# Patient Record
Sex: FEMALE | Race: WHITE | NOT HISPANIC OR LATINO | URBAN - METROPOLITAN AREA
[De-identification: names, ages, dates, MRNs, and addresses within clinical notes are randomized per-mention and may not be internally consistent; named-entity substitution may affect disease eponyms.]

---

## 2017-11-07 ENCOUNTER — EMERGENCY (EMERGENCY)
Facility: HOSPITAL | Age: 67
LOS: 1 days | Discharge: HOME | End: 2017-11-07
Attending: EMERGENCY MEDICINE | Admitting: EMERGENCY MEDICINE
Payer: MEDICARE

## 2017-11-07 VITALS
DIASTOLIC BLOOD PRESSURE: 90 MMHG | RESPIRATION RATE: 27 BRPM | HEART RATE: 93 BPM | OXYGEN SATURATION: 94 % | SYSTOLIC BLOOD PRESSURE: 180 MMHG | TEMPERATURE: 98 F

## 2017-11-07 VITALS
TEMPERATURE: 98 F | HEART RATE: 88 BPM | DIASTOLIC BLOOD PRESSURE: 100 MMHG | SYSTOLIC BLOOD PRESSURE: 150 MMHG | OXYGEN SATURATION: 97 % | RESPIRATION RATE: 18 BRPM

## 2017-11-07 LAB
ALBUMIN SERPL ELPH-MCNC: 4.2 G/DL — SIGNIFICANT CHANGE UP (ref 3.3–5)
ALP SERPL-CCNC: 91 U/L — SIGNIFICANT CHANGE UP (ref 40–120)
ALT FLD-CCNC: 44 U/L RC — SIGNIFICANT CHANGE UP (ref 10–45)
ANION GAP SERPL CALC-SCNC: 16 MMOL/L — SIGNIFICANT CHANGE UP (ref 5–17)
APPEARANCE UR: ABNORMAL
APTT BLD: 27.7 SEC — SIGNIFICANT CHANGE UP (ref 27.5–37.4)
AST SERPL-CCNC: 49 U/L — HIGH (ref 10–40)
BACTERIA # UR AUTO: ABNORMAL /HPF
BASOPHILS # BLD AUTO: 0 K/UL — SIGNIFICANT CHANGE UP (ref 0–0.2)
BASOPHILS NFR BLD AUTO: 0.6 % — SIGNIFICANT CHANGE UP (ref 0–2)
BILIRUB SERPL-MCNC: 0.4 MG/DL — SIGNIFICANT CHANGE UP (ref 0.2–1.2)
BILIRUB UR-MCNC: NEGATIVE — SIGNIFICANT CHANGE UP
BLD GP AB SCN SERPL QL: NEGATIVE — SIGNIFICANT CHANGE UP
BUN SERPL-MCNC: 16 MG/DL — SIGNIFICANT CHANGE UP (ref 7–23)
CALCIUM SERPL-MCNC: 9.1 MG/DL — SIGNIFICANT CHANGE UP (ref 8.4–10.5)
CHLORIDE SERPL-SCNC: 101 MMOL/L — SIGNIFICANT CHANGE UP (ref 96–108)
CK SERPL-CCNC: 90 U/L — SIGNIFICANT CHANGE UP (ref 25–170)
CO2 SERPL-SCNC: 28 MMOL/L — SIGNIFICANT CHANGE UP (ref 22–31)
COLOR SPEC: SIGNIFICANT CHANGE UP
CREAT SERPL-MCNC: 0.92 MG/DL — SIGNIFICANT CHANGE UP (ref 0.5–1.3)
DIFF PNL FLD: ABNORMAL
EOSINOPHIL # BLD AUTO: 0.1 K/UL — SIGNIFICANT CHANGE UP (ref 0–0.5)
EOSINOPHIL NFR BLD AUTO: 1.2 % — SIGNIFICANT CHANGE UP (ref 0–6)
EPI CELLS # UR: SIGNIFICANT CHANGE UP /HPF
ETHANOL SERPL-MCNC: 13 MG/DL — HIGH (ref 0–10)
GLUCOSE SERPL-MCNC: 100 MG/DL — HIGH (ref 70–99)
GLUCOSE UR QL: NEGATIVE — SIGNIFICANT CHANGE UP
HCT VFR BLD CALC: 40.4 % — SIGNIFICANT CHANGE UP (ref 34.5–45)
HGB BLD-MCNC: 13.9 G/DL — SIGNIFICANT CHANGE UP (ref 11.5–15.5)
INR BLD: 1.11 RATIO — SIGNIFICANT CHANGE UP (ref 0.88–1.16)
KETONES UR-MCNC: NEGATIVE — SIGNIFICANT CHANGE UP
LEUKOCYTE ESTERASE UR-ACNC: ABNORMAL
LIDOCAIN IGE QN: 28 U/L — SIGNIFICANT CHANGE UP (ref 7–60)
LYMPHOCYTES # BLD AUTO: 2 K/UL — SIGNIFICANT CHANGE UP (ref 1–3.3)
LYMPHOCYTES # BLD AUTO: 29.5 % — SIGNIFICANT CHANGE UP (ref 13–44)
MCHC RBC-ENTMCNC: 30.9 PG — SIGNIFICANT CHANGE UP (ref 27–34)
MCHC RBC-ENTMCNC: 34.3 GM/DL — SIGNIFICANT CHANGE UP (ref 32–36)
MCV RBC AUTO: 90.2 FL — SIGNIFICANT CHANGE UP (ref 80–100)
MONOCYTES # BLD AUTO: 0.5 K/UL — SIGNIFICANT CHANGE UP (ref 0–0.9)
MONOCYTES NFR BLD AUTO: 6.8 % — SIGNIFICANT CHANGE UP (ref 2–14)
NEUTROPHILS # BLD AUTO: 4.3 K/UL — SIGNIFICANT CHANGE UP (ref 1.8–7.4)
NEUTROPHILS NFR BLD AUTO: 61.9 % — SIGNIFICANT CHANGE UP (ref 43–77)
NITRITE UR-MCNC: POSITIVE
PH UR: 6.5 — SIGNIFICANT CHANGE UP (ref 5–8)
PLATELET # BLD AUTO: 238 K/UL — SIGNIFICANT CHANGE UP (ref 150–400)
POTASSIUM SERPL-MCNC: 3.7 MMOL/L — SIGNIFICANT CHANGE UP (ref 3.5–5.3)
POTASSIUM SERPL-SCNC: 3.7 MMOL/L — SIGNIFICANT CHANGE UP (ref 3.5–5.3)
PROT SERPL-MCNC: 8 G/DL — SIGNIFICANT CHANGE UP (ref 6–8.3)
PROT UR-MCNC: 30 MG/DL
PROTHROM AB SERPL-ACNC: 12 SEC — SIGNIFICANT CHANGE UP (ref 9.8–12.7)
RBC # BLD: 4.48 M/UL — SIGNIFICANT CHANGE UP (ref 3.8–5.2)
RBC # FLD: 13 % — SIGNIFICANT CHANGE UP (ref 10.3–14.5)
RBC CASTS # UR COMP ASSIST: ABNORMAL /HPF (ref 0–2)
RH IG SCN BLD-IMP: POSITIVE — SIGNIFICANT CHANGE UP
SODIUM SERPL-SCNC: 145 MMOL/L — SIGNIFICANT CHANGE UP (ref 135–145)
SP GR SPEC: 1.01 — SIGNIFICANT CHANGE UP (ref 1.01–1.02)
TROPONIN T SERPL-MCNC: <0.01 NG/ML — SIGNIFICANT CHANGE UP (ref 0–0.06)
UROBILINOGEN FLD QL: NEGATIVE — SIGNIFICANT CHANGE UP
WBC # BLD: 7 K/UL — SIGNIFICANT CHANGE UP (ref 3.8–10.5)
WBC # FLD AUTO: 7 K/UL — SIGNIFICANT CHANGE UP (ref 3.8–10.5)
WBC UR QL: ABNORMAL /HPF (ref 0–5)

## 2017-11-07 PROCEDURE — 86900 BLOOD TYPING SEROLOGIC ABO: CPT

## 2017-11-07 PROCEDURE — 99285 EMERGENCY DEPT VISIT HI MDM: CPT | Mod: 25

## 2017-11-07 PROCEDURE — 82435 ASSAY OF BLOOD CHLORIDE: CPT

## 2017-11-07 PROCEDURE — 96374 THER/PROPH/DIAG INJ IV PUSH: CPT | Mod: XU

## 2017-11-07 PROCEDURE — 72125 CT NECK SPINE W/O DYE: CPT

## 2017-11-07 PROCEDURE — 83605 ASSAY OF LACTIC ACID: CPT

## 2017-11-07 PROCEDURE — 85610 PROTHROMBIN TIME: CPT

## 2017-11-07 PROCEDURE — 81001 URINALYSIS AUTO W/SCOPE: CPT

## 2017-11-07 PROCEDURE — 80053 COMPREHEN METABOLIC PANEL: CPT

## 2017-11-07 PROCEDURE — 82803 BLOOD GASES ANY COMBINATION: CPT

## 2017-11-07 PROCEDURE — 85014 HEMATOCRIT: CPT

## 2017-11-07 PROCEDURE — 71010: CPT | Mod: 26

## 2017-11-07 PROCEDURE — 72125 CT NECK SPINE W/O DYE: CPT | Mod: 26

## 2017-11-07 PROCEDURE — 72170 X-RAY EXAM OF PELVIS: CPT | Mod: 26

## 2017-11-07 PROCEDURE — 84295 ASSAY OF SERUM SODIUM: CPT

## 2017-11-07 PROCEDURE — 84484 ASSAY OF TROPONIN QUANT: CPT

## 2017-11-07 PROCEDURE — 70450 CT HEAD/BRAIN W/O DYE: CPT | Mod: 26

## 2017-11-07 PROCEDURE — 70450 CT HEAD/BRAIN W/O DYE: CPT

## 2017-11-07 PROCEDURE — 93010 ELECTROCARDIOGRAM REPORT: CPT | Mod: 59

## 2017-11-07 PROCEDURE — 85027 COMPLETE CBC AUTOMATED: CPT

## 2017-11-07 PROCEDURE — 72170 X-RAY EXAM OF PELVIS: CPT

## 2017-11-07 PROCEDURE — 90471 IMMUNIZATION ADMIN: CPT

## 2017-11-07 PROCEDURE — 84132 ASSAY OF SERUM POTASSIUM: CPT

## 2017-11-07 PROCEDURE — 85730 THROMBOPLASTIN TIME PARTIAL: CPT

## 2017-11-07 PROCEDURE — 82330 ASSAY OF CALCIUM: CPT

## 2017-11-07 PROCEDURE — 86850 RBC ANTIBODY SCREEN: CPT

## 2017-11-07 PROCEDURE — 82550 ASSAY OF CK (CPK): CPT

## 2017-11-07 PROCEDURE — 93005 ELECTROCARDIOGRAM TRACING: CPT

## 2017-11-07 PROCEDURE — 86901 BLOOD TYPING SEROLOGIC RH(D): CPT

## 2017-11-07 PROCEDURE — 82947 ASSAY GLUCOSE BLOOD QUANT: CPT

## 2017-11-07 PROCEDURE — 80307 DRUG TEST PRSMV CHEM ANLYZR: CPT

## 2017-11-07 PROCEDURE — 87186 SC STD MICRODIL/AGAR DIL: CPT

## 2017-11-07 PROCEDURE — 90715 TDAP VACCINE 7 YRS/> IM: CPT

## 2017-11-07 PROCEDURE — 87086 URINE CULTURE/COLONY COUNT: CPT

## 2017-11-07 PROCEDURE — 99284 EMERGENCY DEPT VISIT MOD MDM: CPT | Mod: 25,GC

## 2017-11-07 PROCEDURE — 71045 X-RAY EXAM CHEST 1 VIEW: CPT

## 2017-11-07 PROCEDURE — 82553 CREATINE MB FRACTION: CPT

## 2017-11-07 PROCEDURE — 83690 ASSAY OF LIPASE: CPT

## 2017-11-07 PROCEDURE — 12002 RPR S/N/AX/GEN/TRNK2.6-7.5CM: CPT | Mod: GC

## 2017-11-07 RX ORDER — TETANUS TOXOID, REDUCED DIPHTHERIA TOXOID AND ACELLULAR PERTUSSIS VACCINE, ADSORBED 5; 2.5; 8; 8; 2.5 [IU]/.5ML; [IU]/.5ML; UG/.5ML; UG/.5ML; UG/.5ML
0.5 SUSPENSION INTRAMUSCULAR ONCE
Qty: 0 | Refills: 0 | Status: COMPLETED | OUTPATIENT
Start: 2017-11-07 | End: 2017-11-07

## 2017-11-07 RX ORDER — CEFTRIAXONE 500 MG/1
1 INJECTION, POWDER, FOR SOLUTION INTRAMUSCULAR; INTRAVENOUS ONCE
Qty: 0 | Refills: 0 | Status: COMPLETED | OUTPATIENT
Start: 2017-11-07 | End: 2017-11-07

## 2017-11-07 RX ORDER — CEPHALEXIN 500 MG
1 CAPSULE ORAL
Qty: 20 | Refills: 0 | OUTPATIENT
Start: 2017-11-07 | End: 2017-11-17

## 2017-11-07 RX ADMIN — TETANUS TOXOID, REDUCED DIPHTHERIA TOXOID AND ACELLULAR PERTUSSIS VACCINE, ADSORBED 0.5 MILLILITER(S): 5; 2.5; 8; 8; 2.5 SUSPENSION INTRAMUSCULAR at 22:12

## 2017-11-07 RX ADMIN — CEFTRIAXONE 100 GRAM(S): 500 INJECTION, POWDER, FOR SOLUTION INTRAMUSCULAR; INTRAVENOUS at 22:12

## 2017-11-07 NOTE — ED PROVIDER NOTE - PHYSICAL EXAMINATION
Physical Exam: elderly F who is in mild distress, GCS 15 , PERRL, 3 mm in diameter, EOMI without diplopia or discomfort, C-collar in place, trachea midline, no stridor, CTAB, NRRR. No chest wall tenderness, deformity or crepitus. No abdominal tenderness or guarding. No signs of external trauma to chest and abdomen. No tenderness or deformity to maxillo-facial, cervical/thoracic/lumbar vertebrae, clavicles, hips. Pelvis stable. Extremities neurovascularly intact with soft compartments. No focal sensory or motor deficits. Skin with V-shaped laceration about 3 cm on either side w/ underlying hematoma, and abrasion on the R forearm.

## 2017-11-07 NOTE — ED PROVIDER NOTE - MEDICAL DECISION MAKING DETAILS
Todd Ramos MD (resident): 67 F who was BIBEMS for fall w/ unknown mechanism w/ injury to back of head. Trauma lvl 2. If pt is cleared by trauma, will require admission for possible syncope.

## 2017-11-07 NOTE — ED PROVIDER NOTE - OBJECTIVE STATEMENT
Todd Ramos MD (resident): 67 F who was BIBEMS from home for fall w/ head lac. Pt called EMS after she had a fall while on the stairs. She does not recall how many steps she fell down, she does not recall the reason for the fall, unsure about LOC. She was found ambulatory in the home by EMS, who found a pool of blood at the bottom of the stairs. Unknown last Tdap.

## 2017-11-07 NOTE — ED PROVIDER NOTE - PROGRESS NOTE DETAILS
Todd Ramos MD (resident): pt offered admission for workup of syncope and unknown cause of her fall, but pt declines, and would prefer to see her doctor who she has an appoinatment with tomorrow. Pt is aware of possibility to miss cardiac cause like arrhythmia and cardiac events. Dr. Bagley (Attending Physician)  Pt. refused xrays but was nontender over her right arm.  We recommended admission for work-up of her syncope but patient refused.  Has follow-up appt with pmd tomorrow. She understands the risks of going home including arrythmia as cause of fall. We treated her UTI with ceftriaxone.

## 2017-11-07 NOTE — ED PROVIDER NOTE - NS ED ROS FT
No fever, no chills, no change in vision, no change in hearing, no chest pain, no shortness of breath, no abdominal pain, no vomiting, no dysuria, no muscle pain, no rashes, + head pain, + lac, unsure about loss of consciousness. ~ Todd Ramos MD

## 2017-11-07 NOTE — ED PROVIDER NOTE - ATTENDING CONTRIBUTION TO CARE
Dr. Bagley (Attending Physician)  Pt. with likely fall down stairs but does not recall event possible syncope.  Blood found at floor of steps and in bathroom of apt. no recollection of events.  Pt. denies any chest abd. or extremity pain.  Y shaped laceration to top of head 5 cm.  Abrasion to right forearm. Will CT head, neck, cxr, pelvis xray right arm xray and reassess. Will repair lac.

## 2017-11-07 NOTE — ED PROVIDER NOTE - PLAN OF CARE
1) Please follow-up with your Primary Medical Doctor in 3-5 days. If you need to find a new physician, please call [phone].  2) Return to the Emergency Department if you experiences: chest pain, shortness of breath, fainting, dizziness, or symptoms that are new or recurrent.  3) If you have any questions or concerns, do not hesitate to contact us at [phone].  4) You had your laceration repaired with sutures or staples. Please keep the wound site clean and dry for 24 hours. Afterwards, change the bandaging twice a day and check for any signs of infection. If you notice any redness, swelling, or drainage, return immediately to the Emergency Department. Otherwise, please return to the ED or go to your Primary Care Physician to have the staples removed in 10 days.

## 2017-11-08 NOTE — CONSULT NOTE ADULT - ATTENDING COMMENTS
I have seen and examined this patient and agree with above. 67 year old female fall at home. Primary survey intact. Secondary survey reveals posterior salp laceration. She denies chest pain, dyspnea, nausea/emesis and abdominal pain. CT head and c spine negative for traumatic injury. Patient with positive urinalysis. No acute trauma intervention. Should receive treatment for UTI as this could have attributed to fall.

## 2017-11-10 NOTE — ED POST DISCHARGE NOTE - RESULT SUMMARY
Urine culture grew gram negative rods, patient adequately covered with antibiotics - HIREN Tracy MD 11/10/17

## 2017-11-11 LAB
-  AMIKACIN: SIGNIFICANT CHANGE UP
-  AMIKACIN: SIGNIFICANT CHANGE UP
-  AMPICILLIN/SULBACTAM: SIGNIFICANT CHANGE UP
-  AMPICILLIN/SULBACTAM: SIGNIFICANT CHANGE UP
-  AMPICILLIN: SIGNIFICANT CHANGE UP
-  AMPICILLIN: SIGNIFICANT CHANGE UP
-  AZTREONAM: SIGNIFICANT CHANGE UP
-  AZTREONAM: SIGNIFICANT CHANGE UP
-  CEFAZOLIN: SIGNIFICANT CHANGE UP
-  CEFAZOLIN: SIGNIFICANT CHANGE UP
-  CEFEPIME: SIGNIFICANT CHANGE UP
-  CEFEPIME: SIGNIFICANT CHANGE UP
-  CEFOXITIN: SIGNIFICANT CHANGE UP
-  CEFOXITIN: SIGNIFICANT CHANGE UP
-  CEFTAZIDIME: SIGNIFICANT CHANGE UP
-  CEFTAZIDIME: SIGNIFICANT CHANGE UP
-  CEFTRIAXONE: SIGNIFICANT CHANGE UP
-  CEFTRIAXONE: SIGNIFICANT CHANGE UP
-  CIPROFLOXACIN: SIGNIFICANT CHANGE UP
-  CIPROFLOXACIN: SIGNIFICANT CHANGE UP
-  ERTAPENEM: SIGNIFICANT CHANGE UP
-  ERTAPENEM: SIGNIFICANT CHANGE UP
-  GENTAMICIN: SIGNIFICANT CHANGE UP
-  GENTAMICIN: SIGNIFICANT CHANGE UP
-  IMIPENEM: SIGNIFICANT CHANGE UP
-  IMIPENEM: SIGNIFICANT CHANGE UP
-  LEVOFLOXACIN: SIGNIFICANT CHANGE UP
-  LEVOFLOXACIN: SIGNIFICANT CHANGE UP
-  MEROPENEM: SIGNIFICANT CHANGE UP
-  MEROPENEM: SIGNIFICANT CHANGE UP
-  NITROFURANTOIN: SIGNIFICANT CHANGE UP
-  NITROFURANTOIN: SIGNIFICANT CHANGE UP
-  PIPERACILLIN/TAZOBACTAM: SIGNIFICANT CHANGE UP
-  PIPERACILLIN/TAZOBACTAM: SIGNIFICANT CHANGE UP
-  TOBRAMYCIN: SIGNIFICANT CHANGE UP
-  TOBRAMYCIN: SIGNIFICANT CHANGE UP
-  TRIMETHOPRIM/SULFAMETHOXAZOLE: SIGNIFICANT CHANGE UP
-  TRIMETHOPRIM/SULFAMETHOXAZOLE: SIGNIFICANT CHANGE UP
CULTURE RESULTS: SIGNIFICANT CHANGE UP
METHOD TYPE: SIGNIFICANT CHANGE UP
METHOD TYPE: SIGNIFICANT CHANGE UP
ORGANISM # SPEC MICROSCOPIC CNT: SIGNIFICANT CHANGE UP
SPECIMEN SOURCE: SIGNIFICANT CHANGE UP

## 2017-11-21 ENCOUNTER — EMERGENCY (EMERGENCY)
Facility: HOSPITAL | Age: 67
LOS: 1 days | Discharge: HOME | End: 2017-11-21
Attending: EMERGENCY MEDICINE | Admitting: EMERGENCY MEDICINE
Payer: MEDICARE

## 2017-11-21 VITALS
SYSTOLIC BLOOD PRESSURE: 188 MMHG | DIASTOLIC BLOOD PRESSURE: 105 MMHG | RESPIRATION RATE: 18 BRPM | HEART RATE: 84 BPM | OXYGEN SATURATION: 96 %

## 2017-11-21 VITALS
HEART RATE: 66 BPM | TEMPERATURE: 99 F | DIASTOLIC BLOOD PRESSURE: 100 MMHG | OXYGEN SATURATION: 93 % | SYSTOLIC BLOOD PRESSURE: 189 MMHG | RESPIRATION RATE: 18 BRPM

## 2017-11-21 PROCEDURE — 99284 EMERGENCY DEPT VISIT MOD MDM: CPT | Mod: GC

## 2017-11-21 PROCEDURE — 99282 EMERGENCY DEPT VISIT SF MDM: CPT

## 2017-11-21 NOTE — ED PROVIDER NOTE - ATTENDING CONTRIBUTION TO CARE
Attending MD Gibbs:  I personally have seen and examined this patient.  Resident note reviewed and agree on plan of care and except where noted.  See MDM for details.

## 2017-11-21 NOTE — ED PROVIDER NOTE - MEDICAL DECISION MAKING DETAILS
Attending MD Gibbs: 68 yo female sp fall on 11/7 sustaining laceration to scalp.  Patient here for staple removal.  In triage found to have elevated BP.  Pt on no meds but has been on BP meds in past.  Patient denies chest pain, palpitations, SOB, or diaphoresis.  No HA, dizziness or nausea.  Pt has had a UTI at that time and was started on Keflex.  On exam there is a stapled wound to top of scalp.  Plan: suture removal and contact patient's PMD about her BP.  Dr. Gil Feliciano.

## 2017-11-21 NOTE — ED PROVIDER NOTE - PROGRESS NOTE DETAILS
Vinay SEGOVIA: Spoke with pt's PMD, Dr. Gil Min ([phone]). Pt has hx of HTN but has been off meds for some time. PMD is sending out a prescription for losartan that she will take until follow up in clinic next week. Discussed with patient who agreed.

## 2017-11-21 NOTE — ED PROVIDER NOTE - PLAN OF CARE
Your blood pressure was elevated in the ED today. Your primary care doctor wishes for you to Start Losartan 50mg once a day. He wants the patient to follow up in clinic next week.

## 2017-11-21 NOTE — ED PROVIDER NOTE - CARE PLAN
Principal Discharge DX:	Essential hypertension  Instructions for follow-up, activity and diet:	Your blood pressure was elevated in the ED today. Your primary care doctor wishes for you to Start Losartan 50mg once a day. He wants the patient to follow up in clinic next week.  Secondary Diagnosis:	Scalp laceration, subsequent encounter

## 2017-11-21 NOTE — ED PROVIDER NOTE - OBJECTIVE STATEMENT
67F presents back to ED for suture removal. Pt fell several weeks ago and suffered scalp lac. Pt Feeling well now. Was found to have  in triage today. Pt states she is nervous for suture removal. Was previously on antihypertensives but stopped 2/2 improved BP. Denies HA, chest pain, SOB, abd pain, n/v/d.

## 2017-11-21 NOTE — ED ADULT NURSE NOTE - OBJECTIVE STATEMENT
Pt presents to ED awake, alert and ambulatory, coming to the ER for staple removal. Pt states on 11/7, she fell and had a laceration on her occiput sutured and was told to come back here to have her staples removed. Pt reports no pain or discharge from the sutures. No recent falls since then. Pt reports feeling very anxious and nervous about the pain of getting her staples removed. Triage RN measured multiple high BPs. Pt states she is not being treated for hypertension anymore. Staples are clean dry and intact on top of patient's scalp in her hair. No discharge noted. Pt appears A&Ox4, but anxious. Pt has spinal stenosis and is more comfortable standing than sitting. Dr. Gibbs at bedside states she will set pt up with an appt at her primary tomorrow to discuss her high BP. Pt presents to ED awake, alert and ambulatory, coming to the ER for staple removal. Pt states on 11/7, she fell and had a laceration on her occiput sutured and was told to come back here to have her staples removed. Pt reports no pain or discharge from the sutures. No recent falls since then. Pt reports feeling very anxious and nervous about the pain of getting her staples removed. Triage RN measured multiple high BPs. Pt states she is not being treated for hypertension anymore. Staples are clean dry and intact on top of patient's scalp in her hair. No discharge noted. Pt appears A&Ox4, but anxious. Pt has spinal stenosis and is more comfortable standing than sitting. Dr. Gibbs at bedside states she will set pt up with an appt at her primary tomorrow to discuss her high BP. Pt denies chest pain, SOB or any other symptoms concerning her.

## 2018-01-11 NOTE — ED ADULT NURSE NOTE - CAS EDN DISCHARGE ASSESSMENT
Clinically, heart failure is well compensated without evidence of significant volume overload.  On device interrogation, OptiVol shows possible fluid accumulation 12/18/2017, ongoing.  Continue present management.     Alert and oriented to person, place and time

## 2018-05-09 ENCOUNTER — EMERGENCY (EMERGENCY)
Facility: HOSPITAL | Age: 68
LOS: 1 days | Discharge: HOME | End: 2018-05-09
Attending: EMERGENCY MEDICINE | Admitting: EMERGENCY MEDICINE
Payer: MEDICARE

## 2018-05-09 VITALS
HEART RATE: 92 BPM | RESPIRATION RATE: 20 BRPM | SYSTOLIC BLOOD PRESSURE: 140 MMHG | OXYGEN SATURATION: 98 % | DIASTOLIC BLOOD PRESSURE: 86 MMHG

## 2018-05-09 DIAGNOSIS — I10 ESSENTIAL (PRIMARY) HYPERTENSION: ICD-10-CM

## 2018-05-09 DIAGNOSIS — R63.8 OTHER SYMPTOMS AND SIGNS CONCERNING FOOD AND FLUID INTAKE: ICD-10-CM
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
## 2018-05-09 DIAGNOSIS — Z29.9 ENCOUNTER FOR PROPHYLACTIC MEASURES, UNSPECIFIED: ICD-10-CM

## 2018-05-09 LAB
ALBUMIN SERPL ELPH-MCNC: 4.1 G/DL — SIGNIFICANT CHANGE UP (ref 3.3–5)
ALP SERPL-CCNC: 101 U/L — SIGNIFICANT CHANGE UP (ref 40–120)
ALT FLD-CCNC: 57 U/L — HIGH (ref 10–45)
ANION GAP SERPL CALC-SCNC: 18 MMOL/L — HIGH (ref 5–17)
AST SERPL-CCNC: 74 U/L — HIGH (ref 10–40)
BASOPHILS # BLD AUTO: 0 K/UL — SIGNIFICANT CHANGE UP (ref 0–0.2)
BASOPHILS NFR BLD AUTO: 0.2 % — SIGNIFICANT CHANGE UP (ref 0–2)
BILIRUB SERPL-MCNC: 0.3 MG/DL — SIGNIFICANT CHANGE UP (ref 0.2–1.2)
BUN SERPL-MCNC: 18 MG/DL — SIGNIFICANT CHANGE UP (ref 7–23)
CALCIUM SERPL-MCNC: 9.1 MG/DL — SIGNIFICANT CHANGE UP (ref 8.4–10.5)
CHLORIDE SERPL-SCNC: 102 MMOL/L — SIGNIFICANT CHANGE UP (ref 96–108)
CK MB BLD-MCNC: 4.1 % — HIGH (ref 0–3.5)
CK MB CFR SERPL CALC: 4.6 NG/ML — HIGH (ref 0–3.8)
CK SERPL-CCNC: 113 U/L — SIGNIFICANT CHANGE UP (ref 25–170)
CO2 SERPL-SCNC: 21 MMOL/L — LOW (ref 22–31)
CREAT SERPL-MCNC: 0.88 MG/DL — SIGNIFICANT CHANGE UP (ref 0.5–1.3)
EOSINOPHIL # BLD AUTO: 0.1 K/UL — SIGNIFICANT CHANGE UP (ref 0–0.5)
EOSINOPHIL NFR BLD AUTO: 0.6 % — SIGNIFICANT CHANGE UP (ref 0–6)
GLUCOSE SERPL-MCNC: 126 MG/DL — HIGH (ref 70–99)
HCT VFR BLD CALC: 41.7 % — SIGNIFICANT CHANGE UP (ref 34.5–45)
HGB BLD-MCNC: 13.7 G/DL — SIGNIFICANT CHANGE UP (ref 11.5–15.5)
LYMPHOCYTES # BLD AUTO: 2.2 K/UL — SIGNIFICANT CHANGE UP (ref 1–3.3)
LYMPHOCYTES # BLD AUTO: 22.9 % — SIGNIFICANT CHANGE UP (ref 13–44)
MCHC RBC-ENTMCNC: 29.9 PG — SIGNIFICANT CHANGE UP (ref 27–34)
MCHC RBC-ENTMCNC: 32.8 GM/DL — SIGNIFICANT CHANGE UP (ref 32–36)
MCV RBC AUTO: 91.1 FL — SIGNIFICANT CHANGE UP (ref 80–100)
MONOCYTES # BLD AUTO: 0.5 K/UL — SIGNIFICANT CHANGE UP (ref 0–0.9)
MONOCYTES NFR BLD AUTO: 5 % — SIGNIFICANT CHANGE UP (ref 2–14)
NEUTROPHILS # BLD AUTO: 6.7 K/UL — SIGNIFICANT CHANGE UP (ref 1.8–7.4)
NEUTROPHILS NFR BLD AUTO: 71.2 % — SIGNIFICANT CHANGE UP (ref 43–77)
PLATELET # BLD AUTO: 276 K/UL — SIGNIFICANT CHANGE UP (ref 150–400)
POTASSIUM SERPL-MCNC: 4.1 MMOL/L — SIGNIFICANT CHANGE UP (ref 3.5–5.3)
POTASSIUM SERPL-SCNC: 4.1 MMOL/L — SIGNIFICANT CHANGE UP (ref 3.5–5.3)
PROT SERPL-MCNC: 8.1 G/DL — SIGNIFICANT CHANGE UP (ref 6–8.3)
RBC # BLD: 4.57 M/UL — SIGNIFICANT CHANGE UP (ref 3.8–5.2)
RBC # FLD: 12.3 % — SIGNIFICANT CHANGE UP (ref 10.3–14.5)
SODIUM SERPL-SCNC: 141 MMOL/L — SIGNIFICANT CHANGE UP (ref 135–145)
TROPONIN T SERPL-MCNC: <0.01 NG/ML — SIGNIFICANT CHANGE UP (ref 0–0.06)
WBC # BLD: 9.5 K/UL — SIGNIFICANT CHANGE UP (ref 3.8–10.5)
WBC # FLD AUTO: 9.5 K/UL — SIGNIFICANT CHANGE UP (ref 3.8–10.5)

## 2018-05-09 PROCEDURE — 93010 ELECTROCARDIOGRAM REPORT: CPT

## 2018-05-09 PROCEDURE — 99285 EMERGENCY DEPT VISIT HI MDM: CPT | Mod: 25

## 2018-05-09 PROCEDURE — 73080 X-RAY EXAM OF ELBOW: CPT | Mod: 26,LT

## 2018-05-09 PROCEDURE — 72170 X-RAY EXAM OF PELVIS: CPT | Mod: 26

## 2018-05-09 PROCEDURE — 71045 X-RAY EXAM CHEST 1 VIEW: CPT | Mod: 26

## 2018-05-09 PROCEDURE — 70450 CT HEAD/BRAIN W/O DYE: CPT | Mod: 26

## 2018-05-09 PROCEDURE — 73030 X-RAY EXAM OF SHOULDER: CPT | Mod: 26,LT

## 2018-05-09 RX ORDER — TETANUS TOXOID, REDUCED DIPHTHERIA TOXOID AND ACELLULAR PERTUSSIS VACCINE, ADSORBED 5; 2.5; 8; 8; 2.5 [IU]/.5ML; [IU]/.5ML; UG/.5ML; UG/.5ML; UG/.5ML
0.5 SUSPENSION INTRAMUSCULAR ONCE
Qty: 0 | Refills: 0 | Status: COMPLETED | OUTPATIENT
Start: 2018-05-09 | End: 2018-05-09

## 2018-05-09 RX ORDER — ALPRAZOLAM 0.25 MG
0.25 TABLET ORAL ONCE
Qty: 0 | Refills: 0 | Status: DISCONTINUED | OUTPATIENT
Start: 2018-05-09 | End: 2018-05-10

## 2018-05-09 RX ORDER — SODIUM CHLORIDE 9 MG/ML
500 INJECTION INTRAMUSCULAR; INTRAVENOUS; SUBCUTANEOUS ONCE
Qty: 0 | Refills: 0 | Status: COMPLETED | OUTPATIENT
Start: 2018-05-09 | End: 2018-05-09

## 2018-05-09 RX ORDER — HEPARIN SODIUM 5000 [USP'U]/ML
5000 INJECTION INTRAVENOUS; SUBCUTANEOUS EVERY 12 HOURS
Qty: 0 | Refills: 0 | Status: DISCONTINUED | OUTPATIENT
Start: 2018-05-09 | End: 2018-05-10

## 2018-05-09 RX ORDER — TRAZODONE HCL 50 MG
50 TABLET ORAL AT BEDTIME
Qty: 0 | Refills: 0 | Status: DISCONTINUED | OUTPATIENT
Start: 2018-05-09 | End: 2018-05-10

## 2018-05-09 RX ADMIN — SODIUM CHLORIDE 500 MILLILITER(S): 9 INJECTION INTRAMUSCULAR; INTRAVENOUS; SUBCUTANEOUS at 18:06

## 2018-05-09 RX ADMIN — TETANUS TOXOID, REDUCED DIPHTHERIA TOXOID AND ACELLULAR PERTUSSIS VACCINE, ADSORBED 0.5 MILLILITER(S): 5; 2.5; 8; 8; 2.5 SUSPENSION INTRAMUSCULAR at 18:06

## 2018-05-09 NOTE — H&P ADULT - HISTORY OF PRESENT ILLNESS
This is a 67 year old female with PMH HTN BIBEMS s/p fall down carpeted stairs at home c/o left arm pain and lip pain. Patient reports she was walking down stairs when she tripped  down 3-6 steps, but is unsure of how she fell and admits to poor memory of event. Reports she felt well this AM, went about normal activity, and denies any dizziness/CP/palpitations prior to fall or now. Pt c/o bleeding from mouth and lower lip pain. Per EMS, pt ambulatory at scene. Denies any blood thinner use. Denies any fever/chills, recent illness, vision changes, headache, SOB, abd pain, n/v, dysuria, neck pain, back pain, numbness/tingling. Pt given 5mg morphine IV and 4mg zofran in ambulance. Unknown last tetanus.    In ED, Tmax 98.7, HR 89, /77, satting well on RA.  Labs notable for WBC 9.6, Hgb 13.7, Plt 276, normal Cr, AST 74, ALT 57, troponin negative. Imaging without fracture. Received 500cc NS, TDAP. This is a 67 year old female with PMH HTN BIBEMS after fall.   Patient reports she was walking down stairs when she tripped  down 3-6 steps, but is unsure of how she fell and admits to poor memory of event. Reports she felt well this AM, went about normal activity, and denies any dizziness/CP/palpitations prior to fall or now. Pt c/o bleeding from mouth and lower lip pain. Per EMS, pt ambulatory at scene. Denies any blood thinner use. Denies any fever/chills, recent illness, vision changes, headache, SOB, abd pain, n/v, dysuria, neck pain, back pain, numbness/tingling. Pt given 5mg morphine IV and 4mg zofran in ambulance. Unknown last tetanus.    In ED, Tmax 98.7, HR 89, /77, satting well on RA.  Labs notable for WBC 9.6, Hgb 13.7, Plt 276, normal Cr, AST 74, ALT 57, troponin negative. Imaging without fracture. Received 500cc NS, TDAP.

## 2018-05-09 NOTE — ED PROVIDER NOTE - PROGRESS NOTE DETAILS
Spoke with patient and recommended further observation for serial trop/ekg and telemetry monitoring overnight. Patient not willing to stay and also refusing anything be done for her lower lip laceration.   Patient lives alone without nearby family and reports she only has friends around. Per patient request, also called and spoke with patient's friend/neighbor, Lisbet, who called EMS for patient today.  Per Lisbet, patient is at times "out of it" and "spacey." Today patient called Lisbet after her fall and Lisbet reports patient was acting like baseline self after incident.   The patient has decided to leave against medical advice.  The patient is AAOx3, not intoxicated, and displays normal decision making ability. We discussed all risks of leaving including but not limited to permanent disability, injury, and death.  Patient was instructed that she is welcome to change her mind and return to the emergency department at any time and for any reason in order to allow us to render care. D/w Dr. Baker. - Aubree Kaur PA-C ATTG: : patient refusing lip repair. patient refusing to stay in the hospital. we called her neighbor / friend who is very familiar with patient and came to ER to pick her up. patient does not want to stay in the hospital. Per neighbor / friend patient is a baseline. The patient has decided to leave against medical advice.  The patient is AAOx3, not intoxicated, and displays normal decision making ability. We discussed all risks, benefits, and alternatives to the progression of treatment and the potential dangers of leaving including but not limited to permanent disability, injury, and death.  The patient was instructed that they are welcome to change their decision to leave against medical advice and return to the emergency department at any time and for any reason in order to allow us to render care. We have provided the patient with a copy of all available results. The patient was instructed to bring the results to the pmd and follow up closely. Patient feeling weak and unsteady on her feet, does not feel safe going home with stairs to climb tonight. Reports she is now willing to stay for further syncope workup. Will admit. - Aubree Kaur PA-C

## 2018-05-09 NOTE — ED PROVIDER NOTE - ATTENDING CONTRIBUTION TO CARE
66 y/o f with pmhx HTN presents by [facility] for evaluation after a fall with left shoulder pain and knee pain. She was walking down approx 5 steps (4 feet) and fell. unclear cause of fall and cannot recall what she was doing prior to the fall. not witnessed. unknown if LOC. no chest pain or heart palp.   GCS 15, ABCDE intact  Gen.  no acute respsiratory distress  HEENT:  PERRL EOMI no racoon no duncan sign neck no step off or tenderness.   Lungs:  ctab/l   CVS: S1S2   Abd;  soft non tender  Ext: no edema, abrasion to left elbow, left knee. full range of motion. no deformity  Neuro: aaxo3 no focal deficits  MSK: 5/ 5 x 4 ext

## 2018-05-09 NOTE — H&P ADULT - NSHPPHYSICALEXAM_GEN_ALL_CORE
GENERAL: NAD, well-developed  HEAD:  atraumatic, normocephalic  ENT: EOMI, PERRLA, conjunctiva and sclera clear, neck supple, no JVD, moist mucosa, no LAD, no thyromegaly  CHEST/LUNG: CTAB; no wheezes, rales, rhonchi  BACK: no spinal tenderness  HEART: RRR, no murmurs, rubs, or gallops  ABDOMEN: NABS, soft, nontender, nondistended  EXTREMITIES:  no clubbing, cyanosis, or edema  PSYCH: normal behavior, normal affect, euthymic  NEUROLOGY: AAOx3, non-focal, CN 2-12 intact  SKIN: normal color, no rashes or lesions GENERAL: NAD, well-developed  HEAD:  lower lip with small laceration  ENT: EOMI, PERRLA, conjunctiva and sclera clear, neck supple, no JVD, moist mucosa, no LAD, no thyromegaly  CHEST/LUNG: CTAB; no wheezes, rales, rhonchi  BACK: no spinal tenderness  HEART: RRR, no murmurs, rubs, or gallops  ABDOMEN: NABS, soft, nontender, nondistended  EXTREMITIES:  no clubbing, cyanosis, or edema  PSYCH: normal behavior, normal affect, euthymic  NEUROLOGY: AAOx3, non-focal, CN 2-12 intact  SKIN: normal color, no rashes or lesions

## 2018-05-09 NOTE — ED PROVIDER NOTE - MEDICAL DECISION MAKING DETAILS
ATTG: : fall unclear cause, concern for syncopal event. will check cardiac work up, check labs,. ct head, c spine and xray chest, elbow and knee.

## 2018-05-09 NOTE — ED PROVIDER NOTE - OTHER FINDINGS
How Severe Is Your Skin Lesion?: mild Has Your Skin Lesion Been Treated?: not been treated Is This A New Presentation, Or A Follow-Up?: Skin Lesion pvcs, inverted t lead avl, no st elevations

## 2018-05-09 NOTE — H&P ADULT - PROBLEM SELECTOR PLAN 1
--history suggests mechanical  --telemetry, though doubt cardiogenic  --orthostatic vitals  --B12, folate, TSH  --PT eval

## 2018-05-09 NOTE — H&P ADULT - NSHPLABSRESULTS_GEN_ALL_CORE
Labs personally reviewed.                        13.7   9.5   )-----------( 276      ( 09 May 2018 18:23 )             41.7     05-09    141  |  102  |  18  ----------------------------<  126<H>  4.1   |  21<L>  |  0.88    Ca    9.1      09 May 2018 18:23    TPro  8.1  /  Alb  4.1  /  TBili  0.3  /  DBili  x   /  AST  74<H>  /  ALT  57<H>  /  AlkPhos  101  05-09    CARDIAC MARKERS ( 09 May 2018 18:23 )  x     / <0.01 ng/mL / 113 U/L / x     / 4.6 ng/mL      LIVER FUNCTIONS - ( 09 May 2018 18:23 )  Alb: 4.1 g/dL / Pro: 8.1 g/dL / ALK PHOS: 101 U/L / ALT: 57 U/L / AST: 74 U/L / GGT: x               Imaging personally reviewed.      Tracing personally reviewed.

## 2018-05-09 NOTE — ED ADULT NURSE NOTE - OBJECTIVE STATEMENT
67 y.o. Female presents to the ED via EMS from home for fall. A&Ox3. Hx "hypertension and hypotension." As per pt, she tripped and fell down 3-6 steps. Denies LOC or hitting head. Pt states she hit her lip against the wall next to the stairs. EMS reports giving pt 5mg of morphine and 4mg of Zofran prior to arrival through 20g IV on R lower arm. Patient arrived with sling on L arm. +ROM in all four extremities upon assessment. Abrasion noted on L medial lower arm - approx 3 inches - not actively bleeding. Abrasion noted on b/l knees - not actively bleeding. Lac noted on lower lip - not actively bleeding, dry blood noted around lips. Neuro intact. PERRL. Denies any pain, CP, SOB, N/V/D, urinary/bowel complications, fever/chills. Pt is in no current distress. Comfort and safety provided. Will continue to monitor. Dr. Cohen at bedside for assessment.

## 2018-05-09 NOTE — ED PROVIDER NOTE - EXTREMITY EXAM
full ROM intact upper and lower extremities without any joint ttp; + linear abrasion to left proximal forearm, +small abrasion to left knee, + small abrasion to right shin

## 2018-05-09 NOTE — ED PROVIDER NOTE - CARE PLAN
Principal Discharge DX:	Fall, initial encounter  Secondary Diagnosis:	Lip laceration  Secondary Diagnosis:	Shoulder pain, left Principal Discharge DX:	Fall, initial encounter  Assessment and plan of treatment:	1. Rest, Stay well hydrated.   2. Take Tylenol 650mg every 6-8 hours as needed for pain.  3. Follow up with your PCP within 48-72 hours. Bring copy of printed results with you.   4. Return to ER for any worsening pain, vomiting, dizziness, weakness, changes in vision, numbness/tingling, chest pain, or any other concerning symptoms.  Secondary Diagnosis:	Lip laceration  Secondary Diagnosis:	Shoulder pain, left

## 2018-05-09 NOTE — ED PROVIDER NOTE - PLAN OF CARE
1. Rest, Stay well hydrated.   2. Take Tylenol 650mg every 6-8 hours as needed for pain.  3. Follow up with your PCP within 48-72 hours. Bring copy of printed results with you.   4. Return to ER for any worsening pain, vomiting, dizziness, weakness, changes in vision, numbness/tingling, chest pain, or any other concerning symptoms.

## 2018-05-09 NOTE — ED PROVIDER NOTE - OBJECTIVE STATEMENT
68yo F with PMH HTN BIBEMS s/p fall down carpeted stairs at home c/o left arm pain and lip pain. Patient reports she was walking down stairs when she tripped but is unsure of how she fell and admits to poor memory of event. Reports she felt well this AM, went about normal activity, and denies any dizziness/CP/palpitations prior to fall or now. Pt c/p bleeding from mouth and lower lip pain.  Denies any blood thinner use. Denies any fever/chills, recent illness, vision changes, headache, SOB, abd pain, n/v, dysuria, neck pain, back pain, numbness/tingling. Pt given 5mg morphine IV and 4mg zofran in ambulance. Unknown last tetanus. 66yo F with PMH HTN BIBEMS s/p fall down carpeted stairs at home c/o left arm pain and lip pain. Patient reports she was walking down stairs when she tripped  down 3-6 steps, but is unsure of how she fell and admits to poor memory of event. Reports she felt well this AM, went about normal activity, and denies any dizziness/CP/palpitations prior to fall or now. Pt c/o bleeding from mouth and lower lip pain. Per EMS, pt ambulatory at scene. Denies any blood thinner use. Denies any fever/chills, recent illness, vision changes, headache, SOB, abd pain, n/v, dysuria, neck pain, back pain, numbness/tingling. Pt given 5mg morphine IV and 4mg zofran in ambulance. Unknown last tetanus. 66yo F with PMH HTN BIBEMS s/p fall down carpeted stairs at home c/o left arm pain and lip pain. Patient reports she was walking down stairs when she tripped  down 3-6 steps, but is unsure of how she fell and admits to poor memory of event. Reports she felt well this AM, went about normal activity, and denies any dizziness/CP/palpitations prior to fall or now. Pt c/o bleeding from mouth and lower lip pain. Per EMS, pt ambulatory at scene. Denies any blood thinner use. Denies any fever/chills, recent illness, vision changes, headache, SOB, abd pain, n/v, dysuria, neck pain, back pain, numbness/tingling. Pt given 5mg morphine IV and 4mg zofran in ambulance. Unknown last tetanus.    PCP Gil Martinez

## 2018-05-10 ENCOUNTER — TRANSCRIPTION ENCOUNTER (OUTPATIENT)
Age: 68
End: 2018-05-10

## 2018-05-10 VITALS
SYSTOLIC BLOOD PRESSURE: 112 MMHG | HEART RATE: 82 BPM | RESPIRATION RATE: 19 BRPM | OXYGEN SATURATION: 92 % | TEMPERATURE: 99 F | DIASTOLIC BLOOD PRESSURE: 78 MMHG

## 2018-05-10 LAB
ANION GAP SERPL CALC-SCNC: 14 MMOL/L — SIGNIFICANT CHANGE UP (ref 5–17)
BUN SERPL-MCNC: 20 MG/DL — SIGNIFICANT CHANGE UP (ref 7–23)
CALCIUM SERPL-MCNC: 9 MG/DL — SIGNIFICANT CHANGE UP (ref 8.4–10.5)
CHLORIDE SERPL-SCNC: 100 MMOL/L — SIGNIFICANT CHANGE UP (ref 96–108)
CO2 SERPL-SCNC: 24 MMOL/L — SIGNIFICANT CHANGE UP (ref 22–31)
CREAT SERPL-MCNC: 0.94 MG/DL — SIGNIFICANT CHANGE UP (ref 0.5–1.3)
FOLATE SERPL-MCNC: 5.4 NG/ML — SIGNIFICANT CHANGE UP
GLUCOSE SERPL-MCNC: 121 MG/DL — HIGH (ref 70–99)
HCT VFR BLD CALC: 40.1 % — SIGNIFICANT CHANGE UP (ref 34.5–45)
HGB BLD-MCNC: 12.6 G/DL — SIGNIFICANT CHANGE UP (ref 11.5–15.5)
MCHC RBC-ENTMCNC: 28.9 PG — SIGNIFICANT CHANGE UP (ref 27–34)
MCHC RBC-ENTMCNC: 31.5 GM/DL — LOW (ref 32–36)
MCV RBC AUTO: 91.6 FL — SIGNIFICANT CHANGE UP (ref 80–100)
PLATELET # BLD AUTO: 264 K/UL — SIGNIFICANT CHANGE UP (ref 150–400)
POTASSIUM SERPL-MCNC: 4.6 MMOL/L — SIGNIFICANT CHANGE UP (ref 3.5–5.3)
POTASSIUM SERPL-SCNC: 4.6 MMOL/L — SIGNIFICANT CHANGE UP (ref 3.5–5.3)
RBC # BLD: 4.38 M/UL — SIGNIFICANT CHANGE UP (ref 3.8–5.2)
RBC # FLD: 12.4 % — SIGNIFICANT CHANGE UP (ref 10.3–14.5)
SODIUM SERPL-SCNC: 138 MMOL/L — SIGNIFICANT CHANGE UP (ref 135–145)
TSH SERPL-MCNC: 0.9 UIU/ML — SIGNIFICANT CHANGE UP (ref 0.27–4.2)
VIT B12 SERPL-MCNC: 266 PG/ML — SIGNIFICANT CHANGE UP (ref 232–1245)
WBC # BLD: 7.9 K/UL — SIGNIFICANT CHANGE UP (ref 3.8–10.5)
WBC # FLD AUTO: 7.9 K/UL — SIGNIFICANT CHANGE UP (ref 3.8–10.5)

## 2018-05-10 PROCEDURE — 80048 BASIC METABOLIC PNL TOTAL CA: CPT

## 2018-05-10 PROCEDURE — 82746 ASSAY OF FOLIC ACID SERUM: CPT

## 2018-05-10 PROCEDURE — 73030 X-RAY EXAM OF SHOULDER: CPT

## 2018-05-10 PROCEDURE — 97161 PT EVAL LOW COMPLEX 20 MIN: CPT

## 2018-05-10 PROCEDURE — 84443 ASSAY THYROID STIM HORMONE: CPT

## 2018-05-10 PROCEDURE — G8978: CPT

## 2018-05-10 PROCEDURE — 84484 ASSAY OF TROPONIN QUANT: CPT

## 2018-05-10 PROCEDURE — 72170 X-RAY EXAM OF PELVIS: CPT

## 2018-05-10 PROCEDURE — 90715 TDAP VACCINE 7 YRS/> IM: CPT

## 2018-05-10 PROCEDURE — 85027 COMPLETE CBC AUTOMATED: CPT

## 2018-05-10 PROCEDURE — 93005 ELECTROCARDIOGRAM TRACING: CPT

## 2018-05-10 PROCEDURE — 82550 ASSAY OF CK (CPK): CPT

## 2018-05-10 PROCEDURE — 71045 X-RAY EXAM CHEST 1 VIEW: CPT

## 2018-05-10 PROCEDURE — G8979: CPT

## 2018-05-10 PROCEDURE — 99285 EMERGENCY DEPT VISIT HI MDM: CPT | Mod: 25

## 2018-05-10 PROCEDURE — 73070 X-RAY EXAM OF ELBOW: CPT

## 2018-05-10 PROCEDURE — 73020 X-RAY EXAM OF SHOULDER: CPT

## 2018-05-10 PROCEDURE — 82607 VITAMIN B-12: CPT

## 2018-05-10 PROCEDURE — 70450 CT HEAD/BRAIN W/O DYE: CPT

## 2018-05-10 PROCEDURE — 90471 IMMUNIZATION ADMIN: CPT

## 2018-05-10 PROCEDURE — 82553 CREATINE MB FRACTION: CPT

## 2018-05-10 PROCEDURE — G8980: CPT

## 2018-05-10 PROCEDURE — 80053 COMPREHEN METABOLIC PANEL: CPT

## 2018-05-10 RX ORDER — ACETAMINOPHEN 500 MG
1000 TABLET ORAL ONCE
Qty: 0 | Refills: 0 | Status: COMPLETED | OUTPATIENT
Start: 2018-05-10 | End: 2018-05-10

## 2018-05-10 RX ORDER — PREGABALIN 225 MG/1
1 CAPSULE ORAL
Qty: 0 | Refills: 0 | DISCHARGE
Start: 2018-05-10

## 2018-05-10 RX ORDER — PREGABALIN 225 MG/1
1000 CAPSULE ORAL DAILY
Qty: 0 | Refills: 0 | Status: DISCONTINUED | OUTPATIENT
Start: 2018-05-10 | End: 2018-05-10

## 2018-05-10 RX ADMIN — Medication 1000 MILLIGRAM(S): at 13:06

## 2018-05-10 RX ADMIN — Medication 400 MILLIGRAM(S): at 12:06

## 2018-05-10 RX ADMIN — HEPARIN SODIUM 5000 UNIT(S): 5000 INJECTION INTRAVENOUS; SUBCUTANEOUS at 05:07

## 2018-05-10 RX ADMIN — Medication 0.25 MILLIGRAM(S): at 00:14

## 2018-05-10 NOTE — DISCHARGE NOTE ADULT - MEDICATION SUMMARY - MEDICATIONS TO STOP TAKING
I will STOP taking the medications listed below when I get home from the hospital:    Keflex 500 mg oral capsule  -- 1 cap(s) by mouth 2 times a day (with meals) x 10 days   -- Finish all this medication unless otherwise directed by prescriber.

## 2018-05-10 NOTE — PHYSICAL THERAPY INITIAL EVALUATION ADULT - ADDITIONAL COMMENTS
PTA pt was living in a PH + Stairs and was independent in all functional mobility and ADL's. no AD for gait.

## 2018-05-10 NOTE — CHART NOTE - NSCHARTNOTEFT_GEN_A_CORE
[id number]  DALI RAYGOZA    Notified by Attending Physician to facilitate discharge.     67 year old female with PMH HTN BIBEMS after fall.  Patient reports she was walking down stairs when she tripped  down 3-6 steps, but is unsure of how she fell and admits to poor memory of event. Reports she felt well this AM, went about normal activity, and denies any dizziness/CP/palpitations prior to fall or now. Pt c/o bleeding from mouth and lower lip pain. Per EMS, pt ambulatory at scene. Denies any blood thinner use. Denies any fever/chills, recent illness, vision changes, headache, SOB, abd pain, n/v, dysuria, neck pain, back pain, numbness/tingling. Pt had negative Head CT imaging. XR imaging of Chest, Shoulder, Pelvis, Elbow all with negative findings. ECG revealed NSR with no acute abnormalities. Pt observed on Telemetry with no events noted. Pt was evaluated by Physical Therapy and recommended for no additional skilled Pt needs . Pt observed, clinically improved, and remained stable with no further complaints. Pt seen by medical attending, Dr Lamb, who deemed patient medically cleared and stable for discharge to home. Pt advised to follow up with Dr Feliciano in 1 week for review of hospital course.    Pt seen and examined. A+ O x 3. Has no complaints at this time. Medicine reconciliation reviewed, revised, and Edited with Attending. Case and plan discussed at length.     Vital Signs Last 24 Hrs  T(C): 37.1 (10 May 2018 12:50), Max: 37.1 (09 May 2018 17:25)  T(F): 98.8 (10 May 2018 12:50), Max: 98.8 (10 May 2018 12:50)  HR: 82 (10 May 2018 12:50) (82 - 96)  BP: 112/78 (10 May 2018 12:50) (101/69 - 153/94)  BP(mean): --  RR: 19 (10 May 2018 12:50) (18 - 93)  SpO2: 92% (10 May 2018 12:50) (85% - 98%)    Timmy Barahona PA-C   Dept of Medicine   [id number] [address]

## 2018-05-10 NOTE — DISCHARGE NOTE ADULT - PATIENT PORTAL LINK FT
You can access the [facility] Patient Portal, offered by [facility], by registering with the following website: [email or web address]

## 2018-05-10 NOTE — DISCHARGE NOTE ADULT - HOSPITAL COURSE
67 year old female with PMH HTN BIBEMS after fall.   Patient reports she was walking down stairs when she tripped  down 3-6 steps, but is unsure of how she fell and admits to poor memory of event. Reports she felt well this AM, went about normal activity, and denies any dizziness/CP/palpitations prior to fall or now. Pt c/o bleeding from mouth and lower lip pain. Per EMS, pt ambulatory at scene. Denies any blood thinner use. Denies any fever/chills, recent illness, vision changes, headache, SOB, abd pain, n/v, dysuria, neck pain, back pain, numbness/tingling. Pt had negative Head CT imaging. XR imaging of Chest, Shoulder, Pelvis, Elbow all were negative findinng. ECG revealed NSR with no acute abnormalities. Pt observed on Telemetry with no events noted. Pt was evaluated by Physical Therapy and recommended for no additional skilled Pt needs . Pt observed, clinically improved, and remained stable with no further complaints. Pt seen by medical attending, Dr Lamb, who deemed patient medically cleared and stable for discharge to home. Pt advised to follow up with Dr Feliciano in 1 week for review of hospital course. 67 year old female with PMH HTN BIBEMS after fall.  Patient reports she was walking down stairs when she tripped  down 3-6 steps, but is unsure of how she fell and admits to poor memory of event. Reports she felt well this AM, went about normal activity, and denies any dizziness/CP/palpitations prior to fall or now. Pt c/o bleeding from mouth and lower lip pain. Per EMS, pt ambulatory at scene. Denies any blood thinner use. Denies any fever/chills, recent illness, vision changes, headache, SOB, abd pain, n/v, dysuria, neck pain, back pain, numbness/tingling. Pt had negative Head CT imaging. XR imaging of Chest, Shoulder, Pelvis, Elbow all with negative findings. ECG revealed NSR with no acute abnormalities. Pt observed on Telemetry with no events noted. Pt was evaluated by Physical Therapy and recommended for no additional skilled Pt needs . Pt observed, clinically improved, and remained stable with no further complaints. Pt seen by medical attending, Dr Lamb, who deemed patient medically cleared and stable for discharge to home. Pt advised to follow up with Dr Feliciano in 1 week for review of hospital course.

## 2018-05-10 NOTE — DISCHARGE NOTE ADULT - ADDITIONAL INSTRUCTIONS
-Follow with your Primary care Physician in 1 week to review your hospital course   -Bring all discharge documents and prescriptions with you at the time of your appointments   -You may return to the Emergency department for any worsening or return of your symptoms

## 2018-05-10 NOTE — DISCHARGE NOTE ADULT - CARE PLAN
Principal Discharge DX:	Fall, initial encounter  Secondary Diagnosis:	Lip laceration  Secondary Diagnosis:	Shoulder pain, left Principal Discharge DX:	Fall, initial encounter  Goal:	prevention of future fall  Assessment and plan of treatment:	Likely mechanical fall   -S/p Head CT and XR imaging with no emergent finding noted   -No skilled PT needs were met by physical therapy team  - Follow up with Dr Feliciano within 1 week of discharge for review of your hospital course.  Secondary Diagnosis:	Lip laceration  Assessment and plan of treatment:	-Pt refused suture in the Emergency department   -no additional bleeding was noted   -F/u with primary physician for further management and ensure proper healing  Secondary Diagnosis:	Shoulder pain, left  Assessment and plan of treatment:	Pain control with OTC Tylenol as needed

## 2018-05-10 NOTE — PHYSICAL THERAPY INITIAL EVALUATION ADULT - PERTINENT HX OF CURRENT PROBLEM, REHAB EVAL
67 year old female with PMH HTN BIBEMS after fall.   Patient reports she was walking down stairs when she tripped  down 3-6 steps, but is unsure of how she fell and admits to poor memory of event. Reports she felt well this AM, went about normal activity, and denies any dizziness/CP/palpitations prior to fall or now. DX: fall r/o syncope

## 2018-05-10 NOTE — PROGRESS NOTE ADULT - SUBJECTIVE AND OBJECTIVE BOX
Patient is a 67y old  Female who presents with a chief complaint of fall (09 May 2018 22:21)      SUBJECTIVE / OVERNIGHT EVENTS:  Pt seen and examined at bedside.   No overnight event.   Feeling better.  no cp, no sob, no n/v/d.   Tele with no events.  "I tripped and fall down the stair"  no HA/no dizziness.   Lives alone. able to walk.   "My gf lives next to my place". "My kids are in the area, they are all grown up". " I am "   denied pain.       Vital Signs Last 24 Hrs  T(C): 37.1 (10 May 2018 12:50), Max: 37.1 (09 May 2018 17:25)  T(F): 98.8 (10 May 2018 12:50), Max: 98.8 (10 May 2018 12:50)  HR: 82 (10 May 2018 12:50) (82 - 96)  BP: 112/78 (10 May 2018 12:50) (101/69 - 153/94)  BP(mean): --  RR: 19 (10 May 2018 12:50) (18 - 93)  SpO2: 92% (10 May 2018 12:50) (85% - 98%)  I&O's Summary    09 May 2018 07:01  -  10 May 2018 07:00  --------------------------------------------------------  IN: 240 mL / OUT: 0 mL / NET: 240 mL    10 May 2018 07:01  -  10 May 2018 16:11  --------------------------------------------------------  IN: 360 mL / OUT: 0 mL / NET: 360 mL        PHYSICAL EXAM:  GENERAL: NAD, Comfortable, obese, mildly anxious  HEAD:  Atraumatic, Normocephalic  EYES: EOMI, PERRLA, conjunctiva and sclera clear  Lip: small lip edema, healing already. no sign of cellulitis/pus.  NECK: Supple, No JVD  CHEST/LUNG: Clear to auscultation bilaterally; No wheeze  HEART: Regular rate and rhythm; No murmurs, rubs, or gallops  ABDOMEN: Soft, Nontender, Nondistended; Bowel sounds present  Neuro: AAO x 3, no focal deficit, 5/5 b/l extremities  EXTREMITIES:  2+ Peripheral Pulses, No clubbing, cyanosis, or edema  SKIN: No rashes or lesions    LABS:                        12.6   7.9   )-----------( 264      ( 10 May 2018 07:03 )             40.1     05-10    138  |  100  |  20  ----------------------------<  121<H>  4.6   |  24  |  0.94    Ca    9.0      10 May 2018 07:02    TPro  8.1  /  Alb  4.1  /  TBili  0.3  /  DBili  x   /  AST  74<H>  /  ALT  57<H>  /  AlkPhos  101  05-09      CAPILLARY BLOOD GLUCOSE        CARDIAC MARKERS ( 09 May 2018 18:23 )  x     / <0.01 ng/mL / 113 U/L / x     / 4.6 ng/mL          RADIOLOGY & ADDITIONAL TESTS:    Imaging Personally Reviewed:  [x] YES  [ ] NO    Consultant(s) Notes Reviewed:  [x] YES  [ ] NO      MEDICATIONS  (STANDING):  cyanocobalamin 1000 MICROGram(s) Oral daily  heparin  Injectable 5000 Unit(s) SubCutaneous every 12 hours    MEDICATIONS  (PRN):  traZODone 50 milliGRAM(s) Oral at bedtime PRN sleep      Care Discussed with Consultants/Other Providers [x] YES  [ ] NO    HEALTH ISSUES - PROBLEM Dx:  Nutrition, metabolism, and development symptoms: Nutrition, metabolism, and development symptoms  Prophylactic measure: Prophylactic measure  Essential hypertension: Essential hypertension  Fall, initial encounter: Fall, initial encounter

## 2018-05-10 NOTE — DISCHARGE NOTE ADULT - PLAN OF CARE
prevention of future fall Likely mechanical fall   -S/p Head CT and XR imaging with no emergent finding noted   -No skilled PT needs were met by physical therapy team  - Follow up with Dr Feliciano within 1 week of discharge for review of your hospital course. -Pt refused suture in the Emergency department   -no additional bleeding was noted   -F/u with primary physician for further management and ensure proper healing Pain control with OTC Tylenol as needed

## 2018-05-10 NOTE — PHYSICAL THERAPY INITIAL EVALUATION ADULT - PRECAUTIONS/LIMITATIONS, REHAB EVAL
Pt c/o bleeding from mouth and lower lip pain. Per EMS, pt ambulatory at scene. Denies any blood thinner use. Denies any fever/chills, recent illness, vision changes, headache, SOB, abd pain, n/v, dysuria, neck pain, back pain, numbness/tingling.

## 2018-05-10 NOTE — PROGRESS NOTE ADULT - ATTENDING COMMENTS
d/c planning home.  f/u with Dr. Feliciano (PCP/card)    - Dr. LEANA Lamb ([facility])  - [phone] d/c planning home.  f/u with Dr. Feliciano (PCP/card)  she takes xanax PRN at night for insomnia. caution advised.     - Dr. LEANA Moraleset ([facility])  - [phone]

## 2018-05-10 NOTE — DISCHARGE NOTE ADULT - MEDICATION SUMMARY - MEDICATIONS TO TAKE
I will START or STAY ON the medications listed below when I get home from the hospital:    cyanocobalamin 1000 mcg oral tablet  -- 1 tab(s) by mouth once a day  -- Indication: For b12 deficiency

## 2018-05-10 NOTE — PROGRESS NOTE ADULT - PROBLEM SELECTOR PLAN 1
--history suggests mechanical  --telemetry, though doubt cardiogenic  --orthostatic vitals  --B12 borderline., pending folate, TSH WNL  --PT eval: no need for PT  -- will give vit B12 tablets --history suggests mechanical  --telemetry, though doubt cardiogenic  --orthostatic vitals  --B12 borderline., pending folate, TSH WNL  --PT eval: no need for PT  -- will give vit B12 tablets  -- TTE in January at Dr. Feliciano's office is WNL.

## 2018-05-10 NOTE — PHYSICAL THERAPY INITIAL EVALUATION ADULT - CRITERIA FOR SKILLED THERAPEUTIC INTERVENTIONS
rehab potential/predicted duration of therapy intervention/anticipated equipment needs at discharge/functional limitations in following categories/risk reduction/prevention/therapy frequency/impairments found/anticipated discharge recommendation

## 2018-08-30 ENCOUNTER — APPOINTMENT (OUTPATIENT)
Dept: ORTHOPEDIC SURGERY | Facility: CLINIC | Age: 68
End: 2018-08-30
Payer: MEDICARE

## 2018-08-30 VITALS
BODY MASS INDEX: 31.58 KG/M2 | DIASTOLIC BLOOD PRESSURE: 103 MMHG | HEART RATE: 66 BPM | HEIGHT: 64 IN | SYSTOLIC BLOOD PRESSURE: 159 MMHG | WEIGHT: 185 LBS

## 2018-08-30 DIAGNOSIS — Z86.79 PERSONAL HISTORY OF OTHER DISEASES OF THE CIRCULATORY SYSTEM: ICD-10-CM

## 2018-08-30 PROCEDURE — 99215 OFFICE O/P EST HI 40 MIN: CPT

## 2018-08-31 PROBLEM — Z86.79 HISTORY OF ESSENTIAL HYPERTENSION: Status: RESOLVED | Noted: 2018-08-31 | Resolved: 2018-08-31

## 2018-09-02 ENCOUNTER — EMERGENCY (EMERGENCY)
Facility: HOSPITAL | Age: 68
LOS: 1 days | Discharge: HOME | End: 2018-09-02
Attending: EMERGENCY MEDICINE
Payer: MEDICARE

## 2018-09-02 VITALS
HEART RATE: 73 BPM | SYSTOLIC BLOOD PRESSURE: 174 MMHG | DIASTOLIC BLOOD PRESSURE: 127 MMHG | TEMPERATURE: 98 F | OXYGEN SATURATION: 98 % | RESPIRATION RATE: 20 BRPM

## 2018-09-02 VITALS
TEMPERATURE: 98 F | HEART RATE: 66 BPM | SYSTOLIC BLOOD PRESSURE: 136 MMHG | OXYGEN SATURATION: 92 % | DIASTOLIC BLOOD PRESSURE: 115 MMHG | RESPIRATION RATE: 18 BRPM

## 2018-09-02 PROCEDURE — 23650 CLTX SHO DSLC W/MNPJ WO ANES: CPT | Mod: 54,GC

## 2018-09-02 PROCEDURE — 99284 EMERGENCY DEPT VISIT MOD MDM: CPT | Mod: 57,GC,25

## 2018-09-02 PROCEDURE — 73030 X-RAY EXAM OF SHOULDER: CPT | Mod: 26,LT,76

## 2018-09-02 RX ORDER — OXYCODONE HYDROCHLORIDE 5 MG/1
5 TABLET ORAL ONCE
Qty: 0 | Refills: 0 | Status: DISCONTINUED | OUTPATIENT
Start: 2018-09-02 | End: 2018-09-02

## 2018-09-02 RX ORDER — CEFTRIAXONE 500 MG/1
2 INJECTION, POWDER, FOR SOLUTION INTRAMUSCULAR; INTRAVENOUS EVERY 24 HOURS
Qty: 0 | Refills: 0 | Status: DISCONTINUED | OUTPATIENT
Start: 2018-09-02 | End: 2018-09-02

## 2018-09-02 RX ORDER — KETAMINE HYDROCHLORIDE 100 MG/ML
80 INJECTION INTRAMUSCULAR; INTRAVENOUS ONCE
Qty: 0 | Refills: 0 | Status: DISCONTINUED | OUTPATIENT
Start: 2018-09-02 | End: 2018-09-02

## 2018-09-02 RX ORDER — VANCOMYCIN HCL 1 G
1000 VIAL (EA) INTRAVENOUS ONCE
Qty: 0 | Refills: 0 | Status: DISCONTINUED | OUTPATIENT
Start: 2018-09-02 | End: 2018-09-02

## 2018-09-02 RX ADMIN — KETAMINE HYDROCHLORIDE 80 MILLIGRAM(S): 100 INJECTION INTRAMUSCULAR; INTRAVENOUS at 22:50

## 2018-09-02 RX ADMIN — OXYCODONE HYDROCHLORIDE 5 MILLIGRAM(S): 5 TABLET ORAL at 22:37

## 2018-09-02 RX ADMIN — OXYCODONE HYDROCHLORIDE 5 MILLIGRAM(S): 5 TABLET ORAL at 21:37

## 2018-09-02 NOTE — ED ADULT NURSE REASSESSMENT NOTE - NS ED NURSE REASSESS COMMENT FT1
Conscious sedation performed at 2250 with RN, MD, and ED tech present, pt on cardiac monitor, capnography, crash cart at bedside. Sedation completed at 2257 successfully.

## 2018-09-02 NOTE — ED PROVIDER NOTE - PLAN OF CARE
1) Follow up with your doctor in 1 week. Call the orthopedic number attached for an appointment.   2) Return to the ER immediately for new or worsening symptoms including severe pain or other issues.   3) Wear the sling for the next 1 day. Then you may take it off and resume normal activities, but do not over strain your shoulder.   4) Take Tylenol 650mg every 6 hours as needed if your shoulder is sore.

## 2018-09-02 NOTE — ED PROVIDER NOTE - OBJECTIVE STATEMENT
67yoF hx of hld pw left shoulder pain and concern for dislocation after her arm was pulled across her body while playing with a dog. patient did not fall, did not have any injuries. occurred about 2 hours ago. denies fevers, chills, nausea, vomiting, diarrhea, chest pain, sob, weakness, numbness, tingling or other issues.

## 2018-09-02 NOTE — ED ADULT NURSE NOTE - NSIMPLEMENTINTERV_GEN_ALL_ED
Implemented All Universal Safety Interventions:  [place] to call system. Call bell, personal items and telephone within reach. Instruct patient to call for assistance. Room bathroom lighting operational. Non-slip footwear when patient is off stretcher. Physically safe environment: no spills, clutter or unnecessary equipment. Stretcher in lowest position, wheels locked, appropriate side rails in place.

## 2018-09-02 NOTE — ED PROVIDER NOTE - ATTENDING CONTRIBUTION TO CARE
Patient presenting with L shoulder pain after having arm pulled while playing with dog.  Reporting some tingling in arm but no loss of sensation.  No other injuries.  On exam patient with strong radial pulses, sensation to light touch intact, shoulder contour grossly abnormal.  Plain films obtained with confirmation of dislocation, reduction initially attempted with external rotation method and failed, converted to traction/counter traction with conscious sedation with success, confirmed with plain films, will discharge with ortho follow up.

## 2018-09-02 NOTE — ED PROVIDER NOTE - MEDICAL DECISION MAKING DETAILS
left shoulder dislocated by exam. will obtain xray to rule out fracture. then relocate as appropriate.

## 2018-09-02 NOTE — ED PROVIDER NOTE - CARE PLAN
Principal Discharge DX:	Shoulder dislocation, left, initial encounter Principal Discharge DX:	Shoulder dislocation, left, initial encounter  Assessment and plan of treatment:	1) Follow up with your doctor in 1 week. Call the orthopedic number attached for an appointment.   2) Return to the ER immediately for new or worsening symptoms including severe pain or other issues.   3) Wear the sling for the next 1 day. Then you may take it off and resume normal activities, but do not over strain your shoulder.   4) Take Tylenol 650mg every 6 hours as needed if your shoulder is sore.

## 2018-09-02 NOTE — ED PROVIDER NOTE - PROGRESS NOTE DETAILS
conscious sedation performed for shoulder reduction. patient tolerated well. xray confirmed reduction successful. patient aware and did well. patient tolerating po, ambulating without difficulty. will dc home.

## 2018-09-02 NOTE — ED PROVIDER NOTE - PHYSICAL EXAMINATION
LEFT SHOULDER with severe pain with passive and active movement  left hand fingers with full strength, sensation  pulses in upper extremity strong and equal bilaterally.

## 2018-09-02 NOTE — ED ADULT NURSE NOTE - OBJECTIVE STATEMENT
67 year old female presented to ED from home with c/o of left shoulder pain starting 1 hour before ED arrival. Pt was walking her dog and when he pulled on the leash, pt states 'I felt a pop in my shoulder'. Pt denies CP, SOB, nausea/vomiting, numbness/tingling, fever, cough, chills, dizziness, headache. Pt has limited ROM in left arm, palpable pulses, no deformity or physical injury noted. Pt a&ox3, lung sounds clear, heart rate regular, abdomen soft nontender nondistended to palp. skin intact. Pt currently in x-ray. Will continue to monitor and assess while offering support and reassurance.

## 2018-09-02 NOTE — ED ADULT NURSE NOTE - CHPI ED NUR SYMPTOMS NEG
no chills/no decreased eating/drinking/no weakness/no vomiting/no tingling/no dizziness/no fever/no nausea

## 2018-09-03 PROCEDURE — 73030 X-RAY EXAM OF SHOULDER: CPT

## 2018-09-03 PROCEDURE — 96374 THER/PROPH/DIAG INJ IV PUSH: CPT | Mod: XU

## 2018-09-03 PROCEDURE — 99285 EMERGENCY DEPT VISIT HI MDM: CPT | Mod: 25

## 2018-09-03 PROCEDURE — 73020 X-RAY EXAM OF SHOULDER: CPT

## 2018-09-03 PROCEDURE — 23650 CLTX SHO DSLC W/MNPJ WO ANES: CPT | Mod: LT

## 2018-09-03 RX ORDER — ONDANSETRON 8 MG/1
4 TABLET, FILM COATED ORAL ONCE
Qty: 0 | Refills: 0 | Status: COMPLETED | OUTPATIENT
Start: 2018-09-03 | End: 2018-09-03

## 2018-09-03 RX ADMIN — ONDANSETRON 4 MILLIGRAM(S): 8 TABLET, FILM COATED ORAL at 00:09

## 2018-09-03 NOTE — ED PROCEDURE NOTE - NS_POSTPROCCAREGUIDE_ED_ALL_ED
Patient is now fully awake, with vital signs and temperature stable, hydration is adequate, patients Syeda’s  score is at baseline (or greater than 8), patient and escort has received  discharge education.

## 2018-09-03 NOTE — ED PROCEDURE NOTE - ATTENDING CONTRIBUTION TO CARE
Traction/countertraction L shoulder reduction with procedural sedation with ketamine, successful reduction, no complications.  Jose Miguel Randolph M.D.

## 2018-09-03 NOTE — ED ADULT NURSE REASSESSMENT NOTE - NS ED NURSE REASSESS COMMENT FT1
Pt /111, pt asymptomatic, steady gait, neuro intact, slight numbness in left hand, MD aware and assessed patient. Pt ambulatory a&ox3. no dizziness/headache. Pt currently waiting for cab in triage.

## 2018-09-04 ENCOUNTER — CHART COPY (OUTPATIENT)
Age: 68
End: 2018-09-04

## 2018-09-07 ENCOUNTER — TRANSCRIPTION ENCOUNTER (OUTPATIENT)
Age: 68
End: 2018-09-07

## 2018-09-12 ENCOUNTER — APPOINTMENT (OUTPATIENT)
Dept: ORTHOPEDIC SURGERY | Facility: CLINIC | Age: 68
End: 2018-09-12
Payer: MEDICARE

## 2018-09-12 VITALS
SYSTOLIC BLOOD PRESSURE: 134 MMHG | WEIGHT: 180 LBS | BODY MASS INDEX: 30.73 KG/M2 | DIASTOLIC BLOOD PRESSURE: 96 MMHG | HEART RATE: 69 BPM | HEIGHT: 64 IN

## 2018-09-12 PROCEDURE — 73030 X-RAY EXAM OF SHOULDER: CPT | Mod: LT

## 2018-09-12 PROCEDURE — 99213 OFFICE O/P EST LOW 20 MIN: CPT

## 2018-09-24 ENCOUNTER — APPOINTMENT (OUTPATIENT)
Dept: ORTHOPEDIC SURGERY | Facility: CLINIC | Age: 68
End: 2018-09-24
Payer: MEDICARE

## 2018-09-24 PROCEDURE — 99214 OFFICE O/P EST MOD 30 MIN: CPT

## 2018-10-03 ENCOUNTER — APPOINTMENT (OUTPATIENT)
Dept: ORTHOPEDIC SURGERY | Facility: CLINIC | Age: 68
End: 2018-10-03
Payer: MEDICARE

## 2018-10-03 PROCEDURE — 99213 OFFICE O/P EST LOW 20 MIN: CPT

## 2018-10-22 ENCOUNTER — RX RENEWAL (OUTPATIENT)
Age: 68
End: 2018-10-22

## 2018-10-30 ENCOUNTER — APPOINTMENT (OUTPATIENT)
Dept: ORTHOPEDIC SURGERY | Facility: CLINIC | Age: 68
End: 2018-10-30
Payer: MEDICARE

## 2018-10-30 DIAGNOSIS — G89.29 DORSALGIA, UNSPECIFIED: ICD-10-CM

## 2018-10-30 DIAGNOSIS — M54.9 DORSALGIA, UNSPECIFIED: ICD-10-CM

## 2018-10-30 PROCEDURE — 99214 OFFICE O/P EST MOD 30 MIN: CPT

## 2018-11-14 ENCOUNTER — APPOINTMENT (OUTPATIENT)
Dept: ORTHOPEDIC SURGERY | Facility: CLINIC | Age: 68
End: 2018-11-14
Payer: MEDICARE

## 2018-11-14 VITALS
WEIGHT: 180 LBS | HEART RATE: 77 BPM | HEIGHT: 64 IN | BODY MASS INDEX: 30.73 KG/M2 | DIASTOLIC BLOOD PRESSURE: 84 MMHG | SYSTOLIC BLOOD PRESSURE: 123 MMHG

## 2018-11-14 PROCEDURE — 99213 OFFICE O/P EST LOW 20 MIN: CPT

## 2018-12-04 ENCOUNTER — APPOINTMENT (OUTPATIENT)
Dept: ORTHOPEDIC SURGERY | Facility: CLINIC | Age: 68
End: 2018-12-04
Payer: MEDICARE

## 2018-12-04 PROCEDURE — 99214 OFFICE O/P EST MOD 30 MIN: CPT

## 2019-01-18 ENCOUNTER — APPOINTMENT (OUTPATIENT)
Dept: ORTHOPEDIC SURGERY | Facility: CLINIC | Age: 69
End: 2019-01-18

## 2019-02-07 ENCOUNTER — RX RENEWAL (OUTPATIENT)
Age: 69
End: 2019-02-07

## 2019-02-08 ENCOUNTER — APPOINTMENT (OUTPATIENT)
Dept: ORTHOPEDIC SURGERY | Facility: CLINIC | Age: 69
End: 2019-02-08
Payer: MEDICARE

## 2019-02-08 PROCEDURE — 99214 OFFICE O/P EST MOD 30 MIN: CPT

## 2019-02-08 NOTE — HISTORY OF PRESENT ILLNESS
[de-identified] : She was doing well on the meloxicam 15 mg once a day and after lowering the dose to 7-1/2 mg she had an increase in her lower back pain. She has been supplementing with 2 Aleve twice a day. The symptoms seem to be related to the weather. She is also getting night cramps in her legs.

## 2019-02-08 NOTE — REASON FOR VISIT
[Follow-Up Visit] : a follow-up visit for [Degenerative Joint Disease] : degenerative joint disease [Back Pain] : back pain [Spinal Stenosis] : spinal stenosis [FreeTextEntry2] : Lower back pain

## 2019-02-08 NOTE — DISCUSSION/SUMMARY
[Medication Risks Reviewed] : Medication risks reviewed [de-identified] : On increased meloxicam again to 15 mg once a day and I will see her for followup in 5 weeks.

## 2019-03-01 ENCOUNTER — CHART COPY (OUTPATIENT)
Age: 69
End: 2019-03-01

## 2019-03-25 ENCOUNTER — APPOINTMENT (OUTPATIENT)
Dept: ORTHOPEDIC SURGERY | Facility: CLINIC | Age: 69
End: 2019-03-25

## 2019-08-21 ENCOUNTER — APPOINTMENT (OUTPATIENT)
Dept: ORTHOPEDIC SURGERY | Facility: CLINIC | Age: 69
End: 2019-08-21
Payer: MEDICARE

## 2019-08-21 VITALS
HEIGHT: 64 IN | HEART RATE: 86 BPM | DIASTOLIC BLOOD PRESSURE: 79 MMHG | BODY MASS INDEX: 31.58 KG/M2 | WEIGHT: 185 LBS | SYSTOLIC BLOOD PRESSURE: 135 MMHG

## 2019-08-21 PROCEDURE — 73564 X-RAY EXAM KNEE 4 OR MORE: CPT | Mod: 50

## 2019-08-21 PROCEDURE — 20610 DRAIN/INJ JOINT/BURSA W/O US: CPT | Mod: LT

## 2019-08-21 PROCEDURE — 99213 OFFICE O/P EST LOW 20 MIN: CPT | Mod: 25

## 2019-08-23 NOTE — PHYSICAL EXAM
[de-identified] : Examination of the patient's knees at this time show that her right and her left knees go from 0 to 130 degrees.  Her left knee has patellofemoral crepitus.  She has good medial lateral and anterior posterior stability.  She has pain with compression of her patella.  She does not have a major Baker's cyst but may have a slight effusion.\par \par Her right knee is not bothering her at this time but her motion also goes from 0 to 130 degrees she has good medial lateral and anterior posterior stability she may have some very mild tenderness over the joint line and her patella tracks well she does not have pain with compression of the patella on the right and is not having major crepitus behind the patella. [de-identified] : An AP of both knees that shows her right knee has medial joint space narrowing on the standing view her left knee which is her more symptomatic knee shows medial lateral joint spaces are's open and the cartilage are present.  [place] view on the left knee show severe lateral compartment degenerative arthritis at the lateral facet.  Lateral view of the left knee also does show some effusion in the anterior aspect of the knee in the suprapatellar area.

## 2019-08-23 NOTE — PROCEDURE
[de-identified] : This patient is allergic to a cortisone preparations.  She did very well she said in the past hyaluronic acid.\par \par Procedure Note:\par \par Anatomic Location: Left Knee\par \par Diagnosis:  Arthritis\par \par Procedure:  Injection of Orthovisc\par \par Lot Number:      [id number]                                   Expiration Date:  11/30/20\par \par \par Local Spray: Ethyl Chloride.\par \par Preparation of Skin with Alcohol. \par \par \par Patient has consented for the procedure.\par \par Injection  through a lateral parapatella approach.\par \par Patient tolerated the procedure well.\par \par Patient instructed to call the office if any reaction, fever, chills, increased erythema or swelling.   [phone].

## 2019-08-23 NOTE — DISCUSSION/SUMMARY
[de-identified] : This patient tolerated the injection with Orthovisc well.  We will follow her conservatively at this time return visit in 6 months or sooner if necessary.

## 2019-08-23 NOTE — HISTORY OF PRESENT ILLNESS
[de-identified] : Ms. DALI RAYGOZA is a 68 year female who presents to office complaining of left knee pain beginning 2 years ago with insidious onset.\par PATIENT IS ALLERGIC TO CORTISONE AND NSAIDS. She received a gel injection previously by Dr. Turner at [facility] which helped her for about 1 year.\par Knee pain is an intermittent but sometimes constant pain that is dull/achy in nature and located primarily medially.\par Exacerbating factors include getting up from a seated position and ambulating up and down stairs.\par Denies buckling, locking, numbness, tingling, etc.\par All review of systems not previously stated as positive are negative.

## 2019-09-03 ENCOUNTER — INPATIENT (INPATIENT)
Facility: HOSPITAL | Age: 69
LOS: 2 days | Discharge: HOME | DRG: 562 | End: 2019-09-06
Attending: STUDENT IN AN ORGANIZED HEALTH CARE EDUCATION/TRAINING PROGRAM | Admitting: HOSPITALIST
Payer: MEDICARE

## 2019-09-03 VITALS
HEART RATE: 88 BPM | HEIGHT: 64 IN | DIASTOLIC BLOOD PRESSURE: 97 MMHG | WEIGHT: 199.96 LBS | RESPIRATION RATE: 16 BRPM | SYSTOLIC BLOOD PRESSURE: 122 MMHG | TEMPERATURE: 98 F | OXYGEN SATURATION: 96 %

## 2019-09-03 PROCEDURE — 73030 X-RAY EXAM OF SHOULDER: CPT | Mod: 26,LT

## 2019-09-03 PROCEDURE — 99152 MOD SED SAME PHYS/QHP 5/>YRS: CPT

## 2019-09-03 PROCEDURE — 93010 ELECTROCARDIOGRAM REPORT: CPT | Mod: 59

## 2019-09-03 PROCEDURE — 23650 CLTX SHO DSLC W/MNPJ WO ANES: CPT | Mod: 54,LT

## 2019-09-03 PROCEDURE — 99285 EMERGENCY DEPT VISIT HI MDM: CPT | Mod: 25

## 2019-09-03 RX ORDER — MORPHINE SULFATE 50 MG/1
4 CAPSULE, EXTENDED RELEASE ORAL ONCE
Refills: 0 | Status: DISCONTINUED | OUTPATIENT
Start: 2019-09-03 | End: 2019-09-03

## 2019-09-03 RX ORDER — IBUPROFEN 200 MG
600 TABLET ORAL ONCE
Refills: 0 | Status: COMPLETED | OUTPATIENT
Start: 2019-09-03 | End: 2019-09-03

## 2019-09-03 RX ORDER — PROPOFOL 10 MG/ML
40 INJECTION, EMULSION INTRAVENOUS ONCE
Refills: 0 | Status: DISCONTINUED | OUTPATIENT
Start: 2019-09-03 | End: 2019-09-04

## 2019-09-03 RX ADMIN — Medication 600 MILLIGRAM(S): at 22:36

## 2019-09-03 RX ADMIN — MORPHINE SULFATE 4 MILLIGRAM(S): 50 CAPSULE, EXTENDED RELEASE ORAL at 23:59

## 2019-09-03 NOTE — ED ADULT NURSE NOTE - OBJECTIVE STATEMENT
67 y/o female a+ox3, denies pmhx, coming from home via EMS for injury s/p fall. Pt states she was walking in her home when she "lost her footing" and fell onto her table on the left side; pt denies hitting her head, no LOC. Upon arrival pt c/o pain to left shoulder, sharp, non-radiating. Pt denies chest pain or discomfort, headache, lightheadedness, dizziness, difficulty breathing, abdominal pain, n/v/d, fever or chills. Pt left in position of comfort, will reassess

## 2019-09-04 DIAGNOSIS — J96.01 ACUTE RESPIRATORY FAILURE WITH HYPOXIA: ICD-10-CM

## 2019-09-04 DIAGNOSIS — F41.9 ANXIETY DISORDER, UNSPECIFIED: ICD-10-CM

## 2019-09-04 DIAGNOSIS — R41.0 DISORIENTATION, UNSPECIFIED: ICD-10-CM

## 2019-09-04 DIAGNOSIS — S43.005A UNSPECIFIED DISLOCATION OF LEFT SHOULDER JOINT, INITIAL ENCOUNTER: ICD-10-CM

## 2019-09-04 DIAGNOSIS — Z79.899 OTHER LONG TERM (CURRENT) DRUG THERAPY: ICD-10-CM

## 2019-09-04 DIAGNOSIS — E78.5 HYPERLIPIDEMIA, UNSPECIFIED: ICD-10-CM

## 2019-09-04 DIAGNOSIS — N30.00 ACUTE CYSTITIS WITHOUT HEMATURIA: ICD-10-CM

## 2019-09-04 DIAGNOSIS — Z29.9 ENCOUNTER FOR PROPHYLACTIC MEASURES, UNSPECIFIED: ICD-10-CM

## 2019-09-04 DIAGNOSIS — R09.89 OTHER SPECIFIED SYMPTOMS AND SIGNS INVOLVING THE CIRCULATORY AND RESPIRATORY SYSTEMS: ICD-10-CM

## 2019-09-04 DIAGNOSIS — F05 DELIRIUM DUE TO KNOWN PHYSIOLOGICAL CONDITION: ICD-10-CM

## 2019-09-04 DIAGNOSIS — I95.89 OTHER HYPOTENSION: ICD-10-CM

## 2019-09-04 DIAGNOSIS — I10 ESSENTIAL (PRIMARY) HYPERTENSION: ICD-10-CM

## 2019-09-04 LAB
ALBUMIN SERPL ELPH-MCNC: 4 G/DL — SIGNIFICANT CHANGE UP (ref 3.3–5)
ALP SERPL-CCNC: 74 U/L — SIGNIFICANT CHANGE UP (ref 40–120)
ALT FLD-CCNC: 34 U/L — SIGNIFICANT CHANGE UP (ref 10–45)
ANION GAP SERPL CALC-SCNC: 17 MMOL/L — SIGNIFICANT CHANGE UP (ref 5–17)
APPEARANCE UR: ABNORMAL
AST SERPL-CCNC: 30 U/L — SIGNIFICANT CHANGE UP (ref 10–40)
BACTERIA # UR AUTO: ABNORMAL
BASOPHILS # BLD AUTO: 0 K/UL — SIGNIFICANT CHANGE UP (ref 0–0.2)
BASOPHILS NFR BLD AUTO: 0.5 % — SIGNIFICANT CHANGE UP (ref 0–2)
BILIRUB SERPL-MCNC: 0.2 MG/DL — SIGNIFICANT CHANGE UP (ref 0.2–1.2)
BILIRUB UR-MCNC: NEGATIVE — SIGNIFICANT CHANGE UP
BUN SERPL-MCNC: 15 MG/DL — SIGNIFICANT CHANGE UP (ref 7–23)
CALCIUM SERPL-MCNC: 8.8 MG/DL — SIGNIFICANT CHANGE UP (ref 8.4–10.5)
CHLORIDE SERPL-SCNC: 103 MMOL/L — SIGNIFICANT CHANGE UP (ref 96–108)
CO2 SERPL-SCNC: 22 MMOL/L — SIGNIFICANT CHANGE UP (ref 22–31)
COLOR SPEC: YELLOW — SIGNIFICANT CHANGE UP
CREAT SERPL-MCNC: 0.65 MG/DL — SIGNIFICANT CHANGE UP (ref 0.5–1.3)
DIFF PNL FLD: ABNORMAL
EOSINOPHIL # BLD AUTO: 0.1 K/UL — SIGNIFICANT CHANGE UP (ref 0–0.5)
EOSINOPHIL NFR BLD AUTO: 0.9 % — SIGNIFICANT CHANGE UP (ref 0–6)
EPI CELLS # UR: 7 /HPF — HIGH
GLUCOSE SERPL-MCNC: 132 MG/DL — HIGH (ref 70–99)
GLUCOSE UR QL: NEGATIVE — SIGNIFICANT CHANGE UP
HCT VFR BLD CALC: 41.3 % — SIGNIFICANT CHANGE UP (ref 34.5–45)
HCV AB S/CO SERPL IA: 0.35 S/CO — SIGNIFICANT CHANGE UP (ref 0–0.99)
HCV AB SERPL-IMP: SIGNIFICANT CHANGE UP
HGB BLD-MCNC: 13.2 G/DL — SIGNIFICANT CHANGE UP (ref 11.5–15.5)
HYALINE CASTS # UR AUTO: 4 /LPF — HIGH (ref 0–2)
KETONES UR-MCNC: ABNORMAL
LEUKOCYTE ESTERASE UR-ACNC: ABNORMAL
LYMPHOCYTES # BLD AUTO: 2.1 K/UL — SIGNIFICANT CHANGE UP (ref 1–3.3)
LYMPHOCYTES # BLD AUTO: 26.3 % — SIGNIFICANT CHANGE UP (ref 13–44)
MCHC RBC-ENTMCNC: 29.1 PG — SIGNIFICANT CHANGE UP (ref 27–34)
MCHC RBC-ENTMCNC: 31.9 GM/DL — LOW (ref 32–36)
MCV RBC AUTO: 91.2 FL — SIGNIFICANT CHANGE UP (ref 80–100)
MONOCYTES # BLD AUTO: 0.4 K/UL — SIGNIFICANT CHANGE UP (ref 0–0.9)
MONOCYTES NFR BLD AUTO: 5.2 % — SIGNIFICANT CHANGE UP (ref 2–14)
NEUTROPHILS # BLD AUTO: 5.4 K/UL — SIGNIFICANT CHANGE UP (ref 1.8–7.4)
NEUTROPHILS NFR BLD AUTO: 67.2 % — SIGNIFICANT CHANGE UP (ref 43–77)
NITRITE UR-MCNC: NEGATIVE — SIGNIFICANT CHANGE UP
PH UR: 6 — SIGNIFICANT CHANGE UP (ref 5–8)
PLATELET # BLD AUTO: 215 K/UL — SIGNIFICANT CHANGE UP (ref 150–400)
POTASSIUM SERPL-MCNC: 4.3 MMOL/L — SIGNIFICANT CHANGE UP (ref 3.5–5.3)
POTASSIUM SERPL-SCNC: 4.3 MMOL/L — SIGNIFICANT CHANGE UP (ref 3.5–5.3)
PROT SERPL-MCNC: 7.2 G/DL — SIGNIFICANT CHANGE UP (ref 6–8.3)
PROT UR-MCNC: ABNORMAL
RBC # BLD: 4.53 M/UL — SIGNIFICANT CHANGE UP (ref 3.8–5.2)
RBC # FLD: 12.3 % — SIGNIFICANT CHANGE UP (ref 10.3–14.5)
RBC CASTS # UR COMP ASSIST: 4 /HPF — SIGNIFICANT CHANGE UP (ref 0–4)
SODIUM SERPL-SCNC: 142 MMOL/L — SIGNIFICANT CHANGE UP (ref 135–145)
SP GR SPEC: 1.02 — SIGNIFICANT CHANGE UP (ref 1.01–1.02)
UROBILINOGEN FLD QL: ABNORMAL
WBC # BLD: 8 K/UL — SIGNIFICANT CHANGE UP (ref 3.8–10.5)
WBC # FLD AUTO: 8 K/UL — SIGNIFICANT CHANGE UP (ref 3.8–10.5)
WBC UR QL: 59 /HPF — HIGH (ref 0–5)

## 2019-09-04 PROCEDURE — 99223 1ST HOSP IP/OBS HIGH 75: CPT

## 2019-09-04 PROCEDURE — 71275 CT ANGIOGRAPHY CHEST: CPT | Mod: 26

## 2019-09-04 PROCEDURE — 71045 X-RAY EXAM CHEST 1 VIEW: CPT | Mod: 26

## 2019-09-04 PROCEDURE — 73030 X-RAY EXAM OF SHOULDER: CPT | Mod: 26,LT

## 2019-09-04 RX ORDER — HALOPERIDOL DECANOATE 100 MG/ML
1.5 INJECTION INTRAMUSCULAR ONCE
Refills: 0 | Status: COMPLETED | OUTPATIENT
Start: 2019-09-04 | End: 2019-09-04

## 2019-09-04 RX ORDER — IPRATROPIUM/ALBUTEROL SULFATE 18-103MCG
3 AEROSOL WITH ADAPTER (GRAM) INHALATION EVERY 6 HOURS
Refills: 0 | Status: DISCONTINUED | OUTPATIENT
Start: 2019-09-04 | End: 2019-09-06

## 2019-09-04 RX ORDER — HALOPERIDOL DECANOATE 100 MG/ML
1 INJECTION INTRAMUSCULAR ONCE
Refills: 0 | Status: COMPLETED | OUTPATIENT
Start: 2019-09-04 | End: 2019-09-04

## 2019-09-04 RX ORDER — PROPOFOL 10 MG/ML
70 INJECTION, EMULSION INTRAVENOUS ONCE
Refills: 0 | Status: COMPLETED | OUTPATIENT
Start: 2019-09-04 | End: 2019-09-04

## 2019-09-04 RX ORDER — ENOXAPARIN SODIUM 100 MG/ML
40 INJECTION SUBCUTANEOUS DAILY
Refills: 0 | Status: DISCONTINUED | OUTPATIENT
Start: 2019-09-04 | End: 2019-09-06

## 2019-09-04 RX ORDER — ALPRAZOLAM 0.25 MG
1 TABLET ORAL
Refills: 0 | Status: DISCONTINUED | OUTPATIENT
Start: 2019-09-04 | End: 2019-09-06

## 2019-09-04 RX ORDER — CEFTRIAXONE 500 MG/1
1000 INJECTION, POWDER, FOR SOLUTION INTRAMUSCULAR; INTRAVENOUS EVERY 24 HOURS
Refills: 0 | Status: COMPLETED | OUTPATIENT
Start: 2019-09-04 | End: 2019-09-06

## 2019-09-04 RX ORDER — SODIUM CHLORIDE 9 MG/ML
1000 INJECTION INTRAMUSCULAR; INTRAVENOUS; SUBCUTANEOUS ONCE
Refills: 0 | Status: COMPLETED | OUTPATIENT
Start: 2019-09-04 | End: 2019-09-04

## 2019-09-04 RX ADMIN — PROPOFOL 70 MILLIGRAM(S): 10 INJECTION, EMULSION INTRAVENOUS at 00:10

## 2019-09-04 RX ADMIN — Medication 600 MILLIGRAM(S): at 00:00

## 2019-09-04 RX ADMIN — Medication 3 MILLILITER(S): at 09:27

## 2019-09-04 RX ADMIN — MORPHINE SULFATE 4 MILLIGRAM(S): 50 CAPSULE, EXTENDED RELEASE ORAL at 00:51

## 2019-09-04 RX ADMIN — ENOXAPARIN SODIUM 40 MILLIGRAM(S): 100 INJECTION SUBCUTANEOUS at 11:19

## 2019-09-04 RX ADMIN — HALOPERIDOL DECANOATE 1.5 MILLIGRAM(S): 100 INJECTION INTRAMUSCULAR at 16:24

## 2019-09-04 RX ADMIN — CEFTRIAXONE 100 MILLIGRAM(S): 500 INJECTION, POWDER, FOR SOLUTION INTRAMUSCULAR; INTRAVENOUS at 09:27

## 2019-09-04 RX ADMIN — Medication 1 MILLIGRAM(S): at 23:04

## 2019-09-04 RX ADMIN — Medication 3 MILLILITER(S): at 23:04

## 2019-09-04 RX ADMIN — HALOPERIDOL DECANOATE 1 MILLIGRAM(S): 100 INJECTION INTRAMUSCULAR at 16:41

## 2019-09-04 RX ADMIN — SODIUM CHLORIDE 500 MILLILITER(S): 9 INJECTION INTRAMUSCULAR; INTRAVENOUS; SUBCUTANEOUS at 08:32

## 2019-09-04 NOTE — H&P ADULT - HISTORY OF PRESENT ILLNESS
69 y/o female with HTN p/w ambulance s/p fall at home onto her table with l shoulder pain s/p dislocation and reduction one year ago with no loc or other complaints at this time. 67 y/o female with HTN, HLD, anxiety p/w L shoulder dislocation s/p fall at home on 9/2/19.  It was mechanical tripped & fall in the house ( due to L knee pain s/p injection 8/21), landed on her L shoulder resulting in dislocation and pain.  Hx prior L shoulder dislocation x2, last was 1 year ago.  Patient was seen in Urgent Care earlier, transferred to [facility] ED for further management.  Patient was given Morphine x2, Propofol 70 iv x1 in ED for conscious sedation s/p reduction of L shoulder dislocation in ED, found to have hypoxia 88% RA.  c/o SOB, cough which all started after procedure.  No Hx asthma or COPD, but has + extensive second hand smoking (parents and ), no fever/chill, no chest pain, + urinary hesitency/urgency (new)

## 2019-09-04 NOTE — CHART NOTE - NSCHARTNOTEFT_GEN_A_CORE
LMSW received phone call from patient's  Emi who reported that they were able to gain entry into the patient's home and take care of her dog. HUSSAINSW was also in communication with [employer] who reported that they would be able to arrange a foster for the dog while patient is hospitalized. Patient spoke with [employer] representative and gave consent for them to provide care for her dog while she is admitted. Patient gave [employer] her contact information, as well as, her  Emi's contact information. Patient expressed appreciation. Social Work will continue to remain available and collaborate with interdisciplinary team.

## 2019-09-04 NOTE — BEHAVIORAL HEALTH ASSESSMENT NOTE - SUMMARY
Ms. 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 Psychiatry consulted as patient adamant about going home, agitated.     Patient agitated, likely delirious with several possible causes of delirium. UTI vs hypoxia vs pain medications received in ED all could contribute to patient's current mental status. Will need to touch base with collateral to determine baseline, unable to get in contact with son today Ms. 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 Psychiatry consulted as patient adamant about going home, agitated, capacity to leave AMA     Patient agitated, likely delirious with several possible causes of delirium. UTI vs hypoxia vs pain medications received in ED all could contribute to patient's current mental status. Will need to touch base with collateral to determine baseline, unable to get in contact with son today. pt does not have capacity to leave AMA at this time.

## 2019-09-04 NOTE — ED PROVIDER NOTE - NSFOLLOWUPCLINICS_GEN_ALL_ED_FT
[facility] Orthopedic Surgery  Orthopedic Surgery  [address], 3rd & 4th [place] [address]  Phone: [phone]  Fax:   Follow Up Time:     [facility]  Orthopedic Surgery  [address] [address]  Phone: [phone]  Fax:   Follow Up Time:

## 2019-09-04 NOTE — ED ADULT NURSE REASSESSMENT NOTE - NS ED NURSE REASSESS COMMENT FT1
Patient agreeing to stay in hospital. RN reassured patient that social work will come to see her in the morning to discuss options for taking care of her dog.

## 2019-09-04 NOTE — ED PROVIDER NOTE - PATIENT PORTAL LINK FT
You can access the FollowMyHealth Patient Portal offered by [facility] by registering at the following website: [email or web address]. By joining [employer]’s FollowMyHealth portal, you will also be able to view your health information using other applications (apps) compatible with our system.

## 2019-09-04 NOTE — ED ADULT NURSE REASSESSMENT NOTE - NS ED NURSE REASSESS COMMENT FT1
pt received in [place] admitted s/p conscious sedation for left shoulder dislocation with oxygen sats decreased when removed from supplemental oxygen pt alert oriented left arm in sling pt denies any chest discomfort NSR on moniter oxygen 4liters nasal canula in use pt spoke with social work reguarding patients dog who is home alone sw working on assistance for animal pt with b/p 95/67 with PA notified pt given 1 liter bolus in progress via infusion pump over 2 hours pt endorsed to holding nurse [place] pending tele bed assignment

## 2019-09-04 NOTE — ED ADULT NURSE REASSESSMENT NOTE - NS ED NURSE REASSESS COMMENT FT1
Patient's highest O2sat 93%, will intermittently drop down to 80's. Patient also intermittently hypotensive to 80's systolic. MD Morelos and MD Randolph aware. Patient does not want to stay in hospital, states "I have to get home for my dog, no one is around to take care of my dog."

## 2019-09-04 NOTE — H&P ADULT - PROBLEM SELECTOR PLAN 9
Confirmed with [facility] on [address], [place]  - Losartan 50mg #90 (last filled Oct 2018)  - Lipitor 40 #90 (last filled Feb 2019)  - Xanax 1mg #120 (last filled Aug 2019)  - Gabapentin 600mg QHS #30 (last filled May 2019)

## 2019-09-04 NOTE — CHART NOTE - NSCHARTNOTEFT_GEN_A_CORE
67 y/o female with HTN, HLD, anxiety p/w L shoulder dislocation s/p fall at home on 9/2/19.  It was mechanical tripped & fall in the house ( due to L knee pain s/p injection 8/21), landed on her L shoulder resulting in dislocation and pain.  Hx prior L shoulder dislocation x2, last was 1 year ago.  Patient was seen in Urgent Care earlier, transferred to [facility] ED for further management.  Patient was given Morphine x2, Propofol 70 iv x1 in ED for conscious sedation s/p reduction of L shoulder dislocation in ED, found to have hypoxia 88% RA  IN ER pt required 4 Liter oxygen with NC Bp 105/ 69 y/o female with HTN, HLD, anxiety p/w L shoulder dislocation s/p fall at home on 9/2/19.  It was mechanical tripped & fall in the house ( due to L knee pain s/p injection 8/21), landed on her L shoulder resulting in dislocation and pain.  Hx prior L shoulder dislocation x2, last was 1 year ago.  Patient was seen in Urgent Care earlier, transferred to [facility] ED for further management.  Patient was given Morphine x2, Propofol 70 iv x1 in ED for conscious sedation s/p reduction of L shoulder dislocation in ED, found to have hypoxia 88% RA  currently after 2L IV hydration bp 105/79   currently pt does not required oxygenation  she has been treated for dislocation of shoulder and UTI  she want to sign out AMA despite of hypotension refuse to have IV  plan - Psyc consult called  D/w hospitalist  spoke to her  son and agree that she should stay in hospital  Roxann rowell RPA-C

## 2019-09-04 NOTE — ED PROVIDER NOTE - OBJECTIVE STATEMENT
pt is a 69 y/o female with ambulance with fall at home onto her table with l shoulder pain s/p dislocation and reduction one year ago with no loc or other complaints at this time.

## 2019-09-04 NOTE — BEHAVIORAL HEALTH ASSESSMENT NOTE - RISK ASSESSMENT
Patient at elevated risk for harm to others given agitation, desire to leave AMA but without capacity.

## 2019-09-04 NOTE — H&P ADULT - ASSESSMENT
68 M Hx HTN, HLD, anxiety p/w L shoulder pain and dislocation s/p mechanical fall at home, s/p reduction in ED with conscious sedation, c/b acute hypoxic respiratory failure and hypotension.

## 2019-09-04 NOTE — ED ADULT NURSE REASSESSMENT NOTE - NS ED NURSE REASSESS COMMENT FT1
Pt trialed off oxygen, pulse ox desaturated to 90%. Pt placed on 3 L NC, 96% pulse ox, ECO2 25. MD Randolph made aware and states repeat blood work and EKG to be performed. Blood work sent and EKG completed. Pt NSR on cardiac monitor, HR in 80's. Safety and comfort maintained. Call bell within reach.

## 2019-09-04 NOTE — H&P ADULT - PROBLEM SELECTOR PLAN 5
-checked with [facility] since patient is unable to remember her dose of Losartan.  - Losartan 50 mg was last filled on October 2018 (Rx by Dr. Min)  - will hold all anti HTN meds for now due to hypotension

## 2019-09-04 NOTE — H&P ADULT - ADDITIONAL PE
T(F): 98 (04 Sep 2019 03:21), Max: 98 (04 Sep 2019 03:21)  HR: 77 - 100  BP: 100/60 - 134/94  RR: 14 - 23  SpO2: 88% - 98%
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
## 2019-09-04 NOTE — PROVIDER CONTACT NOTE (MEDICATION) - SITUATION
pt. remains agitated and combative, attempts to leave hospital
Pt. is agitated and confused, attempts to leave the hospital

## 2019-09-04 NOTE — H&P ADULT - PROBLEM SELECTOR PLAN 2
awake and mentating.  Receiving 1L NS  - will monitor vitals closely and clinically   - UA+ with urinary hesitancy - will treat with Abx  - will hold anti HTN meds and f/u UCx

## 2019-09-04 NOTE — H&P ADULT - PROBLEM SELECTOR PLAN 6
- Lipitor 40 was last filled Feb 2019.  - will need to clarify meds with PMD.  Suspect non-compliance

## 2019-09-04 NOTE — BEHAVIORAL HEALTH ASSESSMENT NOTE - NSBHCHARTREVIEWINVESTIGATE_PSY_A_CORE FT
Ventricular Rate 83 BPM    Atrial Rate 83 BPM    P-R Interval 140 ms    QRS Duration 88 ms    Q-T Interval 422 ms    QTC Calculation(Bezet) 495 ms    P Axis 42 degrees    R Axis 15 degrees    T Axis 100 degrees    Diagnosis Line NORMAL SINUS RHYTHM  POSSIBLE INFERIOR INFARCT , AGE UNDETERMINED  ABNORMAL ECG    Confirmed by ATTENDING, ED ([id number]),  BRIDGET PETTY ([id number]) on 9/4/2019 5:19:52 AM

## 2019-09-04 NOTE — CHART NOTE - NSCHARTNOTEFT_GEN_A_CORE
Patient referred to Social Work due to patient’s dog (Ashwin) being left at home alone. As per H&P, “Patient is a 69 y/o female with HTN p/w ambulance s/p fall at home onto her table with l shoulder pain s/p dislocation and reduction one year ago with no loc or other complaints at this time”.  LMSW met with patient at bedside, introduced self and social work role. Patient verbalized understanding and confirmed demographics. Patient reported that she resides alone with her dog in a condo in [place], [place]. Patient explained that since her emergency her Ashwin “Naun” has been alone in his crate.  LMSW explored patient’s support system. Patient reported that her sons reside out of state. Patient reported that her keys on her person. Patient has a neighbor Ankit [phone] that was listed as an additional contact. LMSW contacted Ankit, introduced self and social work role. Patient’s neighbor reported that she is currently not home and able to assist, but provided this writer with their condos management contact [employer] [phone]. LMSW contacted [employer] Emi Radford, introduced self and social work role. Emi verbalized understanding and reported that they do not have access to the condos. As per Emi she will look further into the situation and contact this writer back. LMSW provided contact information. LMSW contacted [employer] located in [place], [place], as well as, [facility] and left non urgent voicemails void of phi with this writer’s contact information. Social Work will continue to remain available and collaborate with interdisciplinary team.

## 2019-09-04 NOTE — ED PROVIDER NOTE - PROGRESS NOTE DETAILS
pt required conscious sedation for ant shoulder reduction, easily reduced in shoulder sling, ortho follow up. Patient signed out pending re-evaluate after sedation.  Room air pulse ox in low 90's, on ambulation dropped down to 88%.  Will obtain CXR to further evaluate.  Jose Miguel Randolph M.D.

## 2019-09-04 NOTE — BEHAVIORAL HEALTH ASSESSMENT NOTE - NSBHCHARTREVIEWLAB_PSY_A_CORE FT
CBC Full  -  ( 04 Sep 2019 03:22 )  WBC Count : 8.0 K/uL  RBC Count : 4.53 M/uL  Hemoglobin : 13.2 g/dL  Hematocrit : 41.3 %  Platelet Count - Automated : 215 K/uL  Mean Cell Volume : 91.2 fl  Mean Cell Hemoglobin : 29.1 pg  Mean Cell Hemoglobin Concentration : 31.9 gm/dL  Auto Neutrophil # : 5.4 K/uL  Auto Lymphocyte # : 2.1 K/uL  Auto Monocyte # : 0.4 K/uL  Auto Eosinophil # : 0.1 K/uL  Auto Basophil # : 0.0 K/uL  Auto Neutrophil % : 67.2 %  Auto Lymphocyte % : 26.3 %  Auto Monocyte % : 5.2 %  Auto Eosinophil % : 0.9 %  Auto Basophil % : 0.5 %        142  |  103  |  15  ----------------------------<  132<H>  4.3   |  22  |  0.65    Ca    8.8      04 Sep 2019 03:22    TPro  7.2  /  Alb  4.0  /  TBili  0.2  /  DBili  x   /  AST  30  /  ALT  34  /  AlkPhos  74  09-04    LIVER FUNCTIONS - ( 04 Sep 2019 03:22 )  Alb: 4.0 g/dL / Pro: 7.2 g/dL / ALK PHOS: 74 U/L / ALT: 34 U/L / AST: 30 U/L / GGT: x           Urinalysis Basic - ( 04 Sep 2019 05:20 )    Color: Yellow / Appearance: Slightly Turbid / S.019 / pH: x  Gluc: x / Ketone: Small  / Bili: Negative / Urobili: 2 mg/dL   Blood: x / Protein: 30 mg/dL / Nitrite: Negative   Leuk Esterase: Large / RBC: 4 /hpf / WBC 59 /HPF   Sq Epi: x / Non Sq Epi: 7 /hpf / Bacteria: Many

## 2019-09-04 NOTE — BEHAVIORAL HEALTH ASSESSMENT NOTE - ORIENTATION OTHER
patient agitated, not engaging in conversation patient agitated, not engaging in conversation, unable to fully assess

## 2019-09-04 NOTE — H&P ADULT - PROBLEM SELECTOR PLAN 7
- On Xanax 1mg QD (Confirmed with [facility] [phone]), last filled Aug 9, 2019, Rx'ed by Dr. Min - On Xanax 1mg QD (Confirmed with [facility] [phone]), last filled Aug 9, 2019, Rx'ed by Dr. Min  - will continue to treat with Xanax 1mg at reduced frequency bid prn

## 2019-09-04 NOTE — H&P ADULT - NSICDXPASTMEDICALHX_GEN_ALL_CORE_FT
PAST MEDICAL HISTORY:  Essential hypertension PAST MEDICAL HISTORY:  Anxiety     Essential hypertension     HLD (hyperlipidemia)

## 2019-09-04 NOTE — CHART NOTE - NSCHARTNOTEFT_GEN_A_CORE
DESIREE was in communication with Luz Brown, a representative from [employer]. As reported, Luz will be fostering patient's dog while she is hospitalized. DESIREE provided patient with Luz Espinoza number [phone]. Social Work will continue to remain available and collaborate with interdisciplinary team.

## 2019-09-04 NOTE — BEHAVIORAL HEALTH ASSESSMENT NOTE - NSBHCHARTREVIEWVS_PSY_A_CORE FT
ICU Vital Signs Last 24 Hrs  T(C): 36.8 (04 Sep 2019 12:53), Max: 36.9 (04 Sep 2019 08:32)  T(F): 98.2 (04 Sep 2019 12:53), Max: 98.4 (04 Sep 2019 08:32)  HR: 66 (04 Sep 2019 14:00) (66 - 100)  BP: 105/79 (04 Sep 2019 14:00) (87/74 - 134/94)  BP(mean): 80 (04 Sep 2019 12:53) (80 - 80)  ABP: --  ABP(mean): --  RR: 19 (04 Sep 2019 12:53) (14 - 23)  SpO2: 95% (04 Sep 2019 12:53) (88% - 100%)

## 2019-09-04 NOTE — BEHAVIORAL HEALTH ASSESSMENT NOTE - NSBHCONSULTMEDS_PSY_A_CORE FT
1. Haldol 2mg qHS    2. Haldol PRN 2-5mg IV/IM q6h for agitation        Patient's QtC 495, manual calculation appears to be closer to 420. Would get repeat EKG tomorrow AM    3. Melatonin 3mg qHS    4. Delirium precautions, attempt to re-orient    5. Continuw with 1:1 CO 1. Haldol PRN 2-5mg IV/IM q6h prn for agitation        Patient's QtC 495, manual calculation appears to be closer to 420. Would get repeat EKG tomorrow AM    2. Melatonin 3mg qHS    3.  Delirium precautions, attempt to re-orient    5. Continuw with 1:1 CO

## 2019-09-04 NOTE — H&P ADULT - PROBLEM SELECTOR PLAN 1
Hypoxic 88% RA, RR 22-28 with audible diffuse wheezing and cough, CTA neg for PE, secretion in trachea with ROBBY and bibasilar atelectasis and cardiomegaly  - possible aspiration pneumonitis or acute bronchitis/COPD exacerb (Hx second hand smoking), or r/o acute CHF   - will treat with Duonebs ATC, O2 supplementation for now.  Passed bedside swallow, asp precaution   - will check RVP, will observe off Abx  - will check TTE to further eval to r/o CHF (long standing HTN - unclear compliance, cardiomegaly on CT chest)

## 2019-09-04 NOTE — PROVIDER CONTACT NOTE (MEDICATION) - BACKGROUND
1.5 mg haldol IM given and not effective after 20 min
pt no capacity to make decisions as per psych evaluation.

## 2019-09-04 NOTE — BEHAVIORAL HEALTH ASSESSMENT NOTE - OTHER
patient agitated, not engaging in conversation Angry, irritated patient agitated, not engaging in conversation, unable to fully assess
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
## 2019-09-04 NOTE — BEHAVIORAL HEALTH ASSESSMENT NOTE - NSBHCHARTREVIEWIMAGING_PSY_A_CORE FT
CTPA  IMPRESSION:     No pulmonary embolus.    Hepatic steatosis.    Xray L shoulder  IMPRESSION:  Reduction of a left anterior shoulder dislocation with anatomic   alignment. Hill-Sachs deformity.

## 2019-09-04 NOTE — H&P ADULT - NSHPSOCIALHISTORY_GEN_ALL_CORE
, lives alone in a second story condo with her dog, has 2 children (1 in [place], 1 in [place]), never smoked tobacco, but extensive second hand smoking from parents and ex-, no alcohol use.  Independent with ADL/IADL

## 2019-09-04 NOTE — BEHAVIORAL HEALTH ASSESSMENT NOTE - NSBHCONSULTOBSREASON_PSY_A_CORE FT
Patient with desire to leave AMA, without capacity to do so. Will require 1:1 observation and reassessment tomorrow

## 2019-09-05 ENCOUNTER — TRANSCRIPTION ENCOUNTER (OUTPATIENT)
Age: 69
End: 2019-09-05

## 2019-09-05 LAB
ANION GAP SERPL CALC-SCNC: 12 MMOL/L — SIGNIFICANT CHANGE UP (ref 5–17)
BASE EXCESS BLDA CALC-SCNC: 1 MMOL/L — SIGNIFICANT CHANGE UP (ref -2–2)
BUN SERPL-MCNC: 14 MG/DL — SIGNIFICANT CHANGE UP (ref 7–23)
CALCIUM SERPL-MCNC: 9.1 MG/DL — SIGNIFICANT CHANGE UP (ref 8.4–10.5)
CHLORIDE SERPL-SCNC: 104 MMOL/L — SIGNIFICANT CHANGE UP (ref 96–108)
CO2 BLDA-SCNC: 28 MMOL/L — SIGNIFICANT CHANGE UP (ref 22–30)
CO2 SERPL-SCNC: 24 MMOL/L — SIGNIFICANT CHANGE UP (ref 22–31)
CREAT SERPL-MCNC: 0.75 MG/DL — SIGNIFICANT CHANGE UP (ref 0.5–1.3)
CULTURE RESULTS: SIGNIFICANT CHANGE UP
GLUCOSE SERPL-MCNC: 119 MG/DL — HIGH (ref 70–99)
HCO3 BLDA-SCNC: 26 MMOL/L — SIGNIFICANT CHANGE UP (ref 21–29)
HCT VFR BLD CALC: 37.3 % — SIGNIFICANT CHANGE UP (ref 34.5–45)
HGB BLD-MCNC: 11.7 G/DL — SIGNIFICANT CHANGE UP (ref 11.5–15.5)
MCHC RBC-ENTMCNC: 29.1 PG — SIGNIFICANT CHANGE UP (ref 27–34)
MCHC RBC-ENTMCNC: 31.4 GM/DL — LOW (ref 32–36)
MCV RBC AUTO: 92.8 FL — SIGNIFICANT CHANGE UP (ref 80–100)
PCO2 BLDA: 46 MMHG — SIGNIFICANT CHANGE UP (ref 32–46)
PH BLDA: 7.37 — SIGNIFICANT CHANGE UP (ref 7.35–7.45)
PLATELET # BLD AUTO: 176 K/UL — SIGNIFICANT CHANGE UP (ref 150–400)
PO2 BLDA: 88 MMHG — SIGNIFICANT CHANGE UP (ref 74–108)
POTASSIUM SERPL-MCNC: 3.7 MMOL/L — SIGNIFICANT CHANGE UP (ref 3.5–5.3)
POTASSIUM SERPL-SCNC: 3.7 MMOL/L — SIGNIFICANT CHANGE UP (ref 3.5–5.3)
RBC # BLD: 4.02 M/UL — SIGNIFICANT CHANGE UP (ref 3.8–5.2)
RBC # FLD: 13.6 % — SIGNIFICANT CHANGE UP (ref 10.3–14.5)
SAO2 % BLDA: 97 % — HIGH (ref 92–96)
SODIUM SERPL-SCNC: 140 MMOL/L — SIGNIFICANT CHANGE UP (ref 135–145)
SPECIMEN SOURCE: SIGNIFICANT CHANGE UP
WBC # BLD: 5.74 K/UL — SIGNIFICANT CHANGE UP (ref 3.8–10.5)
WBC # FLD AUTO: 5.74 K/UL — SIGNIFICANT CHANGE UP (ref 3.8–10.5)

## 2019-09-05 PROCEDURE — 99232 SBSQ HOSP IP/OBS MODERATE 35: CPT | Mod: GC

## 2019-09-05 RX ORDER — SODIUM CHLORIDE 9 MG/ML
500 INJECTION INTRAMUSCULAR; INTRAVENOUS; SUBCUTANEOUS
Refills: 0 | Status: COMPLETED | OUTPATIENT
Start: 2019-09-05 | End: 2019-09-05

## 2019-09-05 RX ORDER — LANOLIN ALCOHOL/MO/W.PET/CERES
3 CREAM (GRAM) TOPICAL ONCE
Refills: 0 | Status: COMPLETED | OUTPATIENT
Start: 2019-09-05 | End: 2019-09-05

## 2019-09-05 RX ADMIN — Medication 1 MILLIGRAM(S): at 05:23

## 2019-09-05 RX ADMIN — Medication 3 MILLILITER(S): at 05:23

## 2019-09-05 RX ADMIN — SODIUM CHLORIDE 500 MILLILITER(S): 9 INJECTION INTRAMUSCULAR; INTRAVENOUS; SUBCUTANEOUS at 05:24

## 2019-09-05 RX ADMIN — Medication 1 MILLIGRAM(S): at 23:17

## 2019-09-05 RX ADMIN — ENOXAPARIN SODIUM 40 MILLIGRAM(S): 100 INJECTION SUBCUTANEOUS at 11:22

## 2019-09-05 RX ADMIN — Medication 3 MILLIGRAM(S): at 00:39

## 2019-09-05 RX ADMIN — SODIUM CHLORIDE 500 MILLILITER(S): 9 INJECTION INTRAMUSCULAR; INTRAVENOUS; SUBCUTANEOUS at 06:50

## 2019-09-05 RX ADMIN — Medication 3 MILLIGRAM(S): at 23:55

## 2019-09-05 RX ADMIN — Medication 3 MILLILITER(S): at 23:17

## 2019-09-05 RX ADMIN — CEFTRIAXONE 100 MILLIGRAM(S): 500 INJECTION, POWDER, FOR SOLUTION INTRAMUSCULAR; INTRAVENOUS at 08:43

## 2019-09-05 RX ADMIN — Medication 3 MILLILITER(S): at 11:22

## 2019-09-05 NOTE — PROGRESS NOTE BEHAVIORAL HEALTH - NSBHCONSULTOBSREASON_PSY_A_CORE FT
Continue 1:1 at this time given yesterday's events and reassess tomorrow. Patient agreeable to stay until the completion of her care tomorrow

## 2019-09-05 NOTE — DISCHARGE NOTE PROVIDER - HOSPITAL COURSE
68 M Hx HTN, HLD, anxiety p/w L shoulder pain and dislocation s/p mechanical fall at home, s/p reduction in ED with conscious sedation, c/b acute hypoxic respiratory failure and hypotension.  hypotension and acute respiratory distress resolved. seen by pulm, no further w/u. For Lt shoulder fracture, sling in place. will f/u outpt ortho.     ****PT      ****OT    + UA and UCx GNR, awaiting sensitivity. on IV ceftriaxone.     pt requested AMA, seen by psych, pt has no capacity. 68 M Hx HTN, HLD, anxiety p/w L shoulder pain and dislocation s/p mechanical fall at home, s/p reduction in ED with conscious sedation, c/b acute hypoxic respiratory failure and hypotension.  hypotension and acute respiratory distress resolved. seen by pulm, no further w/u. For Lt shoulder fracture, sling in place. will f/u outpt ortho.     + UA and UCx GNR, completed ceftriaxone iv x 3 days.     pt requested AMA, seen by psych, pt has no capacity, was on 1:1. now mentation improved, psych cleared pt to d/c. 68 M Hx HTN, HLD, anxiety p/w L shoulder pain and dislocation s/p mechanical fall at home, s/p reduction in ED with conscious sedation, c/b acute hypoxic respiratory failure and hypotension.  hypotension and acute respiratory distress resolved. seen by pulm, no further w/u. For Lt shoulder fracture, sling in place. will f/u outpt ortho. seen by PT, OT.     + UA and UCx GNR, completed ceftriaxone iv x 3 days.     pt requested AMA, seen by psych, pt has no capacity, was on 1:1. now mentation improved, psych cleared pt to d/c.

## 2019-09-05 NOTE — PROGRESS NOTE BEHAVIORAL HEALTH - NSBHFUPINTERVALHXFT_PSY_A_CORE
On interview today patient calm, cooperative and significantly more engaging and pleasant than yesterday. Mental status improving, patient aware of the plan for discharge possibly tomorrow following medical clearance. On interview today patient calm, cooperative and significantly more engaging and pleasant than yesterday. Mental status improving, patient aware of the plan for discharge possibly tomorrow following medical clearance. Patient oriented to person, place, situation, day/month. 1:1 for CO in room. Discusses that she will be working with PT and OT today or tomorrow. No PRNs given since Haldol 1mg x2 in ED yesterday

## 2019-09-05 NOTE — DISCHARGE NOTE PROVIDER - CARE PROVIDER_API CALL
Jose Feliciano PMTOM.  Phone: (   )    -  Fax: (   )    -  Follow Up Time:     Casey Eng)  Orthopaedic Sports Medicine; Orthopaedic Surgery  [address] [address]  Phone: [phone]  Fax: [phone]  Follow Up Time: 1 week

## 2019-09-05 NOTE — DISCHARGE NOTE PROVIDER - NSDCFUSCHEDAPPT_GEN_ALL_CORE_FT
DALI RAYGOZA ; 09/24/2019 ; [facility] [address] [facility] DALI RAYGOZA ; 09/24/2019 ; [facility] [address] [facility] DALI RAYGOZA ; 09/24/2019 ; [facility] [address] [facility] DALI RAYGOZA ; 09/24/2019 ; [facility] [address] [facility] DALI RAYGOZA ; 09/24/2019 ; [facility] [address] [facility] DALI RAYGOZA ; 09/24/2019 ; [facility] [address] [facility]

## 2019-09-05 NOTE — DISCHARGE NOTE PROVIDER - PROVIDER TOKENS
FREE:[LAST:[Jodie],PHONE:[(   )    -],FAX:[(   )    -],ADDRESS:[Your PMD.]],PROVIDER:[[id number]],FOLLOWUP:[1 week]]

## 2019-09-05 NOTE — DISCHARGE NOTE PROVIDER - NSDCCPCAREPLAN_GEN_ALL_CORE_FT
PRINCIPAL DISCHARGE DIAGNOSIS  Diagnosis: Shoulder dislocation, left, initial encounter  Assessment and Plan of Treatment: s/p reduction, on Lt arm sling.   **PT  **OT        SECONDARY DISCHARGE DIAGNOSES  Diagnosis: Medication management  Assessment and Plan of Treatment: Confirmed with [facility] on [address], [place]. pt have not refilled medications.   - Losartan 50mg #90 (last filled Oct 2018)  - Lipitor 40 #90 (last filled Feb 2019)  - Xanax 1mg #120 (last filled Aug 2019)  - Gabapentin 600mg QHS #30 (last filled May 2019).   please follow up with your PMD for medication adjustment and refills.    Diagnosis: Acute respiratory failure with hypoxia  Assessment and Plan of Treatment: Likely secondary to sedation.  Now resolved.   seen by pulmonology, no workups needed at this time.    Diagnosis: Hypotension  Assessment and Plan of Treatment: Likely secondary to sedation.  Now improving. off BP medications.    Diagnosis: Acute cystitis without hematuria  Assessment and Plan of Treatment: positive leukocyte in Urinalysis.   positive gram negative michela in urine culture.   HOME CARE INSTRUCTIONS  f you were prescribed antibiotics, take them exactly as your caregiver instructs you. Finish the medication even if you feel better after you have only taken some of the medication.  Drink enough water and fluids to keep your urine clear or pale yellow.  Avoid caffeine, tea, and carbonated beverages. They tend to irritate your bladder.  Empty your bladder often. Avoid holding urine for long periods of time.  Empty your bladder before and after sexual intercourse.  After a bowel movement, women should cleanse from front to back. Use each tissue only once.  SEEK MEDICAL CARE IF:  You have back pain.  You develop a fever.  Your symptoms do not begin to resolve within 3 days.  SEEK IMMEDIATE MEDICAL CARE IF:  You have severe back pain or lower abdominal pain.  You develop chills.  You have nausea or vomiting.  You have continued burning or discomfort with urination.      Diagnosis: Essential hypertension  Assessment and Plan of Treatment: Hold off antihypertensive medications for now.   f/u with your PMD to resume BP meds.    Diagnosis: Hyperlipidemia  Assessment and Plan of Treatment: Currently pt dosent take lipitor. please follow up with your PMD to manage hyperlipidemia.    Diagnosis: Anxiety  Assessment and Plan of Treatment: continue with xanax. PRINCIPAL DISCHARGE DIAGNOSIS  Diagnosis: Shoulder dislocation, left, initial encounter  Assessment and Plan of Treatment: s/p reduction, on Lt arm sling.   please follow up with your orthopedic in 1 ~ 2 weeks.         SECONDARY DISCHARGE DIAGNOSES  Diagnosis: Medication management  Assessment and Plan of Treatment: Confirmed with [facility] on [address], [place]. pt have not refilled medications.   - Losartan 50mg #90 (last filled Oct 2018)  - Lipitor 40 #90 (last filled Feb 2019)  - Xanax 1mg #120 (last filled Aug 2019)  - Gabapentin 600mg QHS #30 (last filled May 2019).   please follow up with your PMD for medication adjustment and refills.    Diagnosis: Acute respiratory failure with hypoxia  Assessment and Plan of Treatment: Likely secondary to sedation.  Now resolved.   seen by pulmonology, no workups needed at this time.    Diagnosis: Hypotension  Assessment and Plan of Treatment: Likely secondary to sedation.  Now improving. off BP medications.    Diagnosis: Acute cystitis without hematuria  Assessment and Plan of Treatment: positive leukocyte in Urinalysis.   positive gram negative michela in urine culture.   HOME CARE INSTRUCTIONS  f you were prescribed antibiotics, take them exactly as your caregiver instructs you. Finish the medication even if you feel better after you have only taken some of the medication.  Drink enough water and fluids to keep your urine clear or pale yellow.  Avoid caffeine, tea, and carbonated beverages. They tend to irritate your bladder.  Empty your bladder often. Avoid holding urine for long periods of time.  Empty your bladder before and after sexual intercourse.  After a bowel movement, women should cleanse from front to back. Use each tissue only once.  SEEK MEDICAL CARE IF:  You have back pain.  You develop a fever.  Your symptoms do not begin to resolve within 3 days.  SEEK IMMEDIATE MEDICAL CARE IF:  You have severe back pain or lower abdominal pain.  You develop chills.  You have nausea or vomiting.  You have continued burning or discomfort with urination.      Diagnosis: Essential hypertension  Assessment and Plan of Treatment: Hold off antihypertensive medications for now.   f/u with your PMD to resume BP meds.    Diagnosis: Hyperlipidemia  Assessment and Plan of Treatment: Currently pt dosent take lipitor. please follow up with your PMD to manage hyperlipidemia.    Diagnosis: Anxiety  Assessment and Plan of Treatment: continue with xanax.

## 2019-09-05 NOTE — PROVIDER CONTACT NOTE (OTHER) - BACKGROUND
69 y/o female with HTN, HLD, anxiety p/w L shoulder dislocation s/p fall at home on 9/2/19.  It was mechanical tripped & fall in the house

## 2019-09-05 NOTE — PROGRESS NOTE ADULT - PROBLEM SELECTOR PLAN 9
Confirmed with [facility] on [address], [place]  - Losartan 50mg #90 (last filled Oct 2018)  - Lipitor 40 #90 (last filled Feb 2019)  - Xanax 1mg #120 (last filled Aug 2019)  - Gabapentin 600mg QHS #30 (last filled May 2019)

## 2019-09-05 NOTE — CONSULT NOTE ADULT - ASSESSMENT
L shoulder dislocation, s/p reduction with sedation  Tansient post reduction hypoxemia (borderline at 88%) - now normal on RA  ? "allergy" to mold/dust - patient states her coughing was typical of prior episodes,  now resolved  No evidence of pneumonia or PE  Obesity with some atelectasis would account for reduction to low normal O2 sats    REC    Ceftriaxone for UTI per primary team  DC bronchodilators: no evidence of bronchospasm  Monitor O2 sats on RA

## 2019-09-05 NOTE — CONSULT NOTE ADULT - SUBJECTIVE AND OBJECTIVE BOX
PULMONARY CONSULT  David Chaidez MD  [phone]    Initial HPI on admission:  HPI:  67 y/o female with HTN, HLD, anxiety p/w L shoulder dislocation s/p fall at home on 19.  It was mechanical tripped & fall in the house ( due to L knee pain s/p injection ), landed on her L shoulder resulting in dislocation and pain.  Hx prior L shoulder dislocation x2, last was 1 year ago.  Patient was seen in Urgent Care earlier, transferred to [facility] ED for further management.  Patient was given Morphine x2, Propofol 70 iv x1 in ED for conscious sedation s/p reduction of L shoulder dislocation in ED, found to have hypoxia 88% RA.  c/o SOB, cough which all started after procedure.  No Hx asthma or COPD, but has + extensive second hand smoking (parents and ), no fever/chill, no chest pain, + urinary hesitency/urgency (new)     Patient denies history of COPD, asthma, allergy associated wheezing. She states she had been followed in past by allergist and experiences productive cough when exposed to mold/dusts. She currently denies SOB, cough, wheeze.   CTA: negative for PE, pneumonia    PAST MEDICAL & SURGICAL HISTORY:  Anxiety  HLD (hyperlipidemia)  Essential hypertension  No significant past surgical history    Review of Systems:  · Negative General Symptoms	no fever; no chills	  · Negative Skin Symptoms	no rash	  · Respiratory and Thorax Symptoms	wheezing  dyspnea  cough	  · Negative Cardiovascular Symptoms	no chest pain	  · Negative Gastrointestinal Symptoms	no nausea; no vomiting; no abdominal pain	  · Gastrointestinal Symptoms	flatulence	  · Negative General Genitourinary Symptoms	no dysuria	  · General Genitourinary Symptoms	urinary hesitancy	  · Musculoskeletal Symptoms	L shoulder pain	  · Negative Neurological Symptoms	no syncope; no headache; no loss of consciousness	  · Psychiatric Symptoms	anxiety	  · Negative Allergy Types	no reactions to medicines	      Allergies and Intolerances:        Allergies:  	No Known Allergies:     FAMILY HISTORY:  No pertinent family history in first degree relatives    Social history: , exposure to second hand smoke (); non smoker    Medications:  MEDICATIONS  (STANDING):  ALBUTerol/ipratropium for Nebulization 3 milliLiter(s) Nebulizer every 6 hours  cefTRIAXone   IVPB 1000 milliGRAM(s) IV Intermittent every 24 hours  enoxaparin Injectable 40 milliGRAM(s) SubCutaneous daily    MEDICATIONS  (PRN):  ALPRAZolam 1 milliGRAM(s) Oral two times a day PRN anxiety    Vital Signs Last 24 Hrs  T(C): 36.8 (05 Sep 2019 07:43), Max: 36.8 (04 Sep 2019 12:53)  T(F): 98.2 (05 Sep 2019 07:43), Max: 98.2 (04 Sep 2019 12:53)  HR: 70 (05 Sep 2019 07:43) (66 - 87)  BP: 98/73 (05 Sep 2019 07:43) (81/47 - 116/85)  BP(mean): 80 (04 Sep 2019 12:53) (80 - 80)  RR: 18 (05 Sep 2019 07:43) (18 - 19)  SpO2: 94% (05 Sep 2019 07:43) (92% - 96%)    ABG - ( 05 Sep 2019 01:08 )  pH, Arterial: 7.37  pH, Blood: x     /  pCO2: 46    /  pO2: 88    / HCO3: 26    / Base Excess: 1.0   /  SaO2: 97           @ 07:01  -  0905 @ 07:00  --------------------------------------------------------  IN: 100 mL / OUT: 0 mL / NET: 100 mL      LABS:                        11.7   5.74  )-----------( 176      ( 05 Sep 2019 08:39 )             37.3     09-05    140  |  104  |  14  ----------------------------<  119<H>  3.7   |  24  |  0.75    Ca    9.1      05 Sep 2019 06:14    TPro  7.2  /  Alb  4.0  /  TBili  0.2  /  DBili  x   /  AST  30  /  ALT  34  /  AlkPhos  74  09-04        Urinalysis Basic - ( 04 Sep 2019 05:20 )    Color: Yellow / Appearance: Slightly Turbid / S.019 / pH: x  Gluc: x / Ketone: Small  / Bili: Negative / Urobili: 2 mg/dL   Blood: x / Protein: 30 mg/dL / Nitrite: Negative   Leuk Esterase: Large / RBC: 4 /hpf / WBC 59 /HPF   Sq Epi: x / Non Sq Epi: 7 /hpf / Bacteria: Many      CULTURES:  Culture Results:   >100,000 CFU/ml Gram Negative Rods ( @ 10:11)    Physical Examination:    General: Non toxic, No acute distress.  O2 sat 93% on RA sitting OOB    HEENT: Pupils equal, reactive to light.  Symmetric.    PULM: Clear without wheeze or rhonchi    CVS: Regular rate and rhythm, no murmurs, rubs, or gallops    ABD: Soft, nondistended, nontender, normoactive bowel sounds, no masses    EXT: No edema, nontender    SKIN: Warm and well perfused, no rashes noted.    NEURO: Alert, oriented, interactive, nonfocal    RADIOLOGY REVIEWED PERSONALLY  CXR:    CT chest:    PROCEDURE DATE:  2019        INTERPRETATION:  CLINICAL INFORMATION: Hypoxia status post shoulder   reduction. Evaluate for PE.    COMPARISON: None.    PROCEDURE:  CT Angiography of the Chest.  90 ml of Omnipaque 350 was injected intravenously. 10 ml were discarded.  Sagittal and coronal reformats were performed as well as 3D (MIP)   reconstructions.      FINDINGS:    LUNGS AND AIRWAYS: Secretions in the right side of the upper trachea. And   main bronchi are clear. Left upper lobe subsegmental and bibasilar   dependent atelectasis. No suspicious pulmonary mass or nodule.    PLEURA: No pleural effusion or pneumothorax.    MEDIASTINUM AND SHANTELLE: No lymphadenopathy.    VESSELS: No pulmonary embolus. Atherosclerosis.     HEART: Cardiomegaly. No pericardial effusion. Coronary artery   calcifications    CHEST WALL AND LOWER NECK: Within normal limits.    VISUALIZED UPPER ABDOMEN: Hepatic steatosis.    BONES: Degenerative changes.    IMPRESSION:     No pulmonary embolus.    Hepatic steatosis.        TTE:      Assessment:    Plan:

## 2019-09-05 NOTE — CHART NOTE - NSCHARTNOTEFT_GEN_A_CORE
NP note- L shoulder dislocation s/p L shoulder sling.    pt is awaiting PT/OT evaluation. d/w ortho Dr. Eng's office, since pt is admitted in hospital, could not provide any recommendations. spoke to house orthopedic, recommended NWB on L arm. no other recommendations.     NP. Avril Coppola  [phone]

## 2019-09-06 ENCOUNTER — INBOUND DOCUMENT (OUTPATIENT)
Age: 69
End: 2019-09-06

## 2019-09-06 ENCOUNTER — TRANSCRIPTION ENCOUNTER (OUTPATIENT)
Age: 69
End: 2019-09-06

## 2019-09-06 VITALS
OXYGEN SATURATION: 95 % | DIASTOLIC BLOOD PRESSURE: 90 MMHG | SYSTOLIC BLOOD PRESSURE: 135 MMHG | TEMPERATURE: 98 F | HEART RATE: 76 BPM | RESPIRATION RATE: 18 BRPM

## 2019-09-06 LAB
-  AMIKACIN: SIGNIFICANT CHANGE UP
-  AMPICILLIN/SULBACTAM: SIGNIFICANT CHANGE UP
-  AMPICILLIN: SIGNIFICANT CHANGE UP
-  AZTREONAM: SIGNIFICANT CHANGE UP
-  CEFAZOLIN: SIGNIFICANT CHANGE UP
-  CEFEPIME: SIGNIFICANT CHANGE UP
-  CEFOXITIN: SIGNIFICANT CHANGE UP
-  CEFTRIAXONE: SIGNIFICANT CHANGE UP
-  CIPROFLOXACIN: SIGNIFICANT CHANGE UP
-  GENTAMICIN: SIGNIFICANT CHANGE UP
-  IMIPENEM: SIGNIFICANT CHANGE UP
-  LEVOFLOXACIN: SIGNIFICANT CHANGE UP
-  MEROPENEM: SIGNIFICANT CHANGE UP
-  NITROFURANTOIN: SIGNIFICANT CHANGE UP
-  PIPERACILLIN/TAZOBACTAM: SIGNIFICANT CHANGE UP
-  TIGECYCLINE: SIGNIFICANT CHANGE UP
-  TOBRAMYCIN: SIGNIFICANT CHANGE UP
-  TRIMETHOPRIM/SULFAMETHOXAZOLE: SIGNIFICANT CHANGE UP
ANION GAP SERPL CALC-SCNC: 14 MMOL/L — SIGNIFICANT CHANGE UP (ref 5–17)
BUN SERPL-MCNC: 12 MG/DL — SIGNIFICANT CHANGE UP (ref 7–23)
CALCIUM SERPL-MCNC: 9.3 MG/DL — SIGNIFICANT CHANGE UP (ref 8.4–10.5)
CHLORIDE SERPL-SCNC: 103 MMOL/L — SIGNIFICANT CHANGE UP (ref 96–108)
CO2 SERPL-SCNC: 23 MMOL/L — SIGNIFICANT CHANGE UP (ref 22–31)
CREAT SERPL-MCNC: 0.88 MG/DL — SIGNIFICANT CHANGE UP (ref 0.5–1.3)
CULTURE RESULTS: SIGNIFICANT CHANGE UP
GLUCOSE SERPL-MCNC: 119 MG/DL — HIGH (ref 70–99)
HCT VFR BLD CALC: 40.5 % — SIGNIFICANT CHANGE UP (ref 34.5–45)
HGB BLD-MCNC: 12.8 G/DL — SIGNIFICANT CHANGE UP (ref 11.5–15.5)
MCHC RBC-ENTMCNC: 29.2 PG — SIGNIFICANT CHANGE UP (ref 27–34)
MCHC RBC-ENTMCNC: 31.6 GM/DL — LOW (ref 32–36)
MCV RBC AUTO: 92.5 FL — SIGNIFICANT CHANGE UP (ref 80–100)
METHOD TYPE: SIGNIFICANT CHANGE UP
ORGANISM # SPEC MICROSCOPIC CNT: SIGNIFICANT CHANGE UP
ORGANISM # SPEC MICROSCOPIC CNT: SIGNIFICANT CHANGE UP
PLATELET # BLD AUTO: 210 K/UL — SIGNIFICANT CHANGE UP (ref 150–400)
POTASSIUM SERPL-MCNC: 3.8 MMOL/L — SIGNIFICANT CHANGE UP (ref 3.5–5.3)
POTASSIUM SERPL-SCNC: 3.8 MMOL/L — SIGNIFICANT CHANGE UP (ref 3.5–5.3)
RBC # BLD: 4.38 M/UL — SIGNIFICANT CHANGE UP (ref 3.8–5.2)
RBC # FLD: 13.6 % — SIGNIFICANT CHANGE UP (ref 10.3–14.5)
SODIUM SERPL-SCNC: 140 MMOL/L — SIGNIFICANT CHANGE UP (ref 135–145)
SPECIMEN SOURCE: SIGNIFICANT CHANGE UP
WBC # BLD: 6.72 K/UL — SIGNIFICANT CHANGE UP (ref 3.8–10.5)
WBC # FLD AUTO: 6.72 K/UL — SIGNIFICANT CHANGE UP (ref 3.8–10.5)

## 2019-09-06 PROCEDURE — 99238 HOSP IP/OBS DSCHRG MGMT 30/<: CPT

## 2019-09-06 PROCEDURE — 99233 SBSQ HOSP IP/OBS HIGH 50: CPT

## 2019-09-06 RX ORDER — LOSARTAN POTASSIUM 100 MG/1
1 TABLET, FILM COATED ORAL
Qty: 0 | Refills: 0 | DISCHARGE

## 2019-09-06 RX ADMIN — Medication 3 MILLILITER(S): at 05:38

## 2019-09-06 RX ADMIN — ENOXAPARIN SODIUM 40 MILLIGRAM(S): 100 INJECTION SUBCUTANEOUS at 11:32

## 2019-09-06 RX ADMIN — CEFTRIAXONE 100 MILLIGRAM(S): 500 INJECTION, POWDER, FOR SOLUTION INTRAMUSCULAR; INTRAVENOUS at 09:04

## 2019-09-06 NOTE — OCCUPATIONAL THERAPY INITIAL EVALUATION ADULT - PERTINENT HX OF CURRENT PROBLEM, REHAB EVAL
69 y/o female p/w L shoulder dislocation s/p fall at home on 9/2/19.   It was mechanical tripped & fall in the house ( due to L knee pain s/p injection 8/21). In [facility] ED conscious sedation s/p reduction of L shoulder dislocation in ED, found to have hypoxia 88% RA.  c/o SOB, cough which all started after procedure.

## 2019-09-06 NOTE — OCCUPATIONAL THERAPY INITIAL EVALUATION ADULT - LIVES WITH, PROFILE
Pt reports living in a condo with her boyfriend, +8 steps to enter, +first floor set-up, +walk-in shower, +shower chair, +elevated toilet seat.

## 2019-09-06 NOTE — PROGRESS NOTE ADULT - PROBLEM SELECTOR PLAN 7
will continue to treat with Xanax 1mg at reduced frequency bid prn  dc 1:1
was deemed to not have capacity yesterday when thought of leaving AMA  today, patient in good spirits and understands why she has to stay in the hospital  will continue to treat with Xanax 1mg at reduced frequency bid prn

## 2019-09-06 NOTE — PROGRESS NOTE BEHAVIORAL HEALTH - ORIENTATION OTHER
patient agitated, not engaging in conversation, unable to fully assess
patient agitated, not engaging in conversation, unable to fully assess

## 2019-09-06 NOTE — OCCUPATIONAL THERAPY INITIAL EVALUATION ADULT - RANGE OF MOTION EXAMINATION, UPPER EXTREMITY
bilateral UE Active ROM was WFL  (within functional limits)/except L shoulder, not tested per NP Umesh's orders

## 2019-09-06 NOTE — PROGRESS NOTE ADULT - ASSESSMENT
L shoulder dislocatin  Transient post procedure hypoxemia  Obesity/atelectasis  No active pulmonary disease at this time    REC    DC duoneb  Monitor O2 sats
68 M Hx HTN, HLD, anxiety p/w L shoulder pain and dislocation s/p mechanical fall at home, s/p reduction in ED with conscious sedation, c/b acute hypoxic respiratory failure and hypotension.
68 M Hx HTN, HLD, anxiety p/w L shoulder pain and dislocation s/p mechanical fall at home, s/p reduction in ED with conscious sedation, c/b acute hypoxic respiratory failure and hypotension.

## 2019-09-06 NOTE — PROGRESS NOTE BEHAVIORAL HEALTH - SUMMARY
Ms. 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 Psychiatry consulted as patient adamant about going home, agitated, capacity to leave AMA from ED.     Patient's mental status and delirium improving today, more aware of her medical plan going forward. Believes she is going home today. Spoke with primary team, patient pending PT/OT eval and urine sensitivities which have now resulted.
Ms. 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 Psychiatry consulted as patient adamant about going home, agitated, capacity to leave AMA from ED.     Patient's mental status improved today, more aware of her medical plan going forward.

## 2019-09-06 NOTE — PROGRESS NOTE BEHAVIORAL HEALTH - NSBHFUPINTERVALHXFT_PSY_A_CORE
On interview today patient is overall pleasant and engages interviewer appropriately. States she believes she will be going home today. Explains that she was brought to the hospital for a dislocated left shoulder, and that currently she is getting antibiotics for urinary infection. Patient repeatedly asks if she can "sign myself out" today, as she is hopeful the antibiotic decision can be followed up with her pharmacy as an outpatient. Mood reported to be good, no SIIP/HIIP/AVH. Per discussion with primary team, patient pending PT/OT Eval and urine culture sensitivities prior to discharge. On interview today patient is overall pleasant and engages interviewer appropriately. States she believes she will be going home today. Explains that she was brought to the hospital for a dislocated left shoulder, needs to be reminded that she is also getting antibiotics for urinary infection. Patient repeatedly asks if she can "sign myself out" today, as she is hopeful the antibiotic decision can be followed up with her pharmacy as an outpatient. Mood reported to be good, no SIIP/HIIP/AVH. Per discussion with primary team, patient pending PT/OT Eval and urine culture sensitivities prior to discharge. Urine sensitivities now resulted later today.

## 2019-09-06 NOTE — PROGRESS NOTE ADULT - PROBLEM SELECTOR PLAN 3
urine growing ecoli  completed 3 days IV ceftriaxone  no more further abx
urine growing >100,000 GNR, cont Ceftriaxone

## 2019-09-06 NOTE — PHYSICAL THERAPY INITIAL EVALUATION ADULT - PERTINENT HX OF CURRENT PROBLEM, REHAB EVAL
Pt is a 69 y/o F Hx HTN, HLD, anxiety p/w L shoulder pain and dislocation s/p mechanical fall at home, s/p reduction in ED with conscious sedation, c/b acute hypoxic respiratory failure and hypotension.

## 2019-09-06 NOTE — PROGRESS NOTE ADULT - SUBJECTIVE AND OBJECTIVE BOX
Follow-up Pulm Progress Note  David Chaidez MD  [phone]    AFebrile off abx  OOB in chair: denies dyspnea, cough, CP      Medications:  Vital Signs Last 24 Hrs  T(C): 36.9 (06 Sep 2019 10:14), Max: 37 (05 Sep 2019 12:09)  T(F): 98.4 (06 Sep 2019 10:14), Max: 98.6 (05 Sep 2019 12:09)  HR: 64 (06 Sep 2019 10:14) (64 - 76)  BP: 116/75 (06 Sep 2019 10:14) (96/57 - 118/84)  BP(mean): --  RR: 18 (06 Sep 2019 10:14) (18 - 18)  SpO2: 95% (06 Sep 2019 10:14) (92% - 96%)  ABG - ( 05 Sep 2019 01:08 )  pH, Arterial: 7.37  pH, Blood: x     /  pCO2: 46    /  pO2: 88    / HCO3: 26    / Base Excess: 1.0   /  SaO2: 97        09-05 @ 07:01  -  09-06 @ 07:00  --------------------------------------------------------  IN: 300 mL / OUT: 6 mL / NET: 294 mL    LABS:                        12.8   6.72  )-----------( 210      ( 06 Sep 2019 08:41 )             40.5     09-06    140  |  103  |  12  ----------------------------<  119<H>  3.8   |  23  |  0.88    Ca    9.3      06 Sep 2019 05:58    CULTURES:  Culture Results:   >=3 organisms. Probable collection contamination. (09-04 @ 16:36)  Culture Results:   >100,000 CFU/ml Gram Negative Rods (09-04 @ 10:11)    Most recent blood culture -- 09-04 @ 16:36   -- -- .Urine 09-04 @ 16:36  Most recent blood culture -- 09-04 @ 10:11   -- -- .Urine 09-04 @ 10:11      Physical Examination:  PULM:   CVS: Regular rate and rhythm, no murmurs, rubs, or gallops  ABD: Soft, non-tender  EXT:  No clubbing, cyanosis, or edema    RADIOLOGY REVIEWED  CXR:    CT chest:    TTE:
Patient is a 68y old  Female who presents with a chief complaint of hypoxia (05 Sep 2019 11:26)      SUBJECTIVE / OVERNIGHT EVENTS:    Patient seen and examined. denies dysuria. denies cp sob nvd.      Vital Signs Last 24 Hrs  T(C): 37 (05 Sep 2019 12:09), Max: 37 (05 Sep 2019 12:09)  T(F): 98.6 (05 Sep 2019 12:09), Max: 98.6 (05 Sep 2019 12:09)  HR: 74 (05 Sep 2019 12:09) (66 - 87)  BP: 108/77 (05 Sep 2019 12:09) (81/47 - 116/85)  BP(mean): 80 (04 Sep 2019 12:53) (80 - 80)  RR: 18 (05 Sep 2019 12:09) (18 - 19)  SpO2: 92% (05 Sep 2019 12:09) (92% - 96%)  I&O's Summary    04 Sep 2019 07:01  -  05 Sep 2019 07:00  --------------------------------------------------------  IN: 100 mL / OUT: 0 mL / NET: 100 mL        PE:  GENERAL: NAD, AAOx3  HEAD:  Atraumatic, Normocephalic  EYES: EOMI, PERRLA, conjunctiva and sclera clear  NECK: Supple, No JVD  CHEST/LUNG: CTABL, No wheeze  HEART: Regular rate and rhythm; no murmur  ABDOMEN: Soft, Nontender, Nondistended; Bowel sounds present  EXTREMITIES:  2+ Peripheral Pulses, No clubbing, cyanosis, or edema  SKIN: No rashes or lesions  NEURO: No focal deficits    LABS:                        11.7   5.74  )-----------( 176      ( 05 Sep 2019 08:39 )             37.3     09-05    140  |  104  |  14  ----------------------------<  119<H>  3.7   |  24  |  0.75    Ca    9.1      05 Sep 2019 06:14    TPro  7.2  /  Alb  4.0  /  TBili  0.2  /  DBili  x   /  AST  30  /  ALT  34  /  AlkPhos  74  09-04      CAPILLARY BLOOD GLUCOSE            Urinalysis Basic - ( 04 Sep 2019 05:20 )    Color: Yellow / Appearance: Slightly Turbid / S.019 / pH: x  Gluc: x / Ketone: Small  / Bili: Negative / Urobili: 2 mg/dL   Blood: x / Protein: 30 mg/dL / Nitrite: Negative   Leuk Esterase: Large / RBC: 4 /hpf / WBC 59 /HPF   Sq Epi: x / Non Sq Epi: 7 /hpf / Bacteria: Many        RADIOLOGY & ADDITIONAL TESTS:    Imaging Personally Reviewed:  [x] YES  [ ] NO    Consultant(s) Notes Reviewed:  [x] YES  [ ] NO    MEDICATIONS  (STANDING):  ALBUTerol/ipratropium for Nebulization 3 milliLiter(s) Nebulizer every 6 hours  cefTRIAXone   IVPB 1000 milliGRAM(s) IV Intermittent every 24 hours  enoxaparin Injectable 40 milliGRAM(s) SubCutaneous daily    MEDICATIONS  (PRN):  ALPRAZolam 1 milliGRAM(s) Oral two times a day PRN anxiety      Care Discussed with Consultants/Other Providers [x] YES  [ ] NO    HEALTH ISSUES - PROBLEM Dx:  Delirium due to another medical condition  Delirium  Medication management: Medication management  Prophylactic measure: Prophylactic measure  Anxiety: Anxiety  Hyperlipidemia, unspecified hyperlipidemia type: Hyperlipidemia, unspecified hyperlipidemia type  Essential hypertension: Essential hypertension  Shoulder dislocation, left, initial encounter: Shoulder dislocation, left, initial encounter  Acute cystitis without hematuria: Acute cystitis without hematuria  Other specified hypotension: Other specified hypotension  Acute respiratory failure with hypoxia: Acute respiratory failure with hypoxia  Suspected pulmonary embolism
Patient is a 68y old  Female who presents with a chief complaint of hypoxia (06 Sep 2019 10:26)      SUBJECTIVE / OVERNIGHT EVENTS:    Patient seen and examined.   wants to go home  denies cp sob dysuria nvd.    Vital Signs Last 24 Hrs  T(C): 36.9 (06 Sep 2019 10:14), Max: 36.9 (06 Sep 2019 10:14)  T(F): 98.4 (06 Sep 2019 10:14), Max: 98.4 (06 Sep 2019 10:14)  HR: 64 (06 Sep 2019 10:14) (64 - 76)  BP: 116/75 (06 Sep 2019 10:14) (96/57 - 118/84)  BP(mean): --  RR: 18 (06 Sep 2019 10:14) (18 - 18)  SpO2: 95% (06 Sep 2019 10:14) (93% - 96%)  I&O's Summary    05 Sep 2019 07:01  -  06 Sep 2019 07:00  --------------------------------------------------------  IN: 300 mL / OUT: 6 mL / NET: 294 mL        PE:  GENERAL: NAD, AAOx3  HEAD:  Atraumatic, Normocephalic  EYES: EOMI, PERRLA, conjunctiva and sclera clear  NECK: Supple, No JVD  CHEST/LUNG: CTABL, No wheeze  HEART: Regular rate and rhythm; no murmur  ABDOMEN: Soft, Nontender, Nondistended; Bowel sounds present  EXTREMITIES:  2+ Peripheral Pulses, left arm in sling  SKIN: No rashes or lesions  NEURO: No focal deficits    LABS:                        12.8   6.72  )-----------( 210      ( 06 Sep 2019 08:41 )             40.5     09-06    140  |  103  |  12  ----------------------------<  119<H>  3.8   |  23  |  0.88    Ca    9.3      06 Sep 2019 05:58        CAPILLARY BLOOD GLUCOSE                RADIOLOGY & ADDITIONAL TESTS:    Imaging Personally Reviewed:  [x] YES  [ ] NO    Consultant(s) Notes Reviewed:  [x] YES  [ ] NO    MEDICATIONS  (STANDING):  enoxaparin Injectable 40 milliGRAM(s) SubCutaneous daily    MEDICATIONS  (PRN):  ALPRAZolam 1 milliGRAM(s) Oral two times a day PRN anxiety      Care Discussed with Consultants/Other Providers [x] YES  [ ] NO    HEALTH ISSUES - PROBLEM Dx:  Delirium due to another medical condition  Delirium  Medication management: Medication management  Prophylactic measure: Prophylactic measure  Anxiety: Anxiety  Hyperlipidemia, unspecified hyperlipidemia type: Hyperlipidemia, unspecified hyperlipidemia type  Essential hypertension: Essential hypertension  Shoulder dislocation, left, initial encounter: Shoulder dislocation, left, initial encounter  Acute cystitis without hematuria: Acute cystitis without hematuria  Other specified hypotension: Other specified hypotension  Acute respiratory failure with hypoxia: Acute respiratory failure with hypoxia  Suspected pulmonary embolism

## 2019-09-06 NOTE — PROGRESS NOTE ADULT - PROBLEM SELECTOR PLAN 1
resolved, sats well on RA.  appreciate pulm recs, no further inpt workup required
improved, appreciate pulm recs, no further inpt workup required

## 2019-09-06 NOTE — PROGRESS NOTE BEHAVIORAL HEALTH - RISK ASSESSMENT
Remains elevated risk for elopement given events in the ED and strongly hoping she will be discharged today. Mental status overall improved
Remains elevated risk for elopement given events of yesterday, though appears improved today. Would continue 1:1 and reassess tomorrow.

## 2019-09-06 NOTE — PROGRESS NOTE BEHAVIORAL HEALTH - NSBHCONSULTOBSREASON_PSY_A_CORE FT
Patient at this time agreeable to stay for further care, though very hopeful she will be going home today. Currently just pending PT eval prior to discharge

## 2019-09-06 NOTE — PROGRESS NOTE BEHAVIORAL HEALTH - NSBHCHARTREVIEWVS_PSY_A_CORE FT
ICU Vital Signs Last 24 Hrs  T(C): 36.9 (06 Sep 2019 10:14), Max: 37 (05 Sep 2019 12:09)  T(F): 98.4 (06 Sep 2019 10:14), Max: 98.6 (05 Sep 2019 12:09)  HR: 64 (06 Sep 2019 10:14) (64 - 76)  BP: 116/75 (06 Sep 2019 10:14) (96/57 - 118/84)  BP(mean): --  ABP: --  ABP(mean): --  RR: 18 (06 Sep 2019 10:14) (18 - 18)  SpO2: 95% (06 Sep 2019 10:14) (92% - 96%)

## 2019-09-06 NOTE — PROGRESS NOTE BEHAVIORAL HEALTH - NSBHCHARTREVIEWINVESTIGATE_PSY_A_CORE FT
Ventricular Rate 83 BPM    Atrial Rate 83 BPM    P-R Interval 140 ms    QRS Duration 88 ms    Q-T Interval 422 ms    QTC Calculation(Bezet) 495 ms    P Axis 42 degrees    R Axis 15 degrees    T Axis 100 degrees    Diagnosis Line NORMAL SINUS RHYTHM  POSSIBLE INFERIOR INFARCT , AGE UNDETERMINED  ABNORMAL ECG    Confirmed by ATTENDING, ED ([id number]),  BRIDGET PETTY ([id number]) on 9/4/2019 5:19:52 AM

## 2019-09-06 NOTE — OCCUPATIONAL THERAPY INITIAL EVALUATION ADULT - DIAGNOSIS, OT EVAL
Pt presents with NWB L UE, decreased ROM, strength, endurance, balance, and coordination, all impacting ability to perform ADLs and functional mobility.
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
## 2019-09-06 NOTE — DISCHARGE NOTE NURSING/CASE MANAGEMENT/SOCIAL WORK - PATIENT PORTAL LINK FT
You can access the FollowMyHealth Patient Portal offered by [facility] by registering at the following website: [email or web address]. By joining [facility]’s FollowMyHealth portal, you will also be able to view your health information using other applications (apps) compatible with our system.

## 2019-09-06 NOTE — PROGRESS NOTE BEHAVIORAL HEALTH - NSBHCHARTREVIEWLAB_PSY_A_CORE FT
CBC Full  -  ( 06 Sep 2019 08:41 )  WBC Count : 6.72 K/uL  RBC Count : 4.38 M/uL  Hemoglobin : 12.8 g/dL  Hematocrit : 40.5 %  Platelet Count - Automated : 210 K/uL  Mean Cell Volume : 92.5 fl  Mean Cell Hemoglobin : 29.2 pg  Mean Cell Hemoglobin Concentration : 31.6 gm/dL  Auto Neutrophil # : x  Auto Lymphocyte # : x  Auto Monocyte # : x  Auto Eosinophil # : x  Auto Basophil # : x  Auto Neutrophil % : x  Auto Lymphocyte % : x  Auto Monocyte % : x  Auto Eosinophil % : x  Auto Basophil % : x    09-06    140  |  103  |  12  ----------------------------<  119<H>  3.8   |  23  |  0.88    Ca    9.3      06 Sep 2019 05:58

## 2019-09-06 NOTE — OCCUPATIONAL THERAPY INITIAL EVALUATION ADULT - ADL RETRAINING, OT EVAL
GOAL: Patient will be independent with UB dressing within 4 weeks GOAL: Pt will perform LB dressing independently in 4 weeks GOAL: Patient will perform grooming standing sink level independently in 4 weeks

## 2019-09-06 NOTE — PHYSICAL THERAPY INITIAL EVALUATION ADULT - STRENGTHENING, PT EVAL
Patient will improve L UE strength by at least one full MMT grade to improve ease with ADLs in 4 weeks.

## 2019-09-06 NOTE — PHYSICAL THERAPY INITIAL EVALUATION ADULT - GAIT DEVIATIONS NOTED, PT EVAL
Bilateral sway, verbal cues provided to lift the feet instead of dragging them/decreased step length/decreased bharat/decreased stride length

## 2019-09-06 NOTE — PHYSICAL THERAPY INITIAL EVALUATION ADULT - MANUAL MUSCLE TESTING RESULTS, REHAB EVAL
grossly assessed due to/Bilateral LEs at least 4/5, R UE: 4/5, L UE: not assessed secondary to NWB L UE

## 2019-09-13 ENCOUNTER — APPOINTMENT (OUTPATIENT)
Dept: ORTHOPEDIC SURGERY | Facility: CLINIC | Age: 69
End: 2019-09-13
Payer: MEDICARE

## 2019-09-13 VITALS
HEIGHT: 64 IN | BODY MASS INDEX: 31.58 KG/M2 | HEART RATE: 77 BPM | SYSTOLIC BLOOD PRESSURE: 119 MMHG | WEIGHT: 185 LBS | DIASTOLIC BLOOD PRESSURE: 80 MMHG

## 2019-09-13 DIAGNOSIS — S43.492D OTHER SPRAIN OF LEFT SHOULDER JOINT, SUBSEQUENT ENCOUNTER: ICD-10-CM

## 2019-09-13 DIAGNOSIS — M24.412 RECURRENT DISLOCATION, LEFT SHOULDER: ICD-10-CM

## 2019-09-13 PROCEDURE — 99213 OFFICE O/P EST LOW 20 MIN: CPT

## 2019-09-19 PROCEDURE — 93005 ELECTROCARDIOGRAM TRACING: CPT | Mod: XU

## 2019-09-19 PROCEDURE — 80048 BASIC METABOLIC PNL TOTAL CA: CPT

## 2019-09-19 PROCEDURE — 97161 PT EVAL LOW COMPLEX 20 MIN: CPT

## 2019-09-19 PROCEDURE — 82803 BLOOD GASES ANY COMBINATION: CPT

## 2019-09-19 PROCEDURE — 94640 AIRWAY INHALATION TREATMENT: CPT

## 2019-09-19 PROCEDURE — 99285 EMERGENCY DEPT VISIT HI MDM: CPT | Mod: 25

## 2019-09-19 PROCEDURE — 73030 X-RAY EXAM OF SHOULDER: CPT

## 2019-09-19 PROCEDURE — 85027 COMPLETE CBC AUTOMATED: CPT

## 2019-09-19 PROCEDURE — 87086 URINE CULTURE/COLONY COUNT: CPT

## 2019-09-19 PROCEDURE — 36600 WITHDRAWAL OF ARTERIAL BLOOD: CPT

## 2019-09-19 PROCEDURE — 80053 COMPREHEN METABOLIC PANEL: CPT

## 2019-09-19 PROCEDURE — 96374 THER/PROPH/DIAG INJ IV PUSH: CPT | Mod: XU

## 2019-09-19 PROCEDURE — 71045 X-RAY EXAM CHEST 1 VIEW: CPT

## 2019-09-19 PROCEDURE — 87186 SC STD MICRODIL/AGAR DIL: CPT

## 2019-09-19 PROCEDURE — 81001 URINALYSIS AUTO W/SCOPE: CPT

## 2019-09-19 PROCEDURE — 86803 HEPATITIS C AB TEST: CPT

## 2019-09-19 PROCEDURE — 84484 ASSAY OF TROPONIN QUANT: CPT

## 2019-09-19 PROCEDURE — 23650 CLTX SHO DSLC W/MNPJ WO ANES: CPT | Mod: LT

## 2019-09-19 PROCEDURE — 97165 OT EVAL LOW COMPLEX 30 MIN: CPT

## 2019-09-19 PROCEDURE — 73020 X-RAY EXAM OF SHOULDER: CPT

## 2019-09-19 PROCEDURE — 71275 CT ANGIOGRAPHY CHEST: CPT

## 2019-09-19 PROCEDURE — 99152 MOD SED SAME PHYS/QHP 5/>YRS: CPT

## 2019-09-24 ENCOUNTER — APPOINTMENT (OUTPATIENT)
Dept: ORTHOPEDIC SURGERY | Facility: CLINIC | Age: 69
End: 2019-09-24
Payer: MEDICARE

## 2019-10-11 ENCOUNTER — APPOINTMENT (OUTPATIENT)
Dept: ORTHOPEDIC SURGERY | Facility: CLINIC | Age: 69
End: 2019-10-11

## 2019-10-22 ENCOUNTER — APPOINTMENT (OUTPATIENT)
Dept: ORTHOPEDIC SURGERY | Facility: CLINIC | Age: 69
End: 2019-10-22
Payer: MEDICARE

## 2019-10-22 VITALS — WEIGHT: 185 LBS | BODY MASS INDEX: 31.58 KG/M2 | HEIGHT: 64 IN

## 2019-10-22 PROCEDURE — 20610 DRAIN/INJ JOINT/BURSA W/O US: CPT | Mod: LT

## 2019-10-22 NOTE — PHYSICAL EXAM
[de-identified] : Examination of the patient's knees at this time show that her right and her left knees go from 0 to 130 degrees.  Her left knee has patellofemoral crepitus.  This is been significantly improved since the local injection that she had with Orthovisc. She does not have a major Baker's cyst but may have a slight effusion.\par \par Her right knee this time is giving her pain over the medial joint line her motion goes from 03's she has a positive medial Steinmann test good medial lateral and anterior posterior stability her patella appears to be tracking well.  Impression medial arthritis right knee. \par \par

## 2019-10-22 NOTE — DISCUSSION/SUMMARY
[de-identified] : This patient tolerated the injection with Orthovisc well.  We will follow her conservatively at this time return visit in 6 months or sooner if necessary.

## 2019-10-22 NOTE — PROCEDURE
[de-identified] : This patient is allergic to a cortisone preparations.  She did very well she said in the past hyaluronic acid.\par \par Procedure Note:\par \par Anatomic Location: Left Knee\par \par Diagnosis:  Arthritis\par \par Procedure:  Injection of Orthovisc\par \par Lot Number:      [id number]                                  Expiration Date: January 2021\par \par \par Local Spray: Ethyl Chloride.\par \par Preparation of Skin with Alcohol. \par \par \par Patient has consented for the procedure.\par \par Injection  through a lateral parapatella approach.\par \par Patient tolerated the procedure well.\par \par Patient instructed to call the office if any reaction, fever, chills, increased erythema or swelling.   [phone].

## 2019-10-22 NOTE — HISTORY OF PRESENT ILLNESS
[de-identified] : Patient returns to office with right knee pain.\par Last in office on 8/21/19 where she received Orthovisc injection to left knee.\par This injection helped greatly. She has no left knee pain currently.\par She would like an Orthovisc injection for the right knee today.\par SHE IS ALLERGIC TO CORTISONE INJECTIONS AND NSAIDS.

## 2019-11-04 ENCOUNTER — APPOINTMENT (OUTPATIENT)
Dept: ORTHOPEDIC SURGERY | Facility: CLINIC | Age: 69
End: 2019-11-04
Payer: MEDICARE

## 2019-11-04 PROCEDURE — 73564 X-RAY EXAM KNEE 4 OR MORE: CPT | Mod: RT

## 2019-11-04 PROCEDURE — 99213 OFFICE O/P EST LOW 20 MIN: CPT

## 2019-11-04 PROCEDURE — 72100 X-RAY EXAM L-S SPINE 2/3 VWS: CPT

## 2019-11-04 NOTE — DISCUSSION/SUMMARY
[de-identified] : This time the patient will follow conservative measures she will try Mobic 15 mg which appear to work in the past and she will see Dr. Rondon in 1 month if necessary.

## 2019-11-04 NOTE — HISTORY OF PRESENT ILLNESS
[de-identified] : Patient returns to office for her knees.\par She is scheduled for left TKR 11/25/19.\par However, patient's left knee is feeling good today and does not wish to have surgery on this knee at this time.\par It is her right knee that is hurting her today, but because she received Orthovisc injection last office visit on 10/22/19, she cannot receive knee replacement surgery for this knee for another 2.5 months.\par Again, patient is allergic to cortisone and NSAIDs.

## 2019-11-04 NOTE — PHYSICAL EXAM
[de-identified] : At this time the patient has had some increased discomfort in the lateral aspect of her right leg.  Her recent exam does give a description of the arthritis in her knees and she did have a shot of the Monovisc in her right knee a month ago.  Her her left knee and right knee have degenerative arthritis.  Her deep tendon reflexes motor and sensory are approximately symmetric but she really is absent or very decreased reflexes in both knees and ankles.  Her sensation and motor function appear to be generally symmetric but the type of pain she is having done in her right leg is more consistent with spinal stenosis. [de-identified] : AP and lateral lumbar spine show significant degeneration and arthritis in the facets of the lower lumbar spine and marked degeneration of her disc from L1 right to the sacrum and a history of some spinal stenosis.

## 2019-11-20 ENCOUNTER — APPOINTMENT (OUTPATIENT)
Dept: ORTHOPEDIC SURGERY | Facility: CLINIC | Age: 69
End: 2019-11-20

## 2019-11-25 ENCOUNTER — APPOINTMENT (OUTPATIENT)
Dept: ORTHOPEDIC SURGERY | Facility: CLINIC | Age: 69
End: 2019-11-25
Payer: MEDICARE

## 2019-11-25 ENCOUNTER — APPOINTMENT (OUTPATIENT)
Dept: ORTHOPEDIC SURGERY | Facility: HOSPITAL | Age: 69
End: 2019-11-25

## 2019-11-25 VITALS — BODY MASS INDEX: 31.58 KG/M2 | HEIGHT: 64 IN | WEIGHT: 185 LBS

## 2019-11-25 DIAGNOSIS — M47.816 SPONDYLOSIS W/OUT MYELOPATHY OR RADICULOPATHY, LUMBAR REGION: ICD-10-CM

## 2019-11-25 DIAGNOSIS — M54.41 LUMBAGO WITH SCIATICA, LEFT SIDE: ICD-10-CM

## 2019-11-25 DIAGNOSIS — G89.29 LUMBAGO WITH SCIATICA, LEFT SIDE: ICD-10-CM

## 2019-11-25 DIAGNOSIS — M48.061 SPINAL STENOSIS, LUMBAR REGION WITHOUT NEUROGENIC CLAUDICATION: ICD-10-CM

## 2019-11-25 DIAGNOSIS — M54.42 LUMBAGO WITH SCIATICA, LEFT SIDE: ICD-10-CM

## 2019-11-25 PROCEDURE — 99214 OFFICE O/P EST MOD 30 MIN: CPT

## 2019-11-25 NOTE — DISCUSSION/SUMMARY
[Medication Risks Reviewed] : Medication risks reviewed [de-identified] : She is contemplating right total knee replacement in 2 to 3 months.  She will push arthritis strength Tylenol to the full dose and I will see her for follow-up on a as needed basis.

## 2019-11-25 NOTE — HISTORY OF PRESENT ILLNESS
[de-identified] : She was last seen in February with complaints of lower back pain that eventually improved with meloxicam.  She then developed left-sided sciatic type pain that resolved over several months.  She then developed bilateral knee pain and had injections to both knees with relief on the left but not on the right.  She then developed right sided pretibial pain likely related to her back and that eventually resolved as well.  Currently she is not having any radicular pain or significant lower back pain but mostly pain in the right knee.  She is headed towards a right total knee replacement.  She has been taking meloxicam which she fortunately can tolerate.

## 2019-11-25 NOTE — PHYSICAL EXAM
[de-identified] : There is pain with range of motion of her knees.  Straight leg raising is negative to 90 degrees.

## 2019-12-03 ENCOUNTER — APPOINTMENT (OUTPATIENT)
Dept: ORTHOPEDIC SURGERY | Facility: CLINIC | Age: 69
End: 2019-12-03
Payer: MEDICARE

## 2019-12-03 VITALS — WEIGHT: 194 LBS | HEIGHT: 64 IN | BODY MASS INDEX: 33.12 KG/M2

## 2019-12-03 DIAGNOSIS — M17.12 UNILATERAL PRIMARY OSTEOARTHRITIS, LEFT KNEE: ICD-10-CM

## 2019-12-03 DIAGNOSIS — M17.11 UNILATERAL PRIMARY OSTEOARTHRITIS, RIGHT KNEE: ICD-10-CM

## 2019-12-03 PROCEDURE — 99214 OFFICE O/P EST MOD 30 MIN: CPT

## 2019-12-03 PROCEDURE — 73562 X-RAY EXAM OF KNEE 3: CPT | Mod: 50

## 2019-12-03 RX ORDER — MELOXICAM 15 MG/1
15 TABLET ORAL
Qty: 60 | Refills: 2 | Status: DISCONTINUED | COMMUNITY
Start: 2019-11-04 | End: 2019-12-03

## 2019-12-03 RX ORDER — MELOXICAM 15 MG/1
15 TABLET ORAL
Qty: 30 | Refills: 0 | Status: DISCONTINUED | COMMUNITY
Start: 2018-09-24 | End: 2019-12-03

## 2019-12-03 RX ORDER — NAPROXEN 500 MG/1
500 TABLET ORAL
Qty: 60 | Refills: 0 | Status: DISCONTINUED | COMMUNITY
Start: 2018-08-30 | End: 2019-12-03

## 2019-12-03 RX ORDER — MELOXICAM 15 MG/1
15 TABLET ORAL
Qty: 45 | Refills: 0 | Status: DISCONTINUED | COMMUNITY
Start: 2018-10-30 | End: 2019-12-03

## 2019-12-03 RX ORDER — MELOXICAM 7.5 MG/1
7.5 TABLET ORAL DAILY
Qty: 60 | Refills: 0 | Status: DISCONTINUED | COMMUNITY
Start: 2018-12-04 | End: 2019-12-03

## 2019-12-03 RX ORDER — MELOXICAM 15 MG/1
15 TABLET ORAL
Qty: 45 | Refills: 1 | Status: DISCONTINUED | COMMUNITY
Start: 2019-02-08 | End: 2019-12-03

## 2019-12-07 ENCOUNTER — FORM ENCOUNTER (OUTPATIENT)
Age: 69
End: 2019-12-07

## 2019-12-08 ENCOUNTER — APPOINTMENT (OUTPATIENT)
Dept: MRI IMAGING | Facility: CLINIC | Age: 69
End: 2019-12-08
Payer: MEDICARE

## 2019-12-08 ENCOUNTER — OUTPATIENT (OUTPATIENT)
Dept: OUTPATIENT SERVICES | Facility: HOSPITAL | Age: 69
LOS: 1 days | End: 2019-12-08
Payer: MEDICARE

## 2019-12-08 DIAGNOSIS — M24.412 RECURRENT DISLOCATION, LEFT SHOULDER: ICD-10-CM

## 2019-12-08 PROCEDURE — 73221 MRI JOINT UPR EXTREM W/O DYE: CPT | Mod: 26,LT

## 2019-12-08 PROCEDURE — 73221 MRI JOINT UPR EXTREM W/O DYE: CPT

## 2019-12-09 ENCOUNTER — OUTPATIENT (OUTPATIENT)
Dept: OUTPATIENT SERVICES | Facility: HOSPITAL | Age: 69
LOS: 1 days | End: 2019-12-09
Payer: MEDICARE

## 2019-12-09 ENCOUNTER — FORM ENCOUNTER (OUTPATIENT)
Age: 69
End: 2019-12-09

## 2019-12-09 VITALS
HEART RATE: 70 BPM | HEIGHT: 62 IN | WEIGHT: 199.08 LBS | RESPIRATION RATE: 14 BRPM | SYSTOLIC BLOOD PRESSURE: 120 MMHG | OXYGEN SATURATION: 99 % | TEMPERATURE: 98 F | DIASTOLIC BLOOD PRESSURE: 80 MMHG

## 2019-12-09 DIAGNOSIS — M19.90 UNSPECIFIED OSTEOARTHRITIS, UNSPECIFIED SITE: ICD-10-CM

## 2019-12-09 DIAGNOSIS — M17.11 UNILATERAL PRIMARY OSTEOARTHRITIS, RIGHT KNEE: ICD-10-CM

## 2019-12-09 DIAGNOSIS — Z01.818 ENCOUNTER FOR OTHER PREPROCEDURAL EXAMINATION: ICD-10-CM

## 2019-12-09 LAB
ALBUMIN SERPL ELPH-MCNC: 3.8 G/DL — SIGNIFICANT CHANGE UP (ref 3.3–5)
ALP SERPL-CCNC: 77 U/L — SIGNIFICANT CHANGE UP (ref 30–120)
ALT FLD-CCNC: 53 U/L DA — SIGNIFICANT CHANGE UP (ref 10–60)
ANION GAP SERPL CALC-SCNC: 9 MMOL/L — SIGNIFICANT CHANGE UP (ref 5–17)
APTT BLD: 31.3 SEC — SIGNIFICANT CHANGE UP (ref 28.5–37)
AST SERPL-CCNC: 33 U/L — SIGNIFICANT CHANGE UP (ref 10–40)
BILIRUB SERPL-MCNC: 0.4 MG/DL — SIGNIFICANT CHANGE UP (ref 0.2–1.2)
BLD GP AB SCN SERPL QL: SIGNIFICANT CHANGE UP
BUN SERPL-MCNC: 17 MG/DL — SIGNIFICANT CHANGE UP (ref 7–23)
CALCIUM SERPL-MCNC: 9.1 MG/DL — SIGNIFICANT CHANGE UP (ref 8.4–10.5)
CHLORIDE SERPL-SCNC: 104 MMOL/L — SIGNIFICANT CHANGE UP (ref 96–108)
CO2 SERPL-SCNC: 27 MMOL/L — SIGNIFICANT CHANGE UP (ref 22–31)
CREAT SERPL-MCNC: 0.83 MG/DL — SIGNIFICANT CHANGE UP (ref 0.5–1.3)
GLUCOSE SERPL-MCNC: 91 MG/DL — SIGNIFICANT CHANGE UP (ref 70–99)
HCT VFR BLD CALC: 46.2 % — HIGH (ref 34.5–45)
HGB BLD-MCNC: 14.1 G/DL — SIGNIFICANT CHANGE UP (ref 11.5–15.5)
INR BLD: 1.13 RATIO — SIGNIFICANT CHANGE UP (ref 0.88–1.16)
MCHC RBC-ENTMCNC: 27.8 PG — SIGNIFICANT CHANGE UP (ref 27–34)
MCHC RBC-ENTMCNC: 30.5 GM/DL — LOW (ref 32–36)
MCV RBC AUTO: 91.1 FL — SIGNIFICANT CHANGE UP (ref 80–100)
NRBC # BLD: 0 /100 WBCS — SIGNIFICANT CHANGE UP (ref 0–0)
PLATELET # BLD AUTO: 251 K/UL — SIGNIFICANT CHANGE UP (ref 150–400)
POTASSIUM SERPL-MCNC: 4.3 MMOL/L — SIGNIFICANT CHANGE UP (ref 3.5–5.3)
POTASSIUM SERPL-SCNC: 4.3 MMOL/L — SIGNIFICANT CHANGE UP (ref 3.5–5.3)
PROT SERPL-MCNC: 8.4 G/DL — HIGH (ref 6–8.3)
PROTHROM AB SERPL-ACNC: 12.4 SEC — SIGNIFICANT CHANGE UP (ref 10–12.9)
RBC # BLD: 5.07 M/UL — SIGNIFICANT CHANGE UP (ref 3.8–5.2)
RBC # FLD: 13.8 % — SIGNIFICANT CHANGE UP (ref 10.3–14.5)
SODIUM SERPL-SCNC: 140 MMOL/L — SIGNIFICANT CHANGE UP (ref 135–145)
WBC # BLD: 7.48 K/UL — SIGNIFICANT CHANGE UP (ref 3.8–10.5)
WBC # FLD AUTO: 7.48 K/UL — SIGNIFICANT CHANGE UP (ref 3.8–10.5)

## 2019-12-09 PROCEDURE — 93010 ELECTROCARDIOGRAM REPORT: CPT

## 2019-12-09 PROCEDURE — G0463: CPT

## 2019-12-09 PROCEDURE — 93005 ELECTROCARDIOGRAM TRACING: CPT

## 2019-12-09 NOTE — H&P PST ADULT - NSANTHOSAYNRD_GEN_A_CORE
No. IAN screening performed.  STOP BANG Legend: 0-2 = LOW Risk; 3-4 = INTERMEDIATE Risk; 5-8 = HIGH Risk

## 2019-12-09 NOTE — H&P PST ADULT - NSICDXPROBLEM_GEN_ALL_CORE_FT
PROBLEM DIAGNOSES  Problem: Osteoarthritis  Assessment and Plan: Right knee replacement   Medical/cardiac clearance   Pre op instruction

## 2019-12-09 NOTE — H&P PST ADULT - RS GEN PE MLT RESP DETAILS PC
no rhonchi/good air movement/no rales/respirations non-labored/breath sounds equal/clear to auscultation bilaterally

## 2019-12-09 NOTE — H&P PST ADULT - MUSCULOSKELETAL
details… detailed exam joint swelling/joint erythema/decreased ROM due to pain no calf tenderness/decreased ROM due to pain/joint swelling/diminished strength/joint erythema

## 2019-12-09 NOTE — H&P PST ADULT - HISTORY OF PRESENT ILLNESS
69 y/o female with HTN, HLD, anxiet 67 y/o female with HTN, HLD, anxiety presents with bilateral knee pain worse pain in right knee for the past 1 year .Pain has gotten worse and patient admits constant knee pain, difficulty walking and unable to perform her daily activities due to pain . scheduled for right knee replacement on 12/18/19

## 2019-12-09 NOTE — H&P PST ADULT - NSICDXPASTMEDICALHX_GEN_ALL_CORE_FT
PAST MEDICAL HISTORY:  Anxiety     Essential hypertension No medication since 2018    History of closed shoulder dislocation left shoulder 2019    HLD (hyperlipidemia)     Osteoarthritis Right knee PAST MEDICAL HISTORY:  Anxiety     Essential hypertension Not on medication x 1 year    History of closed shoulder dislocation left shoulder 2019    HLD (hyperlipidemia)     Osteoarthritis Right knee

## 2019-12-10 ENCOUNTER — OUTPATIENT (OUTPATIENT)
Dept: OUTPATIENT SERVICES | Facility: HOSPITAL | Age: 69
LOS: 1 days | End: 2019-12-10
Payer: MEDICARE

## 2019-12-10 ENCOUNTER — APPOINTMENT (OUTPATIENT)
Dept: CT IMAGING | Facility: CLINIC | Age: 69
End: 2019-12-10
Payer: MEDICARE

## 2019-12-10 DIAGNOSIS — S43.492D OTHER SPRAIN OF LEFT SHOULDER JOINT, SUBSEQUENT ENCOUNTER: ICD-10-CM

## 2019-12-10 DIAGNOSIS — M24.412 RECURRENT DISLOCATION, LEFT SHOULDER: ICD-10-CM

## 2019-12-10 PROBLEM — Z87.39 PERSONAL HISTORY OF OTHER DISEASES OF THE MUSCULOSKELETAL SYSTEM AND CONNECTIVE TISSUE: Chronic | Status: ACTIVE | Noted: 2019-12-09

## 2019-12-10 LAB
MRSA PCR RESULT.: SIGNIFICANT CHANGE UP
S AUREUS DNA NOSE QL NAA+PROBE: SIGNIFICANT CHANGE UP

## 2019-12-10 PROCEDURE — 73200 CT UPPER EXTREMITY W/O DYE: CPT | Mod: 26,LT

## 2019-12-10 PROCEDURE — 73200 CT UPPER EXTREMITY W/O DYE: CPT

## 2019-12-11 ENCOUNTER — OTHER (OUTPATIENT)
Age: 69
End: 2019-12-11

## 2019-12-18 ENCOUNTER — APPOINTMENT (OUTPATIENT)
Dept: ORTHOPEDIC SURGERY | Facility: HOSPITAL | Age: 69
End: 2019-12-18

## 2019-12-18 ENCOUNTER — INPATIENT (INPATIENT)
Facility: HOSPITAL | Age: 69
LOS: 4 days | Discharge: NURSING, REHAB OR LONG-TERM CARE | DRG: 469 | End: 2019-12-23
Attending: ORTHOPAEDIC SURGERY | Admitting: ORTHOPAEDIC SURGERY
Payer: MEDICARE

## 2019-12-18 ENCOUNTER — RESULT REVIEW (OUTPATIENT)
Age: 69
End: 2019-12-18

## 2019-12-18 VITALS
TEMPERATURE: 99 F | OXYGEN SATURATION: 97 % | HEART RATE: 70 BPM | DIASTOLIC BLOOD PRESSURE: 79 MMHG | HEIGHT: 62 IN | SYSTOLIC BLOOD PRESSURE: 98 MMHG | RESPIRATION RATE: 15 BRPM | WEIGHT: 199.08 LBS

## 2019-12-18 DIAGNOSIS — J96.01 ACUTE RESPIRATORY FAILURE WITH HYPOXIA: ICD-10-CM

## 2019-12-18 DIAGNOSIS — I10 ESSENTIAL (PRIMARY) HYPERTENSION: ICD-10-CM

## 2019-12-18 DIAGNOSIS — J95.821 ACUTE POSTPROCEDURAL RESPIRATORY FAILURE: ICD-10-CM

## 2019-12-18 DIAGNOSIS — Z29.9 ENCOUNTER FOR PROPHYLACTIC MEASURES, UNSPECIFIED: ICD-10-CM

## 2019-12-18 DIAGNOSIS — Z79.01 LONG TERM (CURRENT) USE OF ANTICOAGULANTS: ICD-10-CM

## 2019-12-18 DIAGNOSIS — I95.2 HYPOTENSION DUE TO DRUGS: ICD-10-CM

## 2019-12-18 DIAGNOSIS — T40.605A ADVERSE EFFECT OF UNSPECIFIED NARCOTICS, INITIAL ENCOUNTER: ICD-10-CM

## 2019-12-18 DIAGNOSIS — E78.5 HYPERLIPIDEMIA, UNSPECIFIED: ICD-10-CM

## 2019-12-18 DIAGNOSIS — Z79.82 LONG TERM (CURRENT) USE OF ASPIRIN: ICD-10-CM

## 2019-12-18 DIAGNOSIS — R53.83 OTHER FATIGUE: ICD-10-CM

## 2019-12-18 DIAGNOSIS — F41.9 ANXIETY DISORDER, UNSPECIFIED: ICD-10-CM

## 2019-12-18 DIAGNOSIS — E87.2 ACIDOSIS: ICD-10-CM

## 2019-12-18 DIAGNOSIS — R11.0 NAUSEA: ICD-10-CM
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
## 2019-12-18 DIAGNOSIS — M17.11 UNILATERAL PRIMARY OSTEOARTHRITIS, RIGHT KNEE: ICD-10-CM

## 2019-12-18 DIAGNOSIS — T41.45XA ADVERSE EFFECT OF UNSPECIFIED ANESTHETIC, INITIAL ENCOUNTER: ICD-10-CM

## 2019-12-18 DIAGNOSIS — Z96.651 PRESENCE OF RIGHT ARTIFICIAL KNEE JOINT: ICD-10-CM

## 2019-12-18 DIAGNOSIS — Z87.891 PERSONAL HISTORY OF NICOTINE DEPENDENCE: ICD-10-CM

## 2019-12-18 DIAGNOSIS — E66.2 MORBID (SEVERE) OBESITY WITH ALVEOLAR HYPOVENTILATION: ICD-10-CM

## 2019-12-18 LAB
ABO RH CONFIRMATION: SIGNIFICANT CHANGE UP
ALBUMIN SERPL ELPH-MCNC: 3.3 G/DL — SIGNIFICANT CHANGE UP (ref 3.3–5)
ALP SERPL-CCNC: 61 U/L — SIGNIFICANT CHANGE UP (ref 30–120)
ALT FLD-CCNC: 44 U/L DA — SIGNIFICANT CHANGE UP (ref 10–60)
ANION GAP SERPL CALC-SCNC: 5 MMOL/L — SIGNIFICANT CHANGE UP (ref 5–17)
AST SERPL-CCNC: 26 U/L — SIGNIFICANT CHANGE UP (ref 10–40)
BASE EXCESS BLDA CALC-SCNC: 0.3 MMOL/L — SIGNIFICANT CHANGE UP (ref -2–2)
BILIRUB SERPL-MCNC: 0.2 MG/DL — SIGNIFICANT CHANGE UP (ref 0.2–1.2)
BLOOD GAS SOURCE: SIGNIFICANT CHANGE UP
BUN SERPL-MCNC: 19 MG/DL — SIGNIFICANT CHANGE UP (ref 7–23)
CALCIUM SERPL-MCNC: 8 MG/DL — LOW (ref 8.4–10.5)
CHLORIDE SERPL-SCNC: 103 MMOL/L — SIGNIFICANT CHANGE UP (ref 96–108)
CO2 SERPL-SCNC: 30 MMOL/L — SIGNIFICANT CHANGE UP (ref 22–31)
CREAT SERPL-MCNC: 1.02 MG/DL — SIGNIFICANT CHANGE UP (ref 0.5–1.3)
GLUCOSE SERPL-MCNC: 178 MG/DL — HIGH (ref 70–99)
HCO3 BLDA-SCNC: 22 MMOL/L — SIGNIFICANT CHANGE UP (ref 21–29)
HCO3 BLDA-SCNC: 23 MMOL/L — SIGNIFICANT CHANGE UP (ref 21–29)
HCO3 BLDA-SCNC: 24 MMOL/L — SIGNIFICANT CHANGE UP (ref 21–29)
HCT VFR BLD CALC: 42.1 % — SIGNIFICANT CHANGE UP (ref 34.5–45)
HGB BLD-MCNC: 13 G/DL — SIGNIFICANT CHANGE UP (ref 11.5–15.5)
HOROWITZ INDEX BLDA+IHG-RTO: 100 — SIGNIFICANT CHANGE UP
HOROWITZ INDEX BLDA+IHG-RTO: 36 — SIGNIFICANT CHANGE UP
HOROWITZ INDEX BLDA+IHG-RTO: 50 — SIGNIFICANT CHANGE UP
MAGNESIUM SERPL-MCNC: 1.6 MG/DL — SIGNIFICANT CHANGE UP (ref 1.6–2.6)
MCHC RBC-ENTMCNC: 28.8 PG — SIGNIFICANT CHANGE UP (ref 27–34)
MCHC RBC-ENTMCNC: 30.9 GM/DL — LOW (ref 32–36)
MCV RBC AUTO: 93.1 FL — SIGNIFICANT CHANGE UP (ref 80–100)
NRBC # BLD: 0 /100 WBCS — SIGNIFICANT CHANGE UP (ref 0–0)
PCO2 BLDA: 57 MMHG — HIGH (ref 32–46)
PCO2 BLDA: 64 MMHG — HIGH (ref 32–46)
PCO2 BLDA: 71 MMHG — CRITICAL HIGH (ref 32–46)
PH BLD: 7.2 — CRITICAL LOW (ref 7.35–7.45)
PH BLD: 7.22 — LOW (ref 7.35–7.45)
PH BLD: 7.29 — LOW (ref 7.35–7.45)
PHOSPHATE SERPL-MCNC: 5.2 MG/DL — HIGH (ref 2.5–4.5)
PLATELET # BLD AUTO: 195 K/UL — SIGNIFICANT CHANGE UP (ref 150–400)
PO2 BLDA: 72 MMHG — LOW (ref 74–108)
PO2 BLDA: 75 MMHG — SIGNIFICANT CHANGE UP (ref 74–108)
PO2 BLDA: 82 MMHG — SIGNIFICANT CHANGE UP (ref 74–108)
POTASSIUM SERPL-MCNC: 4.7 MMOL/L — SIGNIFICANT CHANGE UP (ref 3.5–5.3)
POTASSIUM SERPL-SCNC: 4.7 MMOL/L — SIGNIFICANT CHANGE UP (ref 3.5–5.3)
PROT SERPL-MCNC: 7.4 G/DL — SIGNIFICANT CHANGE UP (ref 6–8.3)
RBC # BLD: 4.52 M/UL — SIGNIFICANT CHANGE UP (ref 3.8–5.2)
RBC # FLD: 13.8 % — SIGNIFICANT CHANGE UP (ref 10.3–14.5)
SAO2 % BLDA: 91 % — LOW (ref 92–96)
SAO2 % BLDA: 92 % — SIGNIFICANT CHANGE UP (ref 92–96)
SAO2 % BLDA: 93 % — SIGNIFICANT CHANGE UP (ref 92–96)
SODIUM SERPL-SCNC: 138 MMOL/L — SIGNIFICANT CHANGE UP (ref 135–145)
WBC # BLD: 9.92 K/UL — SIGNIFICANT CHANGE UP (ref 3.8–10.5)
WBC # FLD AUTO: 9.92 K/UL — SIGNIFICANT CHANGE UP (ref 3.8–10.5)

## 2019-12-18 PROCEDURE — 88305 TISSUE EXAM BY PATHOLOGIST: CPT | Mod: 26

## 2019-12-18 PROCEDURE — 71045 X-RAY EXAM CHEST 1 VIEW: CPT | Mod: 26

## 2019-12-18 PROCEDURE — 27447 TOTAL KNEE ARTHROPLASTY: CPT | Mod: RT

## 2019-12-18 PROCEDURE — 27447 TOTAL KNEE ARTHROPLASTY: CPT | Mod: AS,RT

## 2019-12-18 PROCEDURE — 73562 X-RAY EXAM OF KNEE 3: CPT | Mod: 26,RT

## 2019-12-18 PROCEDURE — 88311 DECALCIFY TISSUE: CPT | Mod: 26

## 2019-12-18 PROCEDURE — 93010 ELECTROCARDIOGRAM REPORT: CPT

## 2019-12-18 PROCEDURE — 99223 1ST HOSP IP/OBS HIGH 75: CPT

## 2019-12-18 RX ORDER — ACETAMINOPHEN 500 MG
1000 TABLET ORAL EVERY 6 HOURS
Refills: 0 | Status: COMPLETED | OUTPATIENT
Start: 2019-12-18 | End: 2019-12-19

## 2019-12-18 RX ORDER — CELECOXIB 200 MG/1
100 CAPSULE ORAL EVERY 12 HOURS
Refills: 0 | Status: DISCONTINUED | OUTPATIENT
Start: 2019-12-19 | End: 2019-12-23

## 2019-12-18 RX ORDER — ALPRAZOLAM 0.25 MG
1 TABLET ORAL EVERY 8 HOURS
Refills: 0 | Status: DISCONTINUED | OUTPATIENT
Start: 2019-12-18 | End: 2019-12-23

## 2019-12-18 RX ORDER — CHLORHEXIDINE GLUCONATE 213 G/1000ML
1 SOLUTION TOPICAL ONCE
Refills: 0 | Status: DISCONTINUED | OUTPATIENT
Start: 2019-12-18 | End: 2019-12-18

## 2019-12-18 RX ORDER — POLYETHYLENE GLYCOL 3350 17 G/17G
17 POWDER, FOR SOLUTION ORAL DAILY
Refills: 0 | Status: DISCONTINUED | OUTPATIENT
Start: 2019-12-18 | End: 2019-12-23

## 2019-12-18 RX ORDER — IPRATROPIUM/ALBUTEROL SULFATE 18-103MCG
3 AEROSOL WITH ADAPTER (GRAM) INHALATION EVERY 4 HOURS
Refills: 0 | Status: DISCONTINUED | OUTPATIENT
Start: 2019-12-18 | End: 2019-12-23

## 2019-12-18 RX ORDER — CEFAZOLIN SODIUM 1 G
2000 VIAL (EA) INJECTION EVERY 8 HOURS
Refills: 0 | Status: COMPLETED | OUTPATIENT
Start: 2019-12-18 | End: 2019-12-19

## 2019-12-18 RX ORDER — PANTOPRAZOLE SODIUM 20 MG/1
40 TABLET, DELAYED RELEASE ORAL
Refills: 0 | Status: DISCONTINUED | OUTPATIENT
Start: 2019-12-18 | End: 2019-12-23

## 2019-12-18 RX ORDER — OXYCODONE HYDROCHLORIDE 5 MG/1
10 TABLET ORAL
Refills: 0 | Status: DISCONTINUED | OUTPATIENT
Start: 2019-12-18 | End: 2019-12-23

## 2019-12-18 RX ORDER — ONDANSETRON 8 MG/1
4 TABLET, FILM COATED ORAL EVERY 6 HOURS
Refills: 0 | Status: DISCONTINUED | OUTPATIENT
Start: 2019-12-18 | End: 2019-12-23

## 2019-12-18 RX ORDER — MAGNESIUM HYDROXIDE 400 MG/1
30 TABLET, CHEWABLE ORAL DAILY
Refills: 0 | Status: DISCONTINUED | OUTPATIENT
Start: 2019-12-18 | End: 2019-12-23

## 2019-12-18 RX ORDER — ONDANSETRON 8 MG/1
4 TABLET, FILM COATED ORAL ONCE
Refills: 0 | Status: DISCONTINUED | OUTPATIENT
Start: 2019-12-18 | End: 2019-12-18

## 2019-12-18 RX ORDER — TRANEXAMIC ACID 100 MG/ML
1000 INJECTION, SOLUTION INTRAVENOUS ONCE
Refills: 0 | Status: COMPLETED | OUTPATIENT
Start: 2019-12-18 | End: 2019-12-18

## 2019-12-18 RX ORDER — APREPITANT 80 MG/1
40 CAPSULE ORAL ONCE
Refills: 0 | Status: COMPLETED | OUTPATIENT
Start: 2019-12-18 | End: 2019-12-18

## 2019-12-18 RX ORDER — CEFAZOLIN SODIUM 1 G
2000 VIAL (EA) INJECTION ONCE
Refills: 0 | Status: COMPLETED | OUTPATIENT
Start: 2019-12-18 | End: 2019-12-18

## 2019-12-18 RX ORDER — SODIUM CHLORIDE 9 MG/ML
1000 INJECTION, SOLUTION INTRAVENOUS
Refills: 0 | Status: DISCONTINUED | OUTPATIENT
Start: 2019-12-18 | End: 2019-12-19

## 2019-12-18 RX ORDER — FUROSEMIDE 40 MG
20 TABLET ORAL ONCE
Refills: 0 | Status: COMPLETED | OUTPATIENT
Start: 2019-12-18 | End: 2019-12-18

## 2019-12-18 RX ORDER — ACETAMINOPHEN 500 MG
1000 TABLET ORAL EVERY 8 HOURS
Refills: 0 | Status: DISCONTINUED | OUTPATIENT
Start: 2019-12-19 | End: 2019-12-23

## 2019-12-18 RX ORDER — OXYCODONE HYDROCHLORIDE 5 MG/1
5 TABLET ORAL
Refills: 0 | Status: DISCONTINUED | OUTPATIENT
Start: 2019-12-18 | End: 2019-12-23

## 2019-12-18 RX ORDER — ACETAMINOPHEN 500 MG
1000 TABLET ORAL ONCE
Refills: 0 | Status: COMPLETED | OUTPATIENT
Start: 2019-12-18 | End: 2019-12-18

## 2019-12-18 RX ORDER — ATORVASTATIN CALCIUM 80 MG/1
40 TABLET, FILM COATED ORAL AT BEDTIME
Refills: 0 | Status: DISCONTINUED | OUTPATIENT
Start: 2019-12-18 | End: 2019-12-23

## 2019-12-18 RX ORDER — SENNA PLUS 8.6 MG/1
2 TABLET ORAL AT BEDTIME
Refills: 0 | Status: DISCONTINUED | OUTPATIENT
Start: 2019-12-18 | End: 2019-12-23

## 2019-12-18 RX ORDER — HYDROMORPHONE HYDROCHLORIDE 2 MG/ML
0.5 INJECTION INTRAMUSCULAR; INTRAVENOUS; SUBCUTANEOUS
Refills: 0 | Status: DISCONTINUED | OUTPATIENT
Start: 2019-12-18 | End: 2019-12-18

## 2019-12-18 RX ORDER — SODIUM CHLORIDE 0.65 %
1 AEROSOL, SPRAY (ML) NASAL
Refills: 0 | Status: DISCONTINUED | OUTPATIENT
Start: 2019-12-18 | End: 2019-12-23

## 2019-12-18 RX ORDER — SODIUM CHLORIDE 9 MG/ML
1000 INJECTION, SOLUTION INTRAVENOUS
Refills: 0 | Status: DISCONTINUED | OUTPATIENT
Start: 2019-12-18 | End: 2019-12-18

## 2019-12-18 RX ORDER — ASPIRIN/CALCIUM CARB/MAGNESIUM 324 MG
81 TABLET ORAL EVERY 12 HOURS
Refills: 0 | Status: DISCONTINUED | OUTPATIENT
Start: 2019-12-19 | End: 2019-12-20

## 2019-12-18 RX ADMIN — HYDROMORPHONE HYDROCHLORIDE 0.5 MILLIGRAM(S): 2 INJECTION INTRAMUSCULAR; INTRAVENOUS; SUBCUTANEOUS at 14:54

## 2019-12-18 RX ADMIN — ATORVASTATIN CALCIUM 40 MILLIGRAM(S): 80 TABLET, FILM COATED ORAL at 21:34

## 2019-12-18 RX ADMIN — HYDROMORPHONE HYDROCHLORIDE 0.5 MILLIGRAM(S): 2 INJECTION INTRAMUSCULAR; INTRAVENOUS; SUBCUTANEOUS at 17:47

## 2019-12-18 RX ADMIN — Medication 400 MILLIGRAM(S): at 18:47

## 2019-12-18 RX ADMIN — HYDROMORPHONE HYDROCHLORIDE 0.5 MILLIGRAM(S): 2 INJECTION INTRAMUSCULAR; INTRAVENOUS; SUBCUTANEOUS at 15:07

## 2019-12-18 RX ADMIN — OXYCODONE HYDROCHLORIDE 5 MILLIGRAM(S): 5 TABLET ORAL at 21:34

## 2019-12-18 RX ADMIN — APREPITANT 40 MILLIGRAM(S): 80 CAPSULE ORAL at 09:27

## 2019-12-18 RX ADMIN — Medication 20 MILLIGRAM(S): at 17:15

## 2019-12-18 RX ADMIN — HYDROMORPHONE HYDROCHLORIDE 0.5 MILLIGRAM(S): 2 INJECTION INTRAMUSCULAR; INTRAVENOUS; SUBCUTANEOUS at 18:20

## 2019-12-18 RX ADMIN — Medication 100 MILLIGRAM(S): at 20:03

## 2019-12-18 RX ADMIN — OXYCODONE HYDROCHLORIDE 5 MILLIGRAM(S): 5 TABLET ORAL at 22:19

## 2019-12-18 RX ADMIN — SODIUM CHLORIDE 75 MILLILITER(S): 9 INJECTION, SOLUTION INTRAVENOUS at 14:55

## 2019-12-18 RX ADMIN — Medication 1000 MILLIGRAM(S): at 18:58

## 2019-12-18 RX ADMIN — Medication 1 MILLIGRAM(S): at 22:57

## 2019-12-18 NOTE — CONSULT NOTE ADULT - PROBLEM SELECTOR RECOMMENDATION 2
ABG consistent with resp acidosis 7.22, pc02 64, and hypoxia 02 sat 93 on NRBM. Mentating well, lethargy improving  -start BIPAP 10/5, repeat ABG later in 1-2 hours  -admit to [facility]U for closer monitoring  -pulm Dr Fleming consulted, rec pending  -CXR showing bibasilar atelectasis vs mild b/l pleural effusions with mild vascular congestion, f/u official read  -consider IV lasix 20mg IV x 1 BP stable  -avoid sedative meds for now  -incentive spirometer

## 2019-12-18 NOTE — PROVIDER CONTACT NOTE (OTHER) - SITUATION
Received patient from OR very sedated.  Unable to fully wake patient up for 2 1/2 hours.  O2 Sat on Partial non-rebreather 95%.

## 2019-12-18 NOTE — CONSULT NOTE ADULT - SUBJECTIVE AND OBJECTIVE BOX
Date/Time Patient Seen:  		  Referring MD:   Data Reviewed	       Patient is a 69y old  Female who presents with a chief complaint of Post-Op Hypercapnic Respiratory Failure (18 Dec 2019 20:45)      Subjective/HPI  in bed  seen and examined  vs and meds reviewed  labs reviewed  H and P reviewed  CCM notes reviewed  post op  was on BIPAP    alert  verbal         PAST MEDICAL & SURGICAL HISTORY:  History of closed shoulder dislocation: left shoulder 2019  Osteoarthritis: Right knee  Anxiety  HLD (hyperlipidemia)  Essential hypertension: Not on medication x 1 year  No pertinent past medical history  No significant past surgical history  No significant past surgical history  History of Present Illness:  History of Present Illness	  69 y/o female with HTN, HLD, anxiety presents with bilateral knee pain worse pain in right knee for the past 1 year .Pain has gotten worse and patient admits constant knee pain, difficulty walking and unable to perform her daily activities due to pain . scheduled for right knee replacement on 12/18/19     Allergies/Medications:   Allergies:        Allergies:  	No Known Allergies:        Intolerances:  	NSAIDs: Drug Category, Other, Diarrhea, stomach upset    Home Medications:   * Patient Currently Takes Medications as of 09-Dec-2019 13:33 documented in Structured Notes  · 	Xanax 1 mg oral tablet: Last Dose Taken:  , orally 4 times a day, As Needed  · 	Lipitor 40 mg oral tablet: Last Dose Taken:  , 1 tab(s) orally once a day    PMH/PSH/FH/SH:    Past Medical, Past Surgical, and Family History:  PAST MEDICAL HISTORY:  Anxiety     Essential hypertension Not on medication x 1 year    History of closed shoulder dislocation left shoulder 2019    HLD (hyperlipidemia)     Osteoarthritis Right knee.     PAST SURGICAL HISTORY:  No significant past surgical history.     FAMILY HISTORY:  FH: heart attack, mother.     Social History:  · Marital Status	  · Occupation	Retired   · Lives With	alone     Substance Use History:  · Substance Use	never used     Alcohol Use History:  · Have you ever consumed alcohol	unknown     Tobacco Usage:  · Tobacco Usage: Former smoker  · Tobacco Type: cigarettes  Quit 2013  · Number of Packs per Day: 1  · Number of yrs: 49  · Pack yrs: 49     Passive Smoke Exposure:  · Passive Smoke Exposure	Yes...  · Passive Comment	 smoked    Presurgical Screening:    Cardiovascular:  · Activity	Limited due to pain  · Energy expenditure (mets)	4 mets  · Symptoms	none     Cardiac Tests:  · Last Echocardiogram	2018 normal LVEF  · Last Stress Test	2018 ?     Sleep Apnea Screening:  Confirmed Obstructive Sleep Apnea (IAN)?: No. IAN screening performed.  STOP BANG Legend: 0-2 = LOW Risk; 3-4 = INTERMEDIATE Risk; 5-8 = HIGH Risk  S - Do you SNORE loudly (louder than talking or loud enough to be heard through closed doors): No  T - Are you TIRED, fatigued, or sleepy during the daytime: No  O - Has anyone OBSERVED you stop breathing during your sleep: No  P - Do you have or are you being treated for high blood PRESSURE: No  B - BMI greater than 35 kG/m2: Yes  A - AGE over 50 years old: Yes  N - NECK circumference: No  G - GENDER male: No  STOP BANG Score: 2     Airway:  · Mallampati Score	Class II - visualization of the soft palate, fauces, and uvula  · Dentition	normal     Anesthesia History:  · Previous Reaction to Anesthesia	none     Transfusion History:  · Blood Avoidance/Restrictions	none  · Previous Blood Transfusion	no    Core Measures/Disease Management:    Heart Failure:  Does this patient have a history of or has been diagnosed with heart failure? no.    Clinical Risk Assessment:    Catheterizations/Incisions/Drainage:  · Venous Thromboembolism	no  · Pulmonary Embolus	no          Medication list         MEDICATIONS  (STANDING):  acetaminophen  IVPB .. 1000 milliGRAM(s) IV Intermittent every 6 hours  atorvastatin 40 milliGRAM(s) Oral at bedtime  ceFAZolin   IVPB 2000 milliGRAM(s) IV Intermittent every 8 hours  lactated ringers. 1000 milliLiter(s) (125 mL/Hr) IV Continuous <Continuous>  pantoprazole    Tablet 40 milliGRAM(s) Oral before breakfast  sodium chloride 0.65% Nasal 1 Spray(s) Both Nostrils two times a day    MEDICATIONS  (PRN):  albuterol/ipratropium for Nebulization 3 milliLiter(s) Nebulizer every 4 hours PRN Shortness of Breath and/or Wheezing  ALPRAZolam 1 milliGRAM(s) Oral every 8 hours PRN anxiety  aluminum hydroxide/magnesium hydroxide/simethicone Suspension 30 milliLiter(s) Oral four times a day PRN Indigestion  magnesium hydroxide Suspension 30 milliLiter(s) Oral daily PRN Constipation  ondansetron Injectable 4 milliGRAM(s) IV Push every 6 hours PRN Nausea and/or Vomiting  oxyCODONE    IR 5 milliGRAM(s) Oral every 3 hours PRN Mild Pain (1 - 3)  oxyCODONE    IR 10 milliGRAM(s) Oral every 3 hours PRN Moderate Pain (4 - 6)  polyethylene glycol 3350 17 Gram(s) Oral daily PRN Constipation  senna 2 Tablet(s) Oral at bedtime PRN Constipation         Vitals log        ICU Vital Signs Last 24 Hrs  T(C): 36.1 (18 Dec 2019 20:49), Max: 37.1 (18 Dec 2019 09:28)  T(F): 97 (18 Dec 2019 20:49), Max: 98.7 (18 Dec 2019 09:28)  HR: 76 (18 Dec 2019 20:00) (67 - 100)  BP: 85/58 (18 Dec 2019 20:00) (85/58 - 162/93)  BP(mean): 68 (18 Dec 2019 20:00) (68 - 90)  ABP: --  ABP(mean): --  RR: 14 (18 Dec 2019 20:00) (10 - 22)  SpO2: 94% (18 Dec 2019 20:00) (90% - 98%)           Input and Output:  I&O's Detail    18 Dec 2019 07:01  -  18 Dec 2019 21:01  --------------------------------------------------------  IN:    IV PiggyBack: 100 mL    lactated ringers.: 250 mL    lactated ringers.: 1960 mL    Oral Fluid: 100 mL    Solution: 50 mL  Total IN: 2460 mL    OUT:    Estimated Blood Loss: 200 mL    Indwelling Catheter - Urethral: 1200 mL  Total OUT: 1400 mL    Total NET: 1060 mL          Lab Data                        13.0   9.92  )-----------( 195      ( 18 Dec 2019 20:37 )             42.1     12-18    138  |  103  |  19  ----------------------------<  178<H>  4.7   |  30  |  1.02    Ca    8.0<L>      18 Dec 2019 20:37  Phos  5.2     12-18  Mg     1.6     12-18    TPro  7.4  /  Alb  3.3  /  TBili  0.2  /  DBili  x   /  AST  26  /  ALT  44  /  AlkPhos  61  12-18    ABG - ( 18 Dec 2019 20:15 )  pH, Arterial: x     pH, Blood: 7.29  /  pCO2: 57    /  pO2: 72    / HCO3: 24    / Base Excess: 0.3   /  SaO2: 92                      Review of Systems	  s/p res distress      Objective     Physical Examination    head at  heart s1s2  lung dec BS  abd soft  verbal  alert      Pertinent Lab findings & Imaging      Selena:  NO   Adequate UO     I&O's Detail    18 Dec 2019 07:01  -  18 Dec 2019 21:01  --------------------------------------------------------  IN:    IV PiggyBack: 100 mL    lactated ringers.: 250 mL    lactated ringers.: 1960 mL    Oral Fluid: 100 mL    Solution: 50 mL  Total IN: 2460 mL    OUT:    Estimated Blood Loss: 200 mL    Indwelling Catheter - Urethral: 1200 mL  Total OUT: 1400 mL    Total NET: 1060 mL               Discussed with:     Cultures:	        Radiology          EXAM:  XR CHEST PORTABLE URGENT 1V                                  PROCEDURE DATE:  12/18/2019          INTERPRETATION:  AP chest on December 18, 2019 at 4:51 PM. Patient has abnormal chest sounds.    Heart may be enlarged.    There is slight blunting of the lateral costophrenic angles.    On September 4 of this year there was a larger left base process.    IMPRESSION: Slight blunting of the lateral costophrenic angles. Probable heart enlargement.

## 2019-12-18 NOTE — BRIEF OPERATIVE NOTE - NSICDXBRIEFPOSTOP_GEN_ALL_CORE_FT
POST-OP DIAGNOSIS:  DJD (degenerative joint disease) of knee 18-Dec-2019 14:10:12  Tristan Goncalves

## 2019-12-18 NOTE — CONSULT NOTE ADULT - PROBLEM SELECTOR RECOMMENDATION 9
ABG and CXR noted  off BIPAP now  I and O  I mitra  NEBS prn   pt was a heavy ex smoker  will need PFT as outpatient  old records reviewed - ct chest and labs and reports  post op care - wound care - I mitra - dvt p - I and O -   SPCU monitoring   no need for further serial ABG -   monitor pulse ox and clinical status - appears significantly better and reports feeling better  post nasal drip - nasal saline
Lethargy likely due to resp acidosis due to hypercarbia in setting of multiple sedating medications including versed, propofol, fentanyl, hydromorphone also with likely undiagnosed obstructive sleep apnea given body habitus  -treat resp depression as below  -encourage deep breaths  -get routine bloodwork

## 2019-12-18 NOTE — RESEARCH COMMUNICATION NOTE - NS AS RESEARCH COMMUNICATION NOTE FT
Patient seen in the preoperative unit.  Patient agrees to continue participation in the Bipolar Sealer Clinical Trial.    A copy of the consent was provided to the patient and a copy was placed in the patient chart.   Inclusion /Exclusion Criteria reviewed with the surgeon.  The patient meets the study criteria for enrollment.  Randomization completed via the [employer] database.  The surgeon and operating room notified of the treatment arm. Jorge Little, PietroD

## 2019-12-18 NOTE — PROGRESS NOTE ADULT - ASSESSMENT
69 year old female PMHx Obesity ( BMI 36), Anxiety HTN, HLD, OA.  Ex-smoker ( quit 2013) Post-Op POD 0 s/o R TKR.     Type 2 respiratory failure - Post-Op requiring NIV with Bipap.       ABG has improved s/p ~ 1hr on Bipap however it has not fully normalized.  Patient At this time is now refusing to wear and wishes to eat.  Will Place on NC keep sats 90-92% and let her eat.  Much more awake and alert.  Will attempt to encourage her to wear Bipap overnight.  Pain Mgt   Nebs   Pulm consulted   Will Check ABG in AM or sooner if she become Lethargic again.

## 2019-12-18 NOTE — PROVIDER CONTACT NOTE (EICU) - ACTION/TREATMENT ORDERED:
ABG looks worse, will need to continue to monitor very closely given ?more alert.  May need to be intubated if no significant clinical improvement afterwards.
STAT ABG being done now, another HL being inserted.  Hospitalist made aware of current situation.

## 2019-12-18 NOTE — PROVIDER CONTACT NOTE (OTHER) - ASSESSMENT
EKG done.  Normal EKG.  Chest X Ray done with left lung base infiltrate.  ABG done.  Results = pH 7.22, O2 82, PCo2 64, Sat 93%, Bicarb 22.  Pt. to be placed on CPAP 5 with 6L 02. Dr. Beltran to follow pt re: pulmonology consult. Lasix 20 mg IV given as per Dr. Rashid (anesthesia).

## 2019-12-18 NOTE — CONSULT NOTE ADULT - PROBLEM SELECTOR RECOMMENDATION 3
POD #1  pain control, bowel regimen per ortho, would limit opoid use for pain control if possible, use tylenol and/or nsaids if no contraindications from ortho standpoint  PT eval

## 2019-12-18 NOTE — CONSULT NOTE ADULT - SUBJECTIVE AND OBJECTIVE BOX
HPI: 68 y/o M with PMH of obesity BMI 36, Anxiety, HLD, HTN, OA admitted post op after R TKR. Patient seen in PACU, per RN has been sleepy but easily arousalable, was put on NRB as 02 sat was 89% on 4L NC  .Patient reports feeling fine aide from R knee pain.  Denies chest pain, sob, back pain, palpitations, nausea, vomiting. Denies hx of IAN, smoking, lung disease, cardiac disease.     Of note patient did get Versed 2mg x 2, Fentanyl propofol and 1 dose of hydromorphone 0.5mg x 1 post op.       Home Medications:  Lipitor 40 mg oral tablet: 1 tab(s) orally once a day (18 Dec 2019 09:40)  Xanax 1 mg oral tablet: orally 4 times a day, As Needed (18 Dec 2019 09:40)      PAST MEDICAL & SURGICAL HISTORY:  History of closed shoulder dislocation: left shoulder 2019  Osteoarthritis: Right knee  Anxiety  HLD (hyperlipidemia)  Essential hypertension: Not on medication x 1 year  No significant past surgical history      Review of Systems:   CONSTITUTIONAL: No fever, weight loss, or fatigue  EYES: No eye pain, visual disturbances, or discharge  ENMT:  No difficulty hearing, tinnitus, vertigo; No sinus or throat pain  NECK: No pain or stiffness  RESPIRATORY: No cough, wheezing, chills or hemoptysis; No shortness of breath  CARDIOVASCULAR: No chest pain, palpitations, dizziness, or leg swelling  GASTROINTESTINAL: No abdominal or epigastric pain. No nausea, vomiting, or hematemesis; No diarrhea or constipation. No melena or hematochezia.  GENITOURINARY: No dysuria, frequency, hematuria, or incontinence  NEUROLOGICAL: No headaches, memory loss, loss of strength, numbness, or tremors  SKIN: No itching, burning, rashes, or lesions   MUSCULOSKELETAL: No joint pain or swelling; No muscle, back, or extremity pain  PSYCHIATRIC: No depression, anxiety, mood swings, or difficulty sleeping      Allergies    No Known Allergies    Intolerances    NSAIDs (Other)      Social History:     FAMILY HISTORY:  FH: heart attack: mother   Noncontributory    MEDICATIONS  (STANDING):  acetaminophen  IVPB .. 1000 milliGRAM(s) IV Intermittent every 6 hours  lactated ringers. 1000 milliLiter(s) (75 mL/Hr) IV Continuous <Continuous>    MEDICATIONS  (PRN):  HYDROmorphone  Injectable 0.5 milliGRAM(s) IV Push every 10 minutes PRN Moderate Pain (4 - 6)  ondansetron Injectable 4 milliGRAM(s) IV Push once PRN Nausea and/or Vomiting      Vital Signs Last 24 Hrs  T(C): 36.7 (18 Dec 2019 14:25), Max: 37.1 (18 Dec 2019 09:28)  T(F): 98.1 (18 Dec 2019 14:25), Max: 98.7 (18 Dec 2019 09:28)  HR: 84 (18 Dec 2019 16:10) (67 - 100)  BP: 126/85 (18 Dec 2019 16:10) (85/60 - 162/93)  BP(mean): --  RR: 12 (18 Dec 2019 16:10) (10 - 21)  SpO2: 96% (18 Dec 2019 16:10) (92% - 97%)  CAPILLARY BLOOD GLUCOSE            PHYSICAL EXAM:  GENERAL: obese F, NAD sleeping but easily arousible to audible and tactile stimuli, occasinally falls asleep during interview but redirectable, on NRB satting 95%, not tachypneic   HEAD:  Atraumatic, Normocephalic  EYES: EOMI,, conjunctiva and sclera clear  NECK: Supple, No JVD, large neck+  CHEST/LUNG: Clear to auscultation bilaterally; No wheeze  HEART: Regular rate and rhythm; No murmurs, rubs, or gallops  ABDOMEN: Soft, Nontender, Nondistended; Bowel sounds present  EXTREMITIES:  2+ Peripheral Pulses, No clubbing, cyanosis, or edema. R leg with ACE bandage intact  PSYCH: AAOx3, answering questions appropriately   NEUROLOGY: non-focal, moving all extremities, sensation intact  SKIN: No rashes or lesions    LABS:                      RADIOLOGY & ADDITIONAL TESTS:    Imaging Personally Reviewed:    Consultant(s) Notes Reviewed:      Care Discussed with Consultants/Other Providers: anesthesia HPI: 70 y/o M with PMH of obesity BMI 36, Anxiety, HLD, HTN, OA admitted post op after R TKR. Patient seen in PACU, per RN has been sleepy but easily arousalable, She was satting 88% on 4L NC with improvement to 95% on NRBM  Patient reports feeling fine aide from R knee pain.  Denies chest pain, sob, back pain, palpitations, nausea, vomiting. Denies hx of IAN, smoking, lung disease, cardiac disease.     Patient had general anesthesia for surgery.  Of note patient got Versed 2mg x 2, Fentanyl, propofol and 1 dose of hydromorphone 0.5mg x 1 post op.       Home Medications:  Lipitor 40 mg oral tablet: 1 tab(s) orally once a day (18 Dec 2019 09:40)  Xanax 1 mg oral tablet: orally 4 times a day, As Needed (18 Dec 2019 09:40)      PAST MEDICAL & SURGICAL HISTORY:  History of closed shoulder dislocation: left shoulder 2019  Osteoarthritis: Right knee  Anxiety  HLD (hyperlipidemia)  Essential hypertension: Not on medication x 1 year  No significant past surgical history      Review of Systems:   CONSTITUTIONAL: No fever, weight loss, or fatigue  EYES: No eye pain, visual disturbances, or discharge  ENMT:  No difficulty hearing, tinnitus, vertigo; No sinus or throat pain  NECK: No pain or stiffness  RESPIRATORY: No cough, wheezing, chills or hemoptysis; No shortness of breath  CARDIOVASCULAR: No chest pain, palpitations, dizziness, or leg swelling  GASTROINTESTINAL: No abdominal or epigastric pain. No nausea, vomiting, or hematemesis; No diarrhea or constipation. No melena or hematochezia.  GENITOURINARY: No dysuria, frequency, hematuria, or incontinence  NEUROLOGICAL: No headaches, memory loss, loss of strength, numbness, or tremors  SKIN: No itching, burning, rashes, or lesions   MUSCULOSKELETAL: No joint pain or swelling; No muscle, back, or extremity pain  PSYCHIATRIC: No depression, anxiety, mood swings, or difficulty sleeping      Allergies: No Known Allergies    Intolerances    NSAIDs (Other)      Social History: denies toxic habits x3    FAMILY HISTORY:  FH: heart attack: mother   Noncontributory    MEDICATIONS  (STANDING):  acetaminophen  IVPB .. 1000 milliGRAM(s) IV Intermittent every 6 hours  lactated ringers. 1000 milliLiter(s) (75 mL/Hr) IV Continuous <Continuous>    MEDICATIONS  (PRN):  HYDROmorphone  Injectable 0.5 milliGRAM(s) IV Push every 10 minutes PRN Moderate Pain (4 - 6)  ondansetron Injectable 4 milliGRAM(s) IV Push once PRN Nausea and/or Vomiting      Vital Signs Last 24 Hrs  T(C): 36.7 (18 Dec 2019 14:25), Max: 37.1 (18 Dec 2019 09:28)  T(F): 98.1 (18 Dec 2019 14:25), Max: 98.7 (18 Dec 2019 09:28)  HR: 84 (18 Dec 2019 16:10) (67 - 100)  BP: 126/85 (18 Dec 2019 16:10) (85/60 - 162/93)  BP(mean): --  RR: 12 (18 Dec 2019 16:10) (10 - 21)  SpO2: 96% (18 Dec 2019 16:10) (92% - 97%)  CAPILLARY BLOOD GLUCOSE            PHYSICAL EXAM:  GENERAL: obese F, NAD sleeping but easily arousible to audible and tactile stimuli, occasinally falls asleep during interview but redirectable, on NRB satting 95%, not tachypneic. Later in exam was awake alert and no longer falling asleep  HEAD:  Atraumatic, Normocephalic  EYES: EOMI,, conjunctiva and sclera clear  NECK: Supple, No JVD, large neck+  CHEST/LUNG: decrease bs b/l, no wheezing or rhonci  HEART: Regular rate and rhythm; No murmurs, rubs, or gallops  ABDOMEN: Soft, Nontender, Nondistended; Bowel sounds present  EXTREMITIES:  2+ Peripheral Pulses, No clubbing, cyanosis, or edema. R leg with ACE bandage intact  PSYCH: AAOx3, answering questions appropriately   NEUROLOGY: non-focal, moving all extremities, sensation intact  SKIN: No rashes or lesions    LABS:                ABG - ( 18 Dec 2019 16:50 )  pH, Arterial: x     pH, Blood: 7.22  /  pCO2: 64    /  pO2: 82    / HCO3: 22    / Base Excess: x     /  SaO2: 93                    RADIOLOGY & ADDITIONAL TESTS:    personally reviewed EKG: NSR @ 89, unchanged nonspecific TW flattening v1-v2 and unchanged TWI v3-v6    Imaging Personally Reviewed:    Consultant(s) Notes Reviewed:      Care Discussed with Consultants/Other Providers: anesthesia attending bedside

## 2019-12-18 NOTE — PROVIDER CONTACT NOTE (EICU) - ASSESSMENT
Worsening Type 2 respiratory failure on NIPPV.  Clinically opens eyes to voice and somewhat responsive.
/85, HR 86, RESP 14, 90% PULSE OX

## 2019-12-18 NOTE — PROGRESS NOTE ADULT - SUBJECTIVE AND OBJECTIVE BOX
Patient is a 69y old  Female who presents with a chief complaint of s/p R TKR (18 Dec 2019 16:43)      BRIEF HOSPITAL COURSE: 69 year old female PMHx Obesity ( BMI 36), Anxiety HTN, HLD, OA.  Ex-smoker ( quit 2013) Post-Op POD 0 s/o R TKR.  Lethergy and Hypoxia post-op with worsening ABG indices.    Events last 24 hours: Transferred to S[facility]U for Type 2 respiratory failure - Post-Op requiring NIV with Bipap.     PAST MEDICAL & SURGICAL HISTORY:  History of closed shoulder dislocation: left shoulder 2019  Osteoarthritis: Right knee  Anxiety  HLD (hyperlipidemia)  Essential hypertension: Not on medication x 1 year  No significant past surgical history      Review of Systems: C/o Hunger and Discomfort on Bipap   CONSTITUTIONAL: No fever, chills, or fatigue  EYES: No eye pain, visual disturbances, or discharge  ENMT:  No difficulty hearing, tinnitus, vertigo; No sinus or throat pain  NECK: No pain or stiffness  RESPIRATORY: No cough, wheezing, chills or hemoptysis; No shortness of breath  CARDIOVASCULAR: No chest pain, palpitations, dizziness, or leg swelling  GASTROINTESTINAL: No abdominal or epigastric pain. No nausea, vomiting, or hematemesis; No diarrhea or constipation. No melena or hematochezia.  GENITOURINARY: No dysuria, frequency, hematuria, or incontinence  NEUROLOGICAL: No headaches, memory loss, loss of strength, numbness, or tremors  SKIN: No itching, burning, rashes, or lesions   MUSCULOSKELETAL: No joint pain or swelling; No muscle, back, or extremity pain  PSYCHIATRIC: No depression, anxiety, mood swings, or difficulty sleeping      Medications:  ceFAZolin   IVPB 2000 milliGRAM(s) IV Intermittent every 8 hours  acetaminophen  IVPB .. 1000 milliGRAM(s) IV Intermittent every 6 hours  ALPRAZolam 1 milliGRAM(s) Oral every 8 hours PRN  ondansetron Injectable 4 milliGRAM(s) IV Push every 6 hours PRN  oxyCODONE    IR 5 milliGRAM(s) Oral every 3 hours PRN  oxyCODONE    IR 10 milliGRAM(s) Oral every 3 hours PRN  aluminum hydroxide/magnesium hydroxide/simethicone Suspension 30 milliLiter(s) Oral four times a day PRN  magnesium hydroxide Suspension 30 milliLiter(s) Oral daily PRN  pantoprazole    Tablet 40 milliGRAM(s) Oral before breakfast  polyethylene glycol 3350 17 Gram(s) Oral daily PRN  senna 2 Tablet(s) Oral at bedtime PRN  atorvastatin 40 milliGRAM(s) Oral at bedtime  lactated ringers. 1000 milliLiter(s) IV Continuous <Continuous>          ICU Vital Signs Last 24 Hrs  T(C): 36.7 (18 Dec 2019 18:32), Max: 37.1 (18 Dec 2019 09:28)  T(F): 98 (18 Dec 2019 18:32), Max: 98.7 (18 Dec 2019 09:28)  HR: 74 (18 Dec 2019 19:43) (67 - 100)  BP: 112/70 (18 Dec 2019 19:00) (85/60 - 162/93)  BP(mean): 85 (18 Dec 2019 19:00) (85 - 90)  RR: 16 (18 Dec 2019 19:00) (10 - 22)  SpO2: 93% (18 Dec 2019 19:43) (90% - 98%)      ABG - ( 18 Dec 2019 20:15 )  pH, Arterial: x     pH, Blood: 7.29  /  pCO2: 57    /  pO2: 72    / HCO3: 24    / Base Excess: 0.3   /  SaO2: 92                  I&O's Detail    18 Dec 2019 07:01  -  18 Dec 2019 20:46  --------------------------------------------------------  IN:    IV PiggyBack: 100 mL    lactated ringers.: 250 mL    lactated ringers.: 1960 mL    Oral Fluid: 100 mL    Solution: 50 mL  Total IN: 2460 mL    OUT:    Estimated Blood Loss: 200 mL    Indwelling Catheter - Urethral: 1200 mL  Total OUT: 1400 mL    Total NET: 1060 mL            LABS:                        13.0   9.92  )-----------( 195      ( 18 Dec 2019 20:37 )             42.1                 CAPILLARY BLOOD GLUCOSE            Physical Examination:    General:  Initially lethargic but became more arousable, Awake.  Alert, oriented, interactive, nonfocal - Tolerating  Bipap     HEENT: Pupils equal, reactive to light.  Symmetric. No JVD.    PULM: Decreased to  auscultation bilaterally, Fine bibasilar rhonchi anteriorly,  no significant sputum production    CVS: Regular rate and rhythm, no murmurs, rubs, or gallops    ABD: Soft, nondistended, nontender, normoactive bowel sounds, no masses    EXT: No edema, nontender,  R knee dressed,     SKIN: Warm and well perfused, no rashes noted.        RADIOLOGY:   < from: Xray Chest 1 View- PORTABLE-Urgent (12.18.19 @ 17:03) >  EXAM:  XR CHEST PORTABLE URGENT 1V                                  PROCEDURE DATE:  12/18/2019          INTERPRETATION:  AP chest on December 18, 2019 at 4:51 PM. Patient has abnormal chest sounds.    Heart may be enlarged.    There is slight blunting of the lateral costophrenic angles.    On September 4 of this year there was a larger left base process.    IMPRESSION: Slight blunting of the lateral costophrenic angles. Probable heart enlargement.    < end of copied text >    CRITICAL CARE TIME SPENT: 35 minutes   (Assessing presenting problems of acute illness, which pose high probability of life threatening deterioration or end organ damage/dysfunction, as well as medical decision making including initiating plan of care, reviewing data, reviewing radiologic exams, discussing with multidisciplinary team,  discussing goals of care with patient/family, and writing this note.  Non-inclusive of procedures performed)

## 2019-12-18 NOTE — CONSULT NOTE ADULT - ASSESSMENT
HPI: 68 y/o M with PMH of obesity BMI 36, Anxiety, HLD, HTN, OA admitted post op after R TKR with post op course complicated by lethargy, acute respiratory failure with hypoxia and hypercarbia being admitted to SPCU

## 2019-12-18 NOTE — PROVIDER CONTACT NOTE (CRITICAL VALUE NOTIFICATION) - ACTION/TREATMENT ORDERED:
MAGALI SEGOVIA, Aidan HALL, Hospital aware of STAT ABG pH 7.2, CO2 71.  BIPAP setting changed, see orders.

## 2019-12-19 ENCOUNTER — TRANSCRIPTION ENCOUNTER (OUTPATIENT)
Age: 69
End: 2019-12-19

## 2019-12-19 LAB
ALBUMIN SERPL ELPH-MCNC: 3 G/DL — LOW (ref 3.3–5)
ALP SERPL-CCNC: 57 U/L — SIGNIFICANT CHANGE UP (ref 30–120)
ALT FLD-CCNC: 34 U/L DA — SIGNIFICANT CHANGE UP (ref 10–60)
ANION GAP SERPL CALC-SCNC: 6 MMOL/L — SIGNIFICANT CHANGE UP (ref 5–17)
AST SERPL-CCNC: 24 U/L — SIGNIFICANT CHANGE UP (ref 10–40)
BASE EXCESS BLDA CALC-SCNC: 1.6 MMOL/L — SIGNIFICANT CHANGE UP (ref -2–2)
BASOPHILS # BLD AUTO: 0.01 K/UL — SIGNIFICANT CHANGE UP (ref 0–0.2)
BASOPHILS NFR BLD AUTO: 0.1 % — SIGNIFICANT CHANGE UP (ref 0–2)
BILIRUB SERPL-MCNC: 0.2 MG/DL — SIGNIFICANT CHANGE UP (ref 0.2–1.2)
BLOOD GAS COMMENTS: SIGNIFICANT CHANGE UP
BLOOD GAS COMMENTS: SIGNIFICANT CHANGE UP
BLOOD GAS SOURCE: SIGNIFICANT CHANGE UP
BUN SERPL-MCNC: 20 MG/DL — SIGNIFICANT CHANGE UP (ref 7–23)
CALCIUM SERPL-MCNC: 7.7 MG/DL — LOW (ref 8.4–10.5)
CHLORIDE SERPL-SCNC: 101 MMOL/L — SIGNIFICANT CHANGE UP (ref 96–108)
CO2 SERPL-SCNC: 29 MMOL/L — SIGNIFICANT CHANGE UP (ref 22–31)
CREAT SERPL-MCNC: 0.99 MG/DL — SIGNIFICANT CHANGE UP (ref 0.5–1.3)
EOSINOPHIL # BLD AUTO: 0 K/UL — SIGNIFICANT CHANGE UP (ref 0–0.5)
EOSINOPHIL NFR BLD AUTO: 0 % — SIGNIFICANT CHANGE UP (ref 0–6)
GLUCOSE SERPL-MCNC: 153 MG/DL — HIGH (ref 70–99)
HCO3 BLDA-SCNC: 25 MMOL/L — SIGNIFICANT CHANGE UP (ref 21–29)
HCT VFR BLD CALC: 38.4 % — SIGNIFICANT CHANGE UP (ref 34.5–45)
HGB BLD-MCNC: 11.7 G/DL — SIGNIFICANT CHANGE UP (ref 11.5–15.5)
HOROWITZ INDEX BLDA+IHG-RTO: 36 — SIGNIFICANT CHANGE UP
IMM GRANULOCYTES NFR BLD AUTO: 0.4 % — SIGNIFICANT CHANGE UP (ref 0–1.5)
LYMPHOCYTES # BLD AUTO: 1.01 K/UL — SIGNIFICANT CHANGE UP (ref 1–3.3)
LYMPHOCYTES # BLD AUTO: 12.1 % — LOW (ref 13–44)
MCHC RBC-ENTMCNC: 28.2 PG — SIGNIFICANT CHANGE UP (ref 27–34)
MCHC RBC-ENTMCNC: 30.5 GM/DL — LOW (ref 32–36)
MCV RBC AUTO: 92.5 FL — SIGNIFICANT CHANGE UP (ref 80–100)
MONOCYTES # BLD AUTO: 0.55 K/UL — SIGNIFICANT CHANGE UP (ref 0–0.9)
MONOCYTES NFR BLD AUTO: 6.6 % — SIGNIFICANT CHANGE UP (ref 2–14)
NEUTROPHILS # BLD AUTO: 6.78 K/UL — SIGNIFICANT CHANGE UP (ref 1.8–7.4)
NEUTROPHILS NFR BLD AUTO: 80.8 % — HIGH (ref 43–77)
NRBC # BLD: 0 /100 WBCS — SIGNIFICANT CHANGE UP (ref 0–0)
PCO2 BLDA: 55 MMHG — HIGH (ref 32–46)
PH BLD: 7.31 — LOW (ref 7.35–7.45)
PLATELET # BLD AUTO: 201 K/UL — SIGNIFICANT CHANGE UP (ref 150–400)
PO2 BLDA: 70 MMHG — LOW (ref 74–108)
POTASSIUM SERPL-MCNC: 4.8 MMOL/L — SIGNIFICANT CHANGE UP (ref 3.5–5.3)
POTASSIUM SERPL-SCNC: 4.8 MMOL/L — SIGNIFICANT CHANGE UP (ref 3.5–5.3)
PROT SERPL-MCNC: 6.5 G/DL — SIGNIFICANT CHANGE UP (ref 6–8.3)
RBC # BLD: 4.15 M/UL — SIGNIFICANT CHANGE UP (ref 3.8–5.2)
RBC # FLD: 13.7 % — SIGNIFICANT CHANGE UP (ref 10.3–14.5)
SAO2 % BLDA: 93 % — SIGNIFICANT CHANGE UP (ref 92–96)
SODIUM SERPL-SCNC: 136 MMOL/L — SIGNIFICANT CHANGE UP (ref 135–145)
WBC # BLD: 8.38 K/UL — SIGNIFICANT CHANGE UP (ref 3.8–10.5)
WBC # FLD AUTO: 8.38 K/UL — SIGNIFICANT CHANGE UP (ref 3.8–10.5)

## 2019-12-19 PROCEDURE — 99233 SBSQ HOSP IP/OBS HIGH 50: CPT

## 2019-12-19 RX ORDER — SODIUM CHLORIDE 9 MG/ML
1000 INJECTION INTRAMUSCULAR; INTRAVENOUS; SUBCUTANEOUS
Refills: 0 | Status: DISCONTINUED | OUTPATIENT
Start: 2019-12-19 | End: 2019-12-19

## 2019-12-19 RX ORDER — ONDANSETRON 8 MG/1
4 TABLET, FILM COATED ORAL ONCE
Refills: 0 | Status: COMPLETED | OUTPATIENT
Start: 2019-12-19 | End: 2019-12-19

## 2019-12-19 RX ADMIN — OXYCODONE HYDROCHLORIDE 10 MILLIGRAM(S): 5 TABLET ORAL at 05:25

## 2019-12-19 RX ADMIN — Medication 81 MILLIGRAM(S): at 06:07

## 2019-12-19 RX ADMIN — Medication 100 MILLIGRAM(S): at 03:06

## 2019-12-19 RX ADMIN — CELECOXIB 100 MILLIGRAM(S): 200 CAPSULE ORAL at 09:31

## 2019-12-19 RX ADMIN — Medication 1 SPRAY(S): at 17:36

## 2019-12-19 RX ADMIN — OXYCODONE HYDROCHLORIDE 10 MILLIGRAM(S): 5 TABLET ORAL at 04:40

## 2019-12-19 RX ADMIN — Medication 400 MILLIGRAM(S): at 06:07

## 2019-12-19 RX ADMIN — SODIUM CHLORIDE 125 MILLILITER(S): 9 INJECTION, SOLUTION INTRAVENOUS at 12:08

## 2019-12-19 RX ADMIN — Medication 1 SPRAY(S): at 06:16

## 2019-12-19 RX ADMIN — ONDANSETRON 4 MILLIGRAM(S): 8 TABLET, FILM COATED ORAL at 17:36

## 2019-12-19 RX ADMIN — Medication 1000 MILLIGRAM(S): at 13:30

## 2019-12-19 RX ADMIN — Medication 1000 MILLIGRAM(S): at 00:36

## 2019-12-19 RX ADMIN — OXYCODONE HYDROCHLORIDE 10 MILLIGRAM(S): 5 TABLET ORAL at 21:07

## 2019-12-19 RX ADMIN — ATORVASTATIN CALCIUM 40 MILLIGRAM(S): 80 TABLET, FILM COATED ORAL at 21:08

## 2019-12-19 RX ADMIN — SODIUM CHLORIDE 125 MILLILITER(S): 9 INJECTION, SOLUTION INTRAVENOUS at 03:06

## 2019-12-19 RX ADMIN — Medication 1 MILLIGRAM(S): at 09:43

## 2019-12-19 RX ADMIN — OXYCODONE HYDROCHLORIDE 10 MILLIGRAM(S): 5 TABLET ORAL at 17:36

## 2019-12-19 RX ADMIN — Medication 1 MILLIGRAM(S): at 23:10

## 2019-12-19 RX ADMIN — PANTOPRAZOLE SODIUM 40 MILLIGRAM(S): 20 TABLET, DELAYED RELEASE ORAL at 06:07

## 2019-12-19 RX ADMIN — Medication 81 MILLIGRAM(S): at 17:36

## 2019-12-19 RX ADMIN — ONDANSETRON 4 MILLIGRAM(S): 8 TABLET, FILM COATED ORAL at 21:07

## 2019-12-19 RX ADMIN — Medication 1000 MILLIGRAM(S): at 12:08

## 2019-12-19 RX ADMIN — OXYCODONE HYDROCHLORIDE 10 MILLIGRAM(S): 5 TABLET ORAL at 21:52

## 2019-12-19 RX ADMIN — CELECOXIB 100 MILLIGRAM(S): 200 CAPSULE ORAL at 08:31

## 2019-12-19 RX ADMIN — Medication 400 MILLIGRAM(S): at 00:07

## 2019-12-19 RX ADMIN — Medication 1000 MILLIGRAM(S): at 06:37

## 2019-12-19 NOTE — DISCHARGE NOTE PROVIDER - NSDCACTIVITY_GEN_ALL_CORE
Do not make important decisions/Stairs allowed/Showering allowed/Walking - Indoors allowed/No heavy lifting/straining

## 2019-12-19 NOTE — DISCHARGE NOTE PROVIDER - NSDCCPCAREPLAN_GEN_ALL_CORE_FT
PRINCIPAL DISCHARGE DIAGNOSIS  Diagnosis: Osteoarthritis of right knee  Assessment and Plan of Treatment: Physical Therapy/Occupational Therapy for ambulation, transfers, stairs, ADL's, Range of Motion Exercises, Isometrics.  Full weight bearing as tolerated with rolling walker  Range of Motion Goals: Flexion 120 degrees; Extension 0 degrees  Keep incision clean and dry.  Suture/prineo dressing removal 14 days after surgery at rehab facility or Surgeon's office  May shower post-op day #5 if no drainage from incision

## 2019-12-19 NOTE — PROGRESS NOTE ADULT - ASSESSMENT
HPI: 68 y/o M with PMH of obesity BMI 36, Anxiety, HLD, HTN, OA admitted post op after R TKR with post op course complicated by lethargy, acute respiratory failure with hypoxia and hypercarbia being admitted to SPCU      1 y/o M with PMH of obesity BMI 36, Anxiety, HLD, HTN, OA admitted post op after R TKR with post op course complicated by lethargy, acute respiratory failure with hypoxia and hypercarbia being admitted to SPCU     Problem/Recommendation - 1:  Problem: Lethargy. Recommendation: Lethargy likely due to resp acidosis due to hypercarbia in setting of multiple sedating medications including versed, propofol, fentanyl, hydromorphone also with likely undiagnosed obstructive sleep apnea given body habitus  -treat resp depression as below  -encourage deep breaths  -get routine bloodwork.     Problem/Recommendation - 2:  ·  Problem: Acute respiratory failure with hypoxia and hypercapnia.  Recommendation: ABG consistent with resp acidosis 7.22, pc02 64, and hypoxia 02 sat 93 on NRBM. Mentating well, lethargy improving  -staredt BIPAP 10/5, repeat ABG later in 1-2 hours  -in SPCU for closer monitoring  -pulm Dr Fleming consulted, rec pending  -CXR showing bibasilar atelectasis, and cardiomegally   -patient given IV lasix 20mg IV x 1  -avoid ALL sedative meds for now  -incentive spirometer.   -no IVF for now  -continue to optimize pulm toilet     Problem/Recommendation - 3:  ·  Problem: Status post total right knee replacement.  Recommendation: POD #1  pain control, bowel regimen per ortho, would limit opoid use for pain control if possible, use tylenol and/or nsaids if no contraindications from ortho standpoint  PT eval.     Hypotension  likely due to anaesthesia.  doing well mentating very well  patient will remain in SPCU until patients BP resovled     Problem/Recommendation - 5:  ·  Problem: Anxiety.  Recommendation: on xanax 1mg QID prn at home  -hold benzo in setting of resp acidosis.      Problem/Recommendation - 6:  Problem: HLD (hyperlipidemia). Recommendation: c/w lipitor 40mg daily.     Problem/Recommendation - 7:  Problem: Need for prophylactic measure. Recommendation: dvt ppx: per ortho protocol.    Attending Attestation:   Plan discussed with anesthesia, PACU RN,

## 2019-12-19 NOTE — DISCHARGE NOTE PROVIDER - INSTRUCTIONS
For Constipation :   • Increase your water intake. Drink at least 8 glasses of water daily.  • Try adding fiber to your diet by eating fruits, vegetables and foods that are rich in grains.  • If you do experience constipation, you may take an over-the-counter stool softener/laxative such as Karen Colace, [place] or  Milk of Magnesia.

## 2019-12-19 NOTE — DIETITIAN INITIAL EVALUATION ADULT. - OTHER INFO
70 y/o M with PMH of obesity (BMI 36), Anxiety, HLD, HTN, OA admitted to SCU post op  R TKR with course complicated by lethargy, acute respiratory with hypoxia and hypercapnia. Per MD notes, CXR shows bibasilar atelectasis vs mild b/l pleural effusions with mild vascular congestion. Pt awake/alert and reports very good appetite. Pt tolerated regular diet without trouble. Pt states she even requested a second tray. No nutrition related concerns/questions at this time. Pt 70 y/o M with PMH of obesity (BMI 36), Anxiety, HLD, HTN, OA admitted to SCU post op  R TKR with course complicated by lethargy, acute respiratory with hypoxia and hypercapnia. Per MD notes, CXR shows bibasilar atelectasis vs mild b/l pleural effusions with mild vascular congestion. Pt awake/alert and reports very good appetite. Pt tolerated regular diet without trouble. Pt states she even requested a second tray. No nutrition related concerns/questions at this time. Pt weight 199# BMI 36. Per [facility] admission from September 2019, weights have been stable.

## 2019-12-19 NOTE — PROGRESS NOTE ADULT - SUBJECTIVE AND OBJECTIVE BOX
Patient is a 69y old  Female who presents with a chief complaint of s/p R TKR      BRIEF HOSPITAL COURSE: 69 year old female PMHx Obesity ( BMI 36), Anxiety HTN, HLD, OA.  Ex-smoker ( quit 2013) Post-Op POD 0 s/o R TKR.  Lethergy and Hypoxia post-op with worsening ABG indices.    Events last 24 hours: Patient remains with mild hypercarbia on AM ABG. No longer labored. Awake and alert and following commands.     PAST MEDICAL & SURGICAL HISTORY:  History of closed shoulder dislocation: left shoulder 2019  Osteoarthritis: Right knee  Anxiety  HLD (hyperlipidemia)  Essential hypertension: Not on medication x 1 year  No significant past surgical history      Review of Systems: C/o Hunger and Discomfort on Bipap   CONSTITUTIONAL: No fever, chills, or fatigue  EYES: No eye pain, visual disturbances, or discharge  ENMT:  No difficulty hearing, tinnitus, vertigo; No sinus or throat pain  NECK: No pain or stiffness  RESPIRATORY: No cough, wheezing, chills or hemoptysis; No shortness of breath  CARDIOVASCULAR: No chest pain, palpitations, dizziness, or leg swelling  GASTROINTESTINAL: No abdominal or epigastric pain. No nausea, vomiting, or hematemesis; No diarrhea or constipation. No melena or hematochezia.  GENITOURINARY: No dysuria, frequency, hematuria, or incontinence  NEUROLOGICAL: No headaches, memory loss, loss of strength, numbness, or tremors  SKIN: No itching, burning, rashes, or lesions   MUSCULOSKELETAL: No joint pain or swelling; No muscle, back, or extremity pain  PSYCHIATRIC: No depression, anxiety, mood swings, or difficulty sleeping    MEDICATIONS  (STANDING):  acetaminophen   Tablet .. 1000 milliGRAM(s) Oral every 8 hours  aspirin enteric coated 81 milliGRAM(s) Oral every 12 hours  atorvastatin 40 milliGRAM(s) Oral at bedtime  celecoxib 100 milliGRAM(s) Oral every 12 hours  ondansetron Injectable 4 milliGRAM(s) IV Push once  pantoprazole    Tablet 40 milliGRAM(s) Oral before breakfast  sodium chloride 0.65% Nasal 1 Spray(s) Both Nostrils two times a day    MEDICATIONS  (PRN):  albuterol/ipratropium for Nebulization 3 milliLiter(s) Nebulizer every 4 hours PRN Shortness of Breath and/or Wheezing  ALPRAZolam 1 milliGRAM(s) Oral every 8 hours PRN anxiety  aluminum hydroxide/magnesium hydroxide/simethicone Suspension 30 milliLiter(s) Oral four times a day PRN Indigestion  magnesium hydroxide Suspension 30 milliLiter(s) Oral daily PRN Constipation  ondansetron Injectable 4 milliGRAM(s) IV Push every 6 hours PRN Nausea and/or Vomiting  oxyCODONE    IR 5 milliGRAM(s) Oral every 3 hours PRN Mild Pain (1 - 3)  oxyCODONE    IR 10 milliGRAM(s) Oral every 3 hours PRN Moderate Pain (4 - 6)  polyethylene glycol 3350 17 Gram(s) Oral daily PRN Constipation  senna 2 Tablet(s) Oral at bedtime PRN Constipation    ICU Vital Signs Last 24 Hrs  T(C): 36.4 (19 Dec 2019 20:28), Max: 37.1 (19 Dec 2019 16:40)  T(F): 97.6 (19 Dec 2019 20:28), Max: 98.8 (19 Dec 2019 16:40)  HR: 78 (19 Dec 2019 20:00) (68 - 83)  BP: 88/56 (19 Dec 2019 20:00) (82/58 - 113/81)  BP(mean): 77 (19 Dec 2019 18:00) (67 - 93)  ABP: --  ABP(mean): --  RR: 21 (19 Dec 2019 20:00) (12 - 22)  SpO2: 94% (19 Dec 2019 20:00) (91% - 98%)      ABG - ( 19 Dec 2019 07:15 )  pH, Arterial: x     pH, Blood: 7.31  /  pCO2: 55    /  pO2: 70    / HCO3: 25    / Base Excess: 1.6   /  SaO2: 93          I&O's Detail  18 Dec 2019 07:01  -  19 Dec 2019 07:00  --------------------------------------------------------  IN:    IV PiggyBack: 200 mL    lactated ringers.: 1750 mL    lactated ringers.: 1960 mL    Oral Fluid: 100 mL    Solution: 50 mL  Total IN: 4060 mL    OUT:    Estimated Blood Loss: 200 mL    Indwelling Catheter - Urethral: 2400 mL  Total OUT: 2600 mL    Total NET: 1460 mL      19 Dec 2019 07:01  -  19 Dec 2019 21:02  --------------------------------------------------------  IN:    lactated ringers.: 875 mL  Total IN: 875 mL    OUT:    Indwelling Catheter - Urethral: 500 mL  Total OUT: 500 mL    Total NET: 375 mL    LABS:                                   11.7   8.38  )-----------( 201      ( 19 Dec 2019 06:48 )             38.4       CAPILLARY BLOOD GLUCOSE  None      Physical Examination:    General:  Awake.  Alert, oriented, interactive, nonfocal - Tolerating  nasal cannula    HEENT: Pupils equal, reactive to light.  Symmetric. No JVD.    PULM: Decreased to auscultation bilaterally, Fine bibasilar rhonchi anteriorly    CVS: Regular rate and rhythm, no murmurs, rubs, or gallops    ABD: Soft, nondistended, nontender, normoactive bowel sounds, no masses    EXT: No edema, nontender,  R knee dressed,     SKIN: Warm and well perfused, no rashes noted.        RADIOLOGY:   < from: Xray Chest 1 View- PORTABLE-Urgent (12.18.19 @ 17:03) >  EXAM:  XR CHEST PORTABLE URGENT 1V                                  PROCEDURE DATE:  12/18/2019          INTERPRETATION:  AP chest on December 18, 2019 at 4:51 PM. Patient has abnormal chest sounds.    Heart may be enlarged.    There is slight blunting of the lateral costophrenic angles.    On September 4 of this year there was a larger left base process.    IMPRESSION: Slight blunting of the lateral costophrenic angles. Probable heart enlargement.    < end of copied text >    CRITICAL CARE TIME SPENT: 35 minutes   (Assessing presenting problems of acute illness, which pose high probability of life threatening deterioration or end organ damage/dysfunction, as well as medical decision making including initiating plan of care, reviewing data, reviewing radiologic exams, discussing with multidisciplinary team,  discussing goals of care with patient/family, and writing this note.  Non-inclusive of procedures performed)

## 2019-12-19 NOTE — DISCHARGE NOTE PROVIDER - CARE PROVIDER_API CALL
Juan J Nielsen)  Orthopaedic Surgery  [address], [address]  [place] [address]  Phone: [phone]  Fax: [phone]  Follow Up Time: 2 weeks Juan J Nielsen)  Orthopaedic Surgery  [address], [address]  [place] [address]  Phone: [phone]  Fax: [phone]  Established Patient  Scheduled Appointment: 01/06/2020 09:20 AM

## 2019-12-19 NOTE — PHYSICAL THERAPY INITIAL EVALUATION ADULT - TRANSFER TRAINING, PT EVAL
3-5 sessions: pt will be able to perform sit <-> stand with minAx1 3-5 sessions: pt will be able to perform sit <-> stand with 2

## 2019-12-19 NOTE — OCCUPATIONAL THERAPY INITIAL EVALUATION ADULT - ADDITIONAL COMMENTS
Lives alone. states she has 10 steps to enter then 13 inside to handrail. Stall shower.  States she can not use a RW at home as there is no room. States she will only use a cane  States she has a RW and showerchair

## 2019-12-19 NOTE — PROGRESS NOTE ADULT - ASSESSMENT
69 year old female PMHx Obesity ( BMI 36), Anxiety HTN, HLD, OA.  Ex-smoker ( quit 2013) Post-Op POD 0 s/o R TKR.     Hypercarbic respiratory failure    -continue with nasal cannula.   -aspiration precautions  -post op orthopedic instruction regarding R TKR  -DVT proph ordered  -diet as tolerated  -patient with nausea, likely related to narcotics. Will give additional dose of zofran at this time.   -possible transfer to floor in AM.

## 2019-12-19 NOTE — PHYSICAL THERAPY INITIAL EVALUATION ADULT - ADDITIONAL COMMENTS
Pt lives alone in a house with 10 RUSSELL, +HR, 8 steps to get upstairs. Pt does not have any family or friends that can come to assist after being discharged. Pt reports owning a rolling walker. Pt lives alone in a house with 10 RUSSELL, +HR, 13 steps to get upstairs. Pt does not have any family or friends that can come to assist after being discharged. Pt reports owning a rolling walker.

## 2019-12-19 NOTE — PROGRESS NOTE ADULT - SUBJECTIVE AND OBJECTIVE BOX
POST OPERATIVE DAY #: 1    69y Female  s/p   Right  TKR                      SUBJECTIVE: Patient seen and examined at bedside. c/o slight burning, numbness from anterior knee down     Pain:  well controlled        Pain scale:  5 /10  Denies CP, SOB, N/V/D   No new complains     OBJECTIVE:     Vital Signs Last 24 Hrs  T(C): 36.4 (19 Dec 2019 08:30), Max: 36.9 (19 Dec 2019 06:09)  T(F): 97.6 (19 Dec 2019 08:30), Max: 98.4 (19 Dec 2019 06:09)  HR: 78 (19 Dec 2019 10:00) (69 - 100)  BP: 89/57 (19 Dec 2019 10:00) (82/58 - 162/93)  BP(mean): 68 (19 Dec 2019 10:00) (67 - 99)  RR: 17 (19 Dec 2019 10:00) (10 - 22)  SpO2: 92% (19 Dec 2019 10:00) (90% - 98%)    Affected extremity: RLE         Dressing: clean/dry/intact                          Sensation: intact to light touch          Motor exam:  5 / 5 Tibialis Anterior/Gastrocnemius-Soleus, EHL/FHL         warm, well-perfused; capillary refill < 3 seconds  DP+1, dopplerable         negative calf tenderness B/L LE      LABS:                        11.7   8.38  )-----------( 201      ( 19 Dec 2019 06:48 )             38.4     12-19    136  |  101  |  20  ----------------------------<  153<H>  4.8   |  29  |  0.99    Ca    7.7<L>      19 Dec 2019 06:48  Phos  5.2     12-18  Mg     1.6     12-18    TPro  6.5  /  Alb  3.0<L>  /  TBili  0.2  /  DBili  x   /  AST  24  /  ALT  34  /  AlkPhos  57  12-19      I&O's Detail    18 Dec 2019 07:01  -  19 Dec 2019 07:00  --------------------------------------------------------  IN:    IV PiggyBack: 200 mL    lactated ringers.: 1750 mL    lactated ringers.: 1960 mL    Oral Fluid: 100 mL    Solution: 50 mL  Total IN: 4060 mL    OUT:    Estimated Blood Loss: 200 mL    Indwelling Catheter - Urethral: 2400 mL  Total OUT: 2600 mL    Total NET: 1460 mL          MEDICATIONS:    Pain management:  acetaminophen   Tablet .. 1000 milliGRAM(s) Oral every 8 hours  ALPRAZolam 1 milliGRAM(s) Oral every 8 hours PRN  celecoxib 100 milliGRAM(s) Oral every 12 hours  ondansetron Injectable 4 milliGRAM(s) IV Push every 6 hours PRN  oxyCODONE    IR 5 milliGRAM(s) Oral every 3 hours PRN  oxyCODONE    IR 10 milliGRAM(s) Oral every 3 hours PRN    DVT prophylaxis:   aspirin enteric coated 81 milliGRAM(s) Oral every 12 hours      RADIOLOGY & ADDITIONAL STUDIES:    ASSESSMENT AND PLAN:     - Analgesic pain control  - DVT prophylaxis: ASA 81 mg twice a day       SCDs       - Pain Management: Celebrex 100mg twice a day x 21 days   - PT/OT: Weight Bearing Status:  Weight bearing as tolerated, OOBTC          -  Incentive spirometry  - IVF  - Advance diet as tolerated  - Hospitalist is following  -  Follow up labs  -  Disposition: pending PT evaluation

## 2019-12-19 NOTE — DISCHARGE NOTE PROVIDER - PROVIDER TOKENS
PROVIDER:[TOKEN:[[id number]],FOLLOWUP:[2 weeks]] PROVIDER:[TOKEN:[[id number]],SCHEDULEDAPPT:[01/06/2020],SCHEDULEDAPPTTIME:[09:20 AM],ESTABLISHEDPATIENT:[T]]

## 2019-12-19 NOTE — PROGRESS NOTE ADULT - SUBJECTIVE AND OBJECTIVE BOX
Patient seen and examined at bedside. alert oreinted. BP soft today.   Hb stable   patient not reporting pain    ABG this am with pH of 7.31    on BIPAP overnight  Review of Systems:  General:denies fever chills, headache, weakness  HEENT: denies blurry vision,diffculty swallowing,  Cardiovascular: denies chest pain  ,palpitatins  Pulmonary:denies shortness of breath, cough, wheezing  Gastrointestinal: denies abdominal pain, constipation, diarrhra  : denies hematuria, dysuria  Neurological: denies weakness, numbness , tingling, dizziness  skin: denies skin rash  Psychiatrical: denies mood disturbances        Objective:  Vitals  T(C): 36.6 (12-19-19 @ 12:26), Max: 36.9 (12-19-19 @ 06:09)  HR: 77 (12-19-19 @ 12:00) (69 - 100)  BP: 84/58 (12-19-19 @ 12:00) (82/58 - 162/93)  RR: 19 (12-19-19 @ 12:00) (10 - 22)  SpO2: 96% (12-19-19 @ 12:00) (90% - 98%)    Physical Exam:  General: comfortable, no acute distress, well nourished  HEENT: no LAD, trachea midline  Cardiovascular: normal s1s2, no mrg  Pulmonary: diminished  Gastrointestinal: soft non tender non distended, no masses felt  Muscloskeletal: no lower extremity edema  Neurological: CN II-12 intact. No focal weakness  Psychiatrical: normal mood, cooperative    Labs:                          11.7   8.38  )-----------( 201      ( 19 Dec 2019 06:48 )             38.4     12-19    136  |  101  |  20  ----------------------------<  153<H>  4.8   |  29  |  0.99    Ca    7.7<L>      19 Dec 2019 06:48  Phos  5.2     12-18  Mg     1.6     12-18    TPro  6.5  /  Alb  3.0<L>  /  TBili  0.2  /  DBili  x   /  AST  24  /  ALT  34  /  AlkPhos  57  12-19    LIVER FUNCTIONS - ( 19 Dec 2019 06:48 )  Alb: 3.0 g/dL / Pro: 6.5 g/dL / ALK PHOS: 57 U/L / ALT: 34 U/L DA / AST: 24 U/L / GGT: x                 Active Medications  MEDICATIONS  (STANDING):  acetaminophen   Tablet .. 1000 milliGRAM(s) Oral every 8 hours  aspirin enteric coated 81 milliGRAM(s) Oral every 12 hours  atorvastatin 40 milliGRAM(s) Oral at bedtime  celecoxib 100 milliGRAM(s) Oral every 12 hours  lactated ringers. 1000 milliLiter(s) (125 mL/Hr) IV Continuous <Continuous>  pantoprazole    Tablet 40 milliGRAM(s) Oral before breakfast  sodium chloride 0.65% Nasal 1 Spray(s) Both Nostrils two times a day  sodium chloride 0.9%. 1000 milliLiter(s) (999 mL/Hr) IV Continuous <Continuous>    MEDICATIONS  (PRN):  albuterol/ipratropium for Nebulization 3 milliLiter(s) Nebulizer every 4 hours PRN Shortness of Breath and/or Wheezing  ALPRAZolam 1 milliGRAM(s) Oral every 8 hours PRN anxiety  aluminum hydroxide/magnesium hydroxide/simethicone Suspension 30 milliLiter(s) Oral four times a day PRN Indigestion  magnesium hydroxide Suspension 30 milliLiter(s) Oral daily PRN Constipation  ondansetron Injectable 4 milliGRAM(s) IV Push every 6 hours PRN Nausea and/or Vomiting  oxyCODONE    IR 5 milliGRAM(s) Oral every 3 hours PRN Mild Pain (1 - 3)  oxyCODONE    IR 10 milliGRAM(s) Oral every 3 hours PRN Moderate Pain (4 - 6)  polyethylene glycol 3350 17 Gram(s) Oral daily PRN Constipation  senna 2 Tablet(s) Oral at bedtime PRN Constipation

## 2019-12-19 NOTE — PROGRESS NOTE ADULT - SUBJECTIVE AND OBJECTIVE BOX
Date/Time Patient Seen:  		  Referring MD:   Data Reviewed	       Patient is a 69y old  Female who presents with a chief complaint of Post-Op Hypercapnic Respiratory Failure (18 Dec 2019 20:45)      Subjective/HPI     PAST MEDICAL & SURGICAL HISTORY:  History of closed shoulder dislocation: left shoulder 2019  Osteoarthritis: Right knee  Anxiety  HLD (hyperlipidemia)  Essential hypertension: Not on medication x 1 year  No pertinent past medical history  No significant past surgical history  No significant past surgical history        Medication list         MEDICATIONS  (STANDING):  acetaminophen   Tablet .. 1000 milliGRAM(s) Oral every 8 hours  aspirin enteric coated 81 milliGRAM(s) Oral every 12 hours  atorvastatin 40 milliGRAM(s) Oral at bedtime  celecoxib 100 milliGRAM(s) Oral every 12 hours  lactated ringers. 1000 milliLiter(s) (125 mL/Hr) IV Continuous <Continuous>  pantoprazole    Tablet 40 milliGRAM(s) Oral before breakfast  sodium chloride 0.65% Nasal 1 Spray(s) Both Nostrils two times a day    MEDICATIONS  (PRN):  albuterol/ipratropium for Nebulization 3 milliLiter(s) Nebulizer every 4 hours PRN Shortness of Breath and/or Wheezing  ALPRAZolam 1 milliGRAM(s) Oral every 8 hours PRN anxiety  aluminum hydroxide/magnesium hydroxide/simethicone Suspension 30 milliLiter(s) Oral four times a day PRN Indigestion  magnesium hydroxide Suspension 30 milliLiter(s) Oral daily PRN Constipation  ondansetron Injectable 4 milliGRAM(s) IV Push every 6 hours PRN Nausea and/or Vomiting  oxyCODONE    IR 5 milliGRAM(s) Oral every 3 hours PRN Mild Pain (1 - 3)  oxyCODONE    IR 10 milliGRAM(s) Oral every 3 hours PRN Moderate Pain (4 - 6)  polyethylene glycol 3350 17 Gram(s) Oral daily PRN Constipation  senna 2 Tablet(s) Oral at bedtime PRN Constipation         Vitals log        ICU Vital Signs Last 24 Hrs  T(C): 36.9 (19 Dec 2019 06:09), Max: 37.1 (18 Dec 2019 09:28)  T(F): 98.4 (19 Dec 2019 06:09), Max: 98.7 (18 Dec 2019 09:28)  HR: 72 (19 Dec 2019 08:00) (67 - 100)  BP: 100/56 (19 Dec 2019 08:00) (82/58 - 162/93)  BP(mean): 69 (19 Dec 2019 08:00) (67 - 99)  ABP: --  ABP(mean): --  RR: 19 (19 Dec 2019 08:00) (10 - 22)  SpO2: 95% (19 Dec 2019 08:00) (90% - 98%)           Input and Output:  I&O's Detail    18 Dec 2019 07:01  -  19 Dec 2019 07:00  --------------------------------------------------------  IN:    IV PiggyBack: 200 mL    lactated ringers.: 1750 mL    lactated ringers.: 1960 mL    Oral Fluid: 100 mL    Solution: 50 mL  Total IN: 4060 mL    OUT:    Estimated Blood Loss: 200 mL    Indwelling Catheter - Urethral: 2400 mL  Total OUT: 2600 mL    Total NET: 1460 mL          Lab Data                        11.7   8.38  )-----------( 201      ( 19 Dec 2019 06:48 )             38.4     12-19    136  |  101  |  20  ----------------------------<  153<H>  4.8   |  29  |  0.99    Ca    7.7<L>      19 Dec 2019 06:48  Phos  5.2     12-18  Mg     1.6     12-18    TPro  6.5  /  Alb  3.0<L>  /  TBili  0.2  /  DBili  x   /  AST  24  /  ALT  34  /  AlkPhos  57  12-19    ABG - ( 19 Dec 2019 07:15 )  pH, Arterial: x     pH, Blood: 7.31  /  pCO2: 55    /  pO2: 70    / HCO3: 25    / Base Excess: 1.6   /  SaO2: 93                      Review of Systems	      Objective     Physical Examination      head at  heart s1s2  lung dec BS  abd soft  head nc  cn grossly int  on BIPAP overnight    Pertinent Lab findings & Imaging      Selena:  NO   Adequate UO     I&O's Detail    18 Dec 2019 07:01  -  19 Dec 2019 07:00  --------------------------------------------------------  IN:    IV PiggyBack: 200 mL    lactated ringers.: 1750 mL    lactated ringers.: 1960 mL    Oral Fluid: 100 mL    Solution: 50 mL  Total IN: 4060 mL    OUT:    Estimated Blood Loss: 200 mL    Indwelling Catheter - Urethral: 2400 mL  Total OUT: 2600 mL    Total NET: 1460 mL               Discussed with:     Cultures:	        Radiology

## 2019-12-19 NOTE — DISCHARGE NOTE PROVIDER - NSDCMRMEDTOKEN_GEN_ALL_CORE_FT
Lipitor 40 mg oral tablet: 1 tab(s) orally once a day  Xanax 1 mg oral tablet: orally 4 times a day, As Needed acetaminophen 500 mg oral tablet: 2 tab(s) orally every 8 hours  apixaban 2.5 mg oral tablet: 1 tab(s) orally every 12 hours x 9 more days, then Ecotrin twice a day x 4 weeks  budesonide-formoterol 160 mcg-4.5 mcg/inh inhalation aerosol: 2 puff(s) inhaled 2 times a day  celecoxib 100 mg oral capsule: 1 cap(s) orally every 12 hours  ipratropium-albuterol 0.5 mg-2.5 mg/3 mLinhalation solution: 3 milliliter(s) inhaled every 4 hours, As needed, Shortness of Breath and/or Wheezing  Lipitor 40 mg oral tablet: 1 tab(s) orally once a day  oxyCODONE 10 mg oral tablet: 1 tab(s) orally every 4 hours, As Needed - 6)  oxyCODONE 5 mg oral tablet: 1 tab(s) orally every 4 hours, As Needed - 3)  pantoprazole 40 mg oral delayed release tablet: 1 tab(s) orally once a day (before a meal)  senna oral tablet: 2 tab(s) orally once a day (at bedtime), As needed, Constipation  Xanax 1 mg oral tablet: orally 4 times a day, As Needed

## 2019-12-19 NOTE — DISCHARGE NOTE PROVIDER - HOSPITAL COURSE
This patient was admitted to [facility] with a history of severe degenerative joint disease of the right knee.  Patient went to Pre-Surgical Testing at [facility] and was medically cleared to undergo elective procedure. Patient underwent Right TKR by  on 12/18/19.  No operative or angela-operative complications arose during patients hospital course. Patient admitted to SPCU post op due to hypercapnia/obsrvation.  Walters catheter place for I/O monitoring and discontinued POD1. Patient received antibiotic according to SCIP guidelines for infection prevention.  Ecotrin was given for DVT prophylaxis.  Anesthesia, Medical Hospitalist, Physical Therapy and Occupational Therapy were consulted. Patient is stable for discharge with a good prognosis.  Appropriate discharge instructions and medications are provided in this document. This patient was admitted to [facility] with a history of severe degenerative joint disease of the right knee.  Patient went to Pre-Surgical Testing at [facility] and was medically cleared to undergo elective procedure. Patient underwent Right TKR by  on 12/18/19.  No operative complications arose. Patient admitted to SPCU post op due to hypercapnia/observation.  Walters catheter place for I/O monitoring and discontinued POD1. Patient received antibiotic according to SCIP guidelines for infection prevention.  Ecotrin was given for DVT prophylaxis.  Anesthesia, Medical Hospitalist, Critical Care, Pulmonology, Physical Therapy and Occupational Therapy were consulted. Patient's hospital course was complicated by hypoxia and hypercapnia.  She was managed by pulmonology and refused further workup. Appropriate discharge instructions and medications are provided in this document.

## 2019-12-19 NOTE — DISCHARGE NOTE PROVIDER - NSDCCPTREATMENT_GEN_ALL_CORE_FT
PRINCIPAL PROCEDURE  Procedure: Right total knee replacement  Findings and Treatment: PRINCIPAL PROCEDURE  Procedure: Right total knee replacement  Findings and Treatment: severe DJD left knee

## 2019-12-19 NOTE — DISCHARGE NOTE PROVIDER - NSDCFUSCHEDAPPT_GEN_ALL_CORE_FT
DALI RAYGOZA ; 01/06/2020 ; [address]  DALI RAYGOZA ; 02/18/2020 ; [address] DALI RAYGOZA ; 01/06/2020 ; [address]  DALI RAYGOZA ; 02/18/2020 ; [address] DALI RAYGOZA ; 01/06/2020 ; [address]  DALI RAYGOZA ; 02/18/2020 ; [address] DALI RAYGOZA ; 01/06/2020 ; [address]  DALI RAYGOZA ; 02/18/2020 ; [address]

## 2019-12-20 LAB
ANION GAP SERPL CALC-SCNC: 4 MMOL/L — LOW (ref 5–17)
BUN SERPL-MCNC: 21 MG/DL — SIGNIFICANT CHANGE UP (ref 7–23)
CALCIUM SERPL-MCNC: 7.9 MG/DL — LOW (ref 8.4–10.5)
CHLORIDE SERPL-SCNC: 103 MMOL/L — SIGNIFICANT CHANGE UP (ref 96–108)
CO2 SERPL-SCNC: 32 MMOL/L — HIGH (ref 22–31)
CREAT SERPL-MCNC: 1.04 MG/DL — SIGNIFICANT CHANGE UP (ref 0.5–1.3)
GLUCOSE SERPL-MCNC: 122 MG/DL — HIGH (ref 70–99)
HCT VFR BLD CALC: 36 % — SIGNIFICANT CHANGE UP (ref 34.5–45)
HGB BLD-MCNC: 10.9 G/DL — LOW (ref 11.5–15.5)
MCHC RBC-ENTMCNC: 28.9 PG — SIGNIFICANT CHANGE UP (ref 27–34)
MCHC RBC-ENTMCNC: 30.3 GM/DL — LOW (ref 32–36)
MCV RBC AUTO: 95.5 FL — SIGNIFICANT CHANGE UP (ref 80–100)
NRBC # BLD: 0 /100 WBCS — SIGNIFICANT CHANGE UP (ref 0–0)
PLATELET # BLD AUTO: 171 K/UL — SIGNIFICANT CHANGE UP (ref 150–400)
POTASSIUM SERPL-MCNC: 4.2 MMOL/L — SIGNIFICANT CHANGE UP (ref 3.5–5.3)
POTASSIUM SERPL-SCNC: 4.2 MMOL/L — SIGNIFICANT CHANGE UP (ref 3.5–5.3)
RBC # BLD: 3.77 M/UL — LOW (ref 3.8–5.2)
RBC # FLD: 13.7 % — SIGNIFICANT CHANGE UP (ref 10.3–14.5)
SODIUM SERPL-SCNC: 139 MMOL/L — SIGNIFICANT CHANGE UP (ref 135–145)
WBC # BLD: 8.14 K/UL — SIGNIFICANT CHANGE UP (ref 3.8–10.5)
WBC # FLD AUTO: 8.14 K/UL — SIGNIFICANT CHANGE UP (ref 3.8–10.5)

## 2019-12-20 PROCEDURE — 99233 SBSQ HOSP IP/OBS HIGH 50: CPT

## 2019-12-20 RX ORDER — APIXABAN 2.5 MG/1
2.5 TABLET, FILM COATED ORAL EVERY 12 HOURS
Refills: 0 | Status: DISCONTINUED | OUTPATIENT
Start: 2019-12-20 | End: 2019-12-23

## 2019-12-20 RX ADMIN — OXYCODONE HYDROCHLORIDE 10 MILLIGRAM(S): 5 TABLET ORAL at 20:04

## 2019-12-20 RX ADMIN — ONDANSETRON 4 MILLIGRAM(S): 8 TABLET, FILM COATED ORAL at 09:22

## 2019-12-20 RX ADMIN — Medication 1 SPRAY(S): at 05:43

## 2019-12-20 RX ADMIN — OXYCODONE HYDROCHLORIDE 10 MILLIGRAM(S): 5 TABLET ORAL at 09:12

## 2019-12-20 RX ADMIN — Medication 1 SPRAY(S): at 18:29

## 2019-12-20 RX ADMIN — OXYCODONE HYDROCHLORIDE 10 MILLIGRAM(S): 5 TABLET ORAL at 20:43

## 2019-12-20 RX ADMIN — OXYCODONE HYDROCHLORIDE 10 MILLIGRAM(S): 5 TABLET ORAL at 09:45

## 2019-12-20 RX ADMIN — ONDANSETRON 4 MILLIGRAM(S): 8 TABLET, FILM COATED ORAL at 15:44

## 2019-12-20 RX ADMIN — POLYETHYLENE GLYCOL 3350 17 GRAM(S): 17 POWDER, FOR SOLUTION ORAL at 18:24

## 2019-12-20 RX ADMIN — OXYCODONE HYDROCHLORIDE 10 MILLIGRAM(S): 5 TABLET ORAL at 23:50

## 2019-12-20 RX ADMIN — OXYCODONE HYDROCHLORIDE 10 MILLIGRAM(S): 5 TABLET ORAL at 23:19

## 2019-12-20 RX ADMIN — APIXABAN 2.5 MILLIGRAM(S): 2.5 TABLET, FILM COATED ORAL at 18:24

## 2019-12-20 RX ADMIN — Medication 1 MILLIGRAM(S): at 21:14

## 2019-12-20 RX ADMIN — ATORVASTATIN CALCIUM 40 MILLIGRAM(S): 80 TABLET, FILM COATED ORAL at 20:04

## 2019-12-20 NOTE — PROGRESS NOTE ADULT - SUBJECTIVE AND OBJECTIVE BOX
Discharge medication calendar:  Eliquis 2.5mg q12h x 12 days then ASA EC 81mg q12h x 4 weeks  APAP 1000mg q8h x 2-3 weeks  Celecoxib 100mg q12h x 2-3 weeks  Pantoprazole 40mg QAM x 6 weeks  Narcotic PRN  Docusate 100mg TID while taking narcotic  Miralax, Senna, or Bisacodyl PRN for treatment of constipation

## 2019-12-20 NOTE — PROGRESS NOTE ADULT - ASSESSMENT
HPI: 68 y/o M with PMH of obesity BMI 36, Anxiety, HLD, HTN, OA admitted post op after R TKR with post op course complicated by lethargy, acute respiratory failure with hypoxia and hypercarbia being admitted to SPCU      1 y/o M with PMH of obesity BMI 36, Anxiety, HLD, HTN, OA admitted post op after R TKR with post op course complicated by lethargy, acute respiratory failure with hypoxia and hypercarbia being admitted to SPCU. Blood gas stable on 3 liters.. Blood pressure stable, now downgraded to medical wards.      Hypercapneic Respiratory Failure  Lethargy   Resp acidosis due   Likely due to multiple sedating medications including versed, propofol, fentanyl, hydromorphone also with likely undiagnosed obstructive sleep apnea given body habitus  -was upgraded to SPCU for closer monitoring and started on BIPAP  -pulm Dr Fleming consulted, rec pending  -treat resp depression as below  -encourage deep breaths  -CXR showing bibasilar atelectasis, and cardiomegally   -patient given IV lasix 20mg IV x 1  -avoid ALL sedative meds for now  -incentive spirometer.   -no IVF for now  -continue to optimize pulm toilet     Problem/Recommendation - 3:  ·  Problem: Status post total right knee replacement.  Recommendation: POD #1  pain control, bowel regimen per ortho, would limit opoid use for pain control if possible, use tylenol and/or nsaids if no contraindications from ortho standpoint  PT eval.     Hypotension  likely due to anaesthesia.  doing well mentating very well  patient will remain in SPCU until patients BP resovled     Problem/Recommendation - 5:  ·  Problem: Anxiety.  Recommendation: on xanax 1mg QID prn at home  -hold benzo in setting of resp acidosis.      Problem/Recommendation - 6:  Problem: HLD (hyperlipidemia). Recommendation: c/w lipitor 40mg daily.     Problem/Recommendation - 7:  Problem: Need for prophylactic measure. Recommendation: dvt ppx: per ortho protocol.    Attending Attestation:   Plan discussed with anesthesia, PACU RN, HPI: 70 y/o M with PMH of obesity BMI 36, Anxiety, HLD, HTN, OA admitted post op after R TKR with post op course complicated by lethargy, acute respiratory failure with hypoxia and hypercarbia being admitted to SPCU      1 y/o M with PMH of obesity BMI 36, Anxiety, HLD, HTN, OA admitted post op after R TKR with post op course complicated by lethargy, acute respiratory failure with hypoxia and hypercarbia being admitted to SPCU. Blood gas stable on 3 liters.. Blood pressure stable, now downgraded to medical wards.      Hypercapneic Respiratory Failure  Lethargy   Hypercapneic Respiratory Acidosis  Likely due to multiple sedating medications including versed, propofol, fentanyl, hydromorphone also with likely undiagnosed obstructive sleep apnea given body habitus  -was upgraded to SPCU for closer monitoring and started on BIPAP  -pulm Dr Fleming consulted,  -CXR showing bibasilar atelectasis, and cardiomegally   -patient given IV lasix 20mg IV x 1  -avoid ALL sedative meds for now  -incentive spirometer.   -no IVF for now  -patient more alert today -- AMBULATING WITH PT  -continue to optimize pulm toilet      Status post total right knee replacement. POD #3  pain control, bowel regimen per ortho, would limit opoid use for pain control if possible, use tylenol and/or nsaids if no contraindications from ortho standpoint  PT eval.     Hypotension  likely due to anaesthesia.  doing well mentating very well  Resolved       Problem/Recommendation - 5:  ·  Problem: Anxiety.  Recommendation: on xanax 1mg QID prn at home  -hold benzo in setting of resp acidosis.      Problem/Recommendation - 6:  Problem: HLD (hyperlipidemia). Recommendation: c/w lipitor 40mg daily.     Problem/Recommendation - 7:  Problem: Need for prophylactic measure. Recommendation: dvt ppx: per ortho protocol.

## 2019-12-20 NOTE — PROGRESS NOTE ADULT - SUBJECTIVE AND OBJECTIVE BOX
Patient seen and examined at bedside. downgraded to medical floors  chart reviewed  patient today very anxious to be dced from hospital.  reports she cannot walk wants to get out of bed.    ON 2 liters of NC at this time    Afebrile  Blood pressure now stable      Review of Systems:  General:denies fever chills, headache, weakness  HEENT: denies blurry vision,diffculty swallowing,  Cardiovascular: denies chest pain  ,palpitatins  Pulmonary:denies shortness of breath, cough, wheezing  Gastrointestinal: denies abdominal pain, constipation, diarrhra  : denies hematuria, dysuria  Neurological: denies weakness, numbness , tingling, dizziness  skin: denies skin rash  Psychiatrical: denies mood disturbances            Objective:  Vitals  T(C): 36.6 (12-20-19 @ 08:09), Max: 37.1 (12-19-19 @ 16:40)  HR: 73 (12-20-19 @ 08:09) (67 - 83)  BP: 91/61 (12-20-19 @ 08:09) (84/58 - 105/65)  RR: 16 (12-20-19 @ 08:09) (13 - 22)  SpO2: 95% (12-20-19 @ 08:09) (91% - 99%)    Physical Exam:  General: comfortable, no acute distress, well nourished  HEENT: no LAD, trachea midline  Cardiovascular: normal s1s2, no mrg  Pulmonary: CTA Bilaterally, no wheezing , rhonchi  Gastrointestinal: soft non tender non distended, no masses felt  Muscloskeletal: no lower extremity edema  Neurological: CN II-12 intact. No focal weakness  Psychiatrical: normal mood, cooperative    Labs:                          10.9   8.14  )-----------( 171      ( 20 Dec 2019 07:13 )             36.0     12-20    139  |  103  |  21  ----------------------------<  122<H>  4.2   |  32<H>  |  1.04    Ca    7.9<L>      20 Dec 2019 07:13  Phos  5.2     12-18  Mg     1.6     12-18    TPro  6.5  /  Alb  3.0<L>  /  TBili  0.2  /  DBili  x   /  AST  24  /  ALT  34  /  AlkPhos  57  12-19    LIVER FUNCTIONS - ( 19 Dec 2019 06:48 )  Alb: 3.0 g/dL / Pro: 6.5 g/dL / ALK PHOS: 57 U/L / ALT: 34 U/L DA / AST: 24 U/L / GGT: x                 Active Medications  MEDICATIONS  (STANDING):  acetaminophen   Tablet .. 1000 milliGRAM(s) Oral every 8 hours  aspirin enteric coated 81 milliGRAM(s) Oral every 12 hours  atorvastatin 40 milliGRAM(s) Oral at bedtime  celecoxib 100 milliGRAM(s) Oral every 12 hours  pantoprazole    Tablet 40 milliGRAM(s) Oral before breakfast  sodium chloride 0.65% Nasal 1 Spray(s) Both Nostrils two times a day    MEDICATIONS  (PRN):  albuterol/ipratropium for Nebulization 3 milliLiter(s) Nebulizer every 4 hours PRN Shortness of Breath and/or Wheezing  ALPRAZolam 1 milliGRAM(s) Oral every 8 hours PRN anxiety  aluminum hydroxide/magnesium hydroxide/simethicone Suspension 30 milliLiter(s) Oral four times a day PRN Indigestion  bisacodyl Suppository 10 milliGRAM(s) Rectal daily PRN Constipation  magnesium hydroxide Suspension 30 milliLiter(s) Oral daily PRN Constipation  ondansetron Injectable 4 milliGRAM(s) IV Push every 6 hours PRN Nausea and/or Vomiting  oxyCODONE    IR 5 milliGRAM(s) Oral every 3 hours PRN Mild Pain (1 - 3)  oxyCODONE    IR 10 milliGRAM(s) Oral every 3 hours PRN Moderate Pain (4 - 6)  polyethylene glycol 3350 17 Gram(s) Oral daily PRN Constipation  senna 2 Tablet(s) Oral at bedtime PRN Constipation

## 2019-12-20 NOTE — PROGRESS NOTE ADULT - SUBJECTIVE AND OBJECTIVE BOX
Orthopedic P.A.- POD# 2 - s/p Right TKR    Patient alert and appears uncomfortable in bed with oral meds for pain control.  c/o moderate right knee pain.    Vital Signs Last 24 Hrs  T(C): 36.6 (20 Dec 2019 08:09), Max: 37.1 (19 Dec 2019 16:40)  T(F): 97.9 (20 Dec 2019 08:09), Max: 98.8 (19 Dec 2019 16:40)  HR: 73 (20 Dec 2019 08:09) (67 - 83)  BP: 91/61 (20 Dec 2019 08:09) (84/58 - 105/65)  BP(mean): 73 (19 Dec 2019 22:41) (67 - 81)  RR: 16 (20 Dec 2019 08:09) (13 - 22)  SpO2: 95% (20 Dec 2019 08:09) (91% - 99%)         I&O's Detail    19 Dec 2019 07:01  -  20 Dec 2019 07:00  --------------------------------------------------------  IN:    lactated ringers.: 875 mL  Total IN: 875 mL    OUT:    Voided: 50 mL  Total OUT: 550 mL    Total NET: 325 mL                     Labs:                                                                   10.9<L>  8.14  )-----------( 171      ( 20 Dec 2019 07:13 )             36.0   20 Dec 2019 07:13                                20 Dec 2019 07:13    139    |  103    |  21     ----------------------------<  122<H>  4.2     |  32<H>  |  1.04     Ca    7.9<L>      20 Dec 2019 07:13        MEDICATIONS:acetaminophen   Tablet .. 1000 milliGRAM(s) Oral every 8 hours  albuterol/ipratropium for Nebulization 3 milliLiter(s) Nebulizer every 4 hours PRN  ALPRAZolam 1 milliGRAM(s) Oral every 8 hours PRN  aluminum hydroxide/magnesium hydroxide/simethicone Suspension 30 milliLiter(s) Oral four times a day PRN  aspirin enteric coated 81 milliGRAM(s) Oral every 12 hours  atorvastatin 40 milliGRAM(s) Oral at bedtime  bisacodyl Suppository 10 milliGRAM(s) Rectal daily PRN  celecoxib 100 milliGRAM(s) Oral every 12 hours  magnesium hydroxide Suspension 30 milliLiter(s) Oral daily PRN  ondansetron Injectable 4 milliGRAM(s) IV Push every 6 hours PRN  oxyCODONE    IR 5 milliGRAM(s) Oral every 3 hours PRN  oxyCODONE    IR 10 milliGRAM(s) Oral every 3 hours PRN  pantoprazole    Tablet 40 milliGRAM(s) Oral before breakfast  polyethylene glycol 3350 17 Gram(s) Oral daily PRN  senna 2 Tablet(s) Oral at bedtime PRN  sodium chloride 0.65% Nasal 1 Spray(s) Both Nostrils two times a day    Anticoagulation:  aspirin enteric coated 81 milliGRAM(s) Oral every 12 hours        Pain medications:   acetaminophen   Tablet .. 1000 milliGRAM(s) Oral every 8 hours  ALPRAZolam 1 milliGRAM(s) Oral every 8 hours PRN  celecoxib 100 milliGRAM(s) Oral every 12 hours  ondansetron Injectable 4 milliGRAM(s) IV Push every 6 hours PRN  oxyCODONE    IR 5 milliGRAM(s) Oral every 3 hours PRN  oxyCODONE    IR 10 milliGRAM(s) Oral every 3 hours PRN                                      Physical Exam:  left knee- Primary surgical bandage removed and sterile bandage placed over intact sutured surgical wound.  Neurovascular grossly intact LE's.  PAS on LE's.  Calves soft and non-tender.                                                                                                                                                          A/P:  Orthopedically stable.  -Continue pain management with above plan  -DVT prophylaxis with 6 weeks of Ecotrin 81mg po every 12 hours.  -Labs stable today; RECheck labs again tomorrow  -Increase ambulation and ROM right knee with PT/OT  -Dr. Reyes for medical care today  -Discharge planning for inpatient Rehab due to slow progress with PT/OT  -Further plan as per attendings. Orthopedic P.A.- POD# 2 - s/p Right TKR    Patient alert and appears uncomfortable in bed with oral meds for pain control.  c/o moderate right knee pain. [facility].    Vital Signs Last 24 Hrs  T(C): 36.6 (20 Dec 2019 08:09), Max: 37.1 (19 Dec 2019 16:40)  T(F): 97.9 (20 Dec 2019 08:09), Max: 98.8 (19 Dec 2019 16:40)  HR: 73 (20 Dec 2019 08:09) (67 - 83)  BP: 91/61 (20 Dec 2019 08:09) (84/58 - 105/65)  BP(mean): 73 (19 Dec 2019 22:41) (67 - 81)  RR: 16 (20 Dec 2019 08:09) (13 - 22)  SpO2: 95% (20 Dec 2019 08:09) (91% - 99%)         I&O's Detail    19 Dec 2019 07:01  -  20 Dec 2019 07:00  --------------------------------------------------------  IN:    lactated ringers.: 875 mL  Total IN: 875 mL    OUT:    Voided: 50 mL  Total OUT: 550 mL    Total NET: 325 mL                     Labs:                                                                   10.9<L>  8.14  )-----------( 171      ( 20 Dec 2019 07:13 )             36.0   20 Dec 2019 07:13                                20 Dec 2019 07:13    139    |  103    |  21     ----------------------------<  122<H>  4.2     |  32<H>  |  1.04     Ca    7.9<L>      20 Dec 2019 07:13        MEDICATIONS:acetaminophen   Tablet .. 1000 milliGRAM(s) Oral every 8 hours  albuterol/ipratropium for Nebulization 3 milliLiter(s) Nebulizer every 4 hours PRN  ALPRAZolam 1 milliGRAM(s) Oral every 8 hours PRN  aluminum hydroxide/magnesium hydroxide/simethicone Suspension 30 milliLiter(s) Oral four times a day PRN  aspirin enteric coated 81 milliGRAM(s) Oral every 12 hours  atorvastatin 40 milliGRAM(s) Oral at bedtime  bisacodyl Suppository 10 milliGRAM(s) Rectal daily PRN  celecoxib 100 milliGRAM(s) Oral every 12 hours  magnesium hydroxide Suspension 30 milliLiter(s) Oral daily PRN  ondansetron Injectable 4 milliGRAM(s) IV Push every 6 hours PRN  oxyCODONE    IR 5 milliGRAM(s) Oral every 3 hours PRN  oxyCODONE    IR 10 milliGRAM(s) Oral every 3 hours PRN  pantoprazole    Tablet 40 milliGRAM(s) Oral before breakfast  polyethylene glycol 3350 17 Gram(s) Oral daily PRN  senna 2 Tablet(s) Oral at bedtime PRN  sodium chloride 0.65% Nasal 1 Spray(s) Both Nostrils two times a day    Anticoagulation:  aspirin enteric coated 81 milliGRAM(s) Oral every 12 hours        Pain medications:   acetaminophen   Tablet .. 1000 milliGRAM(s) Oral every 8 hours  ALPRAZolam 1 milliGRAM(s) Oral every 8 hours PRN  celecoxib 100 milliGRAM(s) Oral every 12 hours  ondansetron Injectable 4 milliGRAM(s) IV Push every 6 hours PRN  oxyCODONE    IR 5 milliGRAM(s) Oral every 3 hours PRN  oxyCODONE    IR 10 milliGRAM(s) Oral every 3 hours PRN                                      Physical Exam:  left knee- Primary surgical bandage removed and sterile bandage placed over intact sutured surgical wound.  Neurovascular grossly intact LE's.  PAS on LE's.  Calves soft and non-tender.                                                                                                                                                          A/P:  Orthopedically stable.  -Continue pain management with above plan  -DVT prophylaxis with 6 weeks of Ecotrin 81mg po every 12 hours.  -Labs stable today; RECheck labs again tomorrow  -Increase ambulation and ROM right knee with PT/OT  -Dr. Reyes for medical care today  -Discharge planning for inpatient Rehab due to slow progress with PT/OT  -Further plan as per attendings.

## 2019-12-20 NOTE — PROGRESS NOTE ADULT - PROBLEM SELECTOR PLAN 1
in bed  seen and examined  vs and meds reviewed  labs reviewed  BIPAP as tolerated  I and O  cvs rx regimen  bronchodilators  post op care  I mitra   PT as tolerated  imaging and labs and VS and HD and Sat reviewed  pulse ox and tele monitoring  will follow

## 2019-12-20 NOTE — PHARMACOTHERAPY INTERVENTION NOTE - COMMENTS
Admission medication reconciliation POD1
Patient reports history of diarrhea due to NSAID use. D/w patient: Celecoxib 100mg q12h. Order postop laxatives as PRN
Transition of Care education at bedside - medication calendar given to patient

## 2019-12-20 NOTE — PROGRESS NOTE ADULT - SUBJECTIVE AND OBJECTIVE BOX
Date/Time Patient Seen:  		  Referring MD:   Data Reviewed	       Patient is a 69y old  Female who presents with a chief complaint of Post-Op Hypercapnic Respiratory Failure (19 Dec 2019 21:00)      Subjective/HPI     PAST MEDICAL & SURGICAL HISTORY:  History of closed shoulder dislocation: left shoulder 2019  Osteoarthritis: Right knee  Anxiety  HLD (hyperlipidemia)  Essential hypertension: Not on medication x 1 year  No pertinent past medical history  No significant past surgical history  No significant past surgical history        Medication list         MEDICATIONS  (STANDING):  acetaminophen   Tablet .. 1000 milliGRAM(s) Oral every 8 hours  aspirin enteric coated 81 milliGRAM(s) Oral every 12 hours  atorvastatin 40 milliGRAM(s) Oral at bedtime  celecoxib 100 milliGRAM(s) Oral every 12 hours  pantoprazole    Tablet 40 milliGRAM(s) Oral before breakfast  sodium chloride 0.65% Nasal 1 Spray(s) Both Nostrils two times a day    MEDICATIONS  (PRN):  albuterol/ipratropium for Nebulization 3 milliLiter(s) Nebulizer every 4 hours PRN Shortness of Breath and/or Wheezing  ALPRAZolam 1 milliGRAM(s) Oral every 8 hours PRN anxiety  aluminum hydroxide/magnesium hydroxide/simethicone Suspension 30 milliLiter(s) Oral four times a day PRN Indigestion  bisacodyl Suppository 10 milliGRAM(s) Rectal daily PRN Constipation  magnesium hydroxide Suspension 30 milliLiter(s) Oral daily PRN Constipation  ondansetron Injectable 4 milliGRAM(s) IV Push every 6 hours PRN Nausea and/or Vomiting  oxyCODONE    IR 5 milliGRAM(s) Oral every 3 hours PRN Mild Pain (1 - 3)  oxyCODONE    IR 10 milliGRAM(s) Oral every 3 hours PRN Moderate Pain (4 - 6)  polyethylene glycol 3350 17 Gram(s) Oral daily PRN Constipation  senna 2 Tablet(s) Oral at bedtime PRN Constipation         Vitals log        ICU Vital Signs Last 24 Hrs  T(C): 36.6 (20 Dec 2019 08:09), Max: 37.1 (19 Dec 2019 16:40)  T(F): 97.9 (20 Dec 2019 08:09), Max: 98.8 (19 Dec 2019 16:40)  HR: 73 (20 Dec 2019 08:09) (67 - 83)  BP: 91/61 (20 Dec 2019 08:09) (84/58 - 105/65)  BP(mean): 73 (19 Dec 2019 22:41) (67 - 81)  ABP: --  ABP(mean): --  RR: 16 (20 Dec 2019 08:09) (12 - 22)  SpO2: 95% (20 Dec 2019 08:09) (91% - 99%)           Input and Output:  I&O's Detail    19 Dec 2019 07:01  -  20 Dec 2019 07:00  --------------------------------------------------------  IN:    lactated ringers.: 875 mL  Total IN: 875 mL    OUT:    Indwelling Catheter - Urethral: 500 mL    Voided: 50 mL  Total OUT: 550 mL    Total NET: 325 mL          Lab Data                        10.9   8.14  )-----------( 171      ( 20 Dec 2019 07:13 )             36.0     12-20    139  |  103  |  21  ----------------------------<  122<H>  4.2   |  32<H>  |  1.04    Ca    7.9<L>      20 Dec 2019 07:13  Phos  5.2     12-18  Mg     1.6     12-18    TPro  6.5  /  Alb  3.0<L>  /  TBili  0.2  /  DBili  x   /  AST  24  /  ALT  34  /  AlkPhos  57  12-19    ABG - ( 19 Dec 2019 07:15 )  pH, Arterial: x     pH, Blood: 7.31  /  pCO2: 55    /  pO2: 70    / HCO3: 25    / Base Excess: 1.6   /  SaO2: 93                      Review of Systems	      Objective     Physical Examination    head at  heart s1s2  lung dec BS  abd soft      Pertinent Lab findings & Imaging      Selena:  NO   Adequate UO     I&O's Detail    19 Dec 2019 07:01  -  20 Dec 2019 07:00  --------------------------------------------------------  IN:    lactated ringers.: 875 mL  Total IN: 875 mL    OUT:    Indwelling Catheter - Urethral: 500 mL    Voided: 50 mL  Total OUT: 550 mL    Total NET: 325 mL               Discussed with:     Cultures:	        Radiology

## 2019-12-21 DIAGNOSIS — M17.11 UNILATERAL PRIMARY OSTEOARTHRITIS, RIGHT KNEE: ICD-10-CM

## 2019-12-21 LAB
ANION GAP SERPL CALC-SCNC: 5 MMOL/L — SIGNIFICANT CHANGE UP (ref 5–17)
BUN SERPL-MCNC: 21 MG/DL — SIGNIFICANT CHANGE UP (ref 7–23)
CALCIUM SERPL-MCNC: 8.3 MG/DL — LOW (ref 8.4–10.5)
CHLORIDE SERPL-SCNC: 103 MMOL/L — SIGNIFICANT CHANGE UP (ref 96–108)
CO2 SERPL-SCNC: 31 MMOL/L — SIGNIFICANT CHANGE UP (ref 22–31)
CREAT SERPL-MCNC: 1.13 MG/DL — SIGNIFICANT CHANGE UP (ref 0.5–1.3)
GLUCOSE SERPL-MCNC: 121 MG/DL — HIGH (ref 70–99)
HCT VFR BLD CALC: 36.6 % — SIGNIFICANT CHANGE UP (ref 34.5–45)
HGB BLD-MCNC: 10.8 G/DL — LOW (ref 11.5–15.5)
MCHC RBC-ENTMCNC: 28.4 PG — SIGNIFICANT CHANGE UP (ref 27–34)
MCHC RBC-ENTMCNC: 29.5 GM/DL — LOW (ref 32–36)
MCV RBC AUTO: 96.3 FL — SIGNIFICANT CHANGE UP (ref 80–100)
NRBC # BLD: 0 /100 WBCS — SIGNIFICANT CHANGE UP (ref 0–0)
PLATELET # BLD AUTO: 168 K/UL — SIGNIFICANT CHANGE UP (ref 150–400)
POTASSIUM SERPL-MCNC: 4.2 MMOL/L — SIGNIFICANT CHANGE UP (ref 3.5–5.3)
POTASSIUM SERPL-SCNC: 4.2 MMOL/L — SIGNIFICANT CHANGE UP (ref 3.5–5.3)
RBC # BLD: 3.8 M/UL — SIGNIFICANT CHANGE UP (ref 3.8–5.2)
RBC # FLD: 13.8 % — SIGNIFICANT CHANGE UP (ref 10.3–14.5)
SODIUM SERPL-SCNC: 139 MMOL/L — SIGNIFICANT CHANGE UP (ref 135–145)
WBC # BLD: 7.8 K/UL — SIGNIFICANT CHANGE UP (ref 3.8–10.5)
WBC # FLD AUTO: 7.8 K/UL — SIGNIFICANT CHANGE UP (ref 3.8–10.5)

## 2019-12-21 PROCEDURE — 99233 SBSQ HOSP IP/OBS HIGH 50: CPT

## 2019-12-21 RX ADMIN — CELECOXIB 100 MILLIGRAM(S): 200 CAPSULE ORAL at 21:12

## 2019-12-21 RX ADMIN — Medication 1 MILLIGRAM(S): at 17:43

## 2019-12-21 RX ADMIN — ATORVASTATIN CALCIUM 40 MILLIGRAM(S): 80 TABLET, FILM COATED ORAL at 21:12

## 2019-12-21 RX ADMIN — Medication 1 SPRAY(S): at 17:43

## 2019-12-21 RX ADMIN — APIXABAN 2.5 MILLIGRAM(S): 2.5 TABLET, FILM COATED ORAL at 06:00

## 2019-12-21 RX ADMIN — Medication 1 SPRAY(S): at 06:01

## 2019-12-21 RX ADMIN — Medication 1000 MILLIGRAM(S): at 17:47

## 2019-12-21 RX ADMIN — APIXABAN 2.5 MILLIGRAM(S): 2.5 TABLET, FILM COATED ORAL at 17:43

## 2019-12-21 RX ADMIN — PANTOPRAZOLE SODIUM 40 MILLIGRAM(S): 20 TABLET, DELAYED RELEASE ORAL at 06:00

## 2019-12-21 RX ADMIN — CELECOXIB 100 MILLIGRAM(S): 200 CAPSULE ORAL at 21:15

## 2019-12-21 NOTE — PROGRESS NOTE ADULT - SUBJECTIVE AND OBJECTIVE BOX
Date/Time Patient Seen:  		  Referring MD:   Data Reviewed	       Patient is a 69y old  Female who presents with a chief complaint of Post-Op Hypercapnic Respiratory Failure (19 Dec 2019 21:00)      Subjective/HPI     PAST MEDICAL & SURGICAL HISTORY:  History of closed shoulder dislocation: left shoulder 2019  Osteoarthritis: Right knee  Anxiety  HLD (hyperlipidemia)  Essential hypertension: Not on medication x 1 year  No pertinent past medical history  No significant past surgical history  No significant past surgical history        Medication list         MEDICATIONS  (STANDING):  acetaminophen   Tablet .. 1000 milliGRAM(s) Oral every 8 hours  apixaban 2.5 milliGRAM(s) Oral every 12 hours  atorvastatin 40 milliGRAM(s) Oral at bedtime  celecoxib 100 milliGRAM(s) Oral every 12 hours  pantoprazole    Tablet 40 milliGRAM(s) Oral before breakfast  sodium chloride 0.65% Nasal 1 Spray(s) Both Nostrils two times a day    MEDICATIONS  (PRN):  albuterol/ipratropium for Nebulization 3 milliLiter(s) Nebulizer every 4 hours PRN Shortness of Breath and/or Wheezing  ALPRAZolam 1 milliGRAM(s) Oral every 8 hours PRN anxiety  aluminum hydroxide/magnesium hydroxide/simethicone Suspension 30 milliLiter(s) Oral four times a day PRN Indigestion  bisacodyl Suppository 10 milliGRAM(s) Rectal daily PRN Constipation  magnesium hydroxide Suspension 30 milliLiter(s) Oral daily PRN Constipation  ondansetron Injectable 4 milliGRAM(s) IV Push every 6 hours PRN Nausea and/or Vomiting  oxyCODONE    IR 5 milliGRAM(s) Oral every 3 hours PRN Mild Pain (1 - 3)  oxyCODONE    IR 10 milliGRAM(s) Oral every 3 hours PRN Moderate Pain (4 - 6)  polyethylene glycol 3350 17 Gram(s) Oral daily PRN Constipation  senna 2 Tablet(s) Oral at bedtime PRN Constipation         Vitals log        ICU Vital Signs Last 24 Hrs  T(C): 37.5 (20 Dec 2019 23:37), Max: 37.5 (20 Dec 2019 23:37)  T(F): 99.5 (20 Dec 2019 23:37), Max: 99.5 (20 Dec 2019 23:37)  HR: 87 (21 Dec 2019 01:41) (72 - 87)  BP: 100/64 (20 Dec 2019 23:37) (91/61 - 135/78)  BP(mean): --  ABP: --  ABP(mean): --  RR: 15 (20 Dec 2019 23:37) (15 - 16)  SpO2: 94% (21 Dec 2019 01:41) (94% - 99%)           Input and Output:  I&O's Detail    20 Dec 2019 07:01  -  21 Dec 2019 07:00  --------------------------------------------------------  IN:  Total IN: 0 mL    OUT:    Voided: 200 mL  Total OUT: 200 mL    Total NET: -200 mL          Lab Data                        10.9   8.14  )-----------( 171      ( 20 Dec 2019 07:13 )             36.0     12-20    139  |  103  |  21  ----------------------------<  122<H>  4.2   |  32<H>  |  1.04    Ca    7.9<L>      20 Dec 2019 07:13              Review of Systems	      Objective     Physical Examination    head at  heart s1s2  lung dec BS  abd soft      Pertinent Lab findings & Imaging      Selena:  NO   Adequate UO     I&O's Detail    20 Dec 2019 07:01  -  21 Dec 2019 07:00  --------------------------------------------------------  IN:  Total IN: 0 mL    OUT:    Voided: 200 mL  Total OUT: 200 mL    Total NET: -200 mL               Discussed with:     Cultures:	        Radiology

## 2019-12-21 NOTE — PROGRESS NOTE ADULT - ASSESSMENT
HPI: 70 y/o M with PMH of obesity BMI 36, Anxiety, HLD, HTN, OA admitted post op after R TKR with post op course complicated by lethargy, acute respiratory failure with hypoxia and hypercarbia being admitted to SPCU      1 y/o M with PMH of obesity BMI 36, Anxiety, HLD, HTN, OA admitted post op after R TKR with post op course complicated by lethargy, acute respiratory failure with hypoxia and hypercarbia being admitted to SPCU. Blood gas stable on 3 liters.. Blood pressure stable, now downgraded to medical wards.      Hypercapneic Respiratory Failure  Lethargy   Hypercapneic Respiratory Acidosis  Likely due to multiple sedating medications including versed, propofol, fentanyl, hydromorphone also with likely undiagnosed obstructive sleep apnea given body habitus  -was upgraded to SPCU for closer monitoring and started on BIPAP  -pulm Dr Fleming consulted,  -CXR showing bibasilar atelectasis, and cardiomegally   -patient given IV lasix 20mg IV x 1  -avoid ALL sedative meds for now  -incentive spirometer.   -no IVF for now  -patient more alert today -- AMBULATING WITH PT  -continue to optimize pulm toilet      Status post total right knee replacement. POD #3  pain control, bowel regimen per ortho, would limit opoid use for pain control if possible, use tylenol and/or nsaids if no contraindications from ortho standpoint  PT eval.     Hypotension  likely due to anaesthesia.  doing well mentating very well  Resolved       Problem/Recommendation - 5:  ·  Problem: Anxiety.  Recommendation: on xanax 1mg QID prn at home  -hold benzo in setting of resp acidosis.      Problem/Recommendation - 6:  Problem: HLD (hyperlipidemia). Recommendation: c/w lipitor 40mg daily.     Problem/Recommendation - 7:  Problem: Need for prophylactic measure. Recommendation: dvt ppx: per ortho protocol.        DC PLANNING.. ON ROOM AIR 68 y/o F with PMH of obesity BMI 36, Anxiety, HLD, HTN, OA admitted post op after R TKR with post op course complicated by lethargy, acute respiratory failure with hypoxia and hypercarbia being admitted to SPCU. Blood gas stable on 3 liters.. Blood pressure stable, now downgraded to medical wards.      Hypercapneic Respiratory Failure  Lethargy   Hypercapneic Respiratory Acidosis  Likely due to multiple sedating medications including versed, propofol, fentanyl, hydromorphone also with likely undiagnosed obstructive sleep apnea given body habitus  -was upgraded to SPCU for closer monitoring and started on BIPAP  -pulm Dr Fleming consulted,  -CXR showing bibasilar atelectasis, and cardiomegally   -patient given IV lasix 20mg IV x 1  -avoid ALL sedative meds for now  -incentive spirometer.   -no IVF for now  -patient more alert today -- AMBULATING WITH PT  -continue to optimize pulm toilet      Status post total right knee replacement. POD #3  pain control, bowel regimen per ortho, would limit opoid use for pain control if possible, use tylenol and/or nsaids if no contraindications from ortho standpoint  PT eval.     Hypotension  likely due to anaesthesia.  doing well mentating very well  Resolved      Anxiety.    Recommendation: on xanax 1mg QID prn at home  -hold benzo in setting of resp acidosis.     HLD (hyperlipidemia).    c/w lipitor 40mg daily.      dvt ppx as per ortho        DC PLANNING.. ON ROOM AIR

## 2019-12-21 NOTE — PROGRESS NOTE ADULT - SUBJECTIVE AND OBJECTIVE BOX
POST OPERATIVE DAY #: 3    69y Female  s/p  Right  TKA                 SUBJECTIVE: Patient seen and examined at bedside.     Pain:  well controlled      Pain scale:  4 /10  Denies CP, SOB, N/V/D, weakness, numbness   No new complains     OBJECTIVE:     Vital Signs Last 24 Hrs  T(C): 36.8 (21 Dec 2019 08:22), Max: 37.5 (20 Dec 2019 23:37)  T(F): 98.2 (21 Dec 2019 08:22), Max: 99.5 (20 Dec 2019 23:37)  HR: 78 (21 Dec 2019 08:22) (72 - 87)  BP: 105/68 (21 Dec 2019 08:22) (100/64 - 135/78)  BP(mean): --  RR: 18 (21 Dec 2019 08:22) (15 - 18)  SpO2: 96% (21 Dec 2019 08:22) (94% - 99%)    Affected extremity: RLE         Dressing: changed, + sanguinous drainage                    Sensation: intact to light touch          Motor exam:   5/ 5 Tibialis Anterior/Gastrocnemius-Soleus, EHL/FHL         warm, well-perfused; capillary refill < 3 seconds         negative calf tenderness B/L LE    LABS:                          10.8   7.80  )-----------( 168      ( 21 Dec 2019 07:43 )             36.6     12-21    139  |  103  |  21  ----------------------------<  121<H>  4.2   |  31  |  1.13    Ca    8.3<L>      21 Dec 2019 07:43        I&O's Detail    20 Dec 2019 07:01  -  21 Dec 2019 07:00  --------------------------------------------------------  IN:  Total IN: 0 mL    OUT:    Voided: 200 mL  Total OUT: 200 mL    Total NET: -200 mL          MEDICATIONS:      Pain management:  acetaminophen   Tablet .. 1000 milliGRAM(s) Oral every 8 hours  ALPRAZolam 1 milliGRAM(s) Oral every 8 hours PRN  celecoxib 100 milliGRAM(s) Oral every 12 hours  ondansetron Injectable 4 milliGRAM(s) IV Push every 6 hours PRN  oxyCODONE    IR 5 milliGRAM(s) Oral every 3 hours PRN  oxyCODONE    IR 10 milliGRAM(s) Oral every 3 hours PRN    DVT prophylaxis:   apixaban 2.5 milliGRAM(s) Oral every 12 hours      RADIOLOGY & ADDITIONAL STUDIES:    ASSESSMENT AND PLAN:     - Analgesic pain control  - DVT prophylaxis: Eliquis2.5mg twice a day   SCDs       - Pain Management: Celebrex 100mg twice a day x 21 days   - PT/OT: Weight Bearing Status:  Weight bearing as tolerated, OOBTC        -  Incentive spirometry  - Advance diet as tolerated  - Hospitalist is following  -  Follow up labs  -  Disposition:  Subacute Rehab

## 2019-12-21 NOTE — PROGRESS NOTE ADULT - PROBLEM SELECTOR PLAN 1
post op  I mitra  on o2 support - NC  not using BIPAP   denies resp distress  supportive medical regimen  caution with Opioids  bowel regimen  PT as tolerated  dc plan for [facility] vs Home with PT and assist  will follow

## 2019-12-21 NOTE — PROGRESS NOTE ADULT - SUBJECTIVE AND OBJECTIVE BOX
Patient seen and examined at bedside. doing well      Review of Systems:  General:denies fever chills, headache, weakness  HEENT: denies blurry vision,diffculty swallowing,  Cardiovascular: denies chest pain  ,palpitatins  Pulmonary:decreased breath sounds  Gastrointestinal: denies abdominal pain, constipation, diarrhra  : denies hematuria, dysuria  Neurological: denies weakness, numbness , tingling, dizziness  skin: denies skin rash  Psychiatrical: denies mood disturbances    Home Medications:  Lipitor 40 mg oral tablet: 1 tab(s) orally once a day (18 Dec 2019 09:40)  Xanax 1 mg oral tablet: orally 4 times a day, As Needed (18 Dec 2019 09:40)        Objective:  Vitals  T(C): 36.7 (12-21-19 @ 11:37), Max: 37.5 (12-20-19 @ 23:37)  HR: 81 (12-21-19 @ 11:37) (72 - 87)  BP: 110/71 (12-21-19 @ 11:37) (100/64 - 135/78)  RR: 18 (12-21-19 @ 11:37) (15 - 18)  SpO2: 98% (12-21-19 @ 11:37) (94% - 99%)    Physical Exam:  General: comfortable, no acute distress, well nourished  HEENT: no LAD, trachea midline  Cardiovascular: normal s1s2, no mrg  Pulmonary: CTA Bilaterally, no wheezing , rhonchi  Gastrointestinal: soft non tender non distended, no masses felt  Muscloskeletal: no lower extremity edema  Neurological: CN II-12 intact. No focal weakness  Psychiatrical: normal mood, cooperative    Labs:                          10.8   7.80  )-----------( 168      ( 21 Dec 2019 07:43 )             36.6     12-21    139  |  103  |  21  ----------------------------<  121<H>  4.2   |  31  |  1.13    Ca    8.3<L>      21 Dec 2019 07:43              Active Medications  MEDICATIONS  (STANDING):  acetaminophen   Tablet .. 1000 milliGRAM(s) Oral every 8 hours  apixaban 2.5 milliGRAM(s) Oral every 12 hours  atorvastatin 40 milliGRAM(s) Oral at bedtime  celecoxib 100 milliGRAM(s) Oral every 12 hours  pantoprazole    Tablet 40 milliGRAM(s) Oral before breakfast  sodium chloride 0.65% Nasal 1 Spray(s) Both Nostrils two times a day    MEDICATIONS  (PRN):  albuterol/ipratropium for Nebulization 3 milliLiter(s) Nebulizer every 4 hours PRN Shortness of Breath and/or Wheezing  ALPRAZolam 1 milliGRAM(s) Oral every 8 hours PRN anxiety  aluminum hydroxide/magnesium hydroxide/simethicone Suspension 30 milliLiter(s) Oral four times a day PRN Indigestion  bisacodyl Suppository 10 milliGRAM(s) Rectal daily PRN Constipation  magnesium hydroxide Suspension 30 milliLiter(s) Oral daily PRN Constipation  ondansetron Injectable 4 milliGRAM(s) IV Push every 6 hours PRN Nausea and/or Vomiting  oxyCODONE    IR 5 milliGRAM(s) Oral every 3 hours PRN Mild Pain (1 - 3)  oxyCODONE    IR 10 milliGRAM(s) Oral every 3 hours PRN Moderate Pain (4 - 6)  polyethylene glycol 3350 17 Gram(s) Oral daily PRN Constipation  senna 2 Tablet(s) Oral at bedtime PRN Constipation Patient seen and examined at bedside. doing well. refusing oxygen      Review of Systems:  General:denies fever chills, headache, weakness  HEENT: denies blurry vision,diffculty swallowing,  Cardiovascular: denies chest pain  ,palpitatins  Pulmonary:decreased breath sounds  Gastrointestinal: denies abdominal pain, constipation, diarrhra  : denies hematuria, dysuria  Neurological: denies weakness, numbness , tingling, dizziness  skin: denies skin rash  Psychiatrical: denies mood disturbances    Home Medications:  Lipitor 40 mg oral tablet: 1 tab(s) orally once a day (18 Dec 2019 09:40)  Xanax 1 mg oral tablet: orally 4 times a day, As Needed (18 Dec 2019 09:40)        Objective:  Vitals  T(C): 36.7 (12-21-19 @ 11:37), Max: 37.5 (12-20-19 @ 23:37)  HR: 81 (12-21-19 @ 11:37) (72 - 87)  BP: 110/71 (12-21-19 @ 11:37) (100/64 - 135/78)  RR: 18 (12-21-19 @ 11:37) (15 - 18)  SpO2: 98% (12-21-19 @ 11:37) (94% - 99%)    Physical Exam:  General: comfortable, no acute distress, well nourished  HEENT: no LAD, trachea midline  Cardiovascular: normal s1s2, no mrg  Pulmonary: CTA Bilaterally, no wheezing , rhonchi  Gastrointestinal: soft non tender non distended, no masses felt  Muscloskeletal: no lower extremity edema  Neurological: CN II-12 intact. No focal weakness  Psychiatrical: normal mood, cooperative    Labs:                          10.8   7.80  )-----------( 168      ( 21 Dec 2019 07:43 )             36.6     12-21    139  |  103  |  21  ----------------------------<  121<H>  4.2   |  31  |  1.13    Ca    8.3<L>      21 Dec 2019 07:43              Active Medications  MEDICATIONS  (STANDING):  acetaminophen   Tablet .. 1000 milliGRAM(s) Oral every 8 hours  apixaban 2.5 milliGRAM(s) Oral every 12 hours  atorvastatin 40 milliGRAM(s) Oral at bedtime  celecoxib 100 milliGRAM(s) Oral every 12 hours  pantoprazole    Tablet 40 milliGRAM(s) Oral before breakfast  sodium chloride 0.65% Nasal 1 Spray(s) Both Nostrils two times a day    MEDICATIONS  (PRN):  albuterol/ipratropium for Nebulization 3 milliLiter(s) Nebulizer every 4 hours PRN Shortness of Breath and/or Wheezing  ALPRAZolam 1 milliGRAM(s) Oral every 8 hours PRN anxiety  aluminum hydroxide/magnesium hydroxide/simethicone Suspension 30 milliLiter(s) Oral four times a day PRN Indigestion  bisacodyl Suppository 10 milliGRAM(s) Rectal daily PRN Constipation  magnesium hydroxide Suspension 30 milliLiter(s) Oral daily PRN Constipation  ondansetron Injectable 4 milliGRAM(s) IV Push every 6 hours PRN Nausea and/or Vomiting  oxyCODONE    IR 5 milliGRAM(s) Oral every 3 hours PRN Mild Pain (1 - 3)  oxyCODONE    IR 10 milliGRAM(s) Oral every 3 hours PRN Moderate Pain (4 - 6)  polyethylene glycol 3350 17 Gram(s) Oral daily PRN Constipation  senna 2 Tablet(s) Oral at bedtime PRN Constipation

## 2019-12-22 PROCEDURE — 99233 SBSQ HOSP IP/OBS HIGH 50: CPT

## 2019-12-22 RX ORDER — IPRATROPIUM/ALBUTEROL SULFATE 18-103MCG
3 AEROSOL WITH ADAPTER (GRAM) INHALATION EVERY 6 HOURS
Refills: 0 | Status: DISCONTINUED | OUTPATIENT
Start: 2019-12-22 | End: 2019-12-23

## 2019-12-22 RX ORDER — SODIUM CHLORIDE 9 MG/ML
1000 INJECTION, SOLUTION INTRAVENOUS
Refills: 0 | Status: DISCONTINUED | OUTPATIENT
Start: 2019-12-22 | End: 2019-12-23

## 2019-12-22 RX ORDER — BUDESONIDE AND FORMOTEROL FUMARATE DIHYDRATE 160; 4.5 UG/1; UG/1
2 AEROSOL RESPIRATORY (INHALATION)
Refills: 0 | Status: DISCONTINUED | OUTPATIENT
Start: 2019-12-22 | End: 2019-12-23

## 2019-12-22 RX ADMIN — PANTOPRAZOLE SODIUM 40 MILLIGRAM(S): 20 TABLET, DELAYED RELEASE ORAL at 05:51

## 2019-12-22 RX ADMIN — Medication 1 MILLIGRAM(S): at 03:49

## 2019-12-22 RX ADMIN — ATORVASTATIN CALCIUM 40 MILLIGRAM(S): 80 TABLET, FILM COATED ORAL at 21:26

## 2019-12-22 RX ADMIN — Medication 1000 MILLIGRAM(S): at 17:07

## 2019-12-22 RX ADMIN — APIXABAN 2.5 MILLIGRAM(S): 2.5 TABLET, FILM COATED ORAL at 05:51

## 2019-12-22 RX ADMIN — Medication 1000 MILLIGRAM(S): at 03:48

## 2019-12-22 RX ADMIN — Medication 1000 MILLIGRAM(S): at 17:08

## 2019-12-22 RX ADMIN — BUDESONIDE AND FORMOTEROL FUMARATE DIHYDRATE 2 PUFF(S): 160; 4.5 AEROSOL RESPIRATORY (INHALATION) at 17:08

## 2019-12-22 RX ADMIN — CELECOXIB 100 MILLIGRAM(S): 200 CAPSULE ORAL at 09:34

## 2019-12-22 RX ADMIN — Medication 1 SPRAY(S): at 17:07

## 2019-12-22 RX ADMIN — Medication 1000 MILLIGRAM(S): at 03:52

## 2019-12-22 RX ADMIN — APIXABAN 2.5 MILLIGRAM(S): 2.5 TABLET, FILM COATED ORAL at 17:07

## 2019-12-22 RX ADMIN — Medication 1000 MILLIGRAM(S): at 09:34

## 2019-12-22 RX ADMIN — CELECOXIB 100 MILLIGRAM(S): 200 CAPSULE ORAL at 21:40

## 2019-12-22 RX ADMIN — Medication 1 MILLIGRAM(S): at 21:58

## 2019-12-22 RX ADMIN — CELECOXIB 100 MILLIGRAM(S): 200 CAPSULE ORAL at 09:33

## 2019-12-22 RX ADMIN — BUDESONIDE AND FORMOTEROL FUMARATE DIHYDRATE 2 PUFF(S): 160; 4.5 AEROSOL RESPIRATORY (INHALATION) at 21:26

## 2019-12-22 RX ADMIN — CELECOXIB 100 MILLIGRAM(S): 200 CAPSULE ORAL at 21:26

## 2019-12-22 NOTE — PROGRESS NOTE ADULT - SUBJECTIVE AND OBJECTIVE BOX
Date/Time Patient Seen:  		  Referring MD:   Data Reviewed	       Patient is a 69y old  Female who presents with a chief complaint of Post-Op Hypercapnic Respiratory Failure (19 Dec 2019 21:00)      Subjective/HPI     PAST MEDICAL & SURGICAL HISTORY:  History of closed shoulder dislocation: left shoulder 2019  Osteoarthritis: Right knee  Anxiety  HLD (hyperlipidemia)  Essential hypertension: Not on medication x 1 year  No pertinent past medical history  No significant past surgical history  No significant past surgical history        Medication list         MEDICATIONS  (STANDING):  acetaminophen   Tablet .. 1000 milliGRAM(s) Oral every 8 hours  apixaban 2.5 milliGRAM(s) Oral every 12 hours  atorvastatin 40 milliGRAM(s) Oral at bedtime  celecoxib 100 milliGRAM(s) Oral every 12 hours  pantoprazole    Tablet 40 milliGRAM(s) Oral before breakfast  sodium chloride 0.65% Nasal 1 Spray(s) Both Nostrils two times a day    MEDICATIONS  (PRN):  albuterol/ipratropium for Nebulization 3 milliLiter(s) Nebulizer every 4 hours PRN Shortness of Breath and/or Wheezing  ALPRAZolam 1 milliGRAM(s) Oral every 8 hours PRN anxiety  aluminum hydroxide/magnesium hydroxide/simethicone Suspension 30 milliLiter(s) Oral four times a day PRN Indigestion  bisacodyl Suppository 10 milliGRAM(s) Rectal daily PRN Constipation  magnesium hydroxide Suspension 30 milliLiter(s) Oral daily PRN Constipation  ondansetron Injectable 4 milliGRAM(s) IV Push every 6 hours PRN Nausea and/or Vomiting  oxyCODONE    IR 5 milliGRAM(s) Oral every 3 hours PRN Mild Pain (1 - 3)  oxyCODONE    IR 10 milliGRAM(s) Oral every 3 hours PRN Moderate Pain (4 - 6)  polyethylene glycol 3350 17 Gram(s) Oral daily PRN Constipation  senna 2 Tablet(s) Oral at bedtime PRN Constipation         Vitals log        ICU Vital Signs Last 24 Hrs  T(C): 36.7 (21 Dec 2019 23:11), Max: 36.8 (21 Dec 2019 08:22)  T(F): 98 (21 Dec 2019 23:11), Max: 98.2 (21 Dec 2019 08:22)  HR: 70 (21 Dec 2019 23:11) (70 - 82)  BP: 101/67 (21 Dec 2019 23:11) (99/63 - 110/71)  BP(mean): --  ABP: --  ABP(mean): --  RR: 15 (21 Dec 2019 23:11) (15 - 18)  SpO2: 97% (21 Dec 2019 23:11) (96% - 98%)           Input and Output:  I&O's Detail    21 Dec 2019 07:01  -  22 Dec 2019 07:00  --------------------------------------------------------  IN:  Total IN: 0 mL    OUT:  Total OUT: 0 mL    Total NET: 0 mL          Lab Data                        10.8   7.80  )-----------( 168      ( 21 Dec 2019 07:43 )             36.6     12-21    139  |  103  |  21  ----------------------------<  121<H>  4.2   |  31  |  1.13    Ca    8.3<L>      21 Dec 2019 07:43              Review of Systems	      Objective     Physical Examination    head at  heart s1s2  lung dec BS  abd soft      Pertinent Lab findings & Imaging      Selena:  NO   Adequate UO     I&O's Detail    21 Dec 2019 07:01  -  22 Dec 2019 07:00  --------------------------------------------------------  IN:  Total IN: 0 mL    OUT:  Total OUT: 0 mL    Total NET: 0 mL               Discussed with:     Cultures:	        Radiology

## 2019-12-22 NOTE — PROGRESS NOTE ADULT - SUBJECTIVE AND OBJECTIVE BOX
DALI RAYGOZA                                                               [id number]                                                       Allergies---No Known Allergies  NSAIDs (Other)        Pt is a 69y year old Female s/p right TKR.   Pt. is A&O x 3, resting comfortably, with no complaints.   Pain is 3/10.   Tolerating the diet.   Denies chest pain / shortness of breath / dyspnea / nausea / vomiting / headaches or light headed ness.         Vital Signs Last 24 Hrs  T(F): 97.7 (12-22-19 @ 07:46), Max: 97.7 (12-22-19 @ 07:46)  HR: 66 (12-22-19 @ 07:46)  BP: 103/70 (12-22-19 @ 07:46)  RR: 15 (12-22-19 @ 07:46)  SpO2: 96% (12-22-19 @ 07:46)    I&O's Detail    21 Dec 2019 07:01  -  22 Dec 2019 07:00  --------------------------------------------------------  IN:  Total IN: 0 mL    OUT:  Total OUT: 0 mL    Total NET: 0 mL        PE:   Right Lower Extremity:   Dressing is intact.   There is mild/moderate bloody drainage along the dressing.   The wound is closed with sutures.   No redness, swelling, heat, increase pain or other evidence of infection, superficial or deep, noted.   There is some bleeding nioted at the mid wound.   I pressed out some blood and then cleaned the area and entire wound with alcohol and Chloraprep.   I then applied an Aquacell dressing along with abd pads and ace bandage as a compression dressing.   She was encourage to continue with the ice packs.   Neurovascular intact.  No gross evidence of motor or sensory deficit.   +2 DP/PT pulses.   EHL/FHL/TA intact.   Toes are pink and mobile.   Capillary refill < 2 seconds.   Negative calf tenderness.   PAS on.           A:   Pt is a 69y year old Female S/P right total knee replacement, Post Op Day #3        Plan:    - Follow up with Medicine    - OOB with PT/OT   - Pain control    - Incentive spirometry   - ice packs   - check bandage in the morning. May change to JUAN CARLOS dressing   - Discharge Planning for [facility] DVT ppx = PAS +  apixaban 2.5 milliGRAM(s) Oral every 12 hours                                                                                                                                                                             Santos DASILVA

## 2019-12-22 NOTE — PROGRESS NOTE ADULT - ASSESSMENT
HPI: 70 y/o M with PMH of obesity BMI 36, Anxiety, HLD, HTN, OA admitted post op after R TKR with post op course complicated by lethargy, acute respiratory failure with hypoxia and hypercarbia being admitted to SPCU      1 y/o M with PMH of obesity BMI 36, Anxiety, HLD, HTN, OA admitted post op after R TKR with post op course complicated by lethargy, acute respiratory failure with hypoxia and hypercarbia being admitted to SPCU. Blood gas stable on 3 liters.. Blood pressure stable, now downgraded to medical wards.      Hypercapneic Respiratory Failure  Lethargy   Hypercapneic Respiratory Acidosis  Likely due to multiple sedating medications including versed, propofol, fentanyl, hydromorphone also with likely undiagnosed obstructive sleep apnea given body habitus  -was upgraded to SPCU for closer monitoring and started on BIPAP  -pulm Dr Fleming consulted,  -CXR showing bibasilar atelectasis, and cardiomegally   -patient given IV lasix 20mg IV x 1  -avoid ALL sedative meds for now  -incentive spirometer.   -no IVF for now  -patient more alert today -- AMBULATING WITH PT  -continue to optimize pulm toilet      Status post total right knee replacement. POD #3  pain control, bowel regimen per ortho, would limit opoid use for pain control if possible, use tylenol and/or nsaids if no contraindications from ortho standpoint  PT eval.     Hypotension  likely due to anaesthesia.  doing well mentating very well  Resolved       Problem/Recommendation - 5:  ·  Problem: Anxiety.  Recommendation: on xanax 1mg QID prn at home  -hold benzo in setting of resp acidosis.      Problem/Recommendation - 6:  Problem: HLD (hyperlipidemia). Recommendation: c/w lipitor 40mg daily.     Problem/Recommendation - 7:  Problem: Need for prophylactic measure. Recommendation: dvt ppx: per ortho protocol. HPI: 68 y/o M with PMH of obesity BMI 36, Anxiety, HLD, HTN, OA admitted post op after R TKR with post op course complicated by lethargy, acute respiratory failure with hypoxia and hypercarbia being admitted to SPCU      1 y/o M with PMH of obesity BMI 36, Anxiety, HLD, HTN, OA admitted post op after R TKR with post op course complicated by lethargy, acute respiratory failure with hypoxia and hypercarbia being admitted to SPCU. Blood gas stable on 3 liters.. Blood pressure stable, now downgraded to medical wards.      Hypercapneic Respiratory Failure  Lethargy   Hypercapneic Respiratory Acidosis  Likely due to multiple sedating medications including versed, propofol, fentanyl, hydromorphone also with likely undiagnosed obstructive sleep apnea given body habitus  -was upgraded to SPCU for closer monitoring and started on BIPAP  -pulm Dr Fleming consulted,  -CXR showing bibasilar atelectasis, and cardiomegally   -patient given IV lasix 20mg IV x 1  -avoid ALL sedative meds for now  -incentive spirometer.   -no IVF for now  -patient more alert today -- AMBULATING WITH PT  -continue to optimize pulm toilet  -Patients baselijne 02 sat around 94 percent. continues to desat.  -ordering cT chest        Status post total right knee replacement. POD #4  pain control, bowel regimen per ortho, would limit opoid use for pain control if possible, use tylenol and/or nsaids if no contraindications from ortho standpoint  PT eval.     Hypotension  likely due to anaesthesia.  doing well mentating very well  Resolved       Problem/Recommendation - 5:  ·  Problem: Anxiety.  Recommendation: on xanax 1mg QID prn at home  -hold benzo in setting of resp acidosis.      Problem/Recommendation - 6:  Problem: HLD (hyperlipidemia). Recommendation: c/w lipitor 40mg daily.     Problem/Recommendation - 7:  Problem: Need for prophylactic measure. Recommendation: dvt ppx: per ortho protocol.

## 2019-12-22 NOTE — PROGRESS NOTE ADULT - SUBJECTIVE AND OBJECTIVE BOX
Patient seen and examined at bedside.chart notes reviewed    Review of Systems:  General:denies fever chills, headache, weakness  HEENT: denies blurry vision,diffculty swallowing,  Cardiovascular: denies chest pain  ,palpitatins  Pulmonary:denies shortness of breath, cough, wheezing  Gastrointestinal: denies abdominal pain, constipation, diarrhra  : denies hematuria, dysuria  Neurological: denies weakness, numbness , tingling, dizziness  skin: denies skin rash  Psychiatrical: denies mood disturbances          Objective:  Vitals  T(C): 36.5 (12-22-19 @ 07:46), Max: 36.8 (12-21-19 @ 08:22)  HR: 66 (12-22-19 @ 07:46) (66 - 82)  BP: 103/70 (12-22-19 @ 07:46) (99/63 - 110/71)  RR: 15 (12-22-19 @ 07:46) (15 - 18)  SpO2: 96% (12-22-19 @ 07:46) (96% - 98%)    Physical Exam:  General: comfortable, no acute distress, well nourished  HEENT: no LAD, trachea midline  Cardiovascular: normal s1s2, no mrg  Pulmonary: CTA Bilaterally, no wheezing , rhonchi  Gastrointestinal: soft non tender non distended, no masses felt  Muscloskeletal: no lower extremity edema  Neurological: CN II-12 intact. No focal weakness  Psychiatrical: normal mood, cooperative    Labs:                          10.8   7.80  )-----------( 168      ( 21 Dec 2019 07:43 )             36.6     12-21    139  |  103  |  21  ----------------------------<  121<H>  4.2   |  31  |  1.13    Ca    8.3<L>      21 Dec 2019 07:43              Active Medications  MEDICATIONS  (STANDING):  acetaminophen   Tablet .. 1000 milliGRAM(s) Oral every 8 hours  apixaban 2.5 milliGRAM(s) Oral every 12 hours  atorvastatin 40 milliGRAM(s) Oral at bedtime  celecoxib 100 milliGRAM(s) Oral every 12 hours  pantoprazole    Tablet 40 milliGRAM(s) Oral before breakfast  sodium chloride 0.65% Nasal 1 Spray(s) Both Nostrils two times a day    MEDICATIONS  (PRN):  albuterol/ipratropium for Nebulization 3 milliLiter(s) Nebulizer every 4 hours PRN Shortness of Breath and/or Wheezing  ALPRAZolam 1 milliGRAM(s) Oral every 8 hours PRN anxiety  aluminum hydroxide/magnesium hydroxide/simethicone Suspension 30 milliLiter(s) Oral four times a day PRN Indigestion  bisacodyl Suppository 10 milliGRAM(s) Rectal daily PRN Constipation  magnesium hydroxide Suspension 30 milliLiter(s) Oral daily PRN Constipation  ondansetron Injectable 4 milliGRAM(s) IV Push every 6 hours PRN Nausea and/or Vomiting  oxyCODONE    IR 5 milliGRAM(s) Oral every 3 hours PRN Mild Pain (1 - 3)  oxyCODONE    IR 10 milliGRAM(s) Oral every 3 hours PRN Moderate Pain (4 - 6)  polyethylene glycol 3350 17 Gram(s) Oral daily PRN Constipation  senna 2 Tablet(s) Oral at bedtime PRN Constipation Patient seen and examined at bedside.chart notes reviewed    Review of Systems:  General:denies fever chills, headache, weakness  HEENT: denies blurry vision,diffculty swallowing,  Cardiovascular: denies chest pain  ,palpitatins  Pulmonary:denies shortness of breath, cough, wheezing  Gastrointestinal: denies abdominal pain, constipation, diarrhra  : denies hematuria, dysuria  Neurological: denies weakness, numbness , tingling, dizziness  skin: denies skin rash  Psychiatrical: denies mood disturbances          Objective:  Vitals  T(C): 36.5 (12-22-19 @ 07:46), Max: 36.8 (12-21-19 @ 08:22)  HR: 66 (12-22-19 @ 07:46) (66 - 82)  BP: 103/70 (12-22-19 @ 07:46) (99/63 - 110/71)  RR: 15 (12-22-19 @ 07:46) (15 - 18)  SpO2: 96% (12-22-19 @ 07:46) (96% - 98%)    Physical Exam:  General: comfortable, no acute distress, well nourished  HEENT: no LAD, trachea midline  Cardiovascular: normal s1s2, no mrg  Pulmonary: diminished bilaterally  Gastrointestinal: soft non tender non distended, no masses felt  Muscloskeletal: no lower extremity edema  Neurological: CN II-12 intact. No focal weakness  Psychiatrical: normal mood, cooperative    Labs:                          10.8   7.80  )-----------( 168      ( 21 Dec 2019 07:43 )             36.6     12-21    139  |  103  |  21  ----------------------------<  121<H>  4.2   |  31  |  1.13    Ca    8.3<L>      21 Dec 2019 07:43              Active Medications  MEDICATIONS  (STANDING):  acetaminophen   Tablet .. 1000 milliGRAM(s) Oral every 8 hours  apixaban 2.5 milliGRAM(s) Oral every 12 hours  atorvastatin 40 milliGRAM(s) Oral at bedtime  celecoxib 100 milliGRAM(s) Oral every 12 hours  pantoprazole    Tablet 40 milliGRAM(s) Oral before breakfast  sodium chloride 0.65% Nasal 1 Spray(s) Both Nostrils two times a day    MEDICATIONS  (PRN):  albuterol/ipratropium for Nebulization 3 milliLiter(s) Nebulizer every 4 hours PRN Shortness of Breath and/or Wheezing  ALPRAZolam 1 milliGRAM(s) Oral every 8 hours PRN anxiety  aluminum hydroxide/magnesium hydroxide/simethicone Suspension 30 milliLiter(s) Oral four times a day PRN Indigestion  bisacodyl Suppository 10 milliGRAM(s) Rectal daily PRN Constipation  magnesium hydroxide Suspension 30 milliLiter(s) Oral daily PRN Constipation  ondansetron Injectable 4 milliGRAM(s) IV Push every 6 hours PRN Nausea and/or Vomiting  oxyCODONE    IR 5 milliGRAM(s) Oral every 3 hours PRN Mild Pain (1 - 3)  oxyCODONE    IR 10 milliGRAM(s) Oral every 3 hours PRN Moderate Pain (4 - 6)  polyethylene glycol 3350 17 Gram(s) Oral daily PRN Constipation  senna 2 Tablet(s) Oral at bedtime PRN Constipation

## 2019-12-22 NOTE — PROGRESS NOTE ADULT - PROBLEM SELECTOR PLAN 1
CM notes reviewed - planned for [facility] at [facility] -   post op  I mitra  on o2 support - NC  not using BIPAP   denies resp distress  supportive medical regimen  caution with Opioids  bowel regimen  PT as tolerated  dc plan for [facility] vs Home with PT and assist  will follow.

## 2019-12-23 ENCOUNTER — TRANSCRIPTION ENCOUNTER (OUTPATIENT)
Age: 69
End: 2019-12-23

## 2019-12-23 VITALS — HEART RATE: 78 BPM | OXYGEN SATURATION: 87 %

## 2019-12-23 LAB
ANION GAP SERPL CALC-SCNC: 6 MMOL/L — SIGNIFICANT CHANGE UP (ref 5–17)
BUN SERPL-MCNC: 28 MG/DL — HIGH (ref 7–23)
CALCIUM SERPL-MCNC: 8.5 MG/DL — SIGNIFICANT CHANGE UP (ref 8.4–10.5)
CHLORIDE SERPL-SCNC: 105 MMOL/L — SIGNIFICANT CHANGE UP (ref 96–108)
CO2 SERPL-SCNC: 29 MMOL/L — SIGNIFICANT CHANGE UP (ref 22–31)
CREAT SERPL-MCNC: 0.99 MG/DL — SIGNIFICANT CHANGE UP (ref 0.5–1.3)
GLUCOSE SERPL-MCNC: 115 MG/DL — HIGH (ref 70–99)
HCT VFR BLD CALC: 35 % — SIGNIFICANT CHANGE UP (ref 34.5–45)
HGB BLD-MCNC: 10.6 G/DL — LOW (ref 11.5–15.5)
MCHC RBC-ENTMCNC: 28.6 PG — SIGNIFICANT CHANGE UP (ref 27–34)
MCHC RBC-ENTMCNC: 30.3 GM/DL — LOW (ref 32–36)
MCV RBC AUTO: 94.6 FL — SIGNIFICANT CHANGE UP (ref 80–100)
NRBC # BLD: 0 /100 WBCS — SIGNIFICANT CHANGE UP (ref 0–0)
NT-PROBNP SERPL-SCNC: 369 PG/ML — HIGH (ref 0–125)
PLATELET # BLD AUTO: 216 K/UL — SIGNIFICANT CHANGE UP (ref 150–400)
POTASSIUM SERPL-MCNC: 4 MMOL/L — SIGNIFICANT CHANGE UP (ref 3.5–5.3)
POTASSIUM SERPL-SCNC: 4 MMOL/L — SIGNIFICANT CHANGE UP (ref 3.5–5.3)
RBC # BLD: 3.7 M/UL — LOW (ref 3.8–5.2)
RBC # FLD: 13.3 % — SIGNIFICANT CHANGE UP (ref 10.3–14.5)
SODIUM SERPL-SCNC: 140 MMOL/L — SIGNIFICANT CHANGE UP (ref 135–145)
WBC # BLD: 6.85 K/UL — SIGNIFICANT CHANGE UP (ref 3.8–10.5)
WBC # FLD AUTO: 6.85 K/UL — SIGNIFICANT CHANGE UP (ref 3.8–10.5)

## 2019-12-23 PROCEDURE — 85027 COMPLETE CBC AUTOMATED: CPT

## 2019-12-23 PROCEDURE — C1889: CPT

## 2019-12-23 PROCEDURE — 83880 ASSAY OF NATRIURETIC PEPTIDE: CPT

## 2019-12-23 PROCEDURE — 82803 BLOOD GASES ANY COMBINATION: CPT

## 2019-12-23 PROCEDURE — 94660 CPAP INITIATION&MGMT: CPT

## 2019-12-23 PROCEDURE — 80048 BASIC METABOLIC PNL TOTAL CA: CPT

## 2019-12-23 PROCEDURE — 97535 SELF CARE MNGMENT TRAINING: CPT

## 2019-12-23 PROCEDURE — 94640 AIRWAY INHALATION TREATMENT: CPT

## 2019-12-23 PROCEDURE — 99238 HOSP IP/OBS DSCHRG MGMT 30/<: CPT

## 2019-12-23 PROCEDURE — 97165 OT EVAL LOW COMPLEX 30 MIN: CPT

## 2019-12-23 PROCEDURE — 71045 X-RAY EXAM CHEST 1 VIEW: CPT

## 2019-12-23 PROCEDURE — 88311 DECALCIFY TISSUE: CPT

## 2019-12-23 PROCEDURE — 86140 C-REACTIVE PROTEIN: CPT

## 2019-12-23 PROCEDURE — 97116 GAIT TRAINING THERAPY: CPT

## 2019-12-23 PROCEDURE — 93005 ELECTROCARDIOGRAM TRACING: CPT

## 2019-12-23 PROCEDURE — C1713: CPT

## 2019-12-23 PROCEDURE — 84100 ASSAY OF PHOSPHORUS: CPT

## 2019-12-23 PROCEDURE — 36415 COLL VENOUS BLD VENIPUNCTURE: CPT

## 2019-12-23 PROCEDURE — 97110 THERAPEUTIC EXERCISES: CPT

## 2019-12-23 PROCEDURE — C1776: CPT

## 2019-12-23 PROCEDURE — 73562 X-RAY EXAM OF KNEE 3: CPT

## 2019-12-23 PROCEDURE — 80053 COMPREHEN METABOLIC PANEL: CPT

## 2019-12-23 PROCEDURE — 97530 THERAPEUTIC ACTIVITIES: CPT

## 2019-12-23 PROCEDURE — 83735 ASSAY OF MAGNESIUM: CPT

## 2019-12-23 PROCEDURE — 88305 TISSUE EXAM BY PATHOLOGIST: CPT

## 2019-12-23 RX ORDER — OXYCODONE HYDROCHLORIDE 5 MG/1
1 TABLET ORAL
Qty: 0 | Refills: 0 | DISCHARGE
Start: 2019-12-23

## 2019-12-23 RX ORDER — ACETAMINOPHEN 500 MG
2 TABLET ORAL
Qty: 0 | Refills: 0 | DISCHARGE
Start: 2019-12-23

## 2019-12-23 RX ORDER — PANTOPRAZOLE SODIUM 20 MG/1
1 TABLET, DELAYED RELEASE ORAL
Qty: 0 | Refills: 0 | DISCHARGE
Start: 2019-12-23

## 2019-12-23 RX ORDER — BUDESONIDE AND FORMOTEROL FUMARATE DIHYDRATE 160; 4.5 UG/1; UG/1
2 AEROSOL RESPIRATORY (INHALATION)
Qty: 0 | Refills: 0 | DISCHARGE
Start: 2019-12-23

## 2019-12-23 RX ORDER — IPRATROPIUM/ALBUTEROL SULFATE 18-103MCG
3 AEROSOL WITH ADAPTER (GRAM) INHALATION
Qty: 0 | Refills: 0 | DISCHARGE
Start: 2019-12-23

## 2019-12-23 RX ORDER — SENNA PLUS 8.6 MG/1
2 TABLET ORAL
Qty: 0 | Refills: 0 | DISCHARGE
Start: 2019-12-23

## 2019-12-23 RX ORDER — APIXABAN 2.5 MG/1
1 TABLET, FILM COATED ORAL
Qty: 0 | Refills: 0 | DISCHARGE
Start: 2019-12-23

## 2019-12-23 RX ORDER — CELECOXIB 200 MG/1
1 CAPSULE ORAL
Qty: 0 | Refills: 0 | DISCHARGE
Start: 2019-12-23

## 2019-12-23 RX ADMIN — PANTOPRAZOLE SODIUM 40 MILLIGRAM(S): 20 TABLET, DELAYED RELEASE ORAL at 06:17

## 2019-12-23 RX ADMIN — APIXABAN 2.5 MILLIGRAM(S): 2.5 TABLET, FILM COATED ORAL at 06:17

## 2019-12-23 RX ADMIN — Medication 1000 MILLIGRAM(S): at 06:16

## 2019-12-23 RX ADMIN — Medication 1000 MILLIGRAM(S): at 11:59

## 2019-12-23 RX ADMIN — CELECOXIB 100 MILLIGRAM(S): 200 CAPSULE ORAL at 09:17

## 2019-12-23 RX ADMIN — Medication 1 SPRAY(S): at 06:17

## 2019-12-23 RX ADMIN — Medication 1000 MILLIGRAM(S): at 12:30

## 2019-12-23 RX ADMIN — CELECOXIB 100 MILLIGRAM(S): 200 CAPSULE ORAL at 09:50

## 2019-12-23 RX ADMIN — Medication 1000 MILLIGRAM(S): at 06:40

## 2019-12-23 NOTE — DISCHARGE NOTE NURSING/CASE MANAGEMENT/SOCIAL WORK - PATIENT PORTAL LINK FT
You can access the FollowMyHealth Patient Portal offered by [facility] by registering at the following website: [email or web address]. By joining [id number] [address] [facility]’s FollowMyHealth portal, you will also be able to view your health information using other applications (apps) compatible with our system.

## 2019-12-23 NOTE — PROGRESS NOTE ADULT - SUBJECTIVE AND OBJECTIVE BOX
DALI RAYGOZA  [id number]    Pt is a 69y year old Female s/p right TKR. C/o intermittent SOB and productive cough. Denies chest pain//nausea/vomitting.  Pain in right knee is minimal at rest , increases with activity. (+) Voids, tolerating regular diet.    Vital Signs Last 24 Hrs  T(C): 36.7 (23 Dec 2019 07:56), Max: 36.7 (22 Dec 2019 23:15)  T(F): 98 (23 Dec 2019 07:56), Max: 98.1 (22 Dec 2019 23:15)  HR: 69 (23 Dec 2019 07:56) (69 - 79)  BP: 122/70 (23 Dec 2019 07:56) (98/64 - 122/70)  BP(mean): --  RR: 16 (23 Dec 2019 07:56) (15 - 17)  SpO2: 90% (23 Dec 2019 07:56) (88% - 95%)                              10.6   6.85  )-----------( 216      ( 23 Dec 2019 06:46 )             35.0     12-23    140  |  105  |  28<H>  ----------------------------<  115<H>  4.0   |  29  |  0.99    Ca    8.5      23 Dec 2019 06:46          PE:   RLE: Dressing changed (aquacel with abd and ace removed- previous bloody drainage noted) dry gauze, abd and ace applied, incision clean and dry, no active drainage, no erythema , right foot intact SILT distally, (+2) PT pulses, EHL/FHL/TA intact, Capillary refill < 2 seconds. negative calf tenderness. PAS on.      A: 69y year old Female s/p right TKR POD#2    Plan:   -DVT ppx = Aspirin 81mg bid, pas, activity increased  -PT/OT = OOB  -Pain control adequate on present regimen   -Medicine/ pulmonology to follow as desaturations of O2 levels have been noted .  She has refused further workup despite repeated explanation of importance of proper diagnosis and treatment of respiratory problems.  -continue to follow Labs as needed  -continue use of PAS  -Dispo: [facility]

## 2019-12-23 NOTE — PROGRESS NOTE ADULT - SUBJECTIVE AND OBJECTIVE BOX
Date/Time Patient Seen:  		  Referring MD:   Data Reviewed	       Patient is a 69y old  Female who presents with a chief complaint of Post-Op Hypercapnic Respiratory Failure (19 Dec 2019 21:00)      Subjective/HPI     PAST MEDICAL & SURGICAL HISTORY:  History of closed shoulder dislocation: left shoulder 2019  Osteoarthritis: Right knee  Anxiety  HLD (hyperlipidemia)  Essential hypertension: Not on medication x 1 year  No pertinent past medical history  No significant past surgical history  No significant past surgical history        Medication list         MEDICATIONS  (STANDING):  acetaminophen   Tablet .. 1000 milliGRAM(s) Oral every 8 hours  albuterol/ipratropium for Nebulization 3 milliLiter(s) Nebulizer every 6 hours  apixaban 2.5 milliGRAM(s) Oral every 12 hours  atorvastatin 40 milliGRAM(s) Oral at bedtime  budesonide 160 MICROgram(s)/formoterol 4.5 MICROgram(s) Inhaler 2 Puff(s) Inhalation two times a day  celecoxib 100 milliGRAM(s) Oral every 12 hours  lactated ringers. 1000 milliLiter(s) (75 mL/Hr) IV Continuous <Continuous>  pantoprazole    Tablet 40 milliGRAM(s) Oral before breakfast  sodium chloride 0.65% Nasal 1 Spray(s) Both Nostrils two times a day    MEDICATIONS  (PRN):  albuterol/ipratropium for Nebulization 3 milliLiter(s) Nebulizer every 4 hours PRN Shortness of Breath and/or Wheezing  ALPRAZolam 1 milliGRAM(s) Oral every 8 hours PRN anxiety  aluminum hydroxide/magnesium hydroxide/simethicone Suspension 30 milliLiter(s) Oral four times a day PRN Indigestion  bisacodyl Suppository 10 milliGRAM(s) Rectal daily PRN Constipation  magnesium hydroxide Suspension 30 milliLiter(s) Oral daily PRN Constipation  ondansetron Injectable 4 milliGRAM(s) IV Push every 6 hours PRN Nausea and/or Vomiting  oxyCODONE    IR 5 milliGRAM(s) Oral every 3 hours PRN Mild Pain (1 - 3)  oxyCODONE    IR 10 milliGRAM(s) Oral every 3 hours PRN Moderate Pain (4 - 6)  polyethylene glycol 3350 17 Gram(s) Oral daily PRN Constipation  senna 2 Tablet(s) Oral at bedtime PRN Constipation         Vitals log        ICU Vital Signs Last 24 Hrs  T(C): 36.7 (22 Dec 2019 23:15), Max: 36.7 (22 Dec 2019 23:15)  T(F): 98.1 (22 Dec 2019 23:15), Max: 98.1 (22 Dec 2019 23:15)  HR: 71 (22 Dec 2019 23:15) (66 - 79)  BP: 102/58 (22 Dec 2019 23:15) (98/64 - 103/70)  BP(mean): --  ABP: --  ABP(mean): --  RR: 17 (22 Dec 2019 23:15) (15 - 17)  SpO2: 95% (22 Dec 2019 23:15) (88% - 96%)           Input and Output:  I&O's Detail    21 Dec 2019 07:01  -  22 Dec 2019 07:00  --------------------------------------------------------  IN:  Total IN: 0 mL    OUT:  Total OUT: 0 mL    Total NET: 0 mL          Lab Data                        10.8   7.80  )-----------( 168      ( 21 Dec 2019 07:43 )             36.6     12-21    139  |  103  |  21  ----------------------------<  121<H>  4.2   |  31  |  1.13    Ca    8.3<L>      21 Dec 2019 07:43              Review of Systems	      Objective     Physical Examination    head at  heart s1s2  lung dec BS  abd soft      Pertinent Lab findings & Imaging      Selena:  NO   Adequate UO     I&O's Detail    21 Dec 2019 07:01  -  22 Dec 2019 07:00  --------------------------------------------------------  IN:  Total IN: 0 mL    OUT:  Total OUT: 0 mL    Total NET: 0 mL               Discussed with:     Cultures:	        Radiology

## 2019-12-23 NOTE — PROGRESS NOTE ADULT - SUBJECTIVE AND OBJECTIVE BOX
Patient still desatting on ambulation on room air. ambulation sats at 86 percent. RESTING IN TACT.. and stable 93 percent on room air.  Refusng oxygen and breathig treatment and imaging  she reports she has been bed bound for five days and feels like this is the cause. SHe feels like she has much improved on admssion and will continue to improve given more time on conservatively treatmetn    counceled extensively about the need of 02 and the risk of respiratory failure and death. patient understands risk and continues to adamanently refuse 02          Review of Systems:  General:denies fever chills, headache, weakness  HEENT: denies blurry vision,diffculty swallowing,  Cardiovascular: denies chest pain  ,palpitatins  Pulmonary:denies shortness of breath, cough, wheezing  Gastrointestinal: denies abdominal pain, constipation, diarrhra  : denies hematuria, dysuria  Neurological: denies weakness, numbness , tingling, dizziness  skin: denies skin rash  Psychiatrical: denies mood disturbances    Home Medications:  Lipitor 40 mg oral tablet: 1 tab(s) orally once a day (18 Dec 2019 09:40)  Xanax 1 mg oral tablet: orally 4 times a day, As Needed (18 Dec 2019 09:40)        Objective:  Vitals  T(C): 36.7 (12-23-19 @ 07:56), Max: 36.7 (12-22-19 @ 23:15)  HR: 78 (12-23-19 @ 09:14) (69 - 79)  BP: 122/70 (12-23-19 @ 07:56) (98/64 - 122/70)  RR: 16 (12-23-19 @ 07:56) (15 - 17)  SpO2: 87% (12-23-19 @ 09:14) (87% - 95%)    Physical Exam:  General: comfortable, no acute distress, well nourished  HEENT: no LAD, trachea midline  Cardiovascular: normal s1s2, no mrg  Pulmonary: dminished n  Gastrointestinal: soft non tender non distended, no masses felt  Muscloskeletal: no lower extremity edema  Neurological: CN II-12 intact. No focal weakness  Psychiatrical: normal mood, cooperative    Labs:                          10.6   6.85  )-----------( 216      ( 23 Dec 2019 06:46 )             35.0     12-23    140  |  105  |  28<H>  ----------------------------<  115<H>  4.0   |  29  |  0.99    Ca    8.5      23 Dec 2019 06:46              Active Medications  MEDICATIONS  (STANDING):  acetaminophen   Tablet .. 1000 milliGRAM(s) Oral every 8 hours  albuterol/ipratropium for Nebulization 3 milliLiter(s) Nebulizer every 6 hours  apixaban 2.5 milliGRAM(s) Oral every 12 hours  atorvastatin 40 milliGRAM(s) Oral at bedtime  budesonide 160 MICROgram(s)/formoterol 4.5 MICROgram(s) Inhaler 2 Puff(s) Inhalation two times a day  celecoxib 100 milliGRAM(s) Oral every 12 hours  lactated ringers. 1000 milliLiter(s) (75 mL/Hr) IV Continuous <Continuous>  pantoprazole    Tablet 40 milliGRAM(s) Oral before breakfast  sodium chloride 0.65% Nasal 1 Spray(s) Both Nostrils two times a day    MEDICATIONS  (PRN):  albuterol/ipratropium for Nebulization 3 milliLiter(s) Nebulizer every 4 hours PRN Shortness of Breath and/or Wheezing  ALPRAZolam 1 milliGRAM(s) Oral every 8 hours PRN anxiety  aluminum hydroxide/magnesium hydroxide/simethicone Suspension 30 milliLiter(s) Oral four times a day PRN Indigestion  bisacodyl Suppository 10 milliGRAM(s) Rectal daily PRN Constipation  magnesium hydroxide Suspension 30 milliLiter(s) Oral daily PRN Constipation  ondansetron Injectable 4 milliGRAM(s) IV Push every 6 hours PRN Nausea and/or Vomiting  oxyCODONE    IR 5 milliGRAM(s) Oral every 3 hours PRN Mild Pain (1 - 3)  oxyCODONE    IR 10 milliGRAM(s) Oral every 3 hours PRN Moderate Pain (4 - 6)  polyethylene glycol 3350 17 Gram(s) Oral daily PRN Constipation  senna 2 Tablet(s) Oral at bedtime PRN Constipation

## 2019-12-23 NOTE — PROGRESS NOTE ADULT - REASON FOR ADMISSION
Right TKR
Post-Op Hypercapnic Respiratory Failure
Post-Op Hypercapnic Respiratory Failure
Perioperative Medical Management s/p Right Total Knee Replacement

## 2019-12-23 NOTE — PROGRESS NOTE ADULT - ASSESSMENT
HPI: 68 y/o M with PMH of obesity BMI 36, Anxiety, HLD, HTN, OA admitted post op after R TKR with post op course complicated by lethargy, acute respiratory failure with hypoxia and hypercarbia being admitted to SPCU      1 y/o M with PMH of obesity BMI 36, Anxiety, HLD, HTN, OA admitted post op after R TKR with post op course complicated by lethargy, acute respiratory failure with hypoxia and hypercarbia being admitted to SPCU. Blood gas stable on 3 liters.. Blood pressure stable, now downgraded to medical wards.      Hypercapneic Respiratory Failure  Lethargy   Hypercapneic Respiratory Acidosis  Likely due to multiple sedating medications including versed, propofol, fentanyl, hydromorphone also with likely undiagnosed obstructive sleep apnea given body habitus  -was upgraded to SPCU for closer monitoring and started on BIPAP  -pulm Dr Fleming consulted,  -CXR showing bibasilar atelectasis, and cardiomegally   -patient given IV lasix 20mg IV x 1  -avoid ALL sedative meds for now  -incentive spirometer.   -no IVF for now  -patient more alert today -- AMBULATING WITH PT  -continue to optimize pulm toilet  -Patients baselijne 02 sat around 94 percent. continues to desat.  -ordering cT chest        Status post total right knee replacement. POD #4  pain control, bowel regimen per ortho, would limit opoid use for pain control if possible, use tylenol and/or nsaids if no contraindications from ortho standpoint  PT eval.     Hypotension  likely due to anaesthesia.  doing well mentating very well  Resolved       Problem/Recommendation - 5:  ·  Problem: Anxiety.  Recommendation: on xanax 1mg QID prn at home  -hold benzo in setting of resp acidosis.      Problem/Recommendation - 6:  Problem: HLD (hyperlipidemia). Recommendation: c/w lipitor 40mg daily.     Problem/Recommendation - 7:  Problem: Need for prophylactic measure. Recommendation: dvt ppx: per ortho protocol.            Counceled extensively about the need of 02 and the risk of respiratory failure and death. patient understands risk and continues to adamanently refuse 02  Patient has full medical capcity to refuse care. I hope that patient is more willing to accept oxygen in rehab.    at this time patient is high risk of rehospitalization

## 2019-12-23 NOTE — PROGRESS NOTE ADULT - PROBLEM SELECTOR PLAN 1
suspect IAN OHS  will assess for COPD  on nebs and inhaler regimen  o2 support  keep sat > 88 pct  post op care  I mitra  dvt p  on IVF  will check ProBNP and CRP and CTA chest this am  cont medical supportive care and regimen  not using NIPPV - discussed IAN eval and dx possibility - will need outpatient follow up

## 2019-12-23 NOTE — PROGRESS NOTE ADULT - PROBLEM SELECTOR PROBLEM 1
Acute respiratory failure with hypoxia and hypercapnia
Osteoarthritis of right knee
Lethargy

## 2019-12-24 LAB — CRP SERPL-MCNC: 6.2 MG/DL — HIGH (ref 0–0.4)

## 2020-01-06 ENCOUNTER — APPOINTMENT (OUTPATIENT)
Dept: ORTHOPEDIC SURGERY | Facility: CLINIC | Age: 70
End: 2020-01-06
Payer: MEDICARE

## 2020-01-06 VITALS — DIASTOLIC BLOOD PRESSURE: 63 MMHG | HEART RATE: 85 BPM | SYSTOLIC BLOOD PRESSURE: 93 MMHG

## 2020-01-06 PROCEDURE — 99024 POSTOP FOLLOW-UP VISIT: CPT

## 2020-01-06 PROCEDURE — 73562 X-RAY EXAM OF KNEE 3: CPT | Mod: RT

## 2020-01-06 RX ORDER — CLINDAMYCIN HYDROCHLORIDE 300 MG/1
300 CAPSULE ORAL
Qty: 10 | Refills: 2 | Status: ACTIVE | COMMUNITY
Start: 2020-01-06 | End: 1900-01-01

## 2020-01-15 NOTE — CHART NOTE - NSCHARTNOTEFT_GEN_A_CORE
this is an addendum  case and events reviewed and discussed with Anesthesia and Medicine  pt has hx of COPD and IAN - and experienced mild dyspnea - in PACU - most likely due to underlying conditions. this is an addendum  case and events reviewed and discussed with Anesthesia and Medicine  pt has hx of COPD and IAN - and experienced mild respiratory insufficiency  - in PACU - most likely due to underlying conditions.

## 2020-01-15 NOTE — DISCHARGE NOTE ADULT - LAUNCH MEDICATION RECONCILIATION
Hyperlipidemia Hyperlipidemia Nutrition, metabolism, and development symptoms Nutrition, metabolism, and development symptoms Type 2 diabetes mellitus Hyperlipidemia <<-----Click here for Discharge Medication Review

## 2020-01-30 NOTE — CHART NOTE - NSCHARTNOTEFT_GEN_A_CORE
clarification    Patient's respiratory failure was not related to surgery.  Patient had underlying -  IAN, Atelectasis and Sedation which contributed to hypercapnea and respiratory failure. Addendum    Patient's respiratory failure was not related to surgery.  Patient had underlying -  IAN, Atelectasis and Sedation which contributed to hypercapnea and respiratory failure.

## 2020-01-30 NOTE — CHART NOTE - NSCHARTNOTEFT_GEN_A_CORE
clarification: patient's respiratory failure is not related to surgery  Patient had underlying Obstructive Sleep Apnea, atelectasis, and Sedation which contributed to respiratory failure

## 2020-02-05 ENCOUNTER — APPOINTMENT (OUTPATIENT)
Dept: ORTHOPEDIC SURGERY | Facility: CLINIC | Age: 70
End: 2020-02-05
Payer: MEDICARE

## 2020-02-05 VITALS — WEIGHT: 194 LBS | HEIGHT: 64 IN | BODY MASS INDEX: 33.12 KG/M2

## 2020-02-05 DIAGNOSIS — M75.100 UNSPECIFIED ROTATOR CUFF TEAR OR RUPTURE OF UNSPECIFIED SHOULDER, NOT SPECIFIED AS TRAUMATIC: ICD-10-CM

## 2020-02-05 DIAGNOSIS — M25.312 OTHER INSTABILITY, LEFT SHOULDER: ICD-10-CM

## 2020-02-05 PROCEDURE — 99213 OFFICE O/P EST LOW 20 MIN: CPT

## 2020-02-18 ENCOUNTER — APPOINTMENT (OUTPATIENT)
Dept: ORTHOPEDIC SURGERY | Facility: CLINIC | Age: 70
End: 2020-02-18
Payer: MEDICARE

## 2020-02-18 VITALS
DIASTOLIC BLOOD PRESSURE: 61 MMHG | HEART RATE: 86 BPM | WEIGHT: 194 LBS | HEIGHT: 64 IN | SYSTOLIC BLOOD PRESSURE: 85 MMHG | BODY MASS INDEX: 33.12 KG/M2

## 2020-02-18 DIAGNOSIS — Z96.651 PRESENCE OF RIGHT ARTIFICIAL KNEE JOINT: ICD-10-CM

## 2020-02-18 PROCEDURE — 99024 POSTOP FOLLOW-UP VISIT: CPT

## 2020-02-18 PROCEDURE — 73562 X-RAY EXAM OF KNEE 3: CPT | Mod: RT

## 2020-03-20 NOTE — HISTORY OF PRESENT ILLNESS
[1] : the patient reports pain that is 1/10 in severity [Clean/Dry/Intact] : clean, dry and intact [Healed] : healed [Neuro Intact] : an unremarkable neurological exam [Swelling] : swollen [Negative Frank's] : maneuvers demonstrated a negative Frank's sign [Vascular Intact] : ~T peripheral vascular exam normal [Hardware in Good Position] : hardware in good position [Xray (Date:___)] : [unfilled] Xray -  [Good Overall Alignment] : good overall alignment [No Obvious Fractures] : no obvious fractures [Excellent Pain Control] : has excellent pain control [Doing Well] : is doing well [No Sign of Infection] : is showing no signs of infection [None] : None [Chills] : no chills [Fever] : no fever [Erythema] : not erythematous [de-identified] : s/p Right TKR 12/18/19 [de-identified] : Normal post operative swelling of the right TKR.\par full extension at 0° and flexion to 115°. Neutral alignment of her right total knee replacement and no instability is seen on physical exam of M/L nor A/P [de-identified] : Radiographs 3 views, of the right knee were performed today. There are stable interfaces between bone and implants in the femur, tibia and patella  are well in stable positioning of the femoral, tibia and, patella components. Alignment of the femur with the tibia are good, and the alignment of the patella to the femoral groove are well aligned. There is no fracture, foreign body, subsidence, osteolysis, or notable effusions. \par  [de-identified] : Patient continues to use tylenol for the discomfort of the right TKR [de-identified] : Patient states that she walks long distances, but does not like to participate with PT.\par Requested patient to continue with home exercise program of full extension and flexion of the right knee, and to continue with walking.\par

## 2020-04-28 NOTE — H&P PST ADULT - MAMMOGRAM, RESULTS OF LAST, PROFILE
Ines Keyes Patient Age: 66 year old  MESSAGE: Interpreting service used: No      IM/FP- Symptom-  Adult-  Red Symptom-       Chest complaints          Caller connected to triage- Yes- [place]- Connect call to Triage Hotline. Route message to Provider's Clinical Support Pool.       WEIGHT AND HEIGHT:   Wt Readings from Last 1 Encounters:   04/16/20 104.3 kg (230 lb)     Ht Readings from Last 1 Encounters:   04/16/20 5' 7\" (1.702 m)     BMI Readings from Last 1 Encounters:   04/16/20 36.02 kg/m²       ALLERGIES:  Yellow dye   (food or med); Amoxicillin; and Kdc:yellow dye+conjugated estrogens  Current Outpatient Medications   Medication   • methylPREDNISolone (MEDROL DOSEPAK) 4 MG tablet   • losartan (COZAAR) 100 MG tablet   • hydrochlorothiazide (HYDRODIURIL) 25 MG tablet   • hydrALAZINE (APRESOLINE) 10 MG tablet   • omeprazole (PRILOSEC) 40 MG capsule   • levothyroxine (SYNTHROID, LEVOTHROID) 137 MCG tablet   • verapamil 300 MG CAPSULE SR 24 HR 24 hr capsule   • atorvastatin (LIPITOR) 40 MG tablet   • fluticasone (FLONASE) 50 MCG/ACT nasal spray   • clobetasol (TEMOVATE) 0.05 % ointment   • hydroCORTisone (CORTIZONE) 2.5 % cream   • fluticasone-salmeterol (ADVAIR DISKUS) 250-50 MCG/DOSE inhaler   • metroNIDAZOLE (METROGEL) 1 % gel   • albuterol 108 (90 Base) MCG/ACT inhaler   • aspirin 81 MG tablet     No current facility-administered medications for this visit.      PHARMACY to use: see below           Pharmacy preference(s) on file:   [id number] - [place], [place] - [address]  [address]  [facility] [address]  Phone: [phone] Fax: [phone]      CALL BACK INFO: Ok to leave response (including medical information) with family member or on answering machine  ROUTING: Patient's physician/staff        PCP: Heavenly Lemos MD         INS: Payor: MEDICARE / Plan: PARTA AND B / Product Type: MEDICARE   PATIENT ADDRESS:  [address] [place] [address] [place] [address]    
Offer Zpack antibiotic at this time.  Will need video visit if not improving with this.    Electronically signed by: Heavenly Lemos MD  4/28/2020    
Patient notified.  Patient verbalized understanding of information given.  Patient states she has taken zpak before and she agrees in taking this.   She was notified that medication will continue to work up to 10 days.   Patient verbalized understanding of information given.   
Patient states she was running low temp that started on 4/1  Temp was ranging from 98.8 - 99.9 F  She had a tele visit on 4/16 with Dr. Rincon (allergist). At that time she was experiencing chest tightness.   Then patient had another appointment on 4/21 with Dr. Beckford (cardiologist) - patient was still experiencing chest tightness. Patient was ordered to get chest xray done.  CXray came back with bronchitis. Patient was prescribed prednisone and she has been taking the prednisone since 4/24 - she still has 2 days left.     Patient states she still has chest tightness  Denies cough, fever, SOB, chest pain, fever.   States her O2 sats running 96-98%    Dr. Lemos,  Patient wonders if she can take an antibiotic for the bronchitis. She does not want to take more prednisone.   Would you like patient to set up video or tele visit? Or can you recommend med based on chest xray results?  
wnl

## 2020-06-29 ENCOUNTER — APPOINTMENT (OUTPATIENT)
Dept: ORTHOPEDIC SURGERY | Facility: CLINIC | Age: 70
End: 2020-06-29
Payer: MEDICARE

## 2020-06-29 VITALS — TEMPERATURE: 98 F

## 2020-06-29 PROCEDURE — 73030 X-RAY EXAM OF SHOULDER: CPT | Mod: LT

## 2020-06-29 PROCEDURE — 99215 OFFICE O/P EST HI 40 MIN: CPT

## 2020-06-30 ENCOUNTER — OUTPATIENT (OUTPATIENT)
Dept: OUTPATIENT SERVICES | Facility: HOSPITAL | Age: 70
LOS: 1 days | End: 2020-06-30
Payer: MEDICARE

## 2020-06-30 ENCOUNTER — APPOINTMENT (OUTPATIENT)
Dept: MRI IMAGING | Facility: IMAGING CENTER | Age: 70
End: 2020-06-30
Payer: MEDICARE

## 2020-06-30 DIAGNOSIS — M19.012 PRIMARY OSTEOARTHRITIS, LEFT SHOULDER: ICD-10-CM

## 2020-06-30 PROCEDURE — 73221 MRI JOINT UPR EXTREM W/O DYE: CPT

## 2020-06-30 PROCEDURE — 73221 MRI JOINT UPR EXTREM W/O DYE: CPT | Mod: 26,LT

## 2020-07-07 ENCOUNTER — APPOINTMENT (OUTPATIENT)
Dept: ORTHOPEDIC SURGERY | Facility: CLINIC | Age: 70
End: 2020-07-07
Payer: MEDICARE

## 2020-07-07 VITALS — SYSTOLIC BLOOD PRESSURE: 150 MMHG | HEART RATE: 80 BPM | TEMPERATURE: 97.3 F | DIASTOLIC BLOOD PRESSURE: 92 MMHG

## 2020-07-07 PROCEDURE — 99214 OFFICE O/P EST MOD 30 MIN: CPT

## 2020-07-08 NOTE — PHYSICAL EXAM
[de-identified] : The patient appears well nourished  and in no apparent distress.  The patient is alert and oriented to person, place, and time.   Affect and mood appear normal.    The head is normocephalic and atraumatic.  The eyes reveal normal sclera and extra ocular muscles are intact.   The neck appears normal with no jugular venous distention or masses noted.   Skin shows normal turgor with no evidence of eczema or psoriasis.  No respiratory distress noted.  The patient ambulates with a normal gait.\par \par The left shoulder has has decreased range of motion with 160 degrees forward flexion, 90 degrees abduction, 60 degrees external rotation, internal rotation to the lumbosacral junction.  Rotator cuff strength is good.   there is no soft tissue swelling.  There is no tenderness to palpation. There is no eccyhmosis.  There is no erythema or warmth.   There is a negative impingement sign.  There is a negative Hawkings sign.   Rotator cuff strength is normal.  There is no instability.  No lymphadenopathy or edema is noted.  Pulses and capillary refill are normal.  Sensation is normal.     [de-identified] : The MRI was personally reviewed today.  There is evidence of full-thickness tearing of the anterior and mid insertional fibers of the supraspinatus.  There is not a lot of retraction noted about the tear.  There is subluxation of the bicep tendon.  There is severe glenohumeral joint arthritis.  There is a large glenohumeral joint effusion that decompresses into the subacromial space.  The glenoid retroversion appears to be within normal limits.

## 2020-07-08 NOTE — HISTORY OF PRESENT ILLNESS
[de-identified] : This patient returns for follow-up regarding osteoarthritis of the left shoulder.  She is noting worsening pain recently which is interfering with her activities of daily living.  Due to the significant pain I recommend an MRI to rule out significant osteoarthritis.  Her x-ray showed mild osteophytes noted.  Pain level currently 2 out of 10 but worsens significantly with activity.  She is also complaining of instability about the shoulder and she feels that it subluxate in and out of the joint.  She takes Aleve intermittently with some relief.  The patient states that the pain is worse with activity and improved by rest. The patient denies numbness and tingling, radicular symptoms, or bowel/bladder dysfunction.

## 2020-07-08 NOTE — REASON FOR VISIT
[Follow-Up Visit] : a follow-up visit for [FreeTextEntry2] : Primary Osteoarthritis of left shoulder, MRI of Left shoulder DOS 6/30/20

## 2020-08-10 DIAGNOSIS — Z01.818 ENCOUNTER FOR OTHER PREPROCEDURAL EXAMINATION: ICD-10-CM

## 2020-08-14 RX ORDER — MUPIROCIN 20 MG/G
1 OINTMENT TOPICAL
Qty: 1 | Refills: 0
Start: 2020-08-14 | End: 2020-08-18

## 2020-08-18 ENCOUNTER — OUTPATIENT (OUTPATIENT)
Dept: OUTPATIENT SERVICES | Facility: HOSPITAL | Age: 70
LOS: 1 days | End: 2020-08-18
Payer: MEDICARE

## 2020-08-18 ENCOUNTER — APPOINTMENT (OUTPATIENT)
Dept: DISASTER EMERGENCY | Facility: CLINIC | Age: 70
End: 2020-08-18

## 2020-08-18 ENCOUNTER — APPOINTMENT (OUTPATIENT)
Dept: ORTHOPEDIC SURGERY | Facility: CLINIC | Age: 70
End: 2020-08-18
Payer: MEDICARE

## 2020-08-18 ENCOUNTER — LABORATORY RESULT (OUTPATIENT)
Age: 70
End: 2020-08-18

## 2020-08-18 VITALS — SYSTOLIC BLOOD PRESSURE: 132 MMHG | HEART RATE: 76 BPM | DIASTOLIC BLOOD PRESSURE: 80 MMHG | TEMPERATURE: 97.6 F

## 2020-08-18 DIAGNOSIS — M19.012 PRIMARY OSTEOARTHRITIS, LEFT SHOULDER: ICD-10-CM

## 2020-08-18 DIAGNOSIS — Z01.818 ENCOUNTER FOR OTHER PREPROCEDURAL EXAMINATION: ICD-10-CM

## 2020-08-18 DIAGNOSIS — M75.102 UNSPECIFIED ROTATOR CUFF TEAR OR RUPTURE OF LEFT SHOULDER, NOT SPECIFIED AS TRAUMATIC: ICD-10-CM

## 2020-08-18 PROCEDURE — G0463: CPT

## 2020-08-18 PROCEDURE — 99213 OFFICE O/P EST LOW 20 MIN: CPT

## 2020-08-18 NOTE — PROVIDER CONTACT NOTE (OTHER) - ASSESSMENT
[facility]   MD MARIELA SLOAN MD RUORU WONG, PA    TEL:  OFFICE: [phone]  DR WINSLOW CELL: [phone]  JAYME BEAVERS CELL: [phone]  DR. THAPA CELL: [phone]  DR. JIMENEZ CELL: [phone]    FROM 5 PM - 7 AM PLEASE CALL ANSWERING SERVICE: [phone]    RENAL FOLLOW UP NOTE  --------------------------------------------------------------------------------  HPI:      Pt seen and examined at bedside.   Denies SOB, chest pain     PAST HISTORY  --------------------------------------------------------------------------------  No significant changes to PMH, PSH, FHx, SHx, unless otherwise noted    ALLERGIES & MEDICATIONS  --------------------------------------------------------------------------------  Allergies    No Known Allergies    Intolerances      Standing Inpatient Medications  amLODIPine   Tablet 5 milliGRAM(s) Oral daily  ATENolol  Tablet 50 milliGRAM(s) Oral daily  atorvastatin 40 milliGRAM(s) Oral at bedtime  brimonidine 0.2% Ophthalmic Solution 1 Drop(s) Left EYE three times a day  dextrose 5%. 1000 milliLiter(s) IV Continuous <Continuous>  dextrose 50% Injectable 12.5 Gram(s) IV Push once  dextrose 50% Injectable 25 Gram(s) IV Push once  dextrose 50% Injectable 25 Gram(s) IV Push once  dorzolamide 2%/timolol 0.5% Ophthalmic Solution 1 Drop(s) Left EYE two times a day  erythromycin   Ointment 1 Application(s) Left EYE four times a day  fenofibrate Tablet 145 milliGRAM(s) Oral daily  furosemide   Injectable 20 milliGRAM(s) IV Push once  gabapentin 300 milliGRAM(s) Oral at bedtime  heparin   Injectable 5000 Unit(s) SubCutaneous every 8 hours  insulin glargine Injectable (LANTUS) 10 Unit(s) SubCutaneous at bedtime  insulin glargine Injectable (LANTUS) 10 Unit(s) SubCutaneous every morning  insulin lispro (HumaLOG) corrective regimen sliding scale   SubCutaneous three times a day before meals  levothyroxine 75 MICROGram(s) Oral daily  melatonin 3 milliGRAM(s) Oral at bedtime  mesalamine ER Capsule 500 milliGRAM(s) Oral two times a day  pantoprazole    Tablet 40 milliGRAM(s) Oral before breakfast  potassium chloride    Tablet ER 40 milliEquivalent(s) Oral once  sodium bicarbonate  Infusion 0.08 mEq/kG/Hr IV Continuous <Continuous>  valACYclovir 500 milliGRAM(s) Oral every 24 hours    PRN Inpatient Medications  acetaminophen   Tablet .. 650 milliGRAM(s) Oral every 4 hours PRN  dextrose 40% Gel 15 Gram(s) Oral once PRN  glucagon  Injectable 1 milliGRAM(s) IntraMuscular once PRN  haloperidol    Injectable 0.5 milliGRAM(s) IntraMuscular every 6 hours PRN      REVIEW OF SYSTEMS  --------------------------------------------------------------------------------  General: no fever  CVS: no chest pain  RESP: no sob, no cough  ABD: no abdominal pain  : no dysuria,  MSK: no edema     VITALS/PHYSICAL EXAM  --------------------------------------------------------------------------------  T(C): 36.6 (08-18-20 @ 05:10), Max: 36.8 (08-18-20 @ 02:07)  HR: 83 (08-18-20 @ 05:10) (77 - 83)  BP: 135/80 (08-18-20 @ 05:10) (121/78 - 135/80)  RR: 18 (08-18-20 @ 05:10) (17 - 18)  SpO2: 94% (08-18-20 @ 05:10) (94% - 96%)  Wt(kg): --        08-17-20 @ 07:01  -  08-18-20 @ 07:00  --------------------------------------------------------  IN: 1120 mL / OUT: 4250 mL / NET: -3130 mL      Physical Exam:  	Gen: NAD  	HEENT: MMM  	Pulm: CTA B/L  	CV: S1S2  	Abd: Soft, +BS  	Ext: No LE edema B/L                      Neuro: Awake alert  	Skin: Warm and Dry   	Vascular access: no hd catheter          LEXUS forbes  LABS/STUDIES  --------------------------------------------------------------------------------              11.6   9.62  >-----------<  476      [08-18-20 @ 07:32]              35.5     142  |  98  |  52  ----------------------------<  166      [08-18-20 @ 07:32]  3.3   |  30  |  4.27        Ca     8.6     [08-18-20 @ 07:32]            Creatinine Trend:  SCr 4.27 [08-18 @ 07:32]  SCr 5.71 [08-17 @ 06:28]  SCr 6.80 [08-16 @ 06:48]  SCr 6.61 [08-15 @ 21:03]  SCr 7.16 [08-15 @ 07:32]    Urinalysis - [08-14-20 @ 09:20]      Color Light Yellow / Appearance Clear / SG 1.006 / pH 6.5      Gluc Negative / Ketone Negative  / Bili Negative / Urobili <2 mg/dL       Blood Negative / Protein 30 mg/dL / Leuk Est Negative / Nitrite Negative      RBC 11 / WBC 1 / Hyaline 0 / Gran  / Sq Epi  / Non Sq Epi 1 / Bacteria Negative    Urine Creatinine 25      [08-15-20 @ 12:37]  Urine Sodium 79      [08-15-20 @ 12:37]  Urine Chloride <35      [08-14-20 @ 07:24]  Urine Osmolality 147      [08-14-20 @ 09:20]    TSH 1.29      [08-16-20 @ 09:36]  Lipid: chol 149, , HDL 38, LDL 77      [08-12-20 @ 12:15]    HCV 0.11, Nonreact      [08-13-20 @ 09:04] Patient alert and oriented x 4.  Hypotensive, asymptomatic. Denies CP/SOB/Palpitaions/Dizziness or lightheadedness.

## 2020-08-19 NOTE — REASON FOR VISIT
[Follow-Up Visit] : a follow-up visit for [FreeTextEntry2] : primary osteoarthritis of left shoulder

## 2020-08-19 NOTE — PHYSICAL EXAM
[de-identified] : \par \par The left shoulder has has decreased range of motion with 160 degrees forward flexion, 90 degrees abduction, 60 degrees external rotation, internal rotation to the lumbosacral junction.  Rotator cuff strength is good.   there is no soft tissue swelling.  There is no tenderness to palpation. There is no eccyhmosis.  There is no erythema or warmth.   There is a negative impingement sign.  There is a negative Hawkings sign.   Rotator cuff strength is normal.  There is no instability.  No lymphadenopathy or edema is noted.  Pulses and capillary refill are normal.  Sensation is normal.

## 2020-08-19 NOTE — HISTORY OF PRESENT ILLNESS
[de-identified] : This patient presents today to discuss surgery for the left shoulder.  Pain level is minimal however patient is noting significant subluxations and dislocations of the left shoulder.  Patient scheduled to have a reverse total shoulder replacement this week however patient feels that she cannot go ahead with the surgery this week.  Patient notes pain and discomfort with movement which is mild.  She continues to complain of significant mechanical symptoms and subluxation/dislocation.  She takes Tylenol intermittently for the pain.

## 2020-08-19 NOTE — DISCUSSION/SUMMARY
[de-identified] : The patient's diagnosis and planned surgical procedure were discussed with her at length again.  She would like to hold off on the surgery until after the end of the summer.  She is going to reschedule surgery sometime in September.  The risks benefits and alternative treatment plans of the surgical procedure were explained to the patient. The patient had the opportunity to ask any questions which answered to their satisfaction. The patient will schedule surgery at their convenience.

## 2020-09-03 VITALS
OXYGEN SATURATION: 99 % | TEMPERATURE: 98 F | SYSTOLIC BLOOD PRESSURE: 99 MMHG | WEIGHT: 199.96 LBS | RESPIRATION RATE: 14 BRPM | HEART RATE: 90 BPM | DIASTOLIC BLOOD PRESSURE: 84 MMHG | HEIGHT: 64 IN

## 2020-09-03 NOTE — H&P PST ADULT - NSICDXPROBLEM_GEN_ALL_CORE_FT
PROBLEM DIAGNOSES  Problem: Osteoarthritis of left shoulder  Assessment and Plan: Left reverse total shoulder replacement is scheduled for 9/10/2020 with physical exam on admission.  Medical clearance is pending.  patient refuses [facility] COVID19 nasal testing.  Janay at Dr Pham's office was notified.  patient also instructed to obtain chlorhexadine from pharmacy.  pr eop instructions were reviewed; contact info given; best wishes offered.

## 2020-09-03 NOTE — H&P PST ADULT - NEGATIVE ENMT SYMPTOMS
no nasal obstruction/no abnormal taste sensation/no recurrent cold sores/no throat pain/no dysphagia/no dry mouth/no tinnitus/no nasal discharge/no post-nasal discharge/no nose bleeds/no hearing difficulty/no ear pain/no nasal congestion/no vertigo/no sinus symptoms/no gum bleeding

## 2020-09-03 NOTE — H&P PST ADULT - ASSESSMENT
68 yo female is scheduled for left reverse total shoulder replacement on 9/10/2020 with Dr Pham at [facility]. 70 yo female is scheduled for left reverse total shoulder replacement on 9/10/2020 with Dr Pham at [facility].        COVID test negative

## 2020-09-03 NOTE — H&P PST ADULT - NSICDXPASTMEDICALHX_GEN_ALL_CORE_FT
PAST MEDICAL HISTORY:  Anxiety     Class 1 obesity with body mass index (BMI) of 34.0 to 34.9 in adult     History of closed shoulder dislocation left shoulder 2019    HLD (hyperlipidemia)     Osteoarthritis of left shoulder

## 2020-09-03 NOTE — H&P PST ADULT - HISTORY OF PRESENT ILLNESS
70 yo female is scheduled for left shoulder total shoulder replacement on 9/10/2020 with Dr Pham at [facility].    Reports left shoulder dislocation for many years for which she is advised left reverse total shoulder replacement on 9/10/2020 with Dr Pham.

## 2020-09-06 DIAGNOSIS — Z01.818 ENCOUNTER FOR OTHER PREPROCEDURAL EXAMINATION: ICD-10-CM

## 2020-09-08 ENCOUNTER — OUTPATIENT (OUTPATIENT)
Dept: OUTPATIENT SERVICES | Facility: HOSPITAL | Age: 70
LOS: 1 days | End: 2020-09-08

## 2020-09-08 ENCOUNTER — APPOINTMENT (OUTPATIENT)
Dept: DISASTER EMERGENCY | Facility: CLINIC | Age: 70
End: 2020-09-08

## 2020-09-08 DIAGNOSIS — Z11.59 ENCOUNTER FOR SCREENING FOR OTHER VIRAL DISEASES: ICD-10-CM

## 2020-09-08 DIAGNOSIS — M17.11 UNILATERAL PRIMARY OSTEOARTHRITIS, RIGHT KNEE: ICD-10-CM

## 2020-09-08 DIAGNOSIS — M75.102 UNSPECIFIED ROTATOR CUFF TEAR OR RUPTURE OF LEFT SHOULDER, NOT SPECIFIED AS TRAUMATIC: ICD-10-CM

## 2020-09-08 DIAGNOSIS — M19.012 PRIMARY OSTEOARTHRITIS, LEFT SHOULDER: ICD-10-CM

## 2020-09-08 DIAGNOSIS — Z01.818 ENCOUNTER FOR OTHER PREPROCEDURAL EXAMINATION: ICD-10-CM

## 2020-09-08 DIAGNOSIS — Z96.651 PRESENCE OF RIGHT ARTIFICIAL KNEE JOINT: Chronic | ICD-10-CM

## 2020-09-09 ENCOUNTER — TRANSCRIPTION ENCOUNTER (OUTPATIENT)
Age: 70
End: 2020-09-09

## 2020-09-09 LAB — SARS-COV-2 N GENE NPH QL NAA+PROBE: NOT DETECTED

## 2020-09-10 ENCOUNTER — RESULT REVIEW (OUTPATIENT)
Age: 70
End: 2020-09-10

## 2020-09-10 ENCOUNTER — APPOINTMENT (OUTPATIENT)
Dept: ORTHOPEDIC SURGERY | Facility: HOSPITAL | Age: 70
End: 2020-09-10

## 2020-09-10 ENCOUNTER — INPATIENT (INPATIENT)
Facility: HOSPITAL | Age: 70
LOS: 4 days | Discharge: NURSING, REHAB OR LONG-TERM CARE | DRG: 483 | End: 2020-09-15
Attending: ORTHOPAEDIC SURGERY | Admitting: ORTHOPAEDIC SURGERY
Payer: MEDICARE

## 2020-09-10 VITALS
OXYGEN SATURATION: 99 % | HEIGHT: 61 IN | WEIGHT: 196.21 LBS | RESPIRATION RATE: 18 BRPM | SYSTOLIC BLOOD PRESSURE: 99 MMHG | HEART RATE: 91 BPM | DIASTOLIC BLOOD PRESSURE: 84 MMHG | TEMPERATURE: 99 F

## 2020-09-10 DIAGNOSIS — Z96.651 PRESENCE OF RIGHT ARTIFICIAL KNEE JOINT: Chronic | ICD-10-CM

## 2020-09-10 DIAGNOSIS — Z01.818 ENCOUNTER FOR OTHER PREPROCEDURAL EXAMINATION: ICD-10-CM

## 2020-09-10 DIAGNOSIS — M17.11 UNILATERAL PRIMARY OSTEOARTHRITIS, RIGHT KNEE: ICD-10-CM

## 2020-09-10 DIAGNOSIS — M75.102 UNSPECIFIED ROTATOR CUFF TEAR OR RUPTURE OF LEFT SHOULDER, NOT SPECIFIED AS TRAUMATIC: ICD-10-CM

## 2020-09-10 DIAGNOSIS — R06.03 ACUTE RESPIRATORY DISTRESS: ICD-10-CM

## 2020-09-10 DIAGNOSIS — M19.012 PRIMARY OSTEOARTHRITIS, LEFT SHOULDER: ICD-10-CM

## 2020-09-10 DIAGNOSIS — Z11.59 ENCOUNTER FOR SCREENING FOR OTHER VIRAL DISEASES: ICD-10-CM

## 2020-09-10 LAB
ALBUMIN SERPL ELPH-MCNC: 3.4 G/DL — SIGNIFICANT CHANGE UP (ref 3.3–5)
ALP SERPL-CCNC: 82 U/L — SIGNIFICANT CHANGE UP (ref 30–120)
ALT FLD-CCNC: 60 U/L DA — SIGNIFICANT CHANGE UP (ref 10–60)
ANION GAP SERPL CALC-SCNC: 10 MMOL/L — SIGNIFICANT CHANGE UP (ref 5–17)
AST SERPL-CCNC: 59 U/L — HIGH (ref 10–40)
BASE EXCESS BLDA CALC-SCNC: -2.4 MMOL/L — LOW (ref -2–2)
BASE EXCESS BLDV CALC-SCNC: -2.1 MMOL/L — LOW (ref -2–2)
BILIRUB DIRECT SERPL-MCNC: 0.1 MG/DL — SIGNIFICANT CHANGE UP (ref 0–0.2)
BILIRUB INDIRECT FLD-MCNC: 0.2 MG/DL — SIGNIFICANT CHANGE UP (ref 0.2–1)
BILIRUB SERPL-MCNC: 0.3 MG/DL — SIGNIFICANT CHANGE UP (ref 0.2–1.2)
BLD GP AB SCN SERPL QL: SIGNIFICANT CHANGE UP
BLOOD GAS SOURCE: SIGNIFICANT CHANGE UP
BUN SERPL-MCNC: 22 MG/DL — SIGNIFICANT CHANGE UP (ref 7–23)
CALCIUM SERPL-MCNC: 8.4 MG/DL — SIGNIFICANT CHANGE UP (ref 8.4–10.5)
CHLORIDE SERPL-SCNC: 103 MMOL/L — SIGNIFICANT CHANGE UP (ref 96–108)
CO2 SERPL-SCNC: 26 MMOL/L — SIGNIFICANT CHANGE UP (ref 22–31)
CREAT SERPL-MCNC: 1.32 MG/DL — HIGH (ref 0.5–1.3)
GAS PNL BLDV: SIGNIFICANT CHANGE UP
GLUCOSE SERPL-MCNC: 139 MG/DL — HIGH (ref 70–99)
HCO3 BLDA-SCNC: 20 MMOL/L — LOW (ref 21–29)
HCO3 BLDV-SCNC: 21 MMOL/L — SIGNIFICANT CHANGE UP (ref 21–29)
HOROWITZ INDEX BLDA+IHG-RTO: 28 — SIGNIFICANT CHANGE UP
MAGNESIUM SERPL-MCNC: 1.5 MG/DL — LOW (ref 1.6–2.6)
NT-PROBNP SERPL-SCNC: 727 PG/ML — HIGH (ref 0–125)
PCO2 BLDA: 71 MMHG — CRITICAL HIGH (ref 32–46)
PCO2 BLDV: 52 MMHG — HIGH (ref 35–50)
PH BLD: 7.17 — CRITICAL LOW (ref 7.35–7.45)
PH BLDV: 7.28 — LOW (ref 7.35–7.45)
PHOSPHATE SERPL-MCNC: 5.1 MG/DL — HIGH (ref 2.5–4.5)
PO2 BLDA: 76 MMHG — SIGNIFICANT CHANGE UP (ref 74–108)
PO2 BLDV: 47 MMHG — HIGH (ref 25–45)
POTASSIUM SERPL-MCNC: 4.1 MMOL/L — SIGNIFICANT CHANGE UP (ref 3.5–5.3)
POTASSIUM SERPL-SCNC: 4.1 MMOL/L — SIGNIFICANT CHANGE UP (ref 3.5–5.3)
PROT SERPL-MCNC: 7.2 G/DL — SIGNIFICANT CHANGE UP (ref 6–8.3)
SAO2 % BLDA: 91 % — LOW (ref 92–96)
SAO2 % BLDV: 78 % — SIGNIFICANT CHANGE UP (ref 67–88)
SODIUM SERPL-SCNC: 139 MMOL/L — SIGNIFICANT CHANGE UP (ref 135–145)

## 2020-09-10 PROCEDURE — 88311 DECALCIFY TISSUE: CPT | Mod: 26

## 2020-09-10 PROCEDURE — 73030 X-RAY EXAM OF SHOULDER: CPT | Mod: 26,LT

## 2020-09-10 PROCEDURE — 71045 X-RAY EXAM CHEST 1 VIEW: CPT | Mod: 26,CS

## 2020-09-10 PROCEDURE — 99291 CRITICAL CARE FIRST HOUR: CPT

## 2020-09-10 PROCEDURE — 88305 TISSUE EXAM BY PATHOLOGIST: CPT | Mod: 26

## 2020-09-10 PROCEDURE — 23472 RECONSTRUCT SHOULDER JOINT: CPT | Mod: LT

## 2020-09-10 RX ORDER — MAGNESIUM HYDROXIDE 400 MG/1
30 TABLET, CHEWABLE ORAL DAILY
Refills: 0 | Status: DISCONTINUED | OUTPATIENT
Start: 2020-09-10 | End: 2020-09-15

## 2020-09-10 RX ORDER — HYDROMORPHONE HYDROCHLORIDE 2 MG/ML
0.5 INJECTION INTRAMUSCULAR; INTRAVENOUS; SUBCUTANEOUS
Refills: 0 | Status: DISCONTINUED | OUTPATIENT
Start: 2020-09-10 | End: 2020-09-11

## 2020-09-10 RX ORDER — CEFAZOLIN SODIUM 1 G
2000 VIAL (EA) INJECTION ONCE
Refills: 0 | Status: COMPLETED | OUTPATIENT
Start: 2020-09-10 | End: 2020-09-10

## 2020-09-10 RX ORDER — INFLUENZA VIRUS VACCINE 15; 15; 15; 15 UG/.5ML; UG/.5ML; UG/.5ML; UG/.5ML
0.5 SUSPENSION INTRAMUSCULAR ONCE
Refills: 0 | Status: DISCONTINUED | OUTPATIENT
Start: 2020-09-10 | End: 2020-09-15

## 2020-09-10 RX ORDER — ACETAMINOPHEN 500 MG
1000 TABLET ORAL EVERY 6 HOURS
Refills: 0 | Status: COMPLETED | OUTPATIENT
Start: 2020-09-10 | End: 2020-09-10

## 2020-09-10 RX ORDER — ONDANSETRON 8 MG/1
4 TABLET, FILM COATED ORAL ONCE
Refills: 0 | Status: DISCONTINUED | OUTPATIENT
Start: 2020-09-10 | End: 2020-09-10

## 2020-09-10 RX ORDER — SODIUM CHLORIDE 9 MG/ML
1000 INJECTION, SOLUTION INTRAVENOUS
Refills: 0 | Status: DISCONTINUED | OUTPATIENT
Start: 2020-09-10 | End: 2020-09-11

## 2020-09-10 RX ORDER — TRANEXAMIC ACID 100 MG/ML
1000 INJECTION, SOLUTION INTRAVENOUS ONCE
Refills: 0 | Status: COMPLETED | OUTPATIENT
Start: 2020-09-10 | End: 2020-09-10

## 2020-09-10 RX ORDER — ALPRAZOLAM 0.25 MG
1 TABLET ORAL
Refills: 0 | Status: DISCONTINUED | OUTPATIENT
Start: 2020-09-10 | End: 2020-09-10

## 2020-09-10 RX ORDER — OXYCODONE HYDROCHLORIDE 5 MG/1
10 TABLET ORAL
Refills: 0 | Status: DISCONTINUED | OUTPATIENT
Start: 2020-09-10 | End: 2020-09-11

## 2020-09-10 RX ORDER — POLYETHYLENE GLYCOL 3350 17 G/17G
17 POWDER, FOR SOLUTION ORAL DAILY
Refills: 0 | Status: DISCONTINUED | OUTPATIENT
Start: 2020-09-10 | End: 2020-09-15

## 2020-09-10 RX ORDER — CHLORHEXIDINE GLUCONATE 213 G/1000ML
1 SOLUTION TOPICAL ONCE
Refills: 0 | Status: COMPLETED | OUTPATIENT
Start: 2020-09-10 | End: 2020-09-10

## 2020-09-10 RX ORDER — PANTOPRAZOLE SODIUM 20 MG/1
40 TABLET, DELAYED RELEASE ORAL
Refills: 0 | Status: DISCONTINUED | OUTPATIENT
Start: 2020-09-10 | End: 2020-09-15

## 2020-09-10 RX ORDER — SODIUM CHLORIDE 9 MG/ML
1000 INJECTION, SOLUTION INTRAVENOUS
Refills: 0 | Status: DISCONTINUED | OUTPATIENT
Start: 2020-09-10 | End: 2020-09-10

## 2020-09-10 RX ORDER — CEFAZOLIN SODIUM 1 G
2000 VIAL (EA) INJECTION EVERY 8 HOURS
Refills: 0 | Status: COMPLETED | OUTPATIENT
Start: 2020-09-10 | End: 2020-09-11

## 2020-09-10 RX ORDER — SODIUM CHLORIDE 9 MG/ML
1000 INJECTION, SOLUTION INTRAVENOUS ONCE
Refills: 0 | Status: COMPLETED | OUTPATIENT
Start: 2020-09-10 | End: 2020-09-10

## 2020-09-10 RX ORDER — SODIUM CHLORIDE 9 MG/ML
500 INJECTION INTRAMUSCULAR; INTRAVENOUS; SUBCUTANEOUS ONCE
Refills: 0 | Status: COMPLETED | OUTPATIENT
Start: 2020-09-10 | End: 2020-09-10

## 2020-09-10 RX ORDER — SENNA PLUS 8.6 MG/1
2 TABLET ORAL AT BEDTIME
Refills: 0 | Status: DISCONTINUED | OUTPATIENT
Start: 2020-09-10 | End: 2020-09-15

## 2020-09-10 RX ORDER — OXYCODONE HYDROCHLORIDE 5 MG/1
5 TABLET ORAL
Refills: 0 | Status: DISCONTINUED | OUTPATIENT
Start: 2020-09-10 | End: 2020-09-15

## 2020-09-10 RX ORDER — HYDROMORPHONE HYDROCHLORIDE 2 MG/ML
0.5 INJECTION INTRAMUSCULAR; INTRAVENOUS; SUBCUTANEOUS
Refills: 0 | Status: DISCONTINUED | OUTPATIENT
Start: 2020-09-10 | End: 2020-09-10

## 2020-09-10 RX ORDER — APREPITANT 80 MG/1
40 CAPSULE ORAL ONCE
Refills: 0 | Status: DISCONTINUED | OUTPATIENT
Start: 2020-09-10 | End: 2020-09-10

## 2020-09-10 RX ORDER — ONDANSETRON 8 MG/1
4 TABLET, FILM COATED ORAL EVERY 6 HOURS
Refills: 0 | Status: DISCONTINUED | OUTPATIENT
Start: 2020-09-10 | End: 2020-09-11

## 2020-09-10 RX ORDER — ACETAMINOPHEN 500 MG
1000 TABLET ORAL ONCE
Refills: 0 | Status: COMPLETED | OUTPATIENT
Start: 2020-09-10 | End: 2020-09-10

## 2020-09-10 RX ORDER — SODIUM CHLORIDE 9 MG/ML
1000 INJECTION INTRAMUSCULAR; INTRAVENOUS; SUBCUTANEOUS ONCE
Refills: 0 | Status: COMPLETED | OUTPATIENT
Start: 2020-09-10 | End: 2020-09-10

## 2020-09-10 RX ADMIN — SODIUM CHLORIDE 500 MILLILITER(S): 9 INJECTION INTRAMUSCULAR; INTRAVENOUS; SUBCUTANEOUS at 23:44

## 2020-09-10 RX ADMIN — OXYCODONE HYDROCHLORIDE 5 MILLIGRAM(S): 5 TABLET ORAL at 23:35

## 2020-09-10 RX ADMIN — Medication 400 MILLIGRAM(S): at 22:20

## 2020-09-10 RX ADMIN — SODIUM CHLORIDE 75 MILLILITER(S): 9 INJECTION, SOLUTION INTRAVENOUS at 12:59

## 2020-09-10 RX ADMIN — OXYCODONE HYDROCHLORIDE 5 MILLIGRAM(S): 5 TABLET ORAL at 15:20

## 2020-09-10 RX ADMIN — SODIUM CHLORIDE 100 MILLILITER(S): 9 INJECTION, SOLUTION INTRAVENOUS at 19:18

## 2020-09-10 RX ADMIN — Medication 1000 MILLIGRAM(S): at 22:41

## 2020-09-10 RX ADMIN — SODIUM CHLORIDE 2000 MILLILITER(S): 9 INJECTION INTRAMUSCULAR; INTRAVENOUS; SUBCUTANEOUS at 21:00

## 2020-09-10 RX ADMIN — SODIUM CHLORIDE 1000 MILLILITER(S): 9 INJECTION, SOLUTION INTRAVENOUS at 17:28

## 2020-09-10 RX ADMIN — CHLORHEXIDINE GLUCONATE 1 APPLICATION(S): 213 SOLUTION TOPICAL at 07:58

## 2020-09-10 RX ADMIN — SODIUM CHLORIDE 1000 MILLILITER(S): 9 INJECTION, SOLUTION INTRAVENOUS at 15:57

## 2020-09-10 RX ADMIN — OXYCODONE HYDROCHLORIDE 5 MILLIGRAM(S): 5 TABLET ORAL at 23:04

## 2020-09-10 RX ADMIN — Medication 100 MILLIGRAM(S): at 17:27

## 2020-09-10 RX ADMIN — OXYCODONE HYDROCHLORIDE 5 MILLIGRAM(S): 5 TABLET ORAL at 14:22

## 2020-09-10 RX ADMIN — Medication 400 MILLIGRAM(S): at 17:08

## 2020-09-10 NOTE — CONSULT NOTE ADULT - SUBJECTIVE AND OBJECTIVE BOX
Date/Time Patient Seen:  		  Referring MD:   Data Reviewed	       Patient is a 69y old  Female who presents with a chief complaint of Left Reverse Shoulder Replacement (10 Sep 2020 12:27)      Subjective/HPI  known to me from admission  on AVAPS  in [facility]  post op  verbal  alert  h and p reviewed  old records reviewed  70 y/o M with PMH of obesity BMI 36, Anxiety, HLD, HTN, OA admitted post op after revere left shoulder replacement.  ·  PRE-OP DIAGNOSIS:  DJD of shoulder 10-Sep-2020 11:23:19 left Halie Lynch.  ·  POST-OP DIAGNOSIS:  DJD of shoulder 10-Sep-2020 11:23:38 left Halie Lynch.  ·  PROCEDURES:  Total shoulder arthroplasty 10-Sep-2020 11:22:50 left reverse Halie Lynch.    68 yo female is scheduled for left shoulder total shoulder replacement on 9/10/2020 with Dr Pham at [facility].    Reports left shoulder dislocation for many years for which she is advised left reverse total shoulder replacement on 9/10/2020 with Dr Pham.      PAST SURGICAL HISTORY:  History of right knee joint replacement 2019.     FAMILY HISTORY:  FH: heart attack, mother.     Social History:  · Marital Status	  · Lives With	alone  · Notes	2 children     Substance Use History:  · Substance Use	caffeine  · Caffeine Type	coffee  · Caffeine Amount/Frequency	1-2 cups/cans per day     Alcohol Use History:  · Have you ever consumed alcohol	yes...  · Alcohol Frequency	daily  · Alcohol Amount	1-2 drinks  · Problems Related to Alcohol Use	no  · 1. Have you felt you ought to CUT down on your drinking?	no  · 2. Have people ANNOYED you by criticizing your drinking?	no  · 3. Have you ever felt bad or GUILTY about your drinking?	no  · 4. Have you ever needed an "EYE OPENER", a drink first thing in the morning to steady your nerves or get rid of a hangover?	no     Tobacco Usage:  · Tobacco Usage: Former smoker  · Tobacco Type: cigarettes  quit 2014  · Number of Packs per Day: 1  · Number of yrs: 40  · Pack yrs: 40    Presurgical Screening:    Cardiovascular:  · Activity	able to climb stairs  · Energy expenditure (mets)	>4 METS  · Symptoms	none     Sleep Apnea Screening:  Confirmed Obstructive Sleep Apnea (IAN)?: No. IAN screening performed.  STOP BANG Legend: 0-2 = LOW Risk; 3-4 = INTERMEDIATE Risk; 5-8 = HIGH Risk  S - Do you SNORE loudly (louder than talking or loud enough to be heard through closed doors): No  T - Are you TIRED, fatigued, or sleepy during the daytime: No  O - Has anyone OBSERVED you stop breathing during your sleep: No  P - Do you have or are you being treated for high blood PRESSURE: No  B - BMI greater than 35 kG/m2: No  A - AGE over 50 years old: Yes  N - NECK circumference: No  G - GENDER male: No  STOP BANG Score: 1     Airway:  · Airway	normal  · Mallampati Score	Class II - visualization of the soft palate, fauces, and uvula  · Dentition	normal     Anesthesia History:  · Previous Reaction to Anesthesia	none     Transfusion History:  · Previous Blood Transfusion	no    Core Measures/Disease Management:    Heart Failure:  Does this patient have a history of or has been diagnosed with heart failure? no.     PAST MEDICAL & SURGICAL HISTORY:  Osteoarthritis of left shoulder  Class 1 obesity with body mass index (BMI) of 34.0 to 34.9 in adult  History of closed shoulder dislocation: left shoulder 2019  Osteoarthritis: Right knee  Anxiety  HLD (hyperlipidemia)  Essential hypertension: Not on medication x 1 year  No pertinent past medical history  History of right knee joint replacement: 2019  No significant past surgical history  No significant past surgical history        Medication list         MEDICATIONS  (STANDING):  acetaminophen  IVPB .. 1000 milliGRAM(s) IV Intermittent every 6 hours  ceFAZolin   IVPB 2000 milliGRAM(s) IV Intermittent every 8 hours  influenza   Vaccine 0.5 milliLiter(s) IntraMuscular once  lactated ringers Bolus 1000 milliLiter(s) IV Bolus once  lactated ringers. 1000 milliLiter(s) (100 mL/Hr) IV Continuous <Continuous>  pantoprazole    Tablet 40 milliGRAM(s) Oral before breakfast  polyethylene glycol 3350 17 Gram(s) Oral daily    MEDICATIONS  (PRN):  aluminum hydroxide/magnesium hydroxide/simethicone Suspension 30 milliLiter(s) Oral four times a day PRN Indigestion  HYDROmorphone  Injectable 0.5 milliGRAM(s) IV Push every 3 hours PRN breakthrough  LORazepam   Injectable 2 milliGRAM(s) IV Push every 2 hours PRN Symptom-triggered: 2 point increase in CIWA -Ar score and a total score of 7 or LESS  magnesium hydroxide Suspension 30 milliLiter(s) Oral daily PRN Constipation  ondansetron Injectable 4 milliGRAM(s) IV Push every 6 hours PRN Nausea and/or Vomiting  oxyCODONE    IR 5 milliGRAM(s) Oral every 3 hours PRN Moderate Pain (4 - 6)  oxyCODONE    IR 10 milliGRAM(s) Oral every 3 hours PRN Severe Pain (7 - 10)  senna 2 Tablet(s) Oral at bedtime PRN Constipation         Vitals log        ICU Vital Signs Last 24 Hrs  T(C): 36.7 (10 Sep 2020 13:58), Max: 37.1 (10 Sep 2020 07:38)  T(F): 98 (10 Sep 2020 13:58), Max: 98.7 (10 Sep 2020 07:38)  HR: 102 (10 Sep 2020 15:00) (78 - 120)  BP: 114/100 (10 Sep 2020 14:08) (97/59 - 130/98)  BP(mean): 107 (10 Sep 2020 14:08) (87 - 107)  ABP: --  ABP(mean): --  RR: 25 (10 Sep 2020 15:00) (15 - 29)  SpO2: 92% (10 Sep 2020 15:00) (91% - 99%)           Input and Output:  I&O's Detail    10 Sep 2020 07:01  -  10 Sep 2020 15:50  --------------------------------------------------------  IN:    lactated ringers.: 1250 mL  Total IN: 1250 mL    OUT:    Accordian: 50 mL    Estimated Blood Loss: 200 mL    Voided: 600 mL  Total OUT: 850 mL    Total NET: 400 mL          Lab Data          ABG - ( 10 Sep 2020 13:42 )  pH, Arterial: x     pH, Blood: 7.17  /  pCO2: 71    /  pO2: 76    / HCO3: 20    / Base Excess: -2.4  /  SaO2: 91                      Review of Systems	  resp distress      Objective     Physical Examination    heart s1s2  lung dec BS  abd soft  obese  verbal  on AVAPS device - mask -     Pertinent Lab findings & Imaging      Selena:  NO   Adequate UO     I&O's Detail    10 Sep 2020 07:01  -  10 Sep 2020 15:50  --------------------------------------------------------  IN:    lactated ringers.: 1250 mL  Total IN: 1250 mL    OUT:    Accordian: 50 mL    Estimated Blood Loss: 200 mL    Voided: 600 mL  Total OUT: 850 mL    Total NET: 400 mL               Discussed with:     Cultures:	        Radiology      EXAM:  MR SHOULDER LT        PROCEDURE DATE:  06/30/2020           INTERPRETATION:  CLINICAL INDICATION: Left-sided shoulder pain.    Multiplanar Multisequence MR of the left shoulder.     Prior Studies: Similar 2019.    FINDINGS:    ROTATOR CUFF: The supraspinatus tendon is again noted to be diffusely thinned and attenuated. There is redemonstration of a full-thickness tear at the anterior/mid insertional fibers measuring approximately 0.7 cm in transverse dimension and approximately 0.7 cm in anteroposterior dimension. This appears essentially unchanged compared to the prior examination. There is redemonstration of mild to moderate insertional tendinosis of the infraspinatus tendon without discrete tear. Enthesopathic change is noted throughout the greater tuberosity. There is moderate tendinosis of the subscapularis tendon with areas of intrasubstance tearing along the mid to caudal insertional fibers. This is grossly unchanged compared to the prior examination.    MUSCLES: There is moderate fatty infiltration involving the subscapularis muscle. Mild fatty infiltration of the supraspinatus and infraspinatus muscles is also noted.    LONG HEAD BICEPS TENDON: The proximal extra articular portion of the long head biceps tendon is subluxed medially and perched on the lesser tuberosity. This is unchanged. There is mild tendinosis of the intra-articular portion of long head biceps tendon, unchanged    GLENOHUMERAL JOINT: There is severe glenohumeral arthrosis with extensive full-thickness cartilage loss throughout the humeral head and along the central to inferior aspect of the glenoid. Exuberant osteophyte formation is seen about the inferomedial aspect of the humeral head and about the glenoid rim. There is diffuse degeneration of the glenoid labrum without a detached or displaced labral tear. Findings are grossly unchanged. The glenoid insertion of the anterior band of the inferior glenohumeral ligament appears discontinuous.    SYNOVIUM: There is interval development of a large glenohumeral joint effusion with fluid decompressing into the subacromial/subdeltoid bursa. There is decompression of joint fluid along the anterior aspect of the joint and medially along the anterior rim of the glenoid likely related to deficiency of the anterior band of the inferior glenohumeral ligament.    ACROMIOCLAVICULAR JOINT: There is moderate acromioclavicular joint arthrosis.    MARROW: There is no acute fracture or osteonecrosis.    NERVES: Intact.    SUBCUTANEOUS TISSUES: Unremarkable.    IMPRESSION:     Severe glenohumeral arthrosis which is grossly unchanged compared to the prior examination. Interval development of a large glenohumeral joint effusion with synovitis. There is decompression of joint fluid along the anteroinferior aspect of the joint. This is likely secondary to deficiency of the glenoid insertion of the anterior band of the inferior glenohumeral ligament.    Unchanged focal full-thickness tear involving the anterior/mid insertion of the supraspinatus tendon. Unchanged moderate insertional tendinosis of the subscapularis tendon with areas of intrasubstance tearing. Unchanged moderate fatty infiltration of the subcutaneous scapularis muscle and mild fatty infiltration of the supraspinatus and infraspinatus muscles.    Unchanged medial subluxation of the proximal extra articular portion long head biceps tendon.                   BELLA LINTON M.D., ATTENDING RADIOLOGIST   This document has been electronically signed. Jul 1 2020  2:15PM                EXAM:  XR CHEST PORTABLE IMMED 1V                                  PROCEDURE DATE:  09/10/2020          INTERPRETATION:  AP erect chest on September 10, 2020 at 1:19 PM. Patient is hypoxic postop. Covid virus test was negative on September 6. Patient had shoulder surgery today. Patient was on long-term anticoagulation. Patient has hypertension.    Heart may be enlarged. The aorta is somewhat ectatic.    There is a reverse left shoulder replacement new since December 18, 2019.    There is a mild left base effusion slightly increased from prior.    IMPRESSION: Mild left base effusion slightly increased from prior.                SCARLETT CONRAD M.D., ATTENDING RADIOLOGIST  This document has been electronically signed. Sep 10 2020  2:11PM

## 2020-09-10 NOTE — SWALLOW BEDSIDE ASSESSMENT ADULT - COMMENTS
Consult received and chart reviewed. Patient is a "70 y/o F with PMH of obesity BMI 36, Anxiety, HLD, HTN, OA admitted post op after revere left shoulder replacement. Patient seen and examined at bedside. outpatient notes, clearances reviewed. Perioperative Course reviewed. Page by nursing, report given patient was agitated and altered after surgery."    Discussed with MD via phone that bedside swallow evaluation be performed on 9/11 given patient remains in the PACU and is currently on BiPAP. MD in agreement. This department to f/u on 9/11. Consult received and chart reviewed. Patient is a "70 y/o F with PMH of obesity BMI 36, Anxiety, HLD, HTN, OA admitted post op after revere left shoulder replacement. Patient seen and examined at bedside. outpatient notes, clearances reviewed. Perioperative Course reviewed. Page by nursing, report given patient was agitated and altered after surgery."    Discussed with MD via phone that bedside swallow evaluation be performed on 9/11 given patient remains in the PACU and per MD report, patient is currently on BiPAP. MD in agreement. This department to f/u on 9/11.

## 2020-09-10 NOTE — CONSULT NOTE ADULT - SUBJECTIVE AND OBJECTIVE BOX
68 y/o M with PMH of obesity BMI 36, Anxiety, HLD, HTN, OA admitted post op after revere left shoulder replacement.    Patient seen and examined at bedside. outpatient notes, clearances reviewed.   ALLERGIES  No Known Allergies  NSAIDs (Other)    PAST MEDICAL AND SURGICAL  Primary osteoarthritis of right knee  Encounter for other preprocedural examination  Special screening examination for other specified viral diseases  Tear of left rotator cuff  Primary osteoarthritis of left shoulder  FH: heart attack  No pertinent family history in first degree relatives  Handoff  Osteoarthritis of left shoulder  Class 1 obesity with body mass index (BMI) of 34.0 to 34.9 in adult  History of closed shoulder dislocation  Osteoarthritis  Anxiety  HLD (hyperlipidemia)  Essential hypertension  No pertinent past medical history  DJD of shoulder  DJD of shoulder  Total shoulder arthroplasty  History of right knee joint replacement  No significant past surgical history  No significant past surgical history      FAMILY HISTORY  FH: heart attack  No pertinent family history in first degree relatives      SOCIAL HX: denies smoking alcohol recreational drugs    Home meds:  acetaminophen 500 mg oral tablet  Lipitor 40 mg oral tablet  Xanax 1 mg oral tablet      Review of Systems:  General:denies fever chills, headache, weakness  HEENT: denies blurry vision,diffculty swallowing, difficulty hearing, tinnitus  Cardiovascular: denies chest pain  ,palpitations  Pulmonary:denies shortness of breath, cough, wheezing, hemoptysis  Gastrointestinal: denies abdominal pain, constipation, diarrhea,nausea , vomiting, hematochezia  : denies hematuria, dysuria, or incontinence  Neurological: denies weakness, numbness , tingling, dizziness, tremors  MSK: denies muscle pain, difficulty ambulating, swelling, back pain  skin: denies skin rash, itching, burning, or  skin lesions  Psychiatrical: denies mood disturbances, anxierty, feeling depressed, depression , or difficulty sleeping    Objective:  Vitals  T(C): 36.9 (09-10-20 @ 11:50), Max: 37.1 (09-10-20 @ 07:38)  HR: 110 (09-10-20 @ 11:50) (91 - 110)  BP: 97/59 (09-10-20 @ 11:50) (97/59 - 99/84)  RR: 15 (09-10-20 @ 11:50) (15 - 18)  SpO2: 96% (09-10-20 @ 11:50) (96% - 99%)    Physical Exam:  General: comfortable, no acute distress, well nourished  HEENT: Atraumatic, no LAD, trachea midline, PERRLA  Cardiovascular: normal s1s2, no murmurs, gallops or fricition rubs  Pulmonary: clear to ausculation Bilaterally, no wheezing , rhonchi  Gastrointestinal: soft non tender non distended, no masses felt, no organomegally  Muscloskeletal: no lower extremity edema, intact bilateral lower extremity pulses  Neurological: CN II-12 intact. No focal weakness  Psychiatrical: normal mood, cooperative  SKIN: no rash, lesions or ulcers    Labs:                    Active Medications  MEDICATIONS  (STANDING):  influenza   Vaccine 0.5 milliLiter(s) IntraMuscular once  lactated ringers. 1000 milliLiter(s) (75 mL/Hr) IV Continuous <Continuous>    MEDICATIONS  (PRN):  HYDROmorphone  Injectable 0.5 milliGRAM(s) IV Push every 10 minutes PRN Moderate Pain (4 - 6)  ondansetron Injectable 4 milliGRAM(s) IV Push once PRN Nausea and/or Vomiting 68 y/o M with PMH of obesity BMI 36, Anxiety, HLD, HTN, OA admitted post op after revere left shoulder replacement.    Patient seen and examined at bedside. outpatient notes, clearances reviewed.   Perioperative Course reviewed.   ALLERGIES  No Known Allergies  NSAIDs (Other)    PAST MEDICAL AND SURGICAL  Primary osteoarthritis of right knee  Encounter for other preprocedural examination  Special screening examination for other specified viral diseases  Tear of left rotator cuff  Primary osteoarthritis of left shoulder  FH: heart attack  No pertinent family history in first degree relatives  Handoff  Osteoarthritis of left shoulder  Class 1 obesity with body mass index (BMI) of 34.0 to 34.9 in adult  History of closed shoulder dislocation  Osteoarthritis  Anxiety  HLD (hyperlipidemia)  Essential hypertension  No pertinent past medical history  DJD of shoulder  DJD of shoulder  Total shoulder arthroplasty  History of right knee joint replacement  No significant past surgical history  No significant past surgical history      FAMILY HISTORY  FH: heart attack  No pertinent family history in first degree relatives      SOCIAL HX: denies smoking alcohol recreational drugs    Home meds:  acetaminophen 500 mg oral tablet  Lipitor 40 mg oral tablet  Xanax 1 mg oral tablet      Review of Systems:  General:denies fever chills, headache, weakness  HEENT: denies blurry vision,diffculty swallowing, difficulty hearing, tinnitus  Cardiovascular: denies chest pain  ,palpitations  Pulmonary:denies shortness of breath, cough, wheezing, hemoptysis  Gastrointestinal: denies abdominal pain, constipation, diarrhea,nausea , vomiting, hematochezia  : denies hematuria, dysuria, or incontinence  Neurological: denies weakness, numbness , tingling, dizziness, tremors  MSK: denies muscle pain, difficulty ambulating, swelling, back pain  skin: denies skin rash, itching, burning, or  skin lesions  Psychiatrical: denies mood disturbances, anxierty, feeling depressed, depression , or difficulty sleeping    Objective:  Vitals  T(C): 36.9 (09-10-20 @ 11:50), Max: 37.1 (09-10-20 @ 07:38)  HR: 110 (09-10-20 @ 11:50) (91 - 110)  BP: 97/59 (09-10-20 @ 11:50) (97/59 - 99/84)  RR: 15 (09-10-20 @ 11:50) (15 - 18)  SpO2: 96% (09-10-20 @ 11:50) (96% - 99%)    Physical Exam:  General: comfortable, no acute distress, well nourished  HEENT: Atraumatic, no LAD, trachea midline, PERRLA  Cardiovascular: normal s1s2, no murmurs, gallops or fricition rubs  Pulmonary: clear to ausculation Bilaterally, no wheezing , rhonchi  Gastrointestinal: soft non tender non distended, no masses felt, no organomegally  Muscloskeletal: no lower extremity edema, intact bilateral lower extremity pulses  Neurological: CN II-12 intact. No focal weakness  Psychiatrical: normal mood, cooperative  SKIN: no rash, lesions or ulcers    Labs:                    Active Medications  MEDICATIONS  (STANDING):  influenza   Vaccine 0.5 milliLiter(s) IntraMuscular once  lactated ringers. 1000 milliLiter(s) (75 mL/Hr) IV Continuous <Continuous>    MEDICATIONS  (PRN):  HYDROmorphone  Injectable 0.5 milliGRAM(s) IV Push every 10 minutes PRN Moderate Pain (4 - 6)  ondansetron Injectable 4 milliGRAM(s) IV Push once PRN Nausea and/or Vomiting 70 y/o M with PMH of obesity BMI 36, Anxiety, HLD, HTN, OA admitted post op after revere left shoulder replacement.    Patient seen and examined at bedside. outpatient notes, clearances reviewed.   Perioperative Course reviewed.   Page by nursing, report given patient was agitated and altered after surgery    Patient seen and examined at bedside. patient obtunded lethargic, alert. eyes open.   vitals over tele; tachycardic. borderline normal sats:  94 on room air.  O.E: lungs decreased    ALLERGIES  No Known Allergies  NSAIDs (Other)    PAST MEDICAL AND SURGICAL  Primary osteoarthritis of right knee  Encounter for other preprocedural examination  Special screening examination for other specified viral diseases  Tear of left rotator cuff  Primary osteoarthritis of left shoulder  FH: heart attack  No pertinent family history in first degree relatives  Handoff  Osteoarthritis of left shoulder  Class 1 obesity with body mass index (BMI) of 34.0 to 34.9 in adult  History of closed shoulder dislocation  Osteoarthritis  Anxiety  HLD (hyperlipidemia)  Essential hypertension  No pertinent past medical history  DJD of shoulder  DJD of shoulder  Total shoulder arthroplasty  History of right knee joint replacement  No significant past surgical history  No significant past surgical history      FAMILY HISTORY  FH: heart attack  No pertinent family history in first degree relatives      SOCIAL HX: denies smoking alcohol recreational drugs    Home meds:  acetaminophen 500 mg oral tablet  Lipitor 40 mg oral tablet  Xanax 1 mg oral tablet      Review of Systems:  General:denies fever chills, headache, weakness  HEENT: denies blurry vision,diffculty swallowing, difficulty hearing, tinnitus  Cardiovascular: denies chest pain  ,palpitations  Pulmonary:denies shortness of breath, cough, wheezing, hemoptysis  Gastrointestinal: denies abdominal pain, constipation, diarrhea,nausea , vomiting, hematochezia  : denies hematuria, dysuria, or incontinence  Neurological: denies weakness, numbness , tingling, dizziness, tremors  MSK: denies muscle pain, difficulty ambulating, swelling, back pain  skin: denies skin rash, itching, burning, or  skin lesions  Psychiatrical: denies mood disturbances, anxierty, feeling depressed, depression , or difficulty sleeping    Objective:  Vitals  T(C): 36.9 (09-10-20 @ 11:50), Max: 37.1 (09-10-20 @ 07:38)  HR: 110 (09-10-20 @ 11:50) (91 - 110)  BP: 97/59 (09-10-20 @ 11:50) (97/59 - 99/84)  RR: 15 (09-10-20 @ 11:50) (15 - 18)  SpO2: 96% (09-10-20 @ 11:50) (96% - 99%)    Physical Exam:  General: alert, lethargic  HEENT: Atraumatic, no LAD, trachea midline, PERRLA  Cardiovascular: tachycardic  no murmurs, gallops or fricition rubs  Pulmonary: decreased, no wheezing , rhonchi  Gastrointestinal: soft non tender non distended, no masses felt, no organomegally  Muscloskeletal: no lower extremity edema, intact bilateral lower extremity pulses  Neurological: CN II-12 intact. No focal weakness  Psychiatrical: normal mood, cooperative  SKIN: no rash, lesions or ulcers    Labs:                    Active Medications  MEDICATIONS  (STANDING):  influenza   Vaccine 0.5 milliLiter(s) IntraMuscular once  lactated ringers. 1000 milliLiter(s) (75 mL/Hr) IV Continuous <Continuous>    MEDICATIONS  (PRN):  HYDROmorphone  Injectable 0.5 milliGRAM(s) IV Push every 10 minutes PRN Moderate Pain (4 - 6)  ondansetron Injectable 4 milliGRAM(s) IV Push once PRN Nausea and/or Vomiting

## 2020-09-10 NOTE — CONSULT NOTE ADULT - PROBLEM SELECTOR RECOMMENDATION 9
pt with [facility] - obesity and heart disease - now with SOB and QUINTERO and hypercapnic resp distress - on AVAPS - clinically appears better - BG and LABS and VS noted  cont AVAPS for now - will repeat Blood Gas at 6 pm and consider trial off MASK - with use of NIPPV device overnight  HOB elev  asp prec  oral hygiene  skin and wound care  caution with Opioids in pt with [facility] and [facility]  old records reviewed  pt will be monitored in [facility] - pulse ox - cardiac monitoring - eICU -   dvt p  OT and PT assessment

## 2020-09-10 NOTE — CONSULT NOTE ADULT - ASSESSMENT
68 y/o M with PMH of obesity BMI 36, Anxiety, HLD, HTN, OA admitted post op after revere left shoulder replacement.    Osteoarthritis of left shoulder  S/P Left reverse shoulder replacement   Aftercare following surgery  -WBAT  -PT/OT  -Early ambulation  -Pain management  -Incentive Spirometry  -DVT ppx as per ortho      -incentive spirometer.        Problem/Recommendation - 4:  ·  Problem: Essential hypertension.  Recommendation: BP stable, not on meds at home, diet controlled.      Problem/Recommendation - 5:  ·  Problem: Anxiety.  Recommendation: on xanax 1mg QID prn at home  -hold benzo in setting of resp acidosis.      Problem/Recommendation - 6:  Problem: HLD (hyperlipidemia). Recommendation: c/w lipitor 40mg daily.     Problem/Recommendation - 7:  Problem: Need for prophylactic measure. Recommendation: dvt ppx: per ortho protocol.      patient has a complicated course post surgery due to obesity last surgery.; she has also been noncompliant with medical teams recommendations on 02.   will need to monitor closely post operative  patient will need tele 68 y/o M with PMH of obesity BMI 36, Anxiety, HLD, HTN, OA admitted post op after revere left shoulder replacement.    Osteoarthritis of left shoulder  S/P Left reverse shoulder replacement   Aftercare following surgery  -WBAT  -PT/OT  -Early ambulation  -Pain management  -Incentive Spirometry  -DVT ppx as per ortho  -incentive spirometer.     Lethargy.   Lethargy likely due to resp acidosis due to hypercarbia in setting of multiple sedating medications including versed, propofol, fentanyl, hydromorphone also with likely undiagnosed obstructive sleep apnea given body habitus  -treat resp depression as below  -encourage deep breaths  -get routine bloodwork.         Problem/Recommendation - 4:  ·  Problem: Essential hypertension.  Recommendation: BP stable, not on meds at home, diet controlled.      Problem/Recommendation - 5:  ·  Problem: Anxiety.  Recommendation: on xanax 1mg QID prn at home  -hold benzo in setting of resp acidosis.      Problem/Recommendation - 6:  Problem: HLD (hyperlipidemia). Recommendation: c/w lipitor 40mg daily.     Problem/Recommendation - 7:  Problem: Need for prophylactic measure. Recommendation: dvt ppx: per ortho protocol.      patient has a complicated course post surgery due to obesity last surgery.; she has also been noncompliant with medical teams recommendations on 02.   will need to monitor closely post operative  patient will need tele 70 y/o M with PMH of obesity BMI 36, Anxiety, HLD, HTN, OA admitted post op after revere left shoulder replacement.    Osteoarthritis of left shoulder  S/P Left reverse shoulder replacement   Aftercare following surgery  -WBAT  -PT/OT  -Early ambulation  -Pain management  -Incentive Spirometry  -DVT ppx as per ortho  -incentive spirometer.     Lethargy / Borderline Hypoxemia  Due to right shoulder shoulder and anaesthesia -> will need to r/o diaphragmatic paralysis  Lethargy likely due to resp acidosis due to hypercarbia in setting of multiple sedating medications likely undiagnosed obstructive sleep apnea given body habitus  -treat resp depression as below  -incentive spirometry  -chest xray  -ct head  -abg now        Essential hypertension.  Recommendation: BP stable, not on meds at home, diet controlled.     Anxiety.    Recommendation: on xanax 1mg QID prn at home  hold benzo in setting of resp depression    HLD (hyperlipidemia).   Recommendation: c/w lipitor 40mg daily.     Problem/Recommendation - 7:  Problem: Need for prophylactic measure. Recommendation: dvt ppx: per ortho protocol.    diet:      Patient has a hx of complicated course post surgery due to obesity /sedatives and anesthesia; she has also been noncompliant with medical teams recommendations on 02 / bipap/ and imaging studies.  will need to monitor closely post operative  patient will need [facility] level of care    CC team notified of transfer 68 y/o M with PMH of obesity BMI 36, Anxiety, HLD, HTN, OA admitted post op after revere left shoulder replacement.    Osteoarthritis of left shoulder  S/P Left reverse shoulder replacement   Aftercare following surgery  -WBAT  -PT/OT  -Early ambulation  -Pain management  -Incentive Spirometry  -DVT ppx as per ortho  -incentive spirometer.     Lethargy / Borderline Hypoxemia  Due to right shoulder shoulder and anaesthesia -> will need to r/o diaphragmatic paralysis  Lethargy likely due to resp acidosis due to hypercarbia in setting of multiple sedating medications likely undiagnosed obstructive sleep apnea given body habitus  -treat resp depression as below  -incentive spirometry  -chest xray  -ct head  -abg now    IAN  Night time BiPAP      Essential hypertension.  Recommendation: BP stable, not on meds at home, diet controlled.     Anxiety.    Recommendation: on xanax 1mg QID prn at home  hold benzo in setting of resp depression    HLD (hyperlipidemia).   Recommendation: c/w lipitor 40mg daily.    Need for prophylactic measure.   Recommendation: dvt ppx: per ortho protocol.    diet:      Patient has a hx of complicated course post surgery due to obesity /sedatives and anesthesia; she has also been noncompliant with medical teams recommendations on 02 / bipap/ and imaging studies.  will need to monitor closely post operative  patient will need [facility] level of care    CC team notified of transfer 70 y/o M with PMH of obesity BMI 36, Anxiety, HLD, HTN, OA admitted post op after revere left shoulder replacement.    Osteoarthritis of left shoulder  S/P Left reverse shoulder replacement   Aftercare following surgery  -WBAT  -PT/OT  -Early ambulation  -Pain management  -Incentive Spirometry  -DVT ppx as per ortho  -incentive spirometer.     Lethargy / Acute Hypercapnic Failure due to morbid obesity / IAN / intrascaline block  Due to right shoulder shoulder and anaesthesia -> will need to r/o diaphragmatic paralysis  Lethargy likely due to resp acidosis due to hypercarbia in setting of multiple sedating medications likely undiagnosed obstructive sleep apnea given body habitus  -chest xray with left sided diaphragmatic elevaati  -ABG with severe respiratory acidosis with a c02 of 70  -hold off ct head for now  -repeat abg in 4 hours    IAN  BiPAP      Essential hypertension.  Recommendation: BP stable, not on meds at home, diet controlled.     Anxiety.    Recommendation: on xanax 1mg QID prn at home  hold benzo in setting of resp depression    HLD (hyperlipidemia).   Recommendation: c/w lipitor 40mg daily.    Need for prophylactic measure.   Recommendation: dvt ppx: per ortho protocol.    diet:maintain NPO      Patient has a hx of complicated course post surgery due to obesity /sedatives and anesthesia; she has also been noncompliant with medical teams recommendations on 02 / bipap/ and imaging studies.  will need to monitor closely post operative. HIGH RISK OF RESPIRATORY COMPROMISE AND DEATH  START PATIENT ON BIPAP NOW  patient will need [facility] level of care    CC team notified of transfer    Greater than 50 minute spent on patient encounter, activities included direct patient care, counceling and or coordinating care , reviewing notes, lab data/imaging, and discussion with multidiscplinary team 70 y/o M with PMH of obesity BMI 36, Anxiety, HLD, HTN, OA admitted post op after revere left shoulder replacement.    Osteoarthritis of left shoulder  S/P Left reverse shoulder replacement   Aftercare following surgery  -WBAT  -PT/OT  -Early ambulation  -Pain management  -Incentive Spirometry  -DVT ppx as per ortho  -incentive spirometer.     Lethargy / Acute Hypercapnic Failure due to morbid obesity / IAN / intrascaline block  Due to right shoulder shoulder and anaesthesia -> will need to r/o diaphragmatic paralysis  Lethargy likely due to resp acidosis due to hypercarbia in setting of multiple sedating medications likely undiagnosed obstructive sleep apnea given body habitus  -chest xray with left sided diaphragmatic elevaati  -ABG with severe respiratory acidosis with a c02 of 70  -hold off ct head for now  -repeat abg in 4 hours    IAN  BiPAP      Essential hypertension.  Recommendation: BP stable, not on meds at home, diet controlled.     Hx of Alcohol abuse   Last drink two days ago. patient reports she drinks 1/2 drinks a day  Will start CIWA protocol although doubtful patient is withdrawing.     Anxiety.    on xanax 1mg QID prn at home  hold xanax    HLD (hyperlipidemia).   Recommendation: c/w lipitor 40mg daily.    Need for prophylactic measure.   Recommendation: dvt ppx: per ortho protocol.    diet:maintain NPO      Patient has a hx of complicated course post surgery due to obesity /sedatives and anesthesia; she has also been noncompliant with medical teams recommendations on 02 / bipap/ and imaging studies.  will need to monitor closely post operative. HIGH RISK OF RESPIRATORY COMPROMISE AND DEATH  START PATIENT ON BIPAP NOW  patient will need [facility] level of care    CC team notified of transfer    Greater than 50 minute spent on patient encounter, activities included direct patient care, counceling and or coordinating care , reviewing notes, lab data/imaging, and discussion with multidiscplinary team

## 2020-09-11 ENCOUNTER — TRANSCRIPTION ENCOUNTER (OUTPATIENT)
Age: 70
End: 2020-09-11

## 2020-09-11 LAB
ALBUMIN SERPL ELPH-MCNC: 3.5 G/DL — SIGNIFICANT CHANGE UP (ref 3.3–5)
ALP SERPL-CCNC: 81 U/L — SIGNIFICANT CHANGE UP (ref 30–120)
ALT FLD-CCNC: 39 U/L DA — SIGNIFICANT CHANGE UP (ref 10–60)
ANION GAP SERPL CALC-SCNC: 7 MMOL/L — SIGNIFICANT CHANGE UP (ref 5–17)
ANION GAP SERPL CALC-SCNC: 8 MMOL/L — SIGNIFICANT CHANGE UP (ref 5–17)
ANION GAP SERPL CALC-SCNC: 9 MMOL/L — SIGNIFICANT CHANGE UP (ref 5–17)
APPEARANCE UR: ABNORMAL
AST SERPL-CCNC: 37 U/L — SIGNIFICANT CHANGE UP (ref 10–40)
BACTERIA # UR AUTO: ABNORMAL
BASE EXCESS BLDV CALC-SCNC: -0.4 MMOL/L — SIGNIFICANT CHANGE UP (ref -2–2)
BILIRUB SERPL-MCNC: 0.3 MG/DL — SIGNIFICANT CHANGE UP (ref 0.2–1.2)
BILIRUB UR-MCNC: NEGATIVE — SIGNIFICANT CHANGE UP
BLOOD GAS COMMENTS, VENOUS: SIGNIFICANT CHANGE UP
BUN SERPL-MCNC: 21 MG/DL — SIGNIFICANT CHANGE UP (ref 7–23)
BUN SERPL-MCNC: 22 MG/DL — SIGNIFICANT CHANGE UP (ref 7–23)
BUN SERPL-MCNC: 22 MG/DL — SIGNIFICANT CHANGE UP (ref 7–23)
CALCIUM SERPL-MCNC: 7.9 MG/DL — LOW (ref 8.4–10.5)
CALCIUM SERPL-MCNC: 8.4 MG/DL — SIGNIFICANT CHANGE UP (ref 8.4–10.5)
CALCIUM SERPL-MCNC: 8.5 MG/DL — SIGNIFICANT CHANGE UP (ref 8.4–10.5)
CHLORIDE SERPL-SCNC: 100 MMOL/L — SIGNIFICANT CHANGE UP (ref 96–108)
CHLORIDE SERPL-SCNC: 100 MMOL/L — SIGNIFICANT CHANGE UP (ref 96–108)
CHLORIDE SERPL-SCNC: 103 MMOL/L — SIGNIFICANT CHANGE UP (ref 96–108)
CO2 SERPL-SCNC: 25 MMOL/L — SIGNIFICANT CHANGE UP (ref 22–31)
CO2 SERPL-SCNC: 27 MMOL/L — SIGNIFICANT CHANGE UP (ref 22–31)
CO2 SERPL-SCNC: 27 MMOL/L — SIGNIFICANT CHANGE UP (ref 22–31)
COLOR SPEC: YELLOW — SIGNIFICANT CHANGE UP
CREAT SERPL-MCNC: 1.03 MG/DL — SIGNIFICANT CHANGE UP (ref 0.5–1.3)
CREAT SERPL-MCNC: 1.18 MG/DL — SIGNIFICANT CHANGE UP (ref 0.5–1.3)
CREAT SERPL-MCNC: 1.22 MG/DL — SIGNIFICANT CHANGE UP (ref 0.5–1.3)
CREAT SERPL-MCNC: 1.33 MG/DL — HIGH (ref 0.5–1.3)
DIFF PNL FLD: ABNORMAL
EPI CELLS # UR: SIGNIFICANT CHANGE UP
GAS PNL BLDV: SIGNIFICANT CHANGE UP
GLUCOSE SERPL-MCNC: 147 MG/DL — HIGH (ref 70–99)
GLUCOSE SERPL-MCNC: 151 MG/DL — HIGH (ref 70–99)
GLUCOSE SERPL-MCNC: 180 MG/DL — HIGH (ref 70–99)
GLUCOSE UR QL: NEGATIVE MG/DL — SIGNIFICANT CHANGE UP
HCO3 BLDV-SCNC: 23 MMOL/L — SIGNIFICANT CHANGE UP (ref 21–29)
HCT VFR BLD CALC: 40.8 % — SIGNIFICANT CHANGE UP (ref 34.5–45)
HCT VFR BLD CALC: 41.1 % — SIGNIFICANT CHANGE UP (ref 34.5–45)
HGB BLD-MCNC: 12.8 G/DL — SIGNIFICANT CHANGE UP (ref 11.5–15.5)
HGB BLD-MCNC: 12.8 G/DL — SIGNIFICANT CHANGE UP (ref 11.5–15.5)
KETONES UR-MCNC: NEGATIVE — SIGNIFICANT CHANGE UP
LACTATE SERPL-SCNC: 1.9 MMOL/L — SIGNIFICANT CHANGE UP (ref 0.7–2)
LACTATE SERPL-SCNC: 2.2 MMOL/L — HIGH (ref 0.7–2)
LEUKOCYTE ESTERASE UR-ACNC: ABNORMAL
MAGNESIUM SERPL-MCNC: 1.2 MG/DL — LOW (ref 1.6–2.6)
MAGNESIUM SERPL-MCNC: 2.1 MG/DL — SIGNIFICANT CHANGE UP (ref 1.6–2.6)
MCHC RBC-ENTMCNC: 28.4 PG — SIGNIFICANT CHANGE UP (ref 27–34)
MCHC RBC-ENTMCNC: 28.8 PG — SIGNIFICANT CHANGE UP (ref 27–34)
MCHC RBC-ENTMCNC: 31.1 GM/DL — LOW (ref 32–36)
MCHC RBC-ENTMCNC: 31.4 GM/DL — LOW (ref 32–36)
MCV RBC AUTO: 90.7 FL — SIGNIFICANT CHANGE UP (ref 80–100)
MCV RBC AUTO: 92.4 FL — SIGNIFICANT CHANGE UP (ref 80–100)
NITRITE UR-MCNC: POSITIVE
NRBC # BLD: 0 /100 WBCS — SIGNIFICANT CHANGE UP (ref 0–0)
NRBC # BLD: 0 /100 WBCS — SIGNIFICANT CHANGE UP (ref 0–0)
PCO2 BLDV: 51 MMHG — HIGH (ref 35–50)
PH BLDV: 7.31 — LOW (ref 7.35–7.45)
PH UR: 5 — SIGNIFICANT CHANGE UP (ref 5–8)
PHOSPHATE SERPL-MCNC: 4.6 MG/DL — HIGH (ref 2.5–4.5)
PHOSPHATE SERPL-MCNC: 4.8 MG/DL — HIGH (ref 2.5–4.5)
PHOSPHATE SERPL-MCNC: 4.9 MG/DL — HIGH (ref 2.5–4.5)
PLATELET # BLD AUTO: 217 K/UL — SIGNIFICANT CHANGE UP (ref 150–400)
PLATELET # BLD AUTO: 250 K/UL — SIGNIFICANT CHANGE UP (ref 150–400)
PO2 BLDV: 89 MMHG — HIGH (ref 25–45)
POTASSIUM SERPL-MCNC: 4.4 MMOL/L — SIGNIFICANT CHANGE UP (ref 3.5–5.3)
POTASSIUM SERPL-MCNC: 4.5 MMOL/L — SIGNIFICANT CHANGE UP (ref 3.5–5.3)
POTASSIUM SERPL-MCNC: 4.6 MMOL/L — SIGNIFICANT CHANGE UP (ref 3.5–5.3)
POTASSIUM SERPL-SCNC: 4.4 MMOL/L — SIGNIFICANT CHANGE UP (ref 3.5–5.3)
POTASSIUM SERPL-SCNC: 4.5 MMOL/L — SIGNIFICANT CHANGE UP (ref 3.5–5.3)
POTASSIUM SERPL-SCNC: 4.6 MMOL/L — SIGNIFICANT CHANGE UP (ref 3.5–5.3)
PROCALCITONIN SERPL-MCNC: 0.12 NG/ML — HIGH (ref 0.02–0.1)
PROT SERPL-MCNC: 7.4 G/DL — SIGNIFICANT CHANGE UP (ref 6–8.3)
PROT UR-MCNC: 100 MG/DL
RBC # BLD: 4.45 M/UL — SIGNIFICANT CHANGE UP (ref 3.8–5.2)
RBC # BLD: 4.5 M/UL — SIGNIFICANT CHANGE UP (ref 3.8–5.2)
RBC # FLD: 14.4 % — SIGNIFICANT CHANGE UP (ref 10.3–14.5)
RBC # FLD: 14.4 % — SIGNIFICANT CHANGE UP (ref 10.3–14.5)
RBC CASTS # UR COMP ASSIST: ABNORMAL /HPF (ref 0–4)
SAO2 % BLDV: 96 % — HIGH (ref 67–88)
SODIUM SERPL-SCNC: 134 MMOL/L — LOW (ref 135–145)
SODIUM SERPL-SCNC: 135 MMOL/L — SIGNIFICANT CHANGE UP (ref 135–145)
SODIUM SERPL-SCNC: 137 MMOL/L — SIGNIFICANT CHANGE UP (ref 135–145)
SP GR SPEC: 1.01 — SIGNIFICANT CHANGE UP (ref 1.01–1.02)
TROPONIN I SERPL-MCNC: 0.03 NG/ML — SIGNIFICANT CHANGE UP (ref 0.02–0.06)
TROPONIN I SERPL-MCNC: 0.03 NG/ML — SIGNIFICANT CHANGE UP (ref 0.02–0.06)
UROBILINOGEN FLD QL: NEGATIVE MG/DL — SIGNIFICANT CHANGE UP
WBC # BLD: 11.48 K/UL — HIGH (ref 3.8–10.5)
WBC # BLD: 9.7 K/UL — SIGNIFICANT CHANGE UP (ref 3.8–10.5)
WBC # FLD AUTO: 11.48 K/UL — HIGH (ref 3.8–10.5)
WBC # FLD AUTO: 9.7 K/UL — SIGNIFICANT CHANGE UP (ref 3.8–10.5)
WBC UR QL: ABNORMAL

## 2020-09-11 PROCEDURE — 99233 SBSQ HOSP IP/OBS HIGH 50: CPT

## 2020-09-11 PROCEDURE — 93010 ELECTROCARDIOGRAM REPORT: CPT

## 2020-09-11 PROCEDURE — 93306 TTE W/DOPPLER COMPLETE: CPT | Mod: 26

## 2020-09-11 PROCEDURE — 71045 X-RAY EXAM CHEST 1 VIEW: CPT | Mod: 26

## 2020-09-11 RX ORDER — FUROSEMIDE 40 MG
20 TABLET ORAL ONCE
Refills: 0 | Status: DISCONTINUED | OUTPATIENT
Start: 2020-09-11 | End: 2020-09-11

## 2020-09-11 RX ORDER — ALBUTEROL 90 UG/1
2.5 AEROSOL, METERED ORAL ONCE
Refills: 0 | Status: COMPLETED | OUTPATIENT
Start: 2020-09-11 | End: 2020-09-11

## 2020-09-11 RX ORDER — ACETAMINOPHEN 500 MG
1000 TABLET ORAL EVERY 8 HOURS
Refills: 0 | Status: DISCONTINUED | OUTPATIENT
Start: 2020-09-11 | End: 2020-09-12

## 2020-09-11 RX ORDER — ACETAMINOPHEN 500 MG
650 TABLET ORAL EVERY 6 HOURS
Refills: 0 | Status: DISCONTINUED | OUTPATIENT
Start: 2020-09-11 | End: 2020-09-12

## 2020-09-11 RX ORDER — MIDODRINE HYDROCHLORIDE 2.5 MG/1
10 TABLET ORAL ONCE
Refills: 0 | Status: COMPLETED | OUTPATIENT
Start: 2020-09-11 | End: 2020-09-11

## 2020-09-11 RX ORDER — MIDODRINE HYDROCHLORIDE 2.5 MG/1
10 TABLET ORAL THREE TIMES A DAY
Refills: 0 | Status: DISCONTINUED | OUTPATIENT
Start: 2020-09-11 | End: 2020-09-12

## 2020-09-11 RX ORDER — FUROSEMIDE 40 MG
40 TABLET ORAL ONCE
Refills: 0 | Status: COMPLETED | OUTPATIENT
Start: 2020-09-11 | End: 2020-09-11

## 2020-09-11 RX ORDER — LORATADINE 10 MG/1
10 TABLET ORAL DAILY
Refills: 0 | Status: DISCONTINUED | OUTPATIENT
Start: 2020-09-11 | End: 2020-09-15

## 2020-09-11 RX ORDER — SENNA PLUS 8.6 MG/1
2 TABLET ORAL
Qty: 0 | Refills: 0 | DISCHARGE
Start: 2020-09-11

## 2020-09-11 RX ORDER — HYDROCORTISONE 20 MG
100 TABLET ORAL EVERY 8 HOURS
Refills: 0 | Status: DISCONTINUED | OUTPATIENT
Start: 2020-09-11 | End: 2020-09-12

## 2020-09-11 RX ORDER — MAGNESIUM SULFATE 500 MG/ML
2 VIAL (ML) INJECTION ONCE
Refills: 0 | Status: COMPLETED | OUTPATIENT
Start: 2020-09-11 | End: 2020-09-11

## 2020-09-11 RX ORDER — NOREPINEPHRINE BITARTRATE/D5W 8 MG/250ML
0.05 PLASTIC BAG, INJECTION (ML) INTRAVENOUS
Qty: 8 | Refills: 0 | Status: DISCONTINUED | OUTPATIENT
Start: 2020-09-11 | End: 2020-09-11

## 2020-09-11 RX ORDER — SODIUM CHLORIDE 9 MG/ML
500 INJECTION, SOLUTION INTRAVENOUS ONCE
Refills: 0 | Status: COMPLETED | OUTPATIENT
Start: 2020-09-11 | End: 2020-09-11

## 2020-09-11 RX ORDER — FLUTICASONE PROPIONATE 50 MCG
1 SPRAY, SUSPENSION NASAL
Refills: 0 | Status: DISCONTINUED | OUTPATIENT
Start: 2020-09-11 | End: 2020-09-15

## 2020-09-11 RX ADMIN — MIDODRINE HYDROCHLORIDE 10 MILLIGRAM(S): 2.5 TABLET ORAL at 06:14

## 2020-09-11 RX ADMIN — PANTOPRAZOLE SODIUM 40 MILLIGRAM(S): 20 TABLET, DELAYED RELEASE ORAL at 06:14

## 2020-09-11 RX ADMIN — MIDODRINE HYDROCHLORIDE 10 MILLIGRAM(S): 2.5 TABLET ORAL at 01:09

## 2020-09-11 RX ADMIN — MIDODRINE HYDROCHLORIDE 10 MILLIGRAM(S): 2.5 TABLET ORAL at 14:21

## 2020-09-11 RX ADMIN — OXYCODONE HYDROCHLORIDE 5 MILLIGRAM(S): 5 TABLET ORAL at 06:44

## 2020-09-11 RX ADMIN — SODIUM CHLORIDE 100 MILLILITER(S): 9 INJECTION, SOLUTION INTRAVENOUS at 17:43

## 2020-09-11 RX ADMIN — Medication 1000 MILLIGRAM(S): at 09:56

## 2020-09-11 RX ADMIN — Medication 40 MILLIGRAM(S): at 19:47

## 2020-09-11 RX ADMIN — POLYETHYLENE GLYCOL 3350 17 GRAM(S): 17 POWDER, FOR SOLUTION ORAL at 13:14

## 2020-09-11 RX ADMIN — OXYCODONE HYDROCHLORIDE 5 MILLIGRAM(S): 5 TABLET ORAL at 06:14

## 2020-09-11 RX ADMIN — Medication 50 GRAM(S): at 01:45

## 2020-09-11 RX ADMIN — SODIUM CHLORIDE 500 MILLILITER(S): 9 INJECTION, SOLUTION INTRAVENOUS at 07:42

## 2020-09-11 RX ADMIN — Medication 1 SPRAY(S): at 19:55

## 2020-09-11 RX ADMIN — Medication 100 MILLIGRAM(S): at 18:29

## 2020-09-11 RX ADMIN — ALBUTEROL 2.5 MILLIGRAM(S): 90 AEROSOL, METERED ORAL at 18:44

## 2020-09-11 RX ADMIN — LORATADINE 10 MILLIGRAM(S): 10 TABLET ORAL at 19:55

## 2020-09-11 RX ADMIN — Medication 1000 MILLIGRAM(S): at 17:42

## 2020-09-11 RX ADMIN — Medication 100 MILLIGRAM(S): at 00:59

## 2020-09-11 RX ADMIN — MIDODRINE HYDROCHLORIDE 10 MILLIGRAM(S): 2.5 TABLET ORAL at 21:34

## 2020-09-11 RX ADMIN — Medication 50 GRAM(S): at 05:40

## 2020-09-11 NOTE — DIETITIAN INITIAL EVALUATION ADULT. - OTHER INFO
68 y/o female with PMH of obesity, Anxiety, HLD, HTN, OA admitted post op after revere left shoulder replacement.  Pt admitted to [facility] level care secondary to lethargy/acute hypercapnic failure and hypotension.  Pt visited while sitting in chair, just finished with PT and c/o of some dizziness and nausea, now sipping on some ginger ale.  Pt reports she did not eat much at breakfast or lunch today secondary to not feeling well overall but meal preferences obtained for dinner and pt reports she plans on eating and nausea has already slightly improved.  Reinforced the importance of adequate nutrition for energy and recovery.  Noted pt seen for SLP evaluation this morning to r/o dysphagia; pt denies swallowing difficulties and recommended consistency was regular/solids and thin liquids.  Pt reports she is left hand dominant - informed pt of ability for kitchen to cut up foods as needed.  NKFA. Pt lives alone, noted PT discharge recommendations currently rehab.

## 2020-09-11 NOTE — PROGRESS NOTE ADULT - SUBJECTIVE AND OBJECTIVE BOX
Events overnight:  1. patients bp persistently low: despite 3 liters of fluids  2.    Review of Systems:  General:denies fever chills, headache, weakness  HEENT: denies blurry vision,diffculty swallowing, difficulty hearing, tinnitus  Cardiovascular: denies chest pain  ,palpitations  Pulmonary:denies shortness of breath, cough, wheezing, hemoptysis  Gastrointestinal: denies abdominal pain, constipation, diarrhea,nausea , vomiting, hematochezia  : denies hematuria, dysuria, or incontinence  Neurological: denies weakness, numbness , tingling, dizziness, tremors  MSK: denies muscle pain, difficulty ambulating, swelling, back pain  skin: denies skin rash, itching, burning, or  skin lesions  Psychiatrical: denies mood disturbances, anxierty, feeling depressed, depression , or difficulty sleeping    Objective:  Vitals  T(C): 36.8 (09-11-20 @ 08:02), Max: 36.9 (09-10-20 @ 11:50)  HR: 86 (09-11-20 @ 10:30) (78 - 120)  BP: 91/61 (09-11-20 @ 10:30) (65/42 - 130/98)  RR: 24 (09-11-20 @ 10:30) (15 - 30)  SpO2: 89% (09-11-20 @ 10:30) (89% - 100%)    Physical Exam:  General: comfortable, no acute distress, well nourished  HEENT: Atraumatic, no LAD, trachea midline, PERRLA  Cardiovascular: normal s1s2, no murmurs, gallops or fricition rubs  Pulmonary: clear to ausculation Bilaterally, no wheezing , rhonchi  Gastrointestinal: soft non tender non distended, no masses felt, no organomegally  Muscloskeletal: no lower extremity edema, intact bilateral lower extremity pulses  Neurological: CN II-12 intact. No focal weakness  Psychiatrical: normal mood, cooperative  SKIN: no rash, lesions or ulcers    Labs:                          12.8   11.48 )-----------( 250      ( 11 Sep 2020 06:18 )             40.8     09-11    135  |  100  |  22  ----------------------------<  151<H>  4.4   |  27  |  1.18    Ca    8.5      11 Sep 2020 06:18  Phos  4.8     09-11  Mg     2.1     09-11    TPro  7.4  /  Alb  3.5  /  TBili  0.3  /  DBili  x   /  AST  37  /  ALT  39  /  AlkPhos  81  09-11    LIVER FUNCTIONS - ( 11 Sep 2020 06:18 )  Alb: 3.5 g/dL / Pro: 7.4 g/dL / ALK PHOS: 81 U/L / ALT: 39 U/L DA / AST: 37 U/L / GGT: x                 Active Medications  MEDICATIONS  (STANDING):  acetaminophen   Tablet .. 1000 milliGRAM(s) Oral every 8 hours  influenza   Vaccine 0.5 milliLiter(s) IntraMuscular once  lactated ringers. 1000 milliLiter(s) (100 mL/Hr) IV Continuous <Continuous>  midodrine. 10 milliGRAM(s) Oral three times a day  pantoprazole    Tablet 40 milliGRAM(s) Oral before breakfast  phenylephrine    Infusion 0.1 MICROgram(s)/kG/Min (1.67 mL/Hr) IV Continuous <Continuous>  polyethylene glycol 3350 17 Gram(s) Oral daily    MEDICATIONS  (PRN):  aluminum hydroxide/magnesium hydroxide/simethicone Suspension 30 milliLiter(s) Oral four times a day PRN Indigestion  HYDROmorphone  Injectable 0.5 milliGRAM(s) IV Push every 3 hours PRN breakthrough  LORazepam   Injectable 2 milliGRAM(s) IV Push every 2 hours PRN Symptom-triggered: 2 point increase in CIWA -Ar score and a total score of 7 or LESS  magnesium hydroxide Suspension 30 milliLiter(s) Oral daily PRN Constipation  ondansetron Injectable 4 milliGRAM(s) IV Push every 6 hours PRN Nausea and/or Vomiting  oxyCODONE    IR 5 milliGRAM(s) Oral every 3 hours PRN Moderate Pain (4 - 6)  oxyCODONE    IR 10 milliGRAM(s) Oral every 3 hours PRN Severe Pain (7 - 10)  senna 2 Tablet(s) Oral at bedtime PRN Constipation Events overnight:  1. patients bp persistently low: despite 3 liters of fluids  2. started shelton drip + mido  3. vbg mornin.31      Review of Systems:  General:denies fever chills, headache, weakness  HEENT: denies blurry vision,diffculty swallowing, difficulty hearing, tinnitus  Cardiovascular: denies chest pain  ,palpitations  Pulmonary:denies shortness of breath, cough, wheezing, hemoptysis  Gastrointestinal: denies abdominal pain, constipation, diarrhea,nausea , vomiting, hematochezia  : denies hematuria, dysuria, or incontinence  Neurological: denies weakness, numbness , tingling, dizziness, tremors  MSK: denies muscle pain, difficulty ambulating, swelling, back pain  skin: denies skin rash, itching, burning, or  skin lesions  Psychiatrical: denies mood disturbances, anxierty, feeling depressed, depression , or difficulty sleeping    Objective:  Vitals  T(C): 36.8 (20 @ 08:02), Max: 36.9 (09-10-20 @ 11:50)  HR: 86 (20 @ 10:30) (78 - 120)  BP: 91/61 (20 @ 10:30) (65/42 - 130/98)  RR: 24 (20 @ 10:30) (15 - 30)  SpO2: 89% (20 @ 10:30) (89% - 100%)    Physical Exam:  General: comfortable, no acute distress, well nourished  HEENT: Atraumatic, no LAD, trachea midline, PERRLA  Cardiovascular: normal s1s2, no murmurs, gallops or fricition rubs  Pulmonary: clear to ausculation Bilaterally, no wheezing , rhonchi  Gastrointestinal: soft non tender non distended, no masses felt, no organomegally  Muscloskeletal: no lower extremity edema, intact bilateral lower extremity pulses  Neurological: CN II-12 intact. No focal weakness  Psychiatrical: normal mood, cooperative  SKIN: no rash, lesions or ulcers    Labs:                          12.8   11.48 )-----------( 250      ( 11 Sep 2020 06:18 )             40.8         135  |  100  |  22  ----------------------------<  151<H>  4.4   |  27  |  1.18    Ca    8.5      11 Sep 2020 06:18  Phos  4.8       Mg     2.1         TPro  7.4  /  Alb  3.5  /  TBili  0.3  /  DBili  x   /  AST  37  /  ALT  39  /  AlkPhos  81      LIVER FUNCTIONS - ( 11 Sep 2020 06:18 )  Alb: 3.5 g/dL / Pro: 7.4 g/dL / ALK PHOS: 81 U/L / ALT: 39 U/L DA / AST: 37 U/L / GGT: x                 Active Medications  MEDICATIONS  (STANDING):  acetaminophen   Tablet .. 1000 milliGRAM(s) Oral every 8 hours  influenza   Vaccine 0.5 milliLiter(s) IntraMuscular once  lactated ringers. 1000 milliLiter(s) (100 mL/Hr) IV Continuous <Continuous>  midodrine. 10 milliGRAM(s) Oral three times a day  pantoprazole    Tablet 40 milliGRAM(s) Oral before breakfast  phenylephrine    Infusion 0.1 MICROgram(s)/kG/Min (1.67 mL/Hr) IV Continuous <Continuous>  polyethylene glycol 3350 17 Gram(s) Oral daily    MEDICATIONS  (PRN):  aluminum hydroxide/magnesium hydroxide/simethicone Suspension 30 milliLiter(s) Oral four times a day PRN Indigestion  HYDROmorphone  Injectable 0.5 milliGRAM(s) IV Push every 3 hours PRN breakthrough  LORazepam   Injectable 2 milliGRAM(s) IV Push every 2 hours PRN Symptom-triggered: 2 point increase in CIWA -Ar score and a total score of 7 or LESS  magnesium hydroxide Suspension 30 milliLiter(s) Oral daily PRN Constipation  ondansetron Injectable 4 milliGRAM(s) IV Push every 6 hours PRN Nausea and/or Vomiting  oxyCODONE    IR 5 milliGRAM(s) Oral every 3 hours PRN Moderate Pain (4 - 6)  oxyCODONE    IR 10 milliGRAM(s) Oral every 3 hours PRN Severe Pain (7 - 10)  senna 2 Tablet(s) Oral at bedtime PRN Constipation Events overnight:  1. patients bp persistently low: despite 3 liters of fluids  2. started shelton drip + mido  3. vbg mornin.31      Review of Systems:  General:denies fever chills, headache, weakness  HEENT: denies blurry vision,diffculty swallowing, difficulty hearing, tinnitus  Cardiovascular: denies chest pain  ,palpitations  Pulmonary:denies shortness of breath, cough, wheezing, hemoptysis  Gastrointestinal: denies abdominal pain, constipation, diarrhea,nausea , vomiting, hematochezia  : denies hematuria, dysuria, or incontinence  Neurological: denies weakness, numbness , tingling, dizziness, tremors  MSK: denies muscle pain, difficulty ambulating, swelling, back pain  skin: denies skin rash, itching, burning, or  skin lesions  Psychiatrical: denies mood disturbances, anxierty, feeling depressed, depression , or difficulty sleeping    Objective:  Vitals  T(C): 36.8 (20 @ 08:02), Max: 36.9 (09-10-20 @ 11:50)  HR: 86 (20 @ 10:30) (78 - 120)  BP: 91/61 (20 @ 10:30) (65/42 - 130/98)  RR: 24 (20 @ 10:30) (15 - 30)  SpO2: 89% (20 @ 10:30) (89% - 100%)    Physical Exam:  General: comfortable, no acute distress, well nourished  HEENT: Atraumatic, no LAD, trachea midline, PERRLA  Cardiovascular: normal s1s2, no murmurs, gallops or fricition rubs  Pulmonary: decreased, no wheezing , rhonchi  Gastrointestinal: soft non tender non distended, no masses felt, no organomegally  Muscloskeletal: no lower extremity edema, intact bilateral lower extremity pulses.. ++hemovac left shoulder  Neurological: CN II-12 intact. No focal weakness  Psychiatrical: normal mood, cooperative  SKIN: no rash, lesions or ulcers    Labs:                          12.8   11.48 )-----------( 250      ( 11 Sep 2020 06:18 )             40.8         135  |  100  |  22  ----------------------------<  151<H>  4.4   |  27  |  1.18    Ca    8.5      11 Sep 2020 06:18  Phos  4.8       Mg     2.1         TPro  7.4  /  Alb  3.5  /  TBili  0.3  /  DBili  x   /  AST  37  /  ALT  39  /  AlkPhos  81  0911    LIVER FUNCTIONS - ( 11 Sep 2020 06:18 )  Alb: 3.5 g/dL / Pro: 7.4 g/dL / ALK PHOS: 81 U/L / ALT: 39 U/L DA / AST: 37 U/L / GGT: x                 Active Medications  MEDICATIONS  (STANDING):  acetaminophen   Tablet .. 1000 milliGRAM(s) Oral every 8 hours  influenza   Vaccine 0.5 milliLiter(s) IntraMuscular once  lactated ringers. 1000 milliLiter(s) (100 mL/Hr) IV Continuous <Continuous>  midodrine. 10 milliGRAM(s) Oral three times a day  pantoprazole    Tablet 40 milliGRAM(s) Oral before breakfast  phenylephrine    Infusion 0.1 MICROgram(s)/kG/Min (1.67 mL/Hr) IV Continuous <Continuous>  polyethylene glycol 3350 17 Gram(s) Oral daily    MEDICATIONS  (PRN):  aluminum hydroxide/magnesium hydroxide/simethicone Suspension 30 milliLiter(s) Oral four times a day PRN Indigestion  HYDROmorphone  Injectable 0.5 milliGRAM(s) IV Push every 3 hours PRN breakthrough  LORazepam   Injectable 2 milliGRAM(s) IV Push every 2 hours PRN Symptom-triggered: 2 point increase in CIWA -Ar score and a total score of 7 or LESS  magnesium hydroxide Suspension 30 milliLiter(s) Oral daily PRN Constipation  ondansetron Injectable 4 milliGRAM(s) IV Push every 6 hours PRN Nausea and/or Vomiting  oxyCODONE    IR 5 milliGRAM(s) Oral every 3 hours PRN Moderate Pain (4 - 6)  oxyCODONE    IR 10 milliGRAM(s) Oral every 3 hours PRN Severe Pain (7 - 10)  senna 2 Tablet(s) Oral at bedtime PRN Constipation

## 2020-09-11 NOTE — SWALLOW BEDSIDE ASSESSMENT ADULT - SWALLOW EVAL: RECOMMENDED FEEDING/EATING TECHNIQUES
alternate food with liquid/small sips/bites/oral hygiene/position upright (90 degrees)/maintain upright posture during/after eating for 30 mins/allow for swallow between intakes/crush medication (when feasible)

## 2020-09-11 NOTE — PROVIDER CONTACT NOTE (EICU) - ACTION/TREATMENT ORDERED:
E-alerted by bedside team, pt remains hypotension now with sbp into the 60s, during interview via camera pt is wide awake and w/out any complains, order to start levo gtt now until a shelton drip becomes available from pharmacy, called pharmacy request an shelton drip be made and sent to the S[facility] immediately, plan d/w bedside RN and recommend to notify bedside hospitalize E-alerted by bedside team, pt remains hypotension now with sbp into the 60s, during interview via camera pt is wide awake and w/out any complains, order to start levo gtt now until a shelton drip becomes available from pharmacy, called pharmacy request an shelton drip be made and sent to the S[facility] immediately, plan d/w bedside RN and recommend to notify bedside hospitalize, will add lactate level and procal level to the AM labs

## 2020-09-11 NOTE — DISCHARGE NOTE PROVIDER - NSDCCPGOAL_GEN_ALL_CORE_FT
To get better and follow your care plan as instructed. To get better and follow your care plan as instructed. Decrease pain, increase ROM, improve ADLs and quality of living.

## 2020-09-11 NOTE — PHYSICAL THERAPY INITIAL EVALUATION ADULT - RANGE OF MOTION EXAMINATION, REHAB EVAL
left finger flex/ext wfl; wrist flex/ext wfl;/Right UE ROM was WFL (within functional limits)/deficits as listed below/bilateral lower extremity ROM was WFL (within functional limits)

## 2020-09-11 NOTE — SWALLOW BEDSIDE ASSESSMENT ADULT - ADDITIONAL RECOMMENDATIONS
No additional f/u is warranted at this time. Re-consult this department should there be any new concerns regarding the patient's swallow function.

## 2020-09-11 NOTE — DISCHARGE NOTE PROVIDER - NSDCCPCAREPLAN_GEN_ALL_CORE_FT
PRINCIPAL DISCHARGE DIAGNOSIS  Diagnosis: Primary osteoarthritis of left shoulder  Assessment and Plan of Treatment:

## 2020-09-11 NOTE — CONSULT NOTE ADULT - SUBJECTIVE AND OBJECTIVE BOX
History of Present Illness: The patient is a 69 year old female with a history of obesity, HL who is admitted s/p left shoulder replacement. Post-operatively, she had hypercapnic respiratory failure. Her blood pressures have been low requiring midodrine and initiation of IV vasopressors. She had similar complications after TKR in December.    Past Medical/Surgical History:  Obesity, HL    Medications:  Home Medications:  acetaminophen 500 mg oral tablet: 2 tab(s) orally every 8 hours (10 Sep 2020 07:41)  Lipitor 40 mg oral tablet: 1 tab(s) orally once a day (10 Sep 2020 07:41)  senna oral tablet: 2 tab(s) orally once a day (at bedtime), As needed, Constipation (11 Sep 2020 08:18)  Xanax 1 mg oral tablet: orally 4 times a day, As Needed (10 Sep 2020 07:41)      Family History: Non-contributory family history of premature cardiovascular atherosclerotic disease    Social History: No tobacco, alcohol or drug use    Review of Systems:  General: No fevers, chills, weight loss or gain  Skin: No rashes, color changes  Cardiovascular: No chest pain, orthopnea  Respiratory: (+) shortness of breath, (+) cough  Gastrointestinal: No nausea, abdominal pain  Genitourinary: No incontinence, pain with urination  Musculoskeletal: No pain, swelling, decreased range of motion  Neurological: No headache, weakness  Psychiatric: No depression, anxiety  Endocrine: No weight loss or gain, increased thirst  All other systems are comprehensively negative.    Physical Exam:  Vitals:        Vital Signs Last 24 Hrs  T(C): 36.6 (11 Sep 2020 15:40), Max: 36.8 (11 Sep 2020 08:02)  T(F): 97.8 (11 Sep 2020 15:40), Max: 98.2 (11 Sep 2020 08:02)  HR: 89 (11 Sep 2020 16:30) (78 - 97)  BP: 78/62 (11 Sep 2020 16:30) (66/44 - 123/76)  BP(mean): 69 (11 Sep 2020 16:30) (53 - 100)  RR: 21 (11 Sep 2020 16:30) (17 - 32)  SpO2: 96% (11 Sep 2020 16:30) (83% - 100%)  General: NAD  HEENT: MMM  Neck: No JVD, no carotid bruit  Lungs: Diffuse wheezing  CV: RRR, nl S1/S2, no M/R/G  Abdomen: S/NT/ND, +BS  Extremities: No LE edema, no cyanosis  Neuro: AAOx3, non-focal  Skin: No rash    Labs:                        12.8   11.48 )-----------( 250      ( 11 Sep 2020 06:18 )             40.8     09-11    x   |  x   |  x   ----------------------------<  x   x    |  x   |  1.03    Ca    8.5      11 Sep 2020 06:18  Phos  4.6     09-11  Mg     2.1     09-11    TPro  7.4  /  Alb  3.5  /  TBili  0.3  /  DBili  x   /  AST  37  /  ALT  39  /  AlkPhos  81  09-11    CARDIAC MARKERS ( 11 Sep 2020 12:04 )  .026 ng/mL / x     / x     / x     / x              ECG: NSR, normal axis, nonspecific ST abnormality, prolonged QTc

## 2020-09-11 NOTE — DISCHARGE NOTE PROVIDER - HOSPITAL COURSE
This patient was admitted to [facility] with a history of severe degenerative joint disease of the left shoulder.  Patient went to Pre-Surgical Testing at [facility] and was medically cleared to undergo elective procedure.  No operative or angela-operative complications arose during patients hospital course.  Patient received antibiotic according to SCIP guidelines for infection prevention.  SCD's were worn for DVT prophylaxis.  After surgery patient was transferred to the special care unit for blood pressure monitoring due to low blood pressure, patient was placed on a shelton drip and showed improvement.  Patient also received a speech and swallow evaluation due to trouble swallowing on post operative day 0. Anesthesia, Medical Hospitalist, Physical Therapy and Occupational Therapy were consulted. Patient is stable for discharge with a good prognosis.  Appropriate discharge instructions and medications are provided in this document. This patient was admitted to [facility] with a history of severe degenerative joint disease of the left shoulder.  Patient underwent Pre-Surgical Testing and was medically cleared to undergo elective procedure. Patient underwent Reverse Left TSR by Dr. Farhad Pham on 9/10/20 No operative or angela-operative complications arose during patients hospital course.  Patient received antibiotic according to SCIP guidelines for infection prevention.  SCD's were worn for DVT prophylaxis.  After surgery patient was transferred to the special care unit for blood pressure monitoring due to low blood pressure, patient was placed on a shelton drip and showed improvement.  Patient also received a speech and swallow evaluation due to trouble swallowing on post operative day 0. Anesthesia, Medical Hospitalist, Physical Therapy and Occupational Therapy were consulted. Patient is stable for discharge with a good prognosis.  Appropriate discharge instructions and medications are provided in this document.

## 2020-09-11 NOTE — DISCHARGE NOTE PROVIDER - CARE PROVIDER_API CALL
Farhad Pham  ORTHOPAEDIC SURGERY  [address], [address]  [place] [address]  Phone: [phone]  Fax: [phone]  Follow Up Time:

## 2020-09-11 NOTE — DISCHARGE NOTE PROVIDER - INSTRUCTIONS
For Constipation :   • Increase your water intake. Drink at least 8 glasses of water daily.  • Try adding fiber to your diet by eating fruits, vegetables and foods that are rich in grains.  • If you do experience constipation, you may take an over-the-counter stool softener/laxative such as Karen Colace, Senekot or Milk of Magnesia.  - Call your doctor if you experience:  • An increase in pain not controlled by pain medication or change in activity or  position.  • Temperature greater than 101° F.  • Redness, increased swelling or foul smelling drainage from or around the  incision.  • Numbness, tingling or a change in color or temperature of the operative leg.  • Call your doctor immediately if you experience chest pain, shortness of breath or calf pain.

## 2020-09-11 NOTE — OCCUPATIONAL THERAPY INITIAL EVALUATION ADULT - DIAGNOSIS, OT EVAL
Pt with impaired ROM, strength, balance and pain impacting pt's ability to complete ADLs, IADLs, functional mobility/transfers.

## 2020-09-11 NOTE — PROGRESS NOTE ADULT - ASSESSMENT
68 y/o M with PMH of obesity BMI 36, Anxiety, HLD, HTN, OA admitted post op after revere left shoulder replacement.    Osteoarthritis of left shoulder  S/P Left reverse shoulder replacement   Aftercare following surgery  -WBAT  -PT/OT  -Early ambulation  -Pain management  -Incentive Spirometry  -DVT ppx as per ortho  -incentive spirometer.     Lethargy / Acute Hypercapnic Failure due to morbid obesity / IAN / intrascaline block  Due to right shoulder shoulder and anaesthesia   Lethargy likely due to resp acidosis due to hypercarbia in setting of multiple sedating medications likely undiagnosed obstructive sleep apnea given body habitus  -chest xray with left sided diaphragmatic elevation  -ABG with severe respiratory acidosis with a c02 of 70  -repeat VBG after bipap with PH of 7.28. Patient has been refusing bipap overnight.  plan:  continue to encourage spirometry, although patient refusing spirometry  continue bipap: although patient refusing bipap    Hypotension   unclear at this time. despite 3.5 liters of fluid patient remains hypotensive.  trop x 1 negative  lactic acid 1.9  U/A is positive for UTI although doubtful urine alone is causing bp drop. patient too well appearing. additionally patient report she was treated with cipro for 7 days for  UTI  plan:  on mido and shelton  chk urine/urine cx  echo  EKG  blood culture  rocephin    IAN  BiPAP        Hx of Alcohol abuse   Last drink three days ago. patient reports she drinks 1/2 drinks a day  was on CIWA --> has not needed ativan.  will stop      Anxiety.    on xanax 1mg QID prn at home  hold xanax    HLD (hyperlipidemia).   Recommendation: c/w lipitor 40mg daily.    Need for prophylactic measure.   Recommendation: dvt ppx: per ortho protocol.    diet:maintain NPO      Greater than 50 minute spent on patient encounter, activities included direct patient care, counceling and or coordinating care , reviewing notes, lab data/imaging, and discussion with multidiscplinary team 70 y/o M with PMH of obesity BMI 36, Anxiety, HLD, HTN, OA admitted post op after revere left shoulder replacement.    Osteoarthritis of left shoulder  S/P Left reverse shoulder replacement   Aftercare following surgery  -WBAT  -PT/OT  -Early ambulation  -Pain management  -Incentive Spirometry  -DVT ppx as per ortho  -incentive spirometer.     Lethargy / Acute Hypercapnic Failure due to morbid obesity / IAN / intrascaline block  Due to right shoulder shoulder and anaesthesia   Lethargy likely due to resp acidosis due to hypercarbia in setting of multiple sedating medications likely undiagnosed obstructive sleep apnea given body habitus  -chest xray with left sided diaphragmatic elevation  -ABG with severe respiratory acidosis with a c02 of 70  -repeat VBG after bipap with PH of 7.28. Patient has been refusing bipap overnight.  plan:  continue to encourage spirometry, although patient refusing spirometry  continue bipap: although patient refusing bipap    Hypotension likely due to anaesthesia r/o infectious vs cardiac source  unclear at this time. despite 3.5 liters of fluid patient remains hypotensive.  trop x 1 negative  lactic acid 1.9  U/A is positive for UTI although doubtful urine alone is causing bp drop. patient too well appearing. additionally patient report she was treated with cipro for 7 days for  UTI  plan:  on mido and shelton  chk urine/urine cx  echo  EKG  blood culture  rocephin    IAN  BiPAP        Hx of Alcohol abuse   Last drink three days ago. patient reports she drinks 1/2 drinks a day  was on CIWA --> has not needed ativan.  will stop      Anxiety.    on xanax 1mg QID prn at home  hold xanax    HLD (hyperlipidemia).   Recommendation: c/w lipitor 40mg daily.    Need for prophylactic measure.  Recommendation: dvt ppx: per ortho protocol.

## 2020-09-11 NOTE — DISCHARGE NOTE PROVIDER - NSDCACTIVITY_GEN_ALL_CORE
Do not drive or operate machinery/Walking - Indoors allowed/Showering allowed/Do not make important decisions

## 2020-09-11 NOTE — PROVIDER CONTACT NOTE (EICU) - RECOMMENDATIONS
-Start midodrine 10mg q8h, 1st dose now, if no improvement will need to start shelton gtt over levo given poor PIV  -I have encourge pt place her bipap multiple times via camera as has bedside RN but patient continues to refused dispite multiple encouragement  -plan d/w bedside RN -Start midodrine 10mg q8h, 1st dose now, if no improvement will need to start shelton gtt over levo given poor PIV  -I have encourge the pt to go back on her bipap for the night multiple times via camera as has the bedside RN but patient continues to refused despite multiple encouragement, pt remains on NC for now, can consider nasal bipap if more comfortable   -plan d/w bedside RN

## 2020-09-11 NOTE — DISCHARGE NOTE NURSING/CASE MANAGEMENT/SOCIAL WORK - NSDCVIVACCINE_GEN_ALL_CORE_FT
Tdap , 2017/11/7 22:12 , Mat April (RN)  Tdap , 2018/5/9 18:06 , June Lockhart (RN) Tdap , 2017/11/7 22:12 , Mat April (RN)  Tdap , 2018/5/9 18:06 , June Lockhart (RN)

## 2020-09-11 NOTE — PROVIDER CONTACT NOTE (EICU) - ACTION/TREATMENT ORDERED:
E-alerted by bedside RN reported mag of 1.2, order to give 2g mag stat now and another 2g mag at 530am and repeat chem/mag/phos levels after lyts are repleted, repeat labs order for 6am.

## 2020-09-11 NOTE — PROGRESS NOTE ADULT - SUBJECTIVE AND OBJECTIVE BOX
Date/Time Patient Seen:  		  Referring MD:   Data Reviewed	       Patient is a 69y old  Female who presents with a chief complaint of Left Shoulder Replacement (11 Sep 2020 07:57)      Subjective/HPI     PAST MEDICAL & SURGICAL HISTORY:  Osteoarthritis of left shoulder  Class 1 obesity with body mass index (BMI) of 34.0 to 34.9 in adult  History of closed shoulder dislocation: left shoulder 2019  Osteoarthritis: Right knee  Anxiety  HLD (hyperlipidemia)  Essential hypertension: Not on medication x 1 year  No pertinent past medical history  History of right knee joint replacement: 2019  No significant past surgical history  No significant past surgical history        Medication list         MEDICATIONS  (STANDING):  influenza   Vaccine 0.5 milliLiter(s) IntraMuscular once  lactated ringers. 1000 milliLiter(s) (100 mL/Hr) IV Continuous <Continuous>  midodrine. 10 milliGRAM(s) Oral three times a day  pantoprazole    Tablet 40 milliGRAM(s) Oral before breakfast  phenylephrine    Infusion 0.1 MICROgram(s)/kG/Min (1.67 mL/Hr) IV Continuous <Continuous>  polyethylene glycol 3350 17 Gram(s) Oral daily    MEDICATIONS  (PRN):  aluminum hydroxide/magnesium hydroxide/simethicone Suspension 30 milliLiter(s) Oral four times a day PRN Indigestion  HYDROmorphone  Injectable 0.5 milliGRAM(s) IV Push every 3 hours PRN breakthrough  LORazepam   Injectable 2 milliGRAM(s) IV Push every 2 hours PRN Symptom-triggered: 2 point increase in CIWA -Ar score and a total score of 7 or LESS  magnesium hydroxide Suspension 30 milliLiter(s) Oral daily PRN Constipation  ondansetron Injectable 4 milliGRAM(s) IV Push every 6 hours PRN Nausea and/or Vomiting  oxyCODONE    IR 5 milliGRAM(s) Oral every 3 hours PRN Moderate Pain (4 - 6)  oxyCODONE    IR 10 milliGRAM(s) Oral every 3 hours PRN Severe Pain (7 - 10)  senna 2 Tablet(s) Oral at bedtime PRN Constipation         Vitals log        ICU Vital Signs Last 24 Hrs  T(C): 36.8 (11 Sep 2020 08:02), Max: 36.9 (10 Sep 2020 11:50)  T(F): 98.2 (11 Sep 2020 08:02), Max: 98.4 (10 Sep 2020 11:50)  HR: 88 (11 Sep 2020 07:30) (78 - 120)  BP: 93/53 (11 Sep 2020 07:30) (65/42 - 130/98)  BP(mean): 66 (11 Sep 2020 07:30) (51 - 107)  ABP: --  ABP(mean): --  RR: 22 (11 Sep 2020 07:30) (15 - 29)  SpO2: 100% (11 Sep 2020 07:30) (90% - 100%)           Input and Output:  I&O's Detail    10 Sep 2020 07:01  -  11 Sep 2020 07:00  --------------------------------------------------------  IN:    IV PiggyBack: 300 mL    Lactated Ringers IV Bolus: 2000 mL    lactated ringers.: 1250 mL    lactated ringers.: 1600 mL    Oral Fluid: 970 mL    Sodium Chloride 0.9% IV Bolus: 1500 mL  Total IN: 7620 mL    OUT:    Accordian: 150 mL    Estimated Blood Loss: 200 mL    Voided: 1050 mL  Total OUT: 1400 mL    Total NET: 6220 mL      11 Sep 2020 07:01  -  11 Sep 2020 08:05  --------------------------------------------------------  IN:    Lactated Ringers IV Bolus: 500 mL    phenylephrine   Infusion: 5 mL  Total IN: 505 mL    OUT:  Total OUT: 0 mL    Total NET: 505 mL          Lab Data                        12.8   11.48 )-----------( 250      ( 11 Sep 2020 06:18 )             40.8     09-11    135  |  100  |  22  ----------------------------<  151<H>  4.4   |  27  |  1.18    Ca    8.5      11 Sep 2020 06:18  Phos  4.8     09-11  Mg     2.1     09-11    TPro  7.4  /  Alb  3.5  /  TBili  0.3  /  DBili  x   /  AST  37  /  ALT  39  /  AlkPhos  81  09-11    ABG - ( 10 Sep 2020 13:42 )  pH, Arterial: x     pH, Blood: 7.17  /  pCO2: 71    /  pO2: 76    / HCO3: 20    / Base Excess: -2.4  /  SaO2: 91                      Review of Systems	      Objective     Physical Examination    heart s1s2  lung dec BS  abd soft  obese  alert  verbal      Pertinent Lab findings & Imaging      Selena:  NO   Adequate UO     I&O's Detail    10 Sep 2020 07:01  -  11 Sep 2020 07:00  --------------------------------------------------------  IN:    IV PiggyBack: 300 mL    Lactated Ringers IV Bolus: 2000 mL    lactated ringers.: 1250 mL    lactated ringers.: 1600 mL    Oral Fluid: 970 mL    Sodium Chloride 0.9% IV Bolus: 1500 mL  Total IN: 7620 mL    OUT:    Accordian: 150 mL    Estimated Blood Loss: 200 mL    Voided: 1050 mL  Total OUT: 1400 mL    Total NET: 6220 mL      11 Sep 2020 07:01  -  11 Sep 2020 08:05  --------------------------------------------------------  IN:    Lactated Ringers IV Bolus: 500 mL    phenylephrine   Infusion: 5 mL  Total IN: 505 mL    OUT:  Total OUT: 0 mL    Total NET: 505 mL               Discussed with:     Cultures:	        Radiology

## 2020-09-11 NOTE — PHYSICAL THERAPY INITIAL EVALUATION ADULT - MANUAL MUSCLE TESTING RESULTS, REHAB EVAL
grossly assessed due to/bilateral LEs 4/5; right UE 4/5; left finger flex/ext; wrist flex/ext 4/5; shld not assessed due to surgical precautions

## 2020-09-11 NOTE — SWALLOW BEDSIDE ASSESSMENT ADULT - SWALLOW EVAL: CURRENT DIET
Full liquid diet per MD order- Per RN report, this clinician able to trial all consistencies during today's evaluation

## 2020-09-11 NOTE — PROGRESS NOTE ADULT - SUBJECTIVE AND OBJECTIVE BOX
ORTHOPEDIC PA PROGRESS NOTE  DALI RAYGOZA      69y Female                                                                                                                               POD  #1        STATUS POST:               Pre-Op Dx: DJD of shoulder: left    Post-Op Dx:  DJD of shoulder: left    Procedure: Total shoulder arthroplasty: left reverse                                                Pain (0-10): 2  Current Pain Management:  [ ] PCA   [x] Po Analgesics [ ] IM /IV Anagesics     T(F): 97.5  HR: 88  BP: 93/53  RR: 22  SpO2: 100%                        12.8   11.48 )-----------( 250      ( 11 Sep 2020 06:18 )             40.8                     09-11    135  |  100  |  22  ----------------------------<  151<H>  4.4   |  27  |  1.18    Ca    8.5      11 Sep 2020 06:18  Phos  4.8     09-11  Mg     2.1     09-11    TPro  7.4  /  Alb  3.5  /  TBili  0.3  /  DBili  x   /  AST  37  /  ALT  39  /  AlkPhos  81  09-11    Physical Exam :    -   Dressing sterile.    -   Distal Neurvascular status intact grossly.   -   Warm well perfused; capillary refill <3 seconds   -   Fingers pink/warm/mobile  -   (+) Sensation to light touch  -   (-) Calf tenderness Bilaterally    A/P: 69y Female s/p DJD of shoulder: left    -   Ortho Stable  -   Pain control   -   Medicine to follow  -   DVT ppx:     [x]SCDs     [ ] ASA     [ ] Eliquis     [ ] Lovenox  -   Weight bearing status:  WBAT [ ]        PWB    [ ]     TTWB  [ ]      NWB  [x]   -  Dispo:     Home [ ]     Acute Rehab [ ]     [facility] [ ]     TBD [x]  -  Currently on shelton drip for low blood pressure (patient states her baseline is low)  -  As per nurse patient choked while eating dinner last night, speech and swallow eval pending, patient on full liquids now with no issue

## 2020-09-11 NOTE — PHYSICAL THERAPY INITIAL EVALUATION ADULT - ADDITIONAL COMMENTS
Lives alone in house.  8 stairs plus 6 stairs to enter with railing; 12 stairs to bedroom with railing.

## 2020-09-11 NOTE — OCCUPATIONAL THERAPY INITIAL EVALUATION ADULT - LIVES WITH, PROFILE
alone/Pt lines alone in a private home, 8 steps, then 5 steps to enter  by a landing. Pt has a walk in shower and was independent with ADLs, IADLs, driving, walking without AD prior to admission.

## 2020-09-11 NOTE — PROVIDER CONTACT NOTE (EICU) - BACKGROUND
68 y/o F w/ pmh of obesity, anxiety, htn, hld, OA, now admitted to the SPCU post op after reverse L. shoulder replacement for hypercapnic resp failure was placed on AVAPS repeat VBG shows improvement and currently off bipap doing well on NC. Pt denies any resp complains during interview via camera. Pt found to be hypotensive w/ SBP in the 80s after receiving appx 3.5L of IVF. eICU alerted for hypotension.

## 2020-09-11 NOTE — SBIRT NOTE ADULT - NSSBIRTDRGPOSREINDET_GEN_A_CORE
provided positive reinforcement regarding abstaining from the use of substances other than those required for medical reasons.

## 2020-09-11 NOTE — OCCUPATIONAL THERAPY INITIAL EVALUATION ADULT - PERTINENT HX OF CURRENT PROBLEM, REHAB EVAL
72 y.o. F s/p L rTSA 9/10/2020. Hospital course complicated by respiratory distress and transfer to SPCU.

## 2020-09-11 NOTE — CONSULT NOTE ADULT - ASSESSMENT
The patient is a 69 year old female with a history of obesity, HL who is admitted s/p left shoulder replacement course c/b respiratory failure and shock.    Plan:  - Currently coughing and short of breath with diffuse wheezing - will check CXR and give albuterol nebs. Stop IV fluids. If CXR with worsening heart failure will start furosemide.  - Echo with normal LV systolic function, no significant valve issues  - BNP yesterday 727 although patient had received multiple liters of fluids since  - Continue midodrine 10 mg tid; can increase to 15 or 20 mg tid to wean off IV vasopressors  - Continue phenylephrine and wean  - Continue hydrocortisone 100 mg q8h  - On ceftriaxone

## 2020-09-11 NOTE — OCCUPATIONAL THERAPY INITIAL EVALUATION ADULT - BALANCE TRAINING, PT EVAL
Goal: Pt will improve dynamic standing balance to G to increase safety/independence with IADL completion in 2 weeks.

## 2020-09-11 NOTE — PROVIDER CONTACT NOTE (EICU) - ASSESSMENT
During interview via camera pt is AAOx3, reports being comfortable and denies any medical complains.
Patient denies dizziness, headache no SOB A&Ox3

## 2020-09-11 NOTE — OCCUPATIONAL THERAPY INITIAL EVALUATION ADULT - NS ASR FOLLOW COMMAND OT EVAL
able to follow multistep instructions/100% of the time/Pt appears Samish, requires verbal directives given in close proximity.

## 2020-09-11 NOTE — SWALLOW BEDSIDE ASSESSMENT ADULT - ASR SWALLOW ASPIRATION MONITOR
gurgly voice/pneumonia/change of breathing pattern/position upright (90Y)/cough/oral hygiene/fever/throat clearing/upper respiratory infection

## 2020-09-11 NOTE — DISCHARGE NOTE NURSING/CASE MANAGEMENT/SOCIAL WORK - PATIENT PORTAL LINK FT
You can access the FollowMyHealth Patient Portal offered by [facility] by registering at the following website: [email or web address]. By joining [facility]’s FollowMyHealth portal, you will also be able to view your health information using other applications (apps) compatible with our system.

## 2020-09-11 NOTE — SWALLOW BEDSIDE ASSESSMENT ADULT - COMMENTS
Consult received and chart reviewed. The patient was seen at bedside this AM for an initial assessment of swallow function, at which time she was alert and cooperative. Patient currently receiving supplemental O2 via 2L NC in place and SpO2 remained between 96-99% throughout the entirety of the evaluation. The patient is denying any swallowing difficulties at this time, though did admit to coughing when eating a sandwich last night given her lethargy.    Per charting, "68 y/o F with PMH of obesity BMI 36, Anxiety, HLD, HTN, OA admitted post op after revere left shoulder replacement. Patient seen and examined at bedside. outpatient notes, clearances reviewed. Perioperative Course reviewed. Page by nursing, report given patient was agitated and altered after surgery."    Most recent CXR revealed, "Mild left base effusion slightly increased from prior." Discussed results and recommendations from this evaluation with the patient, RN, and voicemail left for MD. Consult received and chart reviewed. The patient was seen at bedside this AM for an initial assessment of swallow function, at which time she was alert and cooperative. Patient currently receiving supplemental O2 via 2L NC in place and SpO2 remained between 96-99% throughout the entirety of the evaluation. The patient is denying any swallowing difficulties at this time, though did admit to coughing x1 when eating a sandwich last night given her lethargy, however, she exhibited no additional coughing during her meal or during breakfast this AM.    Per charting, "70 y/o F with PMH of obesity BMI 36, Anxiety, HLD, HTN, OA admitted post op after revere left shoulder replacement. Patient seen and examined at bedside. outpatient notes, clearances reviewed. Perioperative Course reviewed. Page by nursing, report given patient was agitated and altered after surgery."    Most recent CXR revealed, "Mild left base effusion slightly increased from prior." Discussed results and recommendations from this evaluation with the patient, RN, and voicemail left for MD.

## 2020-09-11 NOTE — DIETITIAN INITIAL EVALUATION ADULT. - PHYSICAL APPEARANCE
obese/BMI 37.1, class II (ht 5'1", 196.3#), no significant wt changes PTA/wt generally stable; pt expresses desire to lose weight once recovered

## 2020-09-11 NOTE — DISCHARGE NOTE PROVIDER - NSDCFUADDINST_GEN_ALL_CORE_FT
DO NOT bear weight on operative extremity.  Sling for comfort and ambulating only.   Remove sling to exercise elbow and shoulder.  Full AAROM of shoulder   No External Rotation past 30 degrees;   No resisted internal rotation   May do Codman's and pendulums.  Keep incision clean and dry.  Change the dressing daily if there is drainage noted.  When there is no drainage the wound may be open to air.   The wound is closed with prineo (glued on mesh tape.)  Prineo is removed 2 weeks after surgery at rehab facility or in surgeon's office.  If there is no wet drainage you may shower and pat dry with a clean towel DO NOT bear weight on operative extremity.  Sling for comfort and ambulating only.   Remove sling to exercise elbow and shoulder.  Full AAROM of shoulder   No External Rotation past 30 degrees;   No resisted internal rotation   May do Codman's and pendulums.  Keep incision clean and dry.  Change the dressing daily if there is drainage noted.  When there is no drainage the wound may be open to air.   The wound is closed with prineo (glued on mesh tape.)  Prineo is removed 2 weeks after surgery at rehab facility or in surgeon's office.  you may shower and pat dry with a clean towel

## 2020-09-12 DIAGNOSIS — M19.012 PRIMARY OSTEOARTHRITIS, LEFT SHOULDER: ICD-10-CM

## 2020-09-12 LAB
ANION GAP SERPL CALC-SCNC: 4 MMOL/L — LOW (ref 5–17)
APPEARANCE UR: ABNORMAL
BACTERIA # UR AUTO: ABNORMAL
BASOPHILS # BLD AUTO: 0.01 K/UL — SIGNIFICANT CHANGE UP (ref 0–0.2)
BASOPHILS NFR BLD AUTO: 0.1 % — SIGNIFICANT CHANGE UP (ref 0–2)
BILIRUB UR-MCNC: NEGATIVE — SIGNIFICANT CHANGE UP
BUN SERPL-MCNC: 28 MG/DL — HIGH (ref 7–23)
CALCIUM SERPL-MCNC: 8.6 MG/DL — SIGNIFICANT CHANGE UP (ref 8.4–10.5)
CHLORIDE SERPL-SCNC: 103 MMOL/L — SIGNIFICANT CHANGE UP (ref 96–108)
CO2 SERPL-SCNC: 29 MMOL/L — SIGNIFICANT CHANGE UP (ref 22–31)
COLOR SPEC: YELLOW — SIGNIFICANT CHANGE UP
CORTIS AM PEAK SERPL-MCNC: 69.1 UG/DL — HIGH (ref 6–18.4)
CREAT SERPL-MCNC: 1.47 MG/DL — HIGH (ref 0.5–1.3)
CULTURE RESULTS: NO GROWTH — SIGNIFICANT CHANGE UP
DIFF PNL FLD: ABNORMAL
EOSINOPHIL # BLD AUTO: 0 K/UL — SIGNIFICANT CHANGE UP (ref 0–0.5)
EOSINOPHIL NFR BLD AUTO: 0 % — SIGNIFICANT CHANGE UP (ref 0–6)
EPI CELLS # UR: SIGNIFICANT CHANGE UP
GLUCOSE SERPL-MCNC: 143 MG/DL — HIGH (ref 70–99)
GLUCOSE UR QL: NEGATIVE MG/DL — SIGNIFICANT CHANGE UP
HCT VFR BLD CALC: 38.7 % — SIGNIFICANT CHANGE UP (ref 34.5–45)
HGB BLD-MCNC: 11.9 G/DL — SIGNIFICANT CHANGE UP (ref 11.5–15.5)
IMM GRANULOCYTES NFR BLD AUTO: 0.4 % — SIGNIFICANT CHANGE UP (ref 0–1.5)
KETONES UR-MCNC: NEGATIVE — SIGNIFICANT CHANGE UP
LEUKOCYTE ESTERASE UR-ACNC: ABNORMAL
LYMPHOCYTES # BLD AUTO: 1.08 K/UL — SIGNIFICANT CHANGE UP (ref 1–3.3)
LYMPHOCYTES # BLD AUTO: 10.9 % — LOW (ref 13–44)
MAGNESIUM SERPL-MCNC: 2.1 MG/DL — SIGNIFICANT CHANGE UP (ref 1.6–2.6)
MCHC RBC-ENTMCNC: 28.5 PG — SIGNIFICANT CHANGE UP (ref 27–34)
MCHC RBC-ENTMCNC: 30.7 GM/DL — LOW (ref 32–36)
MCV RBC AUTO: 92.8 FL — SIGNIFICANT CHANGE UP (ref 80–100)
MONOCYTES # BLD AUTO: 0.61 K/UL — SIGNIFICANT CHANGE UP (ref 0–0.9)
MONOCYTES NFR BLD AUTO: 6.2 % — SIGNIFICANT CHANGE UP (ref 2–14)
NEUTROPHILS # BLD AUTO: 8.16 K/UL — HIGH (ref 1.8–7.4)
NEUTROPHILS NFR BLD AUTO: 82.4 % — HIGH (ref 43–77)
NITRITE UR-MCNC: NEGATIVE — SIGNIFICANT CHANGE UP
NRBC # BLD: 0 /100 WBCS — SIGNIFICANT CHANGE UP (ref 0–0)
NT-PROBNP SERPL-SCNC: 2464 PG/ML — HIGH (ref 0–125)
PH UR: 5 — SIGNIFICANT CHANGE UP (ref 5–8)
PHOSPHATE 24H UR-MCNC: 66 MG/DL — SIGNIFICANT CHANGE UP
PHOSPHATE CL ?TM UR+SERPL-VRATE: 82 % — SIGNIFICANT CHANGE UP (ref 78–97)
PHOSPHATE SERPL-MCNC: 4.8 MG/DL — HIGH (ref 2.5–4.5)
PLATELET # BLD AUTO: 179 K/UL — SIGNIFICANT CHANGE UP (ref 150–400)
POTASSIUM SERPL-MCNC: 4.4 MMOL/L — SIGNIFICANT CHANGE UP (ref 3.5–5.3)
POTASSIUM SERPL-SCNC: 4.4 MMOL/L — SIGNIFICANT CHANGE UP (ref 3.5–5.3)
PROCALCITONIN SERPL-MCNC: 0.13 NG/ML — HIGH (ref 0.02–0.1)
PROT UR-MCNC: 30 MG/DL
RBC # BLD: 4.17 M/UL — SIGNIFICANT CHANGE UP (ref 3.8–5.2)
RBC # FLD: 14.4 % — SIGNIFICANT CHANGE UP (ref 10.3–14.5)
RBC CASTS # UR COMP ASSIST: ABNORMAL /HPF (ref 0–4)
SODIUM SERPL-SCNC: 136 MMOL/L — SIGNIFICANT CHANGE UP (ref 135–145)
SP GR SPEC: 1.01 — SIGNIFICANT CHANGE UP (ref 1.01–1.02)
SPECIMEN SOURCE: SIGNIFICANT CHANGE UP
UROBILINOGEN FLD QL: NEGATIVE MG/DL — SIGNIFICANT CHANGE UP
WBC # BLD: 9.9 K/UL — SIGNIFICANT CHANGE UP (ref 3.8–10.5)
WBC # FLD AUTO: 9.9 K/UL — SIGNIFICANT CHANGE UP (ref 3.8–10.5)
WBC UR QL: ABNORMAL

## 2020-09-12 PROCEDURE — 99233 SBSQ HOSP IP/OBS HIGH 50: CPT

## 2020-09-12 RX ORDER — BUDESONIDE AND FORMOTEROL FUMARATE DIHYDRATE 160; 4.5 UG/1; UG/1
2 AEROSOL RESPIRATORY (INHALATION)
Refills: 0 | Status: DISCONTINUED | OUTPATIENT
Start: 2020-09-12 | End: 2020-09-15

## 2020-09-12 RX ORDER — HYDROCORTISONE 20 MG
100 TABLET ORAL EVERY 8 HOURS
Refills: 0 | Status: DISCONTINUED | OUTPATIENT
Start: 2020-09-12 | End: 2020-09-12

## 2020-09-12 RX ORDER — APIXABAN 2.5 MG/1
2.5 TABLET, FILM COATED ORAL EVERY 12 HOURS
Refills: 0 | Status: DISCONTINUED | OUTPATIENT
Start: 2020-09-13 | End: 2020-09-15

## 2020-09-12 RX ORDER — APIXABAN 2.5 MG/1
2.5 TABLET, FILM COATED ORAL EVERY 12 HOURS
Refills: 0 | Status: COMPLETED | OUTPATIENT
Start: 2020-09-12 | End: 2020-09-12

## 2020-09-12 RX ORDER — LANOLIN ALCOHOL/MO/W.PET/CERES
3 CREAM (GRAM) TOPICAL AT BEDTIME
Refills: 0 | Status: DISCONTINUED | OUTPATIENT
Start: 2020-09-12 | End: 2020-09-15

## 2020-09-12 RX ORDER — APIXABAN 2.5 MG/1
2.5 TABLET, FILM COATED ORAL EVERY 12 HOURS
Refills: 0 | Status: DISCONTINUED | OUTPATIENT
Start: 2020-09-12 | End: 2020-09-12

## 2020-09-12 RX ADMIN — OXYCODONE HYDROCHLORIDE 5 MILLIGRAM(S): 5 TABLET ORAL at 18:16

## 2020-09-12 RX ADMIN — LORATADINE 10 MILLIGRAM(S): 10 TABLET ORAL at 11:30

## 2020-09-12 RX ADMIN — BUDESONIDE AND FORMOTEROL FUMARATE DIHYDRATE 2 PUFF(S): 160; 4.5 AEROSOL RESPIRATORY (INHALATION) at 22:35

## 2020-09-12 RX ADMIN — APIXABAN 2.5 MILLIGRAM(S): 2.5 TABLET, FILM COATED ORAL at 22:35

## 2020-09-12 RX ADMIN — Medication 3 MILLIGRAM(S): at 22:36

## 2020-09-12 RX ADMIN — MIDODRINE HYDROCHLORIDE 10 MILLIGRAM(S): 2.5 TABLET ORAL at 06:14

## 2020-09-12 RX ADMIN — BUDESONIDE AND FORMOTEROL FUMARATE DIHYDRATE 2 PUFF(S): 160; 4.5 AEROSOL RESPIRATORY (INHALATION) at 12:06

## 2020-09-12 RX ADMIN — Medication 1000 MILLIGRAM(S): at 02:37

## 2020-09-12 RX ADMIN — PANTOPRAZOLE SODIUM 40 MILLIGRAM(S): 20 TABLET, DELAYED RELEASE ORAL at 06:36

## 2020-09-12 RX ADMIN — Medication 1000 MILLIGRAM(S): at 09:45

## 2020-09-12 RX ADMIN — OXYCODONE HYDROCHLORIDE 5 MILLIGRAM(S): 5 TABLET ORAL at 22:40

## 2020-09-12 RX ADMIN — Medication 100 MILLIGRAM(S): at 02:00

## 2020-09-12 RX ADMIN — OXYCODONE HYDROCHLORIDE 5 MILLIGRAM(S): 5 TABLET ORAL at 23:30

## 2020-09-12 RX ADMIN — APIXABAN 2.5 MILLIGRAM(S): 2.5 TABLET, FILM COATED ORAL at 12:06

## 2020-09-12 RX ADMIN — POLYETHYLENE GLYCOL 3350 17 GRAM(S): 17 POWDER, FOR SOLUTION ORAL at 11:29

## 2020-09-12 RX ADMIN — Medication 1 SPRAY(S): at 18:17

## 2020-09-12 RX ADMIN — OXYCODONE HYDROCHLORIDE 5 MILLIGRAM(S): 5 TABLET ORAL at 19:10

## 2020-09-12 RX ADMIN — Medication 1000 MILLIGRAM(S): at 03:38

## 2020-09-12 NOTE — PROGRESS NOTE ADULT - ASSESSMENT
The patient is a 69 year old female with a history of obesity, HL who is admitted s/p left shoulder replacement course c/b respiratory failure and shock.    Plan:  - BNP 2464 this morning  - Overall appears more euvolemic  - If any recurrent shortness of breath or wheezing, give an additional furosemide 20 mg IV  - Echo with normal LV systolic function, no significant valve issues  - Continue midodrine 10 mg tid  - Wean hydrocortisone  - Check orthostatics

## 2020-09-12 NOTE — PROGRESS NOTE ADULT - ASSESSMENT
68 y/o M with PMH of obesity BMI 36, Anxiety, HLD, HTN, OA admitted post op after revere left shoulder replacement.    Osteoarthritis of left shoulder  S/P Left reverse shoulder replacement   Aftercare following surgery POD day 3  -WBAT  -PT/OT  -Early ambulation  -Pain management  -Incentive Spirometry  -DVT ppx as per ortho  -incentive spirometer.     Acute Hypercapnic Failure due to morbid obesity / IAN / intrascaline block  Course c/b by Congestive Heart Failure due to IV fluids  Due to right shoulder shoulder and anaesthesia   Lethargy likely due to resp acidosis due to hypercarbia in setting of multiple sedating medications likely undiagnosed obstructive sleep apnea given body habitus  -initial xray with left sided diaphragmatic elevation+ left pleural effusion  -now volume overloaded  -initia ABG with severe respiratory acidosis with a c02 of 70. repeat VBG with PH of 7.3 Patient has been refusing bipap overnight.  plan:  continue to encourage spirometry, although patient refusing spirometry  continue nignt time bipap: although patient refusing bipap  start IV lasix 20 daily  Cardiology consulted    Hypotension likely due to anaesthesia with component of adrenal insufficiency  despite 3.5 liters of fluid patient remains hypotensive.  trop x 2 negative  lactic acid 1.9  U/A is positive for UTI although doubtful urine alone is causing bp drop. patient too well appearing. additionally patient report she was treated with cipro for 7 days for  UTI  on mido and high dose steroids  echo with mild diastolic dysfunction  plan:  f/u morning cortisol  IV solucortef with midodrine  f/u blood culture  c/w rocephin    IAN  nocturnal BiPAP    LEIF likely prerenal due to C.R.S  IV lasix  renal consult      Hx of Alcohol abuse   Last drink four days ago. patient reports she drinks 1 - 2 drinks a day  was on CIWA --> has not needed ativan.  will stop      Anxiety.    on xanax 1mg QID prn at home  hold xanax    HLD (hyperlipidemia).   Recommendation: c/w lipitor 40mg daily.    Need for prophylactic measure.  Recommendation: dvt ppx: per ortho protocol.    Patient at this time reports concerning findings:  she lives alone. She has a son and daughter, both are out of state and despite "son" is listed as an emergency contact , she does not want writer calling her son. she states she does not have a specific person to designate as a health care proxy and does not want to assign on now. she just wants to go home.

## 2020-09-12 NOTE — CONSULT NOTE ADULT - SUBJECTIVE AND OBJECTIVE BOX
Nephrology Consultation: MD JARET Krueger JOANNE  69y    HPI:    69 obese white female with a history of HTN, HLD, IAN now admitted for shoulder surgery.   found to have LEIF hence renal evaluation.   denies any use of NSAIDS.   denies any prior history of renal disease.   she was given IVF boluses since the surgery  no evidence of IV contrast use      PAST MEDICAL & SURGICAL HISTORY:  Osteoarthritis of left shoulder    Class 1 obesity with body mass index (BMI) of 34.0 to 34.9 in adult    History of closed shoulder dislocation  left shoulder     Anxiety    HLD (hyperlipidemia)    History of right knee joint replacement  2019        Allergies    No Known Allergies    Intolerances    NSAIDs (Other)      Home Medications:  acetaminophen 500 mg oral tablet: 2 tab(s) orally every 8 hours (10 Sep 2020 07:41)  Lipitor 40 mg oral tablet: 1 tab(s) orally once a day (10 Sep 2020 07:41)  senna oral tablet: 2 tab(s) orally once a day (at bedtime), As needed, Constipation (11 Sep 2020 08:18)  Xanax 1 mg oral tablet: orally 4 times a day, As Needed (10 Sep 2020 07:41)        FAMILY HISTORY:  FH: heart attack  mother        SOCIAL HISTORY:    REVIEW OF SYSTEMS:    Constitutional: No fever, weight loss or fatigue  Eyes: No eye pain, visual disturbances, or discharge  ENT:  No difficulty hearing, tinnitus, vertigo; No sinus or throat pain  Neck: No pain or stiffness  Breasts: No pain, masses or nipple discharge  Respiratory: No cough, wheezing, chills or hemoptysis  Cardiovascular: No chest pain, palpitations, shortness of breath, dizziness or leg swelling  Gastrointestinal: No abdominal or epigastric pain. No nausea, vomiting or hematemesis; No diarrhea or constipation. No melena or hematochezia.  Genitourinary: No dysuria, frequency, hematuria or incontinence  Rectal: No pain, hemorrhoids or incontinence  Neurological: No headaches, memory loss, loss of strength, numbness or tremors  Skin: No itching, burning, rashes or lesions   Lymph Nodes: No enlarged glands  Endocrine: No heat or cold intolerance; No hair loss  Musculoskeletal: No joint pain or swelling; No muscle, back or extremity pain  Psychiatric: No depression, anxiety, mood swings or difficulty sleeping  Heme/Lymph: No easy bruising or bleeding gums  Allergy and Immunologic: No hives or eczema    current medications:    aluminum hydroxide/magnesium hydroxide/simethicone Suspension 30 milliLiter(s) Oral four times a day PRN  apixaban 2.5 milliGRAM(s) Oral every 12 hours  budesonide 160 MICROgram(s)/formoterol 4.5 MICROgram(s) Inhaler 2 Puff(s) Inhalation two times a day  cefTRIAXone   IVPB 1000 milliGRAM(s) IV Intermittent every 24 hours  fluticasone propionate 50 MICROgram(s)/spray Nasal Spray 1 Spray(s) Both Nostrils two times a day  influenza   Vaccine 0.5 milliLiter(s) IntraMuscular once  loratadine 10 milliGRAM(s) Oral daily  LORazepam   Injectable 1 milliGRAM(s) IV Push every 4 hours PRN  magnesium hydroxide Suspension 30 milliLiter(s) Oral daily PRN  melatonin 3 milliGRAM(s) Oral at bedtime  midodrine. 15 milliGRAM(s) Oral three times a day  oxyCODONE    IR 5 milliGRAM(s) Oral every 3 hours PRN  pantoprazole    Tablet 40 milliGRAM(s) Oral before breakfast  phenylephrine    Infusion 0.1 MICROgram(s)/kG/Min IV Continuous <Continuous>  polyethylene glycol 3350 17 Gram(s) Oral daily  senna 2 Tablet(s) Oral at bedtime PRN      Vital Signs Last 24 Hrs  T(C): 36.7 (12 Sep 2020 12:00), Max: 36.9 (11 Sep 2020 19:42)  T(F): 98 (12 Sep 2020 12:00), Max: 98.5 (11 Sep 2020 19:42)  HR: 75 (12 Sep 2020 17:00) (70 - 100)  BP: 102/75 (12 Sep 2020 17:00) (76/38 - 145/107)  BP(mean): 84 (12 Sep 2020 17:00) (48 - 117)  RR: 25 (12 Sep 2020 17:00) (16 - 27)  SpO2: 96% (12 Sep 2020 17:00) (89% - 100%)    PHYSICAL EXAM:    Constitutional: NAD, well-groomed, well-developed  HEENT: PERRLA, EOMI, Normal Hearing, MMM  Neck: No LAD, No JVD  Back: Normal spine flexure, No CVA tenderness  Respiratory: CTAB/L   Cardiovascular: S1 and S2, RRR, no M/G/R  Gastrointestinal: BS+, soft, NT/ND  Extremities: No peripheral edema  Vascular: 2+ peripheral pulses  Neurological: A/O x 3, no focal deficits  Skin: No rashes      LABS:                        11.9   9.90  )-----------( 179      ( 12 Sep 2020 07:10 )             38.7     09-12    136  |  103  |  28<H>  ----------------------------<  143<H>  4.4   |  29  |  1.47<H>    Ca    8.6      12 Sep 2020 07:10  Phos  4.8       Mg     2.1         TPro  7.4  /  Alb  3.5  /  TBili  0.3  /  DBili  x   /  AST  37  /  ALT  39  /  AlkPhos  81  09-11      Urinalysis Basic - ( 12 Sep 2020 06:58 )    Color: Yellow / Appearance: Slightly Turbid / S.010 / pH: x  Gluc: x / Ketone: Negative  / Bili: Negative / Urobili: Negative mg/dL   Blood: x / Protein: 30 mg/dL / Nitrite: Negative   Leuk Esterase: Moderate / RBC: 6-10 /HPF / WBC 11-25   Sq Epi: x / Non Sq Epi: Few / Bacteria: Few      Creatinine Trend: 1.47<--, 1.03<--, 1.18<--, 1.33<--, 1.32<--    MICROBIOLOGY:  RECENT CULTURES:        RADIOLOGY & ADDITIONAL STUDIES:

## 2020-09-12 NOTE — CONSULT NOTE ADULT - ASSESSMENT
69 obese white female with a history of HTN, HLD, IAN now admitted for shoulder surgery.   Now found to have LEIF, will need a renal sonogram to assess the kidney size and rule out obstruction.   Will hold any IVF at this due to PVC on chest xray. will follow.

## 2020-09-12 NOTE — PROGRESS NOTE ADULT - SUBJECTIVE AND OBJECTIVE BOX
Date/Time Patient Seen:  		  Referring MD:   Data Reviewed	       Patient is a 69y old  Female who presents with a chief complaint of shoulder surgery (12 Sep 2020 17:41)      Subjective/HPI     PAST MEDICAL & SURGICAL HISTORY:  Osteoarthritis of left shoulder    Class 1 obesity with body mass index (BMI) of 34.0 to 34.9 in adult    History of closed shoulder dislocation  left shoulder 2019    Osteoarthritis  Right knee    Anxiety    HLD (hyperlipidemia)    Essential hypertension  Not on medication x 1 year    No pertinent past medical history    History of right knee joint replacement  2019    No significant past surgical history    No significant past surgical history          Medication list         MEDICATIONS  (STANDING):  apixaban 2.5 milliGRAM(s) Oral every 12 hours  budesonide 160 MICROgram(s)/formoterol 4.5 MICROgram(s) Inhaler 2 Puff(s) Inhalation two times a day  cefTRIAXone   IVPB 1000 milliGRAM(s) IV Intermittent every 24 hours  fluticasone propionate 50 MICROgram(s)/spray Nasal Spray 1 Spray(s) Both Nostrils two times a day  influenza   Vaccine 0.5 milliLiter(s) IntraMuscular once  loratadine 10 milliGRAM(s) Oral daily  melatonin 3 milliGRAM(s) Oral at bedtime  midodrine. 15 milliGRAM(s) Oral three times a day  pantoprazole    Tablet 40 milliGRAM(s) Oral before breakfast  phenylephrine    Infusion 0.1 MICROgram(s)/kG/Min (1.67 mL/Hr) IV Continuous <Continuous>  polyethylene glycol 3350 17 Gram(s) Oral daily    MEDICATIONS  (PRN):  aluminum hydroxide/magnesium hydroxide/simethicone Suspension 30 milliLiter(s) Oral four times a day PRN Indigestion  LORazepam   Injectable 1 milliGRAM(s) IV Push every 4 hours PRN Symptom-triggered: 2 point increase in CIWA -Ar score and a total score of 7 or LESS  magnesium hydroxide Suspension 30 milliLiter(s) Oral daily PRN Constipation  oxyCODONE    IR 5 milliGRAM(s) Oral every 3 hours PRN Moderate Pain (4 - 6)  senna 2 Tablet(s) Oral at bedtime PRN Constipation         Vitals log        ICU Vital Signs Last 24 Hrs  T(C): 36.7 (12 Sep 2020 12:00), Max: 36.9 (11 Sep 2020 19:42)  T(F): 98 (12 Sep 2020 12:00), Max: 98.5 (11 Sep 2020 19:42)  HR: 82 (12 Sep 2020 18:00) (70 - 100)  BP: 102/75 (12 Sep 2020 17:00) (76/38 - 145/107)  BP(mean): 84 (12 Sep 2020 17:00) (48 - 117)  ABP: --  ABP(mean): --  RR: 22 (12 Sep 2020 18:00) (16 - 27)  SpO2: 96% (12 Sep 2020 18:00) (89% - 100%)           Input and Output:  I&O's Detail    11 Sep 2020 07:01  -  12 Sep 2020 07:00  --------------------------------------------------------  IN:    IV PiggyBack: 50 mL    Lactated Ringers: 1100 mL    Lactated Ringers Bolus: 500 mL    Oral Fluid: 780 mL    Phenylephrine: 159.7 mL  Total IN: 2589.7 mL    OUT:    Accordian (mL): 70 mL    Voided (mL): 3350 mL  Total OUT: 3420 mL    Total NET: -830.3 mL      12 Sep 2020 07:01  -  12 Sep 2020 18:29  --------------------------------------------------------  IN:    Oral Fluid: 640 mL  Total IN: 640 mL    OUT:  Total OUT: 0 mL    Total NET: 640 mL          Lab Data                        11.9   9.90  )-----------( 179      ( 12 Sep 2020 07:10 )             38.7     09-12    136  |  103  |  28<H>  ----------------------------<  143<H>  4.4   |  29  |  1.47<H>    Ca    8.6      12 Sep 2020 07:10  Phos  4.8     09-12  Mg     2.1     09-12    TPro  7.4  /  Alb  3.5  /  TBili  0.3  /  DBili  x   /  AST  37  /  ALT  39  /  AlkPhos  81  09-11      CARDIAC MARKERS ( 11 Sep 2020 20:06 )  .025 ng/mL / x     / x     / x     / x      CARDIAC MARKERS ( 11 Sep 2020 12:04 )  .026 ng/mL / x     / x     / x     / x            Review of Systems	      Objective     Physical Examination    heart s1s2  lung dec BS  abd soft      Pertinent Lab findings & Imaging      Selena:  NO   Adequate UO     I&O's Detail    11 Sep 2020 07:01  -  12 Sep 2020 07:00  --------------------------------------------------------  IN:    IV PiggyBack: 50 mL    Lactated Ringers: 1100 mL    Lactated Ringers Bolus: 500 mL    Oral Fluid: 780 mL    Phenylephrine: 159.7 mL  Total IN: 2589.7 mL    OUT:    Accordian (mL): 70 mL    Voided (mL): 3350 mL  Total OUT: 3420 mL    Total NET: -830.3 mL      12 Sep 2020 07:01  -  12 Sep 2020 18:29  --------------------------------------------------------  IN:    Oral Fluid: 640 mL  Total IN: 640 mL    OUT:  Total OUT: 0 mL    Total NET: 640 mL               Discussed with:     Cultures:	  Culture Results:   No growth (09-11 @ 21:09)        Radiology

## 2020-09-12 NOTE — DOWNTIME INTERRUPTION NOTE - WHICH MANUAL FORMS INITIATED?
Adult assessment and Intervention  Emar report  Vital sign sheet  Daily I&O record  Progress notes  CIWA on progress note Adult assessment and Intervention  Emar report  Vital sign sheet  Daily I&O record  Progress notes  DARIUSZ on progress note    Paper records backlogged in [facility] 9/12/20 at 1233  Paper records in chart

## 2020-09-12 NOTE — PROGRESS NOTE ADULT - SUBJECTIVE AND OBJECTIVE BOX
Patient seen and examined at bedside.   yesterday evening: patient persistently with  low bp. was on .1 mcg of SHAHZAD + midodrine 10TID with little effect    Notably, patient developed frothy cough: O/e patients lungs++ crackles  cxr + pulmonary edema    : patient given 1 dose of lasix IV.. with positive effect  : also discussed case with EICU :: unable to find resource to place central line for pressors., trial of solucortef initiated with positive effect    Today: patient anxious, asking of PT , demanding to be discharged today.     Labs::  probnp up to 2700  cr up to 1.47     Review of Systems:  General:denies fever chills, headache, weakness  HEENT: denies blurry vision, diffculty swallowing, difficulty hearing, tinnitus  Cardiovascular: denies chest pain  ,palpitations  Pulmonary:denies shortness of breath, cough, wheezing, hemoptysis  Gastrointestinal: denies abdominal pain, constipation, diarrhea,nausea , vomiting, hematochezia  : denies hematuria, dysuria, or incontinence  Neurological: denies weakness, numbness , tingling, dizziness, tremors  MSK: denies muscle pain, difficulty ambulating, swelling, back pain  skin: denies skin rash, itching, burning, or  skin lesions  Psychiatrical: denies mood disturbances, anxierty, feeling depressed, depression , or difficulty sleeping    Objective:  Vitals  T(C): 36.5 (09-12-20 @ 08:00), Max: 36.9 (09-11-20 @ 19:42)  HR: 70 (09-12-20 @ 10:00) (70 - 100)  BP: 90/68 (09-12-20 @ 10:00) (74/62 - 145/107)  RR: 22 (09-12-20 @ 10:00) (17 - 32)  SpO2: 99% (09-12-20 @ 10:00) (83% - 100%)    Physical Exam:  General: comfortable, no acute distress, well nourished  HEENT: Atraumatic, no LAD, trachea midline, PERRLA  Cardiovascular: normal s1s2, no murmurs, gallops or fricition rubs  Pulmonary: crackles bilaterally, no wheezing , rhonchi  Gastrointestinal: soft non tender non distended, no masses felt, no organomegally  Muscloskeletal: trace pushpa, intact bilateral lower extremity pulses  Neurological: CN II-12 intact. No focal weakness  Psychiatrical: normal mood, cooperative  SKIN: no rash, lesions or ulcers    Labs:                          11.9   9.90  )-----------( 179      ( 12 Sep 2020 07:10 )             38.7     09-12    136  |  103  |  28<H>  ----------------------------<  143<H>  4.4   |  29  |  1.47<H>    Ca    8.6      12 Sep 2020 07:10  Phos  4.8     09-12  Mg     2.1     09-12    TPro  7.4  /  Alb  3.5  /  TBili  0.3  /  DBili  x   /  AST  37  /  ALT  39  /  AlkPhos  81  09-11    LIVER FUNCTIONS - ( 11 Sep 2020 06:18 )  Alb: 3.5 g/dL / Pro: 7.4 g/dL / ALK PHOS: 81 U/L / ALT: 39 U/L DA / AST: 37 U/L / GGT: x                 Active Medications  MEDICATIONS  (STANDING):  acetaminophen   Tablet .. 1000 milliGRAM(s) Oral every 8 hours  cefTRIAXone   IVPB 1000 milliGRAM(s) IV Intermittent every 24 hours  fluticasone propionate 50 MICROgram(s)/spray Nasal Spray 1 Spray(s) Both Nostrils two times a day  hydrocortisone sodium succinate Injectable 100 milliGRAM(s) IV Push every 8 hours  influenza   Vaccine 0.5 milliLiter(s) IntraMuscular once  loratadine 10 milliGRAM(s) Oral daily  midodrine. 10 milliGRAM(s) Oral three times a day  pantoprazole    Tablet 40 milliGRAM(s) Oral before breakfast  phenylephrine    Infusion 0.1 MICROgram(s)/kG/Min (1.67 mL/Hr) IV Continuous <Continuous>  polyethylene glycol 3350 17 Gram(s) Oral daily    MEDICATIONS  (PRN):  acetaminophen   Tablet .. 650 milliGRAM(s) Oral every 6 hours PRN Temp greater or equal to 38C (100.4F)  aluminum hydroxide/magnesium hydroxide/simethicone Suspension 30 milliLiter(s) Oral four times a day PRN Indigestion  magnesium hydroxide Suspension 30 milliLiter(s) Oral daily PRN Constipation  oxyCODONE    IR 5 milliGRAM(s) Oral every 3 hours PRN Moderate Pain (4 - 6)  senna 2 Tablet(s) Oral at bedtime PRN Constipation

## 2020-09-12 NOTE — PROGRESS NOTE ADULT - REASON FOR ADMISSION
Left reverse total shoulder replacement
Left Shoulder Replacement
Reverse left total shoulder replacement

## 2020-09-12 NOTE — PROGRESS NOTE ADULT - SUBJECTIVE AND OBJECTIVE BOX
Chief Complaint: Shoulder replacement    Interval Events: Breathing improved after furosemide. Weaned off of phenylephrine.    Review of Systems:  General: No fevers, chills, weight loss or gain  Skin: No rashes, color changes  Cardiovascular: No chest pain, orthopnea  Respiratory: No shortness of breath, cough  Gastrointestinal: No nausea, abdominal pain  Genitourinary: No incontinence, pain with urination  Musculoskeletal: No pain, swelling, decreased range of motion  Neurological: No headache, weakness  Psychiatric: No depression, anxiety  Endocrine: No weight loss or gain, increased thirst  All other systems are comprehensively negative.    Physical Exam:  Vitals:        Vital Signs Last 24 Hrs  T(C): 36.5 (12 Sep 2020 08:00), Max: 36.9 (11 Sep 2020 19:42)  T(F): 97.7 (12 Sep 2020 08:00), Max: 98.5 (11 Sep 2020 19:42)  HR: 70 (12 Sep 2020 10:00) (70 - 100)  BP: 90/68 (12 Sep 2020 10:00) (74/62 - 145/107)  BP(mean): 77 (12 Sep 2020 10:00) (48 - 117)  RR: 22 (12 Sep 2020 10:00) (17 - 32)  SpO2: 99% (12 Sep 2020 10:00) (83% - 100%)  General: NAD  HEENT: MMM  Neck: No JVD, no carotid bruit  Lungs: CTAB  CV: RRR, nl S1/S2, no M/R/G  Abdomen: S/NT/ND, +BS  Extremities: No LE edema, no cyanosis  Neuro: AAOx3, non-focal  Skin: No rash    Labs:                        11.9   9.90  )-----------( 179      ( 12 Sep 2020 07:10 )             38.7     09-12    136  |  103  |  28<H>  ----------------------------<  143<H>  4.4   |  29  |  1.47<H>    Ca    8.6      12 Sep 2020 07:10  Phos  4.8     09-12  Mg     2.1     09-12    TPro  7.4  /  Alb  3.5  /  TBili  0.3  /  DBili  x   /  AST  37  /  ALT  39  /  AlkPhos  81  09-11    CARDIAC MARKERS ( 11 Sep 2020 20:06 )  .025 ng/mL / x     / x     / x     / x      CARDIAC MARKERS ( 11 Sep 2020 12:04 )  .026 ng/mL / x     / x     / x     / x              Telemetry: Sinus rhythm, PVCs

## 2020-09-12 NOTE — PROGRESS NOTE ADULT - SUBJECTIVE AND OBJECTIVE BOX
ORTHOPEDIC PA PROGRESS NOTE  DALI RAYGOZA      69y Female                                                                                                                               POD #  2      STATUS POST:               Pre-Op Dx: DJD of shoulder  left      Post-Op Dx:  DJD of shoulder  left      Procedure: Total shoulder arthroplasty  left reverse                                                  Pain (0-10): 3  Current Pain Management:  [ ] PCA   [x ] Po Analgesics [ ] IM /IV Anagesics     BP: --100/68  SpO2: --90 on room air, 94 with nasal cannula                        11.9   9.90  )-----------( 179      ( 12 Sep 2020 07:10 )             38.7                     09-12    136  |  103  |  28<H>  ----------------------------<  143<H>  4.4   |  29  |  1.47<H>    Ca    8.6      12 Sep 2020 07:10  Phos  4.8     09-12  Mg     2.1     09-12    TPro  7.4  /  Alb  3.5  /  TBili  0.3  /  DBili  x   /  AST  37  /  ALT  39  /  AlkPhos  81  09-11    Physical Exam :    -   Dressing changed sterile.  Drain removed   -   Wound C/D/I.   -   Distal Neurvascular status intact grossly.   -   Warm well perfused; capillary refill <3 seconds   -   (+) Sensation to light touch  -   (-) Calf tenderness Bilaterally    A/P: 69y Female s/p DJD of shoulder  left      -   Ortho Stable  -   Pain control   -   Medicine to follow  -   DVT ppx:     [x]SCDs     [ ] ASA     [ ] Eliquis     [ ] Lovenox  -   Weight bearing status:  WBAT [ ]        PWB    [ ]     TTWB  [ ]      NWB  [x]   -  Dispo:     Home [ ]     Acute Rehab [ ]     [facility] [ ]     TBD [x]  - NO NSAIDS  -  Currently being treated for UTI (IV ceftriaxone)  -  Received Furosemide yesterday due to fluid overload  -  On shelton drip for low bp  -  Patient needs an assist of 2 with physical therapy at this time.  She lives alone and may require rehab if she does not pass physical therapy.  -  Discussed with patient, hospitalist and Dr. Pham

## 2020-09-12 NOTE — PROGRESS NOTE ADULT - SUBJECTIVE AND OBJECTIVE BOX
Addendum:  Discussed with attending: Due to patients increased risk of DVT including surgery, obesity, history of alcohol abuse and her limited mobility with physical therapy we will begin DVT prophylaxis with Eliquis 2.5mg every 12 hours.  DVT prophylaxis was originally held due to patients intolerance for NSAIDS.  Dr. Eric yarbrough.

## 2020-09-13 LAB
ANION GAP SERPL CALC-SCNC: 5 MMOL/L — SIGNIFICANT CHANGE UP (ref 5–17)
BUN SERPL-MCNC: 32 MG/DL — HIGH (ref 7–23)
CALCIUM SERPL-MCNC: 9 MG/DL — SIGNIFICANT CHANGE UP (ref 8.4–10.5)
CHLORIDE SERPL-SCNC: 103 MMOL/L — SIGNIFICANT CHANGE UP (ref 96–108)
CO2 SERPL-SCNC: 31 MMOL/L — SIGNIFICANT CHANGE UP (ref 22–31)
CORTIS AM PEAK SERPL-MCNC: 13.6 UG/DL — SIGNIFICANT CHANGE UP (ref 6–18.4)
CORTIS AM PEAK SERPL-MCNC: 34.5 UG/DL — HIGH (ref 6–18.4)
CREAT SERPL-MCNC: 1.32 MG/DL — HIGH (ref 0.5–1.3)
GLUCOSE SERPL-MCNC: 114 MG/DL — HIGH (ref 70–99)
HCT VFR BLD CALC: 39 % — SIGNIFICANT CHANGE UP (ref 34.5–45)
HGB BLD-MCNC: 11.8 G/DL — SIGNIFICANT CHANGE UP (ref 11.5–15.5)
MAGNESIUM SERPL-MCNC: 2.2 MG/DL — SIGNIFICANT CHANGE UP (ref 1.6–2.6)
MCHC RBC-ENTMCNC: 28.2 PG — SIGNIFICANT CHANGE UP (ref 27–34)
MCHC RBC-ENTMCNC: 30.3 GM/DL — LOW (ref 32–36)
MCV RBC AUTO: 93.1 FL — SIGNIFICANT CHANGE UP (ref 80–100)
NRBC # BLD: 0 /100 WBCS — SIGNIFICANT CHANGE UP (ref 0–0)
PHOSPHATE SERPL-MCNC: 4.6 MG/DL — HIGH (ref 2.5–4.5)
PLATELET # BLD AUTO: 172 K/UL — SIGNIFICANT CHANGE UP (ref 150–400)
POTASSIUM SERPL-MCNC: 3.9 MMOL/L — SIGNIFICANT CHANGE UP (ref 3.5–5.3)
POTASSIUM SERPL-SCNC: 3.9 MMOL/L — SIGNIFICANT CHANGE UP (ref 3.5–5.3)
RBC # BLD: 4.19 M/UL — SIGNIFICANT CHANGE UP (ref 3.8–5.2)
RBC # FLD: 14.5 % — SIGNIFICANT CHANGE UP (ref 10.3–14.5)
SODIUM SERPL-SCNC: 139 MMOL/L — SIGNIFICANT CHANGE UP (ref 135–145)
WBC # BLD: 8.01 K/UL — SIGNIFICANT CHANGE UP (ref 3.8–10.5)
WBC # FLD AUTO: 8.01 K/UL — SIGNIFICANT CHANGE UP (ref 3.8–10.5)

## 2020-09-13 PROCEDURE — 99291 CRITICAL CARE FIRST HOUR: CPT

## 2020-09-13 RX ORDER — MIDODRINE HYDROCHLORIDE 2.5 MG/1
10 TABLET ORAL THREE TIMES A DAY
Refills: 0 | Status: DISCONTINUED | OUTPATIENT
Start: 2020-09-13 | End: 2020-09-14

## 2020-09-13 RX ORDER — APIXABAN 2.5 MG/1
1 TABLET, FILM COATED ORAL
Qty: 0 | Refills: 0 | DISCHARGE
Start: 2020-09-13

## 2020-09-13 RX ADMIN — BUDESONIDE AND FORMOTEROL FUMARATE DIHYDRATE 2 PUFF(S): 160; 4.5 AEROSOL RESPIRATORY (INHALATION) at 17:14

## 2020-09-13 RX ADMIN — Medication 3 MILLIGRAM(S): at 21:10

## 2020-09-13 RX ADMIN — Medication 1 SPRAY(S): at 17:13

## 2020-09-13 RX ADMIN — OXYCODONE HYDROCHLORIDE 5 MILLIGRAM(S): 5 TABLET ORAL at 17:12

## 2020-09-13 RX ADMIN — PANTOPRAZOLE SODIUM 40 MILLIGRAM(S): 20 TABLET, DELAYED RELEASE ORAL at 06:08

## 2020-09-13 RX ADMIN — OXYCODONE HYDROCHLORIDE 5 MILLIGRAM(S): 5 TABLET ORAL at 11:25

## 2020-09-13 RX ADMIN — LORATADINE 10 MILLIGRAM(S): 10 TABLET ORAL at 11:18

## 2020-09-13 RX ADMIN — POLYETHYLENE GLYCOL 3350 17 GRAM(S): 17 POWDER, FOR SOLUTION ORAL at 11:18

## 2020-09-13 RX ADMIN — MIDODRINE HYDROCHLORIDE 10 MILLIGRAM(S): 2.5 TABLET ORAL at 17:12

## 2020-09-13 RX ADMIN — APIXABAN 2.5 MILLIGRAM(S): 2.5 TABLET, FILM COATED ORAL at 21:10

## 2020-09-13 RX ADMIN — OXYCODONE HYDROCHLORIDE 5 MILLIGRAM(S): 5 TABLET ORAL at 10:26

## 2020-09-13 RX ADMIN — BUDESONIDE AND FORMOTEROL FUMARATE DIHYDRATE 2 PUFF(S): 160; 4.5 AEROSOL RESPIRATORY (INHALATION) at 06:30

## 2020-09-13 RX ADMIN — OXYCODONE HYDROCHLORIDE 5 MILLIGRAM(S): 5 TABLET ORAL at 22:42

## 2020-09-13 RX ADMIN — MIDODRINE HYDROCHLORIDE 10 MILLIGRAM(S): 2.5 TABLET ORAL at 11:18

## 2020-09-13 RX ADMIN — OXYCODONE HYDROCHLORIDE 5 MILLIGRAM(S): 5 TABLET ORAL at 05:40

## 2020-09-13 RX ADMIN — APIXABAN 2.5 MILLIGRAM(S): 2.5 TABLET, FILM COATED ORAL at 09:23

## 2020-09-13 RX ADMIN — Medication 1 SPRAY(S): at 05:42

## 2020-09-13 RX ADMIN — OXYCODONE HYDROCHLORIDE 5 MILLIGRAM(S): 5 TABLET ORAL at 23:40

## 2020-09-13 RX ADMIN — OXYCODONE HYDROCHLORIDE 5 MILLIGRAM(S): 5 TABLET ORAL at 06:40

## 2020-09-13 NOTE — PROGRESS NOTE ADULT - SUBJECTIVE AND OBJECTIVE BOX
POST OPERATIVE DAY #:  [3 ]   STATUS POST: [x Left TSR                    Patient is a 69y old  Female who presents with a chief complaint of shoulder surgery (12 Sep 2020 17:41)      Pain well controlled    OBJECTIVE:     Vital Signs Last 24 Hrs  T(C): 36.7 (13 Sep 2020 07:00), Max: 36.7 (12 Sep 2020 12:00)  T(F): 98 (13 Sep 2020 07:00), Max: 98.1 (12 Sep 2020 20:00)  HR: 72 (13 Sep 2020 09:00) (67 - 89)  BP: 107/88 (13 Sep 2020 09:00) (80/60 - 128/85)  BP(mean): 94 (13 Sep 2020 09:00) (66 - 97)  RR: 20 (13 Sep 2020 09:00) (16 - 25)  SpO2: 99% (13 Sep 2020 09:00) (91% - 99%)    Affected extremity:         Prineo Dressing:  clean/dry/intact  , drain site covered w bandaid          Sensation; intact to light touch           Motor exam: Toes warm and mobile well perfused;  +capillary refill         No calf tenderness bilateral LE's    LABS:                        11.8   8.01  )-----------( 172      ( 13 Sep 2020 05:33 )             39.0     09-13    139  |  103  |  32<H>  ----------------------------<  114<H>  3.9   |  31  |  1.32<H>    Ca    9.0      13 Sep 2020 05:33  Phos  4.6     09-13  Mg     2.2     09-13              A/P :    -    Analgesics PRN  -    DVT ppx: [ ]ASA 81 bid [ ] Lovenox [ ] Coumadin   [x ] Eliquis   -    Weight bearing status: WBAT [ ]        PWB    [ ]     TTWB  [ ]      NWB  [x ]  -    Physical Therapy : progressing slowly  -    Occupational Therapy  -    Dispo: Home [ ]     Rehab [ ]      [facility] [ ]      To be determined [x ] will speak to SW/HC in am

## 2020-09-13 NOTE — PROGRESS NOTE ADULT - SUBJECTIVE AND OBJECTIVE BOX
JARETDALI  69y  Female    Patient is a 69y old  Female who presents with a chief complaint of shoulder surgery (12 Sep 2020 17:41)      denies any chest pain or SOB.       PAST MEDICAL & SURGICAL HISTORY:  Osteoarthritis of left shoulder    Class 1 obesity with body mass index (BMI) of 34.0 to 34.9 in adult    History of closed shoulder dislocation  left shoulder     Anxiety    HLD (hyperlipidemia)    History of right knee joint replacement              PHYSICAL EXAM:    T(C): 36.2 (20 @ 15:59), Max: 36.7 (20 @ 20:00)  HR: 86 (20 @ 15:00) (67 - 89)  BP: 89/76 (20 @ 14:01) (80/60 - 113/77)  RR: 19 (20 @ 15:00) (10 - 23)  SpO2: 100% (20 @ 15:00) (91% - 100%)  Wt(kg): --    I&O's Detail    12 Sep 2020 07:  -  13 Sep 2020 07:00  --------------------------------------------------------  IN:    Oral Fluid: 860 mL  Total IN: 860 mL    OUT:    Voided (mL): 200 mL  Total OUT: 200 mL    Total NET: 660 mL      13 Sep 2020 07:01  -  13 Sep 2020 17:08  --------------------------------------------------------  IN:    Oral Fluid: 840 mL  Total IN: 840 mL    OUT:    Voided (mL): 300 mL  Total OUT: 300 mL    Total NET: 540 mL          Respiratory: clear anteriorly, decreased BS at bases  Cardiovascular: S1 S2  Gastrointestinal: soft NT ND +BS  Extremities: edema   Neuro: Awake and alert    MEDICATIONS  (STANDING):  budesonide 160 MICROgram(s)/formoterol 4.5 MICROgram(s) Inhaler 2 Puff(s) Inhalation two times a day  fluticasone propionate 50 MICROgram(s)/spray Nasal Spray 1 Spray(s) Both Nostrils two times a day  influenza   Vaccine 0.5 milliLiter(s) IntraMuscular once  loratadine 10 milliGRAM(s) Oral daily  melatonin 3 milliGRAM(s) Oral at bedtime  midodrine. 10 milliGRAM(s) Oral three times a day  pantoprazole    Tablet 40 milliGRAM(s) Oral before breakfast  polyethylene glycol 3350 17 Gram(s) Oral daily    MEDICATIONS  (PRN):  aluminum hydroxide/magnesium hydroxide/simethicone Suspension 30 milliLiter(s) Oral four times a day PRN Indigestion  magnesium hydroxide Suspension 30 milliLiter(s) Oral daily PRN Constipation  oxyCODONE    IR 5 milliGRAM(s) Oral every 3 hours PRN Moderate Pain (4 - 6)  senna 2 Tablet(s) Oral at bedtime PRN Constipation                            11.8   8.01  )-----------( 172      ( 13 Sep 2020 05:33 )             39.0       09-13    139  |  103  |  32<H>  ----------------------------<  114<H>  3.9   |  31  |  1.32<H>    Ca    9.0      13 Sep 2020 05:33  Phos  4.6       Mg     2.2             Creatinine Trend: Creatinine Trend: 1.32<--, 1.47<--, 1.03<--, 1.18<--, 1.33<--, 1.32<--    Urinalysis Basic - ( 12 Sep 2020 06:58 )    Color: Yellow / Appearance: Slightly Turbid / S.010 / pH: x  Gluc: x / Ketone: Negative  / Bili: Negative / Urobili: Negative mg/dL   Blood: x / Protein: 30 mg/dL / Nitrite: Negative   Leuk Esterase: Moderate / RBC: 6-10 /HPF / WBC 11-25   Sq Epi: x / Non Sq Epi: Few / Bacteria: Few

## 2020-09-13 NOTE — PROGRESS NOTE ADULT - ASSESSMENT
69 obese white female with a history of HTN, HLD, IAN now admitted for shoulder surgery.   Now found to have LEIF, Renal indices are now better. Advised to follow up with her nephrologist after discharge.

## 2020-09-13 NOTE — PROGRESS NOTE ADULT - ASSESSMENT
69F who is s/p Left Shoulder Replacement is now admitted to [facility]PC[facility] due to Hypercapnia and Hypotension.     Hypotension  Overall improving; Baseline SBP usually in 80's according to patient and labile at home; MAPS >65 with good Urine output  Off Phenylephrine and on Midodrine for past 24 hours   Echo with normal EF and mild diastolic dysfunction   AM Cortisol levels with appropriate response   Urine Culture negative stop Ceftriaxone     Hypercapnia   Improved; Probably undiagnosed IAN and Obesity Hypoventillation Syndrome  Minimize narcotics and sedating medications   Incentive Spirometer and encourage OOB and ambulation    S/P Left Reverse Shoulder POD 2  Continue Bowel and pain control regimen.   Incentive Spirometer for lung expansion.  Work with PT to increase ambulation as per orthopedics.  Monitor Hgb and follow up electrolytes.   Orthopedics on board and following     EtOH Dependance  5 Glasses of Wine per day  Out of window for withdrawal and no symptoms  Stop CIWA     Anxiety  Xanax PRN    HLD  Resume home dose of Statin    Diet  Regular    DVT Prophylaxis  Eliquis    Disposition  Full Code/Inpatient  Discharge planning pending hospital course      69F who is s/p Left Shoulder Replacement is now admitted to [facility]PCU due to Hypercapnia and Hypotension.     Hypotension  Overall improving; Baseline SBP usually in 80's according to patient and labile at home; MAPS >65 with good Urine output  Off Phenylephrine and on Midodrine for past 24 hours   Echo with normal EF and mild diastolic dysfunction   AM Cortisol levels with appropriate response   Urine Culture negative stop Ceftriaxone     Hypercapnia   Improved; Probably undiagnosed IAN and Obesity Hypoventillation Syndrome  Minimize narcotics and sedating medications   Incentive Spirometer and encourage OOB and ambulation    Acute Renal Failure  Non Oliguric and good urine output  Refusing renal U/S; Suspect pre-renal given BP issues  IVF discontinued by Nephrology due to vascular congestion on imaging  Creatinine improvig; Monitor BMP and electrolytes     S/P Left Reverse Shoulder POD 2  Continue Bowel and pain control regimen.   Incentive Spirometer for lung expansion.  Work with PT to increase ambulation as per orthopedics.  Monitor Hgb and follow up electrolytes.   Orthopedics on board and following     EtOH Dependance  5 Glasses of Wine per day  Out of window for withdrawal and no symptoms  Stop CIWA     Anxiety  Xanax PRN    HLD  Resume home dose of Statin    Diet  Regular    DVT Prophylaxis  Eliquis    Disposition  Full Code/Inpatient  Discharge planning pending hospital course

## 2020-09-13 NOTE — PROGRESS NOTE ADULT - SUBJECTIVE AND OBJECTIVE BOX
Chief Complaint: Shoulder replacement    Interval Events: No events overnight. BPs on the lower side.    Review of Systems:  General: No fevers, chills, weight loss or gain  Skin: No rashes, color changes  Cardiovascular: No chest pain, orthopnea  Respiratory: No shortness of breath, cough  Gastrointestinal: No nausea, abdominal pain  Genitourinary: No incontinence, pain with urination  Musculoskeletal: No pain, swelling, decreased range of motion  Neurological: No headache, weakness  Psychiatric: No depression, anxiety  Endocrine: No weight loss or gain, increased thirst  All other systems are comprehensively negative.    Physical Exam:  Vital Signs Last 24 Hrs  T(C): 36.7 (13 Sep 2020 07:00), Max: 36.7 (12 Sep 2020 12:00)  T(F): 98 (13 Sep 2020 07:00), Max: 98.1 (12 Sep 2020 20:00)  HR: 76 (13 Sep 2020 08:00) (67 - 89)  BP: 104/82 (13 Sep 2020 08:00) (80/60 - 128/85)  BP(mean): 91 (13 Sep 2020 08:00) (66 - 97)  RR: 22 (13 Sep 2020 08:00) (16 - 25)  SpO2: 91% (13 Sep 2020 08:00) (91% - 99%)  General: NAD  HEENT: MMM  Neck: No JVD, no carotid bruit  Lungs: CTAB  CV: RRR, nl S1/S2, no M/R/G  Abdomen: S/NT/ND, +BS  Extremities: No LE edema, no cyanosis  Neuro: AAOx3, non-focal  Skin: No rash    Labs:             09-13    139  |  103  |  32<H>  ----------------------------<  114<H>  3.9   |  31  |  1.32<H>    Ca    9.0      13 Sep 2020 05:33  Phos  4.6     09-13  Mg     2.2     09-13                          11.8   8.01  )-----------( 172      ( 13 Sep 2020 05:33 )             39.0       Telemetry: Sinus rhythm

## 2020-09-13 NOTE — PROGRESS NOTE ADULT - SUBJECTIVE AND OBJECTIVE BOX
Date/Time Patient Seen:  		  Referring MD:   Data Reviewed	       Patient is a 69y old  Female who presents with a chief complaint of shoulder surgery (12 Sep 2020 17:41)      Subjective/HPI     PAST MEDICAL & SURGICAL HISTORY:  Osteoarthritis of left shoulder    Class 1 obesity with body mass index (BMI) of 34.0 to 34.9 in adult    History of closed shoulder dislocation  left shoulder 2019    Osteoarthritis  Right knee    Anxiety    HLD (hyperlipidemia)    Essential hypertension  Not on medication x 1 year    No pertinent past medical history    History of right knee joint replacement  2019    No significant past surgical history    No significant past surgical history          Medication list         MEDICATIONS  (STANDING):  budesonide 160 MICROgram(s)/formoterol 4.5 MICROgram(s) Inhaler 2 Puff(s) Inhalation two times a day  cefTRIAXone   IVPB 1000 milliGRAM(s) IV Intermittent every 24 hours  fluticasone propionate 50 MICROgram(s)/spray Nasal Spray 1 Spray(s) Both Nostrils two times a day  influenza   Vaccine 0.5 milliLiter(s) IntraMuscular once  loratadine 10 milliGRAM(s) Oral daily  melatonin 3 milliGRAM(s) Oral at bedtime  midodrine. 15 milliGRAM(s) Oral three times a day  pantoprazole    Tablet 40 milliGRAM(s) Oral before breakfast  phenylephrine    Infusion 0.1 MICROgram(s)/kG/Min (1.67 mL/Hr) IV Continuous <Continuous>  polyethylene glycol 3350 17 Gram(s) Oral daily    MEDICATIONS  (PRN):  aluminum hydroxide/magnesium hydroxide/simethicone Suspension 30 milliLiter(s) Oral four times a day PRN Indigestion  LORazepam   Injectable 1 milliGRAM(s) IV Push every 4 hours PRN Symptom-triggered: 2 point increase in CIWA -Ar score and a total score of 7 or LESS  magnesium hydroxide Suspension 30 milliLiter(s) Oral daily PRN Constipation  oxyCODONE    IR 5 milliGRAM(s) Oral every 3 hours PRN Moderate Pain (4 - 6)  senna 2 Tablet(s) Oral at bedtime PRN Constipation         Vitals log        ICU Vital Signs Last 24 Hrs  T(C): 36.7 (13 Sep 2020 07:00), Max: 36.7 (12 Sep 2020 12:00)  T(F): 98 (13 Sep 2020 07:00), Max: 98.1 (12 Sep 2020 20:00)  HR: 71 (13 Sep 2020 07:00) (67 - 89)  BP: 92/66 (13 Sep 2020 07:00) (80/60 - 128/85)  BP(mean): 74 (13 Sep 2020 07:00) (66 - 97)  ABP: --  ABP(mean): --  RR: 17 (13 Sep 2020 07:00) (16 - 25)  SpO2: 96% (13 Sep 2020 07:00) (96% - 99%)           Input and Output:  I&O's Detail    12 Sep 2020 07:01  -  13 Sep 2020 07:00  --------------------------------------------------------  IN:    Oral Fluid: 860 mL  Total IN: 860 mL    OUT:    Voided (mL): 200 mL  Total OUT: 200 mL    Total NET: 660 mL          Lab Data                        11.8   8.01  )-----------( 172      ( 13 Sep 2020 05:33 )             39.0     09-13    139  |  103  |  32<H>  ----------------------------<  114<H>  3.9   |  31  |  1.32<H>    Ca    9.0      13 Sep 2020 05:33  Phos  4.6     09-13  Mg     2.2     09-13        CARDIAC MARKERS ( 11 Sep 2020 20:06 )  .025 ng/mL / x     / x     / x     / x      CARDIAC MARKERS ( 11 Sep 2020 12:04 )  .026 ng/mL / x     / x     / x     / x            Review of Systems	      Objective     Physical Examination    heart s1s2  lung dec BS  abd soft      Pertinent Lab findings & Imaging      Selena:  NO   Adequate UO     I&O's Detail    12 Sep 2020 07:01  -  13 Sep 2020 07:00  --------------------------------------------------------  IN:    Oral Fluid: 860 mL  Total IN: 860 mL    OUT:    Voided (mL): 200 mL  Total OUT: 200 mL    Total NET: 660 mL               Discussed with:     Cultures:	        Radiology

## 2020-09-13 NOTE — PROGRESS NOTE ADULT - ASSESSMENT
The patient is a 69 year old female with a history of obesity, HL who is admitted s/p left shoulder replacement course c/b respiratory failure and shock.    Plan:  - Echo with normal LV systolic function, no significant valve issues  - Volume overload previously due to IV fluids - improved  - Wean midodrine to 10 mg tid - BP remains on lower side  - Hold hydrocortisone

## 2020-09-13 NOTE — PROGRESS NOTE ADULT - SUBJECTIVE AND OBJECTIVE BOX
Subjective: Patient seen and examined.  Doing well with no overnight events. Refused her CT Head and her Renal Sonogram that was ordered. States her BP usually is labile and has systolic BP is low and in the 80's.     MEDICATIONS  (STANDING):  budesonide 160 MICROgram(s)/formoterol 4.5 MICROgram(s) Inhaler 2 Puff(s) Inhalation two times a day  cefTRIAXone   IVPB 1000 milliGRAM(s) IV Intermittent every 24 hours  fluticasone propionate 50 MICROgram(s)/spray Nasal Spray 1 Spray(s) Both Nostrils two times a day  influenza   Vaccine 0.5 milliLiter(s) IntraMuscular once  loratadine 10 milliGRAM(s) Oral daily  melatonin 3 milliGRAM(s) Oral at bedtime  midodrine. 15 milliGRAM(s) Oral three times a day  pantoprazole    Tablet 40 milliGRAM(s) Oral before breakfast  phenylephrine    Infusion 0.1 MICROgram(s)/kG/Min (1.67 mL/Hr) IV Continuous <Continuous>  polyethylene glycol 3350 17 Gram(s) Oral daily    MEDICATIONS  (PRN):  aluminum hydroxide/magnesium hydroxide/simethicone Suspension 30 milliLiter(s) Oral four times a day PRN Indigestion  LORazepam   Injectable 1 milliGRAM(s) IV Push every 4 hours PRN Symptom-triggered: 2 point increase in CIWA -Ar score and a total score of 7 or LESS  magnesium hydroxide Suspension 30 milliLiter(s) Oral daily PRN Constipation  oxyCODONE    IR 5 milliGRAM(s) Oral every 3 hours PRN Moderate Pain (4 - 6)  senna 2 Tablet(s) Oral at bedtime PRN Constipation      Allergies    No Known Allergies    Intolerances    NSAIDs (Other)      Vital Signs Last 24 Hrs  T(C): 36.7 (13 Sep 2020 07:00), Max: 36.7 (12 Sep 2020 12:00)  T(F): 98 (13 Sep 2020 07:00), Max: 98.1 (12 Sep 2020 20:00)  HR: 71 (13 Sep 2020 07:00) (67 - 89)  BP: 92/66 (13 Sep 2020 07:00) (80/60 - 128/85)  BP(mean): 74 (13 Sep 2020 07:00) (66 - 97)  RR: 17 (13 Sep 2020 07:00) (16 - 25)  SpO2: 96% (13 Sep 2020 07:00) (96% - 99%)    PHYSICAL EXAM:  GENERAL: NAD, well-groomed, well-developed  HEAD:  Atraumatic, Normocephalic  ENMT: Moist mucous membranes,   NECK: Supple, No JVD, Normal thyroid  NERVOUS SYSTEM:  All 4 extremities mobile, no gross sensory deficits.   CHEST/LUNG: Clear to auscultation bilaterally; No rales, rhonchi, wheezing, or rubs  HEART: Regular rate and rhythm; No murmurs, rubs, or gallops  ABDOMEN: Soft, Nontender, Nondistended; Bowel sounds present  EXTREMITIES:  2+ Peripheral Pulses, No clubbing, cyanosis, or edema      LABS:                        11.8   8.01  )-----------( 172      ( 13 Sep 2020 05:33 )             39.0     13 Sep 2020 05:33    139    |  103    |  32     ----------------------------<  114    3.9     |  31     |  1.32     Ca    9.0        13 Sep 2020 05:33  Phos  4.6       13 Sep 2020 05:33  Mg     2.2       13 Sep 2020 05:33        Urinalysis Basic - ( 12 Sep 2020 06:58 )    Color: Yellow / Appearance: Slightly Turbid / S.010 / pH: x  Gluc: x / Ketone: Negative  / Bili: Negative / Urobili: Negative mg/dL   Blood: x / Protein: 30 mg/dL / Nitrite: Negative   Leuk Esterase: Moderate / RBC: 6-10 /HPF / WBC 11-25   Sq Epi: x / Non Sq Epi: Few / Bacteria: Few      CAPILLARY BLOOD GLUCOSE          RADIOLOGY & ADDITIONAL TESTS:    Imaging Personally Reviewed:  [ ] YES     Consultant(s) Notes Reviewed:      Care Discussed with Consultants/Other Providers:    Advanced Directives: [ ] DNR  [ ] No feeding tube  [ ] MOLST in chart  [ ] MOLST completed today  [ ] Unknown

## 2020-09-14 LAB
ANION GAP SERPL CALC-SCNC: 6 MMOL/L — SIGNIFICANT CHANGE UP (ref 5–17)
BUN SERPL-MCNC: 29 MG/DL — HIGH (ref 7–23)
CALCIUM SERPL-MCNC: 8.9 MG/DL — SIGNIFICANT CHANGE UP (ref 8.4–10.5)
CHLORIDE SERPL-SCNC: 103 MMOL/L — SIGNIFICANT CHANGE UP (ref 96–108)
CO2 SERPL-SCNC: 30 MMOL/L — SIGNIFICANT CHANGE UP (ref 22–31)
CREAT SERPL-MCNC: 1.22 MG/DL — SIGNIFICANT CHANGE UP (ref 0.5–1.3)
GLUCOSE SERPL-MCNC: 117 MG/DL — HIGH (ref 70–99)
HCT VFR BLD CALC: 36.8 % — SIGNIFICANT CHANGE UP (ref 34.5–45)
HGB BLD-MCNC: 11.4 G/DL — LOW (ref 11.5–15.5)
MCHC RBC-ENTMCNC: 28.4 PG — SIGNIFICANT CHANGE UP (ref 27–34)
MCHC RBC-ENTMCNC: 31 GM/DL — LOW (ref 32–36)
MCV RBC AUTO: 91.5 FL — SIGNIFICANT CHANGE UP (ref 80–100)
NRBC # BLD: 0 /100 WBCS — SIGNIFICANT CHANGE UP (ref 0–0)
PLATELET # BLD AUTO: 200 K/UL — SIGNIFICANT CHANGE UP (ref 150–400)
POTASSIUM SERPL-MCNC: 3.6 MMOL/L — SIGNIFICANT CHANGE UP (ref 3.5–5.3)
POTASSIUM SERPL-SCNC: 3.6 MMOL/L — SIGNIFICANT CHANGE UP (ref 3.5–5.3)
RBC # BLD: 4.02 M/UL — SIGNIFICANT CHANGE UP (ref 3.8–5.2)
RBC # FLD: 14.3 % — SIGNIFICANT CHANGE UP (ref 10.3–14.5)
SARS-COV-2 RNA SPEC QL NAA+PROBE: SIGNIFICANT CHANGE UP
SODIUM SERPL-SCNC: 139 MMOL/L — SIGNIFICANT CHANGE UP (ref 135–145)
WBC # BLD: 7.36 K/UL — SIGNIFICANT CHANGE UP (ref 3.8–10.5)
WBC # FLD AUTO: 7.36 K/UL — SIGNIFICANT CHANGE UP (ref 3.8–10.5)

## 2020-09-14 PROCEDURE — 99233 SBSQ HOSP IP/OBS HIGH 50: CPT

## 2020-09-14 RX ORDER — MIDODRINE HYDROCHLORIDE 2.5 MG/1
5 TABLET ORAL THREE TIMES A DAY
Refills: 0 | Status: DISCONTINUED | OUTPATIENT
Start: 2020-09-14 | End: 2020-09-15

## 2020-09-14 RX ADMIN — BUDESONIDE AND FORMOTEROL FUMARATE DIHYDRATE 2 PUFF(S): 160; 4.5 AEROSOL RESPIRATORY (INHALATION) at 19:01

## 2020-09-14 RX ADMIN — BUDESONIDE AND FORMOTEROL FUMARATE DIHYDRATE 2 PUFF(S): 160; 4.5 AEROSOL RESPIRATORY (INHALATION) at 06:24

## 2020-09-14 RX ADMIN — OXYCODONE HYDROCHLORIDE 5 MILLIGRAM(S): 5 TABLET ORAL at 07:45

## 2020-09-14 RX ADMIN — OXYCODONE HYDROCHLORIDE 5 MILLIGRAM(S): 5 TABLET ORAL at 12:51

## 2020-09-14 RX ADMIN — Medication 3 MILLIGRAM(S): at 21:43

## 2020-09-14 RX ADMIN — OXYCODONE HYDROCHLORIDE 5 MILLIGRAM(S): 5 TABLET ORAL at 18:59

## 2020-09-14 RX ADMIN — MIDODRINE HYDROCHLORIDE 5 MILLIGRAM(S): 2.5 TABLET ORAL at 12:02

## 2020-09-14 RX ADMIN — MIDODRINE HYDROCHLORIDE 10 MILLIGRAM(S): 2.5 TABLET ORAL at 05:44

## 2020-09-14 RX ADMIN — Medication 1 SPRAY(S): at 05:45

## 2020-09-14 RX ADMIN — LORATADINE 10 MILLIGRAM(S): 10 TABLET ORAL at 12:02

## 2020-09-14 RX ADMIN — PANTOPRAZOLE SODIUM 40 MILLIGRAM(S): 20 TABLET, DELAYED RELEASE ORAL at 06:21

## 2020-09-14 RX ADMIN — OXYCODONE HYDROCHLORIDE 5 MILLIGRAM(S): 5 TABLET ORAL at 13:40

## 2020-09-14 RX ADMIN — POLYETHYLENE GLYCOL 3350 17 GRAM(S): 17 POWDER, FOR SOLUTION ORAL at 12:03

## 2020-09-14 RX ADMIN — APIXABAN 2.5 MILLIGRAM(S): 2.5 TABLET, FILM COATED ORAL at 21:43

## 2020-09-14 RX ADMIN — APIXABAN 2.5 MILLIGRAM(S): 2.5 TABLET, FILM COATED ORAL at 09:05

## 2020-09-14 RX ADMIN — MIDODRINE HYDROCHLORIDE 5 MILLIGRAM(S): 2.5 TABLET ORAL at 17:57

## 2020-09-14 RX ADMIN — OXYCODONE HYDROCHLORIDE 5 MILLIGRAM(S): 5 TABLET ORAL at 19:52

## 2020-09-14 RX ADMIN — OXYCODONE HYDROCHLORIDE 5 MILLIGRAM(S): 5 TABLET ORAL at 06:58

## 2020-09-14 RX ADMIN — SENNA PLUS 2 TABLET(S): 8.6 TABLET ORAL at 05:45

## 2020-09-14 NOTE — PROGRESS NOTE ADULT - SUBJECTIVE AND OBJECTIVE BOX
Subjective: Patient seen and examined. Doing well with no overnight events.  No complaints.     MEDICATIONS  (STANDING):  budesonide 160 MICROgram(s)/formoterol 4.5 MICROgram(s) Inhaler 2 Puff(s) Inhalation two times a day  fluticasone propionate 50 MICROgram(s)/spray Nasal Spray 1 Spray(s) Both Nostrils two times a day  influenza   Vaccine 0.5 milliLiter(s) IntraMuscular once  loratadine 10 milliGRAM(s) Oral daily  melatonin 3 milliGRAM(s) Oral at bedtime  midodrine. 10 milliGRAM(s) Oral three times a day  pantoprazole    Tablet 40 milliGRAM(s) Oral before breakfast  polyethylene glycol 3350 17 Gram(s) Oral daily    MEDICATIONS  (PRN):  aluminum hydroxide/magnesium hydroxide/simethicone Suspension 30 milliLiter(s) Oral four times a day PRN Indigestion  magnesium hydroxide Suspension 30 milliLiter(s) Oral daily PRN Constipation  oxyCODONE    IR 5 milliGRAM(s) Oral every 3 hours PRN Moderate Pain (4 - 6)  senna 2 Tablet(s) Oral at bedtime PRN Constipation      Allergies    No Known Allergies    Intolerances    NSAIDs (Other)      Vital Signs Last 24 Hrs  T(C): 36.6 (14 Sep 2020 04:04), Max: 36.7 (14 Sep 2020 00:02)  T(F): 97.9 (14 Sep 2020 04:04), Max: 98.1 (14 Sep 2020 00:02)  HR: 72 (14 Sep 2020 06:00) (68 - 86)  BP: 118/84 (14 Sep 2020 06:00) (89/76 - 118/84)  BP(mean): 96 (14 Sep 2020 06:00) (81 - 98)  RR: 17 (14 Sep 2020 06:00) (10 - 22)  SpO2: 96% (14 Sep 2020 06:00) (91% - 100%)    PHYSICAL EXAM:  GENERAL: NAD, well-groomed, well-developed  HEAD:  Atraumatic, Normocephalic  ENMT: Moist mucous membranes,   NECK: Supple, No JVD, Normal thyroid  NERVOUS SYSTEM:  All 4 extremities mobile, no gross sensory deficits.   CHEST/LUNG: Clear to auscultation bilaterally; No rales, rhonchi, wheezing, or rubs  HEART: Regular rate and rhythm; No murmurs, rubs, or gallops  ABDOMEN: Soft, Nontender, Nondistended; Bowel sounds present  EXTREMITIES:  2+ Peripheral Pulses, No clubbing, cyanosis, or edema      LABS:                        11.4   7.36  )-----------( 200      ( 14 Sep 2020 06:02 )             36.8     14 Sep 2020 06:02    139    |  103    |  29     ----------------------------<  117    3.6     |  30     |  1.22     Ca    8.9        14 Sep 2020 06:02          CAPILLARY BLOOD GLUCOSE          RADIOLOGY & ADDITIONAL TESTS:    Imaging Personally Reviewed:  [ ] YES     Consultant(s) Notes Reviewed:      Care Discussed with Consultants/Other Providers:    Advanced Directives: [ ] DNR  [ ] No feeding tube  [ ] MOLST in chart  [ ] MOLST completed today  [ ] Unknown

## 2020-09-14 NOTE — PROGRESS NOTE ADULT - SUBJECTIVE AND OBJECTIVE BOX
DALI RAYGOZA                                                                [id number]                                                     Allergies---No Known Allergies  NSAIDs (Other)        Pt is a 69y year old Female s/p reverse left Totoal shoulder replacement.   Pt. is A&O x 3, resting comfortably, in a chair.   Pain is 4/10.   Tolerating the diet.   + some nausea.   Denies chest pain / shortness of breath / dyspnea / nausea / vomiting / headaches or light headed ness.         Vital Signs Last 24 Hrs  T(F): 98.1 (09-14-20 @ 08:15), Max: 98.1 (09-14-20 @ 00:02)  HR: 67 (09-14-20 @ 08:15)  BP: 116/70 (09-14-20 @ 08:15)  RR: 18 (09-14-20 @ 08:15)  SpO2: 97% (09-14-20 @ 08:15)    I&O's Detail    13 Sep 2020 07:01  -  14 Sep 2020 07:00  --------------------------------------------------------  IN:    Oral Fluid: 1200 mL  Total IN: 1200 mL    OUT:    Voided (mL): 1150 mL  Total OUT: 1150 mL    Total NET: 50 mL        PE:   Left Upper Extremity:   Pt is currently in a shoulder immobilizer which is placed correctly.   The prineo dressing is C/D/I and fastened correctly to the wound.   No redness, swelling, heat, discharge or other evidence of infection, superficial or deep.   Neurovascularly intact.   No gross evidence of motor or sensory deficit.   Good hand  strength.   +2 pulses.   Fingers are pink and mobile.   Capillary refill < 2 seconds.   PAS on.                                11.4   7.36  )--------------( 200                          09-14-20 @ 06:02               36.8         139   |  103  |  29  -----------------------------<  117                  09-14-20 @ 06:02  3.6    |  30    |  1.22        Ca    8.9              A:   Pt is a 69y year old Female S/P reverse left total shoulder replacement, Post Op Day #4        Plan:    - Follow up with Medicine    - OOB with PT/OT (passive exercises)   - Pain control    - Incentive spirometry   - Labs in A.M.   - Discharge Planning for home possibly   - DVT ppx = PAS +  (elquis- to determine for how long)                                                                                                                                                                           Santos Raya RPA-C

## 2020-09-14 NOTE — PROGRESS NOTE ADULT - SUBJECTIVE AND OBJECTIVE BOX
Date/Time Patient Seen:  		  Referring MD:   Data Reviewed	       Patient is a 69y old  Female who presents with a chief complaint of shoulder surgery (12 Sep 2020 17:41)      Subjective/HPI     PAST MEDICAL & SURGICAL HISTORY:  Osteoarthritis of left shoulder    Class 1 obesity with body mass index (BMI) of 34.0 to 34.9 in adult    History of closed shoulder dislocation  left shoulder 2019    Osteoarthritis  Right knee    Anxiety    HLD (hyperlipidemia)    Essential hypertension  Not on medication x 1 year    No pertinent past medical history    History of right knee joint replacement  2019    No significant past surgical history    No significant past surgical history          Medication list         MEDICATIONS  (STANDING):  budesonide 160 MICROgram(s)/formoterol 4.5 MICROgram(s) Inhaler 2 Puff(s) Inhalation two times a day  fluticasone propionate 50 MICROgram(s)/spray Nasal Spray 1 Spray(s) Both Nostrils two times a day  influenza   Vaccine 0.5 milliLiter(s) IntraMuscular once  loratadine 10 milliGRAM(s) Oral daily  melatonin 3 milliGRAM(s) Oral at bedtime  midodrine. 10 milliGRAM(s) Oral three times a day  pantoprazole    Tablet 40 milliGRAM(s) Oral before breakfast  polyethylene glycol 3350 17 Gram(s) Oral daily    MEDICATIONS  (PRN):  aluminum hydroxide/magnesium hydroxide/simethicone Suspension 30 milliLiter(s) Oral four times a day PRN Indigestion  magnesium hydroxide Suspension 30 milliLiter(s) Oral daily PRN Constipation  oxyCODONE    IR 5 milliGRAM(s) Oral every 3 hours PRN Moderate Pain (4 - 6)  senna 2 Tablet(s) Oral at bedtime PRN Constipation         Vitals log        ICU Vital Signs Last 24 Hrs  T(C): 36.6 (14 Sep 2020 04:04), Max: 36.7 (14 Sep 2020 00:02)  T(F): 97.9 (14 Sep 2020 04:04), Max: 98.1 (14 Sep 2020 00:02)  HR: 72 (14 Sep 2020 06:00) (68 - 86)  BP: 118/84 (14 Sep 2020 06:00) (89/76 - 118/84)  BP(mean): 96 (14 Sep 2020 06:00) (81 - 98)  ABP: --  ABP(mean): --  RR: 17 (14 Sep 2020 06:00) (10 - 22)  SpO2: 96% (14 Sep 2020 06:00) (91% - 100%)           Input and Output:  I&O's Detail    13 Sep 2020 07:01  -  14 Sep 2020 07:00  --------------------------------------------------------  IN:    Oral Fluid: 1200 mL  Total IN: 1200 mL    OUT:    Voided (mL): 1150 mL  Total OUT: 1150 mL    Total NET: 50 mL          Lab Data                        11.4   7.36  )-----------( 200      ( 14 Sep 2020 06:02 )             36.8     09-14    139  |  103  |  29<H>  ----------------------------<  117<H>  3.6   |  30  |  1.22    Ca    8.9      14 Sep 2020 06:02  Phos  4.6     09-13  Mg     2.2     09-13              Review of Systems	      Objective     Physical Examination    heart s1s2  lung dec BS  abd soft  on o2 support      Pertinent Lab findings & Imaging      Selena:  NO   Adequate UO     I&O's Detail    13 Sep 2020 07:01  -  14 Sep 2020 07:00  --------------------------------------------------------  IN:    Oral Fluid: 1200 mL  Total IN: 1200 mL    OUT:    Voided (mL): 1150 mL  Total OUT: 1150 mL    Total NET: 50 mL               Discussed with:     Cultures:	        Radiology

## 2020-09-14 NOTE — PROGRESS NOTE ADULT - SUBJECTIVE AND OBJECTIVE BOX
Chief Complaint: Shoulder replacement    Interval Events: No events overnight. No complaints.    Review of Systems:  General: No fevers, chills, weight loss or gain  Skin: No rashes, color changes  Cardiovascular: No chest pain, orthopnea  Respiratory: No shortness of breath, cough  Gastrointestinal: No nausea, abdominal pain  Genitourinary: No incontinence, pain with urination  Musculoskeletal: No pain, swelling, decreased range of motion  Neurological: No headache, weakness  Psychiatric: No depression, anxiety  Endocrine: No weight loss or gain, increased thirst  All other systems are comprehensively negative.    Physical Exam:  Vital Signs Last 24 Hrs  T(C): 36.6 (14 Sep 2020 04:04), Max: 36.7 (14 Sep 2020 00:02)  T(F): 97.9 (14 Sep 2020 04:04), Max: 98.1 (14 Sep 2020 00:02)  HR: 72 (14 Sep 2020 06:00) (68 - 86)  BP: 118/84 (14 Sep 2020 06:00) (89/76 - 118/84)  BP(mean): 96 (14 Sep 2020 06:00) (81 - 98)  RR: 17 (14 Sep 2020 06:00) (10 - 20)  SpO2: 96% (14 Sep 2020 06:00) (92% - 100%)  General: NAD  HEENT: MMM  Neck: No JVD, no carotid bruit  Lungs: CTAB  CV: RRR, nl S1/S2, no M/R/G  Abdomen: S/NT/ND, +BS  Extremities: No LE edema, no cyanosis  Neuro: AAOx3, non-focal  Skin: No rash    Labs:             09-14    139  |  103  |  29<H>  ----------------------------<  117<H>  3.6   |  30  |  1.22    Ca    8.9      14 Sep 2020 06:02  Phos  4.6     09-13  Mg     2.2     09-13                          11.4   7.36  )-----------( 200      ( 14 Sep 2020 06:02 )             36.8       Telemetry: Sinus rhythm

## 2020-09-14 NOTE — PROGRESS NOTE ADULT - ASSESSMENT
The patient is a 69 year old female with a history of obesity, HL who is admitted s/p left shoulder replacement course c/b respiratory failure and shock.    Plan:  - Echo with normal LV systolic function, no significant valve issues  - Volume overload previously due to IV fluids - improved  - Wean midodrine to 5 mg tid  - Can be discharged on midodrine, which can further be weaned off as outpatient  - PT  - Discharge planning

## 2020-09-14 NOTE — CHART NOTE - NSCHARTNOTEFT_GEN_A_CORE
Assessment: 68 y/o female seen for nutrition follow-up.  Pt s/p left shoulder replacement for torn rotator cuff.  Remains in [facility] level care for management of hypotension, which noted to be improving- off Phenylephrine and on Midodrine with goal to wean down.  Pt visited while sitting up in chair after lunch.  Continues to reports occasional nausea throughout the day with overall decreased appetite.  On regular diet with meal preferences obtained daily to optimize po intake and tolerance.  PO intake variable throughout admission with occasional refusal of meal tray.  Per nursing documentation, pt consumed approx ~60% of breakfast and lunch tray.  Continued to reinforce the importance of adequate nutrition for energy and recovery, especially as she expresses desire to progress further with PT.  Will honor pt's preferred mealtimes as pt verbalized unhappiness with present times (i.e. would like dinner tonight at 6pm); informed RN about time change; diet office aware.  RD to continue to follow.    Factors impacting intake: [ ] none [ x] nausea  [ ] vomiting [ ] diarrhea [ ] constipation  [ ]chewing problems [ ] swallowing issues  [ ] other:     Diet Prescription: Diet, Regular (09-11-20 @ 08:25)    Intake: variable, usually 50-80% of most meals per nursing documentation, occasional refusal of meal tray noted; ate fairly good today    Current Weight: Weight (kg): 89 (09-10 @ 07:38), 89 (09-10 @ 07:33)  % Weight Change - no updated body weight to assess     Pertinent Medications: MEDICATIONS  (STANDING):  budesonide 160 MICROgram(s)/formoterol 4.5 MICROgram(s) Inhaler 2 Puff(s) Inhalation two times a day  fluticasone propionate 50 MICROgram(s)/spray Nasal Spray 1 Spray(s) Both Nostrils two times a day  influenza   Vaccine 0.5 milliLiter(s) IntraMuscular once  loratadine 10 milliGRAM(s) Oral daily  melatonin 3 milliGRAM(s) Oral at bedtime  midodrine. 5 milliGRAM(s) Oral three times a day  pantoprazole    Tablet 40 milliGRAM(s) Oral before breakfast  polyethylene glycol 3350 17 Gram(s) Oral daily    MEDICATIONS  (PRN):  aluminum hydroxide/magnesium hydroxide/simethicone Suspension 30 milliLiter(s) Oral four times a day PRN Indigestion  magnesium hydroxide Suspension 30 milliLiter(s) Oral daily PRN Constipation  oxyCODONE    IR 5 milliGRAM(s) Oral every 3 hours PRN Moderate Pain (4 - 6)  senna 2 Tablet(s) Oral at bedtime PRN Constipation    Pertinent Labs: 09-14 Na139 mmol/L Glu 117 mg/dL<H> K+ 3.6 mmol/L Cr  1.22 mg/dL BUN 29 mg/dL<H> 09-13 Phos 4.6 mg/dL<H> 09-11 Alb 3.5 g/dL     CAPILLARY BLOOD GLUCOSE        Skin: surgical incision    Estimated Needs:   [ x] no change since previous assessment  [ ] recalculated:     Previous Nutrition Diagnosis:   [ ] Inadequate Energy Intake [ ]Inadequate Oral Intake [ ] Excessive Energy Intake   [ ] Underweight [ ] Increased Nutrient Needs [x ] Overweight/Obesity   [ ] Altered GI Function [ ] Unintended Weight Loss [ ] Food & Nutrition Related Knowledge Deficit [ ] Malnutrition     Nutrition Diagnosis is [x ] ongoing  [ ] resolved [ ] not applicable     New Nutrition Diagnosis: [ ] not applicable       Interventions: regular diet, honor food preferences, continued to encouraged fluid intake  Recommend  [ ] Change Diet To:  [ ] Nutrition Supplement  [ ] Nutrition Support  [ ] Other:     Monitoring and Evaluation:   [x ] PO intake [ x ] Tolerance to diet prescription [ x ] weights [ x ] labs[ x ] follow up per protocol  [ ] other:

## 2020-09-14 NOTE — PROGRESS NOTE ADULT - ASSESSMENT
69F who is s/p Left Shoulder Replacement is now admitted to SPCU due to Hypercapnia and Hypotension.     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   Incentive Spirometer for lung expansion.  Work with PT to increase ambulation as per orthopedics.  Monitor Hgb and follow up electrolytes.   Orthopedics on board and following     ETOH Dependance  5 Glasses of Wine per day  Out of window for withdrawal and no symptoms  Stop CIWA     Anxiety  Xanax PRN    HLD  Resume home dose of Statin    Diet  Regular    DVT Prophylaxis  Eliquis    Disposition  Full Code/Inpatient  Should be discharged on Midodrine and follow up with outpatient PCP for weaning  Optimized for discharge once cleared by PT and Ortho     69F who is s/p Left Shoulder Replacement is now admitted to SPCU due to Hypercapnia and Hypotension.     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   Incentive Spirometer for lung expansion.  Work with PT to increase ambulation as per orthopedics.  Monitor Hgb and follow up electrolytes.   Orthopedics on board and following     ETOH Dependance  5 Glasses of Wine per day  Out of window for withdrawal and no symptoms  Stop CIWA     Anxiety  Xanax PRN    HLD  Resume home dose of Statin    Diet  Regular    DVT Prophylaxis  Eliquis    Disposition  Full Code/Inpatient  Should be discharged on Midodrine and follow up with outpatient PCP for weaning  Optimized for discharge once cleared by PT and Ortho  Suspect she will need Rehab

## 2020-09-15 VITALS
HEART RATE: 72 BPM | SYSTOLIC BLOOD PRESSURE: 100 MMHG | DIASTOLIC BLOOD PRESSURE: 65 MMHG | OXYGEN SATURATION: 96 % | TEMPERATURE: 98 F | RESPIRATION RATE: 19 BRPM

## 2020-09-15 LAB — SURGICAL PATHOLOGY STUDY: SIGNIFICANT CHANGE UP

## 2020-09-15 PROCEDURE — 80053 COMPREHEN METABOLIC PANEL: CPT

## 2020-09-15 PROCEDURE — 86850 RBC ANTIBODY SCREEN: CPT

## 2020-09-15 PROCEDURE — 84484 ASSAY OF TROPONIN QUANT: CPT

## 2020-09-15 PROCEDURE — 85025 COMPLETE CBC W/AUTO DIFF WBC: CPT

## 2020-09-15 PROCEDURE — 86901 BLOOD TYPING SEROLOGIC RH(D): CPT

## 2020-09-15 PROCEDURE — 97110 THERAPEUTIC EXERCISES: CPT

## 2020-09-15 PROCEDURE — 81001 URINALYSIS AUTO W/SCOPE: CPT

## 2020-09-15 PROCEDURE — 36415 COLL VENOUS BLD VENIPUNCTURE: CPT

## 2020-09-15 PROCEDURE — 88305 TISSUE EXAM BY PATHOLOGIST: CPT

## 2020-09-15 PROCEDURE — 93306 TTE W/DOPPLER COMPLETE: CPT

## 2020-09-15 PROCEDURE — 82803 BLOOD GASES ANY COMBINATION: CPT

## 2020-09-15 PROCEDURE — 94640 AIRWAY INHALATION TREATMENT: CPT

## 2020-09-15 PROCEDURE — 94660 CPAP INITIATION&MGMT: CPT

## 2020-09-15 PROCEDURE — 71045 X-RAY EXAM CHEST 1 VIEW: CPT

## 2020-09-15 PROCEDURE — 97165 OT EVAL LOW COMPLEX 30 MIN: CPT

## 2020-09-15 PROCEDURE — 83735 ASSAY OF MAGNESIUM: CPT

## 2020-09-15 PROCEDURE — 88311 DECALCIFY TISSUE: CPT

## 2020-09-15 PROCEDURE — C1713: CPT

## 2020-09-15 PROCEDURE — 82533 TOTAL CORTISOL: CPT

## 2020-09-15 PROCEDURE — G0463: CPT

## 2020-09-15 PROCEDURE — 93005 ELECTROCARDIOGRAM TRACING: CPT

## 2020-09-15 PROCEDURE — 87086 URINE CULTURE/COLONY COUNT: CPT

## 2020-09-15 PROCEDURE — 84145 PROCALCITONIN (PCT): CPT

## 2020-09-15 PROCEDURE — 97530 THERAPEUTIC ACTIVITIES: CPT

## 2020-09-15 PROCEDURE — 86900 BLOOD TYPING SEROLOGIC ABO: CPT

## 2020-09-15 PROCEDURE — 84100 ASSAY OF PHOSPHORUS: CPT

## 2020-09-15 PROCEDURE — 83880 ASSAY OF NATRIURETIC PEPTIDE: CPT

## 2020-09-15 PROCEDURE — 85027 COMPLETE CBC AUTOMATED: CPT

## 2020-09-15 PROCEDURE — U0003: CPT

## 2020-09-15 PROCEDURE — 97161 PT EVAL LOW COMPLEX 20 MIN: CPT

## 2020-09-15 PROCEDURE — 80048 BASIC METABOLIC PNL TOTAL CA: CPT

## 2020-09-15 PROCEDURE — 99233 SBSQ HOSP IP/OBS HIGH 50: CPT

## 2020-09-15 PROCEDURE — 97535 SELF CARE MNGMENT TRAINING: CPT

## 2020-09-15 PROCEDURE — C1776: CPT

## 2020-09-15 PROCEDURE — 80076 HEPATIC FUNCTION PANEL: CPT

## 2020-09-15 PROCEDURE — 97116 GAIT TRAINING THERAPY: CPT

## 2020-09-15 PROCEDURE — 84105 ASSAY OF URINE PHOSPHORUS: CPT

## 2020-09-15 PROCEDURE — 73030 X-RAY EXAM OF SHOULDER: CPT

## 2020-09-15 PROCEDURE — 92610 EVALUATE SWALLOWING FUNCTION: CPT

## 2020-09-15 PROCEDURE — 83605 ASSAY OF LACTIC ACID: CPT

## 2020-09-15 RX ORDER — LANOLIN ALCOHOL/MO/W.PET/CERES
1 CREAM (GRAM) TOPICAL
Qty: 0 | Refills: 0 | DISCHARGE
Start: 2020-09-15

## 2020-09-15 RX ORDER — OXYCODONE HYDROCHLORIDE 5 MG/1
1 TABLET ORAL
Qty: 0 | Refills: 0 | DISCHARGE
Start: 2020-09-15

## 2020-09-15 RX ORDER — POLYETHYLENE GLYCOL 3350 17 G/17G
17 POWDER, FOR SOLUTION ORAL
Qty: 0 | Refills: 0 | DISCHARGE
Start: 2020-09-15

## 2020-09-15 RX ADMIN — APIXABAN 2.5 MILLIGRAM(S): 2.5 TABLET, FILM COATED ORAL at 08:35

## 2020-09-15 RX ADMIN — OXYCODONE HYDROCHLORIDE 5 MILLIGRAM(S): 5 TABLET ORAL at 06:41

## 2020-09-15 RX ADMIN — OXYCODONE HYDROCHLORIDE 5 MILLIGRAM(S): 5 TABLET ORAL at 07:13

## 2020-09-15 RX ADMIN — OXYCODONE HYDROCHLORIDE 5 MILLIGRAM(S): 5 TABLET ORAL at 09:38

## 2020-09-15 RX ADMIN — OXYCODONE HYDROCHLORIDE 5 MILLIGRAM(S): 5 TABLET ORAL at 10:15

## 2020-09-15 RX ADMIN — PANTOPRAZOLE SODIUM 40 MILLIGRAM(S): 20 TABLET, DELAYED RELEASE ORAL at 06:23

## 2020-09-15 RX ADMIN — LORATADINE 10 MILLIGRAM(S): 10 TABLET ORAL at 12:10

## 2020-09-15 RX ADMIN — OXYCODONE HYDROCHLORIDE 5 MILLIGRAM(S): 5 TABLET ORAL at 13:08

## 2020-09-15 RX ADMIN — OXYCODONE HYDROCHLORIDE 5 MILLIGRAM(S): 5 TABLET ORAL at 12:40

## 2020-09-15 RX ADMIN — Medication 1 SPRAY(S): at 06:23

## 2020-09-15 NOTE — PROGRESS NOTE ADULT - PROVIDER SPECIALTY LIST ADULT
Cardiology
Hospitalist
Nephrology
Orthopedics
Pulmonology
Cardiology
Hospitalist
Pulmonology

## 2020-09-15 NOTE — PROGRESS NOTE ADULT - SUBJECTIVE AND OBJECTIVE BOX
Date/Time Patient Seen:  		  Referring MD:   Data Reviewed	       Patient is a 69y old  Female who presents with a chief complaint of shoulder surgery (12 Sep 2020 17:41)      Subjective/HPI     PAST MEDICAL & SURGICAL HISTORY:  Osteoarthritis of left shoulder    Class 1 obesity with body mass index (BMI) of 34.0 to 34.9 in adult    History of closed shoulder dislocation  left shoulder 2019    Osteoarthritis  Right knee    Anxiety    HLD (hyperlipidemia)    Essential hypertension  Not on medication x 1 year    No pertinent past medical history    History of right knee joint replacement  2019    No significant past surgical history    No significant past surgical history          Medication list         MEDICATIONS  (STANDING):  apixaban 2.5 milliGRAM(s) Oral every 12 hours  budesonide 160 MICROgram(s)/formoterol 4.5 MICROgram(s) Inhaler 2 Puff(s) Inhalation two times a day  fluticasone propionate 50 MICROgram(s)/spray Nasal Spray 1 Spray(s) Both Nostrils two times a day  influenza   Vaccine 0.5 milliLiter(s) IntraMuscular once  loratadine 10 milliGRAM(s) Oral daily  melatonin 3 milliGRAM(s) Oral at bedtime  midodrine. 5 milliGRAM(s) Oral three times a day  pantoprazole    Tablet 40 milliGRAM(s) Oral before breakfast  polyethylene glycol 3350 17 Gram(s) Oral daily    MEDICATIONS  (PRN):  aluminum hydroxide/magnesium hydroxide/simethicone Suspension 30 milliLiter(s) Oral four times a day PRN Indigestion  bisacodyl Suppository 10 milliGRAM(s) Rectal daily PRN If no bowel movement by postoperative day #2  magnesium hydroxide Suspension 30 milliLiter(s) Oral daily PRN Constipation  oxyCODONE    IR 5 milliGRAM(s) Oral every 3 hours PRN Moderate Pain (4 - 6)  senna 2 Tablet(s) Oral at bedtime PRN Constipation         Vitals log        ICU Vital Signs Last 24 Hrs  T(C): 36.7 (15 Sep 2020 05:00), Max: 36.7 (14 Sep 2020 08:15)  T(F): 98 (15 Sep 2020 05:00), Max: 98.1 (14 Sep 2020 08:15)  HR: 74 (15 Sep 2020 06:22) (66 - 79)  BP: 140/98 (15 Sep 2020 06:22) (114/84 - 146/109)  BP(mean): 111 (15 Sep 2020 06:22) (85 - 118)  ABP: --  ABP(mean): --  RR: 24 (15 Sep 2020 06:22) (16 - 24)  SpO2: 96% (15 Sep 2020 06:22) (90% - 97%)           Input and Output:  I&O's Detail    14 Sep 2020 07:01  -  15 Sep 2020 07:00  --------------------------------------------------------  IN:    Oral Fluid: 590 mL  Total IN: 590 mL    OUT:    Voided (mL): 1300 mL  Total OUT: 1300 mL    Total NET: -710 mL          Lab Data                        11.4   7.36  )-----------( 200      ( 14 Sep 2020 06:02 )             36.8     09-14    139  |  103  |  29<H>  ----------------------------<  117<H>  3.6   |  30  |  1.22    Ca    8.9      14 Sep 2020 06:02              Review of Systems	      Objective     Physical Examination    heart s1s2  lung dec BS  abd soft      Pertinent Lab findings & Imaging      Selena:  NO   Adequate UO     I&O's Detail    14 Sep 2020 07:01  -  15 Sep 2020 07:00  --------------------------------------------------------  IN:    Oral Fluid: 590 mL  Total IN: 590 mL    OUT:    Voided (mL): 1300 mL  Total OUT: 1300 mL    Total NET: -710 mL               Discussed with:     Cultures:	        Radiology

## 2020-09-15 NOTE — PROGRESS NOTE ADULT - ASSESSMENT
The patient is a 69 year old female with a history of obesity, HL who is admitted s/p left shoulder replacement course c/b respiratory failure and shock.    Plan:  - Echo with normal LV systolic function, no significant valve issues  - Volume overload previously due to IV fluids - improved  - Discontinue midodrine  - Can be discharged on midodrine, which can further be weaned off as outpatient  - PT  - Discharge planning

## 2020-09-15 NOTE — PROGRESS NOTE ADULT - PROBLEM SELECTOR PROBLEM 1
Respiratory distress
Primary osteoarthritis of left shoulder

## 2020-09-15 NOTE — PROGRESS NOTE ADULT - ASSESSMENT
69F who is s/p Left Shoulder Replacement is now admitted to SPCU due to Hypercapnia and Hypotension.     Hypotension  Resolved; Baseline SBP usually in 80's according to patient and labile at home; MAPS >65 with good Urine output  Off Phenylephrine and on Midodrine and will attempt to wean down   Echo with normal EF and mild diastolic dysfunction   AM Cortisol levels with appropriate response   Urine Culture negative stop Ceftriaxone     S/P Left Reverse Shoulder POD 4  Continue Bowel and pain control regimen.   Incentive Spirometer for lung expansion.  Work with PT to increase ambulation as per orthopedics.  Monitor Hgb and follow up electrolytes.   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

## 2020-09-15 NOTE — PROGRESS NOTE ADULT - SUBJECTIVE AND OBJECTIVE BOX
Subjective: No overnight events.     MEDICATIONS  (STANDING):  apixaban 2.5 milliGRAM(s) Oral every 12 hours  budesonide 160 MICROgram(s)/formoterol 4.5 MICROgram(s) Inhaler 2 Puff(s) Inhalation two times a day  fluticasone propionate 50 MICROgram(s)/spray Nasal Spray 1 Spray(s) Both Nostrils two times a day  influenza   Vaccine 0.5 milliLiter(s) IntraMuscular once  loratadine 10 milliGRAM(s) Oral daily  melatonin 3 milliGRAM(s) Oral at bedtime  midodrine. 5 milliGRAM(s) Oral three times a day  pantoprazole    Tablet 40 milliGRAM(s) Oral before breakfast  polyethylene glycol 3350 17 Gram(s) Oral daily    MEDICATIONS  (PRN):  aluminum hydroxide/magnesium hydroxide/simethicone Suspension 30 milliLiter(s) Oral four times a day PRN Indigestion  bisacodyl Suppository 10 milliGRAM(s) Rectal daily PRN If no bowel movement by postoperative day #2  magnesium hydroxide Suspension 30 milliLiter(s) Oral daily PRN Constipation  oxyCODONE    IR 5 milliGRAM(s) Oral every 3 hours PRN Moderate Pain (4 - 6)  senna 2 Tablet(s) Oral at bedtime PRN Constipation      Allergies    No Known Allergies    Intolerances    NSAIDs (Other)      Vital Signs Last 24 Hrs  T(C): 36.7 (15 Sep 2020 05:00), Max: 36.7 (14 Sep 2020 08:15)  T(F): 98 (15 Sep 2020 05:00), Max: 98.1 (14 Sep 2020 08:15)  HR: 74 (15 Sep 2020 06:22) (66 - 79)  BP: 140/98 (15 Sep 2020 06:22) (114/84 - 146/109)  BP(mean): 111 (15 Sep 2020 06:22) (93 - 118)  RR: 24 (15 Sep 2020 06:22) (16 - 24)  SpO2: 96% (15 Sep 2020 06:22) (90% - 97%)    PHYSICAL EXAM:  GENERAL: NAD, well-groomed, well-developed  HEAD:  Atraumatic, Normocephalic  ENMT: Moist mucous membranes,   NECK: Supple, No JVD, Normal thyroid  NERVOUS SYSTEM:  All 4 extremities mobile, no gross sensory deficits.   CHEST/LUNG: Clear to auscultation bilaterally; No rales, rhonchi, wheezing, or rubs  HEART: Regular rate and rhythm; No murmurs, rubs, or gallops  ABDOMEN: Soft, Nontender, Nondistended; Bowel sounds present  EXTREMITIES:  2+ Peripheral Pulses, No clubbing, cyanosis, or edema      LABS:      Ca    8.9        14 Sep 2020 06:02          CAPILLARY BLOOD GLUCOSE          RADIOLOGY & ADDITIONAL TESTS:    Imaging Personally Reviewed:  [ ] YES     Consultant(s) Notes Reviewed:      Care Discussed with Consultants/Other Providers:    Advanced Directives: [ ] DNR  [ ] No feeding tube  [ ] MOLST in chart  [ ] MOLST completed today  [ ] Unknown

## 2020-09-15 NOTE — PROGRESS NOTE ADULT - SUBJECTIVE AND OBJECTIVE BOX
69yFemale    Diagnosis:  S/p Reverse Left Total Shoulder Replacement POD#5    Patient was seen and evaluated at bedside. Patient with no acute complaints.   Pain is  well controlled.  Pain score = 2        .   Tolerating Pain medication.  Denies CP/SOB, dyspnea, paresthesias, N/V/D, palpitations.     Vital Signs Last 24 Hrs  T(C): 36.7 (15 Sep 2020 08:18), Max: 36.7 (14 Sep 2020 12:34)  T(F): 98.1 (15 Sep 2020 08:18), Max: 98.1 (14 Sep 2020 12:34)  HR: 76 (15 Sep 2020 08:00) (66 - 79)  BP: 113/84 (15 Sep 2020 08:00) (113/84 - 146/109)  BP(mean): 111 (15 Sep 2020 06:22) (93 - 118)  RR: 20 (15 Sep 2020 08:00) (16 - 24)  SpO2: 95% (15 Sep 2020 08:00) (90% - 96%)    Physical Exam:    General: AAOx3, NAD, resting comfortably in bed.    Left Shoulder:  Incision is C/D/I. Prineo tape in place. No erythema. Sling in place.  Upper extremity:  Compartments soft and NT in forearm B/L. 2+pulses. NVI. 5/5  strength B/L.  Good capillary refill. Sensation intatct to LT  SCDs in place                  Impression:  69yFemale S/p Reverse Left Total Shoulder Replacement POD#5  Plan:  -  Pain management  -  DVT prophylaxis: Eliquis 2.5mg q 12h  -  Daily PT/OT: NWB of the Left upper extremity   -  Discharge planning: Rehab today    69yFemale    Diagnosis:  S/p Reverse Left Total Shoulder Replacement POD#5    Patient was seen and evaluated. Patient with no acute complaints.   Pain is  well controlled.  Pain score = 2        .   Tolerating Pain medication.  Denies CP/SOB, dyspnea, paresthesias, N/V/D, palpitations.     Vital Signs Last 24 Hrs  T(C): 36.7 (15 Sep 2020 08:18), Max: 36.7 (14 Sep 2020 12:34)  T(F): 98.1 (15 Sep 2020 08:18), Max: 98.1 (14 Sep 2020 12:34)  HR: 76 (15 Sep 2020 08:00) (66 - 79)  BP: 113/84 (15 Sep 2020 08:00) (113/84 - 146/109)  BP(mean): 111 (15 Sep 2020 06:22) (93 - 118)  RR: 20 (15 Sep 2020 08:00) (16 - 24)  SpO2: 95% (15 Sep 2020 08:00) (90% - 96%)    Physical Exam:    General: AAOx3, NAD, resting comfortably in bed.    Left Shoulder:  Incision is C/D/I. Prineo tape in place. No erythema. Sling in place.  Upper extremity:  Compartments soft and NT in forearm B/L. 2+pulses. NVI. 5/5  strength B/L.  Good capillary refill. Sensation intatct to LT  SCDs in place                  Impression:  69yFemale S/p Reverse Left Total Shoulder Replacement POD#5  Plan:  -  Pain management  -  DVT prophylaxis: Eliquis 2.5mg q 12h  -  Daily PT/OT: NWB of the Left upper extremity   -  Discharge planning: Rehab today

## 2020-09-15 NOTE — PROGRESS NOTE ADULT - SUBJECTIVE AND OBJECTIVE BOX
Chief Complaint: Shoulder replacement    Interval Events: No events overnight. No complaints.    Review of Systems:  General: No fevers, chills, weight loss or gain  Skin: No rashes, color changes  Cardiovascular: No chest pain, orthopnea  Respiratory: No shortness of breath, cough  Gastrointestinal: No nausea, abdominal pain  Genitourinary: No incontinence, pain with urination  Musculoskeletal: No pain, swelling, decreased range of motion  Neurological: No headache, weakness  Psychiatric: No depression, anxiety  Endocrine: No weight loss or gain, increased thirst  All other systems are comprehensively negative.    Physical Exam:  Vital Signs Last 24 Hrs  T(C): 36.7 (15 Sep 2020 05:00), Max: 36.7 (14 Sep 2020 08:15)  T(F): 98 (15 Sep 2020 05:00), Max: 98.1 (14 Sep 2020 08:15)  HR: 76 (15 Sep 2020 08:00) (66 - 79)  BP: 113/84 (15 Sep 2020 08:00) (113/84 - 146/109)  BP(mean): 111 (15 Sep 2020 06:22) (93 - 118)  RR: 20 (15 Sep 2020 08:00) (16 - 24)  SpO2: 95% (15 Sep 2020 08:00) (90% - 97%)  General: NAD  HEENT: MMM  Neck: No JVD, no carotid bruit  Lungs: CTAB  CV: RRR, nl S1/S2, no M/R/G  Abdomen: S/NT/ND, +BS  Extremities: No LE edema, no cyanosis  Neuro: AAOx3, non-focal  Skin: No rash    Labs:             09-14    139  |  103  |  29<H>  ----------------------------<  117<H>  3.6   |  30  |  1.22    Ca    8.9      14 Sep 2020 06:02                          11.4   7.36  )-----------( 200      ( 14 Sep 2020 06:02 )             36.8       Telemetry: Sinus rhythm

## 2020-09-15 NOTE — PROGRESS NOTE ADULT - PROBLEM SELECTOR PLAN 1
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
Cardio and Renal Assessment noted   ECHO report reviewed - HFPEF mild  Renal US pending  CXR mild PVC  I and O - HD monitoring in place  serial labs  serial PE and clinical assessment  pt is alert and oriented  wound care - post op care - I mitra - DVT p -   education - counseling  encourage use of NIPPV night time for IAN OHS - and prn day time  pt is non compliant  caution with Benzo and Opioids - in pt with IAN OHS  SPCU care and monitoring in progress - discussed with pt and nursing.
Cardio and Renal Assessment noted   ECHO report reviewed - HFPEF mild  Renal US pending  CXR mild PVC  I and O - HD monitoring in place  serial labs  serial PE and clinical assessment  pt is alert and oriented  wound care - post op care - I mitra - DVT p -   education - counseling  encourage use of NIPPV night time for INA OHS - and prn day time  pt is non compliant  caution with Benzo and Opioids - in pt with IAN OHS  SPCU care and monitoring in progress - discussed with pt and nursing.
Cardio and Renal Assessment noted   HD better - with Midodrine TID dosing  ECHO report reviewed - HFPEF mild  Renal US pending  CXR mild PVC  I and O - HD monitoring in place  serial labs  serial PE and clinical assessment  pt is alert and oriented  wound care - post op care - I mitra - DVT p -   education - counseling  encourage use of NIPPV night time for IAN OHS - and prn day time  pt is non compliant  caution with Benzo and Opioids - in pt with IAN OHS  SPCU care and monitoring in progress - discussed with pt and nursing.
cm notes reviewed - planned for [facility] dc -   tolerating room air - VS noted -   Covid PCR neg on 9/14  Cardio and Renal Assessment noted   HD better - with Midodrine TID dosing  ECHO report reviewed - HFPEF mild  Renal US pending  CXR mild PVC  I and O - HD monitoring in place  serial labs  serial PE and clinical assessment  pt is alert and oriented  wound care - post op care - I mitra - DVT p -   education - counseling  encourage use of NIPPV night time for IAN OHS - and prn day time  pt is non compliant  caution with Benzo and Opioids - in pt with IAN OHS  SPCU care and monitoring in progress - discussed with pt and nursing.

## 2020-09-18 ENCOUNTER — INPATIENT (INPATIENT)
Facility: HOSPITAL | Age: 70
LOS: 3 days | Discharge: HOME | End: 2020-09-22
Attending: HOSPITALIST | Admitting: HOSPITALIST
Payer: MEDICARE

## 2020-09-18 VITALS
TEMPERATURE: 98 F | RESPIRATION RATE: 17 BRPM | OXYGEN SATURATION: 100 % | DIASTOLIC BLOOD PRESSURE: 95 MMHG | SYSTOLIC BLOOD PRESSURE: 120 MMHG | HEART RATE: 65 BPM

## 2020-09-18 DIAGNOSIS — Z96.651 PRESENCE OF RIGHT ARTIFICIAL KNEE JOINT: Chronic | ICD-10-CM

## 2020-09-18 DIAGNOSIS — Z96.619 PRESENCE OF UNSPECIFIED ARTIFICIAL SHOULDER JOINT: ICD-10-CM

## 2020-09-18 DIAGNOSIS — Z96.619 PRESENCE OF UNSPECIFIED ARTIFICIAL SHOULDER JOINT: Chronic | ICD-10-CM

## 2020-09-18 PROBLEM — E66.9 OBESITY, UNSPECIFIED: Chronic | Status: ACTIVE | Noted: 2020-09-03

## 2020-09-18 PROBLEM — M19.012 PRIMARY OSTEOARTHRITIS, LEFT SHOULDER: Chronic | Status: ACTIVE | Noted: 2020-09-03

## 2020-09-18 LAB
ALBUMIN SERPL ELPH-MCNC: 4 G/DL — SIGNIFICANT CHANGE UP (ref 3.3–5)
ALP SERPL-CCNC: 91 U/L — SIGNIFICANT CHANGE UP (ref 40–120)
ALT FLD-CCNC: 15 U/L — SIGNIFICANT CHANGE UP (ref 4–33)
ANION GAP SERPL CALC-SCNC: 17 MMO/L — HIGH (ref 7–14)
AST SERPL-CCNC: 25 U/L — SIGNIFICANT CHANGE UP (ref 4–32)
BASE EXCESS BLDV CALC-SCNC: 6.2 MMOL/L — SIGNIFICANT CHANGE UP
BASOPHILS # BLD AUTO: 0.05 K/UL — SIGNIFICANT CHANGE UP (ref 0–0.2)
BASOPHILS NFR BLD AUTO: 0.5 % — SIGNIFICANT CHANGE UP (ref 0–2)
BILIRUB SERPL-MCNC: 0.5 MG/DL — SIGNIFICANT CHANGE UP (ref 0.2–1.2)
BLOOD GAS VENOUS - CREATININE: 0.9 MG/DL — SIGNIFICANT CHANGE UP (ref 0.5–1.3)
BLOOD GAS VENOUS - FIO2: 21 — SIGNIFICANT CHANGE UP
BUN SERPL-MCNC: 22 MG/DL — SIGNIFICANT CHANGE UP (ref 7–23)
CALCIUM SERPL-MCNC: 9 MG/DL — SIGNIFICANT CHANGE UP (ref 8.4–10.5)
CHLORIDE BLDV-SCNC: 106 MMOL/L — SIGNIFICANT CHANGE UP (ref 96–108)
CHLORIDE SERPL-SCNC: 100 MMOL/L — SIGNIFICANT CHANGE UP (ref 98–107)
CO2 SERPL-SCNC: 22 MMOL/L — SIGNIFICANT CHANGE UP (ref 22–31)
CREAT SERPL-MCNC: 0.88 MG/DL — SIGNIFICANT CHANGE UP (ref 0.5–1.3)
EOSINOPHIL # BLD AUTO: 0.11 K/UL — SIGNIFICANT CHANGE UP (ref 0–0.5)
EOSINOPHIL NFR BLD AUTO: 1.2 % — SIGNIFICANT CHANGE UP (ref 0–6)
GAS PNL BLDV: 139 MMOL/L — SIGNIFICANT CHANGE UP (ref 136–146)
GLUCOSE BLDV-MCNC: 117 MG/DL — HIGH (ref 70–99)
GLUCOSE SERPL-MCNC: 113 MG/DL — HIGH (ref 70–99)
HCO3 BLDV-SCNC: 29 MMOL/L — HIGH (ref 20–27)
HCT VFR BLD CALC: 41.6 % — SIGNIFICANT CHANGE UP (ref 34.5–45)
HCT VFR BLDV CALC: 38.2 % — SIGNIFICANT CHANGE UP (ref 34.5–45)
HGB BLD-MCNC: 12.3 G/DL — SIGNIFICANT CHANGE UP (ref 11.5–15.5)
HGB BLDV-MCNC: 12.4 G/DL — SIGNIFICANT CHANGE UP (ref 11.5–15.5)
IMM GRANULOCYTES NFR BLD AUTO: 0.4 % — SIGNIFICANT CHANGE UP (ref 0–1.5)
LACTATE BLDV-MCNC: 1 MMOL/L — SIGNIFICANT CHANGE UP (ref 0.5–2)
LYMPHOCYTES # BLD AUTO: 2.29 K/UL — SIGNIFICANT CHANGE UP (ref 1–3.3)
LYMPHOCYTES # BLD AUTO: 24.7 % — SIGNIFICANT CHANGE UP (ref 13–44)
MCHC RBC-ENTMCNC: 27.5 PG — SIGNIFICANT CHANGE UP (ref 27–34)
MCHC RBC-ENTMCNC: 29.6 % — LOW (ref 32–36)
MCV RBC AUTO: 93.1 FL — SIGNIFICANT CHANGE UP (ref 80–100)
MONOCYTES # BLD AUTO: 0.79 K/UL — SIGNIFICANT CHANGE UP (ref 0–0.9)
MONOCYTES NFR BLD AUTO: 8.5 % — SIGNIFICANT CHANGE UP (ref 2–14)
NEUTROPHILS # BLD AUTO: 6.01 K/UL — SIGNIFICANT CHANGE UP (ref 1.8–7.4)
NEUTROPHILS NFR BLD AUTO: 64.7 % — SIGNIFICANT CHANGE UP (ref 43–77)
NRBC # FLD: 0 K/UL — SIGNIFICANT CHANGE UP (ref 0–0)
PCO2 BLDV: 55 MMHG — HIGH (ref 41–51)
PH BLDV: 7.38 PH — SIGNIFICANT CHANGE UP (ref 7.32–7.43)
PLATELET # BLD AUTO: 281 K/UL — SIGNIFICANT CHANGE UP (ref 150–400)
PMV BLD: 10.4 FL — SIGNIFICANT CHANGE UP (ref 7–13)
PO2 BLDV: 41 MMHG — HIGH (ref 35–40)
POTASSIUM BLDV-SCNC: 3.2 MMOL/L — LOW (ref 3.4–4.5)
POTASSIUM SERPL-MCNC: 3.6 MMOL/L — SIGNIFICANT CHANGE UP (ref 3.5–5.3)
POTASSIUM SERPL-SCNC: 3.6 MMOL/L — SIGNIFICANT CHANGE UP (ref 3.5–5.3)
PROT SERPL-MCNC: 7.9 G/DL — SIGNIFICANT CHANGE UP (ref 6–8.3)
RBC # BLD: 4.47 M/UL — SIGNIFICANT CHANGE UP (ref 3.8–5.2)
RBC # FLD: 14.2 % — SIGNIFICANT CHANGE UP (ref 10.3–14.5)
SAO2 % BLDV: 66.8 % — SIGNIFICANT CHANGE UP (ref 60–85)
SARS-COV-2 RNA SPEC QL NAA+PROBE: SIGNIFICANT CHANGE UP
SODIUM SERPL-SCNC: 139 MMOL/L — SIGNIFICANT CHANGE UP (ref 135–145)
WBC # BLD: 9.29 K/UL — SIGNIFICANT CHANGE UP (ref 3.8–10.5)
WBC # FLD AUTO: 9.29 K/UL — SIGNIFICANT CHANGE UP (ref 3.8–10.5)

## 2020-09-18 PROCEDURE — 99285 EMERGENCY DEPT VISIT HI MDM: CPT

## 2020-09-18 PROCEDURE — 99223 1ST HOSP IP/OBS HIGH 75: CPT

## 2020-09-18 RX ORDER — LORATADINE 10 MG/1
10 TABLET ORAL
Refills: 0 | Status: DISCONTINUED | OUTPATIENT
Start: 2020-09-19 | End: 2020-09-20

## 2020-09-18 RX ORDER — LANOLIN ALCOHOL/MO/W.PET/CERES
3 CREAM (GRAM) TOPICAL AT BEDTIME
Refills: 0 | Status: DISCONTINUED | OUTPATIENT
Start: 2020-09-18 | End: 2020-09-20

## 2020-09-18 RX ORDER — POLYETHYLENE GLYCOL 3350 17 G/17G
17 POWDER, FOR SOLUTION ORAL DAILY
Refills: 0 | Status: DISCONTINUED | OUTPATIENT
Start: 2020-09-19 | End: 2020-09-22

## 2020-09-18 RX ORDER — IPRATROPIUM BROMIDE 0.2 MG/ML
1 SOLUTION, NON-ORAL INHALATION ONCE
Refills: 0 | Status: COMPLETED | OUTPATIENT
Start: 2020-09-18 | End: 2020-09-18

## 2020-09-18 RX ORDER — ATORVASTATIN CALCIUM 80 MG/1
40 TABLET, FILM COATED ORAL AT BEDTIME
Refills: 0 | Status: DISCONTINUED | OUTPATIENT
Start: 2020-09-18 | End: 2020-09-22

## 2020-09-18 RX ORDER — IPRATROPIUM/ALBUTEROL SULFATE 18-103MCG
3 AEROSOL WITH ADAPTER (GRAM) INHALATION EVERY 6 HOURS
Refills: 0 | Status: DISCONTINUED | OUTPATIENT
Start: 2020-09-18 | End: 2020-09-19

## 2020-09-18 RX ORDER — OXYCODONE AND ACETAMINOPHEN 5; 325 MG/1; MG/1
1 TABLET ORAL EVERY 6 HOURS
Refills: 0 | Status: DISCONTINUED | OUTPATIENT
Start: 2020-09-18 | End: 2020-09-22

## 2020-09-18 RX ORDER — APIXABAN 2.5 MG/1
2.5 TABLET, FILM COATED ORAL
Refills: 0 | Status: DISCONTINUED | OUTPATIENT
Start: 2020-09-18 | End: 2020-09-22

## 2020-09-18 RX ORDER — QUETIAPINE FUMARATE 200 MG/1
1 TABLET, FILM COATED ORAL
Qty: 0 | Refills: 0 | DISCHARGE
Start: 2020-09-18

## 2020-09-18 RX ORDER — LORATADINE 10 MG/1
10 TABLET ORAL ONCE
Refills: 0 | Status: COMPLETED | OUTPATIENT
Start: 2020-09-18 | End: 2020-09-18

## 2020-09-18 RX ORDER — ALPRAZOLAM 0.25 MG
0.5 TABLET ORAL THREE TIMES A DAY
Refills: 0 | Status: DISCONTINUED | OUTPATIENT
Start: 2020-09-18 | End: 2020-09-22

## 2020-09-18 RX ORDER — PANTOPRAZOLE SODIUM 20 MG/1
40 TABLET, DELAYED RELEASE ORAL
Refills: 0 | Status: DISCONTINUED | OUTPATIENT
Start: 2020-09-18 | End: 2020-09-22

## 2020-09-18 RX ORDER — ALBUTEROL 90 UG/1
1 AEROSOL, METERED ORAL ONCE
Refills: 0 | Status: COMPLETED | OUTPATIENT
Start: 2020-09-18 | End: 2020-09-18

## 2020-09-18 RX ORDER — OXYCODONE AND ACETAMINOPHEN 5; 325 MG/1; MG/1
2 TABLET ORAL EVERY 6 HOURS
Refills: 0 | Status: DISCONTINUED | OUTPATIENT
Start: 2020-09-18 | End: 2020-09-22

## 2020-09-18 RX ADMIN — Medication 3 MILLIGRAM(S): at 22:50

## 2020-09-18 RX ADMIN — Medication 0.5 MILLIGRAM(S): at 22:50

## 2020-09-18 RX ADMIN — PANTOPRAZOLE SODIUM 40 MILLIGRAM(S): 20 TABLET, DELAYED RELEASE ORAL at 22:50

## 2020-09-18 RX ADMIN — Medication 1 PUFF(S): at 23:17

## 2020-09-18 RX ADMIN — LORATADINE 10 MILLIGRAM(S): 10 TABLET ORAL at 20:21

## 2020-09-18 RX ADMIN — ATORVASTATIN CALCIUM 40 MILLIGRAM(S): 80 TABLET, FILM COATED ORAL at 22:50

## 2020-09-18 RX ADMIN — ALBUTEROL 1 PUFF(S): 90 AEROSOL, METERED ORAL at 23:17

## 2020-09-18 NOTE — H&P ADULT - NSHPREVIEWOFSYSTEMS_GEN_ALL_CORE
Constitutional: No generalized weakness, fevers, chills, or weight loss  Eyes: No visual changes, double vision, or eye pain  Ears, Nose, Mouth, Throat: No runny nose, sinus pain, ear pain, tinnitus, sore throat, dysphagia, or odynophagia  Cardiovascular: No chest pain, palpitations, or LE edema  Respiratory: +Productive cough. +Wheezing. No SOB or hemoptysis.  Gastrointestinal: No abdominal pain, dysphagia, anorexia, nausea/vomiting, diarrhea/constipation, hematemesis, BRBPR, or melena  Genitourinary: No dysuria, frequency, urgency, or hematuria  Musculoskeletal: No joint pain, joint swelling, or decreased ROM  Skin: No pruritus, rashes, lesions, or wounds  Neurologic:  No seizures, headache, paresthesias, numbness, or limb weakness  Psychiatric: No depression, anxiety, difficulty concentrating, anhedonia, or lack of energy  Endocrine: No heat/cold intolerance, mood swings, sweats, polydipsia, or polyuria  Hematologic/lymphatic: No purpura, petechia, or prolonged or excessive bleeding after dental extraction / injury  Allergic/Immunologic: No anaphylaxis or allergic response to materials, foods, animals    Positives and pertinent negatives noted and all other systems negative. Constitutional: No generalized weakness, fevers, chills, or weight loss  Eyes: No visual changes, double vision, or eye pain  Ears, Nose, Mouth, Throat: No runny nose, sinus pain, ear pain, tinnitus, sore throat, dysphagia, or odynophagia  Cardiovascular: No chest pain, palpitations, or LE edema  Respiratory: +Productive cough. +Wheezing. No SOB or hemoptysis.  Gastrointestinal: No abdominal pain, nausea/vomiting, diarrhea/constipation, hematemesis, melena, or BRBPR  Genitourinary: No dysuria, frequency, urgency, or hematuria  Musculoskeletal: +L shoulder soreness s/p L reverse total shoulder replacement. No back pain.   Skin: No pruritus or rashes  Neurologic: No seizures, headache, paresthesias, numbness, or limb weakness  Psychiatric: +Agitation at rehab. +H/o anxiety. No depression. Denies SI/HI.   Endocrine: No heat/cold intolerance, mood swings, sweats, polydipsia, or polyuria  Hematologic/lymphatic: No purpura, petechia, or prolonged or excessive bleeding after injury  Allergic/Immunologic: No anaphylaxis or allergic response to materials, foods, animals    Positives and pertinent negatives noted and all other systems negative.

## 2020-09-18 NOTE — H&P ADULT - PROBLEM SELECTOR PLAN 4
Per Pulmonology at [facility], pt with presumed IAN/OHS given her body habitus and post-op acute hypercapnic respiratory failure.  - Pt refusing use of NIPPV at night  - Monitor nocturnal O2 sats, supplemental O2 PRN  - Outpatient Pulmonology referral

## 2020-09-18 NOTE — H&P ADULT - ASSESSMENT
68 yo obese woman with history of HTN, HLD, ?COPD (not on home O2, previously on Spiriva), anxiety (on Xanax), R knee osteoarthritis s/p R TKR (2019), and L shoulder dislocation and osteoarthritis s/p recent L reverse total shoulder replacement (9/10/20) presents from [facility] due to agitation and combativeness on Friday morning. 70 yo obese woman with history of HTN, HLD, ?COPD (not on home O2, previously on Spiriva), anxiety (on Xanax), R knee osteoarthritis s/p R TKR (2019), and L shoulder dislocation and osteoarthritis s/p recent L reverse total shoulder replacement (9/10/20) presents from [facility] due to agitation and combativeness on Friday morning, pending rehab placement at different facility.

## 2020-09-18 NOTE — H&P ADULT - NSHPSOCIALHISTORY_GEN_ALL_CORE
Tobacco: former smoker, quit in 2011  Denies any alcohol or illicit drug use Tobacco: former smoker, quit in 2011 after smoking ~1 ppd for many years  Alcohol: 1-2 glasses of wine daily, none since admission at [facility]  Illicit drugs: denies any history of use

## 2020-09-18 NOTE — H&P ADULT - NSHPPHYSICALEXAM_GEN_ALL_CORE
Vital Signs Last 24 Hrs  T(C): 36.6 (18 Sep 2020 18:58), Max: 36.7 (18 Sep 2020 14:26)  T(F): 97.8 (18 Sep 2020 18:58), Max: 98 (18 Sep 2020 14:26)  HR: 64 (18 Sep 2020 18:58) (64 - 65)  BP: 114/75 (18 Sep 2020 18:58) (114/75 - 120/95)  BP(mean): --  RR: 18 (18 Sep 2020 18:58) (17 - 18)  SpO2: 100% (18 Sep 2020 18:58) (100% - 100%)    PHYSICAL EXAM:  General: Awake and alert.  No acute distress.  HEENT: Normocephalic, atraumatic.  PERRL.  EOMI.  No scleral icterus.  Moist MM.  No oropharyngeal exudates.    Neck: Supple.  Full range of motion.  No JVD.  No carotid bruits.  No thyromegaly.  Trachea midline.  No lymphadenopathy.   Heart: RRR.  Normal S1 and S2.  No murmurs, rubs, or gallops.  No LE edema b/l.   Lungs: Good inspiratory effort.  Nonlabored breathing.  CTAB.  No wheezes, crackles, or rhonchi.    Abdomen: BS+, soft, NT/ND.  No organomegaly.  Skin: Warm and dry.  No rashes.  Extremities: No clubbing or cyanosis.  2+ peripheral pulses b/l.  Musculoskeletal: No joint deformities.  No spinal or paraspinal tenderness.  Neuro: A&Ox3.  CN II-XII intact.  5/5 motor strength in UE and LE b/l.  Tactile sensation intact in UE and LE b/l.  Cerebellar function intact as assessed by finger-to-nose test. Vital Signs Last 24 Hrs  T(C): 36.6 (18 Sep 2020 18:58), Max: 36.7 (18 Sep 2020 14:26)  T(F): 97.8 (18 Sep 2020 18:58), Max: 98 (18 Sep 2020 14:26)  HR: 64 (18 Sep 2020 18:58) (64 - 65)  BP: 114/75 (18 Sep 2020 18:58) (114/75 - 120/95)  BP(mean): --  RR: 18 (18 Sep 2020 18:58) (17 - 18)  SpO2: 100% (18 Sep 2020 18:58) (100% - 100%)    PHYSICAL EXAM:  General: Obese.  Awake and alert.  No acute distress.  Head: Normocephalic, atraumatic.    Eyes: PERRL.  EOMI.  No scleral icterus.  No conjunctival pallor.  Mouth: Moist MM.  No oropharyngeal exudates.    Neck: Supple.  Full range of motion.  No JVD.  No LAD.  No thyromegaly.  Trachea midline.   Heart: RRR.  Normal S1 and S2.  No murmurs, rubs, or gallops.  No LE edema b/l.   Lungs: Nonlabored breathing.  Good inspiratory effort and air movement.  Diffuse expiratory wheezing, no crackles or rhonchi.   Abdomen: BS+, soft, nontender, nondistended.  Skin: Warm and dry.  No rashes.  Extremities: No cyanosis.  2+ peripheral pulses b/l.  Musculoskeletal: LUE in sling.  Healing surgical incision scar with overlying dressing, c/d/i, with no active drainage, TTP, edema, erythema, warmth, fluctuance, or crepitus.  No spinal or paraspinal tenderness.  Neuro: A&Ox3.  CN II-XII intact.  5/5 motor strength in RUE and LE b/l.  Limited ROM of LUE.  Tactile sensation intact in UE and LE b/l.  No focal deficits.

## 2020-09-18 NOTE — H&P ADULT - PROBLEM SELECTOR PLAN 1
Pt agitated and combative on Friday morning, trying to leave rehab and calling police stating that she was being held against her will. S/p IM Haldol 2 mg x1 at [facility]. Can be in setting of delirium 2/2 opioid/benzo use and recent hospitalization vs. acute psychotic episode.  - Pt currently calm in ED, not requiring any PRN sedatives or enhanced supervision  - C/w Xanax 0.5 mg tid (to avoid benzo withdrawal, as pt was getting Xanax tid at [facility])  - C/w melatonin 3 mg qhs for insomnia  - C/w pain control with opioids as below, but will attempt to titrate down as clinically indicated  - IM/IV Haldol PRN for agitation  - Check TSH, thiamine, folate, and vitamin B12 levels  - No localizing S/S of infection. No   - Behavioral Health consult to be called in AM Pt agitated and combative on Friday morning, trying to leave rehab and calling police stating that she was being held against her will. S/p IM Haldol 2 mg x1 at [facility]. Can be in setting of delirium 2/2 opioid/benzo use and recent hospitalization vs. acute psychotic episode.  - Pt currently calm in ED, not requiring any PRN sedatives or enhanced supervision  - C/w Xanax 0.5 mg tid (to avoid benzo withdrawal, as pt was getting Xanax tid at [facility])  - C/w melatonin 3 mg qhs for insomnia  - C/w pain control with opioids as below, but will attempt to titrate down as clinically indicated  - IM/IV Haldol PRN for agitation  - Check TSH, folate, and vitamin B12 levels  - No localizing S/S of infection. No changes to mental status (A&Ox3).   - Behavioral Health consult to be called in AM

## 2020-09-18 NOTE — ED ADULT NURSE NOTE - NSIMPLEMENTINTERV_GEN_ALL_ED
Implemented All Fall with Harm Risk Interventions:  [place] to call system. Call bell, personal items and telephone within reach. Instruct patient to call for assistance. Room bathroom lighting operational. Non-slip footwear when patient is off stretcher. Physically safe environment: no spills, clutter or unnecessary equipment. Stretcher in lowest position, wheels locked, appropriate side rails in place. Provide visual cue, wrist band, yellow gown, etc. Monitor gait and stability. Monitor for mental status changes and reorient to person, place, and time. Review medications for side effects contributing to fall risk. Reinforce activity limits and safety measures with patient and family. Provide visual clues: red socks.

## 2020-09-18 NOTE — H&P ADULT - PROBLEM SELECTOR PLAN 3
Pt documented to have h/o COPD on [employer] and previously on Spiriva, though pt denies any previous diagnosis of COPD. On exam, pt with diffuse expiratory wheezing in setting of recent productive cough.   - VBG ordered stat, with pCO2 55. O2 sats in high 90s to 100% on RA.  - F/u CXR ordered overnight  - Start albuterol and ipratropium inhaler q6hrs ATC x2 days, can change to PRN afterwards  - Robitussin PRN for cough  - C/w Claritin 10 mg daily  - Supplemental O2 PRN  - Will hold off on steroids at this time, as pt likely with mild COPD exacerbation at this time and due to concern for delirium   - Outpatient Pulmonology referral Pt documented to have h/o COPD on [employer] and previously on Spiriva, though pt denies any previous diagnosis of COPD. On exam, pt with diffuse expiratory wheezing in setting of recent productive cough.   - VBG ordered stat, with pCO2 55. O2 sats in high 90s to 100% on RA.  - F/u CXR ordered overnight  - Start albuterol and ipratropium inhaler q6hrs PRN for SOB and/or wheezing  - Robitussin PRN for cough  - C/w Claritin 10 mg daily  - Supplemental O2 PRN  - Will hold off on steroids at this time, as pt likely with mild COPD exacerbation at this time and due to concern for delirium   - Outpatient Pulmonology referral

## 2020-09-18 NOTE — H&P ADULT - PROBLEM SELECTOR PLAN 5
Pt not on any antihypertensives. Currently, BPs in acceptable range.  - Monitor BPs off antihypertensives

## 2020-09-18 NOTE — H&P ADULT - REASON FOR ADMISSION
Rehab placement following L shoulder replacement Agitation/combativeness, delirium, rehab placement following L shoulder replacement

## 2020-09-18 NOTE — H&P ADULT - NSHPLABSRESULTS_GEN_ALL_CORE
EKG personally reviewed.  NSR with sinus arrhythmia, rate 65 bpm, TWI (<1 mm) in V4 and V5, flat T waves in V3, QTc 443 ms.

## 2020-09-18 NOTE — ED PROVIDER NOTE - ATTENDING CONTRIBUTION TO CARE
Dr. Aguilar: 70 yo female with no known significant PMH or psychiatric history (as per pt's sister who she gave us permission to speak with), requested to come to ED from [facility] for placement.  Pt is at [facility] for PT after a left shoulder replacement 9 days ago.  She states that she is not receiving any rehab there and does "nothing" all day.  She got agitated and received medication to treat agitation before being transferred to [facility].  At [facility] pt without acute physical complaints.  On exam pt chronically-ill appearing, in mild distress due to left shoulder post-surgical pain, heart RRR, lungs CTAB, abd NTND, strength grossly intact in all extremities and skin without rash.  Left shoulder post-surgical, in sling, with mild expected swelling to surgical site, but surgical incision clean/dry/intact and without swelling.  Left UE with strong pulse and intact strength.

## 2020-09-18 NOTE — ED PROVIDER NOTE - HIV OFFER
The patient is now awake and alert, clinically sober.  Able to walk a straight line.  Repeat exam and neuro/cranial nerve exams normal.  No evidence of head/neck trauma.  Patient denies any pain or other complaints.  Denies cp/sob/ha/abd pain.  Abd soft, lungs clear, heart exam normal.  Kendra po challenge.  Patient says only used alcohol no other substances.  Denies any assault.  Feels much better and pt feels safe for discharge.  No evidence of intoxication at this time or alcohol withdrawal.  No other complaints on discharge.
Opt out

## 2020-09-18 NOTE — H&P ADULT - PROBLEM SELECTOR PLAN 2
H/o L shoulder dislocation and osteoarthritis s/p recent L reverse total shoulder replacement (9/10/20). No S/S of post-op infection.   - C/w pain control with Percocet 5/325 mg q6hrs PRN for moderate pain and Percocet 10/325 mg q6hrs PRN for severe pain  - C/w LUE sling   - Non-weight bearing on LUE  - C/w DVT ppx with Eliquis 2.5 mg bid  - PT eval  - Will need f/u with Dr. Pham (Ortho) in ~1.5 weeks

## 2020-09-18 NOTE — ED PROVIDER NOTE - CLINICAL SUMMARY MEDICAL DECISION MAKING FREE TEXT BOX
Pt is a 68 y/o F PMHx left shoulder replacement presents with issues with rehab facility -- social issue; not clinically concerning for septic joint, post operative shoulder pain -- labs, social work

## 2020-09-18 NOTE — ED ADULT TRIAGE NOTE - CHIEF COMPLAINT QUOTE
Pt brought in from [facility] facility after receiving surgery for a torn rotator cuff. Pt got frustrated and wanted to leave the facility and became an elopement risk and started to argue with the staff. Pt was given 2mg haldol IM at facility. Pt presents anxious and frustrated that she is in the hospital. Pt does not want to go to another rehab, pt wants to do her rehab through home.

## 2020-09-18 NOTE — ED PROVIDER NOTE - UPPER EXTREMITY EXAM, LEFT
+healing surgical wound with tape clean, dry and intact; no active drainage; no point tenderness; no swelling; no redness; no warmth; no crepitus; pain with ROM

## 2020-09-18 NOTE — ED PROVIDER NOTE - OBJECTIVE STATEMENT
Pt is a 68 y/o F PMHx left shoulder replacement presents with issues with rehab facility.  Pt states she had left shoulder replacement on 9/9/20 with Dr. Farhad Pham.  She states she had "an issue with anesthesia" which caused her to have issues remembering what happened between surgery and her placement at [facility].  She states she is upset because she has not been receiving physical therapy and that "no one is listening to me."  She states she has called the  to get her placed out of the facility, after which she states she was sent to [facility] ED for evaluation and management.  She notes no pain at left shoulder unless she attempts to range it. Pt denies any fevers, chills, nausea, vomiting, numbness, weakness, chest pain, SOB, headache, AH/VH, SI/HI.

## 2020-09-18 NOTE — H&P ADULT - HISTORY OF PRESENT ILLNESS
70 yo obese woman with history of HTN, HLD, ?COPD (not on home O2, previously on Spiriva), anxiety (on Xanax), R knee osteoarthritis s/p R TKR (2019), and L shoulder dislocation and osteoarthritis s/p recent L reverse total shoulder replacement (9/10/20) presents from [facility] due to agitation and combativeness on Friday morning. Pt was recently admitted at [facility] (9/10-9/15/20) for L reverse total shoulder replacement with Dr. Pham on 9/10/20. Her hospital course following the surgery was complicated by acute hypercapnic respiratory failure, hypotension/shock, and LEIF. Pulmonology was consulted for the acute hypercapnic respiratory failure, presumed to be from opioid use for pain control and undiagnosed history of IAN/OHS given pt's body habitus. Pt was started on BiPAP 68 yo obese woman with history of HTN, HLD, ?COPD (not on home O2, previously on Spiriva), anxiety (on Xanax), R knee osteoarthritis s/p R TKR (2019), and L shoulder dislocation and osteoarthritis s/p recent L reverse total shoulder replacement (9/10/20) presents from [facility] due to agitation and combativeness on Friday morning. Pt was recently admitted at [facility] (9/10-9/15/20) for L reverse total shoulder replacement with Dr. Pham on 9/10/20. Her hospital course following the surgery was complicated by acute hypercapnic respiratory failure, hypotension/shock, and LEIF. Pulmonology was consulted for the acute hypercapnic respiratory failure, presumed to be from opioid use for pain control and undiagnosed history of IAN/OHS given pt's body habitus. Pt was started on BiPAP for the acute hypercapnic respiratory failure and was encouraged to continue using BiPAP at night for her IAN/OHS following resolution of her respiratory failure, though pt refused any further BiPAP use. 70 yo obese woman with history of HTN, HLD, ?COPD (not on home O2, previously on Spiriva), anxiety (on Xanax), R knee osteoarthritis s/p R TKR (2019), and L shoulder dislocation and osteoarthritis s/p recent L reverse total shoulder replacement (9/10/20) presents from [facility] due to agitation and combativeness on Friday morning. Pt was recently admitted at [facility] (9/10-9/15/20) for L reverse total shoulder replacement with Dr. Pham on 9/10/20. Her hospital course following the surgery was complicated by acute hypercapnic respiratory failure and hypotension/shock. Pulmonology was consulted for the acute hypercapnic respiratory failure, presumed to be from narcotic use for pain control and undiagnosed IAN/OHS given pt's body habitus. The acute hypercapnic respiratory failure resolved with BiPAP use, and pt was encouraged to continue using BiPAP at night for her IAN/OHS, though pt refused. Pt was also followed by Cardiology for post-op hypotension/shock requiring use of IV phenylephrine and PO midodrine, with both eventually being titrated off prior to pt's discharge. TTE performed at the time showed normal LV function and mild diastolic dysfunction. Pt was discharged to [facility] on 9/15/20 for subacute rehab, but on Friday morning, pt was found to be agitated and combative, with pt trying to leave the facility to go home. Documentation that accompanied pt from [facility] reveals that pt tried to get out of her 68 yo obese woman with history of HTN, HLD, ?COPD (not on home O2, previously on Spiriva), anxiety (on Xanax), R knee osteoarthritis s/p R TKR (2019), and L shoulder dislocation and osteoarthritis s/p recent L reverse total shoulder replacement (9/10/20) presents from [facility] due to agitation and combativeness on Friday morning.   Pt was recently admitted at [facility] (9/10-9/15/20) for L reverse total shoulder replacement with Dr. Pham on 9/10/20. Her hospital course following the surgery was complicated by acute hypercapnic respiratory failure and hypotension/shock. Pulmonology was consulted for the acute hypercapnic respiratory failure, presumed to have been from narcotic use for pain control and undiagnosed IAN/OHS given pt's body habitus. The acute hypercapnic respiratory failure resolved with BiPAP use, and pt was encouraged to continue using BiPAP at night for her IAN/OHS, though pt refused. Pt was also followed by Cardiology for post-op hypotension/shock requiring use of IV phenylephrine and PO midodrine, with both eventually being titrated off prior to pt's discharge. TTE performed at the time showed normal LV function and mild diastolic dysfunction.   Pt was discharged to [facility] on 9/15/20 for subacute rehab, but on Friday morning, pt was found to be agitated and combative, with pt trying to leave the facility to go home. Pt was sedated with IM Haldol 2 mg x1 in addition to her morning dose of Xanax 0.5 mg. Documentation that accompanied pt from [facility] reveals that at 1pm on Friday, 2 police officers from [facility] came to [facility] after pt called police stating that she was being held against her will. Pt was, therefore, sent to [facility] ED for psych eval.     Pt currently denies any complaints other than L shoulder soreness and a productive cough that started at [facility] after she didn't get her daily Claritin, causing her seasonal allergies to flare up. Denies any fevers, chills, CP, SOB, palpitations, headaches, sore throat, runny nose, abdominal pain, N/V, diarrhea, constipation, dark/bloody stools, or urinary symptoms.     In the ED,  T 97.8-98, HR 64-65, -120/75-95, RR 17-18, O2 sats 100% RA.

## 2020-09-18 NOTE — H&P ADULT - NSICDXPASTSURGICALHX_GEN_ALL_CORE_FT
PAST SURGICAL HISTORY:  H/O shoulder replacement L shoulder     PAST SURGICAL HISTORY:  H/O shoulder replacement L shoulder    H/O total knee replacement R knee

## 2020-09-18 NOTE — H&P ADULT - NSICDXFAMILYHX_GEN_ALL_CORE_FT
No pertinent family history in first degree relatives FAMILY HISTORY:  Family history of myocardial infarction

## 2020-09-18 NOTE — ED ADULT NURSE NOTE - OBJECTIVE STATEMENT
patient alert times four BIBA from rehab facility. patient states " they don't take care of me here I called the police" police arrived to facility. patient aggressive towards staff, agitated. 2mg IM haldol given. patient denies discomfort. arrived to ED alert times four breathing even unlabored (+) crackles lower lobes wet nonproductive cough. denies chest pain. patient arrived to ED with right arm immobilizer, rotator cuff surgery 1 week prior. patient (+) right radial pulse, skin warm dry appropriate color, able to move right fingers. patient requests social work consult to "home care or go to a new rehab". patient sister able to be contacted, involved in plan of care with SAROJ Gillespie. patient verbalizes understanding of plan of care with social work, pending blood specimen collection. safety maintained. patient alert times four BIBA from rehab facility. patient states " they don't take care of me here I called the police" police arrived to facility. patient aggressive towards staff, agitated. 2mg IM haldol given. patient denies discomfort. arrived to ED alert times four breathing even unlabored (+) crackles lower lobes wet nonproductive cough. denies chest pain. patient arrived to ED with left arm immobilizer, rotator cuff surgery 1 week prior. patient (+) left radial pulse, skin warm dry appropriate color, able to move left fingers. patient requests social work consult to "home care or go to a new rehab". patient sister able to be contacted, involved in plan of care with SAROJ Gillespie. patient verbalizes understanding of plan of care with social work, pending blood specimen collection. safety maintained.

## 2020-09-18 NOTE — ED PROVIDER NOTE - PROGRESS NOTE DETAILS
ISABEL VALDERRAMA:  Discussed case with SW at this time who states that pt will require admission in order for pt to be placed in new facility.  Pt agreeable with admission. ISABEL VALDERRAMA:  Pt accepted to [facility] hospitalist Dr. Lamb.  MAR text paged at this time.

## 2020-09-18 NOTE — H&P ADULT - NSICDXPASTMEDICALHX_GEN_ALL_CORE_FT
PAST MEDICAL HISTORY:  Chronic lower back pain     Chronic obstructive pulmonary disease (COPD)     Hyperlipidemia     Hypertension     Osteoarthritis L shoulder and L knee     PAST MEDICAL HISTORY:  Anxiety     Chronic obstructive pulmonary disease (COPD)     Hyperlipidemia     Hypertension     Osteoarthritis R knee and L shoulder    Seasonal allergies

## 2020-09-19 DIAGNOSIS — R45.1 RESTLESSNESS AND AGITATION: ICD-10-CM

## 2020-09-19 DIAGNOSIS — E78.5 HYPERLIPIDEMIA, UNSPECIFIED: ICD-10-CM

## 2020-09-19 DIAGNOSIS — F41.9 ANXIETY DISORDER, UNSPECIFIED: ICD-10-CM

## 2020-09-19 DIAGNOSIS — I10 ESSENTIAL (PRIMARY) HYPERTENSION: ICD-10-CM

## 2020-09-19 DIAGNOSIS — G47.33 OBSTRUCTIVE SLEEP APNEA (ADULT) (PEDIATRIC): ICD-10-CM

## 2020-09-19 DIAGNOSIS — J44.9 CHRONIC OBSTRUCTIVE PULMONARY DISEASE, UNSPECIFIED: ICD-10-CM

## 2020-09-19 DIAGNOSIS — Z96.659 PRESENCE OF UNSPECIFIED ARTIFICIAL KNEE JOINT: Chronic | ICD-10-CM

## 2020-09-19 DIAGNOSIS — Z29.9 ENCOUNTER FOR PROPHYLACTIC MEASURES, UNSPECIFIED: ICD-10-CM

## 2020-09-19 DIAGNOSIS — J30.2 OTHER SEASONAL ALLERGIC RHINITIS: ICD-10-CM

## 2020-09-19 DIAGNOSIS — Z96.612 PRESENCE OF LEFT ARTIFICIAL SHOULDER JOINT: ICD-10-CM

## 2020-09-19 LAB
ANION GAP SERPL CALC-SCNC: 18 MMO/L — HIGH (ref 7–14)
BUN SERPL-MCNC: 18 MG/DL — SIGNIFICANT CHANGE UP (ref 7–23)
CALCIUM SERPL-MCNC: 9 MG/DL — SIGNIFICANT CHANGE UP (ref 8.4–10.5)
CHLORIDE SERPL-SCNC: 100 MMOL/L — SIGNIFICANT CHANGE UP (ref 98–107)
CO2 SERPL-SCNC: 23 MMOL/L — SIGNIFICANT CHANGE UP (ref 22–31)
CORTICOSTEROID BINDING GLOBULIN RESULT: 2.1 MG/DL — SIGNIFICANT CHANGE UP
CORTIS F/TOTAL MFR SERPL: <2.6 % — SIGNIFICANT CHANGE UP
CORTIS SERPL-MCNC: <1 UG/DL — LOW
CORTISOL, FREE RESULT: <0.03 UG/DL — LOW
CREAT SERPL-MCNC: 0.81 MG/DL — SIGNIFICANT CHANGE UP (ref 0.5–1.3)
FOLATE SERPL-MCNC: 10.9 NG/ML — SIGNIFICANT CHANGE UP (ref 4.7–20)
GLUCOSE SERPL-MCNC: 112 MG/DL — HIGH (ref 70–99)
HCT VFR BLD CALC: 38.5 % — SIGNIFICANT CHANGE UP (ref 34.5–45)
HGB BLD-MCNC: 11.4 G/DL — LOW (ref 11.5–15.5)
MAGNESIUM SERPL-MCNC: 1.9 MG/DL — SIGNIFICANT CHANGE UP (ref 1.6–2.6)
MCHC RBC-ENTMCNC: 27.6 PG — SIGNIFICANT CHANGE UP (ref 27–34)
MCHC RBC-ENTMCNC: 29.6 % — LOW (ref 32–36)
MCV RBC AUTO: 93.2 FL — SIGNIFICANT CHANGE UP (ref 80–100)
NRBC # FLD: 0 K/UL — SIGNIFICANT CHANGE UP (ref 0–0)
PHOSPHATE SERPL-MCNC: 4 MG/DL — SIGNIFICANT CHANGE UP (ref 2.5–4.5)
PLATELET # BLD AUTO: 250 K/UL — SIGNIFICANT CHANGE UP (ref 150–400)
PMV BLD: 11.1 FL — SIGNIFICANT CHANGE UP (ref 7–13)
POTASSIUM SERPL-MCNC: 3.6 MMOL/L — SIGNIFICANT CHANGE UP (ref 3.5–5.3)
POTASSIUM SERPL-SCNC: 3.6 MMOL/L — SIGNIFICANT CHANGE UP (ref 3.5–5.3)
RBC # BLD: 4.13 M/UL — SIGNIFICANT CHANGE UP (ref 3.8–5.2)
RBC # FLD: 14.1 % — SIGNIFICANT CHANGE UP (ref 10.3–14.5)
SODIUM SERPL-SCNC: 141 MMOL/L — SIGNIFICANT CHANGE UP (ref 135–145)
TSH SERPL-MCNC: 1.22 UIU/ML — SIGNIFICANT CHANGE UP (ref 0.27–4.2)
VIT B12 SERPL-MCNC: 535 PG/ML — SIGNIFICANT CHANGE UP (ref 200–900)
WBC # BLD: 7.03 K/UL — SIGNIFICANT CHANGE UP (ref 3.8–10.5)
WBC # FLD AUTO: 7.03 K/UL — SIGNIFICANT CHANGE UP (ref 3.8–10.5)

## 2020-09-19 PROCEDURE — 71045 X-RAY EXAM CHEST 1 VIEW: CPT | Mod: 26

## 2020-09-19 RX ORDER — INFLUENZA VIRUS VACCINE 15; 15; 15; 15 UG/.5ML; UG/.5ML; UG/.5ML; UG/.5ML
0.5 SUSPENSION INTRAMUSCULAR ONCE
Refills: 0 | Status: DISCONTINUED | OUTPATIENT
Start: 2020-09-19 | End: 2020-09-22

## 2020-09-19 RX ORDER — ALBUTEROL 90 UG/1
2 AEROSOL, METERED ORAL EVERY 6 HOURS
Refills: 0 | Status: DISCONTINUED | OUTPATIENT
Start: 2020-09-19 | End: 2020-09-22

## 2020-09-19 RX ORDER — IPRATROPIUM BROMIDE 0.2 MG/ML
1 SOLUTION, NON-ORAL INHALATION EVERY 6 HOURS
Refills: 0 | Status: DISCONTINUED | OUTPATIENT
Start: 2020-09-19 | End: 2020-09-22

## 2020-09-19 RX ADMIN — LORATADINE 10 MILLIGRAM(S): 10 TABLET ORAL at 13:29

## 2020-09-19 RX ADMIN — Medication 3 MILLIGRAM(S): at 23:09

## 2020-09-19 RX ADMIN — PANTOPRAZOLE SODIUM 40 MILLIGRAM(S): 20 TABLET, DELAYED RELEASE ORAL at 05:18

## 2020-09-19 RX ADMIN — ATORVASTATIN CALCIUM 40 MILLIGRAM(S): 80 TABLET, FILM COATED ORAL at 21:22

## 2020-09-19 RX ADMIN — APIXABAN 2.5 MILLIGRAM(S): 2.5 TABLET, FILM COATED ORAL at 05:17

## 2020-09-19 RX ADMIN — Medication 0.5 MILLIGRAM(S): at 21:22

## 2020-09-19 RX ADMIN — Medication 0.5 MILLIGRAM(S): at 13:29

## 2020-09-19 RX ADMIN — ALBUTEROL 2 PUFF(S): 90 AEROSOL, METERED ORAL at 05:18

## 2020-09-19 RX ADMIN — APIXABAN 2.5 MILLIGRAM(S): 2.5 TABLET, FILM COATED ORAL at 18:08

## 2020-09-19 NOTE — OCCUPATIONAL THERAPY INITIAL EVALUATION ADULT - PERTINENT HX OF CURRENT PROBLEM, REHAB EVAL
69 year old female with history of HTN, HLD, ?COPD (not on home O2, previously on Spiriva), anxiety (on Xanax), right knee osteoarthritis s/p right TKR (2019). presenting from [facility] with agitation and combativeness. Pt recently s/p left total shoulder replacement (9/10/20) and hospital course following the surgery was complicated by acute hypercapnic respiratory failure and hypotension/shock.

## 2020-09-19 NOTE — OCCUPATIONAL THERAPY INITIAL EVALUATION ADULT - RANGE OF MOTION EXAMINATION, UPPER EXTREMITY
left wrist/hand active ROM WFL, elbow/shoulder not formally assessed due to recent surgery/Right UE Active ROM was WFL (within functional limits)

## 2020-09-19 NOTE — OCCUPATIONAL THERAPY INITIAL EVALUATION ADULT - PLANNED THERAPY INTERVENTIONS, OT EVAL
strengthening/transfer training/ADL retraining/balance training/bed mobility training/joint mobilization/ROM

## 2020-09-19 NOTE — PHYSICAL THERAPY INITIAL EVALUATION ADULT - GENERAL OBSERVATIONS, REHAB EVAL
Pt received semisupine in bed, +left UE sling, +primafit, in no apparent distress. Pt agreeable to participate in physical therapy evaluation.

## 2020-09-19 NOTE — PATIENT PROFILE ADULT - SURGICAL SITE DESCRIPTION
left shoulder steri strips present- s/ left shoulder replacement left shoulder - s/p left shoulder replacement (steri strips present & intact)

## 2020-09-19 NOTE — PROGRESS NOTE ADULT - I WAS PHYSICALLY PRESENT FOR THE KEY PORTIONS OF THE EVALUATION AND MANAGEMENT (E/M) SERVICE PROVIDED.  I AGREE WITH THE ABOVE HISTORY, PHYSICAL, AND PLAN WHICH I HAVE REVIEWED AND EDITED WHERE APPROPRIATE
Other Pt called in, and make sure the prescription was sent into a right pharmacy. Malachi Augustin, José, verified. Keflex bid, x7d. was prescribed to the pharmacy where the pt is willing to receive. I make sure the pt will be establishing the appointment with Dr. Avalos for MRI. Pt confirmed that he is able to establish the appointment by himself. Statement Selected

## 2020-09-19 NOTE — PROGRESS NOTE ADULT - PROBLEM SELECTOR PLAN 1
Pt agitated and combative on Friday morning, trying to leave rehab and calling police stating that she was being held against her will. S/p IM Haldol 2 mg x1 at [email or web address] Can be in setting of delirium 2/2 opioid/benzo use and recent hospitalization vs. acute psychotic episode.  - no known psyc history per pt  - Pt currently calm in ED and on  the floor, not requiring any PRN sedatives or enhanced supervision  - C/w Xanax 0.5 mg tid (to avoid benzo withdrawal, as pt was getting Xanax tid at [facility] [[email or web address] (She takes 1 mg at home PRN)   - C/w melatonin 3 mg qhs for insomnia  - C/w pain control with opioids as below, but will attempt to titrate down as clinically indicated  - IM/IV Haldol PRN for agitation  - TSH wnl.  folate, and vitamin B12 levels pending  - No localizing S/S of infection. No changes to mental status (A&Ox3).   - Behavioral Health consult if recurrent episode.

## 2020-09-19 NOTE — PHYSICAL THERAPY INITIAL EVALUATION ADULT - RANGE OF MOTION EXAMINATION, REHAB EVAL
bilateral lower extremity ROM was WFL (within functional limits)/Right UE ROM was WFL (within functional limits)/left UE - not tested

## 2020-09-19 NOTE — PHYSICAL THERAPY INITIAL EVALUATION ADULT - ADDITIONAL COMMENTS
Pt lives in a condominium with 1 flight of stairs to negotiate. Pt lives alone. Pt admitted from rehab. Prior to rehab, pt ambulated independently, no assistive device.    Pt was left seated in chair, all lines/tubes intact and call bell within reach, RN aware.

## 2020-09-19 NOTE — PROGRESS NOTE ADULT - PROBLEM SELECTOR PLAN 3
Pt documented to have h/o COPD on [employer] and previously on Spiriva, though pt denies any previous diagnosis of COPD. On exam, pt with diffuse expiratory wheezing in setting of recent productive cough.   - VBG ordered stat, with pCO2 55. O2 sats in high 90s to 100% on RA.  - F/u CXR ordered overnight  - Start albuterol and ipratropium inhaler q6hrs PRN for SOB and/or wheezing  - Robitussin PRN for cough  - C/w Claritin 10 mg daily  - Supplemental O2 PRN  - Will hold off on steroids at this time, as pt likely with mild COPD exacerbation at this time and due to concern for delirium   - Outpatient Pulmonology referral

## 2020-09-19 NOTE — PROGRESS NOTE ADULT - PROBLEM SELECTOR PLAN 4
Per Pulmonology at [facility], pt with presumed IAN/OHS given her body habitus and post-op acute hypercapnic respiratory failure.  - Pt refusing use of NIPPV at night  - Monitor nocturnal O2 sats, supplemental O2 PRN  - Outpatient Pulmonology referral

## 2020-09-19 NOTE — OCCUPATIONAL THERAPY INITIAL EVALUATION ADULT - LIVES WITH, PROFILE
Patient lives alone in a condo with steps to manage. Patient has a walk-in shower with shower chair available.

## 2020-09-19 NOTE — PROGRESS NOTE ADULT - SUBJECTIVE AND OBJECTIVE BOX
Patient is a 69y old  Female who presents with a chief complaint of Agitation/combativeness, delirium, rehab placement following L shoulder replacement (18 Sep 2020 21:11)      SUBJECTIVE / OVERNIGHT EVENTS:  Pt seen and examined at bedside.   No overnight event.  Feeling better.  no cp, no sob, no n/v/d.   her mental status is back to baseline  calm, pleasant and coorperative  Pt denied SI/HI ideations, denied visual and auditory hallucinations.       Vital Signs Last 24 Hrs  T(C): 37.1 (19 Sep 2020 21:19), Max: 37.1 (19 Sep 2020 21:19)  T(F): 98.8 (19 Sep 2020 21:19), Max: 98.8 (19 Sep 2020 21:19)  HR: 71 (19 Sep 2020 21:19) (63 - 74)  BP: 106/69 (19 Sep 2020 21:19) (102/62 - 122/63)  BP(mean): --  RR: 18 (19 Sep 2020 21:19) (18 - 20)  SpO2: 97% (19 Sep 2020 21:19) (96% - 100%)  I&O's Summary      PHYSICAL EXAM:  GENERAL: NAD, Comfortable  HEAD:  Atraumatic, Normocephalic  EYES: EOMI, PERRLA, conjunctiva and sclera clear  NECK: Supple, No JVD  CHEST/LUNG: Clear to auscultation bilaterally; No wheeze  HEART: Regular rate and rhythm; No murmurs, rubs, or gallops  ABDOMEN: Soft, Nontender, Nondistended; Bowel sounds present  Neuro: AAO x 3, no focal deficit, 5/5 b/l extremities  EXTREMITIES:  2+ Peripheral Pulses, No clubbing, cyanosis, or edema  SKIN: No rashes or lesions    LABS:                        11.4   7.03  )-----------( 250      ( 19 Sep 2020 06:00 )             38.5     09-19    141  |  100  |  18  ----------------------------<  112<H>  3.6   |  23  |  0.81    Ca    9.0      19 Sep 2020 06:00  Phos  4.0     09-19  Mg     1.9     09-19    TPro  7.9  /  Alb  4.0  /  TBili  0.5  /  DBili  x   /  AST  25  /  ALT  15  /  AlkPhos  91  09-18      CAPILLARY BLOOD GLUCOSE                RADIOLOGY & ADDITIONAL TESTS:    Imaging Personally Reviewed:  [x] YES  [ ] NO    Consultant(s) Notes Reviewed:  [x] YES  [ ] NO      MEDICATIONS  (STANDING):  ALPRAZolam 0.5 milliGRAM(s) Oral three times a day  apixaban 2.5 milliGRAM(s) Oral two times a day  atorvastatin 40 milliGRAM(s) Oral at bedtime  influenza   Vaccine 0.5 milliLiter(s) IntraMuscular once  loratadine 10 milliGRAM(s) Oral <User Schedule>  pantoprazole    Tablet 40 milliGRAM(s) Oral before breakfast  polyethylene glycol 3350 17 Gram(s) Oral daily    MEDICATIONS  (PRN):  ALBUTerol    90 MICROgram(s) HFA Inhaler 2 Puff(s) Inhalation every 6 hours PRN Shortness of Breath and/or Wheezing  guaiFENesin   Syrup  (Sugar-Free) 200 milliGRAM(s) Oral every 6 hours PRN Cough  ipratropium 17 MICROgram(s) HFA Inhaler 1 Puff(s) Inhalation every 6 hours PRN SOB and/or wheezing  melatonin 3 milliGRAM(s) Oral at bedtime PRN Insomnia  oxycodone    5 mG/acetaminophen 325 mG 1 Tablet(s) Oral every 6 hours PRN Moderate Pain (4 - 6)  oxycodone    5 mG/acetaminophen 325 mG 2 Tablet(s) Oral every 6 hours PRN Severe Pain (7 - 10)      Care Discussed with Consultants/Other Providers [x] YES  [ ] NO

## 2020-09-19 NOTE — OCCUPATIONAL THERAPY INITIAL EVALUATION ADULT - IMPAIRED TRANSFERS: TOILET, REHAB EVAL
impaired balance/cognition/Patient required repeated verbal cues to maintain left UE nonweightbearing

## 2020-09-19 NOTE — PROGRESS NOTE ADULT - ATTENDING COMMENTS
Pt expresses that she wishes to go home with outpt PT as opposed to rehab again.     - Dr. LEANA Lamb ([facility])  - [phone]

## 2020-09-19 NOTE — OCCUPATIONAL THERAPY INITIAL EVALUATION ADULT - GENERAL OBSERVATIONS, REHAB EVAL
Patient received seated in bedside chair in NAD and agreeable to participate in OT evaluation. + Prima fit. +left UE in sling.

## 2020-09-19 NOTE — OCCUPATIONAL THERAPY INITIAL EVALUATION ADULT - DIAGNOSIS, OT EVAL
s/p left total shoulder replacement, s/p agitation, s/p acute hypercapnic respiratory failure; decreased functional mobility, decreased ADL performance, decreased safety awareness

## 2020-09-19 NOTE — PROGRESS NOTE ADULT - ASSESSMENT
68 yo obese woman with history of HTN, HLD, ?COPD (not on home O2, previously on Spiriva), anxiety (on Xanax), R knee osteoarthritis s/p R TKR (2019), and L shoulder dislocation and osteoarthritis s/p recent L reverse total shoulder replacement (9/10/20) presents from [facility] due to agitation and combativeness on Friday morning, pending rehab placement at different facility.

## 2020-09-20 LAB
ANION GAP SERPL CALC-SCNC: 15 MMO/L — HIGH (ref 7–14)
BUN SERPL-MCNC: 20 MG/DL — SIGNIFICANT CHANGE UP (ref 7–23)
CALCIUM SERPL-MCNC: 8.9 MG/DL — SIGNIFICANT CHANGE UP (ref 8.4–10.5)
CHLORIDE SERPL-SCNC: 102 MMOL/L — SIGNIFICANT CHANGE UP (ref 98–107)
CO2 SERPL-SCNC: 25 MMOL/L — SIGNIFICANT CHANGE UP (ref 22–31)
CREAT SERPL-MCNC: 0.91 MG/DL — SIGNIFICANT CHANGE UP (ref 0.5–1.3)
GLUCOSE SERPL-MCNC: 104 MG/DL — HIGH (ref 70–99)
HCT VFR BLD CALC: 37.6 % — SIGNIFICANT CHANGE UP (ref 34.5–45)
HGB BLD-MCNC: 11.5 G/DL — SIGNIFICANT CHANGE UP (ref 11.5–15.5)
MAGNESIUM SERPL-MCNC: 1.9 MG/DL — SIGNIFICANT CHANGE UP (ref 1.6–2.6)
MCHC RBC-ENTMCNC: 28.3 PG — SIGNIFICANT CHANGE UP (ref 27–34)
MCHC RBC-ENTMCNC: 30.6 % — LOW (ref 32–36)
MCV RBC AUTO: 92.6 FL — SIGNIFICANT CHANGE UP (ref 80–100)
NRBC # FLD: 0 K/UL — SIGNIFICANT CHANGE UP (ref 0–0)
PLATELET # BLD AUTO: 273 K/UL — SIGNIFICANT CHANGE UP (ref 150–400)
PMV BLD: 11.2 FL — SIGNIFICANT CHANGE UP (ref 7–13)
POTASSIUM SERPL-MCNC: 3.1 MMOL/L — LOW (ref 3.5–5.3)
POTASSIUM SERPL-SCNC: 3.1 MMOL/L — LOW (ref 3.5–5.3)
RBC # BLD: 4.06 M/UL — SIGNIFICANT CHANGE UP (ref 3.8–5.2)
RBC # FLD: 14.2 % — SIGNIFICANT CHANGE UP (ref 10.3–14.5)
SARS-COV-2 IGG SERPL QL IA: NEGATIVE — SIGNIFICANT CHANGE UP
SARS-COV-2 IGM SERPL IA-ACNC: <0.1 INDEX — SIGNIFICANT CHANGE UP
SODIUM SERPL-SCNC: 142 MMOL/L — SIGNIFICANT CHANGE UP (ref 135–145)
WBC # BLD: 7.7 K/UL — SIGNIFICANT CHANGE UP (ref 3.8–10.5)
WBC # FLD AUTO: 7.7 K/UL — SIGNIFICANT CHANGE UP (ref 3.8–10.5)

## 2020-09-20 RX ORDER — LORATADINE 10 MG/1
5 TABLET ORAL
Refills: 0 | Status: DISCONTINUED | OUTPATIENT
Start: 2020-09-20 | End: 2020-09-22

## 2020-09-20 RX ORDER — SODIUM CHLORIDE 9 MG/ML
1000 INJECTION INTRAMUSCULAR; INTRAVENOUS; SUBCUTANEOUS
Refills: 0 | Status: DISCONTINUED | OUTPATIENT
Start: 2020-09-20 | End: 2020-09-22

## 2020-09-20 RX ORDER — LANOLIN ALCOHOL/MO/W.PET/CERES
6 CREAM (GRAM) TOPICAL AT BEDTIME
Refills: 0 | Status: DISCONTINUED | OUTPATIENT
Start: 2020-09-20 | End: 2020-09-22

## 2020-09-20 RX ORDER — POTASSIUM CHLORIDE 20 MEQ
40 PACKET (EA) ORAL EVERY 4 HOURS
Refills: 0 | Status: COMPLETED | OUTPATIENT
Start: 2020-09-20 | End: 2020-09-20

## 2020-09-20 RX ADMIN — Medication 40 MILLIEQUIVALENT(S): at 13:04

## 2020-09-20 RX ADMIN — APIXABAN 2.5 MILLIGRAM(S): 2.5 TABLET, FILM COATED ORAL at 17:12

## 2020-09-20 RX ADMIN — PANTOPRAZOLE SODIUM 40 MILLIGRAM(S): 20 TABLET, DELAYED RELEASE ORAL at 05:05

## 2020-09-20 RX ADMIN — Medication 40 MILLIEQUIVALENT(S): at 09:50

## 2020-09-20 RX ADMIN — ATORVASTATIN CALCIUM 40 MILLIGRAM(S): 80 TABLET, FILM COATED ORAL at 21:28

## 2020-09-20 RX ADMIN — Medication 0.5 MILLIGRAM(S): at 05:05

## 2020-09-20 RX ADMIN — LORATADINE 5 MILLIGRAM(S): 10 TABLET ORAL at 02:01

## 2020-09-20 RX ADMIN — OXYCODONE AND ACETAMINOPHEN 1 TABLET(S): 5; 325 TABLET ORAL at 22:30

## 2020-09-20 RX ADMIN — APIXABAN 2.5 MILLIGRAM(S): 2.5 TABLET, FILM COATED ORAL at 05:05

## 2020-09-20 RX ADMIN — LORATADINE 5 MILLIGRAM(S): 10 TABLET ORAL at 17:12

## 2020-09-20 RX ADMIN — OXYCODONE AND ACETAMINOPHEN 1 TABLET(S): 5; 325 TABLET ORAL at 02:15

## 2020-09-20 RX ADMIN — SODIUM CHLORIDE 75 MILLILITER(S): 9 INJECTION INTRAMUSCULAR; INTRAVENOUS; SUBCUTANEOUS at 17:11

## 2020-09-20 RX ADMIN — OXYCODONE AND ACETAMINOPHEN 1 TABLET(S): 5; 325 TABLET ORAL at 21:32

## 2020-09-20 RX ADMIN — OXYCODONE AND ACETAMINOPHEN 1 TABLET(S): 5; 325 TABLET ORAL at 03:15

## 2020-09-20 RX ADMIN — Medication 0.5 MILLIGRAM(S): at 13:04

## 2020-09-20 RX ADMIN — Medication 0.5 MILLIGRAM(S): at 21:27

## 2020-09-20 NOTE — PROGRESS NOTE ADULT - PROBLEM SELECTOR PLAN 1
Pt agitated and combative on Friday morning, trying to leave rehab and calling police stating that she was being held against her will. S/p IM Haldol 2 mg x1 at [facility]. Can be in setting of delirium 2/2 opioid/benzo use and recent hospitalization vs. acute psychotic episode.  - no known psyc history per pt  - Pt currently calm in ED and on  the floor, not requiring any PRN sedatives or enhanced supervision  - C/w Xanax 0.5 mg tid (to avoid benzo withdrawal, as pt was getting Xanax tid at [facility]) (She takes 1 mg at home PRN)   - C/w melatonin 3 mg qhs for insomnia  - C/w pain control with opioids as below, but will attempt to titrate down as clinically indicated  - IM/IV Haldol PRN for agitation  - TSH wnl.  folate, and vitamin B12 levels wnl  - No localizing S/S of infection. No changes to mental status (A&Ox3).   - Behavioral Health consult if recurrent episode.

## 2020-09-20 NOTE — PROGRESS NOTE ADULT - ASSESSMENT
70 yo obese woman with history of HTN, HLD, ?COPD (not on home O2, previously on Spiriva), anxiety (on Xanax), R knee osteoarthritis s/p R TKR (2019), and L shoulder dislocation and osteoarthritis s/p recent L reverse total shoulder replacement (9/10/20) presents from [facility] due to agitation and combativeness on Friday morning, pending rehab placement at different facility.

## 2020-09-20 NOTE — PROGRESS NOTE ADULT - SUBJECTIVE AND OBJECTIVE BOX
Patient is a 69y old  Female who presents with a chief complaint of Agitation/combativeness, delirium, rehab placement following L shoulder replacement (19 Sep 2020 16:50)      SUBJECTIVE / OVERNIGHT EVENTS:  soft BP  she is on restricted salt diet  gentle IVF started.  no cp, no sob, no n/v/d. no abdominal pain.  no headache, no dizziness.   "I am so hungry"   diet switched to regular diet   remain pleasant and coorperative       Vital Signs Last 24 Hrs  T(C): 36.7 (20 Sep 2020 18:10), Max: 36.7 (20 Sep 2020 10:30)  T(F): 98 (20 Sep 2020 18:10), Max: 98.1 (20 Sep 2020 14:00)  HR: 64 (20 Sep 2020 18:10) (64 - 76)  BP: 113/68 (20 Sep 2020 18:10) (80/56 - 113/68)  BP(mean): --  RR: 18 (20 Sep 2020 18:10) (17 - 19)  SpO2: 98% (20 Sep 2020 18:10) (95% - 100%)  I&O's Summary      PHYSICAL EXAM:  GENERAL: NAD, Comfortable, out of bed in chair  HEAD:  Atraumatic, Normocephalic  EYES: EOMI, PERRLA, conjunctiva and sclera clear  NECK: Supple, No JVD  CHEST/LUNG: Clear to auscultation bilaterally; No wheeze  HEART: Regular rate and rhythm; No murmurs, rubs, or gallops  ABDOMEN: Soft, Nontender, Nondistended; Bowel sounds present  Neuro: AAO x 3, no focal deficit, 5/5 b/l extremities  EXTREMITIES:  2+ Peripheral Pulses, No clubbing, cyanosis, or edema  SKIN: No rashes or lesions        LABS:                        11.5   7.70  )-----------( 273      ( 20 Sep 2020 06:22 )             37.6     09-20    142  |  102  |  20  ----------------------------<  104<H>  3.1<L>   |  25  |  0.91    Ca    8.9      20 Sep 2020 06:22  Phos  4.0     09-19  Mg     1.9     09-20        CAPILLARY BLOOD GLUCOSE                RADIOLOGY & ADDITIONAL TESTS:    Imaging Personally Reviewed:  [x] YES  [ ] NO    Consultant(s) Notes Reviewed:  [x] YES  [ ] NO      MEDICATIONS  (STANDING):  ALPRAZolam 0.5 milliGRAM(s) Oral three times a day  apixaban 2.5 milliGRAM(s) Oral two times a day  atorvastatin 40 milliGRAM(s) Oral at bedtime  influenza   Vaccine 0.5 milliLiter(s) IntraMuscular once  loratadine 5 milliGRAM(s) Oral two times a day  pantoprazole    Tablet 40 milliGRAM(s) Oral before breakfast  polyethylene glycol 3350 17 Gram(s) Oral daily  sodium chloride 0.9%. 1000 milliLiter(s) (75 mL/Hr) IV Continuous <Continuous>    MEDICATIONS  (PRN):  ALBUTerol    90 MICROgram(s) HFA Inhaler 2 Puff(s) Inhalation every 6 hours PRN Shortness of Breath and/or Wheezing  guaiFENesin   Syrup  (Sugar-Free) 200 milliGRAM(s) Oral every 6 hours PRN Cough  ipratropium 17 MICROgram(s) HFA Inhaler 1 Puff(s) Inhalation every 6 hours PRN SOB and/or wheezing  melatonin 6 milliGRAM(s) Oral at bedtime PRN Insomnia  oxycodone    5 mG/acetaminophen 325 mG 1 Tablet(s) Oral every 6 hours PRN Moderate Pain (4 - 6)  oxycodone    5 mG/acetaminophen 325 mG 2 Tablet(s) Oral every 6 hours PRN Severe Pain (7 - 10)      Care Discussed with Consultants/Other Providers [x] YES  [ ] NO

## 2020-09-20 NOTE — CHART NOTE - NSCHARTNOTEFT_GEN_A_CORE
Notified by RN of hypotension (BP 80/56). RN attempted to take it manually but was unsuccessful and patient began to refuse saying it was taken too many times. Patient seen at bedside. Patient resting in the chair comfortably, easily arousable. Patient reports feeling slightly dizzy after taking potassium pill. Notified patient that dizziness could stem from hypotension and will need to recheck her BP which she is agreeable to after she goes to the bathroom. Encourage PO fluid intake. Will f/u BP.

## 2020-09-20 NOTE — PROGRESS NOTE ADULT - ATTENDING COMMENTS
Pt expresses that she wishes to go home with outpt PT as opposed to rehab again.   PT: rehab. fall risk explained. Still wishes to go home with home PT or outpt PT. she can walk without issues.     - Dr. LEANA Lamb ([facility])  - [phone]

## 2020-09-20 NOTE — PROGRESS NOTE ADULT - PROBLEM SELECTOR PLAN 4
Per Pulmonology at [facility], pt with presumed IAN/OHS given her body habitus and post-op acute hypercapnic respiratory failure.  - Pt refusing use of NIPPV at night  - Monitor nocturnal O2 sats, supplemental O2 PRN  - Outpatient Pulmonology referral

## 2020-09-21 DIAGNOSIS — R41.0 DISORIENTATION, UNSPECIFIED: ICD-10-CM

## 2020-09-21 LAB
ANION GAP SERPL CALC-SCNC: 12 MMO/L — SIGNIFICANT CHANGE UP (ref 7–14)
BUN SERPL-MCNC: 15 MG/DL — SIGNIFICANT CHANGE UP (ref 7–23)
CALCIUM SERPL-MCNC: 7.2 MG/DL — LOW (ref 8.4–10.5)
CHLORIDE SERPL-SCNC: 110 MMOL/L — HIGH (ref 98–107)
CO2 SERPL-SCNC: 22 MMOL/L — SIGNIFICANT CHANGE UP (ref 22–31)
CREAT SERPL-MCNC: 0.74 MG/DL — SIGNIFICANT CHANGE UP (ref 0.5–1.3)
GLUCOSE SERPL-MCNC: 96 MG/DL — SIGNIFICANT CHANGE UP (ref 70–99)
HCT VFR BLD CALC: 37.1 % — SIGNIFICANT CHANGE UP (ref 34.5–45)
HCV AB S/CO SERPL IA: 0.23 S/CO — SIGNIFICANT CHANGE UP (ref 0–0.99)
HCV AB SERPL-IMP: SIGNIFICANT CHANGE UP
HGB BLD-MCNC: 11.1 G/DL — LOW (ref 11.5–15.5)
MAGNESIUM SERPL-MCNC: 1.6 MG/DL — SIGNIFICANT CHANGE UP (ref 1.6–2.6)
MCHC RBC-ENTMCNC: 28.2 PG — SIGNIFICANT CHANGE UP (ref 27–34)
MCHC RBC-ENTMCNC: 29.9 % — LOW (ref 32–36)
MCV RBC AUTO: 94.4 FL — SIGNIFICANT CHANGE UP (ref 80–100)
NRBC # FLD: 0 K/UL — SIGNIFICANT CHANGE UP (ref 0–0)
PLATELET # BLD AUTO: 245 K/UL — SIGNIFICANT CHANGE UP (ref 150–400)
PMV BLD: 11 FL — SIGNIFICANT CHANGE UP (ref 7–13)
POTASSIUM SERPL-MCNC: 3.1 MMOL/L — LOW (ref 3.5–5.3)
POTASSIUM SERPL-SCNC: 3.1 MMOL/L — LOW (ref 3.5–5.3)
RBC # BLD: 3.93 M/UL — SIGNIFICANT CHANGE UP (ref 3.8–5.2)
RBC # FLD: 14.2 % — SIGNIFICANT CHANGE UP (ref 10.3–14.5)
SODIUM SERPL-SCNC: 144 MMOL/L — SIGNIFICANT CHANGE UP (ref 135–145)
WBC # BLD: 7.36 K/UL — SIGNIFICANT CHANGE UP (ref 3.8–10.5)
WBC # FLD AUTO: 7.36 K/UL — SIGNIFICANT CHANGE UP (ref 3.8–10.5)

## 2020-09-21 PROCEDURE — 99223 1ST HOSP IP/OBS HIGH 75: CPT

## 2020-09-21 RX ORDER — POTASSIUM CHLORIDE 20 MEQ
40 PACKET (EA) ORAL EVERY 4 HOURS
Refills: 0 | Status: COMPLETED | OUTPATIENT
Start: 2020-09-21 | End: 2020-09-21

## 2020-09-21 RX ADMIN — Medication 40 MILLIEQUIVALENT(S): at 17:13

## 2020-09-21 RX ADMIN — Medication 0.5 MILLIGRAM(S): at 21:16

## 2020-09-21 RX ADMIN — LORATADINE 5 MILLIGRAM(S): 10 TABLET ORAL at 17:13

## 2020-09-21 RX ADMIN — OXYCODONE AND ACETAMINOPHEN 1 TABLET(S): 5; 325 TABLET ORAL at 22:00

## 2020-09-21 RX ADMIN — APIXABAN 2.5 MILLIGRAM(S): 2.5 TABLET, FILM COATED ORAL at 17:13

## 2020-09-21 RX ADMIN — Medication 0.5 MILLIGRAM(S): at 13:35

## 2020-09-21 RX ADMIN — LORATADINE 5 MILLIGRAM(S): 10 TABLET ORAL at 03:52

## 2020-09-21 RX ADMIN — PANTOPRAZOLE SODIUM 40 MILLIGRAM(S): 20 TABLET, DELAYED RELEASE ORAL at 06:28

## 2020-09-21 RX ADMIN — APIXABAN 2.5 MILLIGRAM(S): 2.5 TABLET, FILM COATED ORAL at 06:28

## 2020-09-21 RX ADMIN — ATORVASTATIN CALCIUM 40 MILLIGRAM(S): 80 TABLET, FILM COATED ORAL at 21:16

## 2020-09-21 RX ADMIN — Medication 0.5 MILLIGRAM(S): at 06:28

## 2020-09-21 RX ADMIN — OXYCODONE AND ACETAMINOPHEN 1 TABLET(S): 5; 325 TABLET ORAL at 21:16

## 2020-09-21 NOTE — BEHAVIORAL HEALTH ASSESSMENT NOTE - SUICIDE PROTECTIVE FACTORS
Fear of death or the actual act of killing self/Has future plans/Identifies reasons for living/Responsibility to family and others/Beloved pets

## 2020-09-21 NOTE — BEHAVIORAL HEALTH ASSESSMENT NOTE - CASE SUMMARY
70 yo woman, , living alone in private residence, with one adult son, w/ no known prior psychiatric hx (but has been seen by psych during prior medical hospitalizations) w/ medical hx of HTN, HLD, ?COPD (not on home O2, previously on Spiriva), anxiety (on Xanax from PCP), R knee osteoarthritis s/p R TKR (2019), and L shoulder dislocation and osteoarthritis s/p recent L reverse total shoulder replacement (9/10/20) presenting from from [facility] rehab due to "agitation and combativeness" with psychiatry consulted for capacity to leave AMA.     Pt seen with resident as above. Chart reviewed. Pt currently AA O x 3, calm, in no distress, and lacking focal neuro deficits. She also is not requesting to leave AMA. Pt likely was transiently delirious during recent episode of AMA request, and psychiatric consultation was late/out of context. She currently lacks psychiatric contraindications to discharge when medically cleared. Signing off.

## 2020-09-21 NOTE — BEHAVIORAL HEALTH ASSESSMENT NOTE - HPI (INCLUDE ILLNESS QUALITY, SEVERITY, DURATION, TIMING, CONTEXT, MODIFYING FACTORS, ASSOCIATED SIGNS AND SYMPTOMS)
68 yo woman, , living alone in private residence, with one adult son, w/ no known prior psychiatric hx (but has been seen by psych during prior medical hospitalizations) w/ medical hx of HTN, HLD, ?COPD (not on home O2, previously on Spiriva), anxiety (on Xanax from PCP), R knee osteoarthritis s/p R TKR (2019), and L shoulder dislocation and osteoarthritis s/p recent L reverse total shoulder replacement (9/10/20) presenting from from [facility] rehab due to "agitation and combativeness" with psychiatry consulted for capacity to leave AMA.     On interview, patient is calm and cooperative. She is A&O x4 and admits to "being out of it" a few days earlier. She states she was feeling "dizzy" at the time. She states she currently feels well and is excited to go home to be with her pet dog. Her son will take her home. Reports mood is good. States she has many family and friends nearby. Denies SI/HI/I/P. Denies hallucinations. Denies psych history and dismisses anxiety as "the kind anyone would have." Denies seeing psychiatrist and reports PCP prescribes xanax. Reports she has no problems with her current plan of care.     Of note, Ms. Alcaraz has previously been seen by psychiatry for delirium when she was similarly combative/ agitated and wanted to leave AMA during hospitalization for shoulder surgery in 2019. She seems to have a tendency toward delirium, especially when in pain/ receiving multiple pain medications. She also experienced hypercapnic respiratory failure during her recent hospitalization at [place] and has required the use of pressors.

## 2020-09-21 NOTE — PROGRESS NOTE ADULT - SUBJECTIVE AND OBJECTIVE BOX
Patient is a 69y old  Female who presents with a chief complaint of Agitation/combativeness, delirium, rehab placement following L shoulder replacement (19 Sep 2020 16:50)      SUBJECTIVE / OVERNIGHT EVENTS:  BP much better  DC IVF.   no cp, no sob, no n/v/d. no abdominal pain.  no headache, no dizziness.   calm and coorperative.      Vital Signs Last 24 Hrs  T(C): 36.7 (20 Sep 2020 18:10), Max: 36.7 (20 Sep 2020 10:30)  T(F): 98 (20 Sep 2020 18:10), Max: 98.1 (20 Sep 2020 14:00)  HR: 64 (20 Sep 2020 18:10) (64 - 76)  BP: 113/68 (20 Sep 2020 18:10) (80/56 - 113/68)  BP(mean): --  RR: 18 (20 Sep 2020 18:10) (17 - 19)  SpO2: 98% (20 Sep 2020 18:10) (95% - 100%)  I&O's Summary      PHYSICAL EXAM:  GENERAL: NAD, Comfortable, out of bed in chair  HEAD:  Atraumatic, Normocephalic  EYES: EOMI, PERRLA, conjunctiva and sclera clear  NECK: Supple, No JVD  CHEST/LUNG: Clear to auscultation bilaterally; No wheeze  HEART: Regular rate and rhythm; No murmurs, rubs, or gallops  ABDOMEN: Soft, Nontender, Nondistended; Bowel sounds present  Neuro: AAO x 3, no focal deficit, 5/5 b/l extremities  EXTREMITIES:  2+ Peripheral Pulses, No clubbing, cyanosis, or edema  SKIN: No rashes or lesions        LABS:                        11.5   7.70  )-----------( 273      ( 20 Sep 2020 06:22 )             37.6     09-20    142  |  102  |  20  ----------------------------<  104<H>  3.1<L>   |  25  |  0.91    Ca    8.9      20 Sep 2020 06:22  Phos  4.0     09-19  Mg     1.9     09-20        CAPILLARY BLOOD GLUCOSE                RADIOLOGY & ADDITIONAL TESTS:    Imaging Personally Reviewed:  [x] YES  [ ] NO    Consultant(s) Notes Reviewed:  [x] YES  [ ] NO      MEDICATIONS  (STANDING):  ALPRAZolam 0.5 milliGRAM(s) Oral three times a day  apixaban 2.5 milliGRAM(s) Oral two times a day  atorvastatin 40 milliGRAM(s) Oral at bedtime  influenza   Vaccine 0.5 milliLiter(s) IntraMuscular once  loratadine 5 milliGRAM(s) Oral two times a day  pantoprazole    Tablet 40 milliGRAM(s) Oral before breakfast  polyethylene glycol 3350 17 Gram(s) Oral daily  sodium chloride 0.9%. 1000 milliLiter(s) (75 mL/Hr) IV Continuous <Continuous>    MEDICATIONS  (PRN):  ALBUTerol    90 MICROgram(s) HFA Inhaler 2 Puff(s) Inhalation every 6 hours PRN Shortness of Breath and/or Wheezing  guaiFENesin   Syrup  (Sugar-Free) 200 milliGRAM(s) Oral every 6 hours PRN Cough  ipratropium 17 MICROgram(s) HFA Inhaler 1 Puff(s) Inhalation every 6 hours PRN SOB and/or wheezing  melatonin 6 milliGRAM(s) Oral at bedtime PRN Insomnia  oxycodone    5 mG/acetaminophen 325 mG 1 Tablet(s) Oral every 6 hours PRN Moderate Pain (4 - 6)  oxycodone    5 mG/acetaminophen 325 mG 2 Tablet(s) Oral every 6 hours PRN Severe Pain (7 - 10)      Care Discussed with Consultants/Other Providers [x] YES  [ ] NO

## 2020-09-21 NOTE — BEHAVIORAL HEALTH ASSESSMENT NOTE - NSBHCONSULTRECOMMENDOTHER_PSY_A_CORE FT
- Would recommend patient follow up with neuro as outpatient as may have some underlying mild cognitive decline, seems to be prone to delirium   - Pt may need more support in near future if remains living alone   - Can f/u at geriatric clinic ([phone]) if decides she would like care

## 2020-09-21 NOTE — PROGRESS NOTE ADULT - PROBLEM SELECTOR PLAN 4
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  - Pt refusing use of NIPPV at night  - Monitor nocturnal O2 sats, supplemental O2 PRN  - Outpatient Pulmonology referral

## 2020-09-21 NOTE — PROGRESS NOTE ADULT - ATTENDING COMMENTS
Pt expresses that she wishes to go home with outpt PT as opposed to rehab again.   PT: rehab. fall risk explained. Still wishes to go home with home PT or outpt PT. she can walk without issues.     - Dr. LEANA Lamb ([facility])  - [phone]

## 2020-09-21 NOTE — BEHAVIORAL HEALTH ASSESSMENT NOTE - SUMMARY
70 yo woman, , living alone in private residence, with one adult son, w/ no known prior psychiatric hx (but has been seen by psych during prior medical hospitalizations) w/ medical hx of HTN, HLD, ?COPD (not on home O2, previously on Spiriva), anxiety (on Xanax from PCP), R knee osteoarthritis s/p R TKR (2019), and L shoulder dislocation and osteoarthritis s/p recent L reverse total shoulder replacement (9/10/20) presenting from from [facility] rehab due to "agitation and combativeness" with psychiatry consulted for capacity to leave AMA.     Patient currently not trying to leave AMA and is calm and cooperative, was likely experiencing delirium in the context or recent surgery, pain, pain medications, as well as recent hypercapnia and low BP. Given prior interactions with psychiatry in the context of acute agitation 2/2 delirium, patient is likely prone to delirium in such contexts. Pt also likely w/ some characterological traits which make her more likely to mistrust others or become somewhat paranoid in these contexts. Given patient's calm demeanor, ability to orient self in time and space, and ability to interact normally, her delirium appears to be resolved.

## 2020-09-21 NOTE — BEHAVIORAL HEALTH ASSESSMENT NOTE - NSBHCHARTREVIEWLAB_PSY_A_CORE FT
11.1   7.36  )-----------( 245      ( 21 Sep 2020 06:48 )             37.1       09-21    144  |  110<H>  |  15  ----------------------------<  96  3.1<L>   |  22  |  0.74    Ca    7.2<L>      21 Sep 2020 06:45  Mg     1.6     09-21                        Lactate Trend            CAPILLARY BLOOD GLUCOSE            Culture Results:   No growth (09-11 @ 21:09)      RADIOLOGY, EKG & ADDITIONAL TESTS: Reviewed.

## 2020-09-21 NOTE — PROGRESS NOTE ADULT - PROBLEM SELECTOR PLAN 1
Pt agitated and combative on Friday morning, trying to leave rehab and calling police stating that she was being held against her will. S/p IM Haldol 2 mg x1 at [facility]. Can be in setting of delirium 2/2 opioid/benzo use and recent hospitalization vs. acute psychotic episode.  - no known psyc history per pt  - Pt currently calm in ED and on  the floor, not requiring any PRN sedatives or enhanced supervision  - C/w Xanax 0.5 mg tid (to avoid benzo withdrawal, as pt was getting Xanax tid at [facility]) (She takes 1 mg at home PRN)   - C/w melatonin 3 mg qhs for insomnia  - C/w pain control with opioids as below, but will attempt to titrate down as clinically indicated  - IM/IV Haldol PRN for agitation  - TSH wnl.  folate, and vitamin B12 levels wnl  - No localizing S/S of infection. No changes to mental status (A&Ox3).   - Behavioral Health consult if recurrent episode.

## 2020-09-21 NOTE — PROGRESS NOTE ADULT - ASSESSMENT
70 yo obese woman with history of HTN, HLD, ?COPD (not on home O2, previously on Spiriva), anxiety (on Xanax), R knee osteoarthritis s/p R TKR (2019), and L shoulder dislocation and osteoarthritis s/p recent L reverse total shoulder replacement (9/10/20) presents from [facility] due to agitation and combativeness on Friday morning, pending rehab placement at different facility.

## 2020-09-22 ENCOUNTER — TRANSCRIPTION ENCOUNTER (OUTPATIENT)
Age: 70
End: 2020-09-22

## 2020-09-22 VITALS
HEART RATE: 69 BPM | TEMPERATURE: 98 F | SYSTOLIC BLOOD PRESSURE: 123 MMHG | RESPIRATION RATE: 17 BRPM | DIASTOLIC BLOOD PRESSURE: 88 MMHG | OXYGEN SATURATION: 96 %

## 2020-09-22 LAB
ANION GAP SERPL CALC-SCNC: 13 MMO/L — SIGNIFICANT CHANGE UP (ref 7–14)
BUN SERPL-MCNC: 19 MG/DL — SIGNIFICANT CHANGE UP (ref 7–23)
CALCIUM SERPL-MCNC: 9 MG/DL — SIGNIFICANT CHANGE UP (ref 8.4–10.5)
CHLORIDE SERPL-SCNC: 105 MMOL/L — SIGNIFICANT CHANGE UP (ref 98–107)
CO2 SERPL-SCNC: 23 MMOL/L — SIGNIFICANT CHANGE UP (ref 22–31)
CREAT SERPL-MCNC: 0.96 MG/DL — SIGNIFICANT CHANGE UP (ref 0.5–1.3)
GLUCOSE SERPL-MCNC: 83 MG/DL — SIGNIFICANT CHANGE UP (ref 70–99)
HCT VFR BLD CALC: 39.1 % — SIGNIFICANT CHANGE UP (ref 34.5–45)
HGB BLD-MCNC: 11.9 G/DL — SIGNIFICANT CHANGE UP (ref 11.5–15.5)
MAGNESIUM SERPL-MCNC: 1.5 MG/DL — LOW (ref 1.6–2.6)
MCHC RBC-ENTMCNC: 27.7 PG — SIGNIFICANT CHANGE UP (ref 27–34)
MCHC RBC-ENTMCNC: 30.4 % — LOW (ref 32–36)
MCV RBC AUTO: 91.1 FL — SIGNIFICANT CHANGE UP (ref 80–100)
NRBC # FLD: 0 K/UL — SIGNIFICANT CHANGE UP (ref 0–0)
PHOSPHATE SERPL-MCNC: 2.9 MG/DL — SIGNIFICANT CHANGE UP (ref 2.5–4.5)
PLATELET # BLD AUTO: 294 K/UL — SIGNIFICANT CHANGE UP (ref 150–400)
PMV BLD: 10.3 FL — SIGNIFICANT CHANGE UP (ref 7–13)
POTASSIUM SERPL-MCNC: 3.8 MMOL/L — SIGNIFICANT CHANGE UP (ref 3.5–5.3)
POTASSIUM SERPL-SCNC: 3.8 MMOL/L — SIGNIFICANT CHANGE UP (ref 3.5–5.3)
RBC # BLD: 4.29 M/UL — SIGNIFICANT CHANGE UP (ref 3.8–5.2)
RBC # FLD: 13.7 % — SIGNIFICANT CHANGE UP (ref 10.3–14.5)
SODIUM SERPL-SCNC: 141 MMOL/L — SIGNIFICANT CHANGE UP (ref 135–145)
WBC # BLD: 7.53 K/UL — SIGNIFICANT CHANGE UP (ref 3.8–10.5)
WBC # FLD AUTO: 7.53 K/UL — SIGNIFICANT CHANGE UP (ref 3.8–10.5)

## 2020-09-22 RX ORDER — LORATADINE 10 MG/1
0.5 TABLET ORAL
Qty: 30 | Refills: 0
Start: 2020-09-22 | End: 2020-10-21

## 2020-09-22 RX ORDER — POLYETHYLENE GLYCOL 3350 17 G/17G
17 POWDER, FOR SOLUTION ORAL
Qty: 510 | Refills: 0
Start: 2020-09-22 | End: 2020-10-21

## 2020-09-22 RX ORDER — SENNA PLUS 8.6 MG/1
2 TABLET ORAL
Qty: 60 | Refills: 0
Start: 2020-09-22 | End: 2020-10-21

## 2020-09-22 RX ORDER — APIXABAN 2.5 MG/1
1 TABLET, FILM COATED ORAL
Qty: 0 | Refills: 0 | DISCHARGE

## 2020-09-22 RX ORDER — LANOLIN ALCOHOL/MO/W.PET/CERES
1 CREAM (GRAM) TOPICAL
Qty: 30 | Refills: 0
Start: 2020-09-22 | End: 2020-10-21

## 2020-09-22 RX ORDER — ACETAMINOPHEN 500 MG
2 TABLET ORAL
Qty: 0 | Refills: 0 | DISCHARGE

## 2020-09-22 RX ORDER — ALPRAZOLAM 0.25 MG
0 TABLET ORAL
Qty: 0 | Refills: 0 | DISCHARGE

## 2020-09-22 RX ORDER — OXYCODONE HYDROCHLORIDE 5 MG/1
1 TABLET ORAL
Qty: 120 | Refills: 0
Start: 2020-09-22 | End: 2020-10-21

## 2020-09-22 RX ORDER — ATORVASTATIN CALCIUM 80 MG/1
1 TABLET, FILM COATED ORAL
Qty: 0 | Refills: 0 | DISCHARGE

## 2020-09-22 RX ORDER — POLYETHYLENE GLYCOL 3350 17 G/17G
17 POWDER, FOR SOLUTION ORAL
Qty: 0 | Refills: 0 | DISCHARGE

## 2020-09-22 RX ORDER — ALPRAZOLAM 0.25 MG
1 TABLET ORAL
Qty: 0 | Refills: 0 | DISCHARGE

## 2020-09-22 RX ORDER — PANTOPRAZOLE SODIUM 20 MG/1
1 TABLET, DELAYED RELEASE ORAL
Qty: 0 | Refills: 0 | DISCHARGE

## 2020-09-22 RX ORDER — ALPRAZOLAM 0.25 MG
1 TABLET ORAL
Qty: 90 | Refills: 0
Start: 2020-09-22 | End: 2020-10-21

## 2020-09-22 RX ORDER — LANOLIN ALCOHOL/MO/W.PET/CERES
1 CREAM (GRAM) TOPICAL
Qty: 0 | Refills: 0 | DISCHARGE

## 2020-09-22 RX ORDER — MAGNESIUM OXIDE 400 MG ORAL TABLET 241.3 MG
400 TABLET ORAL ONCE
Refills: 0 | Status: COMPLETED | OUTPATIENT
Start: 2020-09-22 | End: 2020-09-22

## 2020-09-22 RX ORDER — ATORVASTATIN CALCIUM 80 MG/1
1 TABLET, FILM COATED ORAL
Qty: 30 | Refills: 0
Start: 2020-09-22 | End: 2020-10-21

## 2020-09-22 RX ORDER — QUETIAPINE FUMARATE 200 MG/1
1 TABLET, FILM COATED ORAL
Qty: 90 | Refills: 0
Start: 2020-09-22 | End: 2020-10-21

## 2020-09-22 RX ORDER — OXYCODONE HYDROCHLORIDE 5 MG/1
1 TABLET ORAL
Qty: 0 | Refills: 0 | DISCHARGE

## 2020-09-22 RX ORDER — APIXABAN 2.5 MG/1
1 TABLET, FILM COATED ORAL
Qty: 60 | Refills: 0
Start: 2020-09-22 | End: 2020-10-21

## 2020-09-22 RX ORDER — ACETAMINOPHEN 500 MG
2 TABLET ORAL
Qty: 180 | Refills: 0
Start: 2020-09-22 | End: 2020-10-21

## 2020-09-22 RX ORDER — OMEPRAZOLE 10 MG/1
1 CAPSULE, DELAYED RELEASE ORAL
Qty: 0 | Refills: 0 | DISCHARGE

## 2020-09-22 RX ORDER — PANTOPRAZOLE SODIUM 20 MG/1
1 TABLET, DELAYED RELEASE ORAL
Qty: 30 | Refills: 0
Start: 2020-09-22 | End: 2020-10-21

## 2020-09-22 RX ADMIN — MAGNESIUM OXIDE 400 MG ORAL TABLET 400 MILLIGRAM(S): 241.3 TABLET ORAL at 13:38

## 2020-09-22 RX ADMIN — Medication 0.5 MILLIGRAM(S): at 05:30

## 2020-09-22 RX ADMIN — Medication 0.5 MILLIGRAM(S): at 13:38

## 2020-09-22 RX ADMIN — LORATADINE 5 MILLIGRAM(S): 10 TABLET ORAL at 05:29

## 2020-09-22 RX ADMIN — LORATADINE 5 MILLIGRAM(S): 10 TABLET ORAL at 17:06

## 2020-09-22 RX ADMIN — PANTOPRAZOLE SODIUM 40 MILLIGRAM(S): 20 TABLET, DELAYED RELEASE ORAL at 05:29

## 2020-09-22 RX ADMIN — APIXABAN 2.5 MILLIGRAM(S): 2.5 TABLET, FILM COATED ORAL at 17:06

## 2020-09-22 RX ADMIN — APIXABAN 2.5 MILLIGRAM(S): 2.5 TABLET, FILM COATED ORAL at 05:30

## 2020-09-22 NOTE — DISCHARGE NOTE PROVIDER - NSDCCONDITION_GEN_ALL_CORE
MD Twyla Silva,     Can you please add the Arthrex Mensicus root repair set to this case.  Not sure I'll need it but want it available. Thanks,   Tesfaye      Added on 11/19/19-spoke w/Sobia AGEE       Stable

## 2020-09-22 NOTE — DISCHARGE NOTE PROVIDER - NSDCMRMEDTOKEN_GEN_ALL_CORE_FT
acetaminophen 500 mg oral tablet: 2 tab(s) orally every 8 hours  ALPRAZolam 0.5 mg oral tablet: 1 tab(s) orally 3 times a day at 10am, 2pm, and 7pm  atorvastatin 40 mg oral tablet: 1 tab(s) orally once a day (at bedtime)  Eliquis 2.5 mg oral tablet: 1 tab(s) orally 2 times a day  loratadine 10 mg oral tablet: 0.5 tab(s) orally 2 times a day  Melatonin 3 mg oral tablet: 1 tab(s) orally once a day (at bedtime)  oxyCODONE 5 mg oral tablet: 1 tab(s) orally every 4 hours, As Needed for Moderate to Severe Pain  pantoprazole 20 mg oral delayed release tablet: 1 tab(s) orally once a day  Physical Therapy : Physical Therapy for 3x a week   polyethylene glycol 3350 oral powder for reconstitution: 17 gram(s) orally once a day  senna oral tablet: 2 tab(s) orally once a day (at bedtime), As needed, Constipation  SEROquel 25 mg oral tablet: 1 tab(s) orally 3 times a day at 10am, 2pm, and 7pm  NOT YET STARTED   acetaminophen 500 mg oral tablet: 2 tab(s) orally every 8 hours  ALPRAZolam 0.5 mg oral tablet: 1 tab(s) orally 3 times a day at 10am, 2pm, and 7pm MDD:MMD 3 tablets total daily  atorvastatin 40 mg oral tablet: 1 tab(s) orally once a day (at bedtime)  Eliquis 2.5 mg oral tablet: 1 tab(s) orally 2 times a day  loratadine 10 mg oral tablet: 0.5 tab(s) orally 2 times a day  Melatonin 3 mg oral tablet: 1 tab(s) orally once a day (at bedtime)  oxyCODONE 5 mg oral tablet: 1 tab(s) orally every 6 hours, As Needed  for Moderate to Severe Pain MDD:MMD 4 tablets daily  pantoprazole 20 mg oral delayed release tablet: 1 tab(s) orally once a day  Physical Therapy : Physical Therapy for 3x a week   polyethylene glycol 3350 oral powder for reconstitution: 17 gram(s) orally once a day  senna oral tablet: 2 tab(s) orally once a day (at bedtime), As needed, Constipation  SEROquel 25 mg oral tablet: 1 tab(s) orally 3 times a day at 10am, 2pm, and 7pm  NOT YET STARTED

## 2020-09-22 NOTE — DISCHARGE NOTE NURSING/CASE MANAGEMENT/SOCIAL WORK - PATIENT PORTAL LINK FT
You can access the FollowMyHealth Patient Portal offered by [facility] by registering at the following website: [email or web address]. By joining [employer]’s FollowMyHealth portal, you will also be able to view your health information using other applications (apps) compatible with our system.

## 2020-09-22 NOTE — DISCHARGE NOTE PROVIDER - HOSPITAL COURSE
70 yo obese woman with history of HTN, HLD, ?COPD (not on home O2, previously on Spiriva), anxiety (on Xanax), R knee osteoarthritis s/p R TKR (2019), and L shoulder dislocation and osteoarthritis s/p recent L reverse total shoulder replacement (9/10/20) presents from [facility] due to agitation and combativeness on Friday morning.   Pt was recently admitted at [facility] (9/10-9/15/20) for L reverse total shoulder replacement with Dr. Pham on 9/10/20. Her hospital course following the surgery was complicated by acute hypercapnic respiratory failure and hypotension/shock. Pulmonology was consulted for the acute hypercapnic respiratory failure, presumed to have been from narcotic use for pain control and undiagnosed IAN/OHS given pt's body habitus. The acute hypercapnic respiratory failure resolved with BiPAP use, and pt was encouraged to continue using BiPAP at night for her IAN/OHS, though pt refused. Pt was also followed by Cardiology for post-op hypotension/shock requiring use of IV phenylephrine and PO midodrine, with both eventually being titrated off prior to pt's discharge. TTE performed at the time showed normal LV function and mild diastolic dysfunction.   Pt was discharged to [facility] on 9/15/20 for subacute rehab, but on Friday morning, pt was found to be agitated and combative, with pt trying to leave the facility to go home. Pt was sedated with IM Haldol 2 mg x1 in addition to her morning dose of Xanax 0.5 mg. Documentation that accompanied pt from [facility] reveals that at 1pm on Friday, 2 police officers from [employer] [facility] came to [facility] after pt called police stating that she was being held against her will. Pt was, therefore, sent to [facility] ED for psych eval.     Pt currently denies any complaints other than L shoulder soreness and a productive cough that started at [facility] after she didn't get her daily Claritin, causing her seasonal allergies to flare up. Denies any fevers, chills, CP, SOB, palpitations, headaches, sore throat, runny nose, abdominal pain, N/V, diarrhea, constipation, dark/bloody stools, or urinary symptoms.     In the ED: T 97.8-98, HR 64-65, -120/75-95, RR 17-18, O2 sats 100% RA.      Hospital Course:     1: Agitation.  Plan: Pt agitated and combative on Friday morning, trying to leave rehab and calling police stating that she was being held against her will. S/p IM Haldol 2 mg x1 at [facility]. Can be in setting of delirium 2/2 opioid/benzo use and recent hospitalization vs. acute psychotic episode.  - Pt currently calm in ED, not requiring any PRN sedatives or enhanced supervision  - C/w Xanax 0.5 mg tid (to avoid benzo withdrawal, as pt was getting Xanax tid at [facility])  - C/w melatonin 3 mg qhs for insomnia  - C/w pain control with opioids as below, but will attempt to titrate down as clinically indicated  - IM/IV Haldol PRN for agitation  - Check TSH, folate, and vitamin B12 levels  - No localizing S/S of infection. No changes to mental status (A&Ox3).   - Behavioral Health - no contraindication for discharge     2: History of left shoulder replacement.  Plan: H/o L shoulder dislocation and osteoarthritis s/p recent L reverse total shoulder replacement (9/10/20). No S/S of post-op infection.   - C/w pain control with Percocet 5/325 mg q6hrs PRN for moderate pain and Percocet 10/325 mg q6hrs PRN for severe pain  - C/w LUE sling   - Non-weight bearing on LUE  - C/w DVT ppx with Eliquis 2.5 mg bid  - PT eval  - Will need f/u with Dr. Pham (Ortho) in ~1.5 weeks.     3: Chronic obstructive pulmonary disease (COPD).  Plan: Pt documented to have h/o COPD on [employer] and previously on Spiriva, though pt denies any previous diagnosis of COPD. On exam, pt with diffuse expiratory wheezing in setting of recent productive cough.   - VBG ordered stat, with pCO2 55. O2 sats in high 90s to 100% on RA.  - F/u CXR ordered overnight  - Start albuterol and ipratropium inhaler q6hrs PRN for SOB and/or wheezing  - Robitussin PRN for cough  - C/w Claritin 10 mg daily  - Supplemental O2 PRN  - Will hold off on steroids at this time, as pt likely with mild COPD exacerbation at this time and due to concern for delirium   - Outpatient Pulmonology referral.    4: IAN (obstructive sleep apnea).  Plan: Per Pulmonology at [facility], pt with presumed IAN/OHS given her body habitus and post-op acute hypercapnic respiratory failure.  - Pt refusing use of NIPPV at night  - Monitor nocturnal O2 sats, supplemental O2 PRN  - Outpatient Pulmonology referral.     5: Hypertension.  Plan: Pt not on any antihypertensives. Currently, BPs in acceptable range.  - Monitor BPs off antihypertensives.     6: Hyperlipidemia. Plan: - C/w atorvastatin 40 mg qhs.    7: Anxiety.  Plan: - C/w Xanax 0.5 mg tid  - Behavioral Health consult no contraindication for discharge     8: Seasonal allergies.  Plan: - C/w Claritin 10 mg daily.     9: Need for prophylactic measure.  Plan: - DVT ppx: On Eliquis 2.5 mg bid  - Diet: DASH/TLC  - PT eval --> home with outpatient PT         Case reviewed with the attending of record Dr. MARIA on 09/22/2020 and patient cleared for discharge.     Reviewed discharge medications with patient; All new medications requiring new prescription sent to pharmacy of patients choice. Reviewed need for prescription for previous home medications and new prescriptions sent if requested. Patient in agreement and understands.   70 yo obese woman with history of HTN, HLD, ?COPD (not on home O2, previously on Spiriva), anxiety (on Xanax), R knee osteoarthritis s/p R TKR (2019), and L shoulder dislocation and osteoarthritis s/p recent L reverse total shoulder replacement (9/10/20) presents from [facility] due to agitation and combativeness on Friday morning.   Pt was recently admitted at [facility] (9/10-9/15/20) for L reverse total shoulder replacement with Dr. Pham on 9/10/20. Her hospital course following the surgery was complicated by acute hypercapnic respiratory failure and hypotension/shock. Pulmonology was consulted for the acute hypercapnic respiratory failure, presumed to have been from narcotic use for pain control and undiagnosed IAN/OHS given pt's body habitus. The acute hypercapnic respiratory failure resolved with BiPAP use, and pt was encouraged to continue using BiPAP at night for her IAN/OHS, though pt refused. Pt was also followed by Cardiology for post-op hypotension/shock requiring use of IV phenylephrine and PO midodrine, with both eventually being titrated off prior to pt's discharge. TTE performed at the time showed normal LV function and mild diastolic dysfunction.   Pt was discharged to [facility] on 9/15/20 for subacute rehab, but on Friday morning, pt was found to be agitated and combative, with pt trying to leave the facility to go home. Pt was sedated with IM Haldol 2 mg x1 in addition to her morning dose of Xanax 0.5 mg. Documentation that accompanied pt from [facility] reveals that at 1pm on Friday, 2 police officers from [facility] came to [facility] after pt called police stating that she was being held against her will. Pt was, therefore, sent to [facility] ED for psych eval.     Pt currently denies any complaints other than L shoulder soreness and a productive cough that started at [facility] after she didn't get her daily Claritin, causing her seasonal allergies to flare up. Denies any fevers, chills, CP, SOB, palpitations, headaches, sore throat, runny nose, abdominal pain, N/V, diarrhea, constipation, dark/bloody stools, or urinary symptoms.     In the ED: T 97.8-98, HR 64-65, -120/75-95, RR 17-18, O2 sats 100% RA.      Hospital Course:     1: Agitation.  Plan: Pt agitated and combative on Friday morning, trying to leave rehab and calling police stating that she was being held against her will. S/p IM Haldol 2 mg x1 at [facility]. Can be in setting of delirium 2/2 opioid/benzo use and recent hospitalization vs. acute psychotic episode.  - Pt currently calm in ED, not requiring any PRN sedatives or enhanced supervision  - C/w Xanax 0.5 mg tid (to avoid benzo withdrawal, as pt was getting Xanax tid at [facility])  - C/w melatonin 3 mg qhs for insomnia  - C/w pain control with opioids as below, but will attempt to titrate down as clinically indicated  - IM/IV Haldol PRN for agitation  - Check TSH, folate, and vitamin B12 levels  - No localizing S/S of infection. No changes to mental status (A&Ox3).   - Behavioral Health - no contraindication for discharge     2: History of left shoulder replacement.  Plan: H/o L shoulder dislocation and osteoarthritis s/p recent L reverse total shoulder replacement (9/10/20). No S/S of post-op infection.   - C/w pain control with Percocet 5/325 mg q6hrs PRN for moderate pain and Percocet 10/325 mg q6hrs PRN for severe pain  - C/w LUE sling   - Non-weight bearing on LUE  - C/w DVT ppx with Eliquis 2.5 mg bid  - PT eval  - Will need f/u with Dr. Pham (Ortho) in ~1.5 weeks.     3: Chronic obstructive pulmonary disease (COPD).  Plan: Pt documented to have h/o COPD on [employer] and previously on Spiriva, though pt denies any previous diagnosis of COPD. On exam, pt with diffuse expiratory wheezing in setting of recent productive cough.   - VBG ordered stat, with pCO2 55. O2 sats in high 90s to 100% on RA.  - F/u CXR ordered overnight  - Start albuterol and ipratropium inhaler q6hrs PRN for SOB and/or wheezing  - Robitussin PRN for cough  - C/w Claritin 10 mg daily  - Supplemental O2 PRN  - Will hold off on steroids at this time, as pt likely with mild COPD exacerbation at this time and due to concern for delirium   - Outpatient Pulmonology referral.    4: IAN (obstructive sleep apnea).  Plan: Per Pulmonology at [facility], pt with presumed IAN/OHS given her body habitus and post-op acute hypercapnic respiratory failure.  - Pt refusing use of NIPPV at night  - Monitor nocturnal O2 sats, supplemental O2 PRN  - Outpatient Pulmonology referral.     5: Hypertension.  Plan: Pt not on any antihypertensives. Currently, BPs in acceptable range.  - Monitor BPs off antihypertensives.     6: Hyperlipidemia. Plan: - C/w atorvastatin 40 mg qhs.    7: Anxiety.  Plan: - C/w Xanax 0.5 mg tid  - Behavioral Health consult no contraindication for discharge     8: Seasonal allergies.  Plan: - C/w Claritin 10 mg daily.     9: Need for prophylactic measure.  Plan: - DVT ppx: On Eliquis 2.5 mg bid  - Diet: DASH/TLC  - PT eval --> home with outpatient PT         Case reviewed with the attending of record Dr. MARIA on 09/22/2020 and patient cleared for discharge.     Reviewed discharge medications with patient; All new medications requiring new prescription sent to pharmacy of patients choice. Reviewed need for prescription for previous home medications and new prescriptions sent if requested. Patient in agreement and understands.       Attending Addendum:  Patient seen and examined by me on the discharge day. Medications reviewed.  All questions answered in details. Follow up plan explained.  More than 30 mins were spent evaluating patient and coordinating care for discharge.  Discharge summary sent to pt's primary care physician at [facility].

## 2020-09-22 NOTE — PROVIDER CONTACT NOTE (OTHER) - SITUATION
Patient refusing AM labs despite education. Patient educated that potassium was low yesterday and it is important to trend potassium levels. Patient continues to refuse.

## 2020-09-22 NOTE — DISCHARGE NOTE PROVIDER - PROVIDER TOKENS
FREE:[LAST:[simoncici],PHONE:[(   )    -],FAX:[(   )    -],FOLLOWUP:[1 week],ESTABLISHEDPATIENT:[T]]

## 2020-09-22 NOTE — PROVIDER CONTACT NOTE (OTHER) - BACKGROUND
Patient admitted with agitation and combativeness. Potassium 3.1 on 9/21/20, supplemented with 40mEq PO x2 yesterday.

## 2020-09-22 NOTE — DISCHARGE NOTE PROVIDER - NSDCCPCAREPLAN_GEN_ALL_CORE_FT
PRINCIPAL DISCHARGE DIAGNOSIS  Diagnosis: H/O shoulder replacement  Assessment and Plan of Treatment: follow up with your Orthopedist, and start Physical therapy when ready

## 2020-09-23 ENCOUNTER — APPOINTMENT (OUTPATIENT)
Dept: ORTHOPEDIC SURGERY | Facility: CLINIC | Age: 70
End: 2020-09-23
Payer: MEDICARE

## 2020-09-23 PROBLEM — M19.90 UNSPECIFIED OSTEOARTHRITIS, UNSPECIFIED SITE: Chronic | Status: ACTIVE | Noted: 2020-09-18

## 2020-09-23 PROBLEM — J44.9 CHRONIC OBSTRUCTIVE PULMONARY DISEASE, UNSPECIFIED: Chronic | Status: ACTIVE | Noted: 2020-09-18

## 2020-09-23 PROBLEM — I10 ESSENTIAL (PRIMARY) HYPERTENSION: Chronic | Status: ACTIVE | Noted: 2020-09-18

## 2020-09-23 PROBLEM — E78.5 HYPERLIPIDEMIA, UNSPECIFIED: Chronic | Status: ACTIVE | Noted: 2020-09-18

## 2020-09-23 PROBLEM — F41.9 ANXIETY DISORDER, UNSPECIFIED: Chronic | Status: ACTIVE | Noted: 2020-09-19

## 2020-09-23 PROBLEM — J30.2 OTHER SEASONAL ALLERGIC RHINITIS: Chronic | Status: ACTIVE | Noted: 2020-09-19

## 2020-09-23 PROCEDURE — 73030 X-RAY EXAM OF SHOULDER: CPT | Mod: LT

## 2020-09-23 PROCEDURE — 99024 POSTOP FOLLOW-UP VISIT: CPT

## 2020-10-21 ENCOUNTER — APPOINTMENT (OUTPATIENT)
Dept: ORTHOPEDIC SURGERY | Facility: CLINIC | Age: 70
End: 2020-10-21
Payer: MEDICARE

## 2020-10-21 VITALS
DIASTOLIC BLOOD PRESSURE: 83 MMHG | TEMPERATURE: 96.7 F | WEIGHT: 194 LBS | HEIGHT: 64 IN | BODY MASS INDEX: 33.12 KG/M2 | HEART RATE: 84 BPM | SYSTOLIC BLOOD PRESSURE: 116 MMHG

## 2020-10-21 DIAGNOSIS — Z96.612 PRESENCE OF LEFT ARTIFICIAL SHOULDER JOINT: ICD-10-CM

## 2020-10-21 PROCEDURE — 73030 X-RAY EXAM OF SHOULDER: CPT | Mod: LT

## 2020-10-21 PROCEDURE — 99024 POSTOP FOLLOW-UP VISIT: CPT

## 2020-12-10 ENCOUNTER — NON-APPOINTMENT (OUTPATIENT)
Age: 70
End: 2020-12-10

## 2020-12-12 ENCOUNTER — TRANSCRIPTION ENCOUNTER (OUTPATIENT)
Age: 70
End: 2020-12-12

## 2020-12-12 ENCOUNTER — INPATIENT (INPATIENT)
Facility: HOSPITAL | Age: 70
LOS: 0 days | Discharge: LEFT BEFORE DISCHARGE | DRG: 315 | End: 2020-12-12
Attending: SURGERY | Admitting: SURGERY
Payer: MEDICARE

## 2020-12-12 VITALS
HEIGHT: 64 IN | HEART RATE: 104 BPM | RESPIRATION RATE: 18 BRPM | WEIGHT: 182.1 LBS | OXYGEN SATURATION: 97 % | SYSTOLIC BLOOD PRESSURE: 87 MMHG | DIASTOLIC BLOOD PRESSURE: 62 MMHG | TEMPERATURE: 98 F

## 2020-12-12 VITALS
OXYGEN SATURATION: 94 % | HEART RATE: 108 BPM | DIASTOLIC BLOOD PRESSURE: 115 MMHG | RESPIRATION RATE: 38 BRPM | SYSTOLIC BLOOD PRESSURE: 250 MMHG

## 2020-12-12 DIAGNOSIS — Z96.659 PRESENCE OF UNSPECIFIED ARTIFICIAL KNEE JOINT: Chronic | ICD-10-CM

## 2020-12-12 DIAGNOSIS — Z96.619 PRESENCE OF UNSPECIFIED ARTIFICIAL SHOULDER JOINT: Chronic | ICD-10-CM

## 2020-12-12 DIAGNOSIS — I95.9 HYPOTENSION, UNSPECIFIED: ICD-10-CM

## 2020-12-12 DIAGNOSIS — Z96.651 PRESENCE OF RIGHT ARTIFICIAL KNEE JOINT: Chronic | ICD-10-CM

## 2020-12-12 LAB
ALBUMIN SERPL ELPH-MCNC: 4 G/DL — SIGNIFICANT CHANGE UP (ref 3.3–5)
ALP SERPL-CCNC: 96 U/L — SIGNIFICANT CHANGE UP (ref 40–120)
ALT FLD-CCNC: 27 U/L — SIGNIFICANT CHANGE UP (ref 10–45)
AMMONIA BLD-MCNC: 17 UMOL/L — SIGNIFICANT CHANGE UP (ref 11–55)
ANION GAP SERPL CALC-SCNC: 19 MMOL/L — HIGH (ref 5–17)
AST SERPL-CCNC: 44 U/L — HIGH (ref 10–40)
BASE EXCESS BLDV CALC-SCNC: -0.8 MMOL/L — SIGNIFICANT CHANGE UP (ref -2–2)
BASOPHILS # BLD AUTO: 0.04 K/UL — SIGNIFICANT CHANGE UP (ref 0–0.2)
BASOPHILS NFR BLD AUTO: 0.4 % — SIGNIFICANT CHANGE UP (ref 0–2)
BILIRUB SERPL-MCNC: 1 MG/DL — SIGNIFICANT CHANGE UP (ref 0.2–1.2)
BLD GP AB SCN SERPL QL: NEGATIVE — SIGNIFICANT CHANGE UP
BUN SERPL-MCNC: 14 MG/DL — SIGNIFICANT CHANGE UP (ref 7–23)
CA-I SERPL-SCNC: 1.2 MMOL/L — SIGNIFICANT CHANGE UP (ref 1.12–1.3)
CALCIUM SERPL-MCNC: 9.4 MG/DL — SIGNIFICANT CHANGE UP (ref 8.4–10.5)
CHLORIDE BLDV-SCNC: 106 MMOL/L — SIGNIFICANT CHANGE UP (ref 96–108)
CHLORIDE SERPL-SCNC: 103 MMOL/L — SIGNIFICANT CHANGE UP (ref 96–108)
CK MB BLD-MCNC: 0.4 % — SIGNIFICANT CHANGE UP (ref 0–3.5)
CK MB CFR SERPL CALC: 11.4 NG/ML — HIGH (ref 0–3.8)
CK SERPL-CCNC: 2895 U/L — HIGH (ref 25–170)
CO2 BLDV-SCNC: 27 MMOL/L — SIGNIFICANT CHANGE UP (ref 22–30)
CO2 SERPL-SCNC: 21 MMOL/L — LOW (ref 22–31)
CREAT SERPL-MCNC: 0.98 MG/DL — SIGNIFICANT CHANGE UP (ref 0.5–1.3)
EOSINOPHIL # BLD AUTO: 0.03 K/UL — SIGNIFICANT CHANGE UP (ref 0–0.5)
EOSINOPHIL NFR BLD AUTO: 0.3 % — SIGNIFICANT CHANGE UP (ref 0–6)
GAS PNL BLDV: 140 MMOL/L — SIGNIFICANT CHANGE UP (ref 135–145)
GAS PNL BLDV: SIGNIFICANT CHANGE UP
GAS PNL BLDV: SIGNIFICANT CHANGE UP
GLUCOSE BLDV-MCNC: 125 MG/DL — HIGH (ref 70–99)
GLUCOSE SERPL-MCNC: 122 MG/DL — HIGH (ref 70–99)
HCO3 BLDV-SCNC: 25 MMOL/L — SIGNIFICANT CHANGE UP (ref 21–29)
HCT VFR BLD CALC: 43 % — SIGNIFICANT CHANGE UP (ref 34.5–45)
HCT VFR BLDA CALC: 37 % — LOW (ref 39–50)
HGB BLD CALC-MCNC: 12 G/DL — SIGNIFICANT CHANGE UP (ref 11.5–15.5)
HGB BLD-MCNC: 13.2 G/DL — SIGNIFICANT CHANGE UP (ref 11.5–15.5)
IMM GRANULOCYTES NFR BLD AUTO: 0.6 % — SIGNIFICANT CHANGE UP (ref 0–1.5)
LACTATE BLDV-MCNC: 3 MMOL/L — HIGH (ref 0.7–2)
LYMPHOCYTES # BLD AUTO: 1.35 K/UL — SIGNIFICANT CHANGE UP (ref 1–3.3)
LYMPHOCYTES # BLD AUTO: 14.2 % — SIGNIFICANT CHANGE UP (ref 13–44)
MCHC RBC-ENTMCNC: 28.3 PG — SIGNIFICANT CHANGE UP (ref 27–34)
MCHC RBC-ENTMCNC: 30.7 GM/DL — LOW (ref 32–36)
MCV RBC AUTO: 92.3 FL — SIGNIFICANT CHANGE UP (ref 80–100)
MONOCYTES # BLD AUTO: 0.68 K/UL — SIGNIFICANT CHANGE UP (ref 0–0.9)
MONOCYTES NFR BLD AUTO: 7.2 % — SIGNIFICANT CHANGE UP (ref 2–14)
NEUTROPHILS # BLD AUTO: 7.34 K/UL — SIGNIFICANT CHANGE UP (ref 1.8–7.4)
NEUTROPHILS NFR BLD AUTO: 77.3 % — HIGH (ref 43–77)
NRBC # BLD: 0 /100 WBCS — SIGNIFICANT CHANGE UP (ref 0–0)
PCO2 BLDV: 51 MMHG — HIGH (ref 35–50)
PH BLDV: 7.32 — LOW (ref 7.35–7.45)
PLATELET # BLD AUTO: 235 K/UL — SIGNIFICANT CHANGE UP (ref 150–400)
PO2 BLDV: 31 MMHG — SIGNIFICANT CHANGE UP (ref 25–45)
POTASSIUM BLDV-SCNC: 3.8 MMOL/L — SIGNIFICANT CHANGE UP (ref 3.5–5.3)
POTASSIUM SERPL-MCNC: 4.2 MMOL/L — SIGNIFICANT CHANGE UP (ref 3.5–5.3)
POTASSIUM SERPL-SCNC: 4.2 MMOL/L — SIGNIFICANT CHANGE UP (ref 3.5–5.3)
PROT SERPL-MCNC: 7.3 G/DL — SIGNIFICANT CHANGE UP (ref 6–8.3)
RBC # BLD: 4.66 M/UL — SIGNIFICANT CHANGE UP (ref 3.8–5.2)
RBC # FLD: 15.3 % — HIGH (ref 10.3–14.5)
RH IG SCN BLD-IMP: POSITIVE — SIGNIFICANT CHANGE UP
SAO2 % BLDV: 44 % — LOW (ref 67–88)
SARS-COV-2 RNA SPEC QL NAA+PROBE: SIGNIFICANT CHANGE UP
SODIUM SERPL-SCNC: 143 MMOL/L — SIGNIFICANT CHANGE UP (ref 135–145)
TROPONIN T, HIGH SENSITIVITY RESULT: 27 NG/L — SIGNIFICANT CHANGE UP (ref 0–51)
WBC # BLD: 9.5 K/UL — SIGNIFICANT CHANGE UP (ref 3.8–10.5)
WBC # FLD AUTO: 9.5 K/UL — SIGNIFICANT CHANGE UP (ref 3.8–10.5)

## 2020-12-12 PROCEDURE — 99291 CRITICAL CARE FIRST HOUR: CPT

## 2020-12-12 PROCEDURE — 93308 TTE F-UP OR LMTD: CPT | Mod: 26

## 2020-12-12 PROCEDURE — 71045 X-RAY EXAM CHEST 1 VIEW: CPT | Mod: 26

## 2020-12-12 PROCEDURE — 70450 CT HEAD/BRAIN W/O DYE: CPT | Mod: 26

## 2020-12-12 PROCEDURE — 72170 X-RAY EXAM OF PELVIS: CPT | Mod: 26

## 2020-12-12 PROCEDURE — 73060 X-RAY EXAM OF HUMERUS: CPT | Mod: 26,LT,76

## 2020-12-12 PROCEDURE — 73200 CT UPPER EXTREMITY W/O DYE: CPT | Mod: 26,LT

## 2020-12-12 PROCEDURE — 71260 CT THORAX DX C+: CPT | Mod: 26

## 2020-12-12 PROCEDURE — 76377 3D RENDER W/INTRP POSTPROCES: CPT | Mod: 26

## 2020-12-12 PROCEDURE — 74177 CT ABD & PELVIS W/CONTRAST: CPT | Mod: 26

## 2020-12-12 PROCEDURE — 99291 CRITICAL CARE FIRST HOUR: CPT | Mod: CS

## 2020-12-12 PROCEDURE — 72125 CT NECK SPINE W/O DYE: CPT | Mod: 26

## 2020-12-12 PROCEDURE — 73080 X-RAY EXAM OF ELBOW: CPT | Mod: 26,LT

## 2020-12-12 PROCEDURE — 73030 X-RAY EXAM OF SHOULDER: CPT | Mod: 26,LT

## 2020-12-12 PROCEDURE — 73090 X-RAY EXAM OF FOREARM: CPT | Mod: 26,LT

## 2020-12-12 RX ORDER — ACETAMINOPHEN 500 MG
1000 TABLET ORAL ONCE
Refills: 0 | Status: DISCONTINUED | OUTPATIENT
Start: 2020-12-12 | End: 2020-12-12

## 2020-12-12 NOTE — H&P ADULT - NSHPPHYSICALEXAM_GEN_ALL_CORE
Gen: AAOx3, NAD, mentating appropriately  Neuro: Cranial nerves II-XII grossly intact  HEENT: Atraumatic, normocephalic. Pupils 3mm b/l and reactive  CV: RRR, normal S1/S2, no audible m/r/g  Pulm: Breathing comfortably on RA. Equal chest rise b/l. Lung fields CTAB  Abd: Soft, obese, non-tender, non-distended. No rebound or guarding.  Back/flank: No CVA tenderness. No palpable spinal step-offs or tenderness  Extremities: WWP. Moving all 4 extremities spontaneously. Strength 5/5. Sensation intact. Ecchymosis of the left upper arm and forearm with some swelling compared to right.   Skin: No rashes or suspicious lesions

## 2020-12-12 NOTE — ED ADULT NURSE REASSESSMENT NOTE - NS ED NURSE REASSESS COMMENT FT1
Received report at bedside from DONNA Maki RN and CORI Oreilly RN. Pt at CT scan with trauma team. Pt accepted to SICU. Bedside report provided to SICU RN, assumed care. Refer to trauma flowsheet.

## 2020-12-12 NOTE — ED PROVIDER NOTE - OBJECTIVE STATEMENT
Attending Halie Barnes: 69 y/o female found on the ground by a neighbor. per report last seen by neighbor 2 days ago. per ems found laying on her right side. pt reprotedly on blood thinners. pt denies any falls. Attending Halie Barnes: 71 y/o female found on the ground by a neighbor. per report last seen by neighbor 2 days ago. per ems found laying on her right side. pt reprotedly was on blood thinners. pt denies any falls. does not know how she ended up on the ground. reports pain to left shoulder

## 2020-12-12 NOTE — H&P ADULT - ASSESSMENT
ASSESSMENT:  70F pmhx HTN, COPD, anxiety, R TKR, recent L reverse total shoulder replacement who was BIBEMS as a level 2 trauma after being found down.    PLAN:  To CT for imaging, further plans pending.      Seen and examined with Dr. Asif Mann, PGY-4  ACS Surgery [id number] ASSESSMENT:  70F pmhx HTN, COPD, anxiety, R TKR, recent L reverse total shoulder replacement who was BIBEMS as a level 2 trauma after being found down.    PLAN:  - Admit to ACS team under Dr. Gold  - SICU consult  - F/u official CT reads including CT left hummerus, which preliminarily shows concern for fracture  - Pain control prn  - Ortho input re left arm      Seen and examined with Dr. Asif Mann, PGY-4  ACS Surgery [id number]

## 2020-12-12 NOTE — DISCHARGE NOTE PROVIDER - NSFOLLOWUPCLINICS_GEN_ALL_ED_FT
[facility] Orthopedic Surgery  Orthopedic Surgery  [address], [address]rd & [address]th [place] [address]  Phone: [phone]  Fax:   Follow Up Time: 1 week

## 2020-12-12 NOTE — CHART NOTE - NSCHARTNOTEFT_GEN_A_CORE
70F pmhx HTN, COPD, anxiety, R TKR, recent L reverse total shoulder replacement who was BIBEMS as a level 2 trauma after being found down. When asked why she was on the floor, the patient replied "I was eating." No additional HPI was obtained. Primary survey was intact and secondary survey was notable for left arm ecchymosis. On CT scan, the only finding was left humerus fracture. On exam patient may have had radial nerve injury due to inability to fully flex and extend her thumb and first finger. In the SICU patient was seen by orthopedic surgery, risks and benefits of leaving AMA with left humerus fracture explained to patient. Risks of leaving AMA from the hospital without any care was explained to the patient. Patient understood all risks and benefits

## 2020-12-12 NOTE — DISCHARGE NOTE PROVIDER - NSDCCPCAREPLAN_GEN_ALL_CORE_FT
PRINCIPAL DISCHARGE DIAGNOSIS  Diagnosis: Hypotension  Assessment and Plan of Treatment: Please Follow up with your PMD.   If you feel any symptoms of lightheadedness, SOB, or pass out, Please return to the nearest ED.      SECONDARY DISCHARGE DIAGNOSES  Diagnosis: Fracture, humerus  Assessment and Plan of Treatment: Please follow up with your orthopedic doctor as an outpatient     PRINCIPAL DISCHARGE DIAGNOSIS  Diagnosis: Hypotension  Assessment and Plan of Treatment: Please Follow up with your PMD.   If you feel any symptoms of lightheadedness, SOB, or pass out, Please return to the nearest ED.      SECONDARY DISCHARGE DIAGNOSES  Diagnosis: Abnormal finding on CT scan  Assessment and Plan of Treatment: Please follow up with your parimary acre doctor. There may be finding that can be concerning for possible colon cancer or growth.    Diagnosis: Fracture, humerus  Assessment and Plan of Treatment: Please follow up with your orthopedic doctor as an outpatient

## 2020-12-12 NOTE — CHART NOTE - NSCHARTNOTEFT_GEN_A_CORE
Patient with CT scan of C spine, no acute pathology or fractures. Patient with non tender c spine, and full range of motion without tenderness to palpation. C collar cleared

## 2020-12-12 NOTE — ED ADULT NURSE NOTE - CHIEF COMPLAINT QUOTE
altered mental status, last seen at baseline Wednesday, fall & per EMS believed to be on ground for approximately 48 hours, left arm pain, +hallucinations

## 2020-12-12 NOTE — ED PROVIDER NOTE - PMH
Anxiety    Anxiety    Chronic obstructive pulmonary disease (COPD)    Class 1 obesity with body mass index (BMI) of 34.0 to 34.9 in adult    History of closed shoulder dislocation  left shoulder 2019  HLD (hyperlipidemia)    Hyperlipidemia    Hypertension    Osteoarthritis  R knee and L shoulder  Osteoarthritis of left shoulder    Seasonal allergies

## 2020-12-12 NOTE — DISCHARGE NOTE PROVIDER - NSDCFUADDINST_GEN_ALL_CORE_FT
Wear your splint according to your doctor’s instructions.    Keep the splint dry at all times. Bathe with your splint well out of the water. You can hold the splint outside the tub or shower when bathing. Protect it with a large plastic bag closed at the top end with a rubber band. Use two layers of plastic to help keep the splint dry. Or you can buy a waterproof shield.    If a splint gets wet, dry it with a hair dryer on the “cool” setting. Don’t use the warm or hot setting, because those settings can burn your skin.    Always keep the splint clean and away from dirt.    Wash the Velcro straps and inner cloth sleeve (stockinet) with soapy water and air dry.    Keep your splint away from open flames.    Don’t expose your splint to heat, space heaters, or prolonged sunlight. Excessive heat will cause the splint to change shape.    Don’t cut or tear the splint.  Wear your splint according to your doctor’s instructions.    Keep the splint dry at all times. Bathe with your splint well out of the water. You can hold the splint outside the tub or shower when bathing. Protect it with a large plastic bag closed at the top end with a rubber band. Use two layers of plastic to help keep the splint dry. Or you can buy a waterproof shield.    If a splint gets wet, dry it with a hair dryer on the “cool” setting. Don’t use the warm or hot setting, because those settings can burn your skin.    Always keep the splint clean and away from dirt.    Wash the Velcro straps and inner cloth sleeve (stockinet) with soapy water and air dry.    Keep your splint away from open flames.    Don’t expose your splint to heat, space heaters, or prolonged sunlight. Excessive heat will cause the splint to change shape.    Don’t cut or tear the splint.

## 2020-12-12 NOTE — H&P ADULT - ATTENDING COMMENTS
Patient seen and examined as part of Level 2 Trauma Team Activation response  Patient s/p fall from standing, found on the floor  In trauma bay hypotensive (SBP=60's - 80's), trauma activation upgraded to Level One    Primary Survey  Protecting Airway  Oxygenating well  Hypotensive  GCS=15    Secondary Survey  Negative for traumatic injury except ecchymosis, tenderness, and swelling of LLE    SBP stabilizing with 500cc crystalloids in trauma bay    Taken to CT, where SBP noted to be in 200's    CT-head / CT-c-spine / CT-CAP without traumatic injuries  CT-LLE -> periprosthetic humerus fracture    Patient taken to SICU directly from CT scan by trauma team  Patient HYPERtensive on arrival in SICU.    ??? Given multiple readings of HYPERtension, and no obvious hemorrhage, false BP reading in trauma bay ???  Orthopedics seeing patient in SICU    Patient asking to be discharged.   I offered patient to stay in hospital overnight for observation.  Patient does NOT wish to stay and wants to follow up as outpatient with her Orthopedic Surgeon that she has seen before.  I have discussed with patient -> she should feel free to return to ED if any symptoms worsen.

## 2020-12-12 NOTE — ED PROVIDER NOTE - CARE PLAN
Principal Discharge DX:	Hypotension   Principal Discharge DX:	Hypotension  Secondary Diagnosis:	Fracture, humerus

## 2020-12-12 NOTE — H&P ADULT - NSICDXPASTSURGICALHX_GEN_ALL_CORE_FT
PAST SURGICAL HISTORY:  H/O shoulder replacement L shoulder    H/O total knee replacement R knee    History of right knee joint replacement 2019

## 2020-12-12 NOTE — DISCHARGE NOTE PROVIDER - HOSPITAL COURSE
70F pmhx HTN, COPD, anxiety, R TKR, recent L reverse total shoulder replacement who was BIBEMS as a level 2 trauma after being found down. When asked why she was on the floor, the patient replied "I was eating." No additional HPI was obtained.    On arrival to the trauma bay, primary survey:  Airway patent  Bilateral breath sounds present  Initial SBP in 80s, patient then upgraded to a level 1 trauma    Patient given 500cc bolus crystalloid and repeat SBPs in 120-140s.     Secondary survey notable for extensive ecchymosis & swelling of the left upper extremity, as well as some right groin ecchymosis.  (12 Dec 2020 19:02)    SICU consulted for hemodynamic monitoring. Patient had transient episodes of hypotension in the ED. CT shows humerus fracture, ortho saw patient in the ICU and splinted patient. Will follow with patient on outpatient basis.    Since her admission to the ICU, patient has expressed to AMA 12/12.   The patient is clinically not intoxicated, free from distracting pain, appears to have intact insight, judgment and reason and in my medical opinion has the capacity to make decisions. The patient is also not under any duress to leave the hospital. In this scenario, it would be battery to subject a patient to treatment against his/her will.   I have voiced my concerns for the patient's health given that a full evaluation and treatment had not occurred. I have discussed the need for continued evaluation to determine if their symptoms are caused by a condition that present risk of death or morbidity. Risks including but not limited to death, permanent disability, prolonged hospitalization, prolonged illness, were discussed. I tried offering alternative options in hopes that the patient might be amenable to partial evaluation and treatment which would be medically beneficial to the patient, though the patient declined my options and insisted on leaving.   Because I have been unable to convince the patient to stay, I answered all of their questions about their condition and asked them to return to the ED, especially if their symptoms worsen or do not improve. I emphasized that leaving against medical advice does not preclude returning here for further evaluation.

## 2020-12-12 NOTE — H&P ADULT - NSICDXPASTMEDICALHX_GEN_ALL_CORE_FT
PAST MEDICAL HISTORY:  Anxiety     Anxiety     Chronic obstructive pulmonary disease (COPD)     Class 1 obesity with body mass index (BMI) of 34.0 to 34.9 in adult     History of closed shoulder dislocation left shoulder 2019    HLD (hyperlipidemia)     Hyperlipidemia     Hypertension     Osteoarthritis R knee and L shoulder    Osteoarthritis of left shoulder     Seasonal allergies

## 2020-12-12 NOTE — CHART NOTE - NSCHARTNOTEFT_GEN_A_CORE
Patient states that she would like to leave [place].    The patient is clinically not intoxicated, free from distracting pain, appears to have intact insight, judgment and reason and in my medical opinion has the capacity to make decisions. The patient is also not under any duress to leave the hospital. In this scenario, it would be battery to subject a patient to treatment against his/her will.   I have voiced my concerns for the patient's health given that a full evaluation and treatment had not occurred. I have discussed the need for continued evaluation to determine if their symptoms are caused by a condition that present risk of death or morbidity. Risks including but not limited to death, permanent disability, prolonged hospitalization, prolonged illness, were discussed. I tried offering alternative options in hopes that the patient might be amenable to partial evaluation and treatment which would be medically beneficial to the patient, though the patient declined my options and insisted on leaving.   Because I have been unable to convince the patient to stay, I answered all of their questions about their condition and asked them to return to the ED especially if their symptoms worsen or do not improve. I emphasized that leaving against medical advice does not preclude returning here for further evaluation. I asked the patient to return if they change their mind about the further evaluation and treatment. I strongly encouraged the patient to return to the Emergency Department or any Emergency Department at any time, particularly with worsening symptoms.

## 2020-12-12 NOTE — CONSULT NOTE ADULT - SUBJECTIVE AND OBJECTIVE BOX
70yFemale with PMH of HTN, COPD, anxiety, R TKR, recent L reverse total shoulder replacement on 9/10/20 with Dr. Pham who presented as a level 1 trauma after being found down. When asked why she was on the floor, the patient replied "I was eating." Patient denies head hit or LOC. Patient denies numbness or tingling in the LUE. Patient denies any other injuries.    PMH:  Seasonal allergies    Anxiety    Chronic lower back pain    Osteoarthritis    Chronic obstructive pulmonary disease (COPD)    Hyperlipidemia    Hypertension    Osteoarthritis of left shoulder    Class 1 obesity with body mass index (BMI) of 34.0 to 34.9 in adult    History of closed shoulder dislocation    Osteoarthritis    Anxiety    HLD (hyperlipidemia)    Essential hypertension    No pertinent past medical history      PSH:  H/O total knee replacement    H/O shoulder replacement    History of right knee joint replacement    No significant past surgical history    No significant past surgical history      AH: NKDA    Meds: See med rec    T(C): 36.6 (12-12-20 @ 19:50)  HR: 94 (12-12-20 @ 21:00)  BP: 132/88 (12-12-20 @ 21:00)  RR: 35 (12-12-20 @ 21:00)  SpO2: 93% (12-12-20 @ 21:00)  Wt(kg): --    PE LUE:  Skin intact, swelling and ecchymosis over upper arm and elbow   SILT axillary/med/rad/ulnar  +Motor AIN/Ulnar/Median  unable to abduct thumb, unable to extend wrist   TTP over humerus  +painless elbow/wrist ROM,   shoulder ROM limited 2/2 pain,   2+radial pulse, soft compartments  no pain with passive movement of digits    Secondary:  No TTP over bony landmarks, SILT BL, ROM intact BL, distal pulses palpable.    Imaging:  XR demonstrating left periprosthetic humerus fracture    Procedure:  Patient had a LUE radial nerve palsy on presentation. Well molded plaster coaptation splint was applied. The patient tolerated the procedure well and there we no complications. Post-reduction xrays demonstrated acceptable alignment.     70yFemale PMH of L reverse total shoulder replacement on 9/10/20 with Dr. Pham presents with left periprosthetic humerus fracture s/p found on the ground. Patient has a LUE radial nerve palsy on presentation to ED. Patient is currently on Eliquis.     pain control  NWB LUE in coaptation splint and sling  Splint precautions:  Keep Splint dry  Elevate extremity, can try and ice through the Splint   Do not stick anything into the Splint   Plan for OR this week if patient remains in the hospital     Ortho [id number]

## 2020-12-12 NOTE — ED PROVIDER NOTE - CLINICAL SUMMARY MEDICAL DECISION MAKING FREE TEXT BOX
Attending Halie Barnes: 69 y/o female presenting after fall. upon arrival pt found to have sig ecchymoses to left arm with firmness. while in the trauma bay found to be hypotensive with intermittent response. trauma team called upon arrival. fast performed which was negative. tpt with sig swelling to left arm. unclear how she fell are down time. compartment syndrome is on the differential as well. with hypotension will obtain full trauma imaging, xrays. on review of d/c summary was on eloquis but pt states not on it curently. seen by trauma. will place in the sicu

## 2020-12-12 NOTE — ED PROVIDER NOTE - ATTENDING CONTRIBUTION TO CARE
Attending MD Halie Barnes:  I personally have seen and examined this patient.  Resident note reviewed and agree on plan of care and except where noted.  See HPI, PE, and MDM for details.

## 2020-12-12 NOTE — ED ADULT NURSE REASSESSMENT NOTE - NS ED NURSE REASSESS COMMENT FT1
Pt difficult stick. Multiple RN's attempted IV placement, unsuccessful. U/S guided IV placed by Dr. Shell at bedside. LUE not able to be accessed due to injury.

## 2020-12-12 NOTE — CONSULT NOTE ADULT - ASSESSMENT
70F pmhx HTN, COPD, anxiety, R TKR, recent L reverse total shoulder replacement who was BIBEMS as a level 2 trauma after being found down. When asked why she was on the floor, the patient replied "I was eating."  SICU consulted for hemodynamic monitoring. Patient had transient episodes of hypotension in the ED. CT scan pending official reads.     Neuro:  - AAOx4, verbalizing well  - on IV Tylenol  - no acute pain, will reassess  - on cervical collar, pending CT scans    CV:  #hypotensive episode  #Hx of HTN  - concerns for possible hemorrhage  - SBP 200s after entry to the SICU  - continue to monitor BP  - monitor for signs of active bleed    Resp:  #hx of COPD  - no active issues  - breathing well on RA  - monitor pulse ox    GI:  - no active issues  - screen for dysphagia   - regular diet    ID  - no active issues    Heme:  - monitor for signs of hemorrhage     Endo:  - no active issues  - check bmp labs AM    DVT:  - SCDs   - hold DVT prophylaxis for 12/12 PM    Dispo:  - SICU

## 2020-12-12 NOTE — CONSULT NOTE ADULT - SUBJECTIVE AND OBJECTIVE BOX
SICU Consultation Note  =====================================================  70y Female  HPI:  70F pmhx HTN, COPD, anxiety, R TKR, recent L reverse total shoulder replacement who was BIBEMS as a level 2 trauma after being found down. When asked why she was on the floor, the patient replied "I was eating." No additional HPI was obtained.    On arrival to the trauma bay, primary survey:  Airway patent  Bilateral breath sounds present  Initial SBP in 80s, patient then upgraded to a level 1 trauma    Patient given 500cc bolus crystalloid and repeat SBPs in 120-140s.     Secondary survey notable for extensive ecchymosis & swelling of the left upper extremity, as well as some right groin ecchymosis.  (12 Dec 2020 19:02)    SICU consulted for hemodynamic monitoring. Patient had transient episodes of hypotension in the ED. CT scan pending official reads.       PAST MEDICAL & SURGICAL HISTORY:  Seasonal allergies    Anxiety    Osteoarthritis  R knee and L shoulder    Chronic obstructive pulmonary disease (COPD)    Hyperlipidemia    Hypertension    Osteoarthritis of left shoulder    Class 1 obesity with body mass index (BMI) of 34.0 to 34.9 in adult    History of closed shoulder dislocation  left shoulder 2019    Anxiety    HLD (hyperlipidemia)    H/O total knee replacement  R knee    H/O shoulder replacement  L shoulder    History of right knee joint replacement  2019      Home Meds: Home Medications:    Allergies: Allergies    Allergy Status Unknown    Intolerances    NSAIDs (Other)    Soc:   Advanced Directives: Presumed Full Code     ROS:    REVIEW OF SYSTEMS    [X] A ten-point review of systems was otherwise negative except as noted.  [ ] Due to altered mental status/intubation, subjective information were not able to be obtained from the patient. History was obtained, to the extent possible, from review of the chart and collateral sources of information.      CURRENT MEDICATIONS:   --------------------------------------------------------------------------------------  Neurologic Medications    Respiratory Medications    Cardiovascular Medications    Gastrointestinal Medications    Genitourinary Medications    Hematologic/Oncologic Medications    Antimicrobial/Immunologic Medications    Endocrine/Metabolic Medications    Topical/Other Medications    --------------------------------------------------------------------------------------    VITAL SIGNS, INS/OUTS (last 24 hours):  --------------------------------------------------------------------------------------  ICU Vital Signs Last 24 Hrs  T(C): 36.8 (12 Dec 2020 17:52), Max: 36.8 (12 Dec 2020 17:52)  T(F): 98.3 (12 Dec 2020 17:52), Max: 98.3 (12 Dec 2020 17:52)  HR: 103 (12 Dec 2020 19:25) (96 - 104)  BP: 220/76 (12 Dec 2020 19:25) (87/62 - 220/76)  BP(mean): --  ABP: --  ABP(mean): --  RR: 28 (12 Dec 2020 19:25) (18 - 28)  SpO2: 100% (12 Dec 2020 19:05) (97% - 100%)    I&O's Summary    --------------------------------------------------------------------------------------    EXAM:  General/Neuro  Exam: Normal, NAD, alert, oriented x 3, no focal deficits. PERRLA   in cervical collar    Respiratory  Exam: Lungs clear to auscultation, Normal expansion/effort.       Cardiovascular  Exam: S1, S2.  Regular rate and rhythm.  Peripheral edema    Cardiac Rhythm: Normal Sinus Rhythm   latest read 12/12    GI  Exam: Abdomen soft, Non-tender, Non-distended.       Tubes/Lines/Drains  ***  [x] Peripheral IV  [] Central Venous Line     	[] R	[] L	[] IJ	[] Fem	[] SC        Type:	    Date Placed:   [] Arterial Line		[] R	[] L	[] Fem	[] Rad	[] Ax	Date Placed:   [] PICC:         	[] Midline		[] Mediport           [] Urinary Catheter		Date Placed:     Extremities  Exam: Extremities warm, pink, well-perfused.  - extensive ecchymosis & swelling of the left upper extremity, as well as some right groin ecchymosis.     Derm:  Exam: Good skin turgor, no skin breakdown.          LABS  --------------------------------------------------------------------------------------                        13.2   9.50  )-----------( 235      ( 12 Dec 2020 18:35 )             43.0   12-12    143  |  103  |  14  ----------------------------<  122<H>  4.2   |  21<L>  |  0.98    Ca    9.4      12 Dec 2020 18:35    TPro  7.3  /  Alb  4.0  /  TBili  1.0  /  DBili  x   /  AST  44<H>  /  ALT  27  /  AlkPhos  96  12-12    --------------------------------------------------------------------------------------   SICU Consultation Note  =====================================================  70y Female  HPI:  70F pmhx HTN, COPD, anxiety, R TKR, recent L reverse total shoulder replacement who was BIBEMS as a level 2 trauma after being found down. When asked why she was on the floor, the patient replied "I was eating." No additional HPI was obtained.    On arrival to the trauma bay, primary survey:  Airway patent  Bilateral breath sounds present  Initial SBP in 80s, patient then upgraded to a level 1 trauma    Patient given 500cc bolus crystalloid and repeat SBPs in 120-140s.     Secondary survey notable for extensive ecchymosis & swelling of the left upper extremity, as well as some right groin ecchymosis.  (12 Dec 2020 19:02)    SICU consulted for hemodynamic monitoring. Patient had transient episodes of hypotension in the ED. CT scan pending official reads.       PAST MEDICAL & SURGICAL HISTORY:  Seasonal allergies    Anxiety    Osteoarthritis  R knee and L shoulder    Chronic obstructive pulmonary disease (COPD)    Hyperlipidemia    Hypertension    Osteoarthritis of left shoulder    Class 1 obesity with body mass index (BMI) of 34.0 to 34.9 in adult    History of closed shoulder dislocation  left shoulder 2019    Anxiety    HLD (hyperlipidemia)    H/O total knee replacement  R knee    H/O shoulder replacement  L shoulder    History of right knee joint replacement  2019      Home Meds: Home Medications:    Allergies: Allergies    Allergy Status Unknown    Intolerances    NSAIDs (Other)    Soc:   Advanced Directives: Presumed Full Code     ROS:    REVIEW OF SYSTEMS    [X] A ten-point review of systems was otherwise negative except as noted.  [ ] Due to altered mental status/intubation, subjective information were not able to be obtained from the patient. History was obtained, to the extent possible, from review of the chart and collateral sources of information.      CURRENT MEDICATIONS:   --------------------------------------------------------------------------------------  Neurologic Medications    Respiratory Medications    Cardiovascular Medications    Gastrointestinal Medications    Genitourinary Medications    Hematologic/Oncologic Medications    Antimicrobial/Immunologic Medications    Endocrine/Metabolic Medications    Topical/Other Medications    --------------------------------------------------------------------------------------    VITAL SIGNS, INS/OUTS (last 24 hours):  --------------------------------------------------------------------------------------  ICU Vital Signs Last 24 Hrs  T(C): 36.8 (12 Dec 2020 17:52), Max: 36.8 (12 Dec 2020 17:52)  T(F): 98.3 (12 Dec 2020 17:52), Max: 98.3 (12 Dec 2020 17:52)  HR: 103 (12 Dec 2020 19:25) (96 - 104)  BP: 220/76 (12 Dec 2020 19:25) (87/62 - 220/76)  BP(mean): --  ABP: --  ABP(mean): --  RR: 28 (12 Dec 2020 19:25) (18 - 28)  SpO2: 100% (12 Dec 2020 19:05) (97% - 100%)    I&O's Summary    --------------------------------------------------------------------------------------    EXAM:  General/Neuro  Exam: Normal, NAD, alert, oriented x 3, no focal deficits. PERRLA   in cervical collar    Respiratory  Exam: Lungs clear to auscultation, Normal expansion/effort.       Cardiovascular  Exam: S1, S2.  Regular rate and rhythm.  Peripheral edema    Cardiac Rhythm: Normal Sinus Rhythm   latest read 12/12    GI  Exam: Abdomen soft, Non-tender, Non-distended.       Tubes/Lines/Drains  ***  [x] Peripheral IV  [] Central Venous Line     	[] R	[] L	[] IJ	[] Fem	[] SC        Type:	    Date Placed:   [] Arterial Line		[] R	[] L	[] Fem	[] Rad	[] Ax	Date Placed:   [] PICC:         	[] Midline		[] Mediport           [] Urinary Catheter		Date Placed:     Extremities  Exam: Extremities warm, pink, well-perfused.  - extensive ecchymosis & swelling of the left upper extremity, as well as some right groin ecchymosis.   - residual L sided radial nerve palsy and L TIN palsy    Derm:  Exam: Good skin turgor, no skin breakdown.          LABS  --------------------------------------------------------------------------------------                        13.2   9.50  )-----------( 235      ( 12 Dec 2020 18:35 )             43.0   12-12    143  |  103  |  14  ----------------------------<  122<H>  4.2   |  21<L>  |  0.98    Ca    9.4      12 Dec 2020 18:35    TPro  7.3  /  Alb  4.0  /  TBili  1.0  /  DBili  x   /  AST  44<H>  /  ALT  27  /  AlkPhos  96  12-12    --------------------------------------------------------------------------------------

## 2020-12-12 NOTE — DISCHARGE NOTE PROVIDER - CARE PROVIDER_API CALL
Farhad Pham  ORTHOPAEDIC SURGERY  [address], [address]  [place] [address]  Phone: [phone]  Fax: [phone]  Follow Up Time: 1 week

## 2020-12-12 NOTE — H&P ADULT - NSHPLABSRESULTS_GEN_ALL_CORE
CBC (12-12 @ 18:35)                              13.2                           9.50    )----------------(  235        77.3<H>% Neutrophils, 14.2  % Lymphocytes, ANC: 7.34                                43.0      BMP (12-12 @ 18:35)             143     |  103     |  14    		Ca++ --      Ca 9.4                ---------------------------------( 122<H>		Mg --                 4.2     |  21<L>   |  0.98  			Ph --        LFTs (12-12 @ 18:35)      TPro 7.3 / Alb 4.0 / TBili 1.0 / DBili -- / AST 44<H> / ALT 27 / AlkPhos 96      Cardiac Markers (12-12 @ 18:35)     HSTrop: -- / CKMB: -- / CK: 2895      VBG (12-12 @ 18:35)     7.32<L> / 51<H> / 31 / 25 / -0.8 / 44<L>%     Lactate: 3.0<H>      < from: CT Abdomen and Pelvis w/ IV Cont (12.12.20 @ 19:28) >    IMPRESSION:  Partially visualized acute comminuted fracture of the left humerus. Correlate with dedicated CT scan.  Old bilateral rib fractures and old L1 vertebral body compression fracture.    No evidence of acute traumatic abnormality in the chest, abdomen, or pelvis.  Fatty infiltration of the liver.    Small bubbles of gas in the superficial right inguinal soft tissues. Correlate for recent instrumentation.    < end of copied text >    < from: CT Cervical Spine No Cont (12.12.20 @ 19:27) >    IMPRESSION: No acute fractures or dislocations seen.    < end of copied text >

## 2020-12-12 NOTE — H&P ADULT - HISTORY OF PRESENT ILLNESS
70F pmhx HTN, COPD, anxiety, R TKR, recent L reverse total shoulder replacement who was BIBEMS as a level 2 trauma after being found down. When asked why she was on the floor, the patient replied "I was eating." No additional HPI was obtained.    On arrival to the trauma bay, primary survey:  Airway patent  Bilateral breath sounds present  Initial SBP in 80s, patient then upgraded to a level 1 trauma    Patient given 500cc bolus crystalloid and repeat SBPs in 120-140s.     Secondary survey notable for extensive ecchymosis & swelling of the left upper extremity, as well as some right groin ecchymosis.

## 2020-12-13 ENCOUNTER — INPATIENT (INPATIENT)
Facility: HOSPITAL | Age: 70
LOS: 10 days | Discharge: NURSING, REHAB OR LONG-TERM CARE | DRG: 492 | End: 2020-12-24
Attending: STUDENT IN AN ORGANIZED HEALTH CARE EDUCATION/TRAINING PROGRAM | Admitting: STUDENT IN AN ORGANIZED HEALTH CARE EDUCATION/TRAINING PROGRAM
Payer: MEDICARE

## 2020-12-13 VITALS
DIASTOLIC BLOOD PRESSURE: 96 MMHG | TEMPERATURE: 98 F | HEIGHT: 64 IN | OXYGEN SATURATION: 100 % | HEART RATE: 64 BPM | RESPIRATION RATE: 17 BRPM | SYSTOLIC BLOOD PRESSURE: 101 MMHG | WEIGHT: 182.98 LBS

## 2020-12-13 DIAGNOSIS — R09.89 OTHER SPECIFIED SYMPTOMS AND SIGNS INVOLVING THE CIRCULATORY AND RESPIRATORY SYSTEMS: ICD-10-CM

## 2020-12-13 DIAGNOSIS — S42.309A UNSPECIFIED FRACTURE OF SHAFT OF HUMERUS, UNSPECIFIED ARM, INITIAL ENCOUNTER FOR CLOSED FRACTURE: ICD-10-CM

## 2020-12-13 DIAGNOSIS — Z96.651 PRESENCE OF RIGHT ARTIFICIAL KNEE JOINT: Chronic | ICD-10-CM

## 2020-12-13 DIAGNOSIS — R57.9 SHOCK, UNSPECIFIED: ICD-10-CM

## 2020-12-13 DIAGNOSIS — S42.295K: ICD-10-CM

## 2020-12-13 DIAGNOSIS — R45.1 RESTLESSNESS AND AGITATION: ICD-10-CM

## 2020-12-13 DIAGNOSIS — Z96.659 PRESENCE OF UNSPECIFIED ARTIFICIAL KNEE JOINT: Chronic | ICD-10-CM

## 2020-12-13 DIAGNOSIS — N17.9 ACUTE KIDNEY FAILURE, UNSPECIFIED: ICD-10-CM

## 2020-12-13 DIAGNOSIS — Z96.619 PRESENCE OF UNSPECIFIED ARTIFICIAL SHOULDER JOINT: Chronic | ICD-10-CM

## 2020-12-13 DIAGNOSIS — R55 SYNCOPE AND COLLAPSE: ICD-10-CM

## 2020-12-13 DIAGNOSIS — T79.6XXA TRAUMATIC ISCHEMIA OF MUSCLE, INITIAL ENCOUNTER: ICD-10-CM

## 2020-12-13 LAB
ALBUMIN SERPL ELPH-MCNC: 3.6 G/DL — SIGNIFICANT CHANGE UP (ref 3.3–5)
ALP SERPL-CCNC: 78 U/L — SIGNIFICANT CHANGE UP (ref 40–120)
ALT FLD-CCNC: 22 U/L — SIGNIFICANT CHANGE UP (ref 10–45)
ANION GAP SERPL CALC-SCNC: 14 MMOL/L — SIGNIFICANT CHANGE UP (ref 5–17)
APPEARANCE UR: ABNORMAL
APTT BLD: 23.1 SEC — LOW (ref 27.5–35.5)
AST SERPL-CCNC: 32 U/L — SIGNIFICANT CHANGE UP (ref 10–40)
BACTERIA # UR AUTO: ABNORMAL
BASOPHILS # BLD AUTO: 0.04 K/UL — SIGNIFICANT CHANGE UP (ref 0–0.2)
BASOPHILS # BLD AUTO: 0.04 K/UL — SIGNIFICANT CHANGE UP (ref 0–0.2)
BASOPHILS NFR BLD AUTO: 0.4 % — SIGNIFICANT CHANGE UP (ref 0–2)
BASOPHILS NFR BLD AUTO: 0.4 % — SIGNIFICANT CHANGE UP (ref 0–2)
BILIRUB SERPL-MCNC: 0.8 MG/DL — SIGNIFICANT CHANGE UP (ref 0.2–1.2)
BILIRUB UR-MCNC: NEGATIVE — SIGNIFICANT CHANGE UP
BLD GP AB SCN SERPL QL: NEGATIVE — SIGNIFICANT CHANGE UP
BUN SERPL-MCNC: 16 MG/DL — SIGNIFICANT CHANGE UP (ref 7–23)
CALCIUM SERPL-MCNC: 8.7 MG/DL — SIGNIFICANT CHANGE UP (ref 8.4–10.5)
CHLORIDE SERPL-SCNC: 106 MMOL/L — SIGNIFICANT CHANGE UP (ref 96–108)
CK MB CFR SERPL CALC: 7.5 NG/ML — HIGH (ref 0–3.8)
CK SERPL-CCNC: 2223 U/L — HIGH (ref 25–170)
CO2 SERPL-SCNC: 21 MMOL/L — LOW (ref 22–31)
COLOR SPEC: YELLOW — SIGNIFICANT CHANGE UP
CREAT SERPL-MCNC: 1.18 MG/DL — SIGNIFICANT CHANGE UP (ref 0.5–1.3)
DIFF PNL FLD: SIGNIFICANT CHANGE UP
EOSINOPHIL # BLD AUTO: 0.09 K/UL — SIGNIFICANT CHANGE UP (ref 0–0.5)
EOSINOPHIL # BLD AUTO: 0.13 K/UL — SIGNIFICANT CHANGE UP (ref 0–0.5)
EOSINOPHIL NFR BLD AUTO: 1 % — SIGNIFICANT CHANGE UP (ref 0–6)
EOSINOPHIL NFR BLD AUTO: 1.4 % — SIGNIFICANT CHANGE UP (ref 0–6)
EPI CELLS # UR: 2 /HPF — SIGNIFICANT CHANGE UP (ref 0–5)
GAS PNL BLDV: SIGNIFICANT CHANGE UP
GLUCOSE SERPL-MCNC: 102 MG/DL — HIGH (ref 70–99)
GLUCOSE UR QL: NEGATIVE — SIGNIFICANT CHANGE UP
HCT VFR BLD CALC: 36.9 % — SIGNIFICANT CHANGE UP (ref 34.5–45)
HCT VFR BLD CALC: 38.7 % — SIGNIFICANT CHANGE UP (ref 34.5–45)
HGB BLD-MCNC: 11.5 G/DL — SIGNIFICANT CHANGE UP (ref 11.5–15.5)
HGB BLD-MCNC: 11.6 G/DL — SIGNIFICANT CHANGE UP (ref 11.5–15.5)
HYALINE CASTS # UR AUTO: 0 /LPF — SIGNIFICANT CHANGE UP (ref 0–7)
IMM GRANULOCYTES NFR BLD AUTO: 0.3 % — SIGNIFICANT CHANGE UP (ref 0–1.5)
IMM GRANULOCYTES NFR BLD AUTO: 0.7 % — SIGNIFICANT CHANGE UP (ref 0–1.5)
INR BLD: 1.19 RATIO — HIGH (ref 0.88–1.16)
KETONES UR-MCNC: ABNORMAL
LEUKOCYTE ESTERASE UR-ACNC: ABNORMAL
LYMPHOCYTES # BLD AUTO: 2.13 K/UL — SIGNIFICANT CHANGE UP (ref 1–3.3)
LYMPHOCYTES # BLD AUTO: 2.62 K/UL — SIGNIFICANT CHANGE UP (ref 1–3.3)
LYMPHOCYTES # BLD AUTO: 22.7 % — SIGNIFICANT CHANGE UP (ref 13–44)
LYMPHOCYTES # BLD AUTO: 28.9 % — SIGNIFICANT CHANGE UP (ref 13–44)
MAGNESIUM SERPL-MCNC: 1.9 MG/DL — SIGNIFICANT CHANGE UP (ref 1.6–2.6)
MCHC RBC-ENTMCNC: 27.8 PG — SIGNIFICANT CHANGE UP (ref 27–34)
MCHC RBC-ENTMCNC: 28.8 PG — SIGNIFICANT CHANGE UP (ref 27–34)
MCHC RBC-ENTMCNC: 30 GM/DL — LOW (ref 32–36)
MCHC RBC-ENTMCNC: 31.2 GM/DL — LOW (ref 32–36)
MCV RBC AUTO: 92.5 FL — SIGNIFICANT CHANGE UP (ref 80–100)
MCV RBC AUTO: 92.8 FL — SIGNIFICANT CHANGE UP (ref 80–100)
MONOCYTES # BLD AUTO: 0.88 K/UL — SIGNIFICANT CHANGE UP (ref 0–0.9)
MONOCYTES # BLD AUTO: 0.93 K/UL — HIGH (ref 0–0.9)
MONOCYTES NFR BLD AUTO: 10.3 % — SIGNIFICANT CHANGE UP (ref 2–14)
MONOCYTES NFR BLD AUTO: 9.4 % — SIGNIFICANT CHANGE UP (ref 2–14)
NEUTROPHILS # BLD AUTO: 5.32 K/UL — SIGNIFICANT CHANGE UP (ref 1.8–7.4)
NEUTROPHILS # BLD AUTO: 6.18 K/UL — SIGNIFICANT CHANGE UP (ref 1.8–7.4)
NEUTROPHILS NFR BLD AUTO: 58.7 % — SIGNIFICANT CHANGE UP (ref 43–77)
NEUTROPHILS NFR BLD AUTO: 65.8 % — SIGNIFICANT CHANGE UP (ref 43–77)
NITRITE UR-MCNC: POSITIVE
NRBC # BLD: 0 /100 WBCS — SIGNIFICANT CHANGE UP (ref 0–0)
NRBC # BLD: 0 /100 WBCS — SIGNIFICANT CHANGE UP (ref 0–0)
PH UR: 6 — SIGNIFICANT CHANGE UP (ref 5–8)
PHOSPHATE SERPL-MCNC: 4.2 MG/DL — SIGNIFICANT CHANGE UP (ref 2.5–4.5)
PLATELET # BLD AUTO: 237 K/UL — SIGNIFICANT CHANGE UP (ref 150–400)
PLATELET # BLD AUTO: 238 K/UL — SIGNIFICANT CHANGE UP (ref 150–400)
POTASSIUM SERPL-MCNC: 3.7 MMOL/L — SIGNIFICANT CHANGE UP (ref 3.5–5.3)
POTASSIUM SERPL-SCNC: 3.7 MMOL/L — SIGNIFICANT CHANGE UP (ref 3.5–5.3)
PROT SERPL-MCNC: 6.4 G/DL — SIGNIFICANT CHANGE UP (ref 6–8.3)
PROT UR-MCNC: ABNORMAL
PROTHROM AB SERPL-ACNC: 14.2 SEC — HIGH (ref 10.6–13.6)
RBC # BLD: 3.99 M/UL — SIGNIFICANT CHANGE UP (ref 3.8–5.2)
RBC # BLD: 4.17 M/UL — SIGNIFICANT CHANGE UP (ref 3.8–5.2)
RBC # FLD: 15.5 % — HIGH (ref 10.3–14.5)
RBC # FLD: 15.6 % — HIGH (ref 10.3–14.5)
RBC CASTS # UR COMP ASSIST: 4 /HPF — SIGNIFICANT CHANGE UP (ref 0–4)
RH IG SCN BLD-IMP: POSITIVE — SIGNIFICANT CHANGE UP
SARS-COV-2 IGG SERPL QL IA: NEGATIVE — SIGNIFICANT CHANGE UP
SARS-COV-2 IGM SERPL IA-ACNC: 0.06 INDEX — SIGNIFICANT CHANGE UP
SODIUM SERPL-SCNC: 141 MMOL/L — SIGNIFICANT CHANGE UP (ref 135–145)
SP GR SPEC: >1.05 (ref 1.01–1.02)
TROPONIN T, HIGH SENSITIVITY RESULT: 39 NG/L — SIGNIFICANT CHANGE UP (ref 0–51)
TROPONIN T, HIGH SENSITIVITY RESULT: 44 NG/L — SIGNIFICANT CHANGE UP (ref 0–51)
TSH SERPL-MCNC: 0.73 UIU/ML — SIGNIFICANT CHANGE UP (ref 0.27–4.2)
UROBILINOGEN FLD QL: SIGNIFICANT CHANGE UP
WBC # BLD: 9.07 K/UL — SIGNIFICANT CHANGE UP (ref 3.8–10.5)
WBC # BLD: 9.39 K/UL — SIGNIFICANT CHANGE UP (ref 3.8–10.5)
WBC # FLD AUTO: 9.07 K/UL — SIGNIFICANT CHANGE UP (ref 3.8–10.5)
WBC # FLD AUTO: 9.39 K/UL — SIGNIFICANT CHANGE UP (ref 3.8–10.5)
WBC UR QL: 103 /HPF — HIGH (ref 0–5)

## 2020-12-13 PROCEDURE — 71045 X-RAY EXAM CHEST 1 VIEW: CPT | Mod: 26

## 2020-12-13 PROCEDURE — 99231 SBSQ HOSP IP/OBS SF/LOW 25: CPT

## 2020-12-13 PROCEDURE — 99285 EMERGENCY DEPT VISIT HI MDM: CPT | Mod: GC

## 2020-12-13 PROCEDURE — 99291 CRITICAL CARE FIRST HOUR: CPT

## 2020-12-13 RX ORDER — INFLUENZA VIRUS VACCINE 15; 15; 15; 15 UG/.5ML; UG/.5ML; UG/.5ML; UG/.5ML
0.5 SUSPENSION INTRAMUSCULAR ONCE
Refills: 0 | Status: DISCONTINUED | OUTPATIENT
Start: 2020-12-13 | End: 2020-12-24

## 2020-12-13 RX ORDER — LORATADINE 10 MG/1
0.5 TABLET ORAL
Refills: 0 | Status: DISCONTINUED | OUTPATIENT
Start: 2020-12-13 | End: 2020-12-14

## 2020-12-13 RX ORDER — MIDODRINE HYDROCHLORIDE 2.5 MG/1
10 TABLET ORAL ONCE
Refills: 0 | Status: COMPLETED | OUTPATIENT
Start: 2020-12-13 | End: 2020-12-13

## 2020-12-13 RX ORDER — QUETIAPINE FUMARATE 200 MG/1
25 TABLET, FILM COATED ORAL THREE TIMES A DAY
Refills: 0 | Status: DISCONTINUED | OUTPATIENT
Start: 2020-12-13 | End: 2020-12-14

## 2020-12-13 RX ORDER — SODIUM CHLORIDE 9 MG/ML
1000 INJECTION INTRAMUSCULAR; INTRAVENOUS; SUBCUTANEOUS
Refills: 0 | Status: DISCONTINUED | OUTPATIENT
Start: 2020-12-13 | End: 2020-12-14

## 2020-12-13 RX ORDER — HYDROCORTISONE 20 MG
100 TABLET ORAL ONCE
Refills: 0 | Status: COMPLETED | OUTPATIENT
Start: 2020-12-13 | End: 2020-12-13

## 2020-12-13 RX ORDER — ATORVASTATIN CALCIUM 80 MG/1
40 TABLET, FILM COATED ORAL AT BEDTIME
Refills: 0 | Status: DISCONTINUED | OUTPATIENT
Start: 2020-12-13 | End: 2020-12-17

## 2020-12-13 RX ORDER — ACETAMINOPHEN 500 MG
1000 TABLET ORAL ONCE
Refills: 0 | Status: COMPLETED | OUTPATIENT
Start: 2020-12-13 | End: 2020-12-13

## 2020-12-13 RX ORDER — NYSTATIN CREAM 100000 [USP'U]/G
1 CREAM TOPICAL
Refills: 0 | Status: DISCONTINUED | OUTPATIENT
Start: 2020-12-13 | End: 2020-12-17

## 2020-12-13 RX ORDER — SODIUM CHLORIDE 9 MG/ML
2000 INJECTION INTRAMUSCULAR; INTRAVENOUS; SUBCUTANEOUS ONCE
Refills: 0 | Status: COMPLETED | OUTPATIENT
Start: 2020-12-13 | End: 2020-12-13

## 2020-12-13 RX ORDER — SODIUM CHLORIDE 9 MG/ML
1000 INJECTION INTRAMUSCULAR; INTRAVENOUS; SUBCUTANEOUS ONCE
Refills: 0 | Status: COMPLETED | OUTPATIENT
Start: 2020-12-13 | End: 2020-12-13

## 2020-12-13 RX ORDER — IPRATROPIUM/ALBUTEROL SULFATE 18-103MCG
3 AEROSOL WITH ADAPTER (GRAM) INHALATION ONCE
Refills: 0 | Status: COMPLETED | OUTPATIENT
Start: 2020-12-13 | End: 2020-12-13

## 2020-12-13 RX ADMIN — SODIUM CHLORIDE 4000 MILLILITER(S): 9 INJECTION INTRAMUSCULAR; INTRAVENOUS; SUBCUTANEOUS at 04:42

## 2020-12-13 RX ADMIN — QUETIAPINE FUMARATE 25 MILLIGRAM(S): 200 TABLET, FILM COATED ORAL at 21:55

## 2020-12-13 RX ADMIN — ATORVASTATIN CALCIUM 40 MILLIGRAM(S): 80 TABLET, FILM COATED ORAL at 21:55

## 2020-12-13 RX ADMIN — NYSTATIN CREAM 1 APPLICATION(S): 100000 CREAM TOPICAL at 17:55

## 2020-12-13 RX ADMIN — QUETIAPINE FUMARATE 25 MILLIGRAM(S): 200 TABLET, FILM COATED ORAL at 09:29

## 2020-12-13 RX ADMIN — MIDODRINE HYDROCHLORIDE 10 MILLIGRAM(S): 2.5 TABLET ORAL at 09:30

## 2020-12-13 RX ADMIN — Medication 400 MILLIGRAM(S): at 23:36

## 2020-12-13 RX ADMIN — Medication 3 MILLILITER(S): at 12:25

## 2020-12-13 RX ADMIN — LORATADINE 0.5 MILLIGRAM(S): 10 TABLET ORAL at 23:29

## 2020-12-13 RX ADMIN — Medication 1000 MILLIGRAM(S): at 23:50

## 2020-12-13 RX ADMIN — SODIUM CHLORIDE 100 MILLILITER(S): 9 INJECTION INTRAMUSCULAR; INTRAVENOUS; SUBCUTANEOUS at 11:36

## 2020-12-13 RX ADMIN — SODIUM CHLORIDE 4000 MILLILITER(S): 9 INJECTION INTRAMUSCULAR; INTRAVENOUS; SUBCUTANEOUS at 07:45

## 2020-12-13 NOTE — PROGRESS NOTE ADULT - ASSESSMENT
A/p: 70yFemale s/p L humerus periprosthetic fracture w/ Hx L Rev TSR.  ?Hypotension, patient otherwise asymptomatic on visit and other vitals stable. Patient was hypertensive in SICU.   SARAI. NAD.

## 2020-12-13 NOTE — H&P ADULT - PROBLEM SELECTOR PLAN 5
- pt currently calm  - cont seroquel 25 TID for now - pt currently calm  - cont seroquel 25 TID prn for now - tele  - trend CE  - TTE  - PT eval  - unclear why pt is/was on eliquis, need to clarify this medication

## 2020-12-13 NOTE — H&P ADULT - HISTORY OF PRESENT ILLNESS
70F pmhx HTN, obesity, COPD, anxiety, agitation, R TKR, recent L reverse total shoulder replacement (@ OSH), who was BIBEMS yesterday as a level 2 trauma after being found down, admitted to SICU with left humerus periprosthetic comminuted fracture, c/b rhabdo and left radial nerve palsy, left AMA to [facility] parking lot by wheelchair, where she decided she wanted to be admitted again to hospital for poss fracture repair.    Pt reportedly found by neighbor on floor, and reportedly on floor approximately 48 hours.. When asked why she was on the floor, the patient replied "I was eating." Pt denies falling, but has no memory of how she got her fracture and no memory of the past few days. See prior H&P and notes for detailed course. Per ED, pt now cannot walk and needs a walker and needs hospitalization of debility and poss placement/assistance. At baseline, pt states she is very active, walks 3 miles, can walk up a flight of stairs without problem, plays golf weekly. Pt denies any symptoms at all, including pain in left shoulder.   Review of cahrt shows pt having episodes of intermittent hypotension including requiring pressors during prior hospitalization. Yesterday, pt initially hypotensive to SBP 87, which improved to SBP 200s after 500 cc IVF. Pt denies being on any blood thinners, but meds from other sources shows eliquis filled in september '20.  Pt reported having hallucinations in the ED, which pt currently denies. Chart review shows history of intermittent agitation and confusion.    Tm 98.2, P 64, /96, R 18, % RA  Left shoulder splinted in ED by ortho.

## 2020-12-13 NOTE — ED PROVIDER NOTE - PROGRESS NOTE DETAILS
Resident Jaymie: Ortho re-consulted, recommends medicine admission as patient is refusing operative management here in hospital. Pt cannot safely go home, as patient is walker dependent, and has the L Humeral Fracture. Will Admit to medicine. Resident Jaymie: Gait is stable in ED, however patient is now requesting possible surgery for her arm. Ortho at bedside, recommends medicine admission until pt definitively consents to surgery as she does have an active nerve palsy on exam. Will repeat Trop and CK which were not trended since initial admit, and re-evaluate. Resident Jaymie: pt is now expressing active visual hallucinations; collateral attempted to be obtained from [facility] [place] (no answer x 2). Pt is prescribed Alprazolam and Seroquel and a history of psych related agitation and combativeness. Will obtain UA, UCx, repeat CBC, CMP, Trop, CK for further eval in patient.

## 2020-12-13 NOTE — H&P ADULT - PROBLEM SELECTOR PLAN 4
- tele  - trend CE  - TTE  - PT pepperal - tele  - trend CE  - TTE  - PT eval  - unclear why pt is/was on eliquis, need to clarify this medication - pt has not urinated while in the hospital, and bladder contains 94 cc despite 3L IVF. concern for worsening renal failure.  - will hold off CTA chest for now, but may need VQ scan

## 2020-12-13 NOTE — ED PROVIDER NOTE - CARE PLAN
Principal Discharge DX:	Humeral fracture   Principal Discharge DX:	Humeral fracture  Goal:	Left proximal shaft

## 2020-12-13 NOTE — ED PROVIDER NOTE - PHYSICAL EXAMINATION
GEN - NAD; well appearing; A+Ox3   HEAD - NC/AT, No visible Ecchymosis, No Abrasions, No Lacerations/Skin Tears     EYES - EOMI, no conjunctival pallor, no scleral icterus  ENT -   mucous membranes  moist , no discharge      NECK - Neck supple, No LAD, No Swelling  PULM - CTA B/L,  symmetric breath sounds  COR -  RRR, S1 S2, no murmurs  ABD - NT/ND, soft, no guarding, no rebound, no masses    BACK - no CVA tenderness  EXTREMS - 2+ Pulses B/L UE;  LUE with Shoulder Splint in Place, Evident is a Radial Nerve Palsy on LUE;   NEUROLOGIC - alert, AO x 3; moving all 4 ext

## 2020-12-13 NOTE — ED ADULT NURSE NOTE - NSIMPLEMENTINTERV_GEN_ALL_ED
Implemented All Universal Safety Interventions:  [place] to call system. Call bell, personal items and telephone within reach. Instruct patient to call for assistance. Room bathroom lighting operational. Non-slip footwear when patient is off stretcher. Physically safe environment: no spills, clutter or unnecessary equipment. Stretcher in lowest position, wheels locked, appropriate side rails in place.

## 2020-12-13 NOTE — PROGRESS NOTE ADULT - SUBJECTIVE AND OBJECTIVE BOX
Went to examine patient, Medicine team at bedside.  Reporting hypotension with brachial pressures in 60's.  Patient is asymptomatic otherwise.  No chest pain, SOB, N/V.  No subjective fever, chills, rigors.  No dizziness.  I attempted manual BP x2 on RUE, unable to appreciate Korotkoff sounds, I inflated cuff to 240 on second attempt.  Team reports BP taken on leg 120's systolic.     T(C): 36.7 (12-13-20 @ 03:59), Max: 36.8 (12-12-20 @ 17:52)  HR: 99 (12-13-20 @ 03:59) (64 - 108)  BP: 80/58 (12-13-20 @ 06:41) (70/52 - 250/115)  RR: 17 (12-13-20 @ 03:59) (17 - 46)  SpO2: 95% (12-13-20 @ 03:59) (92% - 100%)  Wt(kg): --    Exam:  Alert and Oriented, No Acute Distress  LUE in coaptation splint, compartments soft/compressible.  SILT. Hand pink, warm, digits mobile.  +Radial pulse.  Patient unable to extend digits, can .  RUE +Radial pulse  Lower Extremities: +DP pulses  Calves Soft, Non-tender bilaterally                        11.5   9.07  )-----------( 238      ( 13 Dec 2020 05:17 )             36.9    12-13    141  |  106  |  16  ----------------------------<  102<H>  3.7   |  21<L>  |  1.18    Ca    8.7      13 Dec 2020 05:17  Phos  4.2     12-13  Mg     1.9     12-13    TPro  6.4  /  Alb  3.6  /  TBili  0.8  /  DBili  x   /  AST  32  /  ALT  22  /  AlkPhos  78  12-13

## 2020-12-13 NOTE — CONSULT NOTE ADULT - SUBJECTIVE AND OBJECTIVE BOX
69yo Female with PMH of HTN, COPD, anxiety, R TKR, recent L reverse total shoulder replacement on 9/10/20 with Dr. Pham who presented as a level 1 trauma after being found down. When asked why she was on the floor, the patient replied "I was eating." Patient denies head hit or LOC. Patient denies numbness or tingling in the LUE. Patient denies any other injuries.    Pt was in the SICU earlier this evening, where she expressed decision to leave AMA. She refused surgery on her left humerus, requesting her own Orthopedist, Dr. Pham to do the operation. She got to the parking lot where she was unable to tolerate the pain in her shoulder, felt unsafe walking with shoulder splint (as she is now walker dependent) and decided to come back in for admission. Denies any neck pain at present. Patient remains in coaptation splint. Endorses left arm pain. Unable to extend left wrist or abduct left thumb. Patient is still refusing surgery at this time.     PMH:  Seasonal allergies    Anxiety    Chronic lower back pain    Osteoarthritis    Chronic obstructive pulmonary disease (COPD)    Hyperlipidemia    Hypertension    Osteoarthritis of left shoulder    Class 1 obesity with body mass index (BMI) of 34.0 to 34.9 in adult    History of closed shoulder dislocation    Osteoarthritis    Anxiety    HLD (hyperlipidemia)    Essential hypertension    No pertinent past medical history      PSH:  H/O total knee replacement in 2019 by Dr. Knight    H/O shoulder replacement in 9/10/20 by Dr. Pham    History of right knee joint replacement    No significant past surgical history    No significant past surgical history      AH: unknown allergies     Meds: See med rec    T(C): 36.8 (12-13-20 @ 00:44)  HR: 64 (12-13-20 @ 00:44)  BP: 101/96 (12-13-20 @ 00:44)  RR: 17 (12-13-20 @ 00:44)  SpO2: 100% (12-13-20 @ 00:44)  Wt(kg): --    PE LUE:  Skin intact, swelling and ecchymosis over upper arm and elbow  Coaptation splint c/d/i, in sling    SILT axillary/med/rad/ulnar  +Motor AIN/Ulnar/Median  unable to abduct thumb, unable to extend wrist   TTP over humerus  +painless elbow/wrist ROM,   shoulder ROM limited 2/2 pain,   2+radial pulse, soft compartments  no pain with passive movement of digits    Secondary:  No TTP over bony landmarks, SILT BL, ROM intact BL, distal pulses palpable.    Imaging:  Previous XR demonstrating left periprosthetic humerus fracture      70yFemale PMH of L reverse total shoulder replacement on 9/10/20 with Dr. Pham presents with left periprosthetic humerus fracture s/p found on the ground. Patient has a LUE radial nerve palsy on presentation to ED. Patient is currently on Eliquis. Patient re-presented to the ED after leaving AMA from the SICU. Patient refusing orthopedic surgery at this time    Pain control  NWB LUE in coaptation splint and sling  Splint precautions:  Keep Splint dry  Elevate extremity, can try and ice through the Splint   Do not stick anything into the Splint   Plan for OR this week if patient is amenable and remains in the hospital     Ortho [id number]

## 2020-12-13 NOTE — H&P ADULT - NSHPPHYSICALEXAM_GEN_ALL_CORE
PHYSICAL EXAM:   GENERAL: Alert. +mildly confused. No acute distress. Not thin. Not cachectic. +obese.  HEAD:  Atraumatic. Normocephalic.  EYES: EOMI. PERRLA. Normal conjunctiva/sclera.  ENT: Neck supple. No JVD. Moist oral mucosa. Not edentulous. No thrush.  LYMPH: Normal supraclavicular/cervical lymph nodes.   CARDIAC: Not tachy, Not heather. Regular rhythm. Not irregularly irregular. S1. S2. No murmur. No rub. No distant heart sounds.  LUNG/CHEST: CTAB. BS equal bilaterally. No wheezes. No rales. No rhonchi.  ABDOMEN: Soft. No tenderness. No distension. No fluid wave. Normal bowel sounds.  BACK: No midline/vertebral tenderness. No flank tenderness.  VASCULAR: +2 b/l radial or ulnar pulses. Palpable DP pulses.  EXTREMITIES:  No clubbing. No cyanosis. +LUE edema. Moving all 4.  NEUROLOGY: A&Ox3. Non-focal exam. Cranial nerves intact. Normal speech. Sensation intact.  PSYCH: Normal behavior. Normal affect.  SKIN: No jaundice. No erythema. +ecchymoses of left UE.  Vascular Access:     ICU Vital Signs Last 24 Hrs  T(C): 36.7 (13 Dec 2020 03:59), Max: 36.8 (12 Dec 2020 17:52)  T(F): 98 (13 Dec 2020 03:59), Max: 98.3 (12 Dec 2020 17:52)  HR: 99 (13 Dec 2020 03:59) (64 - 108)  BP: 80/58 (13 Dec 2020 06:41) (70/52 - 250/115)  BP(mean): 165 (12 Dec 2020 23:15) (90 - 165)  ABP: --  ABP(mean): --  RR: 17 (13 Dec 2020 03:59) (17 - 46)  SpO2: 95% (13 Dec 2020 03:59) (92% - 100%)      I&O's Summary    12 Dec 2020 07:01  -  13 Dec 2020 06:51  --------------------------------------------------------  IN: 1000 mL / OUT: 0 mL / NET: 1000 mL

## 2020-12-13 NOTE — H&P ADULT - PROBLEM SELECTOR PLAN 1
- called to pt for SBP to 60s in right arm both manually/automatic and in left forearm by automatic.   - unclear etiol  - currently getting 3L IVF NS bolus  - 2 PIV in left arm  - pt refusing all steroids including hydrocortisone  - pt refusing IV placement into left arm  - Hb stable  - left arm with some dependent ecchymoses, and no obvious bleeding from anywhere  - ED bedside ultrasound showed no pericardial effusion and did not mention RV strain  - will need CTA chest once blood pressure stable  - SICU refused to evaluate patient. if persistent hypotension, will need MICU eval  - pt mentating grossly well. mildly confused but that may be her baseline affect/personality. - called to pt for SBP to 60s in right arm both manually/automatic and in left forearm by automatic.   - unclear etiol  - currently getting 3L IVF NS bolus  - 2 PIV in right arm  - pt refusing all steroids including hydrocortisone  - pt refusing IV placement into left arm  - Hb stable  - left arm with some dependent ecchymoses, and no obvious bleeding from anywhere  - ED bedside ultrasound showed no pericardial effusion and did not mention RV strain  - will need CTA chest once blood pressure stable  - SICU refused to evaluate patient. if persistent hypotension, will need MICU eval  - pt mentating grossly well. mildly confused but that may be her baseline affect/personality. - called to pt for SBP to 60s in right arm both manually/automatic and in left forearm by automatic.   - unclear etiol  - currently getting 3L IVF NS bolus  - 2 PIV in right arm  - pt refusing all steroids including hydrocortisone  - pt refusing IV placement into left arm  - Hb stable  - left arm with some dependent ecchymoses, and no obvious bleeding from anywhere  - ED bedside ultrasound showed no pericardial effusion and did not mention RV strain  - will need CTA chest once blood pressure stable and renal function stabilizes  - will order 1 more L IVF  - SICU refused to evaluate patient. last SBP of 93 on left forearm. if worsening hypotension, will need MICU eval  - pt mentating grossly well. mildly confused but that may be her baseline affect/personality.

## 2020-12-13 NOTE — ED PROVIDER NOTE - PSH
H/O shoulder replacement  L shoulder  H/O total knee replacement  R knee  History of right knee joint replacement  2019

## 2020-12-13 NOTE — ED ADULT NURSE NOTE - OBJECTIVE STATEMENT
70F pmhx HTN, COPD, anxiety, R TKR, recent L reverse total shoulder replacement. A&Ox3. Patient left AMA 70F pmhx HTN, COPD, anxiety, R TKR, recent L reverse total shoulder replacement. A&Ox3. Patient left AMA from SICU states she came back due to "the floor was slippery outside". She was brought into ED eariler today by EMS for AMS last seen at baseline Wednesday, fall as per EMS earlier pt was believed to be on ground for approximately 48 hours. Patient denies any pain or symptoms at this time. She is well appearing,  gross neuro intact, lungs cta bilaterally, no difficulty speaking in complete sentences, s, abdomen soft nontender nondistended, skin intact, left arm wrapped up in ace bandage from ortho eariler today.

## 2020-12-13 NOTE — ED ADULT NURSE REASSESSMENT NOTE - NS ED NURSE REASSESS COMMENT FT1
MD notified patient is having visual hallucinations, she states "why is all this food on the floor what are you feeding all the inmates here" she denies SI/HI, AH/TH. Safety being maintained, waiting for transport to bed assignment

## 2020-12-13 NOTE — H&P ADULT - NSHPLABSRESULTS_GEN_ALL_CORE
Personally reviewed old records.  Personally reviewed labs.  Personally reviewed imaging.  Personally reviewed EKG.                          11.5   9.07  )-----------( 238      ( 13 Dec 2020 05:17 )             36.9       12-13    141  |  106  |  16  ----------------------------<  102<H>  3.7   |  21<L>  |  1.18    Ca    8.7      13 Dec 2020 05:17  Phos  4.2     12-13  Mg     1.9     12-13    TPro  6.4  /  Alb  3.6  /  TBili  0.8  /  DBili  x   /  AST  32  /  ALT  22  /  AlkPhos  78  12-13      CARDIAC MARKERS ( 13 Dec 2020 05:17 )  x     / x     / x     / x     / 7.5 ng/mL  CARDIAC MARKERS ( 13 Dec 2020 03:17 )  x     / x     / 2223 U/L / x     / x      CARDIAC MARKERS ( 12 Dec 2020 18:35 )  x     / x     / 2895 U/L / x     / 11.4 ng/mL        LIVER FUNCTIONS - ( 13 Dec 2020 05:17 )  Alb: 3.6 g/dL / Pro: 6.4 g/dL / ALK PHOS: 78 U/L / ALT: 22 U/L / AST: 32 U/L / GGT: x

## 2020-12-13 NOTE — ED PROVIDER NOTE - OBJECTIVE STATEMENT
70F pmhx HTN, COPD, anxiety, R TKR, recent L reverse total shoulder replacement who was BIBEMS as a level 2 trauma, evaluated and resuscitated in SICU - where she was found to have L Humeral Periprosthetic Fracture, complicated by a LUE Radial Nerve Palsy. Pt was in 8 ICU earlier this evening (1 hour prior) - where she expressed decision to leave AMA. She refused surgery on her L Shoulder, requesting her own Orthopedist do the operation. She got to the parking lot where she was unable to tolerate the pain in her shoulder, felt unsafe walking with shoulder splint (as she is now walker dependent) and decided to come back in for admission. Denies any fevers, chills, cough, CP, SOB, nausea, vomiting, diarrhea, constipation, dysuric symptoms. Denies any neck pain at present. Endorses L Shoulder pain with weakness in extending L hand.

## 2020-12-13 NOTE — ED PROVIDER NOTE - NS ED ROS FT
Constitution: No Fever or chills  HEENT: No cough, No Discharge, No Rhinorrhea, No URI symptoms  Cardio: No Chest pain, No Palpitations, No Dyspnea  Resp: No SOB, No Wheezing  GI: No abdominal pain, No Nausea, No Vomiting, No Constipation, No Diarrhea  : No burning upon urination, trouble urinating, no foul odor from urine  MSK: (+) LUE Pain and Weakness; No Back pain  Neuro: No Headache, Unstable Gait (Walker Dependent)  Skin: No rashes, No Bruising, No Swelling

## 2020-12-13 NOTE — H&P ADULT - ATTENDING COMMENTS
Pt seen and examined, and plan as above. Pt signed out to NP service for episodic care. [facility] doctors to assume care in AM. Pt seen and examined, and plan as above. Pt signed out to NP service for episodic care. [facility] doctors to assume care in AM. Critical care time spent 70 min

## 2020-12-13 NOTE — H&P ADULT - PROBLEM SELECTOR PLAN 2
- pt wants surgery for arm, but hasn't decided who she wants to perform it  - ortho following - pt wants surgery for arm, but hasn't decided who she wants to perform it  - ortho following  - splint in place  - NPO for now. advance diet if no surgery and SBP normalizes

## 2020-12-13 NOTE — CHART NOTE - NSCHARTNOTEFT_GEN_A_CORE
Medicine PA Episodic Note    Patient seen at bedside for BP of 70/58. Patient seen and assessed by me at bedside w/Dr. Sandoval. Patient w/no acute complaints. Patient states that she is feeling well. Endorses mild L shoulder pain, however patient is admitted w/L humerus fracture. Patient admitted earlier in the day on 12/12 as level 2 trauma w/acute L humerus fracture requiring ICU care for hypotension which improved after fluids. However patient left AMA, and was readmitted for ?fracture repair of L humerus.   Given hypotension, SBP 70s, patient is asymptomatic w/no acute symptoms. She is mentating well and speaking in full sentences, stating she is fine. Patient states that she feels fine. She denies dizziness, lightheadedness, blurry vision, double vision, headache, chest pain, abdominal pan, dyspnea, headache. Imaging findings are negative for acute blood loss only significant for acute L humerus fracture.       Vital Signs Last 24 Hrs  T(C): 36.7 (13 Dec 2020 03:59), Max: 36.8 (12 Dec 2020 17:52)  T(F): 98 (13 Dec 2020 03:59), Max: 98.3 (12 Dec 2020 17:52)  HR: 93 (13 Dec 2020 06:41) (64 - 108)  BP: 80/58 (13 Dec 2020 06:41) (68/50 - 250/115)  BP(mean): 165 (12 Dec 2020 23:15) (90 - 165)  RR: 17 (13 Dec 2020 03:59) (17 - 46)  SpO2: 96% (13 Dec 2020 06:41) (92% - 100%)      Labs:                          11.5   9.07  )-----------( 238      ( 13 Dec 2020 05:17 )             36.9     12-13    141  |  106  |  16  ----------------------------<  102<H>  3.7   |  21<L>  |  1.18    Ca    8.7      13 Dec 2020 05:17  Phos  4.2     12-13  Mg     1.9     12-13    TPro  6.4  /  Alb  3.6  /  TBili  0.8  /  DBili  x   /  AST  32  /  ALT  22  /  AlkPhos  78  12-13      Radiology:    < from: CT Head No Cont (12.12.20 @ 19:27) >    IMPRESSION: No acute hemorrhage, mass or mass effect.    Multiple axial sections were performed through the cervical spine. Coronal and sagittal reconstructions were performed as well    Loss of the normal cervical lordosis is seen. This could be due to positioning or muscle spasm.    C7 is slightly anteriorly displaced relative to T1. This finding is likely the result of chronic degenerative changes.    There are no acute fractures or dislocations identified.    Bilateral hypertrophic facet changes are seen at multiple levels which are secondary to chronic degenerative changes.    Evaluation of the paraspinal soft tissues is limited by lack of IV contrast though grossly normal    IMPRESSION: No acute fractures or dislocations seen.        < end of copied text >      < from: CT Abdomen and Pelvis w/ IV Cont (12.12.20 @ 19:28) >    IMPRESSION:  Partially visualized acute comminuted fracture of the left humerus. Correlate with dedicated CT scan.  Old bilateral rib fractures and old L1 vertebral body compression fracture.    No evidence of acute traumatic abnormality in the chest, abdomen, or pelvis.  Fatty infiltration of the liver.    Small bubbles of gas in the superficial right inguinal soft tissues. Correlate for recent instrumentation.    < end of copied text >    Physical Exam:  General: WN/WD NAD  Neurology: A&Ox3, nonfocal, TOBAR x 4  Head:  Normocephalic, atraumatic  Respiratory: CTA B/L  CV: RRR, S1S2, no murmur  Abdominal: Soft, NT, ND no palpable mass  MSK: No edema, + peripheral pulses, Limited ROM from LUE + LUE edema w/mild ecchymosis.     Assessment & Plan:  HPI:  70F pmhx HTN, obesity, COPD, anxiety, agitation, R TKR, recent L reverse total shoulder replacement (@ OSH), who was BIBEMS yesterday as a level 2 trauma after being found down, admitted to SICU with left humerus periprosthetic comminuted fracture, c/b rhabdo and left radial nerve palsy, left AMA to [facility] parking lot by wheelchair, where she decided she wanted to be admitted again to hospital for poss fracture repair.  Review of chart shows pt having episodes of intermittent hypotension including requiring pressors during prior hospitalization. Yesterday, pt initially hypotensive to SBP 87, which improved to SBP 200s after 500 cc IVF.       C/o of hypotension SBP 60-70s w/no acute symptoms.      Plan:  #Hypotension  - Pt w/no acute symptoms of shock, dizziness, lightheadedness, blurry vision.  - Pt s/p 2L IVF w/mild improvement in SBP.  Patient currently getting 3rd L IVF  - Repeat STAT labs show no indication of infection, or acute blood loss. Patient w/active type and screen.   - MICU eval pending patient's SBP after trial of fluids or worsening hypotension.  - Pt may need reimaging studies once SBP stabilizes.  - Will continue to monitor closely.  - Endorsed out to AM team.     Lebron HALL  Dept of Medicine  [id number] Medicine PA Episodic Note    Patient seen at bedside for BP of 70/58. Patient seen and assessed by me at bedside w/Dr. Sandoval. Patient w/no acute complaints. Patient states that she is feeling well. Endorses mild L shoulder pain, however patient is admitted w/L humerus fracture. Patient admitted earlier in the day on 12/12 as level 2 trauma w/acute L humerus fracture requiring ICU care for hypotension which improved after fluids. However patient left AMA, and was readmitted for ?fracture repair of L humerus.   Given hypotension, SBP 70s, patient is asymptomatic w/no acute symptoms. She is mentating well and speaking in full sentences, stating she is fine. Patient states that she feels fine. She denies dizziness, lightheadedness, blurry vision, double vision, headache, chest pain, abdominal pan, dyspnea, headache. Imaging findings are negative for acute blood loss only significant for acute L humerus fracture.       Vital Signs Last 24 Hrs  T(C): 36.7 (13 Dec 2020 03:59), Max: 36.8 (12 Dec 2020 17:52)  T(F): 98 (13 Dec 2020 03:59), Max: 98.3 (12 Dec 2020 17:52)  HR: 93 (13 Dec 2020 06:41) (64 - 108)  BP: 80/58 (13 Dec 2020 06:41) (68/50 - 250/115)  BP(mean): 165 (12 Dec 2020 23:15) (90 - 165)  RR: 17 (13 Dec 2020 03:59) (17 - 46)  SpO2: 96% (13 Dec 2020 06:41) (92% - 100%)      Labs:                          11.5   9.07  )-----------( 238      ( 13 Dec 2020 05:17 )             36.9     12-13    141  |  106  |  16  ----------------------------<  102<H>  3.7   |  21<L>  |  1.18    Ca    8.7      13 Dec 2020 05:17  Phos  4.2     12-13  Mg     1.9     12-13    TPro  6.4  /  Alb  3.6  /  TBili  0.8  /  DBili  x   /  AST  32  /  ALT  22  /  AlkPhos  78  12-13      Radiology:    < from: CT Head No Cont (12.12.20 @ 19:27) >    IMPRESSION: No acute hemorrhage, mass or mass effect.    Multiple axial sections were performed through the cervical spine. Coronal and sagittal reconstructions were performed as well    Loss of the normal cervical lordosis is seen. This could be due to positioning or muscle spasm.    C7 is slightly anteriorly displaced relative to T1. This finding is likely the result of chronic degenerative changes.    There are no acute fractures or dislocations identified.    Bilateral hypertrophic facet changes are seen at multiple levels which are secondary to chronic degenerative changes.    Evaluation of the paraspinal soft tissues is limited by lack of IV contrast though grossly normal    IMPRESSION: No acute fractures or dislocations seen.        < end of copied text >      < from: CT Abdomen and Pelvis w/ IV Cont (12.12.20 @ 19:28) >    IMPRESSION:  Partially visualized acute comminuted fracture of the left humerus. Correlate with dedicated CT scan.  Old bilateral rib fractures and old L1 vertebral body compression fracture.    No evidence of acute traumatic abnormality in the chest, abdomen, or pelvis.  Fatty infiltration of the liver.    Small bubbles of gas in the superficial right inguinal soft tissues. Correlate for recent instrumentation.    < end of copied text >    Physical Exam:  General: WN/WD NAD  Neurology: A&Ox3, nonfocal, TOBAR x 4  Head:  Normocephalic, atraumatic  Respiratory: CTA B/L  CV: RRR, S1S2, no murmur  Abdominal: Soft, NT, ND no palpable mass  MSK: No edema, + peripheral pulses, Limited ROM from LUE + LUE edema w/mild ecchymosis.     Assessment & Plan:  HPI:  70F pmhx HTN, obesity, COPD, anxiety, agitation, R TKR, recent L reverse total shoulder replacement (@ OSH), who was BIBEMS yesterday as a level 2 trauma after being found down, admitted to SICU with left humerus periprosthetic comminuted fracture, c/b rhabdo and left radial nerve palsy, left AMA to [facility] parking lot by wheelchair, where she decided she wanted to be admitted again to hospital for poss fracture repair.  Review of chart shows pt having episodes of intermittent hypotension including requiring pressors during prior hospitalization. Yesterday, pt initially hypotensive to SBP 87, which improved to SBP 200s after 500 cc IVF.     C/o of hypotension SBP 60-70s w/no acute symptoms w/manual and electronic BP machine.     Plan:  #Hypotension  - Pt w/no acute symptoms of shock, dizziness, lightheadedness, blurry vision.   - Pt s/p 2L IVF w/mild improvement in SBP.  Patient currently getting 3rd L IVF  - Repeat STAT labs show no indication of infection, or acute blood loss. Patient w/active type and screen.   - MICU eval pending patient's SBP after trial of fluids or worsening hypotension.  - Pt may need reimaging studies once SBP stabilizes.  - Will continue to monitor closely.  - Endorsed out to AM team.     Lebron HALL  Dept of Medicine  [id number] Medicine PA Episodic Note    Patient seen at bedside for BP of 70/58. Patient seen and assessed by me at bedside w/Dr. Sandoval. Patient w/no acute complaints. Patient states that she is feeling well. Endorses mild L shoulder pain, however patient is admitted w/L humerus fracture. Patient admitted earlier in the day on 12/12 as level 2 trauma w/acute L humerus fracture requiring ICU care for hypotension which improved after fluids. However patient left AMA, and was readmitted for ?fracture repair of L humerus.   Given hypotension, SBP 70s, patient is asymptomatic w/no acute symptoms. She is mentating well and speaking in full sentences, stating she is fine. Patient states that she feels fine. She denies dizziness, lightheadedness, blurry vision, double vision, headache, chest pain, abdominal pan, dyspnea, headache. Imaging findings are negative for acute blood loss only significant for acute L humerus fracture.       Vital Signs Last 24 Hrs  T(C): 36.7 (13 Dec 2020 03:59), Max: 36.8 (12 Dec 2020 17:52)  T(F): 98 (13 Dec 2020 03:59), Max: 98.3 (12 Dec 2020 17:52)  HR: 93 (13 Dec 2020 06:41) (64 - 108)  BP: 80/58 (13 Dec 2020 06:41) (68/50 - 250/115)  BP(mean): 165 (12 Dec 2020 23:15) (90 - 165)  RR: 17 (13 Dec 2020 03:59) (17 - 46)  SpO2: 96% (13 Dec 2020 06:41) (92% - 100%)      Labs:                          11.5   9.07  )-----------( 238      ( 13 Dec 2020 05:17 )             36.9     12-13    141  |  106  |  16  ----------------------------<  102<H>  3.7   |  21<L>  |  1.18    Ca    8.7      13 Dec 2020 05:17  Phos  4.2     12-13  Mg     1.9     12-13    TPro  6.4  /  Alb  3.6  /  TBili  0.8  /  DBili  x   /  AST  32  /  ALT  22  /  AlkPhos  78  12-13      Radiology:    < from: CT Head No Cont (12.12.20 @ 19:27) >    IMPRESSION: No acute hemorrhage, mass or mass effect.    Multiple axial sections were performed through the cervical spine. Coronal and sagittal reconstructions were performed as well    Loss of the normal cervical lordosis is seen. This could be due to positioning or muscle spasm.    C7 is slightly anteriorly displaced relative to T1. This finding is likely the result of chronic degenerative changes.    There are no acute fractures or dislocations identified.    Bilateral hypertrophic facet changes are seen at multiple levels which are secondary to chronic degenerative changes.    Evaluation of the paraspinal soft tissues is limited by lack of IV contrast though grossly normal    IMPRESSION: No acute fractures or dislocations seen.        < end of copied text >      < from: CT Abdomen and Pelvis w/ IV Cont (12.12.20 @ 19:28) >    IMPRESSION:  Partially visualized acute comminuted fracture of the left humerus. Correlate with dedicated CT scan.  Old bilateral rib fractures and old L1 vertebral body compression fracture.    No evidence of acute traumatic abnormality in the chest, abdomen, or pelvis.  Fatty infiltration of the liver.    Small bubbles of gas in the superficial right inguinal soft tissues. Correlate for recent instrumentation.    < end of copied text >    Physical Exam:  General: WN/WD NAD  Neurology: A&Ox3, nonfocal, TOBAR x 4  Head:  Normocephalic, atraumatic  Respiratory: CTA B/L  CV: RRR, S1S2, no murmur  Abdominal: Soft, NT, ND no palpable mass  MSK: No edema, + peripheral pulses, Limited ROM from LUE + LUE edema w/mild ecchymosis.     Assessment & Plan:  HPI:  70F pmhx HTN, obesity, COPD, anxiety, agitation, R TKR, recent L reverse total shoulder replacement (@ OSH), who was BIBEMS yesterday as a level 2 trauma after being found down, admitted to SICU with left humerus periprosthetic comminuted fracture, c/b rhabdo and left radial nerve palsy, left AMA to [facility] parking lot by wheelchair, where she decided she wanted to be admitted again to hospital for poss fracture repair.  Review of chart shows pt having episodes of intermittent hypotension including requiring pressors during prior hospitalization. Yesterday, pt initially hypotensive to SBP 87, which improved to SBP 200s after 500 cc IVF.     C/o of hypotension SBP 60-70s w/no acute symptoms w/manual and electronic BP machine.     Plan:  #Hypotension  - Pt w/no acute symptoms of shock, dizziness, lightheadedness, blurry vision.   - Pt s/p 2L IVF w/mild improvement in SBP.  Patient currently getting 3rd L IVF  - Repeat STAT labs show no indication of infection, or acute blood loss. Patient w/active type and screen.   - MICU eval pending patient's SBP after trial of fluids or worsening hypotension.  - Pt refusing steroid therapy.   - Pt may need reimaging studies once SBP stabilizes.  - Will continue to monitor closely.  - Endorsed out to AM team.     Lebron HALL  Dept of Medicine  [id number]

## 2020-12-13 NOTE — H&P ADULT - ASSESSMENT
70F pmhx HTN, obesity, COPD, anxiety, agitation, R TKR, recent L reverse total shoulder replacement (@ OSH), who was BIBEMS yesterday as a level 2 trauma after being found down, admitted to SICU with left humerus periprosthetic comminuted fracture, c/b rhabdo, left AMA to [facility] parking lot by wheelchair, now back to hospital for debility, found to be hypotensive to SBP 60-70.

## 2020-12-13 NOTE — H&P ADULT - NSHPREVIEWOFSYSTEMS_GEN_ALL_CORE
REVIEW OF SYSTEMS:  CONSTITUTIONAL: No weakness. No fevers. No chills. No rigors. No weight loss. No night sweats. No poor appetite.  EYES: No blurry or double vision. No eye pain.  ENT: No hearing difficulty. No vertigo. No dysphagia. No sore throat. No Sinusitis/rhinorrhea.   NECK: No pain. No stiffness/rigidity.  CARDIAC: No chest pain. No palpitations. No lightheadedness. No syncope.  RESPIRATORY: No cough. No SOB. No hemoptysis.  GASTROINTESTINAL: No abdominal pain. No nausea. No vomiting. No hematemesis. No diarrhea. No constipation. No melena. No hematochezia.  GENITOURINARY: No dysuria. No frequency. No hesitancy. No hematuria. No oliguria.  NEUROLOGICAL: No numbness/tingling. No focal weakness. No urinary or fecal incontinence. No headache. No unsteady gait.  BACK: No back pain. No flank pain.  EXTREMITIES: No lower extremity edema. limited ROM of left arm 2/2 pain and splint.  SKIN: No rashes. No itching. No other lesions.  PSYCHIATRIC: No depression. No anxiety. No SI/HI.  ALLERGIC: No lip swelling. No hives.  All other review of systems is negative unless indicated above.  Unless indicated above, unable to assess ROS 2/2

## 2020-12-13 NOTE — H&P ADULT - PROBLEM SELECTOR PROBLEM 2
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

## 2020-12-13 NOTE — ED PROVIDER NOTE - CLINICAL SUMMARY MEDICAL DECISION MAKING FREE TEXT BOX
70F pmhx HTN, COPD, anxiety, R TKR, recent L reverse total shoulder replacement who was BIBEMS as a level 2 trauma, evaluated and resuscitated in SICU - where she was found to have L Humeral Periprosthetic Fracture, complicated by a LUE Radial Nerve Palsy. Left AMA from SICU this evening, now with worsening pain to LUE and request for admission for safety concern - pt walker dependent and cannot use walker with L UE Fracture. Exam, presentation, and history without concern for new traumatic sequelae. LUE with persistent radial nerve palsy and sling in place. Ortho at bedside. Admit to medicine. 70F pmhx HTN, COPD, anxiety, R TKR, recent L reverse total shoulder replacement who was BIBEMS as a level 2 trauma, evaluated and resuscitated in SICU - where she was found to have L Humeral Periprosthetic Fracture, complicated by a LUE Radial Nerve Palsy. Left AMA from SICU this evening, now with worsening pain to LUE and request for admission for safety concern - pt walker dependent and cannot use walker with L UE Fracture. Exam, presentation, and history without concern for new traumatic sequelae. LUE with persistent radial nerve palsy and sling in place. Ortho at bedside. Admit to medicine.  Mk: 70 year old female with left reverse total shoulder replacement, signed out AMA from SICU this evening, wosrening pain and needs to be admitted. Patient does not feel safe walking around. while in ED, patient delusional. no si/hi. will get labs, ortho consult, admit to hospital. patient unsure if wants surgery at this time.

## 2020-12-13 NOTE — H&P ADULT - NSHPSOCIALHISTORY_GEN_ALL_CORE
Social History:    Marital Status: (  ) , (  ) Single, ( x ) , (  ) , (  )   # of Children: 2  Lives with: ( x ) alone, (  ) children, (  ) spouse, (  ) parents, (  ) siblings, (  ) friends, (  ) other:   Occupation:     Substance Use/Illicit Drugs: (  ) never used vs other:   Tobacco Usage: (  ) never smoked, ( x ) former smoker, (  ) current smoker and Total Pack-Years: 30+ pk yrs  Last Alcohol Usage/Frequency/Amount/Withdrawal/Hx of Abuse:  none  Foreign travel:   Animal exposure:

## 2020-12-14 ENCOUNTER — APPOINTMENT (OUTPATIENT)
Dept: ORTHOPEDIC SURGERY | Facility: CLINIC | Age: 70
End: 2020-12-14

## 2020-12-14 DIAGNOSIS — F05 DELIRIUM DUE TO KNOWN PHYSIOLOGICAL CONDITION: ICD-10-CM

## 2020-12-14 DIAGNOSIS — F10.10 ALCOHOL ABUSE, UNCOMPLICATED: ICD-10-CM

## 2020-12-14 LAB
ALBUMIN SERPL ELPH-MCNC: 3.4 G/DL — SIGNIFICANT CHANGE UP (ref 3.3–5)
ALP SERPL-CCNC: 71 U/L — SIGNIFICANT CHANGE UP (ref 40–120)
ALT FLD-CCNC: 17 U/L — SIGNIFICANT CHANGE UP (ref 10–45)
ANION GAP SERPL CALC-SCNC: 12 MMOL/L — SIGNIFICANT CHANGE UP (ref 5–17)
AST SERPL-CCNC: 21 U/L — SIGNIFICANT CHANGE UP (ref 10–40)
BASOPHILS # BLD AUTO: 0.02 K/UL — SIGNIFICANT CHANGE UP (ref 0–0.2)
BASOPHILS NFR BLD AUTO: 0.4 % — SIGNIFICANT CHANGE UP (ref 0–2)
BILIRUB SERPL-MCNC: 0.4 MG/DL — SIGNIFICANT CHANGE UP (ref 0.2–1.2)
BUN SERPL-MCNC: 12 MG/DL — SIGNIFICANT CHANGE UP (ref 7–23)
CALCIUM SERPL-MCNC: 8.3 MG/DL — LOW (ref 8.4–10.5)
CHLORIDE SERPL-SCNC: 108 MMOL/L — SIGNIFICANT CHANGE UP (ref 96–108)
CO2 SERPL-SCNC: 23 MMOL/L — SIGNIFICANT CHANGE UP (ref 22–31)
CREAT SERPL-MCNC: 1.02 MG/DL — SIGNIFICANT CHANGE UP (ref 0.5–1.3)
EOSINOPHIL # BLD AUTO: 0.14 K/UL — SIGNIFICANT CHANGE UP (ref 0–0.5)
EOSINOPHIL NFR BLD AUTO: 2.5 % — SIGNIFICANT CHANGE UP (ref 0–6)
FOLATE SERPL-MCNC: 7.6 NG/ML — SIGNIFICANT CHANGE UP
GLUCOSE SERPL-MCNC: 112 MG/DL — HIGH (ref 70–99)
HCT VFR BLD CALC: 33.5 % — LOW (ref 34.5–45)
HGB BLD-MCNC: 10.1 G/DL — LOW (ref 11.5–15.5)
IMM GRANULOCYTES NFR BLD AUTO: 0.4 % — SIGNIFICANT CHANGE UP (ref 0–1.5)
LYMPHOCYTES # BLD AUTO: 1.34 K/UL — SIGNIFICANT CHANGE UP (ref 1–3.3)
LYMPHOCYTES # BLD AUTO: 23.5 % — SIGNIFICANT CHANGE UP (ref 13–44)
MAGNESIUM SERPL-MCNC: 1.8 MG/DL — SIGNIFICANT CHANGE UP (ref 1.6–2.6)
MCHC RBC-ENTMCNC: 28.5 PG — SIGNIFICANT CHANGE UP (ref 27–34)
MCHC RBC-ENTMCNC: 30.1 GM/DL — LOW (ref 32–36)
MCV RBC AUTO: 94.4 FL — SIGNIFICANT CHANGE UP (ref 80–100)
MONOCYTES # BLD AUTO: 0.42 K/UL — SIGNIFICANT CHANGE UP (ref 0–0.9)
MONOCYTES NFR BLD AUTO: 7.4 % — SIGNIFICANT CHANGE UP (ref 2–14)
NEUTROPHILS # BLD AUTO: 3.77 K/UL — SIGNIFICANT CHANGE UP (ref 1.8–7.4)
NEUTROPHILS NFR BLD AUTO: 65.8 % — SIGNIFICANT CHANGE UP (ref 43–77)
NRBC # BLD: 0 /100 WBCS — SIGNIFICANT CHANGE UP (ref 0–0)
PHOSPHATE SERPL-MCNC: 3.7 MG/DL — SIGNIFICANT CHANGE UP (ref 2.5–4.5)
PLATELET # BLD AUTO: 185 K/UL — SIGNIFICANT CHANGE UP (ref 150–400)
POTASSIUM SERPL-MCNC: 3.7 MMOL/L — SIGNIFICANT CHANGE UP (ref 3.5–5.3)
POTASSIUM SERPL-SCNC: 3.7 MMOL/L — SIGNIFICANT CHANGE UP (ref 3.5–5.3)
PROT SERPL-MCNC: 6 G/DL — SIGNIFICANT CHANGE UP (ref 6–8.3)
RBC # BLD: 3.55 M/UL — LOW (ref 3.8–5.2)
RBC # FLD: 15.4 % — HIGH (ref 10.3–14.5)
SODIUM SERPL-SCNC: 143 MMOL/L — SIGNIFICANT CHANGE UP (ref 135–145)
TSH SERPL-MCNC: 1.02 UIU/ML — SIGNIFICANT CHANGE UP (ref 0.27–4.2)
VIT B12 SERPL-MCNC: 386 PG/ML — SIGNIFICANT CHANGE UP (ref 232–1245)
WBC # BLD: 5.71 K/UL — SIGNIFICANT CHANGE UP (ref 3.8–10.5)
WBC # FLD AUTO: 5.71 K/UL — SIGNIFICANT CHANGE UP (ref 3.8–10.5)

## 2020-12-14 PROCEDURE — 93010 ELECTROCARDIOGRAM REPORT: CPT

## 2020-12-14 PROCEDURE — 71045 X-RAY EXAM CHEST 1 VIEW: CPT | Mod: 26

## 2020-12-14 PROCEDURE — 99231 SBSQ HOSP IP/OBS SF/LOW 25: CPT

## 2020-12-14 PROCEDURE — 93970 EXTREMITY STUDY: CPT | Mod: 26

## 2020-12-14 PROCEDURE — 99223 1ST HOSP IP/OBS HIGH 75: CPT

## 2020-12-14 RX ORDER — ACETAMINOPHEN 500 MG
650 TABLET ORAL EVERY 6 HOURS
Refills: 0 | Status: DISCONTINUED | OUTPATIENT
Start: 2020-12-14 | End: 2020-12-17

## 2020-12-14 RX ORDER — CEFTRIAXONE 500 MG/1
INJECTION, POWDER, FOR SOLUTION INTRAMUSCULAR; INTRAVENOUS
Refills: 0 | Status: DISCONTINUED | OUTPATIENT
Start: 2020-12-14 | End: 2020-12-17

## 2020-12-14 RX ORDER — ACETAMINOPHEN 500 MG
1000 TABLET ORAL ONCE
Refills: 0 | Status: COMPLETED | OUTPATIENT
Start: 2020-12-14 | End: 2020-12-14

## 2020-12-14 RX ORDER — LANOLIN ALCOHOL/MO/W.PET/CERES
3 CREAM (GRAM) TOPICAL AT BEDTIME
Refills: 0 | Status: DISCONTINUED | OUTPATIENT
Start: 2020-12-14 | End: 2020-12-17

## 2020-12-14 RX ORDER — ENOXAPARIN SODIUM 100 MG/ML
80 INJECTION SUBCUTANEOUS
Refills: 0 | Status: COMPLETED | OUTPATIENT
Start: 2020-12-14 | End: 2020-12-16

## 2020-12-14 RX ORDER — LORATADINE 10 MG/1
10 TABLET ORAL DAILY
Refills: 0 | Status: DISCONTINUED | OUTPATIENT
Start: 2020-12-14 | End: 2020-12-17

## 2020-12-14 RX ORDER — CEFTRIAXONE 500 MG/1
1000 INJECTION, POWDER, FOR SOLUTION INTRAMUSCULAR; INTRAVENOUS EVERY 24 HOURS
Refills: 0 | Status: DISCONTINUED | OUTPATIENT
Start: 2020-12-15 | End: 2020-12-17

## 2020-12-14 RX ORDER — POLYETHYLENE GLYCOL 3350 17 G/17G
17 POWDER, FOR SOLUTION ORAL DAILY
Refills: 0 | Status: DISCONTINUED | OUTPATIENT
Start: 2020-12-14 | End: 2020-12-17

## 2020-12-14 RX ORDER — MIDODRINE HYDROCHLORIDE 2.5 MG/1
10 TABLET ORAL ONCE
Refills: 0 | Status: COMPLETED | OUTPATIENT
Start: 2020-12-14 | End: 2020-12-14

## 2020-12-14 RX ORDER — OXYCODONE HYDROCHLORIDE 5 MG/1
2.5 TABLET ORAL EVERY 8 HOURS
Refills: 0 | Status: DISCONTINUED | OUTPATIENT
Start: 2020-12-14 | End: 2020-12-17

## 2020-12-14 RX ORDER — DIPHENHYDRAMINE HCL 50 MG
12.5 CAPSULE ORAL ONCE
Refills: 0 | Status: DISCONTINUED | OUTPATIENT
Start: 2020-12-14 | End: 2020-12-14

## 2020-12-14 RX ORDER — QUETIAPINE FUMARATE 200 MG/1
25 TABLET, FILM COATED ORAL EVERY 6 HOURS
Refills: 0 | Status: DISCONTINUED | OUTPATIENT
Start: 2020-12-14 | End: 2020-12-17

## 2020-12-14 RX ORDER — CEFTRIAXONE 500 MG/1
1000 INJECTION, POWDER, FOR SOLUTION INTRAMUSCULAR; INTRAVENOUS ONCE
Refills: 0 | Status: COMPLETED | OUTPATIENT
Start: 2020-12-14 | End: 2020-12-14

## 2020-12-14 RX ORDER — HEPARIN SODIUM 5000 [USP'U]/ML
5000 INJECTION INTRAVENOUS; SUBCUTANEOUS EVERY 8 HOURS
Refills: 0 | Status: DISCONTINUED | OUTPATIENT
Start: 2020-12-14 | End: 2020-12-14

## 2020-12-14 RX ORDER — HALOPERIDOL DECANOATE 100 MG/ML
2.5 INJECTION INTRAMUSCULAR EVERY 6 HOURS
Refills: 0 | Status: DISCONTINUED | OUTPATIENT
Start: 2020-12-14 | End: 2020-12-17

## 2020-12-14 RX ORDER — OXYCODONE HYDROCHLORIDE 5 MG/1
2.5 TABLET ORAL ONCE
Refills: 0 | Status: DISCONTINUED | OUTPATIENT
Start: 2020-12-14 | End: 2020-12-14

## 2020-12-14 RX ORDER — DIPHENHYDRAMINE HCL 50 MG
12.5 CAPSULE ORAL ONCE
Refills: 0 | Status: COMPLETED | OUTPATIENT
Start: 2020-12-14 | End: 2020-12-14

## 2020-12-14 RX ADMIN — ATORVASTATIN CALCIUM 40 MILLIGRAM(S): 80 TABLET, FILM COATED ORAL at 23:05

## 2020-12-14 RX ADMIN — Medication 12.5 MILLIGRAM(S): at 22:51

## 2020-12-14 RX ADMIN — ENOXAPARIN SODIUM 80 MILLIGRAM(S): 100 INJECTION SUBCUTANEOUS at 20:16

## 2020-12-14 RX ADMIN — CEFTRIAXONE 1000 MILLIGRAM(S): 500 INJECTION, POWDER, FOR SOLUTION INTRAMUSCULAR; INTRAVENOUS at 14:38

## 2020-12-14 RX ADMIN — HEPARIN SODIUM 5000 UNIT(S): 5000 INJECTION INTRAVENOUS; SUBCUTANEOUS at 14:38

## 2020-12-14 RX ADMIN — NYSTATIN CREAM 1 APPLICATION(S): 100000 CREAM TOPICAL at 05:56

## 2020-12-14 RX ADMIN — Medication 3 MILLIGRAM(S): at 23:07

## 2020-12-14 RX ADMIN — OXYCODONE HYDROCHLORIDE 2.5 MILLIGRAM(S): 5 TABLET ORAL at 10:22

## 2020-12-14 RX ADMIN — LORATADINE 10 MILLIGRAM(S): 10 TABLET ORAL at 08:28

## 2020-12-14 RX ADMIN — OXYCODONE HYDROCHLORIDE 2.5 MILLIGRAM(S): 5 TABLET ORAL at 19:09

## 2020-12-14 RX ADMIN — MIDODRINE HYDROCHLORIDE 10 MILLIGRAM(S): 2.5 TABLET ORAL at 17:12

## 2020-12-14 RX ADMIN — Medication 400 MILLIGRAM(S): at 08:28

## 2020-12-14 RX ADMIN — POLYETHYLENE GLYCOL 3350 17 GRAM(S): 17 POWDER, FOR SOLUTION ORAL at 14:42

## 2020-12-14 RX ADMIN — Medication 1000 MILLIGRAM(S): at 08:43

## 2020-12-14 RX ADMIN — OXYCODONE HYDROCHLORIDE 2.5 MILLIGRAM(S): 5 TABLET ORAL at 20:00

## 2020-12-14 RX ADMIN — OXYCODONE HYDROCHLORIDE 2.5 MILLIGRAM(S): 5 TABLET ORAL at 09:54

## 2020-12-14 RX ADMIN — NYSTATIN CREAM 1 APPLICATION(S): 100000 CREAM TOPICAL at 14:38

## 2020-12-14 NOTE — BEHAVIORAL HEALTH ASSESSMENT NOTE - NSBHCHARTREVIEWLAB_PSY_A_CORE FT
10.1   5.71  )-----------( 185      ( 14 Dec 2020 10:17 )             33.5     12-14    143  |  108  |  12  ----------------------------<  112<H>  3.7   |  23  |  1.02    Ca    8.3<L>      14 Dec 2020 10:17  Phos  3.7     12-14  Mg     1.8     12-14    TPro  6.0  /  Alb  3.4  /  TBili  0.4  /  DBili  x   /  AST  21  /  ALT  17  /  AlkPhos  71  12-14    Urinalysis Basic - ( 13 Dec 2020 22:33 )    Color: Yellow / Appearance: Slightly Turbid / SG: >1.050 / pH: x  Gluc: x / Ketone: Small  / Bili: Negative / Urobili: <2 mg/dL   Blood: x / Protein: 30 mg/dL / Nitrite: Positive   Leuk Esterase: Small / RBC: 4 /HPF /  /HPF   Sq Epi: x / Non Sq Epi: 2 /HPF / Bacteria: TNTC

## 2020-12-14 NOTE — BEHAVIORAL HEALTH ASSESSMENT NOTE - HPI (INCLUDE ILLNESS QUALITY, SEVERITY, DURATION, TIMING, CONTEXT, MODIFYING FACTORS, ASSOCIATED SIGNS AND SYMPTOMS)
70divorced female, with 2 adult kids, lives alone with her [place] in a private residence in [place], with no PPHx, no h/o SA/SIB, no h/o substance abuse, no h/o inpt psychiatric hospitalizations, with pmhx HTN, obesity, COPD, R TKR, recent L reverse total shoulder replacement (@ OSH), who was BIBEMS yesterday as a level 2 trauma after being found down, admitted to SICU with left humerus periprosthetic comminuted fracture, c/b rhabdo and left radial nerve palsy, left AMA to [facility] parking lot by wheelchair, where she decided she wanted to be admitted again to hospital for fracture repair, hospital course complicated by hypotension which has now since resolved.  Psychiatry consulted to assess for delirium, agitation, management of medications.    Patient seen and evaluated, awake and alert, oriented to person, place, doesn't know what floor she is on, oriented to day of week, month and year, states that it is currently the first of the month.  Patient  unable to state or recall what happened prior to her coming to the hospital, states that she "broke my elbow."  Able to spell THINK backwards.  Reports initially leaving the hospital, reports walked out of the building and reports coming back in stating "it wasn't right."  Currently unable to recall what day she came into the hospital.  Currently endorses need for surgery to repair her fx which is scheduled for this Wednesday.  Patient denies ever seeing a psychiatrist or taking psychotropic medications.  Reports prior to coming to the hospital was living alone, has no HHA, reports has neighbors and friends who come in an check on her.  She denies having depressed, sad or low mood.  Denies that she has anxiety or panic attacks.  Currently denies having any SI/HI, AVH, or thoughts of paranoia, denies any previous episodes of hallucinations.  She reports that her appetite has been good, denies having any good sleep. 69y/o  female, with 2 adult kids, lives alone with her gretel in a private residence in [place], with no PPHx, no h/o SA/SIB, no h/o substance abuse, no h/o inpt psychiatric hospitalizations, with PMHx HTN, obesity, COPD, R TKR, recent L reverse total shoulder replacement (@ OSH), who was BIBEMS yesterday as a level 2 trauma after being found down, admitted to SICU with left humerus periprosthetic comminuted fracture, c/b rhabdo and left radial nerve palsy, left AMA to [facility] parking lot by wheelchair, where she decided she wanted to be admitted again to hospital for fracture repair, hospital course complicated by hypotension which has now since resolved.  Psychiatry consulted to assess for delirium, agitation, management of medications.    Patient seen and evaluated, awake and alert, oriented to person, place, doesn't know what floor she is on, oriented to day of week, month and year, states that it is currently the first of the month.  Patient  unable to state or recall what happened prior to her coming to the hospital, states that she "broke my elbow."  Able to spell THINK backwards.  Reports initially leaving the hospital, reports walked out of the building and reports coming back in stating "it wasn't right."  Currently unable to recall what day she came into the hospital.  Currently endorses need for surgery to repair her fx which is scheduled for this Wednesday.  Patient denies ever seeing a psychiatrist or taking psychotropic medications.  Reports prior to coming to the hospital was living alone, has no HHA, reports has neighbors and friends who come in an check on her.  She denies having depressed, sad or low mood.  Denies that she has anxiety or panic attacks.  Currently denies having any SI/HI, AVH, or thoughts of paranoia, denies any previous episodes of hallucinations.  She reports that her appetite has been good, denies having any good sleep.    Spoke with patient's son, Fransisco, who reports patient at baseline is a "difficult, angry person" all of her life.  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  He reports she at that point thought the staff had programmed her son's voice into her phone, requiring multiple medications to help calm her.  He is unsure of what she was on during this admission.  He reports patient has no h/o dementia or memory impairments, but he reports that her friends have been noticing in the last 3 months that she has been forgetting conversations that she has with them, more forgetful.  He reports patient had agreed to going to a particular rehab following her last hospitalization and then later forgot that she had already chosen a particular facility, becoming angry and irate.  He denies patient has been physically combative, reports she can become verbally combative.  He denies patient has ever had any unusual disturbances such as visual/auditory hallucinations.  He reports patient has extensive daily alcohol use for many decades, unsure how much she consumes, unsure when her last drink was.  He denies that patient has any h/o significant withdrawals or seizures from alcohol use. He denies that there is any family psychiatric history, denies patient has access to guns. 69y/o  female, with 2 adult kids, lives alone with her gretel in a private residence in [place], with no PPHx, no h/o SA/SIB, no h/o substance abuse, no h/o inpt psychiatric hospitalizations, with PMHx HTN, obesity, COPD, R TKR, recent L reverse total shoulder replacement (@ OSH), who was BIBEMS yesterday as a level 2 trauma after being found down, admitted to SICU with left humerus periprosthetic comminuted fracture, c/b rhabdo and left radial nerve palsy, left AMA to [facility] parking lot by wheelchair, where she decided she wanted to be admitted again to hospital for fracture repair, hospital course complicated by hypotension which has now since resolved.  Psychiatry consulted to assess for delirium, agitation, management of medications.    Patient seen and evaluated, awake and alert, oriented to person, place, doesn't know what floor she is on, oriented to day of week, month and year, states that it is currently the first of the month.  Patient  unable to state or recall what happened prior to her coming to the hospital, states that she "broke my elbow."  Able to spell THINK backwards.  Reports initially leaving the hospital, reports walked out of the building and reports coming back in stating "it wasn't right."  Currently unable to recall what day she came into the hospital.  Currently endorses need for surgery to repair her fx which is scheduled for this Wednesday.  Patient denies ever seeing a psychiatrist or taking psychotropic medications.  Reports prior to coming to the hospital was living alone, has no HHA, reports has neighbors and friends who come in and check on her.  She denies having depressed, sad or low mood.  Denies that she has anxiety or panic attacks.  Currently denies having any SI/HI, AVH, or thoughts of paranoia, denies any previous episodes of hallucinations.  She reports that her appetite has been good, denies having any good sleep.    Spoke with patient's son, Fransisco, who reports patient at baseline is a "difficult, angry person" all of her life.  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  He reports she at that point thought the staff had programmed her son's voice into her phone, requiring multiple medications to help calm her.  He is unsure of what she was on during this admission.  He reports patient has no h/o dementia or memory impairments, but he reports that her friends have been noticing in the last 3 months that she has been forgetting conversations that she has with them, more forgetful.  He reports patient had agreed to going to a particular rehab following her last hospitalization and then later forgot that she had already chosen a particular facility, becoming angry and irate.  He denies patient has been physically combative, reports she can become verbally combative.  He denies patient has ever had any unusual disturbances such as visual/auditory hallucinations.  He reports patient has extensive daily alcohol use for many decades, unsure how much she consumes, unsure when her last drink was.  He denies that patient has any h/o significant withdrawals or seizures from alcohol use. He denies that there is any family psychiatric history, denies patient has access to guns.

## 2020-12-14 NOTE — BEHAVIORAL HEALTH ASSESSMENT NOTE - NSBHCHARTREVIEWVS_PSY_A_CORE FT
Vital Signs Last 24 Hrs  T(C): 37.7 (14 Dec 2020 09:42), Max: 37.7 (14 Dec 2020 09:42)  T(F): 99.8 (14 Dec 2020 09:42), Max: 99.8 (14 Dec 2020 09:42)  HR: 98 (14 Dec 2020 09:42) (88 - 98)  BP: 177/71 (14 Dec 2020 09:42) (70/50 - 177/71)  BP(mean): --  RR: 18 (14 Dec 2020 09:42) (16 - 18)  SpO2: 95% (14 Dec 2020 09:42) (95% - 98%)

## 2020-12-14 NOTE — PROGRESS NOTE ADULT - SUBJECTIVE AND OBJECTIVE BOX
Pt seen and examined. Refuses to move fingers, cannot perform n/v assessment.    Injury explained to the patient. Highly comminuted periprosthetic fracture. OPtion of ORIf vs revision vs nonop explained. High risk of nonunion and persistent radial nerve palsy possible.    Plan for ORIF wed or thurs if patient agrees with plan. Otherwise will obtain brace and she can f/u with her total shoulder surgeon.

## 2020-12-14 NOTE — PROGRESS NOTE ADULT - SUBJECTIVE AND OBJECTIVE BOX
Patient is a 70y old  Female who presents with a chief complaint of left humerus fracture (14 Dec 2020 06:57)      SUBJECTIVE / OVERNIGHT EVENTS:    Patient seen and examined. denies cp sob. now agreeable to surgery. denies lightheaded.      Vital Signs Last 24 Hrs  T(C): 37.7 (14 Dec 2020 09:42), Max: 37.7 (14 Dec 2020 09:42)  T(F): 99.8 (14 Dec 2020 09:42), Max: 99.8 (14 Dec 2020 09:42)  HR: 98 (14 Dec 2020 09:42) (88 - 98)  BP: 177/71 (14 Dec 2020 09:42) (70/50 - 177/71)  BP(mean): --  RR: 18 (14 Dec 2020 09:42) (16 - 18)  SpO2: 95% (14 Dec 2020 09:42) (95% - 98%)  I&O's Summary    13 Dec 2020 07:01  -  14 Dec 2020 07:00  --------------------------------------------------------  IN: 2220 mL / OUT: 1340 mL / NET: 880 mL    14 Dec 2020 07:01  -  14 Dec 2020 10:41  --------------------------------------------------------  IN: 240 mL / OUT: 0 mL / NET: 240 mL        PE:  GENERAL: NAD, AAOx2-3  HEAD:  Atraumatic, Normocephalic  CHEST/LUNG: exp wheezing  HEART: Regular rate and rhythm; no murmur  ABDOMEN: Soft, Nontender, Nondistended; Bowel sounds present  EXTREMITIES:  left arm cast, no LE edema  NEURO: No focal deficits, confused at times    LABS:                        10.1   5.71  )-----------( 185      ( 14 Dec 2020 10:17 )             33.5     12-13    141  |  106  |  16  ----------------------------<  102<H>  3.7   |  21<L>  |  1.18    Ca    8.7      13 Dec 2020 05:17  Phos  4.2     12-13  Mg     1.9     12-13    TPro  6.4  /  Alb  3.6  /  TBili  0.8  /  DBili  x   /  AST  32  /  ALT  22  /  AlkPhos  78  12-13    PT/INR - ( 13 Dec 2020 09:21 )   PT: 14.2 sec;   INR: 1.19 ratio         PTT - ( 13 Dec 2020 09:21 )  PTT:23.1 sec  CAPILLARY BLOOD GLUCOSE        CARDIAC MARKERS ( 13 Dec 2020 05:17 )  x     / x     / x     / x     / 7.5 ng/mL  CARDIAC MARKERS ( 13 Dec 2020 03:17 )  x     / x     / 2223 U/L / x     / x      CARDIAC MARKERS ( 12 Dec 2020 18:35 )  x     / x     / 2895 U/L / x     / 11.4 ng/mL      Urinalysis Basic - ( 13 Dec 2020 22:33 )    Color: Yellow / Appearance: Slightly Turbid / SG: >1.050 / pH: x  Gluc: x / Ketone: Small  / Bili: Negative / Urobili: <2 mg/dL   Blood: x / Protein: 30 mg/dL / Nitrite: Positive   Leuk Esterase: Small / RBC: 4 /HPF /  /HPF   Sq Epi: x / Non Sq Epi: 2 /HPF / Bacteria: TNTC        RADIOLOGY & ADDITIONAL TESTS:    Imaging Personally Reviewed:  [x] YES  [ ] NO    Consultant(s) Notes Reviewed:  [x] YES  [ ] NO    MEDICATIONS  (STANDING):  atorvastatin 40 milliGRAM(s) Oral at bedtime  heparin   Injectable 5000 Unit(s) SubCutaneous every 8 hours  influenza   Vaccine 0.5 milliLiter(s) IntraMuscular once  loratadine 10 milliGRAM(s) Oral daily  nystatin Powder 1 Application(s) Topical two times a day    MEDICATIONS  (PRN):  QUEtiapine 25 milliGRAM(s) Oral three times a day PRN agitation      Care Discussed with Consultants/Other Providers [x] YES  [ ] NO    HEALTH ISSUES - PROBLEM Dx:  LEIF (acute kidney injury)  LEIF (acute kidney injury)    Suspected pulmonary embolism    Suspected deep vein thrombosis (DVT)    Agitation  Agitation    Syncope, unspecified syncope type  Syncope, unspecified syncope type    Traumatic rhabdomyolysis, initial encounter  Traumatic rhabdomyolysis, initial encounter    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

## 2020-12-14 NOTE — BEHAVIORAL HEALTH ASSESSMENT NOTE - NSBHCONSULTRECOMMENDOTHER_PSY_A_CORE FT
1. Check: EKG, B12, folate, RPR, UA, U-tox    2. Minimize use of benzos, opioids, anticholinergics, or other deliriogenic agents when possible.  Maintain sleep wake cycle.  Provide frequent reorientation and redirection.  Family member at bedside if possible. Assess for need for glasses and hearing aid (if applicable).    3. Pt does not meet criteria for psychiatric hospitalization.  Recommend outpt psych f/u at [facility] after d/c- [phone].   [facility] Psych will follow.

## 2020-12-14 NOTE — BEHAVIORAL HEALTH ASSESSMENT NOTE - NSBHCONSULTSUBSTANCEALCOHOL_PSY_A_CORE FT
CIWA for alcohol withdrawal sx. Medicate accordingly with PRN Ativan for any breakthrough sx or increase in CIWA score.  MVI, folic acid, high dose thiamine supplementation x 3 days.

## 2020-12-14 NOTE — PROVIDER CONTACT NOTE (OTHER) - ASSESSMENT
pt AOx4, VSS, and resting in bed. Pt s/p straight cath x2 today and DTV @ 2045, currently primafit in place. Pt denies urge to urinate & abd non distended, non tender. Bladder scan shows 283cc.

## 2020-12-14 NOTE — BEHAVIORAL HEALTH ASSESSMENT NOTE - NSBHCHARTREVIEWIMAGING_PSY_A_CORE FT
< from: CT Head No Cont (12.12.20 @ 19:27) >      EXAM:  CT CERVICAL SPINE                          EXAM:  CT BRAIN                            PROCEDURE DATE:  12/12/2020            INTERPRETATION:  Clinical indications: Trauma.    Multiple axial sections were performed from base of skull to vertex without contrast enhancement. Coronal and sagittal reconstructions were performed as well    This exam is compared with prior noncontrast head CT performed on May 9, 2018.    Parenchymal volume loss and chronic microvascular ischemic changes are again seen and unchanged    There is no evidence acute hemorrhage mass or mass effect seen.    Evaluation of the osseous structures with the appropriate window appears unremarkable.    The visualized paranasal sinuses mastoid and mastoids appear clear.    IMPRESSION: No acute hemorrhage, mass or mass effect.    Multiple axial sections were performed through the cervical spine. Coronal and sagittal reconstructions were performed as well    Loss of the normal cervical lordosis is seen. This could be due to positioning or muscle spasm.    C7 is slightly anteriorly displaced relative to T1. This finding is likely the result of chronic degenerative changes.    There are no acute fractures or dislocations identified.    Bilateral hypertrophic facet changes are seen at multiple levels which are secondary to chronic degenerative changes.    Evaluation of the paraspinal soft tissues is limited by lack of IV contrast though grossly normal    IMPRESSION: No acute fractures or dislocations seen.        < end of copied text >

## 2020-12-14 NOTE — PROGRESS NOTE ADULT - ASSESSMENT
A/P: 70 year old female with left periprosthetic humerus fracture s/p presumed fall or injury who was found down for possibly up to 48 hours. Was admitted to SICU and signed out AMA only to return to hospital requesting admission because she realized she would not be able to take care of herself or function independently at home.    ·	Discussed with patient at length, she would like to undergo surgical fixation of left humerus fracture with Dr. Palma likely Wednesday 12/16/2020  ·	Started on DVT prophylaxis (Heparin SubQ); will DC Tuesday night in preparation for surgery  ·	Follow up Creatine Kinase, patient was on floor for ~48 hours and presumed component of Rhabdo, recommend monitoring Cr/BUN for LEIF, Fluids if primary team deems necessary  ·	Given prolonged time down, consider b/l lower extremity venous dopplers to assess for DVT  ·	Radial Nerve Palsy, likely secondary to injury/fracture. Will continue to monitor physical exam, but patient informed that this will likely take a significant amount of time before recovery.  ·	NWB LUE  ·	PT/OT  ·	Venodynes  ·	Incentive Spirometry  ·	Rest of medical care per primary team  ·	Medical optimization for surgery 12/16/2020 AM with Dr. Palma

## 2020-12-14 NOTE — CHART NOTE - NSCHARTNOTEFT_GEN_A_CORE
Notified by radiology resident regarding pre-jackson  results (//2020):        Patient seen and examined at the bedside.  Patient is currently asymptomatic. Denies weakness, headaches, dizziness, LOC/syncope, mental status changes, paresthesias, visual changes, chest pain, palpitations, dyspnea, abdominal pain, paralysis.    Vital Signs Last 24 Hrs  T(C): 36.8 (14 Dec 2020 15:00), Max: 37.7 (14 Dec 2020 09:42)  T(F): 98.3 (14 Dec 2020 15:00), Max: 99.8 (14 Dec 2020 09:42)  HR: 93 (14 Dec 2020 15:00) (88 - 98)  BP: 98/63 (14 Dec 2020 15:00) (70/50 - 177/71)  RR: 18 (14 Dec 2020 15:00) (16 - 18)  SpO2: 94% (14 Dec 2020 15:00) (94% - 98%)    PHYSICAL EXAM:  General: NAD, A&Ox3  Respiratory: Lungs clear to auscultation bilaterally. No wheezes, rales, rhonchi. Normal respiratory effort.   Cardiovascular: S1, S2 present. Regular rate and rhythm. No murmurs, rubs, or gallops  Gastrointestinal: BS x4 normoactive. Soft, non-tender, non-distended.   Extremities: 2+ peripheral pulses. No edema, cyanosis.    A/P  HPI:  70F pmhx HTN, obesity, COPD, anxiety, agitation, R TKR, recent L reverse total shoulder replacement (@ OSH), who was BIBEMS yesterday as a level 2 trauma after being found down, admitted to SICU with left humerus periprosthetic comminuted fracture, c/b rhabdo and left radial nerve palsy, left AMA to [facility] parking lot by wheelchair, where she decided she wanted to be admitted again to hospital for poss fracture repair.    Pt reportedly found by neighbor on floor, and reportedly on floor approximately 48 hours.. When asked why she was on the floor, the patient replied "I was eating." Pt denies falling, but has no memory of how she got her fracture and no memory of the past few days. See prior H&P and notes for detailed course. Per ED, pt now cannot walk and needs a walker and needs hospitalization of debility and poss placement/assistance. At baseline, pt states she is very active, walks 3 miles, can walk up a flight of stairs without problem, plays golf weekly. Pt denies any symptoms at all, including pain in left shoulder.   Review of cahrt shows pt having episodes of intermittent hypotension including requiring pressors during prior hospitalization. Yesterday, pt initially hypotensive to SBP 87, which improved to SBP 200s after 500 cc IVF. Pt denies being on any blood thinners, but meds from other sources shows eliquis filled in september '20.  Pt reported having hallucinations in the ED, which pt currently denies. Chart review shows history of intermittent agitation and confusion.    Tm 98.2, P 64, /96, R 18, % RA  Left shoulder splinted in ED by ortho. (13 Dec 2020 06:42)      #  -Repeat vitals were stable  -Last EKG (//2020) shows NSR (HR: ). No acute changes when compared with last EKG from previous admission (//19)  -Patient with no history of PE (c/b intracranial bleeding? GI bleed?) or recent bleeding. Denies recent intracranial bleeding, hematemesis, hematochezia, melena, hematuria, or any other active sites of bleeding.   -Patient explained the risks and benefits of starting anticoagulation, and understands.  -Patient agrees to starting anticoagulation. Patient has no questions.   -STAT coags ordered; will start heparin gtt after coags result  -PERT team to be called for ? mechanical thrombectomy or tPA   -TTE to evaluate for right heart strain  -CT angio chest with IV contrast  -B/l lower extremity dopplers to r/o DVT  -Monitor closely for signs of bleeding or change in neuro status   -Discussed with   -Discussed with LEONARD MullinsC  # Patient with b/l LE DVTs as shown on b/l LE dopplers:     < from: [facility] Duplex Lower Ext Vein Scan, Bilat (12.14.20 @ 13:40) >    There are postphlebitic changes, the residue of prior DVT, affecting the left popliteal and femoral veins in the thigh, and the left posterior tibial peroneal trunk and gastrocnemius veins in the calf.    There is acute calf vein DVT affecting both the right and left soleal veins.    < end of copied text >      Patient seen and examined at the bedside.  Patient is currently asymptomatic. Denies weakness, headaches, dizziness, LOC/syncope, mental status changes, paresthesias, visual changes, chest pain, palpitations, dyspnea, abdominal pain, paralysis.    Vital Signs Last 24 Hrs  T(C): 36.8 (14 Dec 2020 15:00), Max: 37.7 (14 Dec 2020 09:42)  T(F): 98.3 (14 Dec 2020 15:00), Max: 99.8 (14 Dec 2020 09:42)  HR: 93 (14 Dec 2020 15:00) (88 - 98)  BP: 98/63 (14 Dec 2020 15:00) (70/50 - 177/71)  RR: 18 (14 Dec 2020 15:00) (16 - 18)  SpO2: 94% (14 Dec 2020 15:00) (94% - 98%)    PHYSICAL EXAM:  General: NAD, A&Ox3  Respiratory: Lungs clear to auscultation bilaterally. No wheezes, rales, rhonchi. Normal respiratory effort.   Cardiovascular: S1, S2 present. Regular rate and rhythm. No murmurs, rubs, or gallops  Gastrointestinal: BS x4 normoactive. Soft, non-tender, non-distended.   Extremities: 2+ peripheral pulses. No edema, cyanosis.    A/P  HPI:  70F pmhx HTN, obesity, COPD, anxiety, agitation, R TKR, recent L reverse total shoulder replacement (@ OSH), who was BIBEMS yesterday as a level 2 trauma after being found down, admitted to SICU with left humerus periprosthetic comminuted fracture, c/b rhabdo and left radial nerve palsy, left AMA to [facility] parking lot by wheelchair, where she decided she wanted to be admitted again to hospital for poss fracture repair.    Pt reportedly found by neighbor on floor, and reportedly on floor approximately 48 hours.. When asked why she was on the floor, the patient replied "I was eating." Pt denies falling, but has no memory of how she got her fracture and no memory of the past few days. See prior H&P and notes for detailed course. Per ED, pt now cannot walk and needs a walker and needs hospitalization of debility and poss placement/assistance. At baseline, pt states she is very active, walks 3 miles, can walk up a flight of stairs without problem, plays golf weekly. Pt denies any symptoms at all, including pain in left shoulder.   Review of cahrt shows pt having episodes of intermittent hypotension including requiring pressors during prior hospitalization. Yesterday, pt initially hypotensive to SBP 87, which improved to SBP 200s after 500 cc IVF. Pt denies being on any blood thinners, but meds from other sources shows eliquis filled in september '20.  Pt reported having hallucinations in the ED, which pt currently denies. Chart review shows history of intermittent agitation and confusion.    Tm 98.2, P 64, /96, R 18, % RA  Left shoulder splinted in ED by ortho. (13 Dec 2020 06:42)      #  -Repeat vitals were stable  -Patient with no history of PE (c/b intracranial bleeding? GI bleed?) or recent bleeding. Denies recent intracranial bleeding, hematemesis, hematochezia, melena, hematuria, or any other active sites of bleeding.   -Patient explained the risks and benefits of starting anticoagulation, and understands.  -Patient agrees to starting anticoagulation. Patient has no questions.   -Monitor closely for signs of bleeding or change in neuro status   -Discussed with Dr. Diallo  -Discussed with ISABEL Mullins-C  #[id number] Patient with b/l LE DVTs as shown on b/l LE dopplers:     < from: [facility] Duplex Lower Ext Vein Scan, Bilat (12.14.20 @ 13:40) >    There are postphlebitic changes, the residue of prior DVT, affecting the left popliteal and femoral veins in the thigh, and the left posterior tibial peroneal trunk and gastrocnemius veins in the calf.    There is acute calf vein DVT affecting both the right and left soleal veins.    < end of copied text >      Patient seen and examined at the bedside.  Patient reports an "uncomfortable" feeling in her b/l LEs, but denies any actual pain. Patient is otherwise asymptomatic. Denies headaches, dizziness, paresthesias, chest pain, palpitations, dyspnea, abdominal pain, paralysis.    Vital Signs Last 24 Hrs  T(C): 36.8 (14 Dec 2020 15:00), Max: 37.7 (14 Dec 2020 09:42)  T(F): 98.3 (14 Dec 2020 15:00), Max: 99.8 (14 Dec 2020 09:42)  HR: 93 (14 Dec 2020 15:00) (88 - 98)  BP: 98/63 (14 Dec 2020 15:00) (70/50 - 177/71)  RR: 18 (14 Dec 2020 15:00) (16 - 18)  SpO2: 94% (14 Dec 2020 15:00) (94% - 98%)    PHYSICAL EXAM:  General: NAD, A&Ox3  Respiratory: Lungs clear to auscultation bilaterally. No wheezes, rales, rhonchi. Normal respiratory effort.   Cardiovascular: S1, S2 present. Regular rate and rhythm. No murmurs, rubs, or gallops  Gastrointestinal: BS x4 normoactive. Soft, non-tender, non-distended.   Extremities: LUE in ACE wrap and splint. 2+ peripheral pulses. No edema, cyanosis. No TTP in b/l LE     LABS:                        10.1   5.71  )-----------( 185      ( 14 Dec 2020 10:17 )             33.5     12-14    143  |  108  |  12  ----------------------------<  112<H>  3.7   |  23  |  1.02    Ca    8.3<L>      14 Dec 2020 10:17  Phos  3.7     12-14  Mg     1.8     12-14    TPro  6.0  /  Alb  3.4  /  TBili  0.4  /  DBili  x   /  AST  21  /  ALT  17  /  AlkPhos  71  12-14      A/P  70F pmhx HTN, obesity, COPD, anxiety, agitation, R TKR, recent L reverse total shoulder replacement (@ OSH), who was BIBEMS yesterday as a level 2 trauma after being found down, admitted to SICU with left humerus periprosthetic comminuted fracture, c/b rhabdo, left AMA to [facility] parking lot by wheelchair, now back to hospital for debility, found to be hypotensive to SBP 60-70.  Now, patient with b/l LE DVTs as seen on B/l LE dopplers (12/14/2020). Patient reports an "uncomfortable" feeling in her b/l LEs, but denies any actual pain. Patient is otherwise asymptomatic.     #B/l LE DVTs  -Patient with no history of PE or recent bleeding. Denies recent intracranial bleeding, hematemesis, hematochezia, melena, hematuria, or any other active sites of bleeding.   -Patient explained the risks and benefits of starting anticoagulation, and understands.  -Patient agrees to starting anticoagulation. Patient has no questions.   -Monitor closely for signs of bleeding or change in neuro status   -Discussed with Dr. Diallo: Start Lovenox 1mg/kg BID. F/u with vascular cardiology (called by AM team) for further AC recs  -Discussed with RN      Larisa Zendejas, PA-C  #[id number]

## 2020-12-14 NOTE — BEHAVIORAL HEALTH ASSESSMENT NOTE - DESCRIPTION (FIRST USE, LAST USE, QUANTITY, FREQUENCY, DURATION)
per son patient has had chronic alcohol use for decades, unsure of amount, unsure of patient's last drink.  Son denies h/o complicated withdrawals or DTs

## 2020-12-14 NOTE — CONSULT NOTE ADULT - SUBJECTIVE AND OBJECTIVE BOX
[facility], Division of Infectious Diseases  MAHOGANY Mcfarland, JIMMY Whyte  [phone]    DALI RAYGOZA  70y, Female  [id number]    HPI--  HPI:  Pt is a 70W w/ PMhx of HTN, obesity, COPD, anxiety, agitation, R TKR, recent L reverse total shoulder replacement (@ OSH), who was BIBEMS yesterday as a level 2 trauma after being found down, admitted to SICU with left humerus periprosthetic comminuted fracture, c/brhabdo and left radial nerve palsy, left AMA to [facility] parking lot by wheelchair, where she decided she wanted to be admitted again to hospital for poss fracture repair.    Pt reportedly found by neighbor on floor, and reportedly on floor approximately 48 hours. When asked why she was on the floor, the patient replied "I was eating." Pt denies falling, but has no memory of how she got her fracture and no memory of the past few days. See prior H&P and notes for detailed course. Per ED, pt now cannot walk and needs a walker and needs hospitalization of debility and poss placement/assistance. At baseline, pt states she is very active, walks 3 miles, can walk up a flight of stairs without problem, plays golf weekly. Pt denies any symptoms at all, including pain in left shoulder.   Review of cahrt shows pt having episodes of intermittent hypotension including requiring pressors during prior hospitalization. Yesterday, pt initially hypotensive to SBP 87, which improved to SBP 200s after 500 cc IVF. Pt denies being on any blood thinners, but meds from other sources shows eliquis filled in september '20.  Pt reported having hallucinations in the ED, which pt currently denies. Chart review shows history of intermittent agitation and confusion.    Tm 98.2, P 64, /96, R 18, % RA  Left shoulder splinted in ED by ortho.    ID c/s on HD#            Active Medications--  acetaminophen   Tablet .. 650 milliGRAM(s) Oral every 6 hours PRN  atorvastatin 40 milliGRAM(s) Oral at bedtime  heparin   Injectable 5000 Unit(s) SubCutaneous every 8 hours  influenza   Vaccine 0.5 milliLiter(s) IntraMuscular once  loratadine 10 milliGRAM(s) Oral daily  nystatin Powder 1 Application(s) Topical two times a day  QUEtiapine 25 milliGRAM(s) Oral three times a day PRN    Antimicrobials:     Immunologic: influenza   Vaccine 0.5 milliLiter(s) IntraMuscular once      ROS:  CONSTITUTIONAL: No fevers or chills. No weakness or headache. No weight changes.  EYES/ENT: No visual or hearing changes. No sore throat or throat pain .  NECK: No pain or stiffness  RESPIRATORY: No cough, wheezing, or hemoptysis. No shortness of breath  CARDIOVASCULAR: No chest pain or palpitations  GASTROINTESTINAL: No abdominal pain. No nausea or vomiting. No diarrhea or constipation.  GENITOURINARY: No dysuria, frequency or hematuria  NEUROLOGICAL: No numbness or weakness  SKIN: No itching or rashes  PSYCHIATRIC: Pleasant. Appropriate affect    Allergies: Allergy Status Unknown    PMH -- Seasonal allergies    Anxiety    Chronic lower back pain    Osteoarthritis    Chronic obstructive pulmonary disease (COPD)    Hyperlipidemia    Hypertension    Osteoarthritis of left shoulder    Class 1 obesity with body mass index (BMI) of 34.0 to 34.9 in adult    History of closed shoulder dislocation    Osteoarthritis    Anxiety    HLD (hyperlipidemia)    Essential hypertension    No pertinent past medical history      PSH -- H/O total knee replacement    H/O shoulder replacement    History of right knee joint replacement    No significant past surgical history    No significant past surgical history      FH -- Family history of myocardial infarction    No pertinent family history in first degree relatives    FH: heart attack    No pertinent family history in first degree relatives      Social History --  EtOH: denies   Tobacco: denies   Drug Use: denies     Travel/Environmental/Occupational History:    Physical Exam--  Vital Signs Last 24 Hrs  T(F): 99.8 (14 Dec 2020 09:42), Max: 99.8 (14 Dec 2020 09:42)  HR: 98 (14 Dec 2020 09:42) (88 - 98)  BP: 177/71 (14 Dec 2020 09:42) (70/50 - 177/71)  RR: 18 (14 Dec 2020 09:42) (16 - 18)  SpO2: 95% (14 Dec 2020 09:42) (95% - 98%)  General: nontoxic-appearing, no acute distress  HEENT: NC/AT, EOMI, anicteric, conjunctiva pink and moist, oropharynx clear, dentition fair  Neck: Not rigid. No sense of mass. No LAD  Lungs: Clear bilaterally without rales, wheezing or rhonchi  Heart: Regular rate and rhythm. No murmur, rub or gallop.  Abdomen: Soft. Nondistended. Nontender. Bowel sounds present. No organomegaly.  Back: No spinal tenderness. No costovertebral angle tenderness.  Extremities: No cyanosis or clubbing. No edema.   Skin: Warm. Dry. Good turgor. No rash. No vasculitic stigmata.  Psychiatric: Appropriate affect and mood for situation.     Laboratory & Imaging Data--  CBC:                       10.1   5.71  )-----------( 185      ( 14 Dec 2020 10:17 )             33.5     CMP: 12-14    143  |  108  |  12  ----------------------------<  112<H>  3.7   |  23  |  1.02    Ca    8.3<L>      14 Dec 2020 10:17  Phos  3.7     12-14  Mg     1.8     12-14    TPro  6.0  /  Alb  3.4  /  TBili  0.4  /  DBili  x   /  AST  21  /  ALT  17  /  AlkPhos  71  12-14    LIVER FUNCTIONS - ( 14 Dec 2020 10:17 )  Alb: 3.4 g/dL / Pro: 6.0 g/dL / ALK PHOS: 71 U/L / ALT: 17 U/L / AST: 21 U/L / GGT: x           Urinalysis Basic - ( 13 Dec 2020 22:33 )    Color: Yellow / Appearance: Slightly Turbid / SG: >1.050 / pH: x  Gluc: x / Ketone: Small  / Bili: Negative / Urobili: <2 mg/dL   Blood: x / Protein: 30 mg/dL / Nitrite: Positive   Leuk Esterase: Small / RBC: 4 /HPF /  /HPF   Sq Epi: x / Non Sq Epi: 2 /HPF / Bacteria: TNTC        Microbiology: reviewed      Radiology: reviewed  < from: Xray Chest 1 View- PORTABLE-Routine (Xray Chest 1 View- PORTABLE-Routine .) (12.14.20 @ 09:49) >    EXAM:  XR CHEST PORTABLE ROUTINE 1V                            PROCEDURE DATE:  12/14/2020            INTERPRETATION:  A single chest x-ray was obtained on December 14, 2020.    Indication: Hypertension    Impression:    The heart is enlarged. The lungs are clear. No pleural effusion. No pneumothorax. Status post total left shoulder replacement.                SEAN GREGG MD; Attending Radiologist  This document has been electronically signed. Dec 14 2020 11:34AM    < end of copied text >  < from: Xray Chest 1 View- PORTABLE-Routine (Xray Chest 1 View- PORTABLE-Routine in AM.) (12.13.20 @ 07:18) >    EXAM:  XR CHEST PORTABLE ROUTINE 1V                            PROCEDURE DATE:  12/13/2020            INTERPRETATION:  CLINICAL INFORMATION: Trauma.    TECHNIQUE: Portable AP radiograph of the chest.    COMPARISON: Chest radiograph 12/12/2020.    FINDINGS:    Overlying soft tissue limiting the evaluation. Low lung volumes  Lungs appear grossly clear. Increased perihilar markings. No pleural effusion or pneumothorax.  Cannot accurately assess heart size in this view.    IMPRESSION:    Grossly clear lungs.              ARVIN MONTOYA MD; Resident Radiology  This document has been electronically signed.  AUDELIA WEEMS MD; Attending Radiologist  This document has been electronically signed. Dec 13 2020  9:25AM    < end of copied text >  < from: Xray Humerus, Left (12.12.20 @ 22:30) >    EXAM:  HUMERUS LEFT                            PROCEDURE DATE:  12/12/2020            INTERPRETATION:  CLINICAL INFORMATION: Post splinting of comminuted humerus fracture    TECHNIQUE: 2 views of the left humerus    COMPARISON: Pre-splinting radiographs 12/12/2020 8:30 PM    FINDINGS:    Redemonstration of acute comminuted fracture of the humerus surrounding the orthopedic hardware. Alignment unchanged.  Hardware intact.  Assessment of soft tissues is limited due to overlying material.    IMPRESSION:    Redemonstration of acute comminuted fracture of the humerus              ARVIN MONTOYA MD; Resident Radiology  This document has been electronically signed.  AUDELIA WEEMS MD; Attending Radiologist  This document has been electronically signed. Dec 13 2020 10:39AM    < end of copied text >  < from: Xray Shoulder 2 Views, Left (12.12.20 @ 22:30) >    EXAM:  FOREARM LEFT                          EXAM:  SHOULDER LEFT (MINIMUM 2 VIEWS)                          EXAM:  HUMERUS LEFT                          EXAM:  ELBOW LEFT (3 VIEWS)                            PROCEDURE DATE:  12/12/2020            INTERPRETATION:  CLINICAL INFORMATION: Trauma    TECHNIQUE: One view of the left shoulder, 2 views of the left humerus, 3 views of the left elbow, 3 views of the left forearm    COMPARISON: MR left shoulder 6/30/2020    FINDINGS:    There is an acute comminuted displaced periprosthetic fracture of the proximal shaft of the humerus surrounding the medullary michela of orthopedic hardware status post shoulder arthroplasty.    IMPRESSION:    Acute comminuted fracture of the humerus, surrounding shoulder arthroplasty hardware.              ARVIN MONTOYA MD; Resident Radiology  This document has been electronically signed.  AUDELIA WEEMS MD; Attending Radiologist  This document has been electronically signed. Dec 13 2020  9:23AM    < end of copied text >       [facility], Division of Infectious Diseases  MAHOGANY Mcfarland, JIMMY Whyte  [phone]    DALI RAYGOZA  70y, Female  [id number]    HPI--  HPI:  Pt is a 70W w/ PMhx of HTN, obesity, COPD, anxiety, agitation, R TKR, recent L reverse total shoulder replacement (@ OSH), who was BIBEMS yesterday as a level 2 trauma after being found down, admitted to SICU with left humerus periprosthetic comminuted fracture, c/brhabdo and left radial nerve palsy, left AMA to [facility] parking lot by wheelchair, where she decided she wanted to be admitted again to hospital for poss fracture repair.    Pt reportedly found by neighbor on floor, and reportedly on floor approximately 48 hours. When asked why she was on the floor, the patient replied "I was eating." Pt denies falling, but has no memory of how she got her fracture and no memory of the past few days. See prior H&P and notes for detailed course. Per ED, pt now cannot walk and needs a walker and needs hospitalization of debility and poss placement/assistance. At baseline, pt states she is very active, walks 3 miles, can walk up a flight of stairs without problem, plays golf weekly. Pt denies any symptoms at all, including pain in left shoulder.   Review of cahrt shows pt having episodes of intermittent hypotension including requiring pressors during prior hospitalization. Yesterday, pt initially hypotensive to SBP 87, which improved to SBP 200s after 500 cc IVF. Pt denies being on any blood thinners, but meds from other sources shows eliquis filled in september '20.  Pt reported having hallucinations in the ED, which pt currently denies. Chart review shows history of intermittent agitation and confusion.    Tm 98.2, P 64, /96, R 18, % RA  Left shoulder splinted in ED by ortho.    ID c/s on HD#2 for +UA and urinary sx.   Pt seen and examined at bedside. Low grade temp 99.8F.       Active Medications--  acetaminophen   Tablet .. 650 milliGRAM(s) Oral every 6 hours PRN  atorvastatin 40 milliGRAM(s) Oral at bedtime  heparin   Injectable 5000 Unit(s) SubCutaneous every 8 hours  influenza   Vaccine 0.5 milliLiter(s) IntraMuscular once  loratadine 10 milliGRAM(s) Oral daily  nystatin Powder 1 Application(s) Topical two times a day  QUEtiapine 25 milliGRAM(s) Oral three times a day PRN    Antimicrobials:     Immunologic: influenza   Vaccine 0.5 milliLiter(s) IntraMuscular once      ROS:  CONSTITUTIONAL: No fevers or chills. No weakness or headache. No weight changes.  EYES/ENT: No visual or hearing changes. No sore throat or throat pain .  NECK: No pain or stiffness  RESPIRATORY: No cough, wheezing, or hemoptysis. No shortness of breath  CARDIOVASCULAR: No chest pain or palpitations  GASTROINTESTINAL: No abdominal pain. No nausea or vomiting. No diarrhea or constipation.  GENITOURINARY: No dysuria, frequency or hematuria  NEUROLOGICAL: No numbness or weakness  SKIN: No itching or rashes  PSYCHIATRIC: Pleasant. Appropriate affect    Allergies: Allergy Status Unknown    PMH -- Seasonal allergies    Anxiety    Chronic lower back pain    Osteoarthritis    Chronic obstructive pulmonary disease (COPD)    Hyperlipidemia    Hypertension    Osteoarthritis of left shoulder    Class 1 obesity with body mass index (BMI) of 34.0 to 34.9 in adult    History of closed shoulder dislocation    Osteoarthritis    Anxiety    HLD (hyperlipidemia)    Essential hypertension    No pertinent past medical history      PSH -- H/O total knee replacement    H/O shoulder replacement    History of right knee joint replacement    No significant past surgical history    No significant past surgical history      FH -- Family history of myocardial infarction    No pertinent family history in first degree relatives    FH: heart attack    No pertinent family history in first degree relatives      Social History --  EtOH: denies   Tobacco: denies   Drug Use: denies     Travel/Environmental/Occupational History:    Physical Exam--  Vital Signs Last 24 Hrs  T(F): 99.8 (14 Dec 2020 09:42), Max: 99.8 (14 Dec 2020 09:42)  HR: 98 (14 Dec 2020 09:42) (88 - 98)  BP: 177/71 (14 Dec 2020 09:42) (70/50 - 177/71)  RR: 18 (14 Dec 2020 09:42) (16 - 18)  SpO2: 95% (14 Dec 2020 09:42) (95% - 98%)  General: nontoxic-appearing, no acute distress  HEENT: NC/AT, EOMI, anicteric, conjunctiva pink and moist, oropharynx clear, dentition fair  Neck: Not rigid. No sense of mass. No LAD  Lungs: Clear bilaterally without rales, wheezing or rhonchi  Heart: Regular rate and rhythm. No murmur, rub or gallop.  Abdomen: Soft. Nondistended. Nontender. Bowel sounds present. No organomegaly.  Back: No spinal tenderness. No costovertebral angle tenderness.  Extremities: No cyanosis or clubbing. No edema. L shoulder splint  Skin: Warm. Dry. Good turgor. No rash. No vasculitic stigmata.  Psychiatric: Appropriate affect and mood for situation.     Laboratory & Imaging Data--  CBC:                       10.1   5.71  )-----------( 185      ( 14 Dec 2020 10:17 )             33.5     CMP: 12-14    143  |  108  |  12  ----------------------------<  112<H>  3.7   |  23  |  1.02    Ca    8.3<L>      14 Dec 2020 10:17  Phos  3.7     12-14  Mg     1.8     12-14    TPro  6.0  /  Alb  3.4  /  TBili  0.4  /  DBili  x   /  AST  21  /  ALT  17  /  AlkPhos  71  12-14    LIVER FUNCTIONS - ( 14 Dec 2020 10:17 )  Alb: 3.4 g/dL / Pro: 6.0 g/dL / ALK PHOS: 71 U/L / ALT: 17 U/L / AST: 21 U/L / GGT: x           Urinalysis Basic - ( 13 Dec 2020 22:33 )    Color: Yellow / Appearance: Slightly Turbid / SG: >1.050 / pH: x  Gluc: x / Ketone: Small  / Bili: Negative / Urobili: <2 mg/dL   Blood: x / Protein: 30 mg/dL / Nitrite: Positive   Leuk Esterase: Small / RBC: 4 /HPF /  /HPF   Sq Epi: x / Non Sq Epi: 2 /HPF / Bacteria: TNTC        Microbiology: reviewed      Radiology: reviewed  < from: Xray Chest 1 View- PORTABLE-Routine (Xray Chest 1 View- PORTABLE-Routine .) (12.14.20 @ 09:49) >    EXAM:  XR CHEST PORTABLE ROUTINE 1V                            PROCEDURE DATE:  12/14/2020            INTERPRETATION:  A single chest x-ray was obtained on December 14, 2020.    Indication: Hypertension    Impression:    The heart is enlarged. The lungs are clear. No pleural effusion. No pneumothorax. Status post total left shoulder replacement.                SEAN GREGG MD; Attending Radiologist  This document has been electronically signed. Dec 14 2020 11:34AM    < end of copied text >  < from: Xray Chest 1 View- PORTABLE-Routine (Xray Chest 1 View- PORTABLE-Routine in AM.) (12.13.20 @ 07:18) >    EXAM:  XR CHEST PORTABLE ROUTINE 1V                            PROCEDURE DATE:  12/13/2020            INTERPRETATION:  CLINICAL INFORMATION: Trauma.    TECHNIQUE: Portable AP radiograph of the chest.    COMPARISON: Chest radiograph 12/12/2020.    FINDINGS:    Overlying soft tissue limiting the evaluation. Low lung volumes  Lungs appear grossly clear. Increased perihilar markings. No pleural effusion or pneumothorax.  Cannot accurately assess heart size in this view.    IMPRESSION:    Grossly clear lungs.              ARVIN MONTOYA MD; Resident Radiology  This document has been electronically signed.  AUDELIA WEEMS MD; Attending Radiologist  This document has been electronically signed. Dec 13 2020  9:25AM    < end of copied text >  < from: Xray Humerus, Left (12.12.20 @ 22:30) >    EXAM:  HUMERUS LEFT                            PROCEDURE DATE:  12/12/2020            INTERPRETATION:  CLINICAL INFORMATION: Post splinting of comminuted humerus fracture    TECHNIQUE: 2 views of the left humerus    COMPARISON: Pre-splinting radiographs 12/12/2020 8:30 PM    FINDINGS:    Redemonstration of acute comminuted fracture of the humerus surrounding the orthopedic hardware. Alignment unchanged.  Hardware intact.  Assessment of soft tissues is limited due to overlying material.    IMPRESSION:    Redemonstration of acute comminuted fracture of the humerus              ARVIN MONTOYA MD; Resident Radiology  This document has been electronically signed.  AUDELIA WEEMS MD; Attending Radiologist  This document has been electronically signed. Dec 13 2020 10:39AM    < end of copied text >  < from: Xray Shoulder 2 Views, Left (12.12.20 @ 22:30) >    EXAM:  FOREARM LEFT                          EXAM:  SHOULDER LEFT (MINIMUM 2 VIEWS)                          EXAM:  HUMERUS LEFT                          EXAM:  ELBOW LEFT (3 VIEWS)                            PROCEDURE DATE:  12/12/2020            INTERPRETATION:  CLINICAL INFORMATION: Trauma    TECHNIQUE: One view of the left shoulder, 2 views of the left humerus, 3 views of the left elbow, 3 views of the left forearm    COMPARISON: MR left shoulder 6/30/2020    FINDINGS:    There is an acute comminuted displaced periprosthetic fracture of the proximal shaft of the humerus surrounding the medullary michela of orthopedic hardware status post shoulder arthroplasty.    IMPRESSION:    Acute comminuted fracture of the humerus, surrounding shoulder arthroplasty hardware.              ARVIN MONTOYA MD; Resident Radiology  This document has been electronically signed.  AUDELIA WEEMS MD; Attending Radiologist  This document has been electronically signed. Dec 13 2020  9:23AM    < end of copied text >       [facility], Division of Infectious Diseases  MAHOGANY Mcfarland, JIMMY Whyte  [phone]    DALI RAYGOZA  70y, Female  [id number]    HPI--  HPI:  Pt is a 70W w/ PMhx of HTN, obesity, COPD, anxiety, agitation, R TKR, recent L reverse total shoulder replacement (@ OSH), who was BIBEMS yesterday as a level 2 trauma after being found down, admitted to SICU with left humerus periprosthetic comminuted fracture, c/brhabdo and left radial nerve palsy, left AMA to [facility] parking lot by wheelchair, where she decided she wanted to be admitted again to hospital for poss fracture repair.    Pt reportedly found by neighbor on floor, and reportedly on floor approximately 48 hours. When asked why she was on the floor, the patient replied "I was eating." Pt denies falling, but has no memory of how she got her fracture and no memory of the past few days. See prior H&P and notes for detailed course. Per ED, pt now cannot walk and needs a walker and needs hospitalization of debility and poss placement/assistance. At baseline, pt states she is very active, walks 3 miles, can walk up a flight of stairs without problem, plays golf weekly. Pt denies any symptoms at all, including pain in left shoulder.   Review of cahrt shows pt having episodes of intermittent hypotension including requiring pressors during prior hospitalization. Yesterday, pt initially hypotensive to SBP 87, which improved to SBP 200s after 500 cc IVF. Pt denies being on any blood thinners, but meds from other sources shows eliquis filled in september '20.  Pt reported having hallucinations in the ED, which pt currently denies. Chart review shows history of intermittent agitation and confusion.    Tm 98.2, P 64, /96, R 18, % RA  Left shoulder splinted in ED by ortho.    ID c/s on HD#2 for +UA and urinary sx.   Pt seen and examined at bedside. told me "please leave me alone and let me sleep"    Active Medications--  acetaminophen   Tablet .. 650 milliGRAM(s) Oral every 6 hours PRN  atorvastatin 40 milliGRAM(s) Oral at bedtime  heparin   Injectable 5000 Unit(s) SubCutaneous every 8 hours  influenza   Vaccine 0.5 milliLiter(s) IntraMuscular once  loratadine 10 milliGRAM(s) Oral daily  nystatin Powder 1 Application(s) Topical two times a day  QUEtiapine 25 milliGRAM(s) Oral three times a day PRN    Antimicrobials:     Immunologic: influenza   Vaccine 0.5 milliLiter(s) IntraMuscular once      ROS:  unable to obtain, pt refused interview    Allergies: Allergy Status Unknown    PMH -- Seasonal allergies    Anxiety    Chronic lower back pain    Osteoarthritis    Chronic obstructive pulmonary disease (COPD)    Hyperlipidemia    Hypertension    Osteoarthritis of left shoulder    Class 1 obesity with body mass index (BMI) of 34.0 to 34.9 in adult    History of closed shoulder dislocation    Osteoarthritis    Anxiety    HLD (hyperlipidemia)    Essential hypertension    No pertinent past medical history      PSH -- H/O total knee replacement    H/O shoulder replacement    History of right knee joint replacement    No significant past surgical history    No significant past surgical history      FH -- Family history of myocardial infarction    No pertinent family history in first degree relatives    FH: heart attack    No pertinent family history in first degree relatives      Social History --  EtOH: denies   Tobacco: denies   Drug Use: denies     Travel/Environmental/Occupational History:    Physical Exam--  Vital Signs Last 24 Hrs  T(F): 99.8 (14 Dec 2020 09:42), Max: 99.8 (14 Dec 2020 09:42)  HR: 98 (14 Dec 2020 09:42) (88 - 98)  BP: 177/71 (14 Dec 2020 09:42) (70/50 - 177/71)  RR: 18 (14 Dec 2020 09:42) (16 - 18)  SpO2: 95% (14 Dec 2020 09:42) (95% - 98%)  General: nontoxic-appearing, no acute distress  HEENT: NC/AT, EOMI, anicteric, conjunctiva pink and moist, oropharynx clear, dentition fair  Neck: Not rigid. No sense of mass. No LAD  Lungs: Clear bilaterally without rales, wheezing or rhonchi  Heart: Regular rate and rhythm. No murmur, rub or gallop.  Abdomen: Soft. Nondistended. Nontender. Bowel sounds present. No organomegaly.  Back: No spinal tenderness. No costovertebral angle tenderness.  Extremities: No cyanosis or clubbing. No edema. L shoulder splint  Skin: Warm. Dry. Good turgor. No rash. No vasculitic stigmata.  Psychiatric: Appropriate affect and mood for situation.     Laboratory & Imaging Data--  CBC:                       10.1   5.71  )-----------( 185      ( 14 Dec 2020 10:17 )             33.5     CMP: 12-14    143  |  108  |  12  ----------------------------<  112<H>  3.7   |  23  |  1.02    Ca    8.3<L>      14 Dec 2020 10:17  Phos  3.7     12-14  Mg     1.8     12-14    TPro  6.0  /  Alb  3.4  /  TBili  0.4  /  DBili  x   /  AST  21  /  ALT  17  /  AlkPhos  71  12-14    LIVER FUNCTIONS - ( 14 Dec 2020 10:17 )  Alb: 3.4 g/dL / Pro: 6.0 g/dL / ALK PHOS: 71 U/L / ALT: 17 U/L / AST: 21 U/L / GGT: x           Urinalysis Basic - ( 13 Dec 2020 22:33 )    Color: Yellow / Appearance: Slightly Turbid / SG: >1.050 / pH: x  Gluc: x / Ketone: Small  / Bili: Negative / Urobili: <2 mg/dL   Blood: x / Protein: 30 mg/dL / Nitrite: Positive   Leuk Esterase: Small / RBC: 4 /HPF /  /HPF   Sq Epi: x / Non Sq Epi: 2 /HPF / Bacteria: TNTC        Microbiology: reviewed      Radiology: reviewed  < from: Xray Chest 1 View- PORTABLE-Routine (Xray Chest 1 View- PORTABLE-Routine .) (12.14.20 @ 09:49) >    EXAM:  XR CHEST PORTABLE ROUTINE 1V                            PROCEDURE DATE:  12/14/2020            INTERPRETATION:  A single chest x-ray was obtained on December 14, 2020.    Indication: Hypertension    Impression:    The heart is enlarged. The lungs are clear. No pleural effusion. No pneumothorax. Status post total left shoulder replacement.                SEAN GREGG MD; Attending Radiologist  This document has been electronically signed. Dec 14 2020 11:34AM    < end of copied text >  < from: Xray Chest 1 View- PORTABLE-Routine (Xray Chest 1 View- PORTABLE-Routine in AM.) (12.13.20 @ 07:18) >    EXAM:  XR CHEST PORTABLE ROUTINE 1V                            PROCEDURE DATE:  12/13/2020            INTERPRETATION:  CLINICAL INFORMATION: Trauma.    TECHNIQUE: Portable AP radiograph of the chest.    COMPARISON: Chest radiograph 12/12/2020.    FINDINGS:    Overlying soft tissue limiting the evaluation. Low lung volumes  Lungs appear grossly clear. Increased perihilar markings. No pleural effusion or pneumothorax.  Cannot accurately assess heart size in this view.    IMPRESSION:    Grossly clear lungs.              ARVIN MONTOYA MD; Resident Radiology  This document has been electronically signed.  AUDELIA WEEMS MD; Attending Radiologist  This document has been electronically signed. Dec 13 2020  9:25AM    < end of copied text >  < from: Xray Humerus, Left (12.12.20 @ 22:30) >    EXAM:  HUMERUS LEFT                            PROCEDURE DATE:  12/12/2020            INTERPRETATION:  CLINICAL INFORMATION: Post splinting of comminuted humerus fracture    TECHNIQUE: 2 views of the left humerus    COMPARISON: Pre-splinting radiographs 12/12/2020 8:30 PM    FINDINGS:    Redemonstration of acute comminuted fracture of the humerus surrounding the orthopedic hardware. Alignment unchanged.  Hardware intact.  Assessment of soft tissues is limited due to overlying material.    IMPRESSION:    Redemonstration of acute comminuted fracture of the humerus              ARVIN MONTOYA MD; Resident Radiology  This document has been electronically signed.  AUDELIA WEEMS MD; Attending Radiologist  This document has been electronically signed. Dec 13 2020 10:39AM    < end of copied text >  < from: Xray Shoulder 2 Views, Left (12.12.20 @ 22:30) >    EXAM:  FOREARM LEFT                          EXAM:  SHOULDER LEFT (MINIMUM 2 VIEWS)                          EXAM:  HUMERUS LEFT                          EXAM:  ELBOW LEFT (3 VIEWS)                            PROCEDURE DATE:  12/12/2020            INTERPRETATION:  CLINICAL INFORMATION: Trauma    TECHNIQUE: One view of the left shoulder, 2 views of the left humerus, 3 views of the left elbow, 3 views of the left forearm    COMPARISON: MR left shoulder 6/30/2020    FINDINGS:    There is an acute comminuted displaced periprosthetic fracture of the proximal shaft of the humerus surrounding the medullary michela of orthopedic hardware status post shoulder arthroplasty.    IMPRESSION:    Acute comminuted fracture of the humerus, surrounding shoulder arthroplasty hardware.              ARVIN MONTOYA MD; Resident Radiology  This document has been electronically signed.  AUDELIA WEEMS MD; Attending Radiologist  This document has been electronically signed. Dec 13 2020  9:23AM    < end of copied text >

## 2020-12-14 NOTE — PROGRESS NOTE ADULT - ASSESSMENT
70F pmhx HTN, obesity, COPD, anxiety, agitation, R TKR, recent L reverse total shoulder replacement (@ OSH), who was BIBEMS yesterday as a level 2 trauma after being found down, admitted to SICU with left humerus periprosthetic comminuted fracture, c/b rhabdo, left AMA to [facility] parking lot by wheelchair, now back to hospital for debility, found to be hypotensive to SBP 60-70.

## 2020-12-14 NOTE — PROVIDER CONTACT NOTE (OTHER) - ACTION/TREATMENT ORDERED:
Larisa HALL notified, aware, & at the bedside. Pt to be allowed more time for DTV. Will cont to monitor.

## 2020-12-14 NOTE — PROGRESS NOTE ADULT - SUBJECTIVE AND OBJECTIVE BOX
Patient seen and examined this morning. Complains of pain and asking for pain medication because she did not sleep last night secondary to pain. Patient on nasal canula oxygen, has history of COPD. She was found to have a Left Humerus periprosthetic fracture and placed in coaptation splint. Patient complains of some numbness to the dorsum of her hand over the thumb and 1st finger. Patient understands that she has a fracture and was [facility] but does not remember a specific fall.    Patient is Left Hand Dominant    Patient was able to provide history of shoulder replacement, states it was done in september at [facility] with Dr. Farhad Pham.    Hospital Admission History:  70F pmhx HTN, obesity, COPD, anxiety, R TKR, recent L reverse total shoulder replacement (@ OSH), who was BIBEMS yesterday as a level 2 trauma after being found down, admitted to SICU with left humerus periprosthetic comminuted fracture, c/b rhabdo and left radial nerve palsy, left AMA to [facility] parking lot by wheelchair, where she decided she wanted to be admitted again to hospital for poss fracture repair.  Patient was on Eliquis as outpatient per medical rec.    Pt reportedly found by neighbor on floor, and reportedly on floor approximately 48 hours. See previous notes for detailed hospital course and SICU Notes. At baseline, pt states she is very active, walks miles, can walk up a flight of stairs without problem, plays golf weekly. Pt denies any symptoms at all, including pain in left shoulder.     Vital Signs Last 24 Hrs  T(C): 36.7 (14 Dec 2020 04:33), Max: 37.2 (14 Dec 2020 00:07)  T(F): 98.1 (14 Dec 2020 04:33), Max: 98.9 (14 Dec 2020 00:07)  HR: 88 (14 Dec 2020 04:33) (88 - 98)  BP: 168/81 (14 Dec 2020 04:33) (70/50 - 168/81)  BP(mean): --  RR: 18 (14 Dec 2020 04:33) (16 - 18)  SpO2: 98% (14 Dec 2020 04:33) (94% - 98%)    Gen: AAOx3    LUE:  Coaptation splint intact  Unable to extend fingers/wrist against resistance indicating likely radial nerve neuropraxia  +AIN, Median, Ulnar nerve function  Decreased sensation over dorsum of thumb and 1st digit, Sensation otherwise intact all volar fingers (Median, Ulnar nerve distribution)  +Radial Pulse  Compartments soft  No calf TTP B/L

## 2020-12-14 NOTE — PROGRESS NOTE ADULT - PROBLEM SELECTOR PLAN 2
splint in place  appreciate ortho recs for surgical fixation of left humerus fracture with Dr. Palma 12/16  doppler US le ro dvt  pending TTE FOR medical optimization splint in place  appreciate ortho recs for surgical fixation of left humerus fracture with Dr. Palma 12/16  doppler US le ro dvt  NWB LUE  pending TTE FOR medical optimization

## 2020-12-14 NOTE — PROGRESS NOTE ADULT - PROBLEM SELECTOR PLAN 6
stable but mildly confused at times  cont seroquel 25 TID prn for now stable but mildly confused at times  cont seroquel 25 TID prn for now  psych consult

## 2020-12-14 NOTE — BEHAVIORAL HEALTH ASSESSMENT NOTE - RISK ASSESSMENT
Low Acute Suicide Risk Risk factors: active substance abuse, chronic pain, noncompliant with treatment, not receiving treatment, h/o delirious episodes    Protective factors: no current SIIP/HIIP, no h/o SA/SIB, no h/o psych admissions, no access to weapons, with adult children/grandchildren, domiciled, social supports, positive therapeutic relationship    Overall, pt is a low risk of harm to self/others and does not require psychiatric admission for safety and stabilization.

## 2020-12-14 NOTE — BEHAVIORAL HEALTH ASSESSMENT NOTE - CASE SUMMARY
70F , has 2 adult kids, lives alone, with no formal PPHx, daily drinker, no h/o SA/SIB or psych admissions, no h/o inpt psychiatric hospitalizations, with PMHx HTN, obesity, COPD, R TKR, recent L reverse total shoulder replacement (@ OSH), who was BIBEMS yesterday as a level 2 trauma after being found down, admitted to SICU with left humerus periprosthetic comminuted fracture, c/b rhabdo and left radial nerve palsy, left AMA to [facility] parking lot by wheelchair, where she decided she wanted to be admitted again to hospital for fracture repair, hospital course complicated by hypotension which has now since resolved, psychiatry consulted for management of delirium.  On interview pt AOx2, off on date, expansive and jovial, denies depression, laura, psychosis, or SIIP/HIIP.  Endorses daily ETOH (drinks wine with dinner), denies h/o withdrawals, seizures, or treatments.  States she is amenable for surgery this week to repair her shoulder; recounts her issues getting PT during the COVID pandemic.  Collateral notes pt with h/o hospital delirium with behavioral disturbances, daily ETOH use.  Dx: Delirium 2/2 [facility], ETOH abuse.  Agree with NP's assessment and plan as above.  Seroquel if QTc<500, CIWA monitoring with sx-triggered Ativan, Haldol PRN agitation.  CL psych will f/u.

## 2020-12-14 NOTE — BEHAVIORAL HEALTH ASSESSMENT NOTE - SUMMARY
69y/o  female, with 2 adult kids, lives alone with her gretel in a private residence in [place], with no PPHx, no h/o SA/SIB, no h/o substance abuse, no h/o inpt psychiatric hospitalizations, with PMHx HTN, obesity, COPD, R TKR, recent L reverse total shoulder replacement (@ OSH), who was BIBEMS yesterday as a level 2 trauma after being found down, admitted to SICU with left humerus periprosthetic comminuted fracture, c/b rhabdo and left radial nerve palsy, left AMA to [facility] parking lot by wheelchair, where she decided she wanted to be admitted again to hospital for fracture repair, hospital course complicated by hypotension which has now since resolved.  Psychiatry consulted to assess for delirium, agitation, management of medications. 71y/o  female, with 2 adult kids, lives alone with her gretel in a private residence in [place], with no PPHx, no h/o SA/SIB, no h/o substance abuse, no h/o inpt psychiatric hospitalizations, with PMHx HTN, obesity, COPD, R TKR, recent L reverse total shoulder replacement (@ OSH), who was BIBEMS yesterday as a level 2 trauma after being found down, admitted to SICU with left humerus periprosthetic comminuted fracture, c/b rhabdo and left radial nerve palsy, left AMA to [facility] parking lot by wheelchair, where she decided she wanted to be admitted again to hospital for fracture repair, hospital course complicated by hypotension which has now since resolved.  Psychiatry consulted to assess for delirium, agitation, management of medications.  Patient seen and evaluated, awake and alert, cooperative, pleasant, mildly delirious.  Acknowledges that she has a fx shoulder requiring surgical intervention later this week which she is currently agreeing to.  Denies mood sx, denies SI/HI, AVH, or thoughts of paranoia.  Denies substance use or alcohol use.  Per son, patient in the last 3 months has been more forgetful, not formally diagnosed with dementia.  He reports 2 months ago following shoulder surgery that she became delirious, paranoid thinking that people were stealing the SIM card out of her phone and that her son's voices was being implanted into the phone, calling 911.  He reports that patient drinks alcohol daily for many decades, unsure of when her last drink was.  Discussed plan of care with son and plan to start standing neuroleptic medication for delirium which he is agreeable to.  Would recommend checking ekg and if qtc <500ms on ekg, can start Seroquel 25mg po BID standing.

## 2020-12-15 DIAGNOSIS — Z01.810 ENCOUNTER FOR PREPROCEDURAL CARDIOVASCULAR EXAMINATION: ICD-10-CM

## 2020-12-15 LAB
-  AMIKACIN: SIGNIFICANT CHANGE UP
-  AMOXICILLIN/CLAVULANIC ACID: SIGNIFICANT CHANGE UP
-  AMPICILLIN/SULBACTAM: SIGNIFICANT CHANGE UP
-  AMPICILLIN: SIGNIFICANT CHANGE UP
-  AZTREONAM: SIGNIFICANT CHANGE UP
-  CEFAZOLIN: SIGNIFICANT CHANGE UP
-  CEFEPIME: SIGNIFICANT CHANGE UP
-  CEFOXITIN: SIGNIFICANT CHANGE UP
-  CEFTRIAXONE: SIGNIFICANT CHANGE UP
-  CIPROFLOXACIN: SIGNIFICANT CHANGE UP
-  ERTAPENEM: SIGNIFICANT CHANGE UP
-  GENTAMICIN: SIGNIFICANT CHANGE UP
-  IMIPENEM: SIGNIFICANT CHANGE UP
-  LEVOFLOXACIN: SIGNIFICANT CHANGE UP
-  MEROPENEM: SIGNIFICANT CHANGE UP
-  NITROFURANTOIN: SIGNIFICANT CHANGE UP
-  PIPERACILLIN/TAZOBACTAM: SIGNIFICANT CHANGE UP
-  TIGECYCLINE: SIGNIFICANT CHANGE UP
-  TOBRAMYCIN: SIGNIFICANT CHANGE UP
-  TRIMETHOPRIM/SULFAMETHOXAZOLE: SIGNIFICANT CHANGE UP
ALBUMIN SERPL ELPH-MCNC: 3.4 G/DL — SIGNIFICANT CHANGE UP (ref 3.3–5)
ALP SERPL-CCNC: 75 U/L — SIGNIFICANT CHANGE UP (ref 40–120)
ALT FLD-CCNC: 18 U/L — SIGNIFICANT CHANGE UP (ref 10–45)
ANION GAP SERPL CALC-SCNC: 11 MMOL/L — SIGNIFICANT CHANGE UP (ref 5–17)
AST SERPL-CCNC: 18 U/L — SIGNIFICANT CHANGE UP (ref 10–40)
BASOPHILS # BLD AUTO: 0.04 K/UL — SIGNIFICANT CHANGE UP (ref 0–0.2)
BASOPHILS NFR BLD AUTO: 0.6 % — SIGNIFICANT CHANGE UP (ref 0–2)
BILIRUB SERPL-MCNC: 0.4 MG/DL — SIGNIFICANT CHANGE UP (ref 0.2–1.2)
BUN SERPL-MCNC: 10 MG/DL — SIGNIFICANT CHANGE UP (ref 7–23)
CALCIUM SERPL-MCNC: 8.7 MG/DL — SIGNIFICANT CHANGE UP (ref 8.4–10.5)
CHLORIDE SERPL-SCNC: 106 MMOL/L — SIGNIFICANT CHANGE UP (ref 96–108)
CK SERPL-CCNC: 241 U/L — HIGH (ref 25–170)
CO2 SERPL-SCNC: 23 MMOL/L — SIGNIFICANT CHANGE UP (ref 22–31)
CREAT SERPL-MCNC: 0.95 MG/DL — SIGNIFICANT CHANGE UP (ref 0.5–1.3)
CULTURE RESULTS: SIGNIFICANT CHANGE UP
EOSINOPHIL # BLD AUTO: 0.11 K/UL — SIGNIFICANT CHANGE UP (ref 0–0.5)
EOSINOPHIL NFR BLD AUTO: 1.7 % — SIGNIFICANT CHANGE UP (ref 0–6)
FOLATE SERPL-MCNC: 10.6 NG/ML — SIGNIFICANT CHANGE UP
GLUCOSE SERPL-MCNC: 118 MG/DL — HIGH (ref 70–99)
HCT VFR BLD CALC: 33.4 % — LOW (ref 34.5–45)
HGB BLD-MCNC: 10 G/DL — LOW (ref 11.5–15.5)
IMM GRANULOCYTES NFR BLD AUTO: 0.3 % — SIGNIFICANT CHANGE UP (ref 0–1.5)
INR BLD: 1.19 RATIO — HIGH (ref 0.88–1.16)
LYMPHOCYTES # BLD AUTO: 1.81 K/UL — SIGNIFICANT CHANGE UP (ref 1–3.3)
LYMPHOCYTES # BLD AUTO: 28 % — SIGNIFICANT CHANGE UP (ref 13–44)
MAGNESIUM SERPL-MCNC: 1.8 MG/DL — SIGNIFICANT CHANGE UP (ref 1.6–2.6)
MAGNESIUM SERPL-MCNC: 1.8 MG/DL — SIGNIFICANT CHANGE UP (ref 1.6–2.6)
MCHC RBC-ENTMCNC: 28.1 PG — SIGNIFICANT CHANGE UP (ref 27–34)
MCHC RBC-ENTMCNC: 29.9 GM/DL — LOW (ref 32–36)
MCV RBC AUTO: 93.8 FL — SIGNIFICANT CHANGE UP (ref 80–100)
METHOD TYPE: SIGNIFICANT CHANGE UP
MONOCYTES # BLD AUTO: 0.54 K/UL — SIGNIFICANT CHANGE UP (ref 0–0.9)
MONOCYTES NFR BLD AUTO: 8.3 % — SIGNIFICANT CHANGE UP (ref 2–14)
NEUTROPHILS # BLD AUTO: 3.95 K/UL — SIGNIFICANT CHANGE UP (ref 1.8–7.4)
NEUTROPHILS NFR BLD AUTO: 61.1 % — SIGNIFICANT CHANGE UP (ref 43–77)
NRBC # BLD: 0 /100 WBCS — SIGNIFICANT CHANGE UP (ref 0–0)
NT-PROBNP SERPL-SCNC: 933 PG/ML — HIGH (ref 0–300)
ORGANISM # SPEC MICROSCOPIC CNT: SIGNIFICANT CHANGE UP
ORGANISM # SPEC MICROSCOPIC CNT: SIGNIFICANT CHANGE UP
PHOSPHATE SERPL-MCNC: 3.8 MG/DL — SIGNIFICANT CHANGE UP (ref 2.5–4.5)
PHOSPHATE SERPL-MCNC: 4.3 MG/DL — SIGNIFICANT CHANGE UP (ref 2.5–4.5)
PLATELET # BLD AUTO: 223 K/UL — SIGNIFICANT CHANGE UP (ref 150–400)
POTASSIUM SERPL-MCNC: 4.1 MMOL/L — SIGNIFICANT CHANGE UP (ref 3.5–5.3)
POTASSIUM SERPL-SCNC: 4.1 MMOL/L — SIGNIFICANT CHANGE UP (ref 3.5–5.3)
PROT SERPL-MCNC: 6.2 G/DL — SIGNIFICANT CHANGE UP (ref 6–8.3)
PROTHROM AB SERPL-ACNC: 13.9 SEC — HIGH (ref 10.6–13.6)
RBC # BLD: 3.56 M/UL — LOW (ref 3.8–5.2)
RBC # FLD: 15.1 % — HIGH (ref 10.3–14.5)
SODIUM SERPL-SCNC: 140 MMOL/L — SIGNIFICANT CHANGE UP (ref 135–145)
SPECIMEN SOURCE: SIGNIFICANT CHANGE UP
VIT B12 SERPL-MCNC: 376 PG/ML — SIGNIFICANT CHANGE UP (ref 232–1245)
WBC # BLD: 6.47 K/UL — SIGNIFICANT CHANGE UP (ref 3.8–10.5)
WBC # FLD AUTO: 6.47 K/UL — SIGNIFICANT CHANGE UP (ref 3.8–10.5)

## 2020-12-15 PROCEDURE — 93306 TTE W/DOPPLER COMPLETE: CPT | Mod: 26

## 2020-12-15 PROCEDURE — 71275 CT ANGIOGRAPHY CHEST: CPT | Mod: 26

## 2020-12-15 PROCEDURE — 99223 1ST HOSP IP/OBS HIGH 75: CPT

## 2020-12-15 RX ORDER — FOLIC ACID 0.8 MG
1 TABLET ORAL DAILY
Refills: 0 | Status: DISCONTINUED | OUTPATIENT
Start: 2020-12-15 | End: 2020-12-17

## 2020-12-15 RX ORDER — MIDODRINE HYDROCHLORIDE 2.5 MG/1
10 TABLET ORAL ONCE
Refills: 0 | Status: DISCONTINUED | OUTPATIENT
Start: 2020-12-15 | End: 2020-12-15

## 2020-12-15 RX ORDER — QUETIAPINE FUMARATE 200 MG/1
25 TABLET, FILM COATED ORAL
Refills: 0 | Status: DISCONTINUED | OUTPATIENT
Start: 2020-12-15 | End: 2020-12-17

## 2020-12-15 RX ORDER — THIAMINE MONONITRATE (VIT B1) 100 MG
500 TABLET ORAL EVERY 8 HOURS
Refills: 0 | Status: DISCONTINUED | OUTPATIENT
Start: 2020-12-15 | End: 2020-12-17

## 2020-12-15 RX ORDER — IPRATROPIUM/ALBUTEROL SULFATE 18-103MCG
3 AEROSOL WITH ADAPTER (GRAM) INHALATION ONCE
Refills: 0 | Status: COMPLETED | OUTPATIENT
Start: 2020-12-15 | End: 2020-12-15

## 2020-12-15 RX ORDER — ACETAMINOPHEN 500 MG
1000 TABLET ORAL ONCE
Refills: 0 | Status: COMPLETED | OUTPATIENT
Start: 2020-12-15 | End: 2020-12-15

## 2020-12-15 RX ADMIN — Medication 105 MILLIGRAM(S): at 21:21

## 2020-12-15 RX ADMIN — NYSTATIN CREAM 1 APPLICATION(S): 100000 CREAM TOPICAL at 05:28

## 2020-12-15 RX ADMIN — Medication 1 MILLIGRAM(S): at 11:28

## 2020-12-15 RX ADMIN — QUETIAPINE FUMARATE 25 MILLIGRAM(S): 200 TABLET, FILM COATED ORAL at 09:01

## 2020-12-15 RX ADMIN — Medication 3 MILLIGRAM(S): at 21:23

## 2020-12-15 RX ADMIN — Medication 1000 MILLIGRAM(S): at 22:00

## 2020-12-15 RX ADMIN — CEFTRIAXONE 100 MILLIGRAM(S): 500 INJECTION, POWDER, FOR SOLUTION INTRAMUSCULAR; INTRAVENOUS at 13:23

## 2020-12-15 RX ADMIN — ATORVASTATIN CALCIUM 40 MILLIGRAM(S): 80 TABLET, FILM COATED ORAL at 21:22

## 2020-12-15 RX ADMIN — OXYCODONE HYDROCHLORIDE 2.5 MILLIGRAM(S): 5 TABLET ORAL at 06:50

## 2020-12-15 RX ADMIN — ENOXAPARIN SODIUM 80 MILLIGRAM(S): 100 INJECTION SUBCUTANEOUS at 20:07

## 2020-12-15 RX ADMIN — Medication 3 MILLILITER(S): at 08:20

## 2020-12-15 RX ADMIN — QUETIAPINE FUMARATE 25 MILLIGRAM(S): 200 TABLET, FILM COATED ORAL at 20:08

## 2020-12-15 RX ADMIN — Medication 105 MILLIGRAM(S): at 15:18

## 2020-12-15 RX ADMIN — Medication 400 MILLIGRAM(S): at 21:21

## 2020-12-15 RX ADMIN — Medication 1 TABLET(S): at 11:28

## 2020-12-15 RX ADMIN — POLYETHYLENE GLYCOL 3350 17 GRAM(S): 17 POWDER, FOR SOLUTION ORAL at 11:28

## 2020-12-15 RX ADMIN — OXYCODONE HYDROCHLORIDE 2.5 MILLIGRAM(S): 5 TABLET ORAL at 20:08

## 2020-12-15 RX ADMIN — Medication 3 MILLILITER(S): at 03:15

## 2020-12-15 RX ADMIN — LORATADINE 10 MILLIGRAM(S): 10 TABLET ORAL at 11:28

## 2020-12-15 RX ADMIN — OXYCODONE HYDROCHLORIDE 2.5 MILLIGRAM(S): 5 TABLET ORAL at 21:10

## 2020-12-15 RX ADMIN — NYSTATIN CREAM 1 APPLICATION(S): 100000 CREAM TOPICAL at 17:41

## 2020-12-15 RX ADMIN — OXYCODONE HYDROCHLORIDE 2.5 MILLIGRAM(S): 5 TABLET ORAL at 05:50

## 2020-12-15 RX ADMIN — ENOXAPARIN SODIUM 80 MILLIGRAM(S): 100 INJECTION SUBCUTANEOUS at 08:20

## 2020-12-15 NOTE — PROGRESS NOTE ADULT - PROBLEM SELECTOR PLAN 6
stable but mildly confused at times  cont seroquel 25 TID standing and prn   psych consult noted- fu recs  CIWA protocol prn ativan

## 2020-12-15 NOTE — CONSULT NOTE ADULT - SUBJECTIVE AND OBJECTIVE BOX
PULMONARY CONSULT  David Chaidez MD  [phone]    Initial HPI on admission:  HPI:  70F pmhx HTN, obesity, COPD, anxiety, agitation, R TKR, recent L reverse total shoulder replacement (@ OSH), who was BIBEMS yesterday as a level 2 trauma after being found down, admitted to SICU with left humerus periprosthetic comminuted fracture, c/b rhabdo and left radial nerve palsy, left AMA to [facility] parking lot by wheelchair, where she decided she wanted to be admitted again to hospital for poss fracture repair.    Pt reportedly found by neighbor on floor, and reportedly on floor approximately 48 hours.. When asked why she was on the floor, the patient replied "I was eating." Pt denies falling, but has no memory of how she got her fracture and no memory of the past few days. See prior H&P and notes for detailed course. Per ED, pt now cannot walk and needs a walker and needs hospitalization of debility and poss placement/assistance. At baseline, pt states she is very active, walks 3 miles, can walk up a flight of stairs without problem, plays golf weekly. Pt denies any symptoms at all, including pain in left shoulder.   Review of cahrt shows pt having episodes of intermittent hypotension including requiring pressors during prior hospitalization. Yesterday, pt initially hypotensive to SBP 87, which improved to SBP 200s after 500 cc IVF. Pt denies being on any blood thinners, but meds from other sources shows eliquis filled in september '20.  Pt reported having hallucinations in the ED, which pt currently denies. Chart review shows history of intermittent agitation and confusion. Tm 98.2, P 64, /96, R 18, % RA Left shoulder splinted in ED by ortho.    Asked to evaluate 70F with obesity pre-op for fracture post L total shoulder replacement. Patient found on floor at home with no memory of fall. Presumed syncopal event. She has remote smoking history, DC 25+years ago. She denies history of COPD and has not been followed by pulmonologist: she does not use home oxygen or bronchodilators. She denied recent fever, chills, productive cough, or sick contact. On admission, CK ws 2200, normalizing with IV hydration and c/w rhabdo. Creatinine 1.1 to .9 today. LE dopplers with bilateral acute soleal DVT and evidence of post phlebitic proximal vein changes. She denies history of prior VTE. Currently she denies CP or SOB.       PAST MEDICAL & SURGICAL HISTORY:  Seasonal allergies    Anxiety    Osteoarthritis  R knee and L shoulder    Chronic obstructive pulmonary disease (COPD)    Hyperlipidemia    Hypertension    Osteoarthritis of left shoulder    Class 1 obesity with body mass index (BMI) of 34.0 to 34.9 in adult    History of closed shoulder dislocation  left shoulder 2019    Anxiety    HLD (hyperlipidemia)    H/O total knee replacement  R knee    H/O shoulder replacement  L shoulder    History of right knee joint replacement  2019    FAMILY HISTORY:  Family history of myocardial infarction    FH: heart attack  mother      Social History (marital status, living situation, occupation, tobacco use, alcohol and drug use, and sexual history): Social History:  LIves alone in [place]; drives, had HHA post L shoulder repair  Marital Status: (  ) , (  ) Single, ( x ) , (  ) , (  )   # of Children: 2  Lives with: ( x ) alone, (  ) children, (  ) spouse, (  ) parents, (  ) siblings, (  ) friends, (  ) other:   Occupation:     Substance Use/Illicit Drugs: (  ) never used vs other:   Tobacco Usage: (  ) never smoked, ( x ) former smoker, (  ) current smoker and Total Pack-Years: 30+ pk yrs  Last Alcohol Usage/Frequency/Amount/Withdrawal/Hx of Abuse:  none  Foreign travel:   Animal exposure:     Tobacco Screening:  · Core Measure Site	No      Review of Systems: REVIEW OF SYSTEMS:  CONSTITUTIONAL: No weakness. No fevers. No chills. No rigors. No weight loss. No night sweats. No poor appetite.  EYES: No blurry or double vision. No eye pain.  ENT: No hearing difficulty. No vertigo. No dysphagia. No sore throat. No Sinusitis/rhinorrhea.   NECK: No pain. No stiffness/rigidity.  CARDIAC: No chest pain. No palpitations. No lightheadedness. No syncope.  RESPIRATORY: No cough. No SOB. No hemoptysis.  GASTROINTESTINAL: No abdominal pain. No nausea. No vomiting. No hematemesis. No diarrhea. No constipation. No melena. No hematochezia.  GENITOURINARY: No dysuria. No frequency. No hesitancy. No hematuria. No oliguria.  NEUROLOGICAL: No numbness/tingling. No focal weakness. No urinary or fecal incontinence. No headache. No unsteady gait.  BACK: No back pain. No flank pain.  EXTREMITIES: No lower extremity edema. limited ROM of left arm 2/2 pain and splint.  SKIN: No rashes. No itching. No other lesions.  PSYCHIATRIC: No depression. No anxiety. No SI/HI.  ALLERGIC: No lip swelling. No hives.  All other review of systems is negative unless indicated above.  Unless indicated above, unable to assess ROS 2/2      Allergies and Intolerances:        Allergies:  	Allergy Status Unknown:        Intolerances:  	NSAIDs: Drug Category, Other, Diarrhea, stomach upset      Medications:  MEDICATIONS  (STANDING):  atorvastatin 40 milliGRAM(s) Oral at bedtime  cefTRIAXone   IVPB      cefTRIAXone   IVPB 1000 milliGRAM(s) IV Intermittent every 24 hours  enoxaparin Injectable 80 milliGRAM(s) SubCutaneous two times a day  folic acid 1 milliGRAM(s) Oral daily  influenza   Vaccine 0.5 milliLiter(s) IntraMuscular once  loratadine 10 milliGRAM(s) Oral daily  melatonin 3 milliGRAM(s) Oral at bedtime  multivitamin 1 Tablet(s) Oral daily  nystatin Powder 1 Application(s) Topical two times a day  polyethylene glycol 3350 17 Gram(s) Oral daily  QUEtiapine 25 milliGRAM(s) Oral two times a day  thiamine IVPB 500 milliGRAM(s) IV Intermittent every 8 hours    MEDICATIONS  (PRN):  acetaminophen   Tablet .. 650 milliGRAM(s) Oral every 6 hours PRN Mild Pain (1 - 3)  haloperidol    Injectable 2.5 milliGRAM(s) IV Push every 6 hours PRN Severe Agitation  oxyCODONE    IR 2.5 milliGRAM(s) Oral every 8 hours PRN Severe Pain (7 - 10)  QUEtiapine 25 milliGRAM(s) Oral every 6 hours PRN Agitation    Vital Signs Last 24 Hrs  T(C): 36.3 (15 Dec 2020 09:27), Max: 37.4 (14 Dec 2020 20:56)  T(F): 97.4 (15 Dec 2020 09:27), Max: 99.3 (14 Dec 2020 20:56)  HR: 80 (15 Dec 2020 09:27) (77 - 93)  BP: 84/52 (15 Dec 2020 10:45) (83/55 - 164/63)  BP(mean): --  RR: 18 (15 Dec 2020 09:27) (16 - 20)  SpO2: 93% (15 Dec 2020 09:27) (91% - 98%)    12-14 @ 07:01  -  12-15 @ 07:00  --------------------------------------------------------  IN: 1945 mL / OUT: 1780 mL / NET: 165 mL      LABS:                        10.0   6.47  )-----------( 223      ( 15 Dec 2020 06:13 )             33.4     12-15    140  |  106  |  10  ----------------------------<  118<H>  4.1   |  23  |  0.95    Ca    8.7      15 Dec 2020 06:13  Phos  3.8     12-15  Mg     1.8     12-15    TPro  6.2  /  Alb  3.4  /  TBili  0.4  /  DBili  x   /  AST  18  /  ALT  18  /  AlkPhos  75  12-15      PT/INR - ( 15 Dec 2020 08:14 )   PT: 13.9 sec;   INR: 1.19 ratio       Urinalysis Basic - ( 13 Dec 2020 22:33 )    Color: Yellow / Appearance: Slightly Turbid / SG: >1.050 / pH: x  Gluc: x / Ketone: Small  / Bili: Negative / Urobili: <2 mg/dL   Blood: x / Protein: 30 mg/dL / Nitrite: Positive   Leuk Esterase: Small / RBC: 4 /HPF /  /HPF   Sq Epi: x / Non Sq Epi: 2 /HPF / Bacteria: TNTC      CULTURES:  Culture Results:   >100,000 CFU/ml Escherichia coli (12-13 @ 23:18)    Physical Examination:    General: Non toxic No acute distress.  Comfortable supine on nasal cannula    HEENT: Pupils equal, reactive to light.  Symmetric.    PULM: Clear to auscultation bilaterally, no significant sputum production    CVS: Regular rate and rhythm, no murmurs, rubs, or gallops    ABD: Soft, nondistended, nontender, normoactive bowel sounds, no masses    EXT: No edema, nontender    SKIN: Warm and well perfused, no rashes noted.    NEURO: Alert, oriented, interactive, nonfocal    RADIOLOGY REVIEWED PERSONALLY  CXR:    PROCEDURE DATE:  12/14/2020        INTERPRETATION:  A single chest x-ray was obtained on December 14, 2020.    Indication: Hypertension    Impression:    The heart is enlarged. The lungs are clear. No pleural effusion. No pneumothorax. Status post total left shoulder replacement.    CTA chest: Pending    LE Dopplers:

## 2020-12-15 NOTE — PROGRESS NOTE ADULT - ASSESSMENT
Pt is a 70W w/ PMHx of HTN, obesity, COPD, anxiety, agitation, R TKR, recent L reverse total shoulder replacement (@ OSH), who was BIBEMS yesterday as a level 2 trauma after being found down, admitted to SICU with left humerus periprosthetic comminuted fracture, c/brhabdo and left radial nerve palsy, left AMA to [facility] parking lot by wheelchair, where she decided she wanted to be admitted again to hospital for possible fracture repair.  ID c/s for UTI    Acute cystitis  -UA+, UCx E.coli  -BCx pending  -c/w ceftriaxone 1gm q24h    Nondisplaced fx of humerus   Rhabomyolysis 2/2 Trauma  -in splint  -plan for repair 12/17, appreciate ortho recs

## 2020-12-15 NOTE — PROGRESS NOTE ADULT - ASSESSMENT
70F pmhx HTN, obesity, COPD, anxiety, agitation, R TKR, recent L reverse total shoulder replacement (@ OSH), who was BIBEMS yesterday as a level 2 trauma after being found down, admitted to SICU with left humerus periprosthetic comminuted fracture, c/b rhabdo, left AMA to [facility] parking lot by wheelchair, now back to hospital for debility, found to be hypotensive to SBP 60-70.

## 2020-12-15 NOTE — PROGRESS NOTE ADULT - SUBJECTIVE AND OBJECTIVE BOX
Orthopedic Surgery Progress Note  S: Pain is well controlled.  No acute events overnight. Patient now amenable to surgery, plan is for ORIF L humerus on 12/17    O:  Vital Signs Last 24 Hrs  T(C): 36.9 (15 Dec 2020 04:00), Max: 37.7 (14 Dec 2020 09:42)  T(F): 98.5 (15 Dec 2020 04:00), Max: 99.8 (14 Dec 2020 09:42)  HR: 77 (15 Dec 2020 04:00) (77 - 98)  BP: 115/88 (15 Dec 2020 05:32) (83/55 - 177/71)  RR: 18 (15 Dec 2020 04:00) (16 - 20)  SpO2: 94% (15 Dec 2020 04:00) (91% - 98%)    LUE:  Coaptation splint in place  Unable to extend fingers/wrist against resistance indicating radial nerve neuropraxia  +AIN, Median, Ulnar nerve function  Decreased sensation over dorsum of thumb and 1st digit, Sensation otherwise intact all volar fingers (Median, Ulnar nerve distribution)  +Radial Pulse  Compartments soft  No calf TTP B/L                         10.1   5.71  )-----------( 185      ( 14 Dec 2020 10:17 )             33.5     12-14    143  |  108  |  12  ----------------------------<  112<H>  3.7   |  23  |  1.02    PT/INR - ( 13 Dec 2020 09:21 )   PT: 14.2 sec;   INR: 1.19 ratio       PTT - ( 13 Dec 2020 09:21 )  PTT:23.1 sec    A/P: 70 year old female with left periprosthetic humerus fracture s/p presumed fall or injury who was found down for possibly up to 48 hours. Was admitted to SICU and signed out AMA only to return to hospital requesting admission because she realized she would not be able to take care of herself or function independently at home.    ·	Pain control  ·	NWB LUE  ·	PT/OT  ·	Venodynes, therapeutic lovenox for BLE below the knee VTEs; this will need to be stopped 12/16 in preparation for surgery, or if unable to be stopped patient will need IVC filter  ·	Incentive Spirometry  ·	Rest of medical care per primary team  ·	Please document Medical optimization for surgery 12/17/2020 AM with Dr. Palma  ·	FU TTE per primary team for clearance    Tony Dyer MD

## 2020-12-15 NOTE — CONSULT NOTE ADULT - ATTENDING COMMENTS
Infectious Diseases will continue to follow. Please call with any questions.   Aliya Moreno M.D.  [facility], Division of Infectious Diseases [phone]  For over the weekend and after hours, please call [phone]
Needs CTPA to rule out PE    Depending on CT results will decide on clearance for orthopedic surgery      Kalee

## 2020-12-15 NOTE — PROVIDER CONTACT NOTE (OTHER) - ACTION/TREATMENT ORDERED:
As per PA Sorento, consult vascular to assess if we can cont to assess BP on RLE. as per PA Nam RLE BP most accurate.

## 2020-12-15 NOTE — CONSULT NOTE ADULT - PROBLEM SELECTOR RECOMMENDATION 9
-  -Unclear etiology- orthostatic hypotension vs possible PE  -rule out orthostatic hypotension when amenable  -ecg  unchanged from baseline  -nuclear stress test 12/2019 @ [facility] unremarkable.  -check tte  -given DVT, suspicion for PE is high. continue anticoagulation.   -will need cardiac event monitoring as outpatient.

## 2020-12-15 NOTE — PROGRESS NOTE ADULT - SUBJECTIVE AND OBJECTIVE BOX
Patient is a 70y old  Female who presents with a chief complaint of left humerus fracture (15 Dec 2020 11:13)      SUBJECTIVE / OVERNIGHT EVENTS:    Patient seen and examined. poor historian. confused at times. denies cp sob.       Vital Signs Last 24 Hrs  T(C): 36.3 (15 Dec 2020 09:27), Max: 37.4 (14 Dec 2020 20:56)  T(F): 97.4 (15 Dec 2020 09:27), Max: 99.3 (14 Dec 2020 20:56)  HR: 80 (15 Dec 2020 09:27) (77 - 93)  BP: 84/52 (15 Dec 2020 10:45) (83/55 - 164/63)  BP(mean): --  RR: 18 (15 Dec 2020 09:27) (16 - 20)  SpO2: 93% (15 Dec 2020 09:27) (91% - 98%)  I&O's Summary    14 Dec 2020 07:01  -  15 Dec 2020 07:00  --------------------------------------------------------  IN: 1945 mL / OUT: 1780 mL / NET: 165 mL        PE:  GENERAL: NAD, AAOx1-2, obese  HEAD:  Atraumatic, Normocephalic  CHEST/LUNG: exp wheezing  HEART: Regular rate and rhythm; no murmur  ABDOMEN: Soft, Nontender, Nondistended; Bowel sounds present  EXTREMITIES:  left arm cast, le swelling  NEURO: No focal deficits, confused at times    LABS:                        10.0   6.47  )-----------( 223      ( 15 Dec 2020 06:13 )             33.4     12-15    140  |  106  |  10  ----------------------------<  118<H>  4.1   |  23  |  0.95    Ca    8.7      15 Dec 2020 06:13  Phos  3.8     12-15  Mg     1.8     12-15    TPro  6.2  /  Alb  3.4  /  TBili  0.4  /  DBili  x   /  AST  18  /  ALT  18  /  AlkPhos  75  12-15    PT/INR - ( 15 Dec 2020 08:14 )   PT: 13.9 sec;   INR: 1.19 ratio           CAPILLARY BLOOD GLUCOSE        CARDIAC MARKERS ( 15 Dec 2020 06:13 )  x     / x     / 241 U/L / x     / x      CARDIAC MARKERS ( 14 Dec 2020 10:17 )  x     / x     / 515 U/L / x     / x          Urinalysis Basic - ( 13 Dec 2020 22:33 )    Color: Yellow / Appearance: Slightly Turbid / SG: >1.050 / pH: x  Gluc: x / Ketone: Small  / Bili: Negative / Urobili: <2 mg/dL   Blood: x / Protein: 30 mg/dL / Nitrite: Positive   Leuk Esterase: Small / RBC: 4 /HPF /  /HPF   Sq Epi: x / Non Sq Epi: 2 /HPF / Bacteria: TNTC        RADIOLOGY & ADDITIONAL TESTS:    Imaging Personally Reviewed:  [x] YES  [ ] NO    Consultant(s) Notes Reviewed:  [x] YES  [ ] NO    MEDICATIONS  (STANDING):  atorvastatin 40 milliGRAM(s) Oral at bedtime  cefTRIAXone   IVPB      cefTRIAXone   IVPB 1000 milliGRAM(s) IV Intermittent every 24 hours  enoxaparin Injectable 80 milliGRAM(s) SubCutaneous two times a day  folic acid 1 milliGRAM(s) Oral daily  influenza   Vaccine 0.5 milliLiter(s) IntraMuscular once  loratadine 10 milliGRAM(s) Oral daily  melatonin 3 milliGRAM(s) Oral at bedtime  multivitamin 1 Tablet(s) Oral daily  nystatin Powder 1 Application(s) Topical two times a day  polyethylene glycol 3350 17 Gram(s) Oral daily  QUEtiapine 25 milliGRAM(s) Oral two times a day  thiamine IVPB 500 milliGRAM(s) IV Intermittent every 8 hours    MEDICATIONS  (PRN):  acetaminophen   Tablet .. 650 milliGRAM(s) Oral every 6 hours PRN Mild Pain (1 - 3)  haloperidol    Injectable 2.5 milliGRAM(s) IV Push every 6 hours PRN Severe Agitation  oxyCODONE    IR 2.5 milliGRAM(s) Oral every 8 hours PRN Severe Pain (7 - 10)  QUEtiapine 25 milliGRAM(s) Oral every 6 hours PRN Agitation      Care Discussed with Consultants/Other Providers [x] YES  [ ] NO    HEALTH ISSUES - PROBLEM Dx:  Preop cardiovascular exam  Preop cardiovascular exam    Alcohol abuse    Delirium due to another medical condition    LEIF (acute kidney injury)  LEIF (acute kidney injury)    Suspected pulmonary embolism    Suspected deep vein thrombosis (DVT)    Agitation  Agitation    Syncope, unspecified syncope type  Syncope, unspecified syncope type    Traumatic rhabdomyolysis, initial encounter  Traumatic rhabdomyolysis, initial encounter    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

## 2020-12-15 NOTE — PROVIDER CONTACT NOTE (OTHER) - ASSESSMENT
pt AOx4, hypertensive otherwise VSS. During CIWA VS check, BP on R upper thigh reading 188/84 pt AOx4, hypertensive otherwise VSS. During CIWA VS check, BP on R upper thigh reading 188/84. Unable to use LUE & b/l LE for BP readings. Pt c/o pain of L shoulder pain & was given oxycodone 2.5mg PO ~2000 as well as ice packs.

## 2020-12-15 NOTE — CONSULT NOTE ADULT - SUBJECTIVE AND OBJECTIVE BOX
[facility] Cardiology Consult  _________________________    Patient is a 70y old  Female who presents with a chief complaint of left humerus fracture (15 Dec 2020 06:25)      HPI:  *****Patient does not recall the events that led her to hospital, history obtained from chart*****    70F pmhx HTN, obesity, COPD, anxiety, agitation, R TKR, recent L reverse total shoulder replacement (@ OSH), who was BIBEMS yesterday as a level 2 trauma after being found down, admitted to SICU with left humerus periprosthetic comminuted fracture, c/b rhabdo and left radial nerve palsy, left AMA to [facility] parking lot by wheelchair, where she decided she wanted to be admitted again to hospital for poss fracture repair.    Pt reportedly found by neighbor on floor, and reportedly on floor approximately 48 hours.. When asked why she was on the floor, the patient replied "I was eating." Pt denies falling, but has no memory of how she got her fracture and no memory of the past few days. See prior H&P and notes for detailed course. Per ED, pt now cannot walk and needs a walker and needs hospitalization of debility and poss placement/assistance. At baseline, pt states she is very active, walks 3 miles, can walk up a flight of stairs without problem, plays golf weekly. Pt denies any symptoms at all, including pain in left shoulder.   Review of cahrt shows pt having episodes of intermittent hypotension including requiring pressors during prior hospitalization. Yesterday, pt initially hypotensive to SBP 87, which improved to SBP 200s after 500 cc IVF. Pt denies being on any blood thinners, but meds from other sources shows eliquis filled in september '20.    denies history of cva, chf, insulin dependent diabetes or renal failure.    PAST MEDICAL & SURGICAL HISTORY:  Seasonal allergies    Anxiety    Osteoarthritis  R knee and L shoulder    Chronic obstructive pulmonary disease (COPD)    Hyperlipidemia    Hypertension    Osteoarthritis of left shoulder    Class 1 obesity with body mass index (BMI) of 34.0 to 34.9 in adult    History of closed shoulder dislocation  left shoulder 2019    Anxiety    HLD (hyperlipidemia)    H/O total knee replacement  R knee    H/O shoulder replacement  L shoulder    History of right knee joint replacement  2019        MEDICATIONS  (STANDING):  atorvastatin 40 milliGRAM(s) Oral at bedtime  cefTRIAXone   IVPB      cefTRIAXone   IVPB 1000 milliGRAM(s) IV Intermittent every 24 hours  enoxaparin Injectable 80 milliGRAM(s) SubCutaneous two times a day  folic acid 1 milliGRAM(s) Oral daily  influenza   Vaccine 0.5 milliLiter(s) IntraMuscular once  loratadine 10 milliGRAM(s) Oral daily  melatonin 3 milliGRAM(s) Oral at bedtime  multivitamin 1 Tablet(s) Oral daily  nystatin Powder 1 Application(s) Topical two times a day  polyethylene glycol 3350 17 Gram(s) Oral daily  QUEtiapine 25 milliGRAM(s) Oral two times a day  thiamine IVPB 500 milliGRAM(s) IV Intermittent every 8 hours    MEDICATIONS  (PRN):  acetaminophen   Tablet .. 650 milliGRAM(s) Oral every 6 hours PRN Mild Pain (1 - 3)  haloperidol    Injectable 2.5 milliGRAM(s) IV Push every 6 hours PRN Severe Agitation  oxyCODONE    IR 2.5 milliGRAM(s) Oral every 8 hours PRN Severe Pain (7 - 10)  QUEtiapine 25 milliGRAM(s) Oral every 6 hours PRN Agitation      Allergies    Allergy Status Unknown    Intolerances    NSAIDs (Other)      Social Histroy: Tobacco- , ETOH-, Illicit Drugs-    T(C): 36.9 (12-15-20 @ 04:00), Max: 37.7 (12-14-20 @ 09:42)  HR: 77 (12-15-20 @ 04:00) (77 - 98)  BP: 115/88 (12-15-20 @ 05:32) (83/55 - 177/71)  RR: 18 (12-15-20 @ 04:00) (16 - 20)  SpO2: 94% (12-15-20 @ 04:00) (91% - 98%)  I&O's Summary    14 Dec 2020 07:01  -  15 Dec 2020 07:00  --------------------------------------------------------  IN: 1945 mL / OUT: 1780 mL / NET: 165 mL        Review of Systems:  Constitutional: [ ] Fever [ ] Chills [ ] Fatigue [ ] Weight change   HEENT: [ ] Blurred vision [ ] Eye Pain [ ] Headache [ ] Runny nose [ ] Sore Throat   Respiratory: [ ] Cough [ ] Wheezing [ ] Shortness of breath  Cardiovascular: [ ] Chest Pain [ ] Palpitations [ ] QUINTERO [ ] PND [ ] Orthopnea  Gastrointestinal: [ ] Abdominal Pain [ ] Diarrhea [ ] Constipation [ ] Hemorrhoids [ ] Nausea [ ] Vomiting  Genitourinary: [ ] Nocturia [ ] Dysuria [ ] Incontinence  Extremities: [ ] Swelling [ ] Joint Pain  Neurologic: [ ] Focal deficit [ ] Paresthesias [ ] Syncope  Lymphatic: [ ] Swelling [ ] Lymphadenopathy   Skin: [ ] Rash [ ] Ecchymoses [ ] Wounds [ ] Lesions  Psychiatry: [ ] Depression [ ] Suicidal/Homicidal Ideation [ ] Anxiety [ ] Sleep Disturbances  [ ] 10 point review of systems is otherwise negative except as mentioned above            [x ]Unable to obtain as patient does not recall the events that led to  her hospitalization.    PHYSICAL EXAM:  GENERAL: Alert, NAD  NECK: Supple, No JVD, No carotid bruit.  CHEST/LUNG: Clear to auscultation bilaterally; No wheezes, rales, or rhonchi  HEART: S1 S2 normal, RRR,  No murmurs, rubs, or gallops  ABDOMEN: Soft, Nontender, Nondistended; Bowel sounds present  EXTREMITIES:  No LE edema.      LABS:                        10.0   6.47  )-----------( 223      ( 15 Dec 2020 06:13 )             33.4     12-15    140  |  106  |  10  ----------------------------<  118<H>  4.1   |  23  |  0.95    Ca    8.7      15 Dec 2020 06:13  Phos  3.8     12-15  Mg     1.8     12-15    TPro  6.2  /  Alb  3.4  /  TBili  0.4  /  DBili  x   /  AST  18  /  ALT  18  /  AlkPhos  75  12-15    PT/INR - ( 13 Dec 2020 09:21 )   PT: 14.2 sec;   INR: 1.19 ratio         PTT - ( 13 Dec 2020 09:21 )  PTT:23.1 sec  CARDIAC MARKERS ( 15 Dec 2020 06:13 )  x     / x     / 241 U/L / x     / x      CARDIAC MARKERS ( 14 Dec 2020 10:17 )  x     / x     / 515 U/L / x     / x      CARDIAC MARKERS ( 13 Dec 2020 05:17 )  x     / x     / x     / x     / 7.5 ng/mL  CARDIAC MARKERS ( 13 Dec 2020 03:17 )  x     / x     / 2223 U/L / x     / x      CARDIAC MARKERS ( 12 Dec 2020 18:35 )  x     / x     / 2895 U/L / x     / 11.4 ng/mL          Urinalysis Basic - ( 13 Dec 2020 22:33 )    Color: Yellow / Appearance: Slightly Turbid / SG: >1.050 / pH: x  Gluc: x / Ketone: Small  / Bili: Negative / Urobili: <2 mg/dL   Blood: x / Protein: 30 mg/dL / Nitrite: Positive   Leuk Esterase: Small / RBC: 4 /HPF /  /HPF   Sq Epi: x / Non Sq Epi: 2 /HPF / Bacteria: TNTC        MEDICATIONS  (STANDING):  atorvastatin 40 milliGRAM(s) Oral at bedtime  cefTRIAXone   IVPB      cefTRIAXone   IVPB 1000 milliGRAM(s) IV Intermittent every 24 hours  enoxaparin Injectable 80 milliGRAM(s) SubCutaneous two times a day  folic acid 1 milliGRAM(s) Oral daily  influenza   Vaccine 0.5 milliLiter(s) IntraMuscular once  loratadine 10 milliGRAM(s) Oral daily  melatonin 3 milliGRAM(s) Oral at bedtime  multivitamin 1 Tablet(s) Oral daily  nystatin Powder 1 Application(s) Topical two times a day  polyethylene glycol 3350 17 Gram(s) Oral daily  QUEtiapine 25 milliGRAM(s) Oral two times a day  thiamine IVPB 500 milliGRAM(s) IV Intermittent every 8 hours    MEDICATIONS  (PRN):  acetaminophen   Tablet .. 650 milliGRAM(s) Oral every 6 hours PRN Mild Pain (1 - 3)  haloperidol    Injectable 2.5 milliGRAM(s) IV Push every 6 hours PRN Severe Agitation  oxyCODONE    IR 2.5 milliGRAM(s) Oral every 8 hours PRN Severe Pain (7 - 10)  QUEtiapine 25 milliGRAM(s) Oral every 6 hours PRN Agitation          RADIOLOGY & ADDITIONAL TESTS:    Cardiology testing:  EKG: sinus with pvc, no significant st-t abnormalities.

## 2020-12-15 NOTE — PROVIDER CONTACT NOTE (OTHER) - ASSESSMENT
Pt resting comfortably in bed, pt encouraged to use incentive spirometer for better oxygen perfusion, pt educated on use. Pt denies and dizziness or lightheadedness. BP initially 164/63 on RLE, BP rechecked on RUE 84/52. As per ISABEL Browning, OK to check BP on RLE, PA aware of B/L LE DVT.

## 2020-12-15 NOTE — CONSULT NOTE ADULT - SUBJECTIVE AND OBJECTIVE BOX
Vascular Cardiology Consult Note     SPECTRA [id number]              EMAIL [email or web address]   OFFICE [phone]    CC:      HPI:    Pt is a 70 year old woman w/ a PMHx of HTN, obesity, COPD, anxiety, agitation, R TKR, recent L reverse total shoulder replacement (@ OSH), who was BIBEMS 12/13 as a level 2 trauma after being found down, admitted to SICU with left humerus periprosthetic comminuted fracture, c/b rhabdo and left radial nerve palsy, left AMA to [facility] parking lot by wheelchair, where she decided she wanted to be admitted again to hospital for poss fracture repair. Pt reportedly found by neighbor on floor, and reportedly on floor approximately 48 hours. Pt denies being on any blood thinners, but meds from other sources shows eliquis filled in september '20. Review of cahrt shows pt having episodes of intermittent hypotension including requiring pressors during prior hospitalization.    Plan for surgical fixation of left humerus fracture with Dr. Palma likely Wednesday 12/16/2020    Allergies    Allergy Status Unknown    Intolerances    NSAIDs (Other)  	    MEDICATIONS:  enoxaparin Injectable 80 milliGRAM(s) SubCutaneous two times a day    cefTRIAXone   IVPB      cefTRIAXone   IVPB 1000 milliGRAM(s) IV Intermittent every 24 hours    loratadine 10 milliGRAM(s) Oral daily    acetaminophen   Tablet .. 650 milliGRAM(s) Oral every 6 hours PRN  haloperidol    Injectable 2.5 milliGRAM(s) IV Push every 6 hours PRN  melatonin 3 milliGRAM(s) Oral at bedtime  oxyCODONE    IR 2.5 milliGRAM(s) Oral every 8 hours PRN  QUEtiapine 25 milliGRAM(s) Oral every 6 hours PRN  QUEtiapine 25 milliGRAM(s) Oral two times a day    polyethylene glycol 3350 17 Gram(s) Oral daily    atorvastatin 40 milliGRAM(s) Oral at bedtime    folic acid 1 milliGRAM(s) Oral daily  influenza   Vaccine 0.5 milliLiter(s) IntraMuscular once  multivitamin 1 Tablet(s) Oral daily  nystatin Powder 1 Application(s) Topical two times a day  thiamine IVPB 500 milliGRAM(s) IV Intermittent every 8 hours      PAST MEDICAL & SURGICAL HISTORY:  Seasonal allergies    Anxiety    Osteoarthritis  R knee and L shoulder    Chronic obstructive pulmonary disease (COPD)    Hyperlipidemia    Hypertension    Osteoarthritis of left shoulder    Class 1 obesity with body mass index (BMI) of 34.0 to 34.9 in adult    History of closed shoulder dislocation  left shoulder 2019    Anxiety    HLD (hyperlipidemia)    H/O total knee replacement  R knee    H/O shoulder replacement  L shoulder    History of right knee joint replacement  2019        FAMILY HISTORY:  Family history of myocardial infarction    FH: heart attack  mother        SOCIAL HISTORY:  unchanged    REVIEW OF SYSTEMS:  CONSTITUTIONAL: No fever, weight loss, or fatigue  EYES: No eye pain, visual disturbances, or discharge  ENMT:  No difficulty hearing, tinnitus, vertigo; No sinus or throat pain  NECK: No pain or stiffness  RESPIRATORY:    CARDIOVASCULAR:    GASTROINTESTINAL: No abdominal or epigastric pain. No nausea, vomiting, or hematemesis; No diarrhea or constipation. No melena or hematochezia.  GENITOURINARY: No dysuria, frequency, hematuria, or incontinence  NEUROLOGICAL: No headaches, memory loss, loss of strength, numbness, or tremors  SKIN:   LYMPH Nodes: No enlarged glands  ENDOCRINE: No heat or cold intolerance; No hair loss  MUSCULOSKELETAL: No joint pain or swelling; No muscle, back, or extremity pain  PSYCHIATRIC: No depression, anxiety, mood swings, or difficulty sleeping  HEME/LYMPH: No easy bruising, or bleeding gums  ALLERY AND IMMUNOLOGIC: No hives or eczema	    [ x] All others negative	  [ ] Unable to obtain    PHYSICAL EXAM:  T(C): 36.3 (12-15-20 @ 09:27), Max: 37.4 (12-14-20 @ 20:56)  HR: 80 (12-15-20 @ 09:27) (77 - 93)  BP: 115/88 (12-15-20 @ 05:32) (83/55 - 115/88)  RR: 18 (12-15-20 @ 09:27) (16 - 20)  SpO2: 93% (12-15-20 @ 09:27) (91% - 98%)  Wt(kg): --  I&O's Summary    14 Dec 2020 07:01  -  15 Dec 2020 07:00  --------------------------------------------------------  IN: 1945 mL / OUT: 1780 mL / NET: 165 mL        Appearance:  	  HEENT:   Normal oral mucosa, PERRL, EOMI	  Carotid:   Right:    Left:    Lymphatic: No lymphadenopathy  Cardiovascular:    Respiratory:  	  Psychiatry:  AAO x   Gastrointestinal:  Soft, Non-tender, + BS	  Skin: No rashes, No ecchymoses, No cyanosis	  Neurologic:    Extremities:      Vascular Pulse Exam:  Right DP: []palpable []non-palpable []audible      Left DP :   []palpable []non-palpable []audible  Right PT: []palpable [] non-palpable []audible   Left PT:  [] palpable [] non-palpable []audible         Foot Exam:        LABS:	 	    CBC Full  -  ( 15 Dec 2020 06:13 )  WBC Count : 6.47 K/uL  Hemoglobin : 10.0 g/dL  Hematocrit : 33.4 %  Platelet Count - Automated : 223 K/uL  Mean Cell Volume : 93.8 fl  Mean Cell Hemoglobin : 28.1 pg  Mean Cell Hemoglobin Concentration : 29.9 gm/dL  Auto Neutrophil # : 3.95 K/uL  Auto Lymphocyte # : 1.81 K/uL  Auto Monocyte # : 0.54 K/uL  Auto Eosinophil # : 0.11 K/uL  Auto Basophil # : 0.04 K/uL  Auto Neutrophil % : 61.1 %  Auto Lymphocyte % : 28.0 %  Auto Monocyte % : 8.3 %  Auto Eosinophil % : 1.7 %  Auto Basophil % : 0.6 %    12-15    140  |  106  |  10  ----------------------------<  118<H>  4.1   |  23  |  0.95  12-14    143  |  108  |  12  ----------------------------<  112<H>  3.7   |  23  |  1.02    Ca    8.7      15 Dec 2020 06:13  Ca    8.3<L>      14 Dec 2020 10:17  Phos  3.8     12-15  Phos  3.7     12-14  Mg     1.8     12-15  Mg     1.8     12-14    TPro  6.2  /  Alb  3.4  /  TBili  0.4  /  DBili  x   /  AST  18  /  ALT  18  /  AlkPhos  75  12-15  TPro  6.0  /  Alb  3.4  /  TBili  0.4  /  DBili  x   /  AST  21  /  ALT  17  /  AlkPhos  71  12-14      DVT 12/14/20:  IMPRESSION: There are postphlebitic changes, the residue of prior DVT, affecting the left popliteal and femoral veins in the thigh, and the left posterior tibial peroneal trunk and gastrocnemius veins in the calf.    There is acute calf vein DVT affecting both the right and left soleal veins.    IMPRESSION:  Partially visualized acute comminuted fracture of the left humerus. Correlate with dedicated CT scan.  Old bilateral rib fractures and old L1 vertebral body compression fracture.    No evidence of acute traumatic abnormality in the chest, abdomen, or pelvis.  Fatty infiltration of the liver.    Small bubbles of gas in the superficial right inguinal soft tissues. Correlate for recent instrumentation.                Assessment:  1.            Plan:  1.          Thank you      Vascular Cardiology Service    Please call with any questions:   SPECTRA - [id number]  Office [phone]  email:  [email or web address]     Vascular Cardiology Consult Note     SPECT[facility] [id number]              EMAIL [email or web address]   OFFICE [phone]    CC:  Fall    HPI:    Pt is a 70 year old woman w/ a PMHx of HTN, obesity, COPD, anxiety, R TKR, recent L reverse total shoulder replacement (@ OSH), who was BIBEMS 12/13 as a level 2 trauma after being found down, admitted to SICU with left humerus periprosthetic comminuted fracture, c/b rhabdo and left radial nerve palsy, left AMA to [facility] parking lot by wheelchair, where she decided she wanted to be admitted again to hospital for poss fracture repair. Pt reportedly found by neighbor on floor, and reportedly on floor approximately 48 hours. Review of cahrt shows pt having episodes of intermittent hypotension including requiring pressors during prior hospitalization.    Plan for surgical fixation of left humerus fracture with Dr. Palma likely Wednesday 12/16/2020. Pt has no specific complaints today. She denies leg pain/swelling, CP, SOB, palpitations. She says she never had a DVT before, but thinks she was prescribed Eliquis after her last shoulder surgery for DVT PPx.      Allergies    Allergy Status Unknown    Intolerances    NSAIDs (Other)  	    MEDICATIONS:  enoxaparin Injectable 80 milliGRAM(s) SubCutaneous two times a day    cefTRIAXone   IVPB      cefTRIAXone   IVPB 1000 milliGRAM(s) IV Intermittent every 24 hours    loratadine 10 milliGRAM(s) Oral daily    acetaminophen   Tablet .. 650 milliGRAM(s) Oral every 6 hours PRN  haloperidol    Injectable 2.5 milliGRAM(s) IV Push every 6 hours PRN  melatonin 3 milliGRAM(s) Oral at bedtime  oxyCODONE    IR 2.5 milliGRAM(s) Oral every 8 hours PRN  QUEtiapine 25 milliGRAM(s) Oral every 6 hours PRN  QUEtiapine 25 milliGRAM(s) Oral two times a day    polyethylene glycol 3350 17 Gram(s) Oral daily    atorvastatin 40 milliGRAM(s) Oral at bedtime    folic acid 1 milliGRAM(s) Oral daily  influenza   Vaccine 0.5 milliLiter(s) IntraMuscular once  multivitamin 1 Tablet(s) Oral daily  nystatin Powder 1 Application(s) Topical two times a day  thiamine IVPB 500 milliGRAM(s) IV Intermittent every 8 hours      PAST MEDICAL & SURGICAL HISTORY:  Seasonal allergies    Anxiety    Osteoarthritis  R knee and L shoulder    Chronic obstructive pulmonary disease (COPD)    Hyperlipidemia    Hypertension    Osteoarthritis of left shoulder    Class 1 obesity with body mass index (BMI) of 34.0 to 34.9 in adult    History of closed shoulder dislocation  left shoulder 2019    Anxiety    HLD (hyperlipidemia)    H/O total knee replacement  R knee    H/O shoulder replacement  L shoulder    History of right knee joint replacement  2019        FAMILY HISTORY:  Family history of myocardial infarction    FH: heart attack  mother        SOCIAL HISTORY:  unchanged    REVIEW OF SYSTEMS:  CONSTITUTIONAL: No fever, weight loss, or fatigue  EYES: No eye pain, visual disturbances, or discharge  ENMT:  No difficulty hearing, tinnitus, vertigo; No sinus or throat pain  NECK: No pain or stiffness  RESPIRATORY:    CARDIOVASCULAR:    GASTROINTESTINAL: No abdominal or epigastric pain. No nausea, vomiting, or hematemesis; No diarrhea or constipation. No melena or hematochezia.  GENITOURINARY: No dysuria, frequency, hematuria, or incontinence  NEUROLOGICAL: No headaches, memory loss, loss of strength, numbness, or tremors  SKIN:   LYMPH Nodes: No enlarged glands  ENDOCRINE: No heat or cold intolerance; No hair loss  MUSCULOSKELETAL: No joint pain or swelling; No muscle, back, or extremity pain  PSYCHIATRIC: No depression, anxiety, mood swings, or difficulty sleeping  HEME/LYMPH: No easy bruising, or bleeding gums  ALLERY AND IMMUNOLOGIC: No hives or eczema	    [ x] All others negative	  [ ] Unable to obtain    PHYSICAL EXAM:  T(C): 36.3 (12-15-20 @ 09:27), Max: 37.4 (12-14-20 @ 20:56)  HR: 80 (12-15-20 @ 09:27) (77 - 93)  BP: 115/88 (12-15-20 @ 05:32) (83/55 - 115/88)  RR: 18 (12-15-20 @ 09:27) (16 - 20)  SpO2: 93% (12-15-20 @ 09:27) (91% - 98%)  Wt(kg): --  I&O's Summary    14 Dec 2020 07:01  -  15 Dec 2020 07:00  --------------------------------------------------------  IN: 1945 mL / OUT: 1780 mL / NET: 165 mL        Appearance:  Obese, NAD	  HEENT:   Normal oral mucosa, PERRL, EOMI	  Lymphatic: No lymphadenopathy  Cardiovascular:  RRR, normal S1S2, no m/r/g, no JVD  Respiratory:  CTA b/l	  Psychiatry:  AAO x 3  Gastrointestinal:  Soft, Non-tender, + BS	  Skin: No rashes, No ecchymoses, No cyanosis	  Neurologic:  no FND  Extremities:  No LE edema, redness or TTP    LABS:	 	    CBC Full  -  ( 15 Dec 2020 06:13 )  WBC Count : 6.47 K/uL  Hemoglobin : 10.0 g/dL  Hematocrit : 33.4 %  Platelet Count - Automated : 223 K/uL  Mean Cell Volume : 93.8 fl  Mean Cell Hemoglobin : 28.1 pg  Mean Cell Hemoglobin Concentration : 29.9 gm/dL  Auto Neutrophil # : 3.95 K/uL  Auto Lymphocyte # : 1.81 K/uL  Auto Monocyte # : 0.54 K/uL  Auto Eosinophil # : 0.11 K/uL  Auto Basophil # : 0.04 K/uL  Auto Neutrophil % : 61.1 %  Auto Lymphocyte % : 28.0 %  Auto Monocyte % : 8.3 %  Auto Eosinophil % : 1.7 %  Auto Basophil % : 0.6 %    12-15    140  |  106  |  10  ----------------------------<  118<H>  4.1   |  23  |  0.95  12-14    143  |  108  |  12  ----------------------------<  112<H>  3.7   |  23  |  1.02    Ca    8.7      15 Dec 2020 06:13  Ca    8.3<L>      14 Dec 2020 10:17  Phos  3.8     12-15  Phos  3.7     12-14  Mg     1.8     12-15  Mg     1.8     12-14    TPro  6.2  /  Alb  3.4  /  TBili  0.4  /  DBili  x   /  AST  18  /  ALT  18  /  AlkPhos  75  12-15  TPro  6.0  /  Alb  3.4  /  TBili  0.4  /  DBili  x   /  AST  21  /  ALT  17  /  AlkPhos  71  12-14      DVT 12/14/20:  IMPRESSION: There are postphlebitic changes, the residue of prior DVT, affecting the left popliteal and femoral veins in the thigh, and the left posterior tibial peroneal trunk and gastrocnemius veins in the calf.    There is acute calf vein DVT affecting both the right and left soleal veins.                Assessment:  1. Acute DVT of R and L soleal veins  2. Subacute DVT of L popliteal, L femora, L posterior tibial peroneal trunk and gastroc vein  3. L humerus fracture w/ plan for surgical fixation 12/16        Plan:  1. CTA Chest to r/o PE given hypotension, hx of extensive LE DVTs- await results prior to OR  2. Check serum pro-BNP  3. Continue AC w/ Lovenox 80 BID for now  4. Recommend minimal interruption of AC pre and post-surgery per ortho  5. Will follow     Thank you      Vascular Cardiology Service    Please call with any questions:   SPECTRA - [id number]  Office [phone]  email:  [email or web address]

## 2020-12-15 NOTE — CHART NOTE - NSCHARTNOTEFT_GEN_A_CORE
Upper Abdomen: Calculus within the left kidney with foci of air  Patient is being treated for UTI with Ceftriaxone. ID Dr Moreno and attending made aware Upper Abdomen: Calculus within the left kidney with foci of air  Patient is being treated for UTI with Ceftriaxone. I spoke to ID Dr Moreno can switch to Zosyn if becomes hypotensive or fever, follow up repeat blood cultures. Dr Diallo notified.

## 2020-12-15 NOTE — PROGRESS NOTE ADULT - SUBJECTIVE AND OBJECTIVE BOX
[facility], Division of Infectious Diseases  MAHOGANY Mcfarland Y. Patel, S. Shah  [phone]    Name: DALI RAYGOZA  Age: 70y  Gender: Female  MRN: [id number]  Note Date: 12-15-20    Interval History:  Patient seen and examined at bedside this morning  Has some SOB overnight, currently on 2L NC. Afebrile.  Notes reviewed  plan is for ORIF L humerus on 12/17      Antibiotics:  cefTRIAXone   IVPB      cefTRIAXone   IVPB 1000 milliGRAM(s) IV Intermittent every 24 hours      Medications:  acetaminophen   Tablet .. 650 milliGRAM(s) Oral every 6 hours PRN  atorvastatin 40 milliGRAM(s) Oral at bedtime  cefTRIAXone   IVPB      cefTRIAXone   IVPB 1000 milliGRAM(s) IV Intermittent every 24 hours  enoxaparin Injectable 80 milliGRAM(s) SubCutaneous two times a day  folic acid 1 milliGRAM(s) Oral daily  haloperidol    Injectable 2.5 milliGRAM(s) IV Push every 6 hours PRN  influenza   Vaccine 0.5 milliLiter(s) IntraMuscular once  loratadine 10 milliGRAM(s) Oral daily  melatonin 3 milliGRAM(s) Oral at bedtime  multivitamin 1 Tablet(s) Oral daily  nystatin Powder 1 Application(s) Topical two times a day  oxyCODONE    IR 2.5 milliGRAM(s) Oral every 8 hours PRN  polyethylene glycol 3350 17 Gram(s) Oral daily  QUEtiapine 25 milliGRAM(s) Oral every 6 hours PRN  QUEtiapine 25 milliGRAM(s) Oral two times a day  thiamine IVPB 500 milliGRAM(s) IV Intermittent every 8 hours      Review of Systems:  A 10-point review of systems was obtained.     Pertinent positives and negatives--  Constitutional: No fevers. No Chills. No Rigors.   Cardiovascular: No chest pain. No palpitations.  Respiratory: No shortness of breath. No cough.  Gastrointestinal: No nausea or vomiting. No diarrhea or constipation.   Psychiatric: Pleasant. Appropriate affect.    Review of systems otherwise negative except as previously noted.    Allergies: Allergy Status Unknown    For details regarding the patient's past medical history, social history, family history, and other miscellaneous elements, please refer the initial infectious diseases consultation and/or the admitting history and physical examination for this admission.    Objective:  Vitals:   T(C): 36.3 (12-15-20 @ 09:27), Max: 37.4 (12-14-20 @ 20:56)  HR: 80 (12-15-20 @ 09:27) (77 - 93)  BP: 84/52 (12-15-20 @ 10:45) (83/55 - 164/63)  RR: 18 (12-15-20 @ 09:27) (16 - 20)  SpO2: 93% (12-15-20 @ 09:27) (91% - 98%)    Physical Examination:  General: no acute distress  HEENT: NC/AT, EOMI, anicteric, no oral lesions  Neck: supple, no palpable LAD  Cardio: S1, S2 heard, RRR, no murmurs  Resp: on NC, b/l exp. wheezing  Abd: soft, NT, ND, + bowel sounds  Neuro: AAOx3, no obvious focal deficits  Ext: L arm in splint  Skin: warm, dry, no visible rash    Laboratory Studies:  CBC:                       10.0   6.47  )-----------( 223      ( 15 Dec 2020 06:13 )             33.4     CMP: 12-15    140  |  106  |  10  ----------------------------<  118<H>  4.1   |  23  |  0.95    Ca    8.7      15 Dec 2020 06:13  Phos  3.8     12-15  Mg     1.8     12-15    TPro  6.2  /  Alb  3.4  /  TBili  0.4  /  DBili  x   /  AST  18  /  ALT  18  /  AlkPhos  75  12-15    LIVER FUNCTIONS - ( 15 Dec 2020 06:13 )  Alb: 3.4 g/dL / Pro: 6.2 g/dL / ALK PHOS: 75 U/L / ALT: 18 U/L / AST: 18 U/L / GGT: x           Urinalysis Basic - ( 13 Dec 2020 22:33 )    Color: Yellow / Appearance: Slightly Turbid / SG: >1.050 / pH: x  Gluc: x / Ketone: Small  / Bili: Negative / Urobili: <2 mg/dL   Blood: x / Protein: 30 mg/dL / Nitrite: Positive   Leuk Esterase: Small / RBC: 4 /HPF /  /HPF   Sq Epi: x / Non Sq Epi: 2 /HPF / Bacteria: [facility]      Microbiology: reviewed    Culture - Urine (collected 12-13-20 @ 23:18)  Source: .Urine Clean Catch (Midstream)  Preliminary Report (12-14-20 @ 19:11):    >100,000 CFU/ml Escherichia coli        Radiology: reviewed

## 2020-12-15 NOTE — PROGRESS NOTE ADULT - ASSESSMENT
70F pmhx HTN, obesity, COPD, anxiety, agitation, R TKR, recent L reverse total shoulder replacement (@ OSH), who was BIBEMS yesterday as a level 2 trauma after being found down, admitted to SICU with left humerus periprosthetic comminuted fracture, c/b rhabdo, left AMA to [facility] parking lot by wheelchair, now back to hospital for debility, found to be hypotensive to SBP 60-70.

## 2020-12-15 NOTE — CONSULT NOTE ADULT - PROBLEM SELECTOR RECOMMENDATION 2
-  -Recommendation is to proceed with procedure  -no absolute contraindications  -no history of cva, chf, mi  -ecg unchanged from baseline  -nuclear stress test 12/2019 unremarkable    Patient of Dr. Feliciano ([facility])    Darrin Alejo D.O.  [phone]

## 2020-12-15 NOTE — PROGRESS NOTE ADULT - ATTENDING COMMENTS
Infectious Diseases will continue to follow. Please call with any questions.   Aliya Moreno M.D.  [facility], Division of Infectious Diseases [phone]  For over the weekend and after hours, please call [phone]

## 2020-12-15 NOTE — PROVIDER CONTACT NOTE (OTHER) - ACTION/TREATMENT ORDERED:
Donis Barrientos NP (med NP) notified & aware. NP to order IV Tylenol x1 dose & recheck VS in one hour. Will cont to monitor.

## 2020-12-15 NOTE — PROGRESS NOTE ADULT - PROBLEM SELECTOR PLAN 2
splint in place  appreciate ortho recs for surgical fixation of left humerus fracture with Dr. Palma 12/16  NWB LUE  bl soleal DVT started on lovenox full dose  CTA ro PE urgent  pending TTE  not medically optimized yet  vascular cardiology consult  check BNP

## 2020-12-15 NOTE — CONSULT NOTE ADULT - ASSESSMENT
70F pmhx HTN, obesity, COPD, anxiety, agitation, R TKR, recent L reverse total shoulder replacement (@ OSH), who was BIBEMS yesterday as a level 2 trauma after being found down, admitted to SICU with left humerus periprosthetic comminuted fracture, c/b rhabdo and left radial nerve palsy.
ACute comminuted L humerous fracture, post L shoulder replacement in 9/2020: now pre-op  Bilateral soleal DVT  Syncope with resolving rhabdo  No clear history of "COPD"  Obesity  E Coli bacteuria and pyuria: afebrile on ceftriaxone    REC    CTA pending  LUE dopplers pending  Continue full dose AC for now  TTE: pending
Pt is a 70W w/ PMHx of HTN, obesity, COPD, anxiety, agitation, R TKR, recent L reverse total shoulder replacement (@ OSH), who was BIBEMS yesterday as a level 2 trauma after being found down, admitted to SICU with left humerus periprosthetic comminuted fracture, c/brhabdo and left radial nerve palsy, left AMA to [facility] parking lot by wheelchair, where she decided she wanted to be admitted again to hospital for possible fracture repair.  ID c/s for UTI    Acute cystitis  -UA+, pending UCx  -BCx ordered  -start ceftriaxone 1gm q24h    Nondisplaced fx of humerus   Rhabomyolysis 2/2 Trauma  -in splint  -plan for repair, appreciate ortho recs

## 2020-12-15 NOTE — PROGRESS NOTE ADULT - PROBLEM SELECTOR PLAN 2
splint in place  appreciate ortho recs for surgical fixation of left humerus fracture with Dr. Palma 12/16  NWB LUHUMPHREY  bl soleal DVT started on lovenox full dose  CTA ro PE urgent- no PE, fu pulm recs  pending TTE- reviewed preserved lVEF and RV function  pt medically optimized for the planned procedure  cards fu noted- no absolute cardiac contraindications for the procedure  stress test 12/19 normal study  vasc cards and pulm fu noted

## 2020-12-15 NOTE — PROVIDER CONTACT NOTE (OTHER) - ASSESSMENT
pt AOx3 (forgetful to time), VSS, and resting in bed. Pt called for RN @ 0300 c/o difficulty breathing. Pt has PMH COPD & has b/l expiratory wheezing. Pt also c/o cough w/ mucus (was an issue prior to admit) & performed IS @ bedside w/ RN. Pt satting low 90's on RA.

## 2020-12-15 NOTE — PROGRESS NOTE ADULT - SUBJECTIVE AND OBJECTIVE BOX
Patient is a 70y old  Female who presents with a chief complaint of left humerus fracture (16 Dec 2020 11:31)      INTERVAL HPI/OVERNIGHT EVENTS: noted  pt seen and examined  bp variable  denies cp/sob  pain well controlled      Vital Signs Last 24 Hrs  T(C): 36.9 (16 Dec 2020 20:30), Max: 36.9 (16 Dec 2020 20:30)  T(F): 98.4 (16 Dec 2020 20:30), Max: 98.4 (16 Dec 2020 20:30)  HR: 89 (16 Dec 2020 20:30) (76 - 89)  BP: 168/84 (16 Dec 2020 20:30) (95/66 - 175/80)  BP(mean): --  RR: 18 (16 Dec 2020 20:30) (18 - 18)  SpO2: 93% (16 Dec 2020 20:30) (93% - 95%)    acetaminophen   Tablet .. 650 milliGRAM(s) Oral every 6 hours PRN  albuterol/ipratropium for Nebulization 3 milliLiter(s) Nebulizer every 6 hours PRN  atorvastatin 40 milliGRAM(s) Oral at bedtime  cefTRIAXone   IVPB      cefTRIAXone   IVPB 1000 milliGRAM(s) IV Intermittent every 24 hours  dextrose 5% + sodium chloride 0.9%. 1000 milliLiter(s) IV Continuous <Continuous>  folic acid 1 milliGRAM(s) Oral daily  haloperidol    Injectable 2.5 milliGRAM(s) IV Push every 6 hours PRN  influenza   Vaccine 0.5 milliLiter(s) IntraMuscular once  loratadine 10 milliGRAM(s) Oral daily  melatonin 3 milliGRAM(s) Oral at bedtime  multivitamin 1 Tablet(s) Oral daily  nystatin Powder 1 Application(s) Topical two times a day  oxyCODONE    IR 2.5 milliGRAM(s) Oral every 8 hours PRN  oxyCODONE    IR 5 milliGRAM(s) Oral once  polyethylene glycol 3350 17 Gram(s) Oral daily  QUEtiapine 25 milliGRAM(s) Oral every 6 hours PRN  QUEtiapine 25 milliGRAM(s) Oral two times a day  thiamine IVPB 500 milliGRAM(s) IV Intermittent every 8 hours      PHYSICAL EXAM:  GENERAL: NAD,   EYES: conjunctiva and sclera clear  ENMT: Moist mucous membranes  NECK: Supple, No JVD, Normal thyroid  CHEST/LUNG: non labored, cta b/l  HEART: Regular rate and rhythm; No murmurs, rubs, or gallops  ABDOMEN: Soft, Nontender, Nondistended; Bowel sounds present  EXTREMITIES:  2+ Peripheral Pulses, No clubbing, cyanosis, or edema  lt shoulder nWB  LYMPH: No lymphadenopathy noted  SKIN: No rashes or lesions    Consultant(s) Notes Reviewed:  [x ] YES  [ ] NO  Care Discussed with Consultants/Other Providers [ x] YES  [ ] NO    LABS:                        10.0   6.47  )-----------( 223      ( 15 Dec 2020 06:13 )             33.4     12-15    140  |  106  |  10  ----------------------------<  118<H>  4.1   |  23  |  0.95    Ca    8.7      15 Dec 2020 06:13  Phos  4.3     12-15  Mg     1.8     12-15    TPro  6.2  /  Alb  3.4  /  TBili  0.4  /  DBili  x   /  AST  18  /  ALT  18  /  AlkPhos  75  12-15    PT/INR - ( 15 Dec 2020 08:14 )   PT: 13.9 sec;   INR: 1.19 ratio             CAPILLARY BLOOD GLUCOSE                Culture - Blood (collected 14 Dec 2020 17:39)  Source: .Blood Blood-Peripheral  Preliminary Report (15 Dec 2020 18:03):    No growth to date.    Culture - Blood (collected 14 Dec 2020 17:39)  Source: .Blood Blood-Peripheral  Preliminary Report (15 Dec 2020 18:03):    No growth to date.        RADIOLOGY & ADDITIONAL TESTS:    Imaging Personally Reviewed:  [x ] YES  [ ] NO

## 2020-12-16 ENCOUNTER — TRANSCRIPTION ENCOUNTER (OUTPATIENT)
Age: 70
End: 2020-12-16

## 2020-12-16 LAB
BLD GP AB SCN SERPL QL: NEGATIVE — SIGNIFICANT CHANGE UP
RH IG SCN BLD-IMP: POSITIVE — SIGNIFICANT CHANGE UP

## 2020-12-16 PROCEDURE — 99232 SBSQ HOSP IP/OBS MODERATE 35: CPT

## 2020-12-16 RX ORDER — OXYCODONE HYDROCHLORIDE 5 MG/1
5 TABLET ORAL ONCE
Refills: 0 | Status: DISCONTINUED | OUTPATIENT
Start: 2020-12-16 | End: 2020-12-17

## 2020-12-16 RX ORDER — SODIUM CHLORIDE 9 MG/ML
1000 INJECTION, SOLUTION INTRAVENOUS
Refills: 0 | Status: DISCONTINUED | OUTPATIENT
Start: 2020-12-16 | End: 2020-12-17

## 2020-12-16 RX ORDER — ACETAMINOPHEN 500 MG
1000 TABLET ORAL ONCE
Refills: 0 | Status: COMPLETED | OUTPATIENT
Start: 2020-12-16 | End: 2020-12-16

## 2020-12-16 RX ORDER — IPRATROPIUM/ALBUTEROL SULFATE 18-103MCG
3 AEROSOL WITH ADAPTER (GRAM) INHALATION EVERY 6 HOURS
Refills: 0 | Status: DISCONTINUED | OUTPATIENT
Start: 2020-12-16 | End: 2020-12-17

## 2020-12-16 RX ADMIN — Medication 400 MILLIGRAM(S): at 23:08

## 2020-12-16 RX ADMIN — LORATADINE 10 MILLIGRAM(S): 10 TABLET ORAL at 11:24

## 2020-12-16 RX ADMIN — Medication 1 MILLIGRAM(S): at 11:24

## 2020-12-16 RX ADMIN — Medication 1 TABLET(S): at 11:24

## 2020-12-16 RX ADMIN — POLYETHYLENE GLYCOL 3350 17 GRAM(S): 17 POWDER, FOR SOLUTION ORAL at 11:24

## 2020-12-16 RX ADMIN — OXYCODONE HYDROCHLORIDE 2.5 MILLIGRAM(S): 5 TABLET ORAL at 07:30

## 2020-12-16 RX ADMIN — OXYCODONE HYDROCHLORIDE 2.5 MILLIGRAM(S): 5 TABLET ORAL at 23:07

## 2020-12-16 RX ADMIN — NYSTATIN CREAM 1 APPLICATION(S): 100000 CREAM TOPICAL at 06:17

## 2020-12-16 RX ADMIN — OXYCODONE HYDROCHLORIDE 2.5 MILLIGRAM(S): 5 TABLET ORAL at 15:46

## 2020-12-16 RX ADMIN — Medication 105 MILLIGRAM(S): at 23:10

## 2020-12-16 RX ADMIN — ENOXAPARIN SODIUM 80 MILLIGRAM(S): 100 INJECTION SUBCUTANEOUS at 07:47

## 2020-12-16 RX ADMIN — Medication 105 MILLIGRAM(S): at 06:17

## 2020-12-16 RX ADMIN — QUETIAPINE FUMARATE 25 MILLIGRAM(S): 200 TABLET, FILM COATED ORAL at 06:17

## 2020-12-16 RX ADMIN — Medication 650 MILLIGRAM(S): at 14:05

## 2020-12-16 RX ADMIN — Medication 3 MILLILITER(S): at 18:09

## 2020-12-16 RX ADMIN — OXYCODONE HYDROCHLORIDE 2.5 MILLIGRAM(S): 5 TABLET ORAL at 06:21

## 2020-12-16 RX ADMIN — QUETIAPINE FUMARATE 25 MILLIGRAM(S): 200 TABLET, FILM COATED ORAL at 17:07

## 2020-12-16 RX ADMIN — OXYCODONE HYDROCHLORIDE 2.5 MILLIGRAM(S): 5 TABLET ORAL at 14:46

## 2020-12-16 RX ADMIN — Medication 105 MILLIGRAM(S): at 16:59

## 2020-12-16 RX ADMIN — Medication 650 MILLIGRAM(S): at 15:46

## 2020-12-16 RX ADMIN — NYSTATIN CREAM 1 APPLICATION(S): 100000 CREAM TOPICAL at 17:07

## 2020-12-16 RX ADMIN — SODIUM CHLORIDE 75 MILLILITER(S): 9 INJECTION, SOLUTION INTRAVENOUS at 23:11

## 2020-12-16 RX ADMIN — Medication 3 MILLIGRAM(S): at 21:33

## 2020-12-16 RX ADMIN — ATORVASTATIN CALCIUM 40 MILLIGRAM(S): 80 TABLET, FILM COATED ORAL at 21:34

## 2020-12-16 RX ADMIN — CEFTRIAXONE 100 MILLIGRAM(S): 500 INJECTION, POWDER, FOR SOLUTION INTRAMUSCULAR; INTRAVENOUS at 14:03

## 2020-12-16 NOTE — PROGRESS NOTE ADULT - ASSESSMENT
Pt is a 70W w/ PMHx of HTN, obesity, COPD, anxiety, agitation, R TKR, recent L reverse total shoulder replacement (@ OSH), who was BIBEMS yesterday as a level 2 trauma after being found down, admitted to SICU with left humerus periprosthetic comminuted fracture, c/brhabdo and left radial nerve palsy, left AMA to [facility] parking lot by wheelchair, where she decided she wanted to be admitted again to hospital for possible fracture repair.  ID c/s for UTI    Acute cystitis/pyelonephritis  -UA+, UCx E.coli, susceptibilities review  -BCx NGTD  -imaging w/ evidence of upper tract disease  -c/w ceftriaxone 1gm q24h while inpatient  Will plan for total 10 day course until 12/24/20. When pt is planned for d/c will switch to cefpodoxime 200mg BID to complete course    Nondisplaced fx of humerus   Rhabomyolysis 2/2 Trauma  -in splint  -plan for repair 12/17, appreciate ortho recs

## 2020-12-16 NOTE — PROGRESS NOTE BEHAVIORAL HEALTH - SUMMARY
71y/o  female, with 2 adult kids, lives alone with her gretel in a private residence in [place], with no PPHx, no h/o SA/SIB, no h/o substance abuse, no h/o inpt psychiatric hospitalizations, with PMHx HTN, obesity, COPD, R TKR, recent L reverse total shoulder replacement (@ OSH), who was BIBEMS yesterday as a level 2 trauma after being found down, admitted to SICU with left humerus periprosthetic comminuted fracture, c/b rhabdo and left radial nerve palsy, left AMA to [facility] parking lot by wheelchair, where she decided she wanted to be admitted again to hospital for fracture repair, hospital course complicated by hypotension which has now since resolved.  Psychiatry consulted to assess for delirium, agitation, management of medications.  Patient seen and evaluated, awake and alert, cooperative, pleasant, mildly delirious.  Acknowledges that she has a fx shoulder requiring surgical intervention later this week which she is currently agreeing to.  Denies mood sx, denies SI/HI, AVH, or thoughts of paranoia.  Denies substance use or alcohol use.  Per son, patient in the last 3 months has been more forgetful, not formally diagnosed with dementia.  He reports 2 months ago following shoulder surgery that she became delirious, paranoid thinking that people were stealing the SIM card out of her phone and that her son's voices was being implanted into the phone, calling 911.  He reports that patient drinks alcohol daily for many decades, unsure of when her last drink was.  Discussed plan of care with son and plan to start standing neuroleptic medication for delirium which he is agreeable to.  Would recommend checking ekg and if qtc <500ms on ekg, can start Seroquel 25mg po BID standing. 12/16/20- seen and evaluated, awake, smiling, oriented.  Appropriate with conversation, no agitation.  Denies mood sx, states some anxiety about her upcoming surgery tomorrow but is hopeful.  At this time, would continue standing Seroquel BID with PRN Seroquel/Haldol for any acute agitation episodes. 71y/o  female, with 2 adult kids, lives alone with her gretel in a private residence in [place], with no PPHx, no h/o SA/SIB, no h/o substance abuse, no h/o inpt psychiatric hospitalizations, with PMHx HTN, obesity, COPD, R TKR, recent L reverse total shoulder replacement (@ OSH), who was BIBEMS yesterday as a level 2 trauma after being found down, admitted to SICU with left humerus periprosthetic comminuted fracture, c/b rhabdo and left radial nerve palsy, left AMA to [facility] parking lot by wheelchair, where she decided she wanted to be admitted again to hospital for fracture repair, hospital course complicated by hypotension which has now since resolved.  Psychiatry consulted to assess for delirium, agitation, management of medications.    Patient seen and evaluated, awake and alert, cooperative, pleasant, mildly delirious.  Acknowledges that she has a fx shoulder requiring surgical intervention later this week which she is currently agreeing to.  Denies mood sx, denies SI/HI, AVH, or thoughts of paranoia.  Denies substance use or alcohol use.  Per son, patient in the last 3 months has been more forgetful, not formally diagnosed with dementia.  He reports 2 months ago following shoulder surgery that she became delirious, paranoid thinking that people were stealing the SIM card out of her phone and that her son's voices was being implanted into the phone, calling 911.  He reports that patient drinks alcohol daily for many decades, unsure of when her last drink was.  Discussed plan of care with son and plan to start standing neuroleptic medication for delirium which he is agreeable to.  Would recommend checking ekg and if qtc <500ms on ekg, can start Seroquel 25mg po BID standing. 12/16/20- seen and evaluated, awake, smiling, oriented.  Appropriate with conversation, no agitation.  Denies mood sx, states some anxiety about her upcoming surgery tomorrow but is hopeful.  At this time, would continue standing Seroquel BID with PRN Seroquel/Haldol for any acute agitation episodes.

## 2020-12-16 NOTE — PROGRESS NOTE ADULT - SUBJECTIVE AND OBJECTIVE BOX
Patient reports "tingling" in left fingertips-unchanged from admission.  No chest pain, SOB, N/V.    T(C): 36.4 (12-16-20 @ 04:31), Max: 36.6 (12-16-20 @ 00:30)  HR: 76 (12-16-20 @ 04:31) (76 - 89)  BP: 167/92 (12-16-20 @ 04:31) (84/52 - 188/84)  RR: 18 (12-16-20 @ 04:31) (18 - 18)  SpO2: 93% (12-16-20 @ 04:31) (93% - 94%)      Exam:  Alert and Oriented, No Acute Distress  Extremities:  LUE: Compartments soft, dull sensation grossly intact, radial pulse 2+, Fingers with brisk cap refill  Calves Soft, Non-tender bilaterally                            10.0   6.47  )-----------( 223      ( 15 Dec 2020 06:13 )             33.4    12-15    140  |  106  |  10  ----------------------------<  118<H>  4.1   |  23  |  0.95    Ca    8.7      15 Dec 2020 06:13  Phos  4.3     12-15  Mg     1.8     12-15    TPro  6.2  /  Alb  3.4  /  TBili  0.4  /  DBili  x   /  AST  18  /  ALT  18  /  AlkPhos  75  12-15

## 2020-12-16 NOTE — PROGRESS NOTE ADULT - SUBJECTIVE AND OBJECTIVE BOX
Vascular Cardiology  Progress note     SPECTRA [id number]              EMAIL [email or web address]   OFFICE [phone]    CC:      INTERVAL HISTORY:  Feels well today  CTPA without PE (though small distal segments not well evaluated)          Allergies    Allergy Status Unknown    Intolerances    NSAIDs (Other)  	    MEDICATIONS:    cefTRIAXone   IVPB      cefTRIAXone   IVPB 1000 milliGRAM(s) IV Intermittent every 24 hours    loratadine 10 milliGRAM(s) Oral daily    acetaminophen   Tablet .. 650 milliGRAM(s) Oral every 6 hours PRN  haloperidol    Injectable 2.5 milliGRAM(s) IV Push every 6 hours PRN  melatonin 3 milliGRAM(s) Oral at bedtime  oxyCODONE    IR 2.5 milliGRAM(s) Oral every 8 hours PRN  QUEtiapine 25 milliGRAM(s) Oral every 6 hours PRN  QUEtiapine 25 milliGRAM(s) Oral two times a day    polyethylene glycol 3350 17 Gram(s) Oral daily    atorvastatin 40 milliGRAM(s) Oral at bedtime    folic acid 1 milliGRAM(s) Oral daily  influenza   Vaccine 0.5 milliLiter(s) IntraMuscular once  multivitamin 1 Tablet(s) Oral daily  nystatin Powder 1 Application(s) Topical two times a day  thiamine IVPB 500 milliGRAM(s) IV Intermittent every 8 hours      PAST MEDICAL & SURGICAL HISTORY:  Seasonal allergies    Anxiety    Osteoarthritis  R knee and L shoulder    Chronic obstructive pulmonary disease (COPD)    Hyperlipidemia    Hypertension    Osteoarthritis of left shoulder    Class 1 obesity with body mass index (BMI) of 34.0 to 34.9 in adult    History of closed shoulder dislocation  left shoulder 2019    Anxiety    HLD (hyperlipidemia)    H/O total knee replacement  R knee    H/O shoulder replacement  L shoulder    History of right knee joint replacement  2019        FAMILY HISTORY:  Family history of myocardial infarction    FH: heart attack  mother        SOCIAL HISTORY:  unchanged    REVIEW OF SYSTEMS:  CONSTITUTIONAL: No fever, weight loss, or fatigue  EYES: No eye pain, visual disturbances, or discharge  ENMT:  No difficulty hearing, tinnitus, vertigo; No sinus or throat pain  NECK: No pain or stiffness  RESPIRATORY: No cough, wheezing, chills or hemoptysis; No Shortness of Breath  CARDIOVASCULAR: No chest pain, palpitations, passing out, dizziness, or leg swelling  GASTROINTESTINAL: No abdominal or epigastric pain. No nausea, vomiting, or hematemesis; No diarrhea or constipation. No melena or hematochezia.  GENITOURINARY: No dysuria, frequency, hematuria, or incontinence  NEUROLOGICAL: No headaches, memory loss, loss of strength, numbness, or tremors  SKIN: No itching, burning, rashes, or lesions   LYMPH Nodes: No enlarged glands  ENDOCRINE: No heat or cold intolerance; No hair loss  MUSCULOSKELETAL: No joint pain or swelling; No muscle, back, or extremity pain  PSYCHIATRIC: No depression, anxiety, mood swings, or difficulty sleeping  HEME/LYMPH: No easy bruising, or bleeding gums  ALLERY AND IMMUNOLOGIC: No hives or eczema	    [ x] All others negative	  [ ] Unable to obtain    PHYSICAL EXAM:  T(C): 36.6 (12-16-20 @ 08:30), Max: 36.6 (12-16-20 @ 00:30)  HR: 77 (12-16-20 @ 08:30) (76 - 89)  BP: 166/90 (12-16-20 @ 08:30) (84/52 - 188/84)  RR: 18 (12-16-20 @ 08:30) (18 - 18)  SpO2: 94% (12-16-20 @ 08:30) (93% - 94%)  Wt(kg): --  I&O's Summary    15 Dec 2020 07:01  -  16 Dec 2020 07:00  --------------------------------------------------------  IN: 1360 mL / OUT: 500 mL / NET: 860 mL    16 Dec 2020 07:01  -  16 Dec 2020 10:20  --------------------------------------------------------  IN: 417 mL / OUT: 0 mL / NET: 417 mL        Appearance: Normal	  Appearance:  Obese, NAD	  HEENT:   Normal oral mucosa, PERRL, EOMI	  Lymphatic: No lymphadenopathy  Cardiovascular:  RRR, normal S1S2, no m/r/g, no JVD  Respiratory:  CTA b/l	  Psychiatry:  AAO x 3  Gastrointestinal:  Soft, Non-tender, + BS	  Skin: No rashes, No ecchymoses, No cyanosis	  Neurologic:  no FND  Extremities:  No LE edema, redness or TTP      LABS:	 	    CBC Full  -  ( 15 Dec 2020 06:13 )  WBC Count : 6.47 K/uL  Hemoglobin : 10.0 g/dL  Hematocrit : 33.4 %  Platelet Count - Automated : 223 K/uL  Mean Cell Volume : 93.8 fl  Mean Cell Hemoglobin : 28.1 pg  Mean Cell Hemoglobin Concentration : 29.9 gm/dL  Auto Neutrophil # : 3.95 K/uL  Auto Lymphocyte # : 1.81 K/uL  Auto Monocyte # : 0.54 K/uL  Auto Eosinophil # : 0.11 K/uL  Auto Basophil # : 0.04 K/uL  Auto Neutrophil % : 61.1 %  Auto Lymphocyte % : 28.0 %  Auto Monocyte % : 8.3 %  Auto Eosinophil % : 1.7 %  Auto Basophil % : 0.6 %    12-15    140  |  106  |  10  ----------------------------<  118<H>  4.1   |  23  |  0.95    Ca    8.7      15 Dec 2020 06:13  Phos  4.3     12-15  Phos  3.8     12-15  Mg     1.8     12-15  Mg     1.8     12-15    TPro  6.2  /  Alb  3.4  /  TBili  0.4  /  DBili  x   /  AST  18  /  ALT  18  /  AlkPhos  75  12-15

## 2020-12-16 NOTE — PROGRESS NOTE BEHAVIORAL HEALTH - CASE SUMMARY
70F , has 2 adult kids, lives alone, with no formal PPHx, daily drinker, no h/o SA/SIB or psych admissions, no h/o inpt psychiatric hospitalizations, with PMHx HTN, obesity, COPD, R TKR, recent L reverse total shoulder replacement (@ OSH), who was BIBEMS yesterday as a level 2 trauma after being found down, admitted to SICU with left humerus periprosthetic comminuted fracture, c/b rhabdo and left radial nerve palsy, left AMA to [facility] parking lot by wheelchair, where she decided she wanted to be admitted again to hospital for fracture repair, hospital course complicated by hypotension which has now since resolved, psychiatry consulted for management of delirium.  On interview pt AOx2, off on date, expansive and jovial, denies depression, laura, psychosis, or SIIP/HIIP.  Endorses daily ETOH (drinks wine with dinner), denies h/o withdrawals, seizures, or treatments.  States she is amenable for surgery this week to repair her shoulder; recounts her issues getting PT during the COVID pandemic.  Collateral notes pt with h/o hospital delirium with behavioral disturbances, daily ETOH use.  12/16: Pt presently calm, cooperative, AOx3, not agitated, no delusions or AVH noted, tolerating meds without SE.  Dx: Delirium 2/2 GMC, ETOH abuse.  Agree with NP's assessment and plan as above.  Seroquel if QTc<500, CIWA monitoring with sx-triggered Ativan, Haldol PRN agitation.  CL psych will f/u.

## 2020-12-16 NOTE — PROGRESS NOTE ADULT - ASSESSMENT
70F admitted with presumed syncope and tramatic L humeral fracture  LE dopplers on admission with bilateral soleal DVT and postphlebitic changes in bilateral prox veins  Hypertension  Obesity  No history of COPD    REC    Clear for planned surgical repair from pulmnary POV  Would continue Lovenox pre-op and can resume post-op per surgical clearance  Post-op LE dopplers  Syncope not due to VTE: cardiology f/u

## 2020-12-16 NOTE — PROGRESS NOTE BEHAVIORAL HEALTH - NSBHCONSULTRECOMMENDOTHER_PSY_A_CORE FT
1. Check: RPR, U-tox    2. Supplement B12    3. Minimize use of benzos, opioids, anticholinergics, or other deliriogenic agents when possible.  Maintain sleep wake cycle.  Provide frequent reorientation and redirection.  Family member at bedside if possible. Assess for need for glasses and hearing aid (if applicable).    4. Pt does not meet criteria for psychiatric hospitalization.  Recommend outpt psych f/u at [facility] after d/c- [phone].   CL Psych will follow.

## 2020-12-16 NOTE — PROGRESS NOTE ADULT - SUBJECTIVE AND OBJECTIVE BOX
Follow-up Pulm Progress Note  David Chaidez MD  [phone]    AFebrile with stable cardio-resp status  CTA: no PE through med segmental vessels; minimal basilar atelectasis  No respiratory complaints today     Medications:  Vital Signs Last 24 Hrs  T(C): 36.6 (16 Dec 2020 08:30), Max: 36.6 (16 Dec 2020 00:30)  T(F): 97.8 (16 Dec 2020 08:30), Max: 97.9 (16 Dec 2020 00:30)  HR: 77 (16 Dec 2020 08:30) (76 - 89)  BP: 166/90 (16 Dec 2020 08:30) (93/55 - 188/84)  BP(mean): --  RR: 18 (16 Dec 2020 08:30) (18 - 18)  SpO2: 94% (16 Dec 2020 08:30) (93% - 94%)      12-15 @ 07:01  -  12-16 @ 07:00  --------------------------------------------------------  IN: 1360 mL / OUT: 500 mL / NET: 860 mL    LABS:                        10.0   6.47  )-----------( 223      ( 15 Dec 2020 06:13 )             33.4     12-15    140  |  106  |  10  ----------------------------<  118<H>  4.1   |  23  |  0.95    Ca    8.7      15 Dec 2020 06:13  Phos  4.3     12-15  Mg     1.8     12-15    TPro  6.2  /  Alb  3.4  /  TBili  0.4  /  DBili  x   /  AST  18  /  ALT  18  /  AlkPhos  75  12-15      PT/INR - ( 15 Dec 2020 08:14 )   PT: 13.9 sec;   INR: 1.19 ratio          Serum Pro-Brain Natriuretic Peptide: 933 pg/mL (12-15-20 @ 17:33)      CULTURES:  Culture Results:   No growth to date. (12-14 @ 17:39)  Culture Results:   No growth to date. (12-14 @ 17:39)  Culture Results:   >100,000 CFU/ml Escherichia coli (12-13 @ 23:18)    Most recent blood culture -- 12-14 @ 17:39   -- -- .Blood Blood-Peripheral 12-14 @ 17:39  Most recent blood culture -- 12-13 @ 23:18   Escherichia coli Escherichia coli .Urine Clean Catch (Midstream) 12-13 @ 23:18      Physical Examination:  PULM: No wheeze or rhonchi  CVS: Regular rate and rhythm, no murmurs, rubs, or gallops  ABD: Soft, non-tender  EXT:  No clubbing, cyanosis, or edema    RADIOLOGY REVIEWED  CXR:    CT chest:    TTE:

## 2020-12-16 NOTE — PROGRESS NOTE ADULT - SUBJECTIVE AND OBJECTIVE BOX
[facility], Division of Infectious Diseases  MAHOGANY Mcfarland Y. Patel, S. Shah  [phone]    Name: DALI RAYGOZA  Age: 70y  Gender: Female  MRN: [id number]  Note Date: 12-16-20    Interval History:  Patient seen and examined at bedside this morning  Afebrile overnight however had episode of hypotension  Notes reviewed    Antibiotics:  cefTRIAXone   IVPB      cefTRIAXone   IVPB 1000 milliGRAM(s) IV Intermittent every 24 hours      Medications:  acetaminophen   Tablet .. 650 milliGRAM(s) Oral every 6 hours PRN  atorvastatin 40 milliGRAM(s) Oral at bedtime  cefTRIAXone   IVPB      cefTRIAXone   IVPB 1000 milliGRAM(s) IV Intermittent every 24 hours  folic acid 1 milliGRAM(s) Oral daily  haloperidol    Injectable 2.5 milliGRAM(s) IV Push every 6 hours PRN  influenza   Vaccine 0.5 milliLiter(s) IntraMuscular once  loratadine 10 milliGRAM(s) Oral daily  melatonin 3 milliGRAM(s) Oral at bedtime  multivitamin 1 Tablet(s) Oral daily  nystatin Powder 1 Application(s) Topical two times a day  oxyCODONE    IR 2.5 milliGRAM(s) Oral every 8 hours PRN  polyethylene glycol 3350 17 Gram(s) Oral daily  QUEtiapine 25 milliGRAM(s) Oral every 6 hours PRN  QUEtiapine 25 milliGRAM(s) Oral two times a day  thiamine IVPB 500 milliGRAM(s) IV Intermittent every 8 hours      Review of Systems:  A 10-point review of systems was obtained.     Pertinent positives and negatives--  Constitutional: No fevers. No Chills. No Rigors.   Cardiovascular: No chest pain. No palpitations.  Respiratory: No shortness of breath. No cough.  Gastrointestinal: No nausea or vomiting. No diarrhea or constipation.   Psychiatric: Pleasant. Appropriate affect.    Review of systems otherwise negative except as previously noted.    Allergies: Allergy Status Unknown    For details regarding the patient's past medical history, social history, family history, and other miscellaneous elements, please refer the initial infectious diseases consultation and/or the admitting history and physical examination for this admission.    Objective:  Vitals:   T(C): 36.6 (12-16-20 @ 08:30), Max: 36.6 (12-16-20 @ 00:30)  HR: 77 (12-16-20 @ 08:30) (76 - 89)  BP: 166/90 (12-16-20 @ 08:30) (84/52 - 188/84)  RR: 18 (12-16-20 @ 08:30) (18 - 18)  SpO2: 94% (12-16-20 @ 08:30) (93% - 94%)    Physical Examination:  General: no acute distress  HEENT: NC/AT, EOMI, anicteric, no oral lesions  Neck: supple, no palpable LAD  Cardio: S1, S2 heard, RRR, no murmurs  Resp: breath sounds heard bilaterally, no rales, wheezes or rhonchi  Abd: soft, NT, ND, + bowel sounds  Neuro: AAOx3, no obvious focal deficits  Ext: L arm in splint  Skin: warm, dry, no visible rash      Laboratory Studies:  CBC:                       10.0   6.47  )-----------( 223      ( 15 Dec 2020 06:13 )             33.4     CMP: 12-15    140  |  106  |  10  ----------------------------<  118<H>  4.1   |  23  |  0.95    Ca    8.7      15 Dec 2020 06:13  Phos  4.3     12-15  Mg     1.8     12-15    TPro  6.2  /  Alb  3.4  /  TBili  0.4  /  DBili  x   /  AST  18  /  ALT  18  /  AlkPhos  75  12-15    LIVER FUNCTIONS - ( 15 Dec 2020 06:13 )  Alb: 3.4 g/dL / Pro: 6.2 g/dL / ALK PHOS: 75 U/L / ALT: 18 U/L / AST: 18 U/L / GGT: x             Microbiology: reviewed    Culture - Blood (collected 12-14-20 @ 17:39)  Source: .Blood Blood-Peripheral  Preliminary Report (12-15-20 @ 18:03):    No growth to date.    Culture - Blood (collected 12-14-20 @ 17:39)  Source: .Blood Blood-Peripheral  Preliminary Report (12-15-20 @ 18:03):    No growth to date.    Culture - Urine (collected 12-13-20 @ 23:18)  Source: .Urine Clean Catch (Midstream)  Final Report (12-15-20 @ 16:36):    >100,000 CFU/ml Escherichia coli  Organism: Escherichia coli (12-15-20 @ 16:36)  Organism: Escherichia coli (12-15-20 @ 16:36)      -  Amikacin: S <=16      -  Amoxicillin/Clavulanic Acid: S <=8/4      -  Ampicillin: R >16 These ampicillin results predict results for amoxicillin      -  Ampicillin/Sulbactam: R >16/8 Enterobacter, Citrobacter, and Serratia may develop resistance during prolonged therapy (3-4 days)      -  Aztreonam: S <=4      -  Cefazolin: S 4 (MIC_CL_COM_ENTERIC_CEFAZU) For uncomplicated UTI with K. pneumoniae, E. coli, or P. mirablis: MELODY <=16 is sensitive and MELODY >=32 is resistant. This also predicts results for oral agents cefaclor, cefdinir, cefpodoxime, cefprozil, cefuroxime axetil, cephalexin and locarbef for uncomplicated UTI. Note that some isolates may be susceptible to these agents while testing resistant to cefazolin.      -  Cefepime: S <=2      -  Cefoxitin: S <=8      -  Ceftriaxone: S <=1 Enterobacter, Citrobacter, and Serratia may develop resistance during prolonged therapy      -  Ciprofloxacin: R >2      -  Ertapenem: S <=0.5      -  Gentamicin: S <=2      -  Imipenem: S <=1      -  Levofloxacin: R >4      -  Meropenem: S <=1      -  Nitrofurantoin: S <=32 Should not be used to treat pyelonephritis      -  Piperacillin/Tazobactam: S <=8      -  Tigecycline: S <=2      -  Tobramycin: S <=2      -  Trimethoprim/Sulfamethoxazole: R >2/38      Method Type: MELODY        Radiology: reviewed  < from: CT Angio Chest w/ IV Cont (12.15.20 @ 16:10) >    EXAM:  CT ANGIO CHEST (W)AW IC                            PROCEDURE DATE:  12/15/2020            INTERPRETATION:  CLINICAL INFORMATION: Bilateral lower extremity deep venous thromboses, evaluate for pulmonary embolism    COMPARISON: Ultrasound lower extremities 12/14/2020, CTA chest 9/4/2019, CT chest abdomen pelvis 12/12/2020    TECHNIQUE: Enhanced helical images were obtained of the chest. Coronal and sagittal images were reconstructed.  Images were obtained after the uneventful administration of 72 cc of nonionic intravenous contrast (Omnipaque 350). 28 cc of nonionic intravenous contrast (Omnipaque 350) was discarded. Maximum intensity projection images were generated.    FINDINGS:    Pulmonary Artery:  The main pulmonary artery is normal in caliber. There is no main, central, lobar, or proximal segmental pulmonary embolus. The mid to distal segmental and subsegmental vessels are not well evaluated.    Lungs And Airways: Dependent subsegmental atelectasis. Secretions within the trachea.    Pleura: No pneumothorax.  No pleural effusion.    Mediastinum: There are no enlarged chest lymph nodes. The visualized portion of the thyroid gland is unremarkable.   The esophagus is unremarkable.    Heart and Vasculature: There is mild cardiomegaly.  There is no pericardial effusion.  Mitral annular calcification. Sided aortic arch and left-sided descending thoracic aorta. Aortic calcifications. The ostia and proximal segments the brachiocephalic artery is not well evaluated secondary tocalcified plaque.    Upper Abdomen: Calculus within the left kidney with foci of air. Presumed vicarious excretion of contrast within the gallbladder.    Bones And Soft Tissues: Degenerative change of the spine. Unchanged left anterior rib fractures.Unchanged right chronic anterior rib fractures. Chronic L1 vertebral body fracture. Left shoulder arthroplasty.    IMPRESSION:    1.  No pulmonary embolus, however, the mid to distal segmental and subsegmental vessels are not well evaluated.  2.  Airwithin the left collecting system could be secondary to infection or alternatively instrumention (if there is a history). These findings were discussed with ISABEL Browning at 12/15/2020 4:22 PM by Dr. Nguyen with read back confirmation.                  EDER NGUYEN MD; Resident Radiology  This document has been electronically signed.  RAMOS EPSTEIN MD; Attending Radiologist  This document has been electronically signed. Dec 15 2020  4:35PM    < end of copied text >  < from: [facility] Duplex Lower Ext Vein Scan, Bilat (12.14.20 @ 13:40) >    EXAM:  DUPLEX SCAN EXT VEINS LOWER BI                            PROCEDURE DATE:  12/14/2020            INTERPRETATION:  CLINICAL INFORMATION: Bilateral lower extremity swelling, rule out DVT.    COMPARISON: None available.    TECHNIQUE: Duplex sonography of the BILATERAL LOWER extremity veins with color and spectral Doppler, with and without compression.    FINDINGS: There is edema within the superficial soft tissues of the right lower extremity.    A 1.4 cm right inguinal lymph node is imaged.    There is normal compressibility of the right common femoral, femoral and popliteal veins.  Doppler examination shows normal spontaneous and phasic flow.    The right posterior tibial and peroneal veins are patent and free of thrombus.    There isacute calf vein DVT affecting a right soleal vein.    The left common femoral vein is patent and free of clot.    There are postphlebitic changes, wall thickening with incomplete compression of the left femoral and popliteal veins and left posterior tibial peroneal trunk.    Chronic DVT also affects the left gastrocnemius vein.    The left posterior tibial and peroneal veins are patent and free of thrombus.    The left soleal vein is distended with acute thrombus.    IMPRESSION: There are postphlebitic changes, the residue of prior DVT, affecting the left popliteal and femoral veins in the thigh, and the left posterior tibial peroneal trunk and gastrocnemius veins in the calf.    There is acute calf vein DVT affecting both the right and left soleal veins.    EVER Browning notified.                SUSHILA MEADE M.D., ATTENDING RADIOLOGIST  This document has been electronically signed. Dec 14 2020  2:44PM    < end of copied text >

## 2020-12-16 NOTE — PROGRESS NOTE ADULT - ATTENDING COMMENTS
Infectious Diseases will continue to follow. Please call with any questions.   Aliya Moreno M.D.  [facility], Division of Infectious Diseases [phone]  For over the weekend and after hours, please call [phone]

## 2020-12-16 NOTE — PROGRESS NOTE ADULT - ASSESSMENT
A/P: 69 y/o F a/w L Humerus PP Fx s/p L r TSA and acute b/l soleal vein DVTs on admission      DVT ppx- Lovenox 80mg SQ BID  GUTIERREZ CAM  Pain management prn  Plan for possible L Humerus ORIF 12/17 pending medical clearance      s[

## 2020-12-16 NOTE — PROGRESS NOTE ADULT - ASSESSMENT
Assessment:  1. Acute DVT of R and L soleal veins  2. Subacute DVT of L popliteal, L femora, L posterior tibial peroneal trunk and gastroc vein  3. L humerus fracture w/ plan for surgical fixation 12/16  4. CTPA w/o signs of PE, pBNP 933        Plan:  1. Continue AC w/ Lovenox 80 BID for now  2. Recommend minimal interruption of AC pre and post-surgery per ortho  3. Will follow     Thank you      Vascular Cardiology Service    Please call with any questions:   [facility] - [phone]  Office [phone]  email:  [email or web address]   Assessment:  1. Acute DVT of R and L soleal veins  2. Subacute DVT of L popliteal, L femora, L posterior tibial peroneal trunk and gastroc vein  3. L humerus fracture w/ plan for surgical fixation 12/16  4. CTPA w/o signs of PE, pBNP 933        Plan:  1. Continue AC w/ Lovenox 80 BID for now  2. Recommend minimal interruption of AC pre and post-surgery per ortho  3. Defer general cardiac clearance and management to Dr. Alejo    Thank you      Vascular Cardiology Service    Please call with any questions:   [facility] - [phone]  Office [phone]  email:  [email or web address]   Assessment:  1. Acute DVT of R and L soleal veins  2. Subacute DVT of L popliteal, L femora, L posterior tibial peroneal trunk and gastroc vein  3. L humerus fracture w/ plan for surgical fixation 12/16  4. CTPA w/o signs of PE, pBNP 933        Plan:  1. Continue AC w/ Lovenox 80 BID for now  2. Recommend minimal interruption of AC pre and post-surgery per ortho (restart as soon as safe)  3. No Vascular reason (ie. no PE) to delay surgery.  3. Defer general cardiac clearance and management to Dr. Alejo    Thank you      Vascular Cardiology Service    Please call with any questions:   [facility] - [phone]  Office [phone]  email:  [email or web address]

## 2020-12-17 LAB
ANION GAP SERPL CALC-SCNC: 10 MMOL/L — SIGNIFICANT CHANGE UP (ref 5–17)
ANION GAP SERPL CALC-SCNC: 11 MMOL/L — SIGNIFICANT CHANGE UP (ref 5–17)
APTT BLD: 29.3 SEC — SIGNIFICANT CHANGE UP (ref 27.5–35.5)
BLD GP AB SCN SERPL QL: NEGATIVE — SIGNIFICANT CHANGE UP
BUN SERPL-MCNC: 11 MG/DL — SIGNIFICANT CHANGE UP (ref 7–23)
BUN SERPL-MCNC: 12 MG/DL — SIGNIFICANT CHANGE UP (ref 7–23)
CALCIUM SERPL-MCNC: 8.7 MG/DL — SIGNIFICANT CHANGE UP (ref 8.4–10.5)
CALCIUM SERPL-MCNC: 9.1 MG/DL — SIGNIFICANT CHANGE UP (ref 8.4–10.5)
CHLORIDE SERPL-SCNC: 102 MMOL/L — SIGNIFICANT CHANGE UP (ref 96–108)
CHLORIDE SERPL-SCNC: 106 MMOL/L — SIGNIFICANT CHANGE UP (ref 96–108)
CO2 SERPL-SCNC: 23 MMOL/L — SIGNIFICANT CHANGE UP (ref 22–31)
CO2 SERPL-SCNC: 24 MMOL/L — SIGNIFICANT CHANGE UP (ref 22–31)
CREAT SERPL-MCNC: 0.84 MG/DL — SIGNIFICANT CHANGE UP (ref 0.5–1.3)
CREAT SERPL-MCNC: 0.97 MG/DL — SIGNIFICANT CHANGE UP (ref 0.5–1.3)
GLUCOSE SERPL-MCNC: 110 MG/DL — HIGH (ref 70–99)
GLUCOSE SERPL-MCNC: 130 MG/DL — HIGH (ref 70–99)
HCT VFR BLD CALC: 33.7 % — LOW (ref 34.5–45)
HCT VFR BLD CALC: 36.1 % — SIGNIFICANT CHANGE UP (ref 34.5–45)
HGB BLD-MCNC: 10.6 G/DL — LOW (ref 11.5–15.5)
HGB BLD-MCNC: 11.4 G/DL — LOW (ref 11.5–15.5)
INR BLD: 1.12 RATIO — SIGNIFICANT CHANGE UP (ref 0.88–1.16)
MCHC RBC-ENTMCNC: 28.6 PG — SIGNIFICANT CHANGE UP (ref 27–34)
MCHC RBC-ENTMCNC: 28.8 PG — SIGNIFICANT CHANGE UP (ref 27–34)
MCHC RBC-ENTMCNC: 31.5 GM/DL — LOW (ref 32–36)
MCHC RBC-ENTMCNC: 31.6 GM/DL — LOW (ref 32–36)
MCV RBC AUTO: 90.8 FL — SIGNIFICANT CHANGE UP (ref 80–100)
MCV RBC AUTO: 91.2 FL — SIGNIFICANT CHANGE UP (ref 80–100)
NRBC # BLD: 0 /100 WBCS — SIGNIFICANT CHANGE UP (ref 0–0)
NRBC # BLD: 0 /100 WBCS — SIGNIFICANT CHANGE UP (ref 0–0)
PLATELET # BLD AUTO: 230 K/UL — SIGNIFICANT CHANGE UP (ref 150–400)
PLATELET # BLD AUTO: 241 K/UL — SIGNIFICANT CHANGE UP (ref 150–400)
POTASSIUM SERPL-MCNC: 3.7 MMOL/L — SIGNIFICANT CHANGE UP (ref 3.5–5.3)
POTASSIUM SERPL-MCNC: 4 MMOL/L — SIGNIFICANT CHANGE UP (ref 3.5–5.3)
POTASSIUM SERPL-SCNC: 3.7 MMOL/L — SIGNIFICANT CHANGE UP (ref 3.5–5.3)
POTASSIUM SERPL-SCNC: 4 MMOL/L — SIGNIFICANT CHANGE UP (ref 3.5–5.3)
PROTHROM AB SERPL-ACNC: 13.4 SEC — SIGNIFICANT CHANGE UP (ref 10.6–13.6)
RBC # BLD: 3.71 M/UL — LOW (ref 3.8–5.2)
RBC # BLD: 3.96 M/UL — SIGNIFICANT CHANGE UP (ref 3.8–5.2)
RBC # FLD: 14.6 % — HIGH (ref 10.3–14.5)
RBC # FLD: 14.8 % — HIGH (ref 10.3–14.5)
RH IG SCN BLD-IMP: POSITIVE — SIGNIFICANT CHANGE UP
SODIUM SERPL-SCNC: 137 MMOL/L — SIGNIFICANT CHANGE UP (ref 135–145)
SODIUM SERPL-SCNC: 139 MMOL/L — SIGNIFICANT CHANGE UP (ref 135–145)
WBC # BLD: 5.99 K/UL — SIGNIFICANT CHANGE UP (ref 3.8–10.5)
WBC # BLD: 7.69 K/UL — SIGNIFICANT CHANGE UP (ref 3.8–10.5)
WBC # FLD AUTO: 5.99 K/UL — SIGNIFICANT CHANGE UP (ref 3.8–10.5)
WBC # FLD AUTO: 7.69 K/UL — SIGNIFICANT CHANGE UP (ref 3.8–10.5)

## 2020-12-17 PROCEDURE — 73060 X-RAY EXAM OF HUMERUS: CPT | Mod: 26,LT

## 2020-12-17 RX ORDER — HALOPERIDOL DECANOATE 100 MG/ML
2.5 INJECTION INTRAMUSCULAR EVERY 6 HOURS
Refills: 0 | Status: DISCONTINUED | OUTPATIENT
Start: 2020-12-17 | End: 2020-12-21

## 2020-12-17 RX ORDER — QUETIAPINE FUMARATE 200 MG/1
25 TABLET, FILM COATED ORAL EVERY 6 HOURS
Refills: 0 | Status: DISCONTINUED | OUTPATIENT
Start: 2020-12-17 | End: 2020-12-21

## 2020-12-17 RX ORDER — FOLIC ACID 0.8 MG
1 TABLET ORAL DAILY
Refills: 0 | Status: DISCONTINUED | OUTPATIENT
Start: 2020-12-17 | End: 2020-12-24

## 2020-12-17 RX ORDER — ENOXAPARIN SODIUM 100 MG/ML
80 INJECTION SUBCUTANEOUS
Refills: 0 | Status: DISCONTINUED | OUTPATIENT
Start: 2020-12-18 | End: 2020-12-24

## 2020-12-17 RX ORDER — ONDANSETRON 8 MG/1
4 TABLET, FILM COATED ORAL ONCE
Refills: 0 | Status: DISCONTINUED | OUTPATIENT
Start: 2020-12-17 | End: 2020-12-17

## 2020-12-17 RX ORDER — ACETAMINOPHEN 500 MG
1000 TABLET ORAL ONCE
Refills: 0 | Status: COMPLETED | OUTPATIENT
Start: 2020-12-17 | End: 2020-12-17

## 2020-12-17 RX ORDER — THIAMINE MONONITRATE (VIT B1) 100 MG
500 TABLET ORAL DAILY
Refills: 0 | Status: COMPLETED | OUTPATIENT
Start: 2020-12-17 | End: 2020-12-19

## 2020-12-17 RX ORDER — ENOXAPARIN SODIUM 100 MG/ML
40 INJECTION SUBCUTANEOUS ONCE
Refills: 0 | Status: COMPLETED | OUTPATIENT
Start: 2020-12-17 | End: 2020-12-17

## 2020-12-17 RX ORDER — ACETAMINOPHEN 500 MG
1000 TABLET ORAL ONCE
Refills: 0 | Status: COMPLETED | OUTPATIENT
Start: 2020-12-17 | End: 2020-12-18

## 2020-12-17 RX ORDER — OXYCODONE HYDROCHLORIDE 5 MG/1
5 TABLET ORAL EVERY 6 HOURS
Refills: 0 | Status: DISCONTINUED | OUTPATIENT
Start: 2020-12-17 | End: 2020-12-18

## 2020-12-17 RX ORDER — ACETAMINOPHEN 500 MG
650 TABLET ORAL EVERY 6 HOURS
Refills: 0 | Status: DISCONTINUED | OUTPATIENT
Start: 2020-12-17 | End: 2020-12-18

## 2020-12-17 RX ORDER — SODIUM CHLORIDE 9 MG/ML
1000 INJECTION INTRAMUSCULAR; INTRAVENOUS; SUBCUTANEOUS
Refills: 0 | Status: DISCONTINUED | OUTPATIENT
Start: 2020-12-17 | End: 2020-12-20

## 2020-12-17 RX ORDER — QUETIAPINE FUMARATE 200 MG/1
25 TABLET, FILM COATED ORAL
Refills: 0 | Status: DISCONTINUED | OUTPATIENT
Start: 2020-12-17 | End: 2020-12-21

## 2020-12-17 RX ORDER — ATORVASTATIN CALCIUM 80 MG/1
40 TABLET, FILM COATED ORAL AT BEDTIME
Refills: 0 | Status: DISCONTINUED | OUTPATIENT
Start: 2020-12-17 | End: 2020-12-24

## 2020-12-17 RX ORDER — OXYCODONE HYDROCHLORIDE 5 MG/1
2.5 TABLET ORAL EVERY 6 HOURS
Refills: 0 | Status: DISCONTINUED | OUTPATIENT
Start: 2020-12-17 | End: 2020-12-18

## 2020-12-17 RX ORDER — VANCOMYCIN HCL 1 G
1250 VIAL (EA) INTRAVENOUS ONCE
Refills: 0 | Status: COMPLETED | OUTPATIENT
Start: 2020-12-17 | End: 2020-12-17

## 2020-12-17 RX ORDER — HYDROMORPHONE HYDROCHLORIDE 2 MG/ML
0.25 INJECTION INTRAMUSCULAR; INTRAVENOUS; SUBCUTANEOUS
Refills: 0 | Status: DISCONTINUED | OUTPATIENT
Start: 2020-12-17 | End: 2020-12-17

## 2020-12-17 RX ORDER — POLYETHYLENE GLYCOL 3350 17 G/17G
17 POWDER, FOR SOLUTION ORAL DAILY
Refills: 0 | Status: DISCONTINUED | OUTPATIENT
Start: 2020-12-17 | End: 2020-12-24

## 2020-12-17 RX ORDER — THIAMINE MONONITRATE (VIT B1) 100 MG
500 TABLET ORAL EVERY 8 HOURS
Refills: 0 | Status: DISCONTINUED | OUTPATIENT
Start: 2020-12-17 | End: 2020-12-17

## 2020-12-17 RX ORDER — LORATADINE 10 MG/1
10 TABLET ORAL DAILY
Refills: 0 | Status: DISCONTINUED | OUTPATIENT
Start: 2020-12-17 | End: 2020-12-24

## 2020-12-17 RX ORDER — LANOLIN ALCOHOL/MO/W.PET/CERES
3 CREAM (GRAM) TOPICAL AT BEDTIME
Refills: 0 | Status: DISCONTINUED | OUTPATIENT
Start: 2020-12-17 | End: 2020-12-24

## 2020-12-17 RX ADMIN — OXYCODONE HYDROCHLORIDE 5 MILLIGRAM(S): 5 TABLET ORAL at 01:10

## 2020-12-17 RX ADMIN — ATORVASTATIN CALCIUM 40 MILLIGRAM(S): 80 TABLET, FILM COATED ORAL at 21:48

## 2020-12-17 RX ADMIN — OXYCODONE HYDROCHLORIDE 5 MILLIGRAM(S): 5 TABLET ORAL at 19:15

## 2020-12-17 RX ADMIN — OXYCODONE HYDROCHLORIDE 5 MILLIGRAM(S): 5 TABLET ORAL at 18:38

## 2020-12-17 RX ADMIN — QUETIAPINE FUMARATE 25 MILLIGRAM(S): 200 TABLET, FILM COATED ORAL at 18:10

## 2020-12-17 RX ADMIN — LORATADINE 10 MILLIGRAM(S): 10 TABLET ORAL at 18:10

## 2020-12-17 RX ADMIN — OXYCODONE HYDROCHLORIDE 2.5 MILLIGRAM(S): 5 TABLET ORAL at 16:00

## 2020-12-17 RX ADMIN — Medication 1000 MILLIGRAM(S): at 00:10

## 2020-12-17 RX ADMIN — Medication 166.67 MILLIGRAM(S): at 21:49

## 2020-12-17 RX ADMIN — Medication 650 MILLIGRAM(S): at 21:48

## 2020-12-17 RX ADMIN — HYDROMORPHONE HYDROCHLORIDE 0.25 MILLIGRAM(S): 2 INJECTION INTRAMUSCULAR; INTRAVENOUS; SUBCUTANEOUS at 14:05

## 2020-12-17 RX ADMIN — HYDROMORPHONE HYDROCHLORIDE 0.25 MILLIGRAM(S): 2 INJECTION INTRAMUSCULAR; INTRAVENOUS; SUBCUTANEOUS at 14:15

## 2020-12-17 RX ADMIN — QUETIAPINE FUMARATE 25 MILLIGRAM(S): 200 TABLET, FILM COATED ORAL at 05:19

## 2020-12-17 RX ADMIN — Medication 1 TABLET(S): at 15:43

## 2020-12-17 RX ADMIN — OXYCODONE HYDROCHLORIDE 2.5 MILLIGRAM(S): 5 TABLET ORAL at 15:32

## 2020-12-17 RX ADMIN — Medication 3 MILLIGRAM(S): at 21:48

## 2020-12-17 RX ADMIN — Medication 650 MILLIGRAM(S): at 22:30

## 2020-12-17 RX ADMIN — SODIUM CHLORIDE 100 MILLILITER(S): 9 INJECTION INTRAMUSCULAR; INTRAVENOUS; SUBCUTANEOUS at 14:00

## 2020-12-17 RX ADMIN — Medication 400 MILLIGRAM(S): at 05:18

## 2020-12-17 RX ADMIN — Medication 105 MILLIGRAM(S): at 15:35

## 2020-12-17 RX ADMIN — Medication 1 MILLIGRAM(S): at 18:10

## 2020-12-17 RX ADMIN — NYSTATIN CREAM 1 APPLICATION(S): 100000 CREAM TOPICAL at 05:19

## 2020-12-17 RX ADMIN — OXYCODONE HYDROCHLORIDE 5 MILLIGRAM(S): 5 TABLET ORAL at 00:10

## 2020-12-17 RX ADMIN — ENOXAPARIN SODIUM 40 MILLIGRAM(S): 100 INJECTION SUBCUTANEOUS at 18:35

## 2020-12-17 RX ADMIN — OXYCODONE HYDROCHLORIDE 2.5 MILLIGRAM(S): 5 TABLET ORAL at 00:10

## 2020-12-17 NOTE — BRIEF OPERATIVE NOTE - SPECIMENS
Discontinue immobilizer after 3-4 weeks once full WBAT and off crutches    Can transition to wb 25% to 50% by 5 weeks, full wb by 6 weeks, locked in extension, full ROM symmetric to other side beginning by 6 weeks    May use exercycle 2-3 times per week for 15-30 minutes  Once able to stop immobiilzer and crutches can use elliptical  30 min/ 3-4 times per week  Above CORE and gluteal program / VMO program       none

## 2020-12-17 NOTE — CHART NOTE - NSCHARTNOTEFT_GEN_A_CORE
Medicine NP note    CC: Agitation, urinary retention  Notified by RN that patient was restless and agitated. Evaluated the patient at bedside. Patient a&ox3, in no acute distress.  very anxious, states that she is having lower abdominal discomfort, and pain left shoulder.  Denies HA, SOB, CP. N/V/D  Bladder scan done with residual urine of 600 ml    Vital Signs Last 24 Hrs  T(C): 36.6 (17 Dec 2020 04:30), Max: 36.9 (16 Dec 2020 20:30)  T(F): 97.9 (17 Dec 2020 04:30), Max: 98.4 (16 Dec 2020 20:30)  HR: 75 (17 Dec 2020 04:30) (75 - 89)  BP: 168/78 (17 Dec 2020 05:44) (95/66 - 181/82)  BP(mean): --  RR: 18 (17 Dec 2020 04:30) (18 - 18)  SpO2: 95% (17 Dec 2020 04:30) (93% - 95%)    Physical Examination:  General: A&ox3, anxious appearing  Neck: supple, no palpable LAD  Cardio: S1, S2 heard, RRR, no murmurs  Resp: breath sounds heard bilaterally, no rales, wheezes or rhonchi  Abd: soft, NT, ND, + bowel sounds  Neuro: AAOx3, no acute focal  focal deficits  Ext: L arm with splint, ace wrap  Skin: warm, dry,     A/P  Pt is a 70W w/ PMHx of HTN, obesity, COPD, anxiety, agitation, R TKR, recent L reverse total shoulder replacement (@ OSH), who was BIBEMS yesterday as a level 2 trauma after being found down, admitted to SICU with left humerus periprosthetic comminuted fracture, c/brhabdo and left radial nerve palsy, left AMA to [facility] parking lot by wheelchair, where she decided she wanted to be admitted again to hospital for possible fracture repair.  Patient scheduled for ORIF lt humerus today    Now with c/o agitation, urinary retention /Left arm pain r/t fracture  Urinary retention  Patient restless and agitated likely due to shoulder pain/urinary retention  patient with previous episodes of urinary retention, s/p straight cath x2  Walters cathter ordered, patient drained close to 1 L after Walters insertion  Oxycodone 5mg Po x1 dose received for severe pain left shoulder and arm, for ORIF today  C/W oxy 2.5mg PO pRN doses  Tylenol 1000mg IV x1 dose ordered  Monitor U/O  Will continue to monitor    Donis Barrientos [facility]  [id number]

## 2020-12-17 NOTE — PRE-ANESTHESIA EVALUATION ADULT - NSANTHADDINFOFT_GEN_ALL_CORE
extensive rba dw pt at bedside, agrees with plan- regional block deferred in setting of existing nerve palsy

## 2020-12-17 NOTE — BRIEF OPERATIVE NOTE - NSICDXBRIEFPREOP_GEN_ALL_CORE_FT
PRE-OP DIAGNOSIS:  Periprosthetic fracture around internal prosthetic shoulder joint 17-Dec-2020 13:33:06  Tony Dyer

## 2020-12-17 NOTE — PRE-ANESTHESIA EVALUATION ADULT - NSANTHPMHFT_GEN_ALL_CORE
chart + im/cv/pulm notes reviewed. pt denies hx sig cv/pulm dz - states good prior et no cp/sob. ekg + tte noted. acute dvt, pe likely ruled out

## 2020-12-17 NOTE — PROGRESS NOTE ADULT - ASSESSMENT
70F pmhx HTN, obesity, COPD, anxiety, agitation, R TKR, recent L reverse total shoulder replacement (@ OSH), who was BIBEMS yesterday as a level 2 trauma after being found down, admitted to SICU with left humerus periprosthetic comminuted fracture, c/b rhabdo, left AMA to [facility] parking lot by wheelchair, now back to hospital for debility, found to be hypotensive to SBP 60-70.

## 2020-12-17 NOTE — BRIEF OPERATIVE NOTE - NSICDXBRIEFPOSTOP_GEN_ALL_CORE_FT
POST-OP DIAGNOSIS:  Periprosthetic fracture around internal prosthetic shoulder joint 17-Dec-2020 13:33:26  Tony Dyer

## 2020-12-17 NOTE — CHART NOTE - NSCHARTNOTEFT_GEN_A_CORE
Patient seen in PACU resting with complaint of LUE pain.  No Chest Pain, SOB, N/V.    T(C): 36 (12-17-20 @ 13:22), Max: 36.9 (12-16-20 @ 20:30)  HR: 97 (12-17-20 @ 14:30) (75 - 106)  BP: 147/67 (12-17-20 @ 14:15) (136/97 - 181/82)  RR: 17 (12-17-20 @ 14:30) (15 - 18)  SpO2: 97% (12-17-20 @ 14:30) (92% - 100%)  Wt(kg): --    Exam:  Alert and oriented, no acute distress  Laterality: LUE aquacels in place, C/D/I with HMV in place maintaining good suction  Moving digits  Unable to assess full neurovascular status 2/2 pain  + Radial pulse    Xray: in chart                          11.4   7.69  )-----------( 230      ( 17 Dec 2020 14:02 )             36.1    12-17    139  |  106  |  11  ----------------------------<  130<H>  4.0   |  23  |  0.84    Ca    8.7      17 Dec 2020 14:02  Phos  4.3     12-15  Mg     1.8     12-15        A/P: 70yFemale S/p  L humeral ORIF. VSS. NAD  -PT/OT-NWB LUE, OOB when tolerated  -IS encouraged  -DVT PPx with lovenox, then therapeutic lovenox  -Followup AM labs  -Pain Control  -Case as per primary team    Halie Garcia PA-C  Team Pager #[phone]

## 2020-12-17 NOTE — PROVIDER CONTACT NOTE (OTHER) - ASSESSMENT
pt AOx3 (disoriented to situation), VSS, and triggered bed alarm @ 0150. Pt agitated & states "I need to pee." Pt has been voiding w/ primafit, however unsuccessful. Bladder scan shows 695cc. pt AOx3 (disoriented to situation), VSS, and triggered bed alarm @ 0150. Pt agitated & states "I need to pee." Pt has been voiding w/ primafit, however unsuccessful. Bladder scan shows 695cc. pt s/p straight cath x2 on 12/14.

## 2020-12-17 NOTE — PROVIDER CONTACT NOTE (OTHER) - BACKGROUND
12/12/20: ED. s/p fall level II trauma w/ (+) L humerus fx.  Pending OR 12/17 12/12/20: ED. s/p fall level II trauma w/ (+) L humerus fx.  12/13: UTI (+) E.coli  Pending OR 12/17

## 2020-12-17 NOTE — PROGRESS NOTE ADULT - SUBJECTIVE AND OBJECTIVE BOX
Orthopedic Surgery Progress Note  S: Pain is well controlled.  Patient awaiting OR this morning. No acute events overnight.    O:  Vital Signs Last 24 Hrs  T(C): 36.6 (17 Dec 2020 04:30), Max: 36.9 (16 Dec 2020 20:30)  T(F): 97.9 (17 Dec 2020 04:30), Max: 98.4 (16 Dec 2020 20:30)  HR: 75 (17 Dec 2020 04:30) (75 - 89)  BP: 168/78 (17 Dec 2020 05:44) (95/66 - 181/82)  RR: 18 (17 Dec 2020 04:30) (18 - 18)  SpO2: 95% (17 Dec 2020 04:30) (93% - 95%)    LUE:  Coaptation splint in place  Unable to extend fingers/wrist against resistance indicating radial nerve neuropraxia  +AIN, Median, Ulnar nerve function  Decreased sensation over dorsum of thumb and 1st digit, Sensation otherwise intact all volar fingers (Median, Ulnar nerve distribution)  +Radial Pulse  Compartments soft  No calf TTP B/L                         10.6   5.99  )-----------( 241      ( 17 Dec 2020 05:20 )             33.7     12-17    137  |  102  |  12  ----------------------------<  110<H>  3.7   |  24  |  0.97    PT/INR - ( 17 Dec 2020 05:20 )   PT: 13.4 sec;   INR: 1.12 ratio       PTT - ( 17 Dec 2020 05:20 )  PTT:29.3 sec    A/P: 70 year old female with left periprosthetic humerus fracture    ·	Pain control  ·	NWB LUE  ·	Venodynes, therapeutic lovenox on hold today for surgery  ·	Incentive Spirometry  ·	Rest of medical care per primary team  ·	Medical clearance documented  ·	NPO/IVF  ·	OR today for ORIF  ·	Consent in chart    Tony Dyer MD

## 2020-12-17 NOTE — PROVIDER CONTACT NOTE (OTHER) - ACTION/TREATMENT ORDERED:
Donis Barrientos NP notified & aware. Walters to be inserted & no other medications to be given at this time. NP to also order oxy 5mg PO @ 0400 for pain. Will cont to monitor.

## 2020-12-17 NOTE — PRE-OP CHECKLIST - HEART RATE (BEATS/MIN)
----- Message from Valentine Rodriguez RN sent at 5/27/2020  1:49 PM CDT -----  Regarding: Progesterone  Patient currently taking 300mg compounded Progesterone. Dr. Sarmiento advised she discontinue, however, she would like to consider continuing Progesterone for overall well-being, sleep, anxiety.     Please advise patient if you wish to decrease her dose. It is compounded via [facility].    Kindest Regards,   Valentine Rodriguez RN, BSN  RN Navigator   [facility]  [phone]    
Called in Progesterone 100mg to [facility], one nightly.  
79

## 2020-12-17 NOTE — PROGRESS NOTE ADULT - SUBJECTIVE AND OBJECTIVE BOX
Patient is a 70y old  Female who presents with a chief complaint of left humerus fracture (17 Dec 2020 06:38)      INTERVAL HPI/OVERNIGHT EVENTS: noted  pt seen and examined  feels well, lt humerus splint      Vital Signs Last 24 Hrs  T(C): 36.6 (17 Dec 2020 21:08), Max: 37 (17 Dec 2020 17:00)  T(F): 97.8 (17 Dec 2020 21:08), Max: 98.6 (17 Dec 2020 17:00)  HR: 84 (17 Dec 2020 21:08) (75 - 106)  BP: 182/91 (17 Dec 2020 21:08) (138/62 - 187/85)  BP(mean): 112 (17 Dec 2020 17:00) (89 - 112)  RR: 18 (17 Dec 2020 21:08) (14 - 18)  SpO2: 93% (17 Dec 2020 21:08) (92% - 100%)    acetaminophen   Tablet .. 650 milliGRAM(s) Oral every 6 hours PRN  atorvastatin 40 milliGRAM(s) Oral at bedtime  folic acid 1 milliGRAM(s) Oral daily  haloperidol    Injectable 2.5 milliGRAM(s) IV Push every 6 hours PRN  influenza   Vaccine 0.5 milliLiter(s) IntraMuscular once  loratadine 10 milliGRAM(s) Oral daily  LORazepam     Tablet 1 milliGRAM(s) Oral every 2 hours PRN  LORazepam   Injectable 1 milliGRAM(s) IV Push every 2 hours PRN  LORazepam   Injectable 1 milliGRAM(s) IntraMuscular every 2 hours PRN  melatonin 3 milliGRAM(s) Oral at bedtime  multivitamin 1 Tablet(s) Oral daily  oxyCODONE    IR 2.5 milliGRAM(s) Oral every 6 hours PRN  oxyCODONE    IR 5 milliGRAM(s) Oral every 6 hours PRN  polyethylene glycol 3350 17 Gram(s) Oral daily  QUEtiapine 25 milliGRAM(s) Oral two times a day  QUEtiapine 25 milliGRAM(s) Oral every 6 hours PRN  sodium chloride 0.9%. 1000 milliLiter(s) IV Continuous <Continuous>  vancomycin  IVPB 1250 milliGRAM(s) IV Intermittent once      PHYSICAL EXAM:  GENERAL: NAD,   EYES: conjunctiva and sclera clear  ENMT: Moist mucous membranes  NECK: Supple, No JVD, Normal thyroid  CHEST/LUNG: non labored, cta b/l  HEART: Regular rate and rhythm; No murmurs, rubs, or gallops  ABDOMEN: Soft, Nontender, Nondistended; Bowel sounds present  EXTREMITIES:  2+ Peripheral Pulses, No clubbing, cyanosis, or edema  LYMPH: No lymphadenopathy noted  SKIN: No rashes or lesions    Consultant(s) Notes Reviewed:  [x ] YES  [ ] NO  Care Discussed with Consultants/Other Providers [ x] YES  [ ] NO    LABS:                        11.4   7.69  )-----------( 230      ( 17 Dec 2020 14:02 )             36.1     12-17    139  |  106  |  11  ----------------------------<  130<H>  4.0   |  23  |  0.84    Ca    8.7      17 Dec 2020 14:02      PT/INR - ( 17 Dec 2020 05:20 )   PT: 13.4 sec;   INR: 1.12 ratio         PTT - ( 17 Dec 2020 05:20 )  PTT:29.3 sec    CAPILLARY BLOOD GLUCOSE          ABG - ( 17 Dec 2020 10:25 )  pH, Arterial: 7.49  pH, Blood: x     /  pCO2: 32    /  pO2: 420   / HCO3: 24    / Base Excess: 1.4   /  SaO2: 100                     RADIOLOGY & ADDITIONAL TESTS:    Imaging Personally Reviewed:  [x ] YES  [ ] NO

## 2020-12-17 NOTE — BRIEF OPERATIVE NOTE - NSICDXBRIEFPROCEDURE_GEN_ALL_CORE_FT
PROCEDURES:  Open reduction and internal fixation (ORIF) of shaft of left humerus 17-Dec-2020 13:32:30  Tony Dyer

## 2020-12-18 DIAGNOSIS — I10 ESSENTIAL (PRIMARY) HYPERTENSION: ICD-10-CM

## 2020-12-18 LAB
ANION GAP SERPL CALC-SCNC: 12 MMOL/L — SIGNIFICANT CHANGE UP (ref 5–17)
BUN SERPL-MCNC: 12 MG/DL — SIGNIFICANT CHANGE UP (ref 7–23)
CALCIUM SERPL-MCNC: 8.3 MG/DL — LOW (ref 8.4–10.5)
CHLORIDE SERPL-SCNC: 104 MMOL/L — SIGNIFICANT CHANGE UP (ref 96–108)
CO2 SERPL-SCNC: 22 MMOL/L — SIGNIFICANT CHANGE UP (ref 22–31)
CREAT SERPL-MCNC: 1 MG/DL — SIGNIFICANT CHANGE UP (ref 0.5–1.3)
GLUCOSE SERPL-MCNC: 107 MG/DL — HIGH (ref 70–99)
HCT VFR BLD CALC: 32.8 % — LOW (ref 34.5–45)
HGB BLD-MCNC: 9.9 G/DL — LOW (ref 11.5–15.5)
MCHC RBC-ENTMCNC: 28.5 PG — SIGNIFICANT CHANGE UP (ref 27–34)
MCHC RBC-ENTMCNC: 30.2 GM/DL — LOW (ref 32–36)
MCV RBC AUTO: 94.5 FL — SIGNIFICANT CHANGE UP (ref 80–100)
NRBC # BLD: 0 /100 WBCS — SIGNIFICANT CHANGE UP (ref 0–0)
PLATELET # BLD AUTO: 251 K/UL — SIGNIFICANT CHANGE UP (ref 150–400)
POTASSIUM SERPL-MCNC: 4.1 MMOL/L — SIGNIFICANT CHANGE UP (ref 3.5–5.3)
POTASSIUM SERPL-SCNC: 4.1 MMOL/L — SIGNIFICANT CHANGE UP (ref 3.5–5.3)
RBC # BLD: 3.47 M/UL — LOW (ref 3.8–5.2)
RBC # FLD: 14.8 % — HIGH (ref 10.3–14.5)
SODIUM SERPL-SCNC: 138 MMOL/L — SIGNIFICANT CHANGE UP (ref 135–145)
WBC # BLD: 7.41 K/UL — SIGNIFICANT CHANGE UP (ref 3.8–10.5)
WBC # FLD AUTO: 7.41 K/UL — SIGNIFICANT CHANGE UP (ref 3.8–10.5)

## 2020-12-18 PROCEDURE — 99232 SBSQ HOSP IP/OBS MODERATE 35: CPT

## 2020-12-18 RX ORDER — CEFTRIAXONE 500 MG/1
INJECTION, POWDER, FOR SOLUTION INTRAMUSCULAR; INTRAVENOUS
Refills: 0 | Status: DISCONTINUED | OUTPATIENT
Start: 2020-12-18 | End: 2020-12-24

## 2020-12-18 RX ORDER — OXYCODONE HYDROCHLORIDE 5 MG/1
2.5 TABLET ORAL EVERY 4 HOURS
Refills: 0 | Status: DISCONTINUED | OUTPATIENT
Start: 2020-12-18 | End: 2020-12-24

## 2020-12-18 RX ORDER — ACETAMINOPHEN 500 MG
1000 TABLET ORAL ONCE
Refills: 0 | Status: COMPLETED | OUTPATIENT
Start: 2020-12-18 | End: 2020-12-18

## 2020-12-18 RX ORDER — TRAMADOL HYDROCHLORIDE 50 MG/1
50 TABLET ORAL EVERY 6 HOURS
Refills: 0 | Status: DISCONTINUED | OUTPATIENT
Start: 2020-12-18 | End: 2020-12-19

## 2020-12-18 RX ORDER — TRAMADOL HYDROCHLORIDE 50 MG/1
50 TABLET ORAL EVERY 6 HOURS
Refills: 0 | Status: DISCONTINUED | OUTPATIENT
Start: 2020-12-19 | End: 2020-12-24

## 2020-12-18 RX ORDER — ACETAMINOPHEN 500 MG
975 TABLET ORAL EVERY 6 HOURS
Refills: 0 | Status: DISCONTINUED | OUTPATIENT
Start: 2020-12-18 | End: 2020-12-24

## 2020-12-18 RX ORDER — CEFTRIAXONE 500 MG/1
1000 INJECTION, POWDER, FOR SOLUTION INTRAMUSCULAR; INTRAVENOUS ONCE
Refills: 0 | Status: COMPLETED | OUTPATIENT
Start: 2020-12-18 | End: 2020-12-18

## 2020-12-18 RX ORDER — OXYCODONE HYDROCHLORIDE 5 MG/1
5 TABLET ORAL EVERY 4 HOURS
Refills: 0 | Status: DISCONTINUED | OUTPATIENT
Start: 2020-12-18 | End: 2020-12-24

## 2020-12-18 RX ORDER — CEFTRIAXONE 500 MG/1
1000 INJECTION, POWDER, FOR SOLUTION INTRAMUSCULAR; INTRAVENOUS EVERY 24 HOURS
Refills: 0 | Status: DISCONTINUED | OUTPATIENT
Start: 2020-12-19 | End: 2020-12-24

## 2020-12-18 RX ADMIN — OXYCODONE HYDROCHLORIDE 5 MILLIGRAM(S): 5 TABLET ORAL at 21:14

## 2020-12-18 RX ADMIN — Medication 1 TABLET(S): at 11:36

## 2020-12-18 RX ADMIN — Medication 1000 MILLIGRAM(S): at 06:00

## 2020-12-18 RX ADMIN — TRAMADOL HYDROCHLORIDE 50 MILLIGRAM(S): 50 TABLET ORAL at 13:54

## 2020-12-18 RX ADMIN — OXYCODONE HYDROCHLORIDE 5 MILLIGRAM(S): 5 TABLET ORAL at 07:48

## 2020-12-18 RX ADMIN — OXYCODONE HYDROCHLORIDE 5 MILLIGRAM(S): 5 TABLET ORAL at 00:07

## 2020-12-18 RX ADMIN — TRAMADOL HYDROCHLORIDE 50 MILLIGRAM(S): 50 TABLET ORAL at 14:40

## 2020-12-18 RX ADMIN — POLYETHYLENE GLYCOL 3350 17 GRAM(S): 17 POWDER, FOR SOLUTION ORAL at 11:36

## 2020-12-18 RX ADMIN — Medication 975 MILLIGRAM(S): at 23:37

## 2020-12-18 RX ADMIN — Medication 1 MILLIGRAM(S): at 11:36

## 2020-12-18 RX ADMIN — TRAMADOL HYDROCHLORIDE 50 MILLIGRAM(S): 50 TABLET ORAL at 19:30

## 2020-12-18 RX ADMIN — ATORVASTATIN CALCIUM 40 MILLIGRAM(S): 80 TABLET, FILM COATED ORAL at 21:17

## 2020-12-18 RX ADMIN — Medication 105 MILLIGRAM(S): at 00:11

## 2020-12-18 RX ADMIN — QUETIAPINE FUMARATE 25 MILLIGRAM(S): 200 TABLET, FILM COATED ORAL at 17:46

## 2020-12-18 RX ADMIN — OXYCODONE HYDROCHLORIDE 5 MILLIGRAM(S): 5 TABLET ORAL at 16:20

## 2020-12-18 RX ADMIN — ENOXAPARIN SODIUM 80 MILLIGRAM(S): 100 INJECTION SUBCUTANEOUS at 17:47

## 2020-12-18 RX ADMIN — TRAMADOL HYDROCHLORIDE 50 MILLIGRAM(S): 50 TABLET ORAL at 18:58

## 2020-12-18 RX ADMIN — ENOXAPARIN SODIUM 80 MILLIGRAM(S): 100 INJECTION SUBCUTANEOUS at 05:27

## 2020-12-18 RX ADMIN — OXYCODONE HYDROCHLORIDE 5 MILLIGRAM(S): 5 TABLET ORAL at 21:45

## 2020-12-18 RX ADMIN — Medication 1000 MILLIGRAM(S): at 11:45

## 2020-12-18 RX ADMIN — Medication 400 MILLIGRAM(S): at 05:26

## 2020-12-18 RX ADMIN — TRAMADOL HYDROCHLORIDE 50 MILLIGRAM(S): 50 TABLET ORAL at 23:38

## 2020-12-18 RX ADMIN — LORATADINE 10 MILLIGRAM(S): 10 TABLET ORAL at 11:36

## 2020-12-18 RX ADMIN — QUETIAPINE FUMARATE 25 MILLIGRAM(S): 200 TABLET, FILM COATED ORAL at 05:27

## 2020-12-18 RX ADMIN — Medication 3 MILLIGRAM(S): at 21:17

## 2020-12-18 RX ADMIN — Medication 400 MILLIGRAM(S): at 11:34

## 2020-12-18 RX ADMIN — Medication 105 MILLIGRAM(S): at 23:37

## 2020-12-18 RX ADMIN — OXYCODONE HYDROCHLORIDE 5 MILLIGRAM(S): 5 TABLET ORAL at 00:30

## 2020-12-18 RX ADMIN — Medication 975 MILLIGRAM(S): at 17:46

## 2020-12-18 RX ADMIN — Medication 975 MILLIGRAM(S): at 18:30

## 2020-12-18 RX ADMIN — CEFTRIAXONE 1000 MILLIGRAM(S): 500 INJECTION, POWDER, FOR SOLUTION INTRAMUSCULAR; INTRAVENOUS at 10:45

## 2020-12-18 RX ADMIN — OXYCODONE HYDROCHLORIDE 5 MILLIGRAM(S): 5 TABLET ORAL at 08:40

## 2020-12-18 RX ADMIN — OXYCODONE HYDROCHLORIDE 5 MILLIGRAM(S): 5 TABLET ORAL at 15:19

## 2020-12-18 NOTE — PHYSICAL THERAPY INITIAL EVALUATION ADULT - GAIT DEVIATIONS NOTED, PT EVAL
decreased bharat/decreased step length/decreased stride length/decreased weight-shifting ability
decreased bharat/decreased step length

## 2020-12-18 NOTE — PHYSICAL THERAPY INITIAL EVALUATION ADULT - GAIT TRAINING, PT EVAL
GOAL: Pt will ambulate 150 feet with least restrictive device as appropriate independently within 4 weeks
GOAL: Pt will ambulate 150 feet with least restrictive device as appropriate independently within 4 weeks

## 2020-12-18 NOTE — PROGRESS NOTE ADULT - PROBLEM SELECTOR PLAN 2
-  -BP elevated, likely from pain from recent surgery.  -can start amlodipine 5 mg daily.     dvt: lovenox as per vascular cardiology. can switch to eliquis if no further procedures planned.     Patient of Dr. Feliciano ([facility])    Darrin Alejo D.O.  [phone].

## 2020-12-18 NOTE — OCCUPATIONAL THERAPY INITIAL EVALUATION ADULT - PRECAUTIONS/LIMITATIONS, REHAB EVAL
HISTORY AND RESULTS CONTINUED: DUPLEX BLE: acute calf vein DVT affecting both the right and left soleal veins. CTA CHEST: no PE. CT ABDOMEN/PELVIS: acute comminuted fx of the left humerus. old b/l rib fx and old L1 vertebral body compression fx.; fall precautions/fall precautions **ROM L digits/wrist only, +sling HISTORY AND RESULTS CONTINUED: DUPLEX BLE: acute calf vein DVT affecting both the right and left soleal veins. CTA CHEST: no PE. CT ABDOMEN/PELVIS: acute comminuted fx of the left humerus. old b/l rib fx and old L1 vertebral body compression fx.; fall precautions/fall precautions

## 2020-12-18 NOTE — PROGRESS NOTE ADULT - ASSESSMENT
70F pmhx HTN, obesity, COPD, anxiety, agitation, R TKR, recent L reverse total shoulder replacement (@ OSH), who was BIBEMS yesterday as a level 2 trauma after being found down, admitted to SICU with left humerus periprosthetic comminuted fracture, c/b rhabdo, left AMA to [facility] parking lot by wheelchair, now back to hospital for debility, found to be hypotensive to SBP 60-70.

## 2020-12-18 NOTE — PHYSICAL THERAPY INITIAL EVALUATION ADULT - PERTINENT HX OF CURRENT PROBLEM, REHAB EVAL
71 y/o F with PMH of HTN, obesity, COPD, anxiety, agitation, R TKR, recent L reverse total shoulder replacement, initially presents after being found down. Pt initially admitted to SICU with left humerus periprosthetic comminuted fx, c/b rhabdo and left radial nerve palsy, left AMA to [facility] parking by wheelchair, where she decided she wanted to be admitted again to hospital for possible fx repair.
70yoF PMH of HTN, obesity, COPD, anxiety, agitation, R TKR, recent L reverse total shoulder replacement, initially presents after being found down. Pt initially admitted to SICU w/ L humerus periprosthetic comminuted fx, c/b rhabdo & left radial nerve palsy, left AMA to [facility] parking by wheelchair, but then wanted to be re-admit for sx.  DUPLEX BLE: acute calf vein DVT affecting both the Rt & Lt soleal veins.

## 2020-12-18 NOTE — OCCUPATIONAL THERAPY INITIAL EVALUATION ADULT - RANGE OF MOTION EXAMINATION, UPPER EXTREMITY
L shoulder/elbow NT, PROM to L wrist/digits WFL- no active movement in wrist, minimal movement in L digits/Right UE Active ROM was WNL (within normal limits)

## 2020-12-18 NOTE — PROGRESS NOTE ADULT - ATTENDING COMMENTS
Infectious Diseases will sign off at this time, please call with any further questions  Aliya Moreno M.D.  [facility], Division of Infectious Diseases [phone]  For over the weekend and after hours, please call [phone]

## 2020-12-18 NOTE — PROGRESS NOTE ADULT - PROBLEM SELECTOR PLAN 2
splint in place  appreciate ortho recs  sp surgical fixation of left humerus fracture with Dr. Palma 12/17  pain control PT eval  bl soleal DVT started on lovenox full dose  CTA ro PE urgent- no PE, fu pulm recs  pending TTE- reviewed preserved lVEF and RV function  pt medically optimized for the planned procedure  cards fu noted- no absolute cardiac contraindications for the procedure  stress test 12/19 normal study  vasc cards and pulm fu noted

## 2020-12-18 NOTE — PROGRESS NOTE ADULT - ASSESSMENT
70F admitted with presumed syncope and tramatic L humeral fracture  LE dopplers on admission with bilateral soleal DVT and postphlebitic changes in bilateral prox veins  Hypertension  Obesity  No history of COPD    REC    Continue full dose AC  Favor full dose AC on discharge: no objection to NOAC  Follow up dopplers at 3 months with risk-benefit assessment of need for prolonged AC

## 2020-12-18 NOTE — PHYSICAL THERAPY INITIAL EVALUATION ADULT - ADDITIONAL COMMENTS
pt limited historian, appeared frustrated answering further questions. pt reports living alone in a condo, flight of steps to enter through the garage (+handrail). Pt states prior to admission being independent with all functional mobility and ADLs. pt states owning 2 walkers and 2 straight canes. pt reports having HHA, difficult to follow regarding the hours.
pt limited historian, appeared frustrated answering further questions. pt reports living alone in a condo, flight of steps to enter through the garage (+handrail). Pt states prior to admission being independent with all functional mobility and ADLs. pt states owning 2 walkers and 2 straight canes. pt reports having HHA, difficult to follow regarding the hours.

## 2020-12-18 NOTE — PROGRESS NOTE ADULT - ASSESSMENT
70F pmhx HTN, obesity, COPD, anxiety, agitation, R TKR, recent L reverse total shoulder replacement (@ OSH), who was BIBEMS yesterday as a level 2 trauma after being found down, admitted to SICU with left humerus periprosthetic comminuted fracture, c/b rhabdo and left radial nerve palsy.

## 2020-12-18 NOTE — PHYSICAL THERAPY INITIAL EVALUATION ADULT - STRENGTHENING, PT EVAL
GOAL: Pt will increase RUE and BLE strength by full grade within 4 weeks
GOAL: Pt will increase RUE and BLE strength by full grade within 4 weeks

## 2020-12-18 NOTE — OCCUPATIONAL THERAPY INITIAL EVALUATION ADULT - DIAGNOSIS, OT EVAL
Patient presents with decreased balance, ROM, coordination, strength, endurance impacting ability to perform ADLs and functional mobility

## 2020-12-18 NOTE — PROVIDER CONTACT NOTE (OTHER) - ASSESSMENT
pt bp at 2008  was 187/85  and at 2108  bp was 182/91. pt denied chest pain and denied SOB- pt has pain and po Tylenol was given- oxycodone was not due yet

## 2020-12-18 NOTE — PROGRESS NOTE ADULT - SUBJECTIVE AND OBJECTIVE BOX
[facility], Division of Infectious Diseases  MAHOGANY Mcfarland Y. Patel, S. Shah  [phone]    Name: DALI RAYGOZA  Age: 70y  Gender: Female  MRN: [id number]  Note Date: 12-18-20    Interval History:  Patient seen and examined at bedside this morning  S/p ORIF of L humerus yesterday  Afebrile overnight  Notes reviewed    Antibiotics:      Medications:  acetaminophen   Tablet .. 650 milliGRAM(s) Oral every 6 hours PRN  atorvastatin 40 milliGRAM(s) Oral at bedtime  enoxaparin Injectable 80 milliGRAM(s) SubCutaneous two times a day  folic acid 1 milliGRAM(s) Oral daily  haloperidol    Injectable 2.5 milliGRAM(s) IV Push every 6 hours PRN  influenza   Vaccine 0.5 milliLiter(s) IntraMuscular once  loratadine 10 milliGRAM(s) Oral daily  LORazepam     Tablet 1 milliGRAM(s) Oral every 2 hours PRN  LORazepam   Injectable 1 milliGRAM(s) IV Push every 2 hours PRN  LORazepam   Injectable 1 milliGRAM(s) IntraMuscular every 2 hours PRN  melatonin 3 milliGRAM(s) Oral at bedtime  multivitamin 1 Tablet(s) Oral daily  oxyCODONE    IR 2.5 milliGRAM(s) Oral every 6 hours PRN  oxyCODONE    IR 5 milliGRAM(s) Oral every 6 hours PRN  polyethylene glycol 3350 17 Gram(s) Oral daily  QUEtiapine 25 milliGRAM(s) Oral two times a day  QUEtiapine 25 milliGRAM(s) Oral every 6 hours PRN  sodium chloride 0.9%. 1000 milliLiter(s) IV Continuous <Continuous>  thiamine IVPB 500 milliGRAM(s) IV Intermittent daily      Review of Systems:  A 10-point review of systems was obtained.     Pertinent positives and negatives--  Constitutional: No fevers. No Chills. No Rigors.   Cardiovascular: No chest pain. No palpitations.  Respiratory: No shortness of breath. No cough.  Gastrointestinal: No nausea or vomiting. No diarrhea or constipation.   Psychiatric: Pleasant. Appropriate affect.    Review of systems otherwise negative except as previously noted.    Allergies: No Known Allergies    For details regarding the patient's past medical history, social history, family history, and other miscellaneous elements, please refer the initial infectious diseases consultation and/or the admitting history and physical examination for this admission.    Objective:  Vitals:   T(C): 36.8 (12-18-20 @ 06:30), Max: 37 (12-17-20 @ 17:00)  HR: 80 (12-18-20 @ 06:30) (79 - 106)  BP: 167/90 (12-18-20 @ 06:30) (138/62 - 187/85)  RR: 18 (12-18-20 @ 06:30) (14 - 18)  SpO2: 92% (12-18-20 @ 06:30) (92% - 100%)    Physical Examination:  General: no acute distress  HEENT: NC/AT, EOMI, anicteric, no oral lesions  Neck: supple, no palpable LAD  Cardio: S1, S2 heard, RRR, no murmurs  Resp: breath sounds heard bilaterally, no rales, wheezes or rhonchi  Abd: soft, NT, ND, + bowel sounds  Neuro: AAOx3, no obvious focal deficits  Ext: s/p L humerus ORIF, in dressing  Skin: warm, dry, no visible rash    Laboratory Studies:  CBC:                       9.9    7.41  )-----------( 251      ( 18 Dec 2020 08:51 )             32.8     CMP: 12-18    138  |  104  |  12  ----------------------------<  107<H>  4.1   |  22  |  1.00    Ca    8.3<L>      18 Dec 2020 08:51          Microbiology: reviewed    Culture - Blood (collected 12-14-20 @ 17:39)  Source: .Blood Blood-Peripheral  Preliminary Report (12-15-20 @ 18:03):    No growth to date.    Culture - Blood (collected 12-14-20 @ 17:39)  Source: .Blood Blood-Peripheral  Preliminary Report (12-15-20 @ 18:03):    No growth to date.    Culture - Urine (collected 12-13-20 @ 23:18)  Source: .Urine Clean Catch (Midstream)  Final Report (12-15-20 @ 16:36):    >100,000 CFU/ml Escherichia coli  Organism: Escherichia coli (12-15-20 @ 16:36)  Organism: Escherichia coli (12-15-20 @ 16:36)      -  Amikacin: S <=16      -  Amoxicillin/Clavulanic Acid: S <=8/4      -  Ampicillin: R >16 These ampicillin results predict results for amoxicillin      -  Ampicillin/Sulbactam: R >16/8 Enterobacter, Citrobacter, and Serratia may develop resistance during prolonged therapy (3-4 days)      -  Aztreonam: S <=4      -  Cefazolin: S 4 ([id number]) For uncomplicated UTI with K. pneumoniae, E. coli, or P. mirablis: MELODY <=16 is sensitive and MELODY >=32 is resistant. This also predicts results for oral agents cefaclor, cefdinir, cefpodoxime, cefprozil, cefuroxime axetil, cephalexin and locarbef for uncomplicated UTI. Note that some isolates may be susceptible to these agents while testing resistant to cefazolin.      -  Cefepime: S <=2      -  Cefoxitin: S <=8      -  Ceftriaxone: S <=1 Enterobacter, Citrobacter, and Serratia may develop resistance during prolonged therapy      -  Ciprofloxacin: R >2      -  Ertapenem: S <=0.5      -  Gentamicin: S <=2      -  Imipenem: S <=1      -  Levofloxacin: R >4      -  Meropenem: S <=1      -  Nitrofurantoin: S <=32 Should not be used to treat pyelonephritis      -  Piperacillin/Tazobactam: S <=8      -  Tigecycline: S <=2      -  Tobramycin: S <=2      -  Trimethoprim/Sulfamethoxazole: R >2/38      Method Type: MELODY        Radiology: reviewed

## 2020-12-18 NOTE — PROGRESS NOTE ADULT - SUBJECTIVE AND OBJECTIVE BOX
[facility] Cardiology Progress Note  _______________________________    Pt. seen and examined. No new cardiac-related complaints.    T(C): 36.8 (12-18-20 @ 06:30), Max: 37 (12-17-20 @ 17:00)  HR: 80 (12-18-20 @ 06:30) (79 - 106)  BP: 167/90 (12-18-20 @ 06:30) (138/62 - 187/85)  RR: 18 (12-18-20 @ 06:30) (14 - 18)  SpO2: 92% (12-18-20 @ 06:30) (92% - 100%)  I&O's Summary    17 Dec 2020 07:01  -  18 Dec 2020 07:00  --------------------------------------------------------  IN: 1840 mL / OUT: 1700 mL / NET: 140 mL        PHYSICAL EXAM:  GENERAL: Alert, NAD.  NECK: Supple  CHEST/LUNG: Clear to auscultation bilaterally; No wheezes, rales, or rhonchi.  HEART: S1 S2 normal, RRR; No murmurs, rubs, or gallops  ABDOMEN: Soft, Nondistended  EXTREMITIES:  No LE edema.      LABS:                        9.9    7.41  )-----------( 251      ( 18 Dec 2020 08:51 )             32.8     12-18    138  |  104  |  12  ----------------------------<  107<H>  4.1   |  22  |  1.00    Ca    8.3<L>      18 Dec 2020 08:51      PT/INR - ( 17 Dec 2020 05:20 )   PT: 13.4 sec;   INR: 1.12 ratio         PTT - ( 17 Dec 2020 05:20 )  PTT:29.3 sec  CARDIAC MARKERS ( 15 Dec 2020 06:13 )  x     / x     / 241 U/L / x     / x      CARDIAC MARKERS ( 14 Dec 2020 10:17 )  x     / x     / 515 U/L / x     / x      CARDIAC MARKERS ( 13 Dec 2020 05:17 )  x     / x     / x     / x     / 7.5 ng/mL  CARDIAC MARKERS ( 13 Dec 2020 03:17 )  x     / x     / 2223 U/L / x     / x      CARDIAC MARKERS ( 12 Dec 2020 18:35 )  x     / x     / 2895 U/L / x     / 11.4 ng/mL              MEDICATIONS  (STANDING):  atorvastatin 40 milliGRAM(s) Oral at bedtime  enoxaparin Injectable 80 milliGRAM(s) SubCutaneous two times a day  folic acid 1 milliGRAM(s) Oral daily  influenza   Vaccine 0.5 milliLiter(s) IntraMuscular once  loratadine 10 milliGRAM(s) Oral daily  melatonin 3 milliGRAM(s) Oral at bedtime  multivitamin 1 Tablet(s) Oral daily  polyethylene glycol 3350 17 Gram(s) Oral daily  QUEtiapine 25 milliGRAM(s) Oral two times a day  sodium chloride 0.9%. 1000 milliLiter(s) (100 mL/Hr) IV Continuous <Continuous>  thiamine IVPB 500 milliGRAM(s) IV Intermittent daily    MEDICATIONS  (PRN):  acetaminophen   Tablet .. 650 milliGRAM(s) Oral every 6 hours PRN Mild Pain (1 - 3)  haloperidol    Injectable 2.5 milliGRAM(s) IV Push every 6 hours PRN Severe Agitation  LORazepam     Tablet 1 milliGRAM(s) Oral every 2 hours PRN CIWA-Ar score increase by 2 points and a total score of 7 or less  LORazepam   Injectable 1 milliGRAM(s) IV Push every 2 hours PRN CIWA-Ar score increase by 2 points and a total score of 7 or less  LORazepam   Injectable 1 milliGRAM(s) IntraMuscular every 2 hours PRN CIWA-Ar score increase by 2 points and a total score of 7 or less  oxyCODONE    IR 2.5 milliGRAM(s) Oral every 6 hours PRN Moderate Pain (4 - 6)  oxyCODONE    IR 5 milliGRAM(s) Oral every 6 hours PRN Severe Pain (7 - 10)  QUEtiapine 25 milliGRAM(s) Oral every 6 hours PRN Agitation        RADIOLOGY & ADDITIONAL TESTS:

## 2020-12-18 NOTE — PROVIDER CONTACT NOTE (OTHER) - ACTION/TREATMENT ORDERED:
PA stated since pt got the Tylenol for pain, just to check the bp in an hour to see if bp decreases.

## 2020-12-18 NOTE — PROGRESS NOTE ADULT - ASSESSMENT
Assessment:  1. Acute DVT of R and L soleal veins  2. Subacute DVT of L popliteal, L femora, L posterior tibial peroneal trunk and gastroc vein  3. L humerus fracture w/ plan for surgical fixation 12/16  4. CTPA w/o signs of PE, pBNP 933        Plan:  1. Continue AC w/ Lovenox 80 BID for now  2. Rest of cardiac care per primary Cardiology    Thank you      Vascular Cardiology Service    Please call with any questions:   SPECTRA - [phone]  Office [phone]  email:  [email or web address]     Assessment:  1. Acute DVT of R and L soleal veins  2. Subacute DVT of L popliteal, L femora, L posterior tibial peroneal trunk and gastroc vein  3. L humerus fracture w/ plan for surgical fixation 12/16  4. CTPA w/o signs of PE, pBNP 933        Plan:  1. Continue full dose AC; defer to outpatient providers (cardiology or pulm who will follow patient); lovenox or NOAC acceptable options.  2. Rest of cardiac care per primary Cardiology    Thank you      Vascular Cardiology Service    Please call with any questions:   SPECTRA - [phone]  Office [phone]  email:  [email or web address]

## 2020-12-18 NOTE — PROGRESS NOTE ADULT - SUBJECTIVE AND OBJECTIVE BOX
Follow-up Pulm Progress Note  David Chaidez MD  [phone]    POD #1 ORIF L humerus   OOB in chair: no resp complaints  Pain controlled    Vital Signs Last 24 Hrs  T(C): 36.4 (18 Dec 2020 11:01), Max: 37 (17 Dec 2020 17:00)  T(F): 97.5 (18 Dec 2020 11:01), Max: 98.6 (17 Dec 2020 17:00)  HR: 83 (18 Dec 2020 11:01) (80 - 103)  BP: 165/75 (18 Dec 2020 11:01) (138/62 - 187/85)  BP(mean): 112 (17 Dec 2020 17:00) (89 - 112)  RR: 18 (18 Dec 2020 11:01) (14 - 18)  SpO2: 94% (18 Dec 2020 11:01) (92% - 100%)    ABG - ( 17 Dec 2020 10:25 )  pH, Arterial: 7.49  pH, Blood: x     /  pCO2: 32    /  pO2: 420   / HCO3: 24    / Base Excess: 1.4   /  SaO2: 100                           9.9    7.41  )-----------( 251      ( 18 Dec 2020 08:51 )             32.8       12-18    138  |  104  |  12  ----------------------------<  107<H>  4.1   |  22  |  1.00    Ca    8.3<L>      18 Dec 2020 08:51        Serum Pro-Brain Natriuretic Peptide: 933 pg/mL (12-15-20 @ 17:33)      CULTURES:  Culture Results:   No growth to date. (12-14 @ 17:39)  Culture Results:   No growth to date. (12-14 @ 17:39)  Culture Results:   >100,000 CFU/ml Escherichia coli (12-13 @ 23:18)    Most recent blood culture -- 12-14 @ 17:39   -- -- .Blood Blood-Peripheral 12-14 @ 17:39  Most recent blood culture -- 12-13 @ 23:18   Escherichia coli Escherichia coli .Urine Clean Catch (Midstream) 12-13 @ 23:18      Physical Examination:  PULM: No wheeze or rhonchi  CVS: Regular rate and rhythm, no murmurs, rubs, or gallops  ABD: Soft, non-tender  EXT:  No clubbing, cyanosis, or edema    RADIOLOGY REVIEWED  CXR:    CT chest:    TTE:

## 2020-12-18 NOTE — PHYSICAL THERAPY INITIAL EVALUATION ADULT - GENERAL OBSERVATIONS, REHAB EVAL
pt pastora 40 min eval fair. pt rec'd in bed, LUE in splint (NWB), on room air, NAD. pt with h/o obesity, anxiety/agitation, recent L total shoulder replacement, presents s/p fall with L periprosthetic fx c/b rhabdomylosis, plan for OR tomorrow. pt eager to get OOB, following all commands, appeared anxious/nervous at times.
Pt rec'ed tyree-supine in bed, pre-med for session by RN/Yadira, +Sling to LUE, +washington catheter, pastora PT re-evaluation s/p Left Humerus ORIF 12/17 fair.

## 2020-12-18 NOTE — PHYSICAL THERAPY INITIAL EVALUATION ADULT - PRECAUTIONS/LIMITATIONS, REHAB EVAL
HISTORY AND RESULTS CONTINUED: DUPLEX BLE: acute calf vein DVT affecting both the right and left soleal veins. CTA CHEST: no PE. CT ABDOMEN/PELVIS: acute comminuted fx of the left humerus. old b/l rib fx and old L1 vertebral body compression fx./fall precautions
CTA CHEST: no PE. CT ABDOMEN/PELVIS: acute comminuted fx of the left humerus, old b/l rib fx & old L1 vertebral body compression fx. Pt now s/p Left Humerus ORIF 12/17.

## 2020-12-18 NOTE — OCCUPATIONAL THERAPY INITIAL EVALUATION ADULT - PERTINENT HX OF CURRENT PROBLEM, REHAB EVAL
69 y/o F with PMH of HTN, obesity, COPD, anxiety, agitation, R TKR, recent L reverse total shoulder replacement, initially presents after being found down. Pt initially admitted to SICU with left humerus periprosthetic comminuted fx, c/b rhabdo and left radial nerve palsy, left AMA to [facility] parking by wheelchair, where she decided she wanted to be admitted again to hospital for possible fx repair.

## 2020-12-18 NOTE — PROVIDER CONTACT NOTE (OTHER) - ASSESSMENT
bp rechecked at 2300 and it was 170/94- pt denied SOB and denied chestpain; however pt still had some pain after po Tylenol

## 2020-12-18 NOTE — PROGRESS NOTE ADULT - ASSESSMENT
70 year old female s/p Left Humerus ORIF POD1  ·	DVT: Patient currently being treated for DVT's after being found down for 48 hours at home with injury. Continue Anticoagulation per primary team  ·	Non weight bearing LUE in sling  ·	PT/OT Evaluation, patient requesting to go home and states that she has an Aid/someone who helps her throughout the day and is looking into extending her hours for her recovery. Awaiting PT evaluation to determine if patient a candidate for safe discharge home versus rehabilitation facility  ·	Radial Nerve Palsy: Patient had preoperatively and has been counseled on delayed return of nerve function  ·	OT Splint evaluation for cock up wrist splint secondary to radial nerve palsy from injury  ·	Ice over left humerus at all times while patient in bed  ·	Pain control, currently controlled  ·	Follow up disposition status

## 2020-12-18 NOTE — PROGRESS NOTE ADULT - SUBJECTIVE AND OBJECTIVE BOX
Vascular Cardiology  Progress note     SPECTRA [id number]              EMAIL [email or web address]   OFFICE [phone]    CC:      INTERVAL HISTORY:  s/p L humeral ORIF  Already returned to full dose lovenox  No obvious bleeding or symptoms thereof  Feeling well today.         Allergies    No Known Allergies    Intolerances    NSAIDs (Other)  	    MEDICATIONS:  enoxaparin Injectable 80 milliGRAM(s) SubCutaneous two times a day      loratadine 10 milliGRAM(s) Oral daily    acetaminophen   Tablet .. 650 milliGRAM(s) Oral every 6 hours PRN  haloperidol    Injectable 2.5 milliGRAM(s) IV Push every 6 hours PRN  LORazepam     Tablet 1 milliGRAM(s) Oral every 2 hours PRN  LORazepam   Injectable 1 milliGRAM(s) IV Push every 2 hours PRN  LORazepam   Injectable 1 milliGRAM(s) IntraMuscular every 2 hours PRN  melatonin 3 milliGRAM(s) Oral at bedtime  oxyCODONE    IR 2.5 milliGRAM(s) Oral every 6 hours PRN  oxyCODONE    IR 5 milliGRAM(s) Oral every 6 hours PRN  QUEtiapine 25 milliGRAM(s) Oral two times a day  QUEtiapine 25 milliGRAM(s) Oral every 6 hours PRN    polyethylene glycol 3350 17 Gram(s) Oral daily    atorvastatin 40 milliGRAM(s) Oral at bedtime    folic acid 1 milliGRAM(s) Oral daily  influenza   Vaccine 0.5 milliLiter(s) IntraMuscular once  multivitamin 1 Tablet(s) Oral daily  sodium chloride 0.9%. 1000 milliLiter(s) IV Continuous <Continuous>  thiamine IVPB 500 milliGRAM(s) IV Intermittent daily      PAST MEDICAL & SURGICAL HISTORY:  Seasonal allergies    Anxiety    Osteoarthritis  R knee and L shoulder    Chronic obstructive pulmonary disease (COPD)    Hyperlipidemia    Hypertension    Osteoarthritis of left shoulder    Class 1 obesity with body mass index (BMI) of 34.0 to 34.9 in adult    History of closed shoulder dislocation  left shoulder 2019    Anxiety    HLD (hyperlipidemia)    H/O total knee replacement  R knee    H/O shoulder replacement  L shoulder    History of right knee joint replacement  2019        FAMILY HISTORY:  Family history of myocardial infarction    FH: heart attack  mother        SOCIAL HISTORY:  unchanged    REVIEW OF SYSTEMS:  CONSTITUTIONAL: No fever, weight loss, or fatigue  EYES: No eye pain, visual disturbances, or discharge  ENMT:  No difficulty hearing, tinnitus, vertigo; No sinus or throat pain  NECK: No pain or stiffness  RESPIRATORY: No cough, wheezing, chills or hemoptysis; No Shortness of Breath  CARDIOVASCULAR: No chest pain, palpitations, passing out, dizziness, or leg swelling  GASTROINTESTINAL: No abdominal or epigastric pain. No nausea, vomiting, or hematemesis; No diarrhea or constipation. No melena or hematochezia.  GENITOURINARY: No dysuria, frequency, hematuria, or incontinence  NEUROLOGICAL: No headaches, memory loss, loss of strength, numbness, or tremors  SKIN: No itching, burning, rashes, or lesions   LYMPH Nodes: No enlarged glands  ENDOCRINE: No heat or cold intolerance; No hair loss  MUSCULOSKELETAL: No joint pain or swelling; No muscle, back, or extremity pain  PSYCHIATRIC: No depression, anxiety, mood swings, or difficulty sleeping  HEME/LYMPH: No easy bruising, or bleeding gums  ALLERY AND IMMUNOLOGIC: No hives or eczema	    [ x] All others negative	  [ ] Unable to obtain    PHYSICAL EXAM:  T(C): 36.8 (12-18-20 @ 06:30), Max: 37 (12-17-20 @ 17:00)  HR: 80 (12-18-20 @ 06:30) (79 - 106)  BP: 167/90 (12-18-20 @ 06:30) (138/62 - 187/85)  RR: 18 (12-18-20 @ 06:30) (14 - 18)  SpO2: 92% (12-18-20 @ 06:30) (92% - 100%)  Wt(kg): --  I&O's Summary    17 Dec 2020 07:01  -  18 Dec 2020 07:00  --------------------------------------------------------  IN: 1840 mL / OUT: 1700 mL / NET: 140 mL    Appearance: Normal	  Appearance:  Obese, NAD	  HEENT:   Normal oral mucosa, PERRL, EOMI	  Lymphatic: No lymphadenopathy  Cardiovascular:  RRR, normal S1S2, no m/r/g, no JVD  Respiratory:  CTA b/l	  Psychiatry:  AAO x 3  Gastrointestinal:  Soft, Non-tender, + BS	  Skin: No rashes, No ecchymoses, No cyanosis	  Neurologic:  no FND  Extremities:  No LE edema, redness or TTP      LABS:	 	    CBC Full  -  ( 18 Dec 2020 08:51 )  WBC Count : 7.41 K/uL  Hemoglobin : 9.9 g/dL  Hematocrit : 32.8 %  Platelet Count - Automated : 251 K/uL  Mean Cell Volume : 94.5 fl  Mean Cell Hemoglobin : 28.5 pg  Mean Cell Hemoglobin Concentration : 30.2 gm/dL  Auto Neutrophil # : x  Auto Lymphocyte # : x  Auto Monocyte # : x  Auto Eosinophil # : x  Auto Basophil # : x  Auto Neutrophil % : x  Auto Lymphocyte % : x  Auto Monocyte % : x  Auto Eosinophil % : x  Auto Basophil % : x    12-18    138  |  104  |  12  ----------------------------<  107<H>  4.1   |  22  |  1.00  12-17    139  |  106  |  11  ----------------------------<  130<H>  4.0   |  23  |  0.84    Ca    8.3<L>      18 Dec 2020 08:51  Ca    8.7      17 Dec 2020 14:02

## 2020-12-18 NOTE — PROGRESS NOTE ADULT - SUBJECTIVE AND OBJECTIVE BOX
Pt S/E at bedside, no acute events overnight, pain controlled  Patient is status post Left Humerus ORIF for Periprosthetic humeral shaft fracture     Vital Signs Last 24 Hrs  T(C): 36.8 (18 Dec 2020 06:30), Max: 37 (17 Dec 2020 17:00)  T(F): 98.2 (18 Dec 2020 06:30), Max: 98.6 (17 Dec 2020 17:00)  HR: 80 (18 Dec 2020 06:30) (79 - 106)  BP: 167/90 (18 Dec 2020 06:30) (138/62 - 187/85)  BP(mean): 112 (17 Dec 2020 17:00) (89 - 112)  RR: 18 (18 Dec 2020 06:30) (14 - 18)  SpO2: 92% (18 Dec 2020 06:30) (92% - 100%)      Gen: NAD, AAOx3    LUE:  Dressing clean dry intact  +AIN/M/U ulnar nerve functions  0/5 Radial Nerve/PIN Function on exam, similar to preoperative examination  Decreased sensation over radial nerve distribution, dorsum of left hand   SILT Otherwise  +Radial Pulse  Compartments soft

## 2020-12-18 NOTE — PHYSICAL THERAPY INITIAL EVALUATION ADULT - BED MOBILITY TRAINING, PT EVAL
GOAL: Pt will perform all bed mobility independently within 4 weeks
GOAL: Pt will perform all bed mobility independently within 4 weeks

## 2020-12-18 NOTE — PHYSICAL THERAPY INITIAL EVALUATION ADULT - DISCHARGE DISPOSITION, PT EVAL
Subacute Rehab. *IF discharge home recommend home PT, assist with ALL ADLs and functional mobility, and ambulance transport home.
Subacute Rehab. *IF discharge home recommend home PT, assist with ALL ADLs and functional mobility, and ambulance transport home. MEDINA Hernandez aware.

## 2020-12-18 NOTE — PHYSICAL THERAPY INITIAL EVALUATION ADULT - TRANSFER TRAINING, PT EVAL
GOAL: Pt will transfer sit to/from stand with least restrictive device as appropriate independently within 4 weeks
GOAL: Pt will transfer sit to/from stand with least restrictive device as appropriate independently within 4 weeks

## 2020-12-18 NOTE — PHYSICAL THERAPY INITIAL EVALUATION ADULT - PLANNED THERAPY INTERVENTIONS, PT EVAL
GOAL: Pt will negotiate 9 steps with unilateral handrail in a step-to pattern independently within 4 weeks./balance training/bed mobility training/gait training/strengthening/transfer training
GOAL: Pt will negotiate 9 steps with unilateral handrail in a step-to pattern independently within 4 weeks./balance training/bed mobility training/gait training/strengthening/transfer training

## 2020-12-18 NOTE — OCCUPATIONAL THERAPY INITIAL EVALUATION ADULT - LIVES WITH, PROFILE
Pt refusing to report prior living: obtained from PT eval- pt limited historian, appeared frustrated answering further questions. pt reports living alone in a condo, flight of steps to enter through the garage (+handrail). Pt states prior to admission being independent with all functional mobility and ADLs. pt states owning 2 walkers and 2 straight canes. pt reports having HHA, difficult to follow regarding the hours.

## 2020-12-18 NOTE — PHYSICAL THERAPY INITIAL EVALUATION ADULT - MANUAL MUSCLE TESTING RESULTS, REHAB EVAL
RUE and BLE grossly at least 3/5, 1/5 L fingers, further MMT deferred 2/2 fx/grossly assessed due to
RUE and BLE grossly at least 3/5, 1/5 L fingers, further MMT deferred 2/2 fx/grossly assessed due to

## 2020-12-18 NOTE — PHYSICAL THERAPY INITIAL EVALUATION ADULT - BALANCE TRAINING, PT EVAL
GOAL: Pt will increase static/dynamic sitting and standing balance to at least "good" within 4 weeks
GOAL: Pt will increase static/dynamic sitting and standing balance to at least "good" within 4 weeks

## 2020-12-18 NOTE — PROGRESS NOTE ADULT - SUBJECTIVE AND OBJECTIVE BOX
Patient is a 70y old  Female who presents with a chief complaint of left humerus fracture (18 Dec 2020 14:02)      INTERVAL HPI/OVERNIGHT EVENTS: noted  pt seen and examined  sp Lt humerus fx repair 12/17  pain with PT      Vital Signs Last 24 Hrs  T(C): 36.7 (18 Dec 2020 22:40), Max: 36.8 (18 Dec 2020 06:30)  T(F): 98.1 (18 Dec 2020 22:40), Max: 98.2 (18 Dec 2020 06:30)  HR: 83 (18 Dec 2020 22:40) (79 - 87)  BP: 149/77 (18 Dec 2020 22:40) (144/84 - 170/83)  BP(mean): --  RR: 18 (18 Dec 2020 22:40) (18 - 18)  SpO2: 93% (18 Dec 2020 22:40) (92% - 94%)    acetaminophen   Tablet .. 975 milliGRAM(s) Oral every 6 hours  atorvastatin 40 milliGRAM(s) Oral at bedtime  cefTRIAXone   IVPB      enoxaparin Injectable 80 milliGRAM(s) SubCutaneous two times a day  folic acid 1 milliGRAM(s) Oral daily  haloperidol    Injectable 2.5 milliGRAM(s) IV Push every 6 hours PRN  influenza   Vaccine 0.5 milliLiter(s) IntraMuscular once  loratadine 10 milliGRAM(s) Oral daily  LORazepam     Tablet 1 milliGRAM(s) Oral every 2 hours PRN  LORazepam   Injectable 1 milliGRAM(s) IV Push every 2 hours PRN  LORazepam   Injectable 1 milliGRAM(s) IntraMuscular every 2 hours PRN  melatonin 3 milliGRAM(s) Oral at bedtime  multivitamin 1 Tablet(s) Oral daily  oxyCODONE    IR 5 milliGRAM(s) Oral every 4 hours PRN  oxyCODONE    IR 2.5 milliGRAM(s) Oral every 4 hours PRN  polyethylene glycol 3350 17 Gram(s) Oral daily  QUEtiapine 25 milliGRAM(s) Oral two times a day  QUEtiapine 25 milliGRAM(s) Oral every 6 hours PRN  sodium chloride 0.9%. 1000 milliLiter(s) IV Continuous <Continuous>  thiamine IVPB 500 milliGRAM(s) IV Intermittent daily  traMADol 50 milliGRAM(s) Oral every 6 hours      PHYSICAL EXAM:  GENERAL: NAD,   EYES: conjunctiva and sclera clear  ENMT: Moist mucous membranes  NECK: Supple, No JVD, Normal thyroid  CHEST/LUNG: non labored, cta b/l  HEART: Regular rate and rhythm; No murmurs, rubs, or gallops  ABDOMEN: Soft, Nontender, Nondistended; Bowel sounds present  EXTREMITIES:  2+ Peripheral Pulses, No clubbing, cyanosis, or edema  LYMPH: No lymphadenopathy noted  SKIN: No rashes or lesions    Consultant(s) Notes Reviewed:  [x ] YES  [ ] NO  Care Discussed with Consultants/Other Providers [ x] YES  [ ] NO    LABS:                        9.9    7.41  )-----------( 251      ( 18 Dec 2020 08:51 )             32.8     12-18    138  |  104  |  12  ----------------------------<  107<H>  4.1   |  22  |  1.00    Ca    8.3<L>      18 Dec 2020 08:51      PT/INR - ( 17 Dec 2020 05:20 )   PT: 13.4 sec;   INR: 1.12 ratio         PTT - ( 17 Dec 2020 05:20 )  PTT:29.3 sec    CAPILLARY BLOOD GLUCOSE          ABG - ( 17 Dec 2020 10:25 )  pH, Arterial: 7.49  pH, Blood: x     /  pCO2: 32    /  pO2: 420   / HCO3: 24    / Base Excess: 1.4   /  SaO2: 100                     RADIOLOGY & ADDITIONAL TESTS:    Imaging Personally Reviewed:  [x ] YES  [ ] NO

## 2020-12-19 DIAGNOSIS — I82.463 ACUTE EMBOLISM AND THROMBOSIS OF CALF MUSCULAR VEIN, BILATERAL: ICD-10-CM

## 2020-12-19 LAB
ANION GAP SERPL CALC-SCNC: 13 MMOL/L — SIGNIFICANT CHANGE UP (ref 5–17)
BUN SERPL-MCNC: 17 MG/DL — SIGNIFICANT CHANGE UP (ref 7–23)
CALCIUM SERPL-MCNC: 8.7 MG/DL — SIGNIFICANT CHANGE UP (ref 8.4–10.5)
CHLORIDE SERPL-SCNC: 103 MMOL/L — SIGNIFICANT CHANGE UP (ref 96–108)
CO2 SERPL-SCNC: 22 MMOL/L — SIGNIFICANT CHANGE UP (ref 22–31)
CREAT SERPL-MCNC: 0.78 MG/DL — SIGNIFICANT CHANGE UP (ref 0.5–1.3)
CULTURE RESULTS: SIGNIFICANT CHANGE UP
CULTURE RESULTS: SIGNIFICANT CHANGE UP
GLUCOSE SERPL-MCNC: 90 MG/DL — SIGNIFICANT CHANGE UP (ref 70–99)
POTASSIUM SERPL-MCNC: 4.2 MMOL/L — SIGNIFICANT CHANGE UP (ref 3.5–5.3)
POTASSIUM SERPL-SCNC: 4.2 MMOL/L — SIGNIFICANT CHANGE UP (ref 3.5–5.3)
SODIUM SERPL-SCNC: 138 MMOL/L — SIGNIFICANT CHANGE UP (ref 135–145)
SPECIMEN SOURCE: SIGNIFICANT CHANGE UP
SPECIMEN SOURCE: SIGNIFICANT CHANGE UP

## 2020-12-19 RX ORDER — IPRATROPIUM/ALBUTEROL SULFATE 18-103MCG
3 AEROSOL WITH ADAPTER (GRAM) INHALATION ONCE
Refills: 0 | Status: COMPLETED | OUTPATIENT
Start: 2020-12-19 | End: 2020-12-19

## 2020-12-19 RX ORDER — AMLODIPINE BESYLATE 2.5 MG/1
5 TABLET ORAL DAILY
Refills: 0 | Status: DISCONTINUED | OUTPATIENT
Start: 2020-12-19 | End: 2020-12-20

## 2020-12-19 RX ADMIN — OXYCODONE HYDROCHLORIDE 5 MILLIGRAM(S): 5 TABLET ORAL at 22:01

## 2020-12-19 RX ADMIN — TRAMADOL HYDROCHLORIDE 50 MILLIGRAM(S): 50 TABLET ORAL at 06:26

## 2020-12-19 RX ADMIN — Medication 975 MILLIGRAM(S): at 06:26

## 2020-12-19 RX ADMIN — LORATADINE 10 MILLIGRAM(S): 10 TABLET ORAL at 11:18

## 2020-12-19 RX ADMIN — Medication 975 MILLIGRAM(S): at 07:00

## 2020-12-19 RX ADMIN — Medication 1 MILLIGRAM(S): at 11:17

## 2020-12-19 RX ADMIN — Medication 975 MILLIGRAM(S): at 17:25

## 2020-12-19 RX ADMIN — Medication 975 MILLIGRAM(S): at 22:30

## 2020-12-19 RX ADMIN — QUETIAPINE FUMARATE 25 MILLIGRAM(S): 200 TABLET, FILM COATED ORAL at 17:25

## 2020-12-19 RX ADMIN — OXYCODONE HYDROCHLORIDE 5 MILLIGRAM(S): 5 TABLET ORAL at 22:30

## 2020-12-19 RX ADMIN — Medication 1 TABLET(S): at 11:18

## 2020-12-19 RX ADMIN — OXYCODONE HYDROCHLORIDE 5 MILLIGRAM(S): 5 TABLET ORAL at 17:55

## 2020-12-19 RX ADMIN — Medication 3 MILLILITER(S): at 18:00

## 2020-12-19 RX ADMIN — Medication 975 MILLIGRAM(S): at 17:55

## 2020-12-19 RX ADMIN — Medication 105 MILLIGRAM(S): at 11:17

## 2020-12-19 RX ADMIN — TRAMADOL HYDROCHLORIDE 50 MILLIGRAM(S): 50 TABLET ORAL at 11:49

## 2020-12-19 RX ADMIN — ENOXAPARIN SODIUM 80 MILLIGRAM(S): 100 INJECTION SUBCUTANEOUS at 06:27

## 2020-12-19 RX ADMIN — ENOXAPARIN SODIUM 80 MILLIGRAM(S): 100 INJECTION SUBCUTANEOUS at 17:25

## 2020-12-19 RX ADMIN — Medication 975 MILLIGRAM(S): at 11:17

## 2020-12-19 RX ADMIN — Medication 975 MILLIGRAM(S): at 00:10

## 2020-12-19 RX ADMIN — Medication 975 MILLIGRAM(S): at 11:47

## 2020-12-19 RX ADMIN — CEFTRIAXONE 100 MILLIGRAM(S): 500 INJECTION, POWDER, FOR SOLUTION INTRAMUSCULAR; INTRAVENOUS at 09:28

## 2020-12-19 RX ADMIN — Medication 3 MILLIGRAM(S): at 22:01

## 2020-12-19 RX ADMIN — TRAMADOL HYDROCHLORIDE 50 MILLIGRAM(S): 50 TABLET ORAL at 07:00

## 2020-12-19 RX ADMIN — TRAMADOL HYDROCHLORIDE 50 MILLIGRAM(S): 50 TABLET ORAL at 11:19

## 2020-12-19 RX ADMIN — ATORVASTATIN CALCIUM 40 MILLIGRAM(S): 80 TABLET, FILM COATED ORAL at 22:01

## 2020-12-19 RX ADMIN — TRAMADOL HYDROCHLORIDE 50 MILLIGRAM(S): 50 TABLET ORAL at 00:10

## 2020-12-19 RX ADMIN — Medication 975 MILLIGRAM(S): at 22:01

## 2020-12-19 RX ADMIN — OXYCODONE HYDROCHLORIDE 5 MILLIGRAM(S): 5 TABLET ORAL at 17:25

## 2020-12-19 RX ADMIN — AMLODIPINE BESYLATE 5 MILLIGRAM(S): 2.5 TABLET ORAL at 17:25

## 2020-12-19 RX ADMIN — QUETIAPINE FUMARATE 25 MILLIGRAM(S): 200 TABLET, FILM COATED ORAL at 06:28

## 2020-12-19 RX ADMIN — OXYCODONE HYDROCHLORIDE 5 MILLIGRAM(S): 5 TABLET ORAL at 08:02

## 2020-12-19 RX ADMIN — OXYCODONE HYDROCHLORIDE 5 MILLIGRAM(S): 5 TABLET ORAL at 08:32

## 2020-12-19 NOTE — PROGRESS NOTE ADULT - ASSESSMENT
69 y/o FM s/p ORIF left humeral angela-prosthetic fracture POD#2, f/u HMV, NWB GUTIERREZ in sling  Betty Goff PA-C  Orthopaedic Surgery  Team pager [phone]  [facility] [phone]  [phone]

## 2020-12-19 NOTE — PROGRESS NOTE ADULT - SUBJECTIVE AND OBJECTIVE BOX
Patient is a 70y old  Female who presents with a chief complaint of left humerus fracture (19 Dec 2020 10:33)      INTERVAL HPI/OVERNIGHT EVENTS: noted  pt seen and examined  feels well  pain lt arm      Vital Signs Last 24 Hrs  T(C): 36.8 (19 Dec 2020 16:40), Max: 36.8 (19 Dec 2020 16:40)  T(F): 98.2 (19 Dec 2020 16:40), Max: 98.2 (19 Dec 2020 16:40)  HR: 71 (19 Dec 2020 16:40) (71 - 83)  BP: 179/80 (19 Dec 2020 16:40) (149/77 - 179/80)  BP(mean): --  RR: 20 (19 Dec 2020 16:40) (18 - 22)  SpO2: 94% (19 Dec 2020 16:40) (92% - 94%)    acetaminophen   Tablet .. 975 milliGRAM(s) Oral every 6 hours  amLODIPine   Tablet 5 milliGRAM(s) Oral daily  atorvastatin 40 milliGRAM(s) Oral at bedtime  cefTRIAXone   IVPB      cefTRIAXone   IVPB 1000 milliGRAM(s) IV Intermittent every 24 hours  enoxaparin Injectable 80 milliGRAM(s) SubCutaneous two times a day  folic acid 1 milliGRAM(s) Oral daily  haloperidol    Injectable 2.5 milliGRAM(s) IV Push every 6 hours PRN  influenza   Vaccine 0.5 milliLiter(s) IntraMuscular once  loratadine 10 milliGRAM(s) Oral daily  LORazepam     Tablet 1 milliGRAM(s) Oral every 2 hours PRN  LORazepam   Injectable 1 milliGRAM(s) IV Push every 2 hours PRN  LORazepam   Injectable 1 milliGRAM(s) IntraMuscular every 2 hours PRN  melatonin 3 milliGRAM(s) Oral at bedtime  multivitamin 1 Tablet(s) Oral daily  oxyCODONE    IR 5 milliGRAM(s) Oral every 4 hours PRN  oxyCODONE    IR 2.5 milliGRAM(s) Oral every 4 hours PRN  polyethylene glycol 3350 17 Gram(s) Oral daily  QUEtiapine 25 milliGRAM(s) Oral two times a day  QUEtiapine 25 milliGRAM(s) Oral every 6 hours PRN  sodium chloride 0.9%. 1000 milliLiter(s) IV Continuous <Continuous>  traMADol 50 milliGRAM(s) Oral every 6 hours PRN      PHYSICAL EXAM:  GENERAL: NAD,   EYES: conjunctiva and sclera clear  ENMT: Moist mucous membranes  NECK: Supple, No JVD, Normal thyroid  CHEST/LUNG: non labored, cta b/l  HEART: Regular rate and rhythm; No murmurs, rubs, or gallops  ABDOMEN: Soft, Nontender, Nondistended; Bowel sounds present  EXTREMITIES:  2+ Peripheral Pulses, No clubbing, cyanosis, or edema  LYMPH: No lymphadenopathy noted  SKIN: No rashes or lesions    Consultant(s) Notes Reviewed:  [x ] YES  [ ] NO  Care Discussed with Consultants/Other Providers [ x] YES  [ ] NO    LABS:                        9.9    7.41  )-----------( 251      ( 18 Dec 2020 08:51 )             32.8     12-19    138  |  103  |  17  ----------------------------<  90  4.2   |  22  |  0.78    Ca    8.7      19 Dec 2020 11:31          CAPILLARY BLOOD GLUCOSE                  RADIOLOGY & ADDITIONAL TESTS:    Imaging Personally Reviewed:  [x ] YES  [ ] NO

## 2020-12-19 NOTE — PROGRESS NOTE ADULT - SUBJECTIVE AND OBJECTIVE BOX
Patient is a 70y old  Female who presents with a chief complaint of left humerus  periprosthetic fracture  Patient s/p ORIF left humeral angela-prosthetic fracture POD#2  Patient resting without complaints.  No chest pain, SOB, N/V.    T(C): 36.7 (12-19-20 @ 05:51), Max: 36.8 (12-18-20 @ 14:40)  HR: 76 (12-19-20 @ 05:51) (76 - 87)  BP: 159/83 (12-19-20 @ 05:51) (144/84 - 169/86)  RR: 22 (12-19-20 @ 05:51) (18 - 22)  SpO2: 93% (12-19-20 @ 05:51) (93% - 94%)    Exam:  Alert and Oriented, No Acute Distress  Cards: +S1/S2, RRR  Pulm: CTAB  Left U/E; radial nerve and PIN Palsy at baseline, Aquacel dressing clean, dry , HMV-85/30cc  Lower Extremities:  Calves Soft, Non-tender bilaterally  +PF/DF/EHL/FHL                      9.9    7.41  )-----------( 251      ( 18 Dec 2020 08:51 )             32.8    12-18  138  |  104  |  12  ----------------------------<  107<H>  4.1   |  22  |  1.00  Ca    8.3<L>      18 Dec 2020 08:51

## 2020-12-19 NOTE — PROGRESS NOTE ADULT - ASSESSMENT
70F pmhx HTN, obesity, COPD, anxiety, agitation, R TKR, recent L reverse total shoulder replacement (@ OSH), who was BIBEMS yesterday as a level 2 trauma after being found down, admitted to SICU with left humerus periprosthetic comminuted fracture, c/b rhabdo, left radial nerve palsy, b/l umesh vein DVT's.

## 2020-12-19 NOTE — PROGRESS NOTE ADULT - SUBJECTIVE AND OBJECTIVE BOX
[facility] Cardiology Progress Note  _______________________________    Pt. seen and examined. No new cardiac-related complaints.      T(C): 36.7 (12-19-20 @ 08:44), Max: 36.8 (12-18-20 @ 14:40)  HR: 81 (12-19-20 @ 08:44) (76 - 87)  BP: 170/76 (12-19-20 @ 08:44) (144/84 - 170/76)  RR: 20 (12-19-20 @ 08:44) (18 - 22)  SpO2: 92% (12-19-20 @ 08:44) (92% - 94%)  I&O's Summary    18 Dec 2020 07:01  -  19 Dec 2020 07:00  --------------------------------------------------------  IN: 1560 mL / OUT: 1510 mL / NET: 50 mL        PHYSICAL EXAM:  GENERAL: Alert, NAD.  NECK: Supple, No JVD, no carotid bruit.  CHEST/LUNG: Clear to auscultation bilaterally; No wheezes, rales, or rhonchi.  HEART: S1 S2 normal, RRR; No murmurs, rubs, or gallops  ABDOMEN: Soft, Nontender, Nondistended; Bowel sounds present  EXTREMITIES:  No LE edema.      LABS:                        9.9    7.41  )-----------( 251      ( 18 Dec 2020 08:51 )             32.8     12-18    138  |  104  |  12  ----------------------------<  107<H>  4.1   |  22  |  1.00    Ca    8.3<L>      18 Dec 2020 08:51        CARDIAC MARKERS ( 15 Dec 2020 06:13 )  x     / x     / 241 U/L / x     / x      CARDIAC MARKERS ( 14 Dec 2020 10:17 )  x     / x     / 515 U/L / x     / x      CARDIAC MARKERS ( 13 Dec 2020 05:17 )  x     / x     / x     / x     / 7.5 ng/mL  CARDIAC MARKERS ( 13 Dec 2020 03:17 )  x     / x     / 2223 U/L / x     / x      CARDIAC MARKERS ( 12 Dec 2020 18:35 )  x     / x     / 2895 U/L / x     / 11.4 ng/mL              MEDICATIONS  (STANDING):  acetaminophen   Tablet .. 975 milliGRAM(s) Oral every 6 hours  atorvastatin 40 milliGRAM(s) Oral at bedtime  cefTRIAXone   IVPB      cefTRIAXone   IVPB 1000 milliGRAM(s) IV Intermittent every 24 hours  enoxaparin Injectable 80 milliGRAM(s) SubCutaneous two times a day  folic acid 1 milliGRAM(s) Oral daily  influenza   Vaccine 0.5 milliLiter(s) IntraMuscular once  loratadine 10 milliGRAM(s) Oral daily  melatonin 3 milliGRAM(s) Oral at bedtime  multivitamin 1 Tablet(s) Oral daily  polyethylene glycol 3350 17 Gram(s) Oral daily  QUEtiapine 25 milliGRAM(s) Oral two times a day  sodium chloride 0.9%. 1000 milliLiter(s) (100 mL/Hr) IV Continuous <Continuous>  thiamine IVPB 500 milliGRAM(s) IV Intermittent daily  traMADol 50 milliGRAM(s) Oral every 6 hours    MEDICATIONS  (PRN):  haloperidol    Injectable 2.5 milliGRAM(s) IV Push every 6 hours PRN Severe Agitation  LORazepam     Tablet 1 milliGRAM(s) Oral every 2 hours PRN CIWA-Ar score increase by 2 points and a total score of 7 or less  LORazepam   Injectable 1 milliGRAM(s) IV Push every 2 hours PRN CIWA-Ar score increase by 2 points and a total score of 7 or less  LORazepam   Injectable 1 milliGRAM(s) IntraMuscular every 2 hours PRN CIWA-Ar score increase by 2 points and a total score of 7 or less  oxyCODONE    IR 5 milliGRAM(s) Oral every 4 hours PRN Severe Pain (7 - 10)  oxyCODONE    IR 2.5 milliGRAM(s) Oral every 4 hours PRN Moderate Pain (4 - 6)  QUEtiapine 25 milliGRAM(s) Oral every 6 hours PRN Agitation  traMADol 50 milliGRAM(s) Oral every 6 hours PRN breakthrough pain      RADIOLOGY & ADDITIONAL TESTS:     [facility] Cardiology Progress Note  _______________________________    Pt. seen and examined. No new cardiac-related complaints.      T(C): 36.7 (12-19-20 @ 08:44), Max: 36.8 (12-18-20 @ 14:40)  HR: 81 (12-19-20 @ 08:44) (76 - 87)  BP: 170/76 (12-19-20 @ 08:44) (144/84 - 170/76)  RR: 20 (12-19-20 @ 08:44) (18 - 22)  SpO2: 92% (12-19-20 @ 08:44) (92% - 94%)  I&O's Summary    18 Dec 2020 07:01  -  19 Dec 2020 07:00  --------------------------------------------------------  IN: 1560 mL / OUT: 1510 mL / NET: 50 mL        PHYSICAL EXAM:  GENERAL: Alert, NAD.  NECK: Supple, No JVD, no carotid bruit.  CHEST/LUNG: cta anteriorly. Coughing during exam.  HEART: S1 S2 normal, RRR; No murmurs, rubs, or gallops  ABDOMEN: Soft, Nontender, Nondistended; Bowel sounds present  EXTREMITIES:  LUE bandage and drain in place. Minimal LE edema b/l  Walters in place      LABS:                        9.9    7.41  )-----------( 251      ( 18 Dec 2020 08:51 )             32.8     12-18    138  |  104  |  12  ----------------------------<  107<H>  4.1   |  22  |  1.00    Ca    8.3<L>      18 Dec 2020 08:51        CARDIAC MARKERS ( 15 Dec 2020 06:13 )  x     / x     / 241 U/L / x     / x      CARDIAC MARKERS ( 14 Dec 2020 10:17 )  x     / x     / 515 U/L / x     / x      CARDIAC MARKERS ( 13 Dec 2020 05:17 )  x     / x     / x     / x     / 7.5 ng/mL  CARDIAC MARKERS ( 13 Dec 2020 03:17 )  x     / x     / 2223 U/L / x     / x      CARDIAC MARKERS ( 12 Dec 2020 18:35 )  x     / x     / 2895 U/L / x     / 11.4 ng/mL              MEDICATIONS  (STANDING):  acetaminophen   Tablet .. 975 milliGRAM(s) Oral every 6 hours  atorvastatin 40 milliGRAM(s) Oral at bedtime  cefTRIAXone   IVPB      cefTRIAXone   IVPB 1000 milliGRAM(s) IV Intermittent every 24 hours  enoxaparin Injectable 80 milliGRAM(s) SubCutaneous two times a day  folic acid 1 milliGRAM(s) Oral daily  influenza   Vaccine 0.5 milliLiter(s) IntraMuscular once  loratadine 10 milliGRAM(s) Oral daily  melatonin 3 milliGRAM(s) Oral at bedtime  multivitamin 1 Tablet(s) Oral daily  polyethylene glycol 3350 17 Gram(s) Oral daily  QUEtiapine 25 milliGRAM(s) Oral two times a day  sodium chloride 0.9%. 1000 milliLiter(s) (100 mL/Hr) IV Continuous <Continuous>  thiamine IVPB 500 milliGRAM(s) IV Intermittent daily  traMADol 50 milliGRAM(s) Oral every 6 hours    MEDICATIONS  (PRN):  haloperidol    Injectable 2.5 milliGRAM(s) IV Push every 6 hours PRN Severe Agitation  LORazepam     Tablet 1 milliGRAM(s) Oral every 2 hours PRN CIWA-Ar score increase by 2 points and a total score of 7 or less  LORazepam   Injectable 1 milliGRAM(s) IV Push every 2 hours PRN CIWA-Ar score increase by 2 points and a total score of 7 or less  LORazepam   Injectable 1 milliGRAM(s) IntraMuscular every 2 hours PRN CIWA-Ar score increase by 2 points and a total score of 7 or less  oxyCODONE    IR 5 milliGRAM(s) Oral every 4 hours PRN Severe Pain (7 - 10)  oxyCODONE    IR 2.5 milliGRAM(s) Oral every 4 hours PRN Moderate Pain (4 - 6)  QUEtiapine 25 milliGRAM(s) Oral every 6 hours PRN Agitation  traMADol 50 milliGRAM(s) Oral every 6 hours PRN breakthrough pain      RADIOLOGY & ADDITIONAL TESTS:

## 2020-12-19 NOTE — PROGRESS NOTE ADULT - ASSESSMENT
70F pmhx HTN, obesity, COPD, anxiety, agitation, R TKR, recent L reverse total shoulder replacement (@ OSH), who was BIBEMS yesterday as a level 2 trauma after being found down, admitted to SICU with left humerus periprosthetic comminuted fracture, c/b rhabdo, left AMA to [facility] parking lot by wheelchair, now back to hospital for debility, found to be hypotensive to SBP 60-70.

## 2020-12-20 LAB
ANION GAP SERPL CALC-SCNC: 15 MMOL/L — SIGNIFICANT CHANGE UP (ref 5–17)
BUN SERPL-MCNC: 14 MG/DL — SIGNIFICANT CHANGE UP (ref 7–23)
CALCIUM SERPL-MCNC: 8.6 MG/DL — SIGNIFICANT CHANGE UP (ref 8.4–10.5)
CHLORIDE SERPL-SCNC: 104 MMOL/L — SIGNIFICANT CHANGE UP (ref 96–108)
CO2 SERPL-SCNC: 22 MMOL/L — SIGNIFICANT CHANGE UP (ref 22–31)
CREAT SERPL-MCNC: 0.75 MG/DL — SIGNIFICANT CHANGE UP (ref 0.5–1.3)
GLUCOSE SERPL-MCNC: 111 MG/DL — HIGH (ref 70–99)
HCT VFR BLD CALC: 35.7 % — SIGNIFICANT CHANGE UP (ref 34.5–45)
HGB BLD-MCNC: 11 G/DL — LOW (ref 11.5–15.5)
MCHC RBC-ENTMCNC: 28.5 PG — SIGNIFICANT CHANGE UP (ref 27–34)
MCHC RBC-ENTMCNC: 30.8 GM/DL — LOW (ref 32–36)
MCV RBC AUTO: 92.5 FL — SIGNIFICANT CHANGE UP (ref 80–100)
NRBC # BLD: 0 /100 WBCS — SIGNIFICANT CHANGE UP (ref 0–0)
PLATELET # BLD AUTO: 214 K/UL — SIGNIFICANT CHANGE UP (ref 150–400)
POTASSIUM SERPL-MCNC: 3.4 MMOL/L — LOW (ref 3.5–5.3)
POTASSIUM SERPL-SCNC: 3.4 MMOL/L — LOW (ref 3.5–5.3)
RBC # BLD: 3.86 M/UL — SIGNIFICANT CHANGE UP (ref 3.8–5.2)
RBC # FLD: 14.5 % — SIGNIFICANT CHANGE UP (ref 10.3–14.5)
SODIUM SERPL-SCNC: 141 MMOL/L — SIGNIFICANT CHANGE UP (ref 135–145)
WBC # BLD: 5.71 K/UL — SIGNIFICANT CHANGE UP (ref 3.8–10.5)
WBC # FLD AUTO: 5.71 K/UL — SIGNIFICANT CHANGE UP (ref 3.8–10.5)

## 2020-12-20 RX ORDER — SODIUM CHLORIDE 9 MG/ML
1000 INJECTION INTRAMUSCULAR; INTRAVENOUS; SUBCUTANEOUS
Refills: 0 | Status: DISCONTINUED | OUTPATIENT
Start: 2020-12-20 | End: 2020-12-23

## 2020-12-20 RX ORDER — SODIUM CHLORIDE 9 MG/ML
1000 INJECTION INTRAMUSCULAR; INTRAVENOUS; SUBCUTANEOUS ONCE
Refills: 0 | Status: COMPLETED | OUTPATIENT
Start: 2020-12-20 | End: 2020-12-20

## 2020-12-20 RX ORDER — POTASSIUM CHLORIDE 20 MEQ
40 PACKET (EA) ORAL ONCE
Refills: 0 | Status: COMPLETED | OUTPATIENT
Start: 2020-12-20 | End: 2020-12-20

## 2020-12-20 RX ORDER — ONDANSETRON 8 MG/1
4 TABLET, FILM COATED ORAL ONCE
Refills: 0 | Status: DISCONTINUED | OUTPATIENT
Start: 2020-12-20 | End: 2020-12-24

## 2020-12-20 RX ADMIN — SODIUM CHLORIDE 1000 MILLILITER(S): 9 INJECTION INTRAMUSCULAR; INTRAVENOUS; SUBCUTANEOUS at 08:50

## 2020-12-20 RX ADMIN — OXYCODONE HYDROCHLORIDE 5 MILLIGRAM(S): 5 TABLET ORAL at 06:29

## 2020-12-20 RX ADMIN — AMLODIPINE BESYLATE 5 MILLIGRAM(S): 2.5 TABLET ORAL at 06:29

## 2020-12-20 RX ADMIN — Medication 40 MILLIEQUIVALENT(S): at 11:21

## 2020-12-20 RX ADMIN — QUETIAPINE FUMARATE 25 MILLIGRAM(S): 200 TABLET, FILM COATED ORAL at 11:45

## 2020-12-20 RX ADMIN — SODIUM CHLORIDE 100 MILLILITER(S): 9 INJECTION INTRAMUSCULAR; INTRAVENOUS; SUBCUTANEOUS at 09:39

## 2020-12-20 RX ADMIN — OXYCODONE HYDROCHLORIDE 5 MILLIGRAM(S): 5 TABLET ORAL at 02:42

## 2020-12-20 RX ADMIN — Medication 975 MILLIGRAM(S): at 17:42

## 2020-12-20 RX ADMIN — QUETIAPINE FUMARATE 25 MILLIGRAM(S): 200 TABLET, FILM COATED ORAL at 06:29

## 2020-12-20 RX ADMIN — OXYCODONE HYDROCHLORIDE 5 MILLIGRAM(S): 5 TABLET ORAL at 03:10

## 2020-12-20 RX ADMIN — OXYCODONE HYDROCHLORIDE 5 MILLIGRAM(S): 5 TABLET ORAL at 16:30

## 2020-12-20 RX ADMIN — Medication 1 MILLIGRAM(S): at 11:21

## 2020-12-20 RX ADMIN — LORATADINE 10 MILLIGRAM(S): 10 TABLET ORAL at 11:21

## 2020-12-20 RX ADMIN — CEFTRIAXONE 100 MILLIGRAM(S): 500 INJECTION, POWDER, FOR SOLUTION INTRAMUSCULAR; INTRAVENOUS at 09:39

## 2020-12-20 RX ADMIN — Medication 975 MILLIGRAM(S): at 07:48

## 2020-12-20 RX ADMIN — OXYCODONE HYDROCHLORIDE 5 MILLIGRAM(S): 5 TABLET ORAL at 17:00

## 2020-12-20 RX ADMIN — OXYCODONE HYDROCHLORIDE 5 MILLIGRAM(S): 5 TABLET ORAL at 22:04

## 2020-12-20 RX ADMIN — Medication 975 MILLIGRAM(S): at 17:12

## 2020-12-20 RX ADMIN — ENOXAPARIN SODIUM 80 MILLIGRAM(S): 100 INJECTION SUBCUTANEOUS at 06:29

## 2020-12-20 RX ADMIN — OXYCODONE HYDROCHLORIDE 5 MILLIGRAM(S): 5 TABLET ORAL at 21:34

## 2020-12-20 RX ADMIN — Medication 975 MILLIGRAM(S): at 11:21

## 2020-12-20 RX ADMIN — ATORVASTATIN CALCIUM 40 MILLIGRAM(S): 80 TABLET, FILM COATED ORAL at 21:36

## 2020-12-20 RX ADMIN — QUETIAPINE FUMARATE 25 MILLIGRAM(S): 200 TABLET, FILM COATED ORAL at 17:12

## 2020-12-20 RX ADMIN — ENOXAPARIN SODIUM 80 MILLIGRAM(S): 100 INJECTION SUBCUTANEOUS at 17:12

## 2020-12-20 RX ADMIN — Medication 975 MILLIGRAM(S): at 06:29

## 2020-12-20 RX ADMIN — HALOPERIDOL DECANOATE 2.5 MILLIGRAM(S): 100 INJECTION INTRAMUSCULAR at 08:15

## 2020-12-20 RX ADMIN — OXYCODONE HYDROCHLORIDE 5 MILLIGRAM(S): 5 TABLET ORAL at 07:00

## 2020-12-20 RX ADMIN — Medication 1 TABLET(S): at 11:21

## 2020-12-20 RX ADMIN — Medication 975 MILLIGRAM(S): at 11:51

## 2020-12-20 RX ADMIN — POLYETHYLENE GLYCOL 3350 17 GRAM(S): 17 POWDER, FOR SOLUTION ORAL at 11:21

## 2020-12-20 RX ADMIN — Medication 3 MILLIGRAM(S): at 21:36

## 2020-12-20 NOTE — DIETITIAN INITIAL EVALUATION ADULT. - OTHER INFO
Pt with dosing weight of 182.9 pounds - unclear how weight obtained. Per previous RD notes/chart, wt hx: 194 pounds (10/21), 189.1 pounds (9/19), 196.2 pounds (9/10), 199.9 pounds (9/3). ?accuracy of weight history given large fluctuations in short time frame. Bedscale weight taken at visit of 215.4 lb. Will continue to monitor in house weights.    Pt denies any N/V, last BM per chart 12/17 - constipation, bowel regimen ordered. Pt agitated at visit, states she has "eaten nothing in four days." Per PCA and RN, pt with fair to good appetite, eating at least 50% of meals and drinking fluids. Pt states she requires full feeding assistance.

## 2020-12-20 NOTE — DIETITIAN INITIAL EVALUATION ADULT. - PERTINENT MEDS FT
MEDICATIONS  (STANDING):  folic acid 1 milliGRAM(s) Oral daily  multivitamin 1 Tablet(s) Oral daily  ondansetron Injectable 4 milliGRAM(s) IV Push once  polyethylene glycol 3350 17 Gram(s) Oral daily

## 2020-12-20 NOTE — DIETITIAN INITIAL EVALUATION ADULT. - CHIEF COMPLAINT
The patient is a 70F pmhx HTN, obesity, COPD, anxiety, agitation, R TKR, recent L reverse total shoulder replacement (@ OSH), who was BIBEMS yesterday as a level 2 trauma after being found down, admitted to SICU with left humerus periprosthetic comminuted fracture, c/b rhabdo, left AMA to [facility] parking lot by wheelchair, now back to hospital for debility, found to be hypotensive to SBP 60-70.

## 2020-12-20 NOTE — DIETITIAN INITIAL EVALUATION ADULT. - PROBLEM SELECTOR PLAN 2
- pt wants surgery for arm, but hasn't decided who she wants to perform it  - ortho following  - splint in place  - NPO for now. advance diet if no surgery and SBP normalizes

## 2020-12-20 NOTE — PROVIDER CONTACT NOTE (OTHER) - ASSESSMENT
Pt A/ox1, oriented to name, reorientation provided, pt eating dinner. IVL on floor x2. Pt in bed. pt talking to herself and making statements that do not make sense

## 2020-12-20 NOTE — PROGRESS NOTE ADULT - ASSESSMENT
A/p: 70y Female s/p L Humeral angela prosthetic Fx ORIF.  VSS. NAD.    PT/OT-NWB LUE  F/U Drain dispo as per Dr. Palma  IS  DVT PPx: Therapeutic Lovenox for acute  Bilateral Soleal vein DVTs. TO switch to Eliquis upon discharge as per cardiovascular recs.  Pain Control  Continue Current Tx as per primary Team  Dispo planning to sub acute rehab      Andrey Abernathy PA-C  Orthopedic Surgery Team  Team Pager: [phone]

## 2020-12-20 NOTE — DIETITIAN INITIAL EVALUATION ADULT. - PROBLEM SELECTOR PLAN 4
- pt has not urinated while in the hospital, and bladder contains 94 cc despite 3L IVF. concern for worsening renal failure.  - will hold off CTA chest for now, but may need VQ scan

## 2020-12-20 NOTE — DIETITIAN INITIAL EVALUATION ADULT. - PROBLEM SELECTOR PLAN 1
- called to pt for SBP to 60s in right arm both manually/automatic and in left forearm by automatic.   - unclear etiol  - currently getting 3L IVF NS bolus  - 2 PIV in right arm  - pt refusing all steroids including hydrocortisone  - pt refusing IV placement into left arm  - Hb stable  - left arm with some dependent ecchymoses, and no obvious bleeding from anywhere  - ED bedside ultrasound showed no pericardial effusion and did not mention RV strain  - will need CTA chest once blood pressure stable and renal function stabilizes  - will order 1 more L IVF  - SICU refused to evaluate patient. last SBP of 93 on left forearm. if worsening hypotension, will need MICU eval  - pt mentating grossly well. mildly confused but that may be her baseline affect/personality.

## 2020-12-20 NOTE — PROGRESS NOTE ADULT - ASSESSMENT
70F pmhx HTN, obesity, COPD, anxiety, agitation, R TKR, recent L reverse total shoulder replacement (@ OSH), who was BIBEMS yesterday as a level 2 trauma after being found down, admitted to SICU with left humerus periprosthetic comminuted fracture, c/b rhabdo, left AMA to [facility] parking lot by wheelchair, now back to hospital for debility, found to be hypotensive to SBP 60-70.

## 2020-12-20 NOTE — PROVIDER CONTACT NOTE (OTHER) - ASSESSMENT
VSS except BP elevated, BP being taken in R thigh- R arm readings are inaccurate with electronic and RN was not hearing anything on manual BP checks.

## 2020-12-20 NOTE — PROGRESS NOTE ADULT - SUBJECTIVE AND OBJECTIVE BOX
[facility] Cardiology Progress Note  _______________________________    Pt. seen and examined. Events noted. Had symptomatic hypotension this AM. Amlodipine held. Feeling better now.        T(C): 37 (12-20-20 @ 08:13), Max: 37.1 (12-19-20 @ 22:28)  HR: 76 (12-20-20 @ 10:35) (69 - 84)  BP: 101/69 (12-20-20 @ 10:35) (88/60 - 180/93)  RR: 23 (12-20-20 @ 08:13) (20 - 23)  SpO2: 96% (12-20-20 @ 08:13) (94% - 96%)  I&O's Summary    19 Dec 2020 07:01  -  20 Dec 2020 07:00  --------------------------------------------------------  IN: 1270 mL / OUT: 2570 mL / NET: -1300 mL        PHYSICAL EXAM:  GENERAL: Alert, NAD.  NECK: Supple, No JVD, no carotid bruit.  CHEST/LUNG: cta anteriorly.   HEART: S1 S2 normal, RRR; No murmurs, rubs, or gallops  ABDOMEN: Soft, Nontender, Nondistended; Bowel sounds present  EXTREMITIES:  LUE bandage and drain in place. Minimal LE edema b/l  Walters in place      LABS:                        11.0   5.71  )-----------( 214      ( 20 Dec 2020 10:07 )             35.7     12-20    141  |  104  |  14  ----------------------------<  111<H>  3.4<L>   |  22  |  0.75    Ca    8.6      20 Dec 2020 10:07        CARDIAC MARKERS ( 15 Dec 2020 06:13 )  x     / x     / 241 U/L / x     / x      CARDIAC MARKERS ( 14 Dec 2020 10:17 )  x     / x     / 515 U/L / x     / x                  MEDICATIONS  (STANDING):  acetaminophen   Tablet .. 975 milliGRAM(s) Oral every 6 hours  atorvastatin 40 milliGRAM(s) Oral at bedtime  cefTRIAXone   IVPB      cefTRIAXone   IVPB 1000 milliGRAM(s) IV Intermittent every 24 hours  enoxaparin Injectable 80 milliGRAM(s) SubCutaneous two times a day  folic acid 1 milliGRAM(s) Oral daily  influenza   Vaccine 0.5 milliLiter(s) IntraMuscular once  loratadine 10 milliGRAM(s) Oral daily  melatonin 3 milliGRAM(s) Oral at bedtime  multivitamin 1 Tablet(s) Oral daily  ondansetron Injectable 4 milliGRAM(s) IV Push once  polyethylene glycol 3350 17 Gram(s) Oral daily  potassium chloride    Tablet ER 40 milliEquivalent(s) Oral once  QUEtiapine 25 milliGRAM(s) Oral two times a day  sodium chloride 0.9%. 1000 milliLiter(s) (100 mL/Hr) IV Continuous <Continuous>    MEDICATIONS  (PRN):  haloperidol    Injectable 2.5 milliGRAM(s) IV Push every 6 hours PRN Severe Agitation  LORazepam     Tablet 1 milliGRAM(s) Oral every 2 hours PRN CIWA-Ar score increase by 2 points and a total score of 7 or less  LORazepam   Injectable 1 milliGRAM(s) IV Push every 2 hours PRN CIWA-Ar score increase by 2 points and a total score of 7 or less  LORazepam   Injectable 1 milliGRAM(s) IntraMuscular every 2 hours PRN CIWA-Ar score increase by 2 points and a total score of 7 or less  oxyCODONE    IR 5 milliGRAM(s) Oral every 4 hours PRN Severe Pain (7 - 10)  oxyCODONE    IR 2.5 milliGRAM(s) Oral every 4 hours PRN Moderate Pain (4 - 6)  QUEtiapine 25 milliGRAM(s) Oral every 6 hours PRN Agitation  traMADol 50 milliGRAM(s) Oral every 6 hours PRN breakthrough pain      RADIOLOGY & ADDITIONAL TESTS:

## 2020-12-20 NOTE — CHART NOTE - NSCHARTNOTEFT_GEN_A_CORE
Patient visited for drain pull. Sutures d/c'd.  Hemostasis achieved, patient tolerated well, tip intact. 4x4 Dsg/tegaderm placed over drain site incision.  Replaced aquacel dressing x 2 over incision with aquacel reinforcement at dressing junction.    Andrey Abernathy PA-C  Orthopedic Surgery Team  Team Pager: #[phone]

## 2020-12-20 NOTE — CHART NOTE - NSCHARTNOTEFT_GEN_A_CORE
RN notified PA of patient experiencing agitation and diaphoresis c/o dizziness this AM.   Pt seen and assessed at bedside. Pt sitting at edge of bed. Appears diaphoretic and pale.   Pt attempted to get OOB without assistance and required help resulting in agitation and dizziness.   Reports dizziness and nausea, improved once placed back in bed.   L ORIF surgical site noted to be bleeding.   Ortho reinforced dressing at bedside.       Vital Signs Last 24 Hrs  T(C): 37 (20 Dec 2020 08:13), Max: 37.1 (19 Dec 2020 22:28)  T(F): 98.6 (20 Dec 2020 08:13), Max: 98.7 (19 Dec 2020 22:28)  HR: 74 (20 Dec 2020 09:05) (69 - 84)  BP: 91/65 (20 Dec 2020 09:12) (88/60 - 180/93)  BP(mean): --  RR: 23 (20 Dec 2020 08:13) (20 - 23)  SpO2: 96% (20 Dec 2020 08:13) (94% - 96%)                          11.0   5.71  )-----------( 214      ( 20 Dec 2020 10:07 )             35.7     12-19    138  |  103  |  17  ----------------------------<  90  4.2   |  22  |  0.78    Ca    8.7      19 Dec 2020 11:31      A/P: 69 y/o F with PMHx of HTN, Obesity, COPD, Anxiety, s/p R TKR s/p Total shoulder replacement presents s/p fall with LEFT comminuted humeral periprosthetic fracture s/p ORIF (12/17).     #Agitation   - s/p Haldol 2.5mg x1 from PRN q6hrs for severe agitiation.     #Dizziness/Hypotension   - BP trended from 142/99 to 91/65 at bedside   - Likely 2/2 orthostatic hypotension.   - f/u Orthostatics when able.   - s/p 1L NS bolus x1   - Restart maintenance fluids 0.9% Normal saline @ 100/hr x 10 hours.   - Will d/c Amlodipine as HTN appeared to be from thigh BP ?inaccurate.   - Continue to monitor BP     #L ORIF   - Awaiting d/c drain per ortho   - f/u ortho recs. Appreciate recs.   - CBC/CMP - H/H stable.     Discussed with attending Dr. Yao.     LEONARD MijaresC   Dept of Medicine   #[id number]

## 2020-12-20 NOTE — PROGRESS NOTE ADULT - SUBJECTIVE AND OBJECTIVE BOX
Post op Day [3]    Patient seen bedside this AM and reports having moderate pain overnight in her L Arm.  No chest pain, SOB, N/V.    T(C): 36.7 (12-20-20 @ 04:40), Max: 37.1 (12-19-20 @ 22:28)  HR: 70 (12-20-20 @ 04:40) (69 - 81)  BP: 152/82 (12-20-20 @ 04:40) (152/82 - 180/93)  RR: 20 (12-20-20 @ 04:40) (20 - 20)  SpO2: 94% (12-20-20 @ 04:40) (92% - 95%)  Wt(kg): --    Exam:  Alert and Oriented, No Acute Distress    Upper Extremities: L Humerus  Wrst drop noted, applied wrist splint  Dressing: C/D/I w/ Aquacel  Forearm soft and non tender with no signs of compartment syndrome  Wiggles fingers, normal  strength, wrist drop (radial/PIN palsy baseline)  SILT  +Radial Pulse                              9.9    7.41  )-----------( 251      ( 18 Dec 2020 08:51 )             32.8    12-19    138  |  103  |  17  ----------------------------<  90  4.2   |  22  |  0.78    Ca    8.7      19 Dec 2020 11:31

## 2020-12-20 NOTE — PROVIDER CONTACT NOTE (OTHER) - BACKGROUND
ADD: pts bp now stable, pt denies dizziness. Pt now on maintenance fluids 100cc/hr.  s/p L. humerus ORIF 12/17.

## 2020-12-20 NOTE — DIETITIAN INITIAL EVALUATION ADULT. - ORAL INTAKE PTA/DIET HISTORY
Pt A&Ox1, agitated at visit, unable to obtain much diet history. No reported poor appetite or PO intake per H&P, pt stated "I was eating" when she fell. Per behavioral health notes pt's son reports daily EtOH use PTA. NKFA per chart. No documented micronutrient supplementation PTA. No documented chewing/swallowing difficulty.

## 2020-12-20 NOTE — DIETITIAN INITIAL EVALUATION ADULT. - PERTINENT LABORATORY DATA
12-20 Glu 111 mg/dL<H> K+ 3.4 mmol/L<L>     CAPILLARY BLOOD GLUCOSE  POCT Blood Glucose.: 120 mg/dL (20 Dec 2020 08:44)

## 2020-12-20 NOTE — PROGRESS NOTE ADULT - SUBJECTIVE AND OBJECTIVE BOX
Patient is a 70y old  Female who presents with a chief complaint of left humerus fracture (20 Dec 2020 14:46)      INTERVAL HPI/OVERNIGHT EVENTS: noted  pt seen and examined  was dizzi and nauseous earlier today  +orthostasis      Vital Signs Last 24 Hrs  T(C): 36.9 (20 Dec 2020 16:19), Max: 37.1 (19 Dec 2020 22:28)  T(F): 98.4 (20 Dec 2020 16:19), Max: 98.7 (19 Dec 2020 22:28)  HR: 83 (20 Dec 2020 16:19) (69 - 84)  BP: 178/78 (20 Dec 2020 16:19) (88/60 - 180/93)  BP(mean): --  RR: 23 (20 Dec 2020 16:19) (20 - 23)  SpO2: 95% (20 Dec 2020 16:19) (94% - 96%)    acetaminophen   Tablet .. 975 milliGRAM(s) Oral every 6 hours  atorvastatin 40 milliGRAM(s) Oral at bedtime  cefTRIAXone   IVPB      cefTRIAXone   IVPB 1000 milliGRAM(s) IV Intermittent every 24 hours  enoxaparin Injectable 80 milliGRAM(s) SubCutaneous two times a day  folic acid 1 milliGRAM(s) Oral daily  haloperidol    Injectable 2.5 milliGRAM(s) IV Push every 6 hours PRN  influenza   Vaccine 0.5 milliLiter(s) IntraMuscular once  loratadine 10 milliGRAM(s) Oral daily  LORazepam     Tablet 1 milliGRAM(s) Oral every 2 hours PRN  LORazepam   Injectable 1 milliGRAM(s) IV Push every 2 hours PRN  LORazepam   Injectable 1 milliGRAM(s) IntraMuscular every 2 hours PRN  melatonin 3 milliGRAM(s) Oral at bedtime  multivitamin 1 Tablet(s) Oral daily  ondansetron Injectable 4 milliGRAM(s) IV Push once  oxyCODONE    IR 5 milliGRAM(s) Oral every 4 hours PRN  oxyCODONE    IR 2.5 milliGRAM(s) Oral every 4 hours PRN  polyethylene glycol 3350 17 Gram(s) Oral daily  QUEtiapine 25 milliGRAM(s) Oral two times a day  QUEtiapine 25 milliGRAM(s) Oral every 6 hours PRN  sodium chloride 0.9%. 1000 milliLiter(s) IV Continuous <Continuous>  traMADol 50 milliGRAM(s) Oral every 6 hours PRN      PHYSICAL EXAM:  GENERAL: NAD,   EYES: conjunctiva and sclera clear  ENMT: Moist mucous membranes  NECK: Supple, No JVD, Normal thyroid  CHEST/LUNG: non labored, cta b/l  HEART: Regular rate and rhythm; No murmurs, rubs, or gallops  ABDOMEN: Soft, Nontender, Nondistended; Bowel sounds present  EXTREMITIES:  2+ Peripheral Pulses, No clubbing, cyanosis, or edema  LYMPH: No lymphadenopathy noted  SKIN: No rashes or lesions    Consultant(s) Notes Reviewed:  [x ] YES  [ ] NO  Care Discussed with Consultants/Other Providers [ x] YES  [ ] NO    LABS:                        11.0   5.71  )-----------( 214      ( 20 Dec 2020 10:07 )             35.7     12-20    141  |  104  |  14  ----------------------------<  111<H>  3.4<L>   |  22  |  0.75    Ca    8.6      20 Dec 2020 10:07  Mg     1.9     12-20          CAPILLARY BLOOD GLUCOSE      POCT Blood Glucose.: 120 mg/dL (20 Dec 2020 08:44)              RADIOLOGY & ADDITIONAL TESTS:    Imaging Personally Reviewed:  [x ] YES  [ ] NO

## 2020-12-20 NOTE — DIETITIAN INITIAL EVALUATION ADULT. - PROBLEM SELECTOR PLAN 5
- tele  - trend CE  - TTE  - PT eval  - unclear why pt is/was on eliquis, need to clarify this medication

## 2020-12-20 NOTE — DIETITIAN INITIAL EVALUATION ADULT. - ADD RECOMMEND
Continue multivitamin and folic acid supplementation. Obtain updated scaled weight. Provide encouragement with PO intake, menu selections, and full assistance with meals as needed. Continue to monitor PO intake, labs, weights, BM, skin, clinical course.

## 2020-12-21 LAB
ANION GAP SERPL CALC-SCNC: 13 MMOL/L — SIGNIFICANT CHANGE UP (ref 5–17)
BUN SERPL-MCNC: 14 MG/DL — SIGNIFICANT CHANGE UP (ref 7–23)
CALCIUM SERPL-MCNC: 9.1 MG/DL — SIGNIFICANT CHANGE UP (ref 8.4–10.5)
CHLORIDE SERPL-SCNC: 102 MMOL/L — SIGNIFICANT CHANGE UP (ref 96–108)
CO2 SERPL-SCNC: 26 MMOL/L — SIGNIFICANT CHANGE UP (ref 22–31)
CREAT SERPL-MCNC: 0.79 MG/DL — SIGNIFICANT CHANGE UP (ref 0.5–1.3)
GLUCOSE SERPL-MCNC: 109 MG/DL — HIGH (ref 70–99)
HCT VFR BLD CALC: 35.4 % — SIGNIFICANT CHANGE UP (ref 34.5–45)
HGB BLD-MCNC: 11 G/DL — LOW (ref 11.5–15.5)
MAGNESIUM SERPL-MCNC: 2 MG/DL — SIGNIFICANT CHANGE UP (ref 1.6–2.6)
MCHC RBC-ENTMCNC: 28.2 PG — SIGNIFICANT CHANGE UP (ref 27–34)
MCHC RBC-ENTMCNC: 31.1 GM/DL — LOW (ref 32–36)
MCV RBC AUTO: 90.8 FL — SIGNIFICANT CHANGE UP (ref 80–100)
NRBC # BLD: 0 /100 WBCS — SIGNIFICANT CHANGE UP (ref 0–0)
PCP SPEC-MCNC: SIGNIFICANT CHANGE UP
PHOSPHATE SERPL-MCNC: 3.9 MG/DL — SIGNIFICANT CHANGE UP (ref 2.5–4.5)
PLATELET # BLD AUTO: 322 K/UL — SIGNIFICANT CHANGE UP (ref 150–400)
POTASSIUM SERPL-MCNC: 3.4 MMOL/L — LOW (ref 3.5–5.3)
POTASSIUM SERPL-SCNC: 3.4 MMOL/L — LOW (ref 3.5–5.3)
RBC # BLD: 3.9 M/UL — SIGNIFICANT CHANGE UP (ref 3.8–5.2)
RBC # FLD: 14.6 % — HIGH (ref 10.3–14.5)
SODIUM SERPL-SCNC: 141 MMOL/L — SIGNIFICANT CHANGE UP (ref 135–145)
WBC # BLD: 7.62 K/UL — SIGNIFICANT CHANGE UP (ref 3.8–10.5)
WBC # FLD AUTO: 7.62 K/UL — SIGNIFICANT CHANGE UP (ref 3.8–10.5)

## 2020-12-21 PROCEDURE — 93010 ELECTROCARDIOGRAM REPORT: CPT

## 2020-12-21 PROCEDURE — 99232 SBSQ HOSP IP/OBS MODERATE 35: CPT

## 2020-12-21 RX ORDER — POTASSIUM CHLORIDE 20 MEQ
40 PACKET (EA) ORAL ONCE
Refills: 0 | Status: COMPLETED | OUTPATIENT
Start: 2020-12-21 | End: 2020-12-21

## 2020-12-21 RX ORDER — ALPRAZOLAM 0.25 MG
0.5 TABLET ORAL
Refills: 0 | Status: DISCONTINUED | OUTPATIENT
Start: 2020-12-21 | End: 2020-12-24

## 2020-12-21 RX ADMIN — QUETIAPINE FUMARATE 25 MILLIGRAM(S): 200 TABLET, FILM COATED ORAL at 17:04

## 2020-12-21 RX ADMIN — Medication 0.5 MILLIGRAM(S): at 21:18

## 2020-12-21 RX ADMIN — OXYCODONE HYDROCHLORIDE 5 MILLIGRAM(S): 5 TABLET ORAL at 16:30

## 2020-12-21 RX ADMIN — Medication 1 MILLIGRAM(S): at 11:30

## 2020-12-21 RX ADMIN — Medication 975 MILLIGRAM(S): at 11:30

## 2020-12-21 RX ADMIN — QUETIAPINE FUMARATE 25 MILLIGRAM(S): 200 TABLET, FILM COATED ORAL at 01:44

## 2020-12-21 RX ADMIN — Medication 1 MILLIGRAM(S): at 05:14

## 2020-12-21 RX ADMIN — Medication 975 MILLIGRAM(S): at 05:17

## 2020-12-21 RX ADMIN — Medication 975 MILLIGRAM(S): at 22:37

## 2020-12-21 RX ADMIN — CEFTRIAXONE 100 MILLIGRAM(S): 500 INJECTION, POWDER, FOR SOLUTION INTRAMUSCULAR; INTRAVENOUS at 11:30

## 2020-12-21 RX ADMIN — Medication 1 TABLET(S): at 11:31

## 2020-12-21 RX ADMIN — LORATADINE 10 MILLIGRAM(S): 10 TABLET ORAL at 11:30

## 2020-12-21 RX ADMIN — Medication 975 MILLIGRAM(S): at 05:47

## 2020-12-21 RX ADMIN — Medication 3 MILLIGRAM(S): at 21:19

## 2020-12-21 RX ADMIN — ATORVASTATIN CALCIUM 40 MILLIGRAM(S): 80 TABLET, FILM COATED ORAL at 21:19

## 2020-12-21 RX ADMIN — Medication 975 MILLIGRAM(S): at 00:54

## 2020-12-21 RX ADMIN — Medication 975 MILLIGRAM(S): at 17:03

## 2020-12-21 RX ADMIN — ENOXAPARIN SODIUM 80 MILLIGRAM(S): 100 INJECTION SUBCUTANEOUS at 05:17

## 2020-12-21 RX ADMIN — ENOXAPARIN SODIUM 80 MILLIGRAM(S): 100 INJECTION SUBCUTANEOUS at 17:04

## 2020-12-21 RX ADMIN — QUETIAPINE FUMARATE 25 MILLIGRAM(S): 200 TABLET, FILM COATED ORAL at 05:17

## 2020-12-21 RX ADMIN — Medication 975 MILLIGRAM(S): at 00:24

## 2020-12-21 RX ADMIN — TRAMADOL HYDROCHLORIDE 50 MILLIGRAM(S): 50 TABLET ORAL at 18:08

## 2020-12-21 RX ADMIN — Medication 40 MILLIEQUIVALENT(S): at 21:18

## 2020-12-21 RX ADMIN — OXYCODONE HYDROCHLORIDE 5 MILLIGRAM(S): 5 TABLET ORAL at 15:56

## 2020-12-21 RX ADMIN — Medication 975 MILLIGRAM(S): at 22:07

## 2020-12-21 RX ADMIN — Medication 975 MILLIGRAM(S): at 17:33

## 2020-12-21 RX ADMIN — Medication 975 MILLIGRAM(S): at 12:00

## 2020-12-21 NOTE — PROGRESS NOTE BEHAVIORAL HEALTH - NSBHFUPINTERVALHXFT_PSY_A_CORE
Patient seen and evaluated, awake and alert oriented to person, place ([facility]) and date.  States feeling well today, smiling, appropriate with conversation talking about upcoming snow storm.  Reports is having shoulder revision tomorrow, states some anxiety related to surgery.  Denies feeling depressed, denies panic attacks.  Denies SI/HI, AVH, or thoughts of  paranoia, reports sleeping and eating well.
Psych f/u requested for agitation over the weekend.  Pt seen, currently calm and pleasant, on the phone with her friend.  Oriented to person, place, off on date.  States she is aware she has been confused and agitated, states she is easily frustrated when she doesn't get what she wants.  Denies SIIP/HIIP, AVH, or paranoia.  Wants a cell phone .  Per nursing staff, pt was very agitated over the weekend, yelling out repeatedly, AOx0-1 at that time.  Takes Xanax as outpatient.

## 2020-12-21 NOTE — PROGRESS NOTE ADULT - SUBJECTIVE AND OBJECTIVE BOX
Patient is a 70y old  Female who presents with a chief complaint of left humerus fracture (21 Dec 2020 06:27)      INTERVAL HPI/OVERNIGHT EVENTS: noted  pt seen and examined  overnight events noted  was agitated and confused, pulled iv   ativan  was given  this am- confused    Vital Signs Last 24 Hrs  T(C): 36.8 (21 Dec 2020 09:28), Max: 36.9 (20 Dec 2020 16:19)  T(F): 98.2 (21 Dec 2020 09:28), Max: 98.4 (20 Dec 2020 16:19)  HR: 82 (21 Dec 2020 09:28) (72 - 90)  BP: 104/71 (21 Dec 2020 09:28) (101/69 - 178/78)  BP(mean): --  RR: 20 (21 Dec 2020 09:28) (20 - 23)  SpO2: 92% (21 Dec 2020 09:28) (90% - 95%)    acetaminophen   Tablet .. 975 milliGRAM(s) Oral every 6 hours  atorvastatin 40 milliGRAM(s) Oral at bedtime  cefTRIAXone   IVPB      cefTRIAXone   IVPB 1000 milliGRAM(s) IV Intermittent every 24 hours  enoxaparin Injectable 80 milliGRAM(s) SubCutaneous two times a day  folic acid 1 milliGRAM(s) Oral daily  haloperidol    Injectable 2.5 milliGRAM(s) IV Push every 6 hours PRN  influenza   Vaccine 0.5 milliLiter(s) IntraMuscular once  loratadine 10 milliGRAM(s) Oral daily  melatonin 3 milliGRAM(s) Oral at bedtime  multivitamin 1 Tablet(s) Oral daily  ondansetron Injectable 4 milliGRAM(s) IV Push once  oxyCODONE    IR 5 milliGRAM(s) Oral every 4 hours PRN  oxyCODONE    IR 2.5 milliGRAM(s) Oral every 4 hours PRN  polyethylene glycol 3350 17 Gram(s) Oral daily  QUEtiapine 25 milliGRAM(s) Oral two times a day  QUEtiapine 25 milliGRAM(s) Oral every 6 hours PRN  sodium chloride 0.9%. 1000 milliLiter(s) IV Continuous <Continuous>  traMADol 50 milliGRAM(s) Oral every 6 hours PRN      PHYSICAL EXAM:  GENERAL: NAD,   EYES: conjunctiva and sclera clear  ENMT: Moist mucous membranes  NECK: Supple, No JVD, Normal thyroid  CHEST/LUNG: non labored, cta b/l  HEART: Regular rate and rhythm; No murmurs, rubs, or gallops  ABDOMEN: Soft, Nontender, Nondistended; Bowel sounds present  EXTREMITIES:  2+ Peripheral Pulses, No clubbing, cyanosis, or edema  lt shoulder sling  LYMPH: No lymphadenopathy noted  SKIN: No rashes or lesions    Consultant(s) Notes Reviewed:  [x ] YES  [ ] NO  Care Discussed with Consultants/Other Providers [ x] YES  [ ] NO    LABS:                        11.0   7.62  )-----------( 322      ( 21 Dec 2020 08:50 )             35.4     12-21    141  |  102  |  14  ----------------------------<  109<H>  3.4<L>   |  26  |  0.79    Ca    9.1      21 Dec 2020 08:50  Phos  3.9     12-21  Mg     2.0     12-21          CAPILLARY BLOOD GLUCOSE                  RADIOLOGY & ADDITIONAL TESTS:    Imaging Personally Reviewed:  [x ] YES  [ ] NO

## 2020-12-21 NOTE — PROGRESS NOTE BEHAVIORAL HEALTH - NSBHCONSULTMEDS_PSY_A_CORE FT
1. D/c Seroquel and Haldol in setting of QTc prolongation, repeat EKG    2. c/w melatonin 3mg po qhs    3. Restart Xanax 0.5mg PO four times a day
c/w Seroquel 25mg po BID (monitor qtc <500ms on ekg)  c/w melatonin 3mg po qhs

## 2020-12-21 NOTE — PROVIDER CONTACT NOTE (OTHER) - ASSESSMENT
Pt remains confused, screaming out frequently, pt received seroquel and Haldol during day time which had no change in patients status. requesting xanax multiple times. Attempted x4 on placing new IV access without success.

## 2020-12-21 NOTE — PROVIDER CONTACT NOTE (OTHER) - ACTION/TREATMENT ORDERED:
Justina on floor to attempt and assess patient, pt refusing to communicate w her, only communicating with RN. Ativan 1mg IM to be given as pt still has no IV access.

## 2020-12-21 NOTE — CHART NOTE - NSCHARTNOTEFT_GEN_A_CORE
Called by RN this AM to relate pt becoming agitated and delerious, and repeatedly attempting to come out of bed.  Pt seen at bedside, she was calm, but evasive, and not interested in speaking with provider  She did identify herself, after initially refusing stating that her information  was private, and repeated her birthday correctly, and that she was born in [place].  Pt refused to allow me to examine her, and ended the conversation abruptly, stating, "get out".     Vital Signs Last 24 Hrs  T(C): 36.7 (21 Dec 2020 04:46), Max: 37 (20 Dec 2020 08:13)  T(F): 98 (21 Dec 2020 04:46), Max: 98.6 (20 Dec 2020 08:13)  HR: 90 (21 Dec 2020 04:46) (72 - 90)  BP: 110/81 (21 Dec 2020 04:46) (88/60 - 178/78)  BP(mean): --  RR: 20 (21 Dec 2020 04:46) (20 - 23)  SpO2: 90% (21 Dec 2020 04:46) (90% - 96%)    HPI:  70F pmhx HTN, obesity, COPD, anxiety, agitation, R TKR, recent L reverse total shoulder replacement (@ OSH), who was BIBEMS yesterday as a level 2 trauma after being found down, admitted to SICU with left humerus periprosthetic comminuted fracture, c/b rhabdo, left AMA to [facility] parking lot by wheelchair, now back to hospital for debility, found to be hypotensive to SBP 60-70.    Pt was seen by Behavioural Health for delirium due to Alcohol abuse.  Recommendation made to use Haldol and Seroquel PRN, as long as QTc < 500 ms, and symptoms triggered CIWA. Pt has not required CIWA, and subsequently discontinued.   This AM, in light of pt. agitation & delirious behaviour, will give Ativan 1 mg IM X 1 this AM.  Pt. without IV access at this time as she ripped it out.    PLAN:  >Continue to monitor  >Maintain bed alarms for pt. safety  >Recall Behavioural Health to see pt. in light of agitated behaviour

## 2020-12-21 NOTE — PROGRESS NOTE BEHAVIORAL HEALTH - AXIS III
HTN, obesity, COPD, R TKR, recent L reverse total shoulder replacement (@ OSH)
HTN, obesity, COPD, R TKR, recent L reverse total shoulder replacement (@ OSH)

## 2020-12-21 NOTE — PROGRESS NOTE BEHAVIORAL HEALTH - SUMMARY
69y/o  female, with 2 adult kids, lives alone with her gretel in a private residence in [place], with no PPHx, no h/o SA/SIB, no h/o substance abuse, no h/o inpt psychiatric hospitalizations, with PMHx HTN, obesity, COPD, R TKR, recent L reverse total shoulder replacement (@ OSH), who was BIBEMS yesterday as a level 2 trauma after being found down, admitted to SICU with left humerus periprosthetic comminuted fracture, c/b rhabdo and left radial nerve palsy, left AMA to [facility] parking lot by wheelchair, where she decided she wanted to be admitted again to hospital for fracture repair, hospital course complicated by hypotension which has now since resolved.  Psychiatry consulted to assess for delirium, agitation, management of medications.  Pt with agitation over the weekend, on Seroquel, QTc now prolonged, also takes Xanax at home - not continued in hospital.

## 2020-12-21 NOTE — PROGRESS NOTE BEHAVIORAL HEALTH - NSBHCHARTREVIEWIMAGING_PSY_A_CORE FT
< from: CT Head No Cont (12.12.20 @ 19:27) >      EXAM:  CT CERVICAL SPINE                          EXAM:  CT BRAIN                            PROCEDURE DATE:  12/12/2020            INTERPRETATION:  Clinical indications: Trauma.    Multiple axial sections were performed from base of skull to vertex without contrast enhancement. Coronal and sagittal reconstructions were performed as well    This exam is compared with prior noncontrast head CT performed on May 9, 2018.    Parenchymal volume loss and chronic microvascular ischemic changes are again seen and unchanged    There is no evidence acute hemorrhage mass or mass effect seen.    Evaluation of the osseous structures with the appropriate window appears unremarkable.    The visualized paranasal sinuses mastoid and mastoids appear clear.    IMPRESSION: No acute hemorrhage, mass or mass effect.    Multiple axial sections were performed through the cervical spine. Coronal and sagittal reconstructions were performed as well    Loss of the normal cervical lordosis is seen. This could be due to positioning or muscle spasm.    C7 is slightly anteriorly displaced relative to T1. This finding is likely the result of chronic degenerative changes.    There are no acute fractures or dislocations identified.    Bilateral hypertrophic facet changes are seen at multiple levels which are secondary to chronic degenerative changes.    Evaluation of the paraspinal soft tissues is limited by lack of IV contrast though grossly normal    IMPRESSION: No acute fractures or dislocations seen.        < end of copied text >
< from: CT Head No Cont (12.12.20 @ 19:27) >      EXAM:  CT CERVICAL SPINE                          EXAM:  CT BRAIN                            PROCEDURE DATE:  12/12/2020            INTERPRETATION:  Clinical indications: Trauma.    Multiple axial sections were performed from base of skull to vertex without contrast enhancement. Coronal and sagittal reconstructions were performed as well    This exam is compared with prior noncontrast head CT performed on May 9, 2018.    Parenchymal volume loss and chronic microvascular ischemic changes are again seen and unchanged    There is no evidence acute hemorrhage mass or mass effect seen.    Evaluation of the osseous structures with the appropriate window appears unremarkable.    The visualized paranasal sinuses mastoid and mastoids appear clear.    IMPRESSION: No acute hemorrhage, mass or mass effect.    Multiple axial sections were performed through the cervical spine. Coronal and sagittal reconstructions were performed as well    Loss of the normal cervical lordosis is seen. This could be due to positioning or muscle spasm.    C7 is slightly anteriorly displaced relative to T1. This finding is likely the result of chronic degenerative changes.    There are no acute fractures or dislocations identified.    Bilateral hypertrophic facet changes are seen at multiple levels which are secondary to chronic degenerative changes.    Evaluation of the paraspinal soft tissues is limited by lack of IV contrast though grossly normal    IMPRESSION: No acute fractures or dislocations seen.        < end of copied text >

## 2020-12-21 NOTE — PROGRESS NOTE BEHAVIORAL HEALTH - RISK ASSESSMENT
Risk factors: active substance abuse, chronic pain, noncompliant with treatment, not receiving treatment, h/o delirious episodes    Protective factors: no current SIIP/HIIP, no h/o SA/SIB, no h/o psych admissions, no access to weapons, with adult children/grandchildren, domiciled, social supports, positive therapeutic relationship    Overall, pt is a low risk of harm to self/others and does not require psychiatric admission for safety and stabilization.
Risk factors: active substance abuse, chronic pain, noncompliant with treatment, not receiving treatment, h/o delirious episodes    Protective factors: no current SIIP/HIIP, no h/o SA/SIB, no h/o psych admissions, no access to weapons, with adult children/grandchildren, domiciled, social supports, positive therapeutic relationship    Overall, pt is a low risk of harm to self/others and does not require psychiatric admission for safety and stabilization.

## 2020-12-21 NOTE — PROVIDER CONTACT NOTE (OTHER) - ACTION/TREATMENT ORDERED:
Justina aware of situation, last EKG performed 12/19. Stated to perform EKG now and will order haldol IM. Justina aware of situation, last EKG performed 12/14. Stated to perform EKG now and will order haldol IM.

## 2020-12-21 NOTE — PROVIDER CONTACT NOTE (OTHER) - ASSESSMENT
Xanax has not been ordered by medicine team despite multiple attempts at Rn's recommending, related top possible withdrawal. DARIUSZ dc'd 12/18. Pt continues to attempt to climb OOB, bed alarm frequently alarming, pt thinks she is in other [place], talking to animals, completely disoriented. sweating.

## 2020-12-21 NOTE — PROGRESS NOTE BEHAVIORAL HEALTH - NSBHCHARTREVIEWLAB_PSY_A_CORE FT
10.0   6.47  )-----------( 223      ( 15 Dec 2020 06:13 )             33.4     12-15    140  |  106  |  10  ----------------------------<  118<H>  4.1   |  23  |  0.95    Ca    8.7      15 Dec 2020 06:13  Phos  4.3     12-15  Mg     1.8     12-15    TPro  6.2  /  Alb  3.4  /  TBili  0.4  /  DBili  x   /  AST  18  /  ALT  18  /  AlkPhos  75  12-15
11.0   7.62  )-----------( 322      ( 21 Dec 2020 08:50 )             35.4     12-21    141  |  102  |  14  ----------------------------<  109<H>  3.4<L>   |  26  |  0.79    Ca    9.1      21 Dec 2020 08:50  Phos  3.9     12-21  Mg     2.0     12-21

## 2020-12-21 NOTE — PROVIDER CONTACT NOTE (OTHER) - ACTION/TREATMENT ORDERED:
Justina aware of situation, stated to give pt melatonin and she will sleep, explained this is very small dose (3mg) and she has not slept at all after receiving this, give seroquel dose prn

## 2020-12-21 NOTE — PROVIDER CONTACT NOTE (OTHER) - ASSESSMENT
Pt has been restless, confused, actively hallucinating, screaming. Pt repeatedly trying to get OOB, stating she needs to go to [place]. Pt talking to dogs and rats in room. Pt completely illogical despite reorientation. Visibly sweating. Seroquel given at 0144 with no change noted. Pulled out 2 Iv access earlier and attempted multiple times to place new IV but no IV access yet. Pt asking "do you have any xanax on you? I usually take it every day."

## 2020-12-21 NOTE — PROGRESS NOTE ADULT - ASSESSMENT
70F admitted with presumed syncope and tramatic L humeral fracture  LE dopplers on admission with bilateral soleal DVT and postphlebitic changes in bilateral prox veins  Hypertension  Obesity  No history of COPD    REC    Continue full dose AC  Favor full dose AC on discharge: agree with Eliquis per cardiology  Follow up dopplers at 3 months with risk-benefit assessment of need for prolonged AC

## 2020-12-21 NOTE — PROGRESS NOTE BEHAVIORAL HEALTH - NSBHCHARTREVIEWINVESTIGATE_PSY_A_CORE FT
< from: 12 Lead ECG (12.14.20 @ 15:45) >      Ventricular Rate 93 BPM    Atrial Rate 93 BPM    P-R Interval 138 ms    QRS Duration 78 ms    Q-T Interval 310 ms    QTC Calculation(Bazett) 385 ms    P Axis 62 degrees    R Axis 34 degrees    T Axis 134 degrees    Diagnosis Line SINUS RHYTHM WITH OCCASIONAL PREMATURE VENTRICULAR COMPLEXES AND FUSION COMPLEXES  LOW VOLTAGE QRS  NONSPECIFIC ST AND T WAVE ABNORMALITY  ABNORMAL ECG  WHEN COMPARED WITH ECG OF 13-DEC-2020 03:39, (UNCONFIRMED)  NO SIGNIFICANT CHANGE WAS FOUND  Confirmed by MARCO GASCA PERWAIZ ([id number]) on 12/16/2020 12:08:36 AM    < end of copied text >
< from: 12 Lead ECG (12.21.20 @ 03:48) >      Ventricular Rate 89 BPM    Atrial Rate 89 BPM    P-R Interval 154 ms    QRS Duration 96 ms    Q-T Interval 448 ms    QTC Calculation(Bazett) 545 ms    P Axis 69 degrees    R Axis 46 degrees    T Axis 96 degrees    Diagnosis Line NORMAL SINUS RHYTHM  NONSPECIFIC ST AND T WAVE ABNORMALITY  PROLONGED QT  ABNORMAL ECG  WHEN COMPARED WITH ECG OF `12/14/2020   NO SIGNIFICANT CHANGE WAS FOUND  Confirmed by MD LISBETH, ELIUD ([id number]) on 12/21/2020 3:44:29 PM    < end of copied text >

## 2020-12-21 NOTE — PROGRESS NOTE BEHAVIORAL HEALTH - NSBHCHARTREVIEWVS_PSY_A_CORE FT
Vital Signs Last 24 Hrs  T(C): 36.6 (16 Dec 2020 08:30), Max: 36.6 (16 Dec 2020 00:30)  T(F): 97.8 (16 Dec 2020 08:30), Max: 97.9 (16 Dec 2020 00:30)  HR: 77 (16 Dec 2020 08:30) (76 - 89)  BP: 166/90 (16 Dec 2020 08:30) (93/55 - 188/84)  BP(mean): --  RR: 18 (16 Dec 2020 08:30) (18 - 18)  SpO2: 94% (16 Dec 2020 08:30) (93% - 94%)
T(C): 36.6 (12-21-20 @ 12:26), Max: 36.8 (12-21-20 @ 09:28)  HR: 80 (12-21-20 @ 12:26) (80 - 90)  BP: 106/74 (12-21-20 @ 12:26) (104/71 - 112/74)  RR: 20 (12-21-20 @ 12:26) (20 - 20)  SpO2: 94% (12-21-20 @ 12:26) (90% - 94%)  Wt(kg): --

## 2020-12-21 NOTE — PROGRESS NOTE ADULT - ASSESSMENT
70F pmhx HTN, obesity, COPD, anxiety, agitation, R TKR, recent L reverse total shoulder replacement (@ OSH), who was BIBEMS yesterday as a level 2 trauma after being found down, admitted to SICU with left humerus periprosthetic comminuted fracture, c/b rhabdo, left AMA to [facility] parking lot by wheelchair, now back to hospital for debility, found to be hypotensive to SBP 60-70.

## 2020-12-21 NOTE — PROVIDER CONTACT NOTE (OTHER) - ASSESSMENT
Pt B/p 89/59 on right arm and 175/82 on right thigh, this is patient at baseline, r arm is low and thigh high. . pt also hallucinating saying she is "[place] refugee" talking about cats

## 2020-12-21 NOTE — PROGRESS NOTE ADULT - SUBJECTIVE AND OBJECTIVE BOX
Pt seen/examined. Pain controlled. Agitated overnight and required haldol by primary team.    T(C): 36.7 (12-21-20 @ 04:46), Max: 37 (12-20-20 @ 08:13)  HR: 90 (12-21-20 @ 04:46) (72 - 90)  BP: 110/81 (12-21-20 @ 04:46) (88/60 - 178/78)  RR: 20 (12-21-20 @ 04:46) (20 - 23)  SpO2: 90% (12-21-20 @ 04:46) (90% - 96%)  Wt(kg): --                          11.0   5.71  )-----------( 214      ( 20 Dec 2020 10:07 )             35.7       12-20    141  |  104  |  14  ----------------------------<  111<H>  3.4<L>   |  22  |  0.75        Gen: awake, alert, NAD  Resp: no increased work of breathing  LUE: L Humerus  Wrist drop, applied wrist splint (patient continues to take splint off)  Dressing: Aquacel dressing c/d/i  Forearm soft and non tender with no signs of compartment syndrome  Wiggles fingers, normal  strength, wrist drop (radial palsy baseline)  Able to cross fingers, and make ok sign  SILT  +Radial Pulse        A/P: 71yo Female s/p L Humeral periprosthetic fracture ORIF. POD #4. VSS. NAD.    Pain control  PT/OT-NWB LUHUMPHREY  IS  On Rocephin for positive UA  DVT PPx: Therapeutic Lovenox for acute bilateral soleal vein DVTs. Switch to Eliquis upon discharge as per cardiovascular recs.  Continue current Tx as per primary Team  Dispo planning: subacute rehab

## 2020-12-21 NOTE — PROGRESS NOTE ADULT - SUBJECTIVE AND OBJECTIVE BOX
Follow-up Pulm Progress Note  David Chaidez MD  [phone]    POD  ORIF L humerus   Room air sats > 90% with stable resp status  On full dose lovenox for bilateral soleal DCVT    Vital Signs Last 24 Hrs  T(C): 36.4 (18 Dec 2020 11:01), Max: 37 (17 Dec 2020 17:00)  T(F): 97.5 (18 Dec 2020 11:01), Max: 98.6 (17 Dec 2020 17:00)  HR: 83 (18 Dec 2020 11:01) (80 - 103)  BP: 165/75 (18 Dec 2020 11:01) (138/62 - 187/85)  BP(mean): 112 (17 Dec 2020 17:00) (89 - 112)  RR: 18 (18 Dec 2020 11:01) (14 - 18)  SpO2: 94% (18 Dec 2020 11:01) (92% - 100%)    ABG - ( 17 Dec 2020 10:25 )  pH, Arterial: 7.49  pH, Blood: x     /  pCO2: 32    /  pO2: 420   / HCO3: 24    / Base Excess: 1.4   /  SaO2: 100                           9.9    7.41  )-----------( 251      ( 18 Dec 2020 08:51 )             32.8       12-18    138  |  104  |  12  ----------------------------<  107<H>  4.1   |  22  |  1.00    Ca    8.3<L>      18 Dec 2020 08:51        Serum Pro-Brain Natriuretic Peptide: 933 pg/mL (12-15-20 @ 17:33)      CULTURES:  Culture Results:   No growth to date. (12-14 @ 17:39)  Culture Results:   No growth to date. (12-14 @ 17:39)  Culture Results:   >100,000 CFU/ml Escherichia coli (12-13 @ 23:18)    Most recent blood culture -- 12-14 @ 17:39   -- -- .Blood Blood-Peripheral 12-14 @ 17:39  Most recent blood culture -- 12-13 @ 23:18   Escherichia coli Escherichia coli .Urine Clean Catch (Midstream) 12-13 @ 23:18      Physical Examination:  PULM: No wheeze or rhonchi  CVS: Regular rate and rhythm, no murmurs, rubs, or gallops  ABD: Soft, non-tender  EXT:  No clubbing, cyanosis, or edema    RADIOLOGY REVIEWED  CXR:    CT chest:    TTE:

## 2020-12-21 NOTE — PROGRESS NOTE BEHAVIORAL HEALTH - NSBHCONSULTFOLLOWAFTERCARE_PSY_A_CORE FT
per primary team  [phone]
per primary team  [phone]

## 2020-12-22 LAB
ANION GAP SERPL CALC-SCNC: 12 MMOL/L — SIGNIFICANT CHANGE UP (ref 5–17)
BUN SERPL-MCNC: 23 MG/DL — SIGNIFICANT CHANGE UP (ref 7–23)
CALCIUM SERPL-MCNC: 9 MG/DL — SIGNIFICANT CHANGE UP (ref 8.4–10.5)
CHLORIDE SERPL-SCNC: 104 MMOL/L — SIGNIFICANT CHANGE UP (ref 96–108)
CO2 SERPL-SCNC: 27 MMOL/L — SIGNIFICANT CHANGE UP (ref 22–31)
CORTICOSTEROID BINDING GLOBULIN RESULT: 1.8 MG/DL — SIGNIFICANT CHANGE UP
CORTIS F/TOTAL MFR SERPL: 35 % — SIGNIFICANT CHANGE UP
CORTIS SERPL-MCNC: 18 UG/DL — SIGNIFICANT CHANGE UP
CORTISOL, FREE RESULT: 6.3 UG/DL — HIGH
CREAT SERPL-MCNC: 0.9 MG/DL — SIGNIFICANT CHANGE UP (ref 0.5–1.3)
GLUCOSE SERPL-MCNC: 107 MG/DL — HIGH (ref 70–99)
HCT VFR BLD CALC: 33.6 % — LOW (ref 34.5–45)
HGB BLD-MCNC: 10.5 G/DL — LOW (ref 11.5–15.5)
MAGNESIUM SERPL-MCNC: 2.2 MG/DL — SIGNIFICANT CHANGE UP (ref 1.6–2.6)
MCHC RBC-ENTMCNC: 28.8 PG — SIGNIFICANT CHANGE UP (ref 27–34)
MCHC RBC-ENTMCNC: 31.3 GM/DL — LOW (ref 32–36)
MCV RBC AUTO: 92.1 FL — SIGNIFICANT CHANGE UP (ref 80–100)
NRBC # BLD: 0 /100 WBCS — SIGNIFICANT CHANGE UP (ref 0–0)
PLATELET # BLD AUTO: 367 K/UL — SIGNIFICANT CHANGE UP (ref 150–400)
POTASSIUM SERPL-MCNC: 3.2 MMOL/L — LOW (ref 3.5–5.3)
POTASSIUM SERPL-SCNC: 3.2 MMOL/L — LOW (ref 3.5–5.3)
RBC # BLD: 3.65 M/UL — LOW (ref 3.8–5.2)
RBC # FLD: 14.8 % — HIGH (ref 10.3–14.5)
SODIUM SERPL-SCNC: 143 MMOL/L — SIGNIFICANT CHANGE UP (ref 135–145)
WBC # BLD: 8.64 K/UL — SIGNIFICANT CHANGE UP (ref 3.8–10.5)
WBC # FLD AUTO: 8.64 K/UL — SIGNIFICANT CHANGE UP (ref 3.8–10.5)

## 2020-12-22 RX ORDER — SODIUM CHLORIDE 9 MG/ML
250 INJECTION INTRAMUSCULAR; INTRAVENOUS; SUBCUTANEOUS ONCE
Refills: 0 | Status: COMPLETED | OUTPATIENT
Start: 2020-12-22 | End: 2020-12-22

## 2020-12-22 RX ORDER — POTASSIUM CHLORIDE 20 MEQ
40 PACKET (EA) ORAL ONCE
Refills: 0 | Status: COMPLETED | OUTPATIENT
Start: 2020-12-22 | End: 2020-12-22

## 2020-12-22 RX ADMIN — OXYCODONE HYDROCHLORIDE 2.5 MILLIGRAM(S): 5 TABLET ORAL at 09:23

## 2020-12-22 RX ADMIN — Medication 1 TABLET(S): at 11:27

## 2020-12-22 RX ADMIN — TRAMADOL HYDROCHLORIDE 50 MILLIGRAM(S): 50 TABLET ORAL at 04:34

## 2020-12-22 RX ADMIN — Medication 975 MILLIGRAM(S): at 18:15

## 2020-12-22 RX ADMIN — TRAMADOL HYDROCHLORIDE 50 MILLIGRAM(S): 50 TABLET ORAL at 15:02

## 2020-12-22 RX ADMIN — Medication 0.5 MILLIGRAM(S): at 05:25

## 2020-12-22 RX ADMIN — ENOXAPARIN SODIUM 80 MILLIGRAM(S): 100 INJECTION SUBCUTANEOUS at 05:26

## 2020-12-22 RX ADMIN — TRAMADOL HYDROCHLORIDE 50 MILLIGRAM(S): 50 TABLET ORAL at 14:33

## 2020-12-22 RX ADMIN — Medication 975 MILLIGRAM(S): at 05:55

## 2020-12-22 RX ADMIN — Medication 0.5 MILLIGRAM(S): at 11:27

## 2020-12-22 RX ADMIN — LORATADINE 10 MILLIGRAM(S): 10 TABLET ORAL at 11:27

## 2020-12-22 RX ADMIN — Medication 975 MILLIGRAM(S): at 11:26

## 2020-12-22 RX ADMIN — ENOXAPARIN SODIUM 80 MILLIGRAM(S): 100 INJECTION SUBCUTANEOUS at 17:42

## 2020-12-22 RX ADMIN — Medication 3 MILLIGRAM(S): at 23:50

## 2020-12-22 RX ADMIN — SODIUM CHLORIDE 500 MILLILITER(S): 9 INJECTION INTRAMUSCULAR; INTRAVENOUS; SUBCUTANEOUS at 18:14

## 2020-12-22 RX ADMIN — Medication 975 MILLIGRAM(S): at 17:42

## 2020-12-22 RX ADMIN — Medication 975 MILLIGRAM(S): at 05:25

## 2020-12-22 RX ADMIN — ATORVASTATIN CALCIUM 40 MILLIGRAM(S): 80 TABLET, FILM COATED ORAL at 23:50

## 2020-12-22 RX ADMIN — Medication 1 MILLIGRAM(S): at 11:27

## 2020-12-22 RX ADMIN — CEFTRIAXONE 100 MILLIGRAM(S): 500 INJECTION, POWDER, FOR SOLUTION INTRAMUSCULAR; INTRAVENOUS at 11:26

## 2020-12-22 RX ADMIN — TRAMADOL HYDROCHLORIDE 50 MILLIGRAM(S): 50 TABLET ORAL at 05:04

## 2020-12-22 RX ADMIN — Medication 975 MILLIGRAM(S): at 11:58

## 2020-12-22 RX ADMIN — Medication 975 MILLIGRAM(S): at 23:51

## 2020-12-22 RX ADMIN — Medication 0.5 MILLIGRAM(S): at 23:50

## 2020-12-22 RX ADMIN — OXYCODONE HYDROCHLORIDE 2.5 MILLIGRAM(S): 5 TABLET ORAL at 09:55

## 2020-12-22 RX ADMIN — SODIUM CHLORIDE 500 MILLILITER(S): 9 INJECTION INTRAMUSCULAR; INTRAVENOUS; SUBCUTANEOUS at 17:43

## 2020-12-22 RX ADMIN — Medication 40 MILLIEQUIVALENT(S): at 14:30

## 2020-12-22 RX ADMIN — Medication 0.5 MILLIGRAM(S): at 17:42

## 2020-12-22 NOTE — PROGRESS NOTE ADULT - PROBLEM SELECTOR PLAN 2
was orthostatic earlier, now labile bps   hypotensive episodes-likely d/t standing benzos, fu psych re: adjusting meds  hold xanax for low BP, iV hydration for bp optimization

## 2020-12-22 NOTE — PROGRESS NOTE ADULT - ATTENDING COMMENTS
Dr Diallo will be covering  starting 12/23/20  [facility] [phone] for questions or concerns

## 2020-12-22 NOTE — PROGRESS NOTE ADULT - SUBJECTIVE AND OBJECTIVE BOX
Patient is a 70y old  Female who presents with a chief complaint of left humerus fracture (22 Dec 2020 06:18)      INTERVAL HPI/OVERNIGHT EVENTS: noted  pt seen and examined  calm and cooperative this am  agitated and hallucinating overnight  episodes of hypotension-borderline      Vital Signs Last 24 Hrs  T(C): 36.4 (22 Dec 2020 16:24), Max: 36.9 (22 Dec 2020 04:55)  T(F): 97.6 (22 Dec 2020 16:24), Max: 98.4 (22 Dec 2020 04:55)  HR: 80 (22 Dec 2020 16:24) (75 - 83)  BP: 83/60 (22 Dec 2020 18:00) (83/60 - 145/77)  BP(mean): --  RR: 20 (22 Dec 2020 16:24) (20 - 20)  SpO2: 96% (22 Dec 2020 16:24) (93% - 96%)    acetaminophen   Tablet .. 975 milliGRAM(s) Oral every 6 hours  ALPRAZolam 0.5 milliGRAM(s) Oral four times a day  atorvastatin 40 milliGRAM(s) Oral at bedtime  cefTRIAXone   IVPB      cefTRIAXone   IVPB 1000 milliGRAM(s) IV Intermittent every 24 hours  enoxaparin Injectable 80 milliGRAM(s) SubCutaneous two times a day  folic acid 1 milliGRAM(s) Oral daily  influenza   Vaccine 0.5 milliLiter(s) IntraMuscular once  loratadine 10 milliGRAM(s) Oral daily  melatonin 3 milliGRAM(s) Oral at bedtime  multivitamin 1 Tablet(s) Oral daily  ondansetron Injectable 4 milliGRAM(s) IV Push once  oxyCODONE    IR 2.5 milliGRAM(s) Oral every 4 hours PRN  oxyCODONE    IR 5 milliGRAM(s) Oral every 4 hours PRN  polyethylene glycol 3350 17 Gram(s) Oral daily  sodium chloride 0.9%. 1000 milliLiter(s) IV Continuous <Continuous>  traMADol 50 milliGRAM(s) Oral every 6 hours PRN      PHYSICAL EXAM:  GENERAL: NAD,   EYES: conjunctiva and sclera clear  ENMT: Moist mucous membranes  NECK: Supple, No JVD, Normal thyroid  CHEST/LUNG: non labored, cta b/l  HEART: Regular rate and rhythm; No murmurs, rubs, or gallops  ABDOMEN: Soft, Nontender, Nondistended; Bowel sounds present  EXTREMITIES:  2+ Peripheral Pulses, No clubbing, cyanosis, or edema  lt shoulder sling  LYMPH: No lymphadenopathy noted  SKIN: No rashes or lesions    Consultant(s) Notes Reviewed:  [x ] YES  [ ] NO  Care Discussed with Consultants/Other Providers [ x] YES  [ ] NO    LABS:                        10.5   8.64  )-----------( 367      ( 22 Dec 2020 10:14 )             33.6     12-22    143  |  104  |  23  ----------------------------<  107<H>  3.2<L>   |  27  |  0.90    Ca    9.0      22 Dec 2020 10:14  Phos  3.9     12-21  Mg     2.2     12-22          CAPILLARY BLOOD GLUCOSE                  RADIOLOGY & ADDITIONAL TESTS:    Imaging Personally Reviewed:  [x ] YES  [ ] NO

## 2020-12-22 NOTE — PROGRESS NOTE ADULT - SUBJECTIVE AND OBJECTIVE BOX
Pt seen/examined. Pain controlled. Agitated and hallucinating overnight.    T(C): 36.9 (12-22-20 @ 04:55), Max: 36.9 (12-22-20 @ 04:55)  HR: 83 (12-22-20 @ 04:55) (75 - 85)  BP: 93/66 (12-22-20 @ 04:55) (89/59 - 175/82)  RR: 20 (12-22-20 @ 04:55) (20 - 26)  SpO2: 93% (12-22-20 @ 04:55) (92% - 94%)  Wt(kg): --                          11.0   7.62  )-----------( 322      ( 21 Dec 2020 08:50 )             35.4                         11.0   5.71  )-----------( 214      ( 20 Dec 2020 10:07 )             35.7       12-21    141  |  102  |  14  ----------------------------<  109<H>  3.4<L>   |  26  |  0.79      Gen: awake, alert, NAD  Resp: no increased work of breathing  LUE: L Humerus  Wrist drop, applied wrist splint (patient continues to take splint off)  Dressing: Aquacel dressing c/d/i  In sling  Forearm soft and non tender with no signs of compartment syndrome  Wiggles fingers, normal  strength, wrist drop (radial palsy baseline)  Able to cross fingers, and make ok sign  SILT  +Radial Pulse    A/P: 69yo Female s/p L Humeral periprosthetic fracture ORIF. POD #5. VSS. NAD.    Care per primary team  Pain control  PT/OT-NWB LUE in sling  IS  On Rocephin for positive UA  DVT PPx: Therapeutic Lovenox for acute bilateral soleal vein DVTs. Switch to Eliquis upon discharge as per cardiovascular recs.  Dispo planning: subacute rehab

## 2020-12-22 NOTE — PROGRESS NOTE ADULT - ASSESSMENT
70F pmhx HTN, obesity, COPD, anxiety, agitation, R TKR, recent L reverse total shoulder replacement (@ OSH), who was BIBEMS yesterday as a level 2 trauma after being found down, admitted to SICU with left humerus periprosthetic comminuted fracture, c/b rhabdo, left AMA to [facility] parking lot by wheelchair, now back to hospital for debility, found to be hypotensive to SBP 60-70.

## 2020-12-23 ENCOUNTER — TRANSCRIPTION ENCOUNTER (OUTPATIENT)
Age: 70
End: 2020-12-23

## 2020-12-23 LAB
ANION GAP SERPL CALC-SCNC: 11 MMOL/L — SIGNIFICANT CHANGE UP (ref 5–17)
BUN SERPL-MCNC: 21 MG/DL — SIGNIFICANT CHANGE UP (ref 7–23)
CALCIUM SERPL-MCNC: 9 MG/DL — SIGNIFICANT CHANGE UP (ref 8.4–10.5)
CHLORIDE SERPL-SCNC: 106 MMOL/L — SIGNIFICANT CHANGE UP (ref 96–108)
CO2 SERPL-SCNC: 25 MMOL/L — SIGNIFICANT CHANGE UP (ref 22–31)
CREAT SERPL-MCNC: 0.82 MG/DL — SIGNIFICANT CHANGE UP (ref 0.5–1.3)
GLUCOSE SERPL-MCNC: 107 MG/DL — HIGH (ref 70–99)
HCT VFR BLD CALC: 33.9 % — LOW (ref 34.5–45)
HGB BLD-MCNC: 10 G/DL — LOW (ref 11.5–15.5)
MCHC RBC-ENTMCNC: 28 PG — SIGNIFICANT CHANGE UP (ref 27–34)
MCHC RBC-ENTMCNC: 29.5 GM/DL — LOW (ref 32–36)
MCV RBC AUTO: 95 FL — SIGNIFICANT CHANGE UP (ref 80–100)
NRBC # BLD: 0 /100 WBCS — SIGNIFICANT CHANGE UP (ref 0–0)
PLATELET # BLD AUTO: 334 K/UL — SIGNIFICANT CHANGE UP (ref 150–400)
POTASSIUM SERPL-MCNC: 3.7 MMOL/L — SIGNIFICANT CHANGE UP (ref 3.5–5.3)
POTASSIUM SERPL-SCNC: 3.7 MMOL/L — SIGNIFICANT CHANGE UP (ref 3.5–5.3)
RBC # BLD: 3.57 M/UL — LOW (ref 3.8–5.2)
RBC # FLD: 14.8 % — HIGH (ref 10.3–14.5)
SARS-COV-2 RNA SPEC QL NAA+PROBE: SIGNIFICANT CHANGE UP
SODIUM SERPL-SCNC: 142 MMOL/L — SIGNIFICANT CHANGE UP (ref 135–145)
WBC # BLD: 9.22 K/UL — SIGNIFICANT CHANGE UP (ref 3.8–10.5)
WBC # FLD AUTO: 9.22 K/UL — SIGNIFICANT CHANGE UP (ref 3.8–10.5)

## 2020-12-23 RX ORDER — HYDROCORTISONE 1 %
1 OINTMENT (GRAM) TOPICAL ONCE
Refills: 0 | Status: COMPLETED | OUTPATIENT
Start: 2020-12-23 | End: 2020-12-23

## 2020-12-23 RX ORDER — CALAMINE AND ZINC OXIDE AND PHENOL 160; 10 MG/ML; MG/ML
1 LOTION TOPICAL ONCE
Refills: 0 | Status: DISCONTINUED | OUTPATIENT
Start: 2020-12-23 | End: 2020-12-23

## 2020-12-23 RX ORDER — DIPHENHYDRAMINE HCL 50 MG
12.5 CAPSULE ORAL ONCE
Refills: 0 | Status: COMPLETED | OUTPATIENT
Start: 2020-12-23 | End: 2020-12-23

## 2020-12-23 RX ORDER — DIPHENHYDRAMINE HCL 50 MG
12.5 CAPSULE ORAL ONCE
Refills: 0 | Status: DISCONTINUED | OUTPATIENT
Start: 2020-12-23 | End: 2020-12-23

## 2020-12-23 RX ADMIN — Medication 12.5 MILLIGRAM(S): at 04:42

## 2020-12-23 RX ADMIN — Medication 0.5 MILLIGRAM(S): at 05:26

## 2020-12-23 RX ADMIN — OXYCODONE HYDROCHLORIDE 2.5 MILLIGRAM(S): 5 TABLET ORAL at 14:27

## 2020-12-23 RX ADMIN — Medication 3 MILLIGRAM(S): at 22:25

## 2020-12-23 RX ADMIN — Medication 1 APPLICATION(S): at 04:28

## 2020-12-23 RX ADMIN — Medication 1 TABLET(S): at 11:25

## 2020-12-23 RX ADMIN — Medication 975 MILLIGRAM(S): at 11:07

## 2020-12-23 RX ADMIN — LORATADINE 10 MILLIGRAM(S): 10 TABLET ORAL at 11:25

## 2020-12-23 RX ADMIN — Medication 975 MILLIGRAM(S): at 10:37

## 2020-12-23 RX ADMIN — Medication 975 MILLIGRAM(S): at 05:56

## 2020-12-23 RX ADMIN — Medication 975 MILLIGRAM(S): at 17:26

## 2020-12-23 RX ADMIN — OXYCODONE HYDROCHLORIDE 2.5 MILLIGRAM(S): 5 TABLET ORAL at 13:47

## 2020-12-23 RX ADMIN — Medication 975 MILLIGRAM(S): at 05:26

## 2020-12-23 RX ADMIN — Medication 975 MILLIGRAM(S): at 00:20

## 2020-12-23 RX ADMIN — Medication 0.5 MILLIGRAM(S): at 11:25

## 2020-12-23 RX ADMIN — ENOXAPARIN SODIUM 80 MILLIGRAM(S): 100 INJECTION SUBCUTANEOUS at 17:26

## 2020-12-23 RX ADMIN — OXYCODONE HYDROCHLORIDE 5 MILLIGRAM(S): 5 TABLET ORAL at 01:42

## 2020-12-23 RX ADMIN — ENOXAPARIN SODIUM 80 MILLIGRAM(S): 100 INJECTION SUBCUTANEOUS at 05:26

## 2020-12-23 RX ADMIN — TRAMADOL HYDROCHLORIDE 50 MILLIGRAM(S): 50 TABLET ORAL at 10:37

## 2020-12-23 RX ADMIN — Medication 0.5 MILLIGRAM(S): at 23:19

## 2020-12-23 RX ADMIN — Medication 975 MILLIGRAM(S): at 22:54

## 2020-12-23 RX ADMIN — OXYCODONE HYDROCHLORIDE 5 MILLIGRAM(S): 5 TABLET ORAL at 05:58

## 2020-12-23 RX ADMIN — OXYCODONE HYDROCHLORIDE 5 MILLIGRAM(S): 5 TABLET ORAL at 22:24

## 2020-12-23 RX ADMIN — OXYCODONE HYDROCHLORIDE 5 MILLIGRAM(S): 5 TABLET ORAL at 22:54

## 2020-12-23 RX ADMIN — OXYCODONE HYDROCHLORIDE 5 MILLIGRAM(S): 5 TABLET ORAL at 06:28

## 2020-12-23 RX ADMIN — ATORVASTATIN CALCIUM 40 MILLIGRAM(S): 80 TABLET, FILM COATED ORAL at 22:25

## 2020-12-23 RX ADMIN — CEFTRIAXONE 100 MILLIGRAM(S): 500 INJECTION, POWDER, FOR SOLUTION INTRAMUSCULAR; INTRAVENOUS at 10:43

## 2020-12-23 RX ADMIN — Medication 975 MILLIGRAM(S): at 22:25

## 2020-12-23 RX ADMIN — Medication 1 MILLIGRAM(S): at 11:25

## 2020-12-23 RX ADMIN — TRAMADOL HYDROCHLORIDE 50 MILLIGRAM(S): 50 TABLET ORAL at 11:08

## 2020-12-23 RX ADMIN — OXYCODONE HYDROCHLORIDE 5 MILLIGRAM(S): 5 TABLET ORAL at 01:12

## 2020-12-23 RX ADMIN — Medication 0.5 MILLIGRAM(S): at 17:26

## 2020-12-23 NOTE — PROGRESS NOTE ADULT - SUBJECTIVE AND OBJECTIVE BOX
Patient is a 70y old  Female who presents with a chief complaint of left humerus fracture (23 Dec 2020 06:33)      SUBJECTIVE / OVERNIGHT EVENTS:    Patient seen and examined.   co pain in left arm. denies dizziness HA lightheaded. BPs improved.    Vital Signs Last 24 Hrs  T(C): 36.3 (23 Dec 2020 04:47), Max: 36.9 (22 Dec 2020 14:09)  T(F): 97.3 (23 Dec 2020 04:47), Max: 98.4 (22 Dec 2020 14:09)  HR: 77 (23 Dec 2020 04:47) (77 - 82)  BP: 150/61 (23 Dec 2020 04:47) (83/60 - 150/61)  BP(mean): --  RR: 19 (23 Dec 2020 04:47) (19 - 20)  SpO2: 94% (23 Dec 2020 04:47) (92% - 96%)  I&O's Summary    22 Dec 2020 07:01  -  23 Dec 2020 07:00  --------------------------------------------------------  IN: 1260 mL / OUT: 1150 mL / NET: 110 mL        PE:  GENERAL: NAD, AAOx1-2, obese  HEAD:  Atraumatic, Normocephalic  CHEST/LUNG: CTABL, No wheeze  HEART: Regular rate and rhythm; no murmur  ABDOMEN: Soft, Nontender, Nondistended; Bowel sounds present   [place]  EXTREMITIES:  left arm dressing over shoulder upper arm, cast  SKIN: No rashes or lesions  NEURO: No focal deficits    LABS:                        10.5   8.64  )-----------( 367      ( 22 Dec 2020 10:14 )             33.6     12-22    143  |  104  |  23  ----------------------------<  107<H>  3.2<L>   |  27  |  0.90    Ca    9.0      22 Dec 2020 10:14  Mg     2.2     12-22        CAPILLARY BLOOD GLUCOSE                RADIOLOGY & ADDITIONAL TESTS:    Imaging Personally Reviewed:  [x] YES  [ ] NO    Consultant(s) Notes Reviewed:  [x] YES  [ ] NO    MEDICATIONS  (STANDING):  acetaminophen   Tablet .. 975 milliGRAM(s) Oral every 6 hours  ALPRAZolam 0.5 milliGRAM(s) Oral four times a day  atorvastatin 40 milliGRAM(s) Oral at bedtime  cefTRIAXone   IVPB      cefTRIAXone   IVPB 1000 milliGRAM(s) IV Intermittent every 24 hours  enoxaparin Injectable 80 milliGRAM(s) SubCutaneous two times a day  folic acid 1 milliGRAM(s) Oral daily  influenza   Vaccine 0.5 milliLiter(s) IntraMuscular once  loratadine 10 milliGRAM(s) Oral daily  melatonin 3 milliGRAM(s) Oral at bedtime  multivitamin 1 Tablet(s) Oral daily  ondansetron Injectable 4 milliGRAM(s) IV Push once  polyethylene glycol 3350 17 Gram(s) Oral daily  sodium chloride 0.9%. 1000 milliLiter(s) (100 mL/Hr) IV Continuous <Continuous>    MEDICATIONS  (PRN):  oxyCODONE    IR 5 milliGRAM(s) Oral every 4 hours PRN Severe Pain (7 - 10)  oxyCODONE    IR 2.5 milliGRAM(s) Oral every 4 hours PRN Moderate Pain (4 - 6)  traMADol 50 milliGRAM(s) Oral every 6 hours PRN breakthrough pain      Care Discussed with Consultants/Other Providers [x] YES  [ ] NO    HEALTH ISSUES - PROBLEM Dx:  Acute deep vein thrombosis (DVT) of calf muscle vein of both lower extremities  Acute deep vein thrombosis (DVT) of calf muscle vein of both lower extremities    Preop cardiovascular exam  Preop cardiovascular exam    Alcohol abuse    Delirium due to another medical condition    LEIF (acute kidney injury)  LEIF (acute kidney injury)    Suspected pulmonary embolism    Suspected deep vein thrombosis (DVT)    Agitation  Agitation    Syncope, unspecified syncope type  Syncope, unspecified syncope type    Traumatic rhabdomyolysis, initial encounter  Traumatic rhabdomyolysis, initial encounter    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

## 2020-12-23 NOTE — DISCHARGE NOTE PROVIDER - NSDCMRMEDTOKEN_GEN_ALL_CORE_FT
atorvastatin 40 mg oral tablet: 1 tab(s) orally once a day (at bedtime)  Eliquis 2.5 mg oral tablet: 1 tab(s) orally 2 times a day  loratadine 10 mg oral tablet: 0.5 tab(s) orally 2 times a day  SEROquel 25 mg oral tablet: 1 tab(s) orally 3 times a day at 10am, 2pm, and 7pm  NOT YET STARTED   acetaminophen 325 mg oral tablet: 2 tab(s) orally every 6 hours  ALPRAZolam 0.5 mg oral tablet: 1 tab(s) orally 4 times a day  atorvastatin 40 mg oral tablet: 1 tab(s) orally once a day (at bedtime)  Eliquis 5 mg oral tablet: 1 tab(s) orally 2 times a day   folic acid 1 mg oral tablet: 1 tab(s) orally once a day  loratadine 10 mg oral tablet: 1 tab(s) orally once a day  melatonin 3 mg oral tablet: 1 tab(s) orally once a day (at bedtime)  Multiple Vitamins oral tablet: 1 tab(s) orally once a day  oxyCODONE 5 mg oral tablet: 1 tab(s) orally every 4 hours, As needed, Severe Pain (7 - 10)  oxyCODONE 5 mg oral tablet: 0.5 tab(s) orally every 4 hours  polyethylene glycol 3350 oral powder for reconstitution: 17 gram(s) orally once a day  traMADol 50 mg oral tablet: 1 tab(s) orally every 6 hours, As needed, breakthrough pain

## 2020-12-23 NOTE — PROVIDER CONTACT NOTE (OTHER) - REASON
BP elevated and then BP low
Elevated BP
Pt c/o cough & audible b/l expiratory wheezing
Increasingly agitated
pt hallucinating and lowb/p
Pt DTV 2045, bladder scan shows 283cc
bp recheck
c/o itchiness on back
hallucinations, attempting to get oob
Pt repeatedly attempting OOB, hallucinating, agitated
Unable to obtain BP in RUE manually
pt complaining of itchy back
pt confused, restless, No iv access
pt with an elevated BP
Agitation event, bladder scan shows 695cc
Hallucinating, increased confusion.
elevated BP
Hypotensive/Orthostatic
Pt has no IV access

## 2020-12-23 NOTE — PROVIDER CONTACT NOTE (OTHER) - DATE AND TIME:
17-Dec-2020 02:00
23-Dec-2020 04:00
14-Dec-2020 20:10
20-Dec-2020 22:00
23-Dec-2020 00:00
15-Dec-2020 00:50
15-Dec-2020 03:00
15-Dec-2020 20:55
17-Dec-2020 22:00
20-Dec-2020 18:38
21-Dec-2020 02:45
20-Dec-2020 18:20
21-Dec-2020 20:00
19-Dec-2020 22:44
20-Dec-2020 09:00
20-Dec-2020 11:30
21-Dec-2020 05:00
19-Dec-2020 17:30
17-Dec-2020 23:17

## 2020-12-23 NOTE — PROVIDER CONTACT NOTE (OTHER) - RECOMMENDATIONS
Arrow/ IV per by IV team.
IVF, monitor BP.
Seroquel.
notify provider, possibly benadryl
informed PA
Concern for benzo withdrawal.
notify provider
Possible Xanax withdrawal..  Re-instate [facility]. ARTURO Terry made aware of patients status/situation.
if BP low- bolus?
informed PA
notify provider, reoriented PRN

## 2020-12-23 NOTE — DISCHARGE NOTE PROVIDER - CARE PROVIDER_API CALL
MARITO NICHOLS  Cardiovascular Diseases  [address] [place] [address]  Phone: [phone]  Fax: [phone]  Follow Up Time: 1 month    Erasto Palma)  Orthopaedic Surgery  [address] [place] [address] [address]  [place] [address]  Phone: [phone]  Fax: [phone]  Follow Up Time: 2 weeks

## 2020-12-23 NOTE — PROGRESS NOTE ADULT - PROBLEM SELECTOR PLAN 6
resolved with IVF  UA positive  urine cx ecoli  ID following, cont ceftriaxone 10d course, can change to cefpodoxine 200mg bid until 12/24 resolved with IVF  UA positive  urine cx ecoli  ID following, cont ceftriaxone 10d course, can change to cefpodoxine 200mg bid until 12/24  TOV, [place] originally placed for retention

## 2020-12-23 NOTE — CHART NOTE - NSCHARTNOTEFT_GEN_A_CORE
Notified by RN that pt is complaining about her back itchiness. Seen patient at the bedside. Notified by RN that pt is complaining about her back pruritus. Seen patient at the bedside. Pt reports back Notified by RN that pt is complaining about her back itchiness. Seen patient at the bedside. Pt reports back pruritus, which is temporally resolved by wash. Skin clear, no rash at this time. Pt denies itching of chest, arms or legs. Denies difficulty swallowing. Hydrocortisone cream placed on a back and benadryl ordered. Will continue to monitor closely.    Sandra Pimentel NP, #[id number]

## 2020-12-23 NOTE — DISCHARGE NOTE PROVIDER - HOSPITAL COURSE
70F pmhx HTN, obesity, COPD, anxiety, agitation, R TKR, recent L reverse total shoulder replacement (@ OSH), recently admitted after an unwitnessed fall with left humerus periprosthetic comminuted fracture, c/b rhabdo, left AMA to [facility] parking lot by wheelchair, now back to hospital for debility, found to be hypotensive to SBP 60-70.  Admitted to SICU.  s/p L Humeral periprosthetic fracture ORIF.  -Problem: Shock.  Plan: resolved.     - Problem: Hypotension. Hypotension.  Plan: labile bps hypotensive episodes, now improved.     -  Problem: Essential hypertension.  Plan: labile BP as above amlodipine discontinued due to symptomatic orthostasis.     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 Palma 12/17 splint       in place. PT/OT-NWB LUE in      sling, Patient should remain non weight bearing until seen in office, sling can be removed and is for comfort but should be worn when         	    ambulating.  Pain control.      - Problem: B/l soleal DVT started on Lovenox full dose- switch to Eliquis upon discharge.  CTA no PE.  TTE preserved lVEF and RV function.     -Problem: Traumatic rhabdomyolysis, initial encounter.  Plan: stable creatinine downtrending.     -Problem: LEIF (acute kidney injury). Plan: resolved with IVF UA positive urine cx E-coli. ID recs appreciated cont ceftriaxone 10d course, can change to Cefpodoxine 200mg 	   bid until 12/24  - Problem: Urinary Retention. Walters placed -TOV    - Problem: Agitation.  Plan: delirium and agitation Seroquel and haldol discontinued, started Xanax standing per psych recs psych signed off.    70F pmhx HTN, obesity, COPD, anxiety, agitation, R TKR, recent L reverse total shoulder replacement (@ OSH), recently admitted after an unwitnessed fall with left humerus periprosthetic comminuted fracture, c/b rhabdo, left AMA to [facility] parking lot by wheelchair, now back to hospital for debility, found to be hypotensive to SBP 60-70.  Admitted to SICU.  s/p L Humeral periprosthetic fracture ORIF.  -Problem: Shock.  Plan: resolved.     - Problem: Hypotension. Hypotension.  Plan: labile bps hypotensive episodes, now improved.     -  Problem: Essential hypertension.  Plan: labile BP as above amlodipine discontinued due to symptomatic orthostasis.     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 Palma 12/17 splint       in place. PT/OT-NWB LUE in      sling, Patient should remain non weight bearing until seen in office, sling can be removed and is for comfort but should be worn when         	    ambulating.  Pain control.      - Problem: B/l soleal DVT started on Lovenox full dose- switch to Eliquis upon discharge.  CTA no PE.  TTE preserved lVEF and RV function.     -Problem: Traumatic rhabdomyolysis, initial encounter.  Plan: stable creatinine downtrending.     -Problem: LEIF (acute kidney injury). Plan: resolved with IVF UA positive urine cx E-coli. ID recs appreciated cont ceftriaxone 10d course, can change to Cefpodoxine 200mg 	   bid until 12/24  - Problem: Urinary Retention. Walters placed -Passed TOV    - Problem: Agitation.  Plan: delirium and agitation Seroquel and haldol discontinued, started Xanax standing per psych recs psych signed off.     Pt is medically cleared by Dr. Diallo to DC rehab w/ outpatient follow up.

## 2020-12-23 NOTE — DISCHARGE NOTE PROVIDER - NSDCFUADDAPPT_GEN_ALL_CORE_FT
Please follow up with Dr. Palma 1-2 weeks after discharge.  Please follow up PCP within 1 month after discharge.

## 2020-12-23 NOTE — DISCHARGE NOTE PROVIDER - NSDCCPCAREPLAN_GEN_ALL_CORE_FT
PRINCIPAL DISCHARGE DIAGNOSIS  Diagnosis: Humeral fracture  Assessment and Plan of Treatment: Seen by orthopedic  Splint in place  Status post surgical fixation of left humerus fracture with Dr. Palma on 12/17  Follow up with Dr. Palma 1-2 weeks after discharge  Pain control  PT/OT-NWB LUE in sling, Patient should remain non weight bearing until seen in office, sling can be removed and is for comfort but should be worn when ambulating        SECONDARY DISCHARGE DIAGNOSES  Diagnosis: E-coli UTI  Assessment and Plan of Treatment: UA positive  urine cx ecoli  ID following, cont ceftriaxone 10d course, finished today 10 day course  passed TOV.    Diagnosis: Acute deep vein thrombosis (DVT) of calf muscle vein of both lower extremities  Assessment and Plan of Treatment: soleal DVT started on lovenox full dose- switch to eliquis upon discharge  CTA shows no PE    Diagnosis: Shock  Assessment and Plan of Treatment: Resolved.  Labile bps  hypotensive episodes, now improved, cont to monitor.    Diagnosis: LEIF (acute kidney injury)  Assessment and Plan of Treatment: Resolved with IVF  UA positive  Urine cultures grows - Ecoli  ID following, cont ceftriaxone 10d course, finished today 10 day course  passed TOV.    Diagnosis: Traumatic rhabdomyolysis, initial encounter  Assessment and Plan of Treatment: stable creat  ck downtrending.

## 2020-12-23 NOTE — PROGRESS NOTE ADULT - ASSESSMENT
70F pmhx HTN, obesity, COPD, anxiety, agitation, R TKR, recent L reverse total shoulder replacement (@ OSH), who was BIBEMS yesterday as a level 2 trauma after being found down, admitted to SICU with left humerus periprosthetic comminuted fracture, c/b rhabdo, left AMA to [facility] parking lot by wheelchair, now back to hospital for debility, found to be hypotensive to SBP 60-70.

## 2020-12-23 NOTE — PROGRESS NOTE ADULT - SUBJECTIVE AND OBJECTIVE BOX
Pt seen/examined. Pain controlled. Doing well this morning. Oriented to self and location. States she would like to go home and we discussed that she will probably be going to rehab facility.    Vital Signs Last 24 Hrs  T(C): 36.3 (23 Dec 2020 04:47), Max: 36.9 (22 Dec 2020 08:00)  T(F): 97.3 (23 Dec 2020 04:47), Max: 98.4 (22 Dec 2020 08:00)  HR: 77 (23 Dec 2020 04:47) (77 - 82)  BP: 150/61 (23 Dec 2020 04:47) (83/60 - 150/61)  BP(mean): --  RR: 19 (23 Dec 2020 04:47) (19 - 20)  SpO2: 94% (23 Dec 2020 04:47) (92% - 96%)               Gen: awake, alert, NAD  Resp: no increased work of breathing  LUE: L Humerus  +Radial Nerve Palsy, 0/5 wrist extension and finger extension, cock up wrist splint on.   Dressing: Aquacel dressing c/d/i  In sling  Forearm soft and non tender with no signs of compartment syndrome  +Median/Ulnar Nerve Motor Function  SILT with the exception of decreased sensation on the dorsum of the hand corresponding to radial nerve distribution  +Radial Pulse

## 2020-12-23 NOTE — PROVIDER CONTACT NOTE (OTHER) - SITUATION
Pt repeatedly attempting OOB, hallucinating, agitated
Pt found in room with IVL x2 on floor. Pt reports " the call bell pulled out the IV" Pt is A/ox1. Re-orientation provided.
Agitation event, bladder scan shows 695cc
Pt screaming in bed, A/ox1. Pt repeats herself and forgets what she is asking.
Pts BP hypotensive in RUE when taken electronically. BP have been taken in RLE (Thigh), BP elevated during the day. Attempted bp manually in RUE, unable to obtain.
elevated /93
BP elevated and then BP low
Elevated BP
Pt c/o cough & audible b/l expiratory wheezing
Pt hallucinating in room, A/ox1, increasingly confused despite reorientating patient. Pt reports she takes a Xanax every night before bed, unsure of years? has not been getting xanax in hospital.
Pt B/p 89/59 on right arm and 175/82 on right thigh, this is patient at baseline, r arm is low and thigh high. pt also hallucinating saying she is "chinese refugee
Pt DTV 2045, bladder scan shows 283cc
Pt attempted to get oob this AM. Pt was agitated, screaming. 0.5mg Haldol given. Pt became orthostatic while sitting at EOB. Pts bp read 65/45, given 1L Bolus. Pt placed back to bed.
pt bp decreased
Pt confused, restless, screaming, No iv access
pt complaining of her back being itchy
pt with severe hallucinations, repeatedly trying to get OOB
pt c/o itchiness on her back even after multiple attempts of lotion applied
this evening pt had an elevated BP

## 2020-12-23 NOTE — PROGRESS NOTE ADULT - ASSESSMENT
A/P: 71yo Female s/p L Humeral periprosthetic fracture ORIF. POD #6    ·	Care per primary team, Stable for discharge from orthopaedic perspective, Follow up with Dr. Palma 1-2 weeks after discharge  ·	Pain control  ·	PT/OT-NWB LUE in sling, Patient should remain non weight bearing until seen in office, sling can be removed and is for comfort but should be worn when ambulating  ·	IS  ·	On Rocephin for positive UA  ·	DVT PPx: Per Primary Team  ·	Dispo planning

## 2020-12-23 NOTE — CHART NOTE - NSCHARTNOTESELECT_GEN_ALL_CORE
Ortho POC/Event Note
Agitation, Urinary retention/Event Note
Drain Pull/Event Note
Event Note
Event Note/(+) Bl LE DVT
left kidney with air/Event Note

## 2020-12-24 ENCOUNTER — TRANSCRIPTION ENCOUNTER (OUTPATIENT)
Age: 70
End: 2020-12-24

## 2020-12-24 VITALS
TEMPERATURE: 97 F | DIASTOLIC BLOOD PRESSURE: 76 MMHG | SYSTOLIC BLOOD PRESSURE: 148 MMHG | RESPIRATION RATE: 19 BRPM | OXYGEN SATURATION: 94 % | HEART RATE: 69 BPM

## 2020-12-24 PROCEDURE — 80307 DRUG TEST PRSMV CHEM ANLYZR: CPT

## 2020-12-24 PROCEDURE — 82435 ASSAY OF BLOOD CHLORIDE: CPT

## 2020-12-24 PROCEDURE — 71275 CT ANGIOGRAPHY CHEST: CPT

## 2020-12-24 PROCEDURE — 82962 GLUCOSE BLOOD TEST: CPT

## 2020-12-24 PROCEDURE — 97166 OT EVAL MOD COMPLEX 45 MIN: CPT

## 2020-12-24 PROCEDURE — 83605 ASSAY OF LACTIC ACID: CPT

## 2020-12-24 PROCEDURE — C1889: CPT

## 2020-12-24 PROCEDURE — 82947 ASSAY GLUCOSE BLOOD QUANT: CPT

## 2020-12-24 PROCEDURE — 82746 ASSAY OF FOLIC ACID SERUM: CPT

## 2020-12-24 PROCEDURE — 97164 PT RE-EVAL EST PLAN CARE: CPT

## 2020-12-24 PROCEDURE — 93005 ELECTROCARDIOGRAM TRACING: CPT

## 2020-12-24 PROCEDURE — 99285 EMERGENCY DEPT VISIT HI MDM: CPT

## 2020-12-24 PROCEDURE — 97535 SELF CARE MNGMENT TRAINING: CPT

## 2020-12-24 PROCEDURE — 87086 URINE CULTURE/COLONY COUNT: CPT

## 2020-12-24 PROCEDURE — 82330 ASSAY OF CALCIUM: CPT

## 2020-12-24 PROCEDURE — 80048 BASIC METABOLIC PNL TOTAL CA: CPT

## 2020-12-24 PROCEDURE — 85018 HEMOGLOBIN: CPT

## 2020-12-24 PROCEDURE — 82607 VITAMIN B-12: CPT

## 2020-12-24 PROCEDURE — 93970 EXTREMITY STUDY: CPT

## 2020-12-24 PROCEDURE — 86901 BLOOD TYPING SEROLOGIC RH(D): CPT

## 2020-12-24 PROCEDURE — 86850 RBC ANTIBODY SCREEN: CPT

## 2020-12-24 PROCEDURE — P9016: CPT

## 2020-12-24 PROCEDURE — 81001 URINALYSIS AUTO W/SCOPE: CPT

## 2020-12-24 PROCEDURE — 97760 ORTHOTIC MGMT&TRAING 1ST ENC: CPT

## 2020-12-24 PROCEDURE — 85610 PROTHROMBIN TIME: CPT

## 2020-12-24 PROCEDURE — C9399: CPT

## 2020-12-24 PROCEDURE — 85027 COMPLETE CBC AUTOMATED: CPT

## 2020-12-24 PROCEDURE — 86923 COMPATIBILITY TEST ELECTRIC: CPT

## 2020-12-24 PROCEDURE — 83735 ASSAY OF MAGNESIUM: CPT

## 2020-12-24 PROCEDURE — 82533 TOTAL CORTISOL: CPT

## 2020-12-24 PROCEDURE — 84132 ASSAY OF SERUM POTASSIUM: CPT

## 2020-12-24 PROCEDURE — 97116 GAIT TRAINING THERAPY: CPT

## 2020-12-24 PROCEDURE — 82550 ASSAY OF CK (CPK): CPT

## 2020-12-24 PROCEDURE — 85730 THROMBOPLASTIN TIME PARTIAL: CPT

## 2020-12-24 PROCEDURE — 86900 BLOOD TYPING SEROLOGIC ABO: CPT

## 2020-12-24 PROCEDURE — 82803 BLOOD GASES ANY COMBINATION: CPT

## 2020-12-24 PROCEDURE — 84295 ASSAY OF SERUM SODIUM: CPT

## 2020-12-24 PROCEDURE — 80053 COMPREHEN METABOLIC PANEL: CPT

## 2020-12-24 PROCEDURE — U0003: CPT

## 2020-12-24 PROCEDURE — 73060 X-RAY EXAM OF HUMERUS: CPT

## 2020-12-24 PROCEDURE — 71045 X-RAY EXAM CHEST 1 VIEW: CPT

## 2020-12-24 PROCEDURE — 97530 THERAPEUTIC ACTIVITIES: CPT

## 2020-12-24 PROCEDURE — 93306 TTE W/DOPPLER COMPLETE: CPT

## 2020-12-24 PROCEDURE — 85014 HEMATOCRIT: CPT

## 2020-12-24 PROCEDURE — 87186 SC STD MICRODIL/AGAR DIL: CPT

## 2020-12-24 PROCEDURE — 83880 ASSAY OF NATRIURETIC PEPTIDE: CPT

## 2020-12-24 PROCEDURE — 97110 THERAPEUTIC EXERCISES: CPT

## 2020-12-24 PROCEDURE — 82565 ASSAY OF CREATININE: CPT

## 2020-12-24 PROCEDURE — 82553 CREATINE MB FRACTION: CPT

## 2020-12-24 PROCEDURE — C1713: CPT

## 2020-12-24 PROCEDURE — 94640 AIRWAY INHALATION TREATMENT: CPT

## 2020-12-24 PROCEDURE — 86769 SARS-COV-2 COVID-19 ANTIBODY: CPT

## 2020-12-24 PROCEDURE — 84484 ASSAY OF TROPONIN QUANT: CPT

## 2020-12-24 PROCEDURE — 87040 BLOOD CULTURE FOR BACTERIA: CPT

## 2020-12-24 PROCEDURE — 76000 FLUOROSCOPY <1 HR PHYS/QHP: CPT

## 2020-12-24 PROCEDURE — 97161 PT EVAL LOW COMPLEX 20 MIN: CPT

## 2020-12-24 PROCEDURE — 85025 COMPLETE CBC W/AUTO DIFF WBC: CPT

## 2020-12-24 PROCEDURE — 84443 ASSAY THYROID STIM HORMONE: CPT

## 2020-12-24 PROCEDURE — 84100 ASSAY OF PHOSPHORUS: CPT

## 2020-12-24 RX ORDER — ATORVASTATIN CALCIUM 80 MG/1
1 TABLET, FILM COATED ORAL
Qty: 0 | Refills: 0 | DISCHARGE
Start: 2020-12-24

## 2020-12-24 RX ORDER — ALPRAZOLAM 0.25 MG
1 TABLET ORAL
Qty: 0 | Refills: 0 | DISCHARGE
Start: 2020-12-24

## 2020-12-24 RX ORDER — POLYETHYLENE GLYCOL 3350 17 G/17G
17 POWDER, FOR SOLUTION ORAL
Qty: 0 | Refills: 0 | DISCHARGE
Start: 2020-12-24

## 2020-12-24 RX ORDER — FOLIC ACID 0.8 MG
1 TABLET ORAL
Qty: 0 | Refills: 0 | DISCHARGE
Start: 2020-12-24

## 2020-12-24 RX ORDER — OXYCODONE HYDROCHLORIDE 5 MG/1
0.5 TABLET ORAL
Qty: 0 | Refills: 0 | DISCHARGE

## 2020-12-24 RX ORDER — LANOLIN ALCOHOL/MO/W.PET/CERES
1 CREAM (GRAM) TOPICAL
Qty: 0 | Refills: 0 | DISCHARGE
Start: 2020-12-24

## 2020-12-24 RX ORDER — LORATADINE 10 MG/1
1 TABLET ORAL
Qty: 0 | Refills: 0 | DISCHARGE
Start: 2020-12-24

## 2020-12-24 RX ORDER — ACETAMINOPHEN 500 MG
2 TABLET ORAL
Qty: 0 | Refills: 0 | DISCHARGE
Start: 2020-12-24

## 2020-12-24 RX ORDER — OXYCODONE HYDROCHLORIDE 5 MG/1
1 TABLET ORAL
Qty: 0 | Refills: 0 | DISCHARGE
Start: 2020-12-24

## 2020-12-24 RX ORDER — TRAMADOL HYDROCHLORIDE 50 MG/1
1 TABLET ORAL
Qty: 0 | Refills: 0 | DISCHARGE
Start: 2020-12-24

## 2020-12-24 RX ORDER — APIXABAN 2.5 MG/1
1 TABLET, FILM COATED ORAL
Qty: 60 | Refills: 0
Start: 2020-12-24 | End: 2021-01-22

## 2020-12-24 RX ADMIN — Medication 975 MILLIGRAM(S): at 11:06

## 2020-12-24 RX ADMIN — Medication 975 MILLIGRAM(S): at 11:35

## 2020-12-24 RX ADMIN — CEFTRIAXONE 100 MILLIGRAM(S): 500 INJECTION, POWDER, FOR SOLUTION INTRAMUSCULAR; INTRAVENOUS at 11:06

## 2020-12-24 RX ADMIN — LORATADINE 10 MILLIGRAM(S): 10 TABLET ORAL at 08:22

## 2020-12-24 RX ADMIN — Medication 0.5 MILLIGRAM(S): at 06:49

## 2020-12-24 RX ADMIN — ENOXAPARIN SODIUM 80 MILLIGRAM(S): 100 INJECTION SUBCUTANEOUS at 06:49

## 2020-12-24 RX ADMIN — Medication 975 MILLIGRAM(S): at 06:49

## 2020-12-24 RX ADMIN — OXYCODONE HYDROCHLORIDE 5 MILLIGRAM(S): 5 TABLET ORAL at 07:20

## 2020-12-24 RX ADMIN — Medication 1 MILLIGRAM(S): at 11:50

## 2020-12-24 RX ADMIN — Medication 0.5 MILLIGRAM(S): at 11:50

## 2020-12-24 RX ADMIN — Medication 1 TABLET(S): at 11:50

## 2020-12-24 RX ADMIN — OXYCODONE HYDROCHLORIDE 5 MILLIGRAM(S): 5 TABLET ORAL at 06:50

## 2020-12-24 NOTE — DISCHARGE NOTE NURSING/CASE MANAGEMENT/SOCIAL WORK - PATIENT PORTAL LINK FT
You can access the FollowMyHealth Patient Portal offered by [facility] by registering at the following website: [email or web address]. By joining [employer]’s FollowMyHealth portal, you will also be able to view your health information using other applications (apps) compatible with our system.

## 2020-12-24 NOTE — PROGRESS NOTE ADULT - PROBLEM SELECTOR PROBLEM 5
Traumatic rhabdomyolysis, initial encounter
Traumatic rhabdomyolysis, initial encounter
Syncope, unspecified syncope type
LEIF (acute kidney injury)
Syncope, unspecified syncope type
Traumatic rhabdomyolysis, initial encounter
LEIF (acute kidney injury)
LEIF (acute kidney injury)

## 2020-12-24 NOTE — PROGRESS NOTE ADULT - PROBLEM SELECTOR PLAN 5
appreciate cardio recs  TTE reviewed
resolved with IVF  UA positive  urine cx ecoli  ID following, cont ceftriaxone 5d course
resolved with IVF  UA positive  urine cx ecoli  ID following, cont ceftriaxone 5d course
stable creat  ck downtrending
resolved with IVF  UA positive  urine cx ecoli  ID following, cont ceftriaxone 5d course
stable creat  ck downtrending
appreciate cardio recs  TTE reviewed
TTE  cardio consult
appreciate cardio recs  TTE
appreciate cardio recs  TTE reviewed
stable creat  ck downtrending

## 2020-12-24 NOTE — PROGRESS NOTE ADULT - SUBJECTIVE AND OBJECTIVE BOX
Patient is a 70y old  Female who presents with a chief complaint of left humerus fracture (23 Dec 2020 12:24)      SUBJECTIVE / OVERNIGHT EVENTS:    Patient seen and examined. denies cp sob. passed TOV.       Vital Signs Last 24 Hrs  T(C): 36.8 (24 Dec 2020 08:50), Max: 36.9 (23 Dec 2020 14:26)  T(F): 98.2 (24 Dec 2020 08:50), Max: 98.4 (23 Dec 2020 14:26)  HR: 72 (24 Dec 2020 08:50) (70 - 88)  BP: 154/75 (24 Dec 2020 08:50) (127/69 - 155/79)  BP(mean): --  RR: 19 (24 Dec 2020 08:50) (19 - 19)  SpO2: 94% (24 Dec 2020 08:50) (94% - 97%)  I&O's Summary    23 Dec 2020 07:01  -  24 Dec 2020 07:00  --------------------------------------------------------  IN: 1200 mL / OUT: 600 mL / NET: 600 mL    24 Dec 2020 07:01  -  24 Dec 2020 10:35  --------------------------------------------------------  IN: 474 mL / OUT: 0 mL / NET: 474 mL        PE:  GENERAL: NAD, AAOx1-2, obese  HEAD:  Atraumatic, Normocephalic  CHEST/LUNG: CTABL, No wheeze  HEART: Regular rate and rhythm; no murmur  ABDOMEN: Soft, Nontender, Nondistended; Bowel sounds present  EXTREMITIES:  left arm dressing over shoulder upper arm, splint  SKIN: No rashes or lesions  NEURO: No focal deficits    LABS:                        10.0   9.22  )-----------( 334      ( 23 Dec 2020 09:47 )             33.9     12-23    142  |  106  |  21  ----------------------------<  107<H>  3.7   |  25  |  0.82    Ca    9.0      23 Dec 2020 09:47        CAPILLARY BLOOD GLUCOSE                RADIOLOGY & ADDITIONAL TESTS:    Imaging Personally Reviewed:  [x] YES  [ ] NO    Consultant(s) Notes Reviewed:  [x] YES  [ ] NO    MEDICATIONS  (STANDING):  acetaminophen   Tablet .. 975 milliGRAM(s) Oral every 6 hours  ALPRAZolam 0.5 milliGRAM(s) Oral four times a day  atorvastatin 40 milliGRAM(s) Oral at bedtime  cefTRIAXone   IVPB 1000 milliGRAM(s) IV Intermittent every 24 hours  cefTRIAXone   IVPB      enoxaparin Injectable 80 milliGRAM(s) SubCutaneous two times a day  folic acid 1 milliGRAM(s) Oral daily  influenza   Vaccine 0.5 milliLiter(s) IntraMuscular once  loratadine 10 milliGRAM(s) Oral daily  melatonin 3 milliGRAM(s) Oral at bedtime  multivitamin 1 Tablet(s) Oral daily  ondansetron Injectable 4 milliGRAM(s) IV Push once  polyethylene glycol 3350 17 Gram(s) Oral daily    MEDICATIONS  (PRN):  oxyCODONE    IR 5 milliGRAM(s) Oral every 4 hours PRN Severe Pain (7 - 10)  oxyCODONE    IR 2.5 milliGRAM(s) Oral every 4 hours PRN Moderate Pain (4 - 6)  traMADol 50 milliGRAM(s) Oral every 6 hours PRN breakthrough pain      Care Discussed with Consultants/Other Providers [x] YES  [ ] NO    HEALTH ISSUES - PROBLEM Dx:  Acute deep vein thrombosis (DVT) of calf muscle vein of both lower extremities  Acute deep vein thrombosis (DVT) of calf muscle vein of both lower extremities    Preop cardiovascular exam  Preop cardiovascular exam    Alcohol abuse    Delirium due to another medical condition    LEIF (acute kidney injury)  LEIF (acute kidney injury)    Suspected pulmonary embolism    Suspected deep vein thrombosis (DVT)    Agitation  Agitation    Syncope, unspecified syncope type  Syncope, unspecified syncope type    Traumatic rhabdomyolysis, initial encounter  Traumatic rhabdomyolysis, initial encounter    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

## 2020-12-24 NOTE — PROGRESS NOTE ADULT - ASSESSMENT
70F pmhx HTN, obesity, COPD, anxiety, agitation, R TKR, recent L reverse total shoulder replacement (@ OSH), who was BIBEMS yesterday as a level 2 trauma after being found down, admitted to SICU with left humerus periprosthetic comminuted fracture, c/b rhabdo, left AMA to [facility] parking lot by wheelchair, now back to hospital for debility, found to be hypotensive to SBP 60-70.

## 2020-12-24 NOTE — PROGRESS NOTE ADULT - PROBLEM SELECTOR PROBLEM 3
Acute deep vein thrombosis (DVT) of calf muscle vein of both lower extremities
Traumatic rhabdomyolysis, initial encounter
R/O Essential hypertension
Traumatic rhabdomyolysis, initial encounter
Traumatic rhabdomyolysis, initial encounter
Acute deep vein thrombosis (DVT) of calf muscle vein of both lower extremities
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
Traumatic rhabdomyolysis, initial encounter
R/O Essential hypertension
R/O Essential hypertension
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
Traumatic rhabdomyolysis, initial encounter
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

## 2020-12-24 NOTE — PROGRESS NOTE ADULT - PROBLEM SELECTOR PLAN 3
splint in place  appreciate ortho recs  sp surgical fixation of left humerus fracture with Dr. Palma 12/17  pain control PT eval  bl soleal DVT started on lovenox full dose  CTA ro PE urgent- no PE, fu pulm recs  pending TTE- reviewed preserved lVEF and RV function  pt medically optimized for the planned procedure  cards fu noted- no absolute cardiac contraindications for the procedure  vasc cards and pulm fu noted
splint in place  appreciate ortho recs  sp surgical fixation of left humerus fracture with Dr. Palma 12/17  pain control PT eval  bl soleal DVT started on lovenox full dose  CTA ro PE urgent- no PE, fu pulm recs  pending TTE- reviewed preserved lVEF and RV function  vasc cards and pulm fu noted
splint in place  appreciate ortho recs  sp surgical fixation of left humerus fracture with Dr. Palma 12/17  pain control PT eval  bl soleal DVT started on lovenox full dose  CTA ro PE urgent- no PE, fu pulm recs  pending TTE- reviewed preserved lVEF and RV function  vasc cards and pulm fu noted
stable creat  ck downtrending
stable creat  ck downtrending
-  - vascular cardiology eval noted.  - currently maintained on therapeutic dose lovenox.  - change to eliquis prior to discharge.    Andry Avelra M.D., [facility]  [phone]
-  - vascular cardiology eval noted.  - currently maintained on therapeutic dose lovenox.  - monitor h/h (slight drop today)  - change to eliquis prior to discharge.    Andry Avelar M.D., [facility]  [phone]
labile BP as above  amlodipine dced dt symptomatic orthostasis
stable creat  ck downtrending
labile BP as above  amlodipine dced dt symptomatic orthostasis
stable creat  ck downtrending
stable creat  ck downtrending, cont to trend
labile BP as above  amlodipine dced dt symptomatic orthostasis

## 2020-12-24 NOTE — PROGRESS NOTE ADULT - REASON FOR ADMISSION
left humerus fracture
L humerus periprosthetic fracture (Rev TSR)
left humerus fracture

## 2020-12-24 NOTE — PROGRESS NOTE ADULT - PROBLEM SELECTOR PLAN 4
splint in place  appreciate ortho recs  sp surgical fixation of left humerus fracture with Dr. Palma 12/17  pain control PT eval  bl soleal DVT started on lovenox full dose- switch to eliquis upon discharge  CTA ro PE urgent- no PE, fu pulm recs  pending TTE- reviewed preserved lVEF and RV function  vasc cards and pulm fu noted
stable creat  ck downtrending
stable creat  ck downtrending
splint in place  appreciate ortho recs  sp surgical fixation of left humerus fracture with Dr. Palma 12/17  pain control PT   bl soleal DVT started on lovenox full dose- switch to eliquis upon discharge  CTA no PE  TTE preserved lVEF and RV function
resolved with IVF  UA positive  urine cx ecoli  ID following, cont ceftriaxone
resolved with IVF  UA positive  urine cx ecoli  ID following, cont ceftriaxone 5d course
stable creat  ck downtrending
resolved with IVF  UA positive  fu urine cx  ID consult
resolved with IVF  UA positive  urine cx ecoli  ID following, cont ceftriaxone
resolved with IVF  UA positive  urine cx ecoli  ID following, cont ceftriaxone
splint in place  appreciate ortho recs  sp surgical fixation of left humerus fracture with Dr. Palma 12/17  pain control PT   bl soleal DVT started on lovenox full dose- switch to eliquis upon discharge  CTA no PE  TTE preserved lVEF and RV function

## 2020-12-24 NOTE — PROGRESS NOTE ADULT - PROBLEM SELECTOR PLAN 7
appreciate cardio recs  TTE reviewed
delirium and agitation   cont seroquel 25 TID standing and prn   psych consult for fu  CIWA protocol symptom triggered done
appreciate cardio recs  TTE reviewed
stable but mildly confused at times  cont seroquel 25 TID standing and prn   psych consult noted- fu recs  CIWA protocol prn ativan
stable but mildly confused at times  cont seroquel 25 TID standing and prn   psych consult noted- fu recs  CIWA protocol prn ativan
appreciate cardio recs  TTE reviewed

## 2020-12-24 NOTE — PROGRESS NOTE ADULT - PROBLEM SELECTOR PLAN 1
-  - Echo was technically limited. Grossly normal LV and RV size and systolic function per report.  - agree with cardiac event monitoring as outpatient.  - check orthostatics.
resolved
resolved
BP improved sp IVF resuscitation
BP improved sp IVF resuscitation  avoid taking blood pressure in legs
NWB LUE in sling  IS  DVT PPx per primary team  OR if patient stays in house later this week  Pain Control  Continue Current Tx.  Left in care of primary team  ICU consult getting obtained per team    Domenic Marroquin PA-C  Team Pager: #[phone]
BP improved sp IVF resuscitation
BP improved sp IVF resuscitation  avoid taking blood pressure in legs
-  - Unclear etiology.  - check orthostatics when able.  - Echo was technically limited. Grossly normal LV and RV size and systolic function per report.  - agree with cardiac event monitoring as outpatient.
BP improved sp IVF resuscitation
-  -Unclear etiology- possible neurocardiogenic etiology.  -rule out orthostatic hypotension when amenable  -ecg  unchanged from baseline  -nuclear stress test 12/2019 @ [facility] unremarkable.  -TTE shows no significant abnormalities.  -will need cardiac event monitoring as outpatient.
BP improved sp IVF resuscitation  avoid taking blood pressure in legs
BP improved sp IVF resuscitation  avoid taking blood pressure in legs
BP improved sp IVF resuscitation
resolved

## 2020-12-24 NOTE — PROGRESS NOTE ADULT - PROBLEM SELECTOR PROBLEM 1
Syncope, unspecified syncope type
Syncope, unspecified syncope type
Humeral fracture
Shock
Syncope, unspecified syncope type
Shock
Shock

## 2020-12-24 NOTE — PROGRESS NOTE ADULT - PROBLEM SELECTOR PLAN 8
delirium and agitation   dc seroquel and haldol, started xanax standing per psych recs  psych signed off
delirium and agitation   dc seroquel and haldol, started xanax standing per psych recs  psych signed off
delirium and agitation   dc seroquel and haldol, started xanax standing per psych recs  fu psych recs  sp chuck protocol

## 2020-12-24 NOTE — PROGRESS NOTE ADULT - PROBLEM SELECTOR PROBLEM 4
Traumatic rhabdomyolysis, initial encounter
LEIF (acute kidney injury)
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
Traumatic rhabdomyolysis, initial encounter
LEIF (acute kidney injury)
LEIF (acute kidney injury)
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
LEIF (acute kidney injury)
LEIF (acute kidney injury)
Traumatic rhabdomyolysis, initial encounter

## 2020-12-24 NOTE — PROGRESS NOTE ADULT - PROBLEM SELECTOR PROBLEM 7
Agitation
Syncope, unspecified syncope type
Agitation
Syncope, unspecified syncope type
Agitation
Syncope, unspecified syncope type

## 2020-12-24 NOTE — PROGRESS NOTE ADULT - PROBLEM SELECTOR PROBLEM 6
Syncope, unspecified syncope type
Agitation
Syncope, unspecified syncope type
Syncope, unspecified syncope type
LEIF (acute kidney injury)
Agitation
LEIF (acute kidney injury)
Agitation
LEIF (acute kidney injury)

## 2020-12-24 NOTE — PROGRESS NOTE ADULT - PROBLEM SELECTOR PROBLEM 2
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
R/O Essential hypertension
Essential hypertension
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
Essential hypertension
R/O Hypotension
Essential hypertension
R/O Hypotension
R/O Essential hypertension
R/O Essential hypertension
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
R/O Hypotension

## 2020-12-24 NOTE — PROGRESS NOTE ADULT - PROBLEM SELECTOR PLAN 6
resolved with IVF  UA positive  urine cx ecoli  ID following, cont ceftriaxone 10d course, finished today 10 day course  passed TOV

## 2020-12-24 NOTE — PROGRESS NOTE ADULT - PROVIDER SPECIALTY LIST ADULT
Infectious Disease
Infectious Disease
Internal Medicine
Internal Medicine
Orthopedics
Pulmonology
Vascular Cardiology
Vascular Cardiology
Infectious Disease
Orthopedics
Pulmonology
Pulmonology
Orthopedics
Internal Medicine
Cardiology
Internal Medicine
Cardiology
Cardiology
Internal Medicine

## 2020-12-28 PROCEDURE — 72125 CT NECK SPINE W/O DYE: CPT

## 2020-12-28 PROCEDURE — 82435 ASSAY OF BLOOD CHLORIDE: CPT

## 2020-12-28 PROCEDURE — 84484 ASSAY OF TROPONIN QUANT: CPT

## 2020-12-28 PROCEDURE — 85014 HEMATOCRIT: CPT

## 2020-12-28 PROCEDURE — 99285 EMERGENCY DEPT VISIT HI MDM: CPT

## 2020-12-28 PROCEDURE — U0003: CPT

## 2020-12-28 PROCEDURE — 82553 CREATINE MB FRACTION: CPT

## 2020-12-28 PROCEDURE — 83605 ASSAY OF LACTIC ACID: CPT

## 2020-12-28 PROCEDURE — 85610 PROTHROMBIN TIME: CPT

## 2020-12-28 PROCEDURE — 80053 COMPREHEN METABOLIC PANEL: CPT

## 2020-12-28 PROCEDURE — 82947 ASSAY GLUCOSE BLOOD QUANT: CPT

## 2020-12-28 PROCEDURE — 86901 BLOOD TYPING SEROLOGIC RH(D): CPT

## 2020-12-28 PROCEDURE — 70450 CT HEAD/BRAIN W/O DYE: CPT

## 2020-12-28 PROCEDURE — 82803 BLOOD GASES ANY COMBINATION: CPT

## 2020-12-28 PROCEDURE — 82550 ASSAY OF CK (CPK): CPT

## 2020-12-28 PROCEDURE — 73080 X-RAY EXAM OF ELBOW: CPT

## 2020-12-28 PROCEDURE — 71260 CT THORAX DX C+: CPT

## 2020-12-28 PROCEDURE — 84132 ASSAY OF SERUM POTASSIUM: CPT

## 2020-12-28 PROCEDURE — 73030 X-RAY EXAM OF SHOULDER: CPT

## 2020-12-28 PROCEDURE — 73060 X-RAY EXAM OF HUMERUS: CPT

## 2020-12-28 PROCEDURE — 86850 RBC ANTIBODY SCREEN: CPT

## 2020-12-28 PROCEDURE — 73200 CT UPPER EXTREMITY W/O DYE: CPT

## 2020-12-28 PROCEDURE — 82140 ASSAY OF AMMONIA: CPT

## 2020-12-28 PROCEDURE — 72170 X-RAY EXAM OF PELVIS: CPT

## 2020-12-28 PROCEDURE — 76377 3D RENDER W/INTRP POSTPROCES: CPT

## 2020-12-28 PROCEDURE — 73090 X-RAY EXAM OF FOREARM: CPT

## 2020-12-28 PROCEDURE — 85730 THROMBOPLASTIN TIME PARTIAL: CPT

## 2020-12-28 PROCEDURE — 85025 COMPLETE CBC W/AUTO DIFF WBC: CPT

## 2020-12-28 PROCEDURE — 71045 X-RAY EXAM CHEST 1 VIEW: CPT

## 2020-12-28 PROCEDURE — 86900 BLOOD TYPING SEROLOGIC ABO: CPT

## 2020-12-28 PROCEDURE — 85018 HEMOGLOBIN: CPT

## 2020-12-28 PROCEDURE — 93308 TTE F-UP OR LMTD: CPT

## 2020-12-28 PROCEDURE — 82330 ASSAY OF CALCIUM: CPT

## 2020-12-28 PROCEDURE — 84295 ASSAY OF SERUM SODIUM: CPT

## 2020-12-28 PROCEDURE — 74177 CT ABD & PELVIS W/CONTRAST: CPT

## 2020-12-28 PROCEDURE — 84443 ASSAY THYROID STIM HORMONE: CPT

## 2020-12-29 LAB
AMPHET UR-MCNC: NEGATIVE — SIGNIFICANT CHANGE UP
BARBITURATES, URINE.: NEGATIVE — SIGNIFICANT CHANGE UP
BENZODIAZ UR-MCNC: SIGNIFICANT CHANGE UP
COCAINE METAB.OTHER UR-MCNC: NEGATIVE — SIGNIFICANT CHANGE UP
CREATININE, URINE THERAPEUTIC: 191.1 MG/DL — SIGNIFICANT CHANGE UP
METHADONE UR-MCNC: NEGATIVE — SIGNIFICANT CHANGE UP
METHAQUALONE UR QL: NEGATIVE — SIGNIFICANT CHANGE UP
METHAQUALONE UR-MCNC: NEGATIVE — SIGNIFICANT CHANGE UP
OPIATES UR-MCNC: NEGATIVE — SIGNIFICANT CHANGE UP
PCP UR-MCNC: NEGATIVE — SIGNIFICANT CHANGE UP
PROPOXYPH UR QL: NEGATIVE — SIGNIFICANT CHANGE UP
THC UR QL: NEGATIVE — SIGNIFICANT CHANGE UP

## 2021-01-03 NOTE — PHARMACOTHERAPY INTERVENTION NOTE - NSPHARMCOMMPTEDU
Pt oriented to room on arrival, needs reinforcement. In [facility] bed. Bed alarm on.   On bipap.    Patient Education - Discharge Counseling

## 2021-03-24 NOTE — PHYSICAL THERAPY INITIAL EVALUATION ADULT - PHYSICAL ASSIST/NONPHYSICAL ASSIST: GAIT, REHAB EVAL
COVID-19 Screening:    • Does the patient OR patient’s household members have any of the following symptoms?  o Temperature: Fever ?100.0°F or ?37.8°C?  No  o Respiratory symptoms: New or worsening cough, shortness of breath, difficulty breathing, or sore throat? No  o GI symptoms: New onset of nausea, vomiting or diarrhea?  No  o Miscellaneous: New onset of loss of taste or smell, chills, repeated shaking with chills, muscle pain, headache, congestion or runny nose?  No  • Has the patient or a household member tested positive for COVID-19 in the last 14 days?  No  • Has the patient or a household member been tested for COVID-19 and are waiting for the results?  No    Spoke to wife     1 person assist

## 2021-04-07 NOTE — H&P ADULT - PROBLEM SELECTOR PROBLEM 1
[facility] SP[facility]ITUAL C[facility] PROGRESS NOTE - [address]  COVID-19+ PATIENT      Room # 1109/1109-01   Name: Radha Nelson               Reason for visit: Routine    I called the patient in room. Admit Date & Time: 4/6/2021  4:06 PM    Assessment:  Radha Nelson is a 61 y.o. female in the hospital because of COVID-19. Upon calling the pt, she seemed anxious and said that she \"is not doing the best.\"   She states she has good support system with her daughter who was talking to her on her cell phone when I called her room phone. The pt belongs to a Jewish though I could not make out which one over the phone. Intervention:  I introduced myself and my title as  I offered space for the pt  to express feelings, needs, and concerns and provided a ministry presence. I nurtured hope and actively listened to pt concern. Outcome: The pt was grateful and receptive. Plan:  Chaplains will remain available to offer spiritual and emotional support as needed by phone/virtually. 04/07/21 1639   Encounter Summary   Services provided to: Patient   Referral/Consult From: [address] [facility];Spouse   Continue Visiting   (4/7/21 covid call in room)   Complexity of Encounter Moderate   Length of Encounter 15 minutes   Spiritual Assessment Completed Yes   Routine   Type Initial   Assessment Anxious; Fearful;Helplessness   Intervention Active listening;Explored feelings, thoughts, concerns;Nurtured hope;Sustaining presence/ Ministry of presence; Discussed illness/injury and it's impact; Develop care plan   Outcome Expressed gratitude;Expressed feelings/needs/concerns; Less anxious, less agitated;Receptive       Electronically signed by Jackelyn Ureña on 4/7/2021 at 4:41 PM.  Abdelrahman Acute respiratory failure with hypoxia

## 2021-04-28 NOTE — BEHAVIORAL HEALTH ASSESSMENT NOTE - NS ED BHA MSE GENERAL APPEARANCE
Peripheral Block    Patient location during procedure: pre-op  Staffing  Performed: anesthesiologist   Anesthesiologist: Rebel Nunez MD  Preanesthetic Checklist  Completed: patient identified, IV checked, site marked, risks and benefits discussed, surgical consent, monitors and equipment checked, pre-op evaluation, timeout performed, anesthesia consent given, oxygen available and patient being monitored  Peripheral Block  Patient position: supine  Prep: ChloraPrep  Patient monitoring: IV access  Block type: Saphenous  Laterality: left  Injection technique: single-shot  Guidance: ultrasound guided  Provider prep: mask and sterile gloves  Needle  Needle type: combined needle/nerve stimulator   Needle localization: ultrasound guidance  Assessment  Injection assessment: negative aspiration for heme, no paresthesia on injection and local visualized surrounding nerve on ultrasound  Paresthesia pain: none  Slow fractionated injection: yes  Hemodynamics: stable  Medications Administered  Ropivacaine (NAROPIN) injection 0.5%, 20 mL  Reason for block: post-op pain management
Well developed

## 2021-06-14 NOTE — PATIENT PROFILE ADULT. - BRADEN SCORE (IF 18 OR LESS ACTIVATE SKIN INJURY RISK INCREASED GUIDELINE), MLM
Detail Level: Zone
Products Recommended: Daily facial moisturizer - Cetaphil oil control moisturizer with SPF 30
General Sunscreen Counseling: I recommended over-the-counter SPF 30 or higher sunscreen applied prior to going outdoors, even on cloudy days, and the use of broad-brimmed hats and sun-protective clothing. I advised that sunscreen should be reapplied every 2 hours, and immediately after swimming. I recommended the use of a daily facial moisturizer containing SPF 30 or higher sunscreen.
20

## 2021-06-15 NOTE — ED PROVIDER NOTE - CCCP TRG CHIEF CMPLNT
Chief Complaint   Patient presents with   • Anxiety       HISTORY OF PRESENT ILLNESS: Patient is a 18 y.o. female established patient who presents today to discuss the following issues:    Anxiety  Patient was started on fluoxetine 10 mg a few months ago. She states that she was only noticing a slight difference when taking it. She was supposed to follow up 2 weeks after starting the medication for dose adjustment but she failed to make that appointment. She states that she missed a dose and was not sure what to do so she quit taking the medication.  She is here today with a recurrence of her symptoms. We will start her on 20 mg fluoxetine since she was able to handle the 10 mg without an increase in her symptoms. She has an appointment scheduled for 2 weeks for reevaluation.    Current mild episode of major depressive disorder (HCC)  Restarted on fluoxetine and dose increased to 20 mg. Follow up in office in 2 weeks.      Patient Active Problem List    Diagnosis Date Noted   • Encounter for routine gynecological examination with Papanicolaou smear of cervix 03/25/2021   • Hair loss 12/21/2020   • Routine screening for STI (sexually transmitted infection) 10/06/2020   • Abnormal WBC count 06/16/2020   • Anxiety 06/04/2020   • Current mild episode of major depressive disorder (HCC) 06/04/2020   • Well adult exam 01/02/2020   • Encounter for surveillance of implantable subdermal contraceptive 07/10/2017       Allergies:Patient has no known allergies.    Current Outpatient Medications   Medication Sig Dispense Refill   • FLUoxetine (PROZAC) 20 MG Cap Take 1 capsule by mouth every day. 60 capsule 1   • terbinafine (LAMISIL) 250 MG Tab TAKE 1 TABLET BY MOUTH ONCE DAILY FOR 7 DAYS THEN HOLD FOR 3 WEEKS. REPEAT MONTHLY FOR 4 MONTHS.     • etonogestrel (NEXPLANON) 68 MG Implant implant Inject 1 Each as instructed Once.       No current facility-administered medications for this visit.       Hospital Outpatient Visit on  "2021   Component Date Value Ref Range Status   • Cytology Reg 2021 See Path Report   Final    Comment: A separate pathology report will follow after the Cytology  specimen has been received by the laboratory and the results  are signed out by a pathologist.  Please see \"Pathology GYN Specimen\" order for these results.     • Source 2021 Cervical   Final   • HPV Genotype 16 2021 Negative  Negative Final   • HPV Genotype 18 2021 Negative  Negative Final   • HPV Other High Risk Genotypes 2021 Negative  Negative Final    Comment: This test detects the high-risk HPV types 16, 18, 31, 33,  35, 39, 45, 51, 52, 56, 58, 59, 66, and 68. It is intended  for use in women 21 years and older with ASC-US cervical  cytology results and in women 30 years and older with  positive high-risk HPV results. Sensitivity may be affected  by specimen collection methods, stage of infection, and the  presence of interfering substances. Results should be  interpreted in conjunction with other available laboratory  and clinical data.     ]    The ASCVD Risk score (Corning DC Jr., et al., 2013) failed to calculate for the following reasons:    The 2013 ASCVD risk score is only valid for ages 40 to 79    Social History     Tobacco Use   • Smoking status: Former Smoker     Packs/day: 1.00     Years: 2.00     Pack years: 2.00     Types: Cigarettes     Start date:      Quit date: 2020     Years since quittin.4   • Smokeless tobacco: Never Used   Vaping Use   • Vaping Use: Some days   • Start date: 2017   • Substances: Nicotine, THC, Flavoring, Nicotine - 50 mg?   • Devices: RefCuipoble tank   Substance Use Topics   • Alcohol use: No     Alcohol/week: 0.0 oz   • Drug use: Yes     Frequency: 5.0 times per week     Types: Marijuana, Inhaled     Comment: vape and smoke       Family Status   Relation Name Status   • Mo  Alive        33 in ,  depression   • Fa unknown Alive        43 in    • MGFa  Alive "   • Bro  Alive   • MAunt  Alive   • MGMo  Alive        thyroid problems.    • Bro  Alive     Family History   Problem Relation Age of Onset   • Other Mother         migraine headaches   • Migraines Mother    • Diabetes Maternal Grandfather    • No Known Problems Brother    • Psychiatric Illness Maternal Aunt    • Cancer Maternal Grandmother    • Thyroid Maternal Grandmother    • No Known Problems Brother        Patient's last menstrual period was 05/15/2021.    Health Maintenance Summary                COVID-19 Vaccine Overdue 8/24/2013     IMM MENINGOCOCCAL B VACCINE HEALTHY PATIENTS AGED 16 TO 23 Next Due 8/16/2021      Done 2/16/2021 Imm Admin: MENING VAC SERO B 2-3 DOSE SCHED IM (TRUMENBA)    IMM INFLUENZA Next Due 9/1/2021     CHLAMYDIA SCREENING Next Due 10/7/2021      Done 10/7/2020 CHLAMYDIA/GC PCR URINE OR SWAB    IMM DTaP/Tdap/Td Vaccine Next Due 11/5/2022      Done 11/5/2012 Imm Admin: Tdap Vaccine     Patient has more history with this topic...           Review of Systems:   Constitutional: Negative for fever, chills, weight change, fatigue, loss of appetite.  HNT: Negative for nosebleeds, congestion, odynophagia, sore throat or changes in taste.    Eyes: Negative for vision changes.   Ears: Negative for recent changes in hearing, pain or discharge.  Neck: Negative for pain, swelling, lumps or goiter.  Respiratory: Negative for cough, sputum production, shortness of breath and wheezing.    Cardiovascular: Negative for chest pain, palpitations, orthopnea and leg swelling.   Gastrointestinal: Negative for constipation, diarrhea, heartburn, dysphagia, nausea, vomiting or abdominal pain.   Genitourinary: Negative for dysuria, urgency and frequency.   Musculoskeletal: Negative for myalgias, joint pain, and back pain.  Skin: Negative for skin, hair or nail changes, rash, itching.   Neurological: Negative for dizziness, tingling, tremors, sensory change, gait/coordination changes, focal weakness and headaches.  "  Endo/Heme/Allergies: Does not bruise/bleed easily.   Psychiatric/Behavioral: Positive for anxiety and depression. Negative for suicidal ideas and memory loss.  The patient does not have insomnia.        Exam:  /60 (BP Location: Right arm, Patient Position: Sitting, BP Cuff Size: Child)   Pulse 87   Temp 36.9 °C (98.4 °F) (Temporal)   Ht 1.6 m (5' 3\")   Wt 51.7 kg (114 lb)   SpO2 100%  Body mass index is 20.19 kg/m².  General:  Well nourished, well developed female. Body mass index is 20.19 kg/m². No apparent distress.  Eyes: EOM intact, PERRL, conjunctiva non-injected, sclera non-icteric.  Neck: Supple with no cervical lymphadenopathy, JVD, palpable thyroid nodules or carotid bruits.  Pulmonary: Clear to ausculation bilaterally. Normal effort. No rales, ronchi, or wheezing.  Cardiovascular: Regular rate and rhythm without murmur, rub or gallop.   Extremities: Full range of motion. Warm and well perfused with no edema.  Skin: Intact with no obvious rashes or lesions.  Neuro: Cranial nerves I-XII intact.  Psych: Alert and oriented x 3.  Appropriately dressed. Mood and affect appropriate.      Assessment/Plan:  1. Anxiety  FLUoxetine (PROZAC) 20 MG Cap   2. Current mild episode of major depressive disorder, unspecified whether recurrent (HCC)  FLUoxetine (PROZAC) 20 MG Cap       Reviewed risks and benefits of treatment plan. Patient verbally agrees to plan of care.     Return in about 2 weeks (around 6/29/2021) for f/u medication management.    Please note that this dictation was created using voice recognition software. I have made every reasonable attempt to correct obvious errors, but I expect that there are errors of grammar and possibly content that I did not discover before finalizing the note.  " fall

## 2021-08-12 NOTE — ED ADULT TRIAGE NOTE - CHIEF COMPLAINT QUOTE
altered mental status, last seen at baseline Wednesday, fall & per EMS believed to be on ground for approximately 48 hours, left arm pain, +hallucinations
Follow up with EP on 8/25/2021 at 11:00am   Follow up with Dr. Velázquez on ----

## 2021-09-16 NOTE — OCCUPATIONAL THERAPY INITIAL EVALUATION ADULT - PRECAUTIONS/LIMITATIONS, REHAB EVAL
fall precautions/surgical precautions Implemented All Universal Safety Interventions:  [place] to call system. Call bell, personal items and telephone within reach. Instruct patient to call for assistance. Room bathroom lighting operational. Non-slip footwear when patient is off stretcher. Physically safe environment: no spills, clutter or unnecessary equipment. Stretcher in lowest position, wheels locked, appropriate side rails in place.

## 2021-11-22 NOTE — H&P PST ADULT - CIGARETTES, NUMBER OF YRS
40 Birth Control Pills Pregnancy And Lactation Text: This medication should be avoided if pregnant and for the first 30 days post-partum.

## 2021-12-20 NOTE — H&P PST ADULT - LYMPH NODES
You were seen in the ER for swelling of the right leg.  Thankfully, your labs and imaging did not demonstrate an acute abnormality that requires further work-up, consultation, or admission to the hospital.  I am giving a prescription for antibiotics as we will treat you for cellulitis which is an infection of the skin and soft tissue.  I have also refilled your prescriptions for gabapentin and torsemide as requested.  It will be important that you follow-up with the primary care physician and you should return to the ER immediately with any new or worsening symptoms.  Good luck, hope you feel better soon!  
detailed exam

## 2021-12-21 NOTE — PATIENT PROFILE ADULT - SAFE PLACE TO LIVE
no Instructions: This plan will send the code FBSD to the PM system.  DO NOT or CHANGE the price. Price (Do Not Change): 0.00 Detail Level: Simple

## 2022-03-12 NOTE — PATIENT PROFILE ADULT - BRADEN MOISTURE
[address]   OPERATIVE REPORT    Name:  Aviva Rodrigues  MR#:  [id number]  :  1986  ACCOUNT #:  [de-identified]  DATE OF SERVICE:  2022    PREOPERATIVE DIAGNOSIS:  Chronic cholecystitis. POSTOPERATIVE DIAGNOSIS:  Chronic cholecystitis. PROCEDURE PERFORMED:  Laparoscopic cholecystectomy. SURGEON:  Nelia Hansen MD    ASSISTANT:  SA Alka    ANESTHESIA:  General endotracheal.    COMPLICATIONS:  None. SPECIMENS REMOVED:  Gallbladder with contents. IMPLANTS:  None. ESTIMATED BLOOD LOSS:  20 mL. DRAINS:  None. COUNTS:  Sponge count correct. Needle count correct. INDICATIONS:  The patient is a 27-year-old white female with a history of morbid obesity, managed through laparoscopic sleeve gastrectomy in 2019 with excellent weight loss results. She has a 2-year history of intermittent upper abdominal pain associated with nausea. Recent imaging reveals gallstones. Her findings are consistent with chronic cholecystitis. She is taken to the operating room today for laparoscopic cholecystectomy. FINDINGS:  Chronic cholecystitis with cholelithiasis. PROCEDURE:  The patient was identified as the correct patient in the preoperative holding area and informed consent was confirmed. After answering the patient's remaining questions, she was taken to the operating room and placed on the operating room table in the supine position. Sequential compression devices were placed on both lower extremities. Following the uneventful initiation of general anesthesia, she was carefully secured to the operating room table with footboard and safety strap in place. All potential pressure points were padded with eggcrate. Her abdomen was prepped and draped in usual sterile fashion. Final time-out was performed, and it was confirmed she had received intravenous antibiotics.   A 5-mm trocar was inserted through a small right-sided skin incision using an [employer] technique. After confirming intraperitoneal location of the trocar tip, insufflation with carbon dioxide gas was initiated. Once adequate working sites had been developed, the 5-mm, 30-degree laparoscope was inserted. No signs of trocar injury were present. The liver was noted to be of normal size and texture. A 50-HT supraumbilical trocar was inserted through a small incision using visual guidance with the laparoscope. The patient was placed in a steep reverse Trendelenburg position. Two additional 5-mm trocars were inserted through small upper abdominal incisions using visual guidance with the laparoscope. The gallbladder was grasped at the fundus with retraction oriented cephalad. Adhesions between the duodenum and pericolic fat and the gallbladder were taken down using electrocautery and blunt dissection. Once freed, the infundibulum was grasped and retracted laterally, thus opening Calot's triangle. Peritoneal tissue was stripped away from the infundibulum in the direction of the portal structures. This allowed identification of the cystic artery and cystic duct. Each structure was circumferentially dissected free from surrounding tissue. Once the critical view of safety had been achieved, the cystic artery was doubly ligated between titanium hemoclips and then divided. The cystic duct was triply ligated between titanium hemoclips and then divided. The gallbladder was dissected free from the gallbladder fossa of the liver using electrocautery and blunt dissection. Once freed, the gallbladder was placed within a retrieval bag and removed from the patient's body. Once pneumoperitoneum had been reestablished, the right upper quadrant was irrigated with sterile saline. After confirming adequate hemostasis, the 12-mm fascial defect was closed with a 0 Vicryl suture using a laparoscopic suture passer. Pneumoperitoneum was released, and all trocars were removed.   All wounds were infiltrated with 0.5% Marcaine without epinephrine. All skin edges were reapproximated with a combination of subcuticular 4-0 Monocryl suture and Dermabond. The patient tolerated the procedure well. She was extubated in the operating room and transported to the recovery area in stable condition. The attending surgeon, Dr. Alphonse Champagne, was scrubbed and present for the entire procedure.       Toby Dang MD      [id number]  D:  03/11/2022 15:08  T:  03/11/2022 22:14  JOB #:  [id number] (3) occasionally moist

## 2022-03-23 NOTE — PROGRESS NOTE BEHAVIORAL HEALTH - FUND OF KNOWLEDGE
----- Message from Jacinta Lewis sent at 3/22/2022  3:42 PM CDT -----  Regarding: Call Back  Who Called: pt          What is the reason for the call: pt is calling in regards to scheduling appoint. Referral is listed. No next available. Please contact to schedule.          Can patient be contacted on [employer]: Yes         Call back number: [phone]    
Normal
Normal

## 2022-04-26 NOTE — H&P PST ADULT - ENMT
[facility] AMBULATORY ENCOUNTER  ORTHOPEDIC POSTOPERATIVE VISIT    DATE OF INJURY:  Not available  DATE OF SURGERY:  04/12/2022  SURGERY PERFORMED:  Anterior C6-7 decompression, diskectomy, interbody fusion with plate/screw fixation and use of allograft bone  PREOPERATIVE SYMPTOMS:  Severe neck, right upper extremity pain    SUBJECTIVE:    Grace Maldonado is a pleasant 43 year old female who presented for her 1st postoperative follow-up regarding C6-7 ACDF performed on 04/12/2022 without complication.  Postoperatively, she was admitted to the hospital for recovery and observation.  Her hospital course was uncomplicated and she was discharged home on 04/14.      Today, she reports she is doing very well regarding her postsurgical recovery, as her severe preoperative radicular pain has essentially resolved.  She denies any severe axial neck pain.  She has been compliant with the cervical collar, activity restrictions.  She has been taking oxycodone very sparingly for severe postoperative pain.  She denies any chest pain, has restarted Plavix.  Her incision is well healing.  No other major concerns at this time.    Treatment  Current medications:  Oxycodone, Tylenol  Past medications:  Tramadol, gabapentin, Motrin, Tylenol, hydrocodone  Physical therapy:  In the past for the lumbar spine  Chiropractic care:  In the past  Acupuncture:  None  Spinal Injections:  In the past for the lumbar spine  Other:  None      No problems updated.     PROBLEM LIST:    Patient Active Problem List   Diagnosis   • GERD (gastroesophageal reflux disease)   • Osteopenia   • Abdominal pain   • Intractable vomiting   • Intractable back pain   • Loss of weight   • Pituitary adenoma (CMS/HCC)   • Accelerated hypertension   • Right facial numbness   • Stroke (CMS/HCC)   • Encephalopathy acute   • Impaired mobility and ADLs   • Cervical radiculitis   • Intervertebral cervical disc disorder with myelopathy, cervical region        PAST HISTORIES:     I have reviewed the past medical history, family history, social history, medications and allergies listed in the medical record as obtained by my nursing staff and support staff and agree with their documentation.      OBJECTIVE:      PHYSICAL EXAMINATION:    Vitals: There were no vitals taken for this visit.     Physical Exam  Constitutional:       Appearance: Normal appearance.   HENT:      Head: Normocephalic and atraumatic.   Neck:      Trachea: No tracheal tenderness or tracheal deviation.   Cardiovascular:      Pulses:           Dorsalis pedis pulses are 2+ on the right side and 2+ on the left side.        Posterior tibial pulses are 2+ on the right side and 2+ on the left side.   Pulmonary:      Effort: Pulmonary effort is normal. No respiratory distress.   Abdominal:      Palpations: Abdomen is soft.   Musculoskeletal:      Cervical back: Full passive range of motion without pain, normal range of motion and neck supple.      Comments: Strength (bilateral):  Deltoid (C5): 5/5  Biceps (C6): 5/5  Triceps (C7): 5/5  Finger Flexion (C8): 5/5  Finger Abduction (T1): 5/5   Skin:     General: Skin is warm and dry.      Nails: There is no clubbing.      Comments: Incision site: well-healing without evidence of drainage, erythema, wound dehiscence.  A picture was obtained and uploaded to the patient's chart.   Neurological:      Mental Status: She is alert and oriented to person, place, and time.      Motor: Motor function is intact.      Deep Tendon Reflexes:      Reflex Scores:       Tricep reflexes are 2+ on the right side and 2+ on the left side.       Bicep reflexes are 2+ on the right side and 2+ on the left side.       Brachioradialis reflexes are 2+ on the right side and 2+ on the left side.     Comments: Neurovascular Exam (bilateral)  Lateral arm (C5): Intact  Lateral forearm (C6): Intact  Middle finger (C7): Intact  Medial forearm (C8): Intact  Medial arm (T1) Intact   Psychiatric:         Mood and  Affect: Mood and affect normal.         Judgment: Judgment normal.           IMAGING STUDIES:    Postsurgical cervical radiographs were obtained today demonstrating postsurgical changes consistent with C6-7 anterior cervical decompression, fusion with appropriate graft, plate, screw placement without evidence of fracture, adjacent disc space collapse.    ASSESSMENT & PLAN               Assessment:   C6-7 disc herniation with severe stenosis status post C6-7 ACDF on 04/12/2022, well-healing           Discussion:  I discussed with the patient her clinical and radiographic findings as well as the expected postoperative course.  Grace is recovering very well from her recent cervical decompression, fusion.  She understands the importance of continuing to wear the cervical collar for the immediate postop.  She will our office in 4 weeks for repeat clinical and radiographic evaluation.    Plan:  Activity modification  Avoid excessive or repetitive bending, twisting, overhead lifting, lifting greater than 10 lb   Wear hard cervical collar at all times except for hygiene purposes   Continue current analgesic regimen; continue to wean off pain medication   Return to clinic in 4 weeks for repeat clinical and radiographic evaluation  X-rays to obtain before next clinic visit:  Cervical spine AP and lateral views              Greater than 50% of this time was spent counseling the patient and coordinating care.      Instructions provided as documented in the after visit summary.               The patient indicated understanding of the diagnosis and agreed with the plan of care.     Supervising Physician: Dr. Pascual Espinoza MD        details… No oral lesions; no gross abnormalities

## 2022-05-03 NOTE — ED ADULT NURSE NOTE - AS SC [facility]ORY
Interval History:  Notes reviewed, no acute events overnight. Pt states he feels better today. HR is improving. D/w wound care nurse yesterday evening findings on wound vac change. Heparin gtt stopped yesterday, VQ scan negative for PE. D/w with pt and he verbalized understanding. Will cont to monitor closely. LTAC still pending.     Review of Systems   Constitutional:  Negative for activity change, appetite change, chills, diaphoresis and fever.   HENT:  Negative for congestion, sore throat and trouble swallowing.    Eyes:  Negative for photophobia and visual disturbance.   Respiratory:  Positive for cough. Negative for chest tightness and shortness of breath.    Cardiovascular:  Negative for chest pain, palpitations and leg swelling.   Gastrointestinal:  Negative for abdominal pain, diarrhea and nausea.   Genitourinary:  Negative for dysuria, flank pain and hematuria.   Musculoskeletal:  Positive for gait problem and myalgias. Negative for back pain.   Skin:  Negative for color change.   Neurological:  Negative for dizziness, weakness and headaches.   Psychiatric/Behavioral:  Negative for agitation, behavioral problems and confusion. The patient is nervous/anxious.    All other systems reviewed and are negative.  Objective:     Vital Signs (Most Recent):  Temp: 98.6 °F (37 °C) (05/03/22 0753)  Pulse: (!) 119 (05/03/22 0753)  Resp: 16 (05/03/22 0826)  BP: 137/63 (05/03/22 0753)  SpO2: (!) 94 % (05/03/22 0753)   Vital Signs (24h Range):  Temp:  [96.8 °F (36 °C)-100.4 °F (38 °C)] 98.6 °F (37 °C)  Pulse:  [109-134] 119  Resp:  [16-18] 16  SpO2:  [94 %-96 %] 94 %  BP: (117-187)/() 137/63     Weight: 81.2 kg (179 lb)  Body mass index is 26.43 kg/m².    Intake/Output Summary (Last 24 hours) at 5/3/2022 1047  Last data filed at 5/3/2022 0400  Gross per 24 hour   Intake 500 ml   Output 4150 ml   Net -3650 ml        Physical Exam  Vitals and nursing note reviewed.   Constitutional:       General: He is not in acute  distress.     Appearance: Normal appearance. He is normal weight. He is not ill-appearing, toxic-appearing or diaphoretic.   HENT:      Head: Normocephalic.      Nose: Nose normal. No congestion or rhinorrhea.      Mouth/Throat:      Mouth: Mucous membranes are moist.      Pharynx: Oropharynx is clear. No oropharyngeal exudate or posterior oropharyngeal erythema.   Eyes:      General: No scleral icterus.     Extraocular Movements: Extraocular movements intact.      Pupils: Pupils are equal, round, and reactive to light.   Cardiovascular:      Rate and Rhythm: Regular rhythm. Tachycardia present.      Pulses: Normal pulses.      Heart sounds: Normal heart sounds. No murmur heard.  Pulmonary:      Effort: Pulmonary effort is normal. No respiratory distress.      Breath sounds: Normal breath sounds. No stridor. No wheezing, rhonchi or rales.   Abdominal:      General: Bowel sounds are normal. There is no distension.      Palpations: Abdomen is soft.      Tenderness: There is no abdominal tenderness.   Musculoskeletal:         General: Swelling and tenderness present. Normal range of motion.      Cervical back: Normal range of motion.      Right upper leg: Swelling present.      Right lower leg: Swelling present.      Comments: Right leg wound vac in place and drsg CDI.  Soft tissue mass to left a and is firm and tender to palpation, no surrounding erythema, fluctuance or open wound.   Skin:     General: Skin is warm and dry.   Neurological:      Mental Status: He is alert and oriented to person, place, and time. Mental status is at baseline.   Psychiatric:         Mood and Affect: Mood normal.         Behavior: Behavior normal.         Thought Content: Thought content normal.       Significant Labs: All pertinent labs within the past 24 hours have been reviewed.  CBC:   Recent Labs   Lab 05/01/22  1103 05/02/22  0410 05/03/22  0549   WBC 16.38*  16.38* 18.53*  18.53* 20.78*   HGB 8.3*  8.3* 8.3*  8.3* 7.6*   HCT  25.4*  25.4* 25.2*  25.2* 23.5*     359 369  369 354       CMP:   Recent Labs   Lab 05/02/22  0410 05/03/22  0549   * 135*   K 3.9 3.9   CL 99 97   CO2 24 26    104   BUN 39* 39*   CREATININE 2.7* 2.8*   CALCIUM 14.9* 12.3*   PROT 6.7 6.9   ALBUMIN 2.3* 2.3*   BILITOT 0.5 0.3   ALKPHOS 100 102   AST 42* 27   ALT 19 16   ANIONGAP 12 12   EGFRNONAA 32* 30*       Microbiology Results (last 7 days)       Procedure Component Value Units Date/Time    Urine culture [[id number]] Collected: 05/01/22 2211    Order Status: Completed Specimen: Urine Updated: 05/03/22 0802     Urine Culture, Routine No growth    Narrative:      Specimen Source->Urine    Aerobic culture [[id number]] Collected: 05/02/22 1400    Order Status: Sent Specimen: Wound from Leg, Right Updated: 05/02/22 2236    Culture, Anaerobic [[id number]] Collected: 05/02/22 1400    Order Status: Sent Specimen: Wound from Leg, Right Updated: 05/02/22 2236    Blood culture [[id number]] Collected: 05/01/22 1216    Order Status: Completed Specimen: Blood Updated: 05/02/22 1812     Blood Culture, Routine No Growth to date      No Growth to date    Narrative:      Collection has been rescheduled by [facility] at 05/01/2022 11:22 Reason:   Patient unavailable starting dialysis and with the dr     Collection has been rescheduled by [facility] at 05/01/2022 11:42 Reason:   Got first set second set due after 12 couldnt stick other side due to   IV   Collection has been rescheduled by [facility] at 05/01/2022 11:22 Reason:   Patient unavailable starting dialysis and with the dr     Collection has been rescheduled by [facility] at 05/01/2022 11:42 Reason:   Got first set second set due after 12 couldnt stick other side due to   IV     Blood culture [[id number]] Collected: 05/01/22 1140    Order Status: Completed Specimen: Blood Updated: 05/02/22 1812     Blood Culture, Routine No Growth to date      No Growth to date    Narrative:      Collection has been rescheduled by [facility] at  05/01/2022 11:22 Reason:   Patient unavailable starting dialysis and with the dr   Collection has been rescheduled by [email or web address] at 05/01/2022 11:22 Reason:   Patient unavailable starting dialysis and with the dr     Blood culture [[id number]] Collected: 05/02/22 0410    Order Status: Completed Specimen: Blood from Antecubital, Right Arm Updated: 05/02/22 1715     Blood Culture, Routine No Growth to date    Influenza A & B by Molecular [[id number]] Collected: 05/02/22 0900    Order Status: Completed Specimen: Nasopharyngeal Swab Updated: 05/02/22 1021     Influenza A, Molecular Negative     Influenza B, Molecular Negative     Flu A & B Source Nasal swab          Significant Imaging:   X-ray lumbar spine: Normal study.    Right tibia and fibula: Normal study.    Right femur x-ray: Normal study.    Pelvis x-ray: Normal study.    CT Head: No acute abnormality.  Minimal sinus disease.    CT Maxillofacial scan: No facial bone fracture.  Minimal sinus disease.    RLE venous doppler scan: No evidence of deep venous thrombosis in the right lower extremity.    CXR: Central line placement with the tip over the SVC.  No acute detrimental changes.    RLE venous doppler scan:  No evidence of deep venous thrombosis in the right lower extremity. Edema in the soft tissues of the right lower extremity.    CXR:   No acute process.  Right IJ catheter in place.   (4) no impairment

## 2022-05-16 NOTE — PATIENT PROFILE ADULT - FOOD INSECURITY
History: Follow-up right leg swelling and pain: Seen at [facility] ED on 5/12/2022 when she presented for right lower extremity swelling and pain. She had been seen at ortho due to right leg pain and they sent her to ED. Labs showed normal CMP, CBC showed anemia with hemoglobin 7.7 and ultrasound of right lower extremities  negative DVT.  She was discharged home on ferrous sulfate 325 mg 1 tablet daily.  Currently states now BLE are swollen, right greater than left. Started about 9 days ago. Wakes up with some swelling but worsens at end of the day. Has burning type pain B feet/heel and around the ankle.  Denies any joint pain.  Pain 10/10. Feels tingling of B ankles/feet.   Denies any recent falls, travel, no hx of DVT in self or in family. No new meds. Nothing tried for swelling.   Denies chest pain, SOB, has noted some FELIX over the past 2 months.   Assessment: new condition   Plan: Reviewed ED consult note, labs, imaging with patient.  On exam does have +2 nonpitting edema bilateral lower extremity, no open sores, no calf tenderness to palpation.  Suspect either edema secondary to her moderate anemia, possible underlying cardiac issue, thyroid issue, venous insufficiency, very low suspicion for DVT.  Will start Lasix 20 mg 1 tablet twice daily for the next 2 days and then decrease to 1 tablet daily as needed.  Check labs and send for echocardiogram.  Did advise to elevate legs is much as possible, exercise and monitor closely.  If symptoms do not start to improve with daily diuretic or new symptoms develop should contact the office to be reevaluated.   no

## 2022-05-23 NOTE — PATIENT PROFILE ADULT - NSPROPTRIGHTSUPPORTPERSON_GEN_A_NUR
[Use of Plain Language] : use of plain language [Adequate] : adequate [None] : none same name as above

## 2022-06-10 NOTE — PHYSICAL THERAPY INITIAL EVALUATION ADULT - PERTINENT HX OF CURRENT PROBLEM, REHAB EVAL
Pt is a 69 year old female presenting from [facility] with agitation and combativeness. Pt recently s/p left total shoulder replacement (9/10/20) and hospital course following the surgery was complicated by acute hypercapnic respiratory failure and hypotension/shock. PMH: HTN, HLD, ?COPD (not on home O2, previously on Spiriva), anxiety (on Xanax), right knee osteoarthritis s/p right TKR (2019). Mother's Bedside/Non-

## 2022-06-20 NOTE — OCCUPATIONAL THERAPY INITIAL EVALUATION ADULT - PRECAUTIONS/LIMITATIONS, REHAB EVAL
surgical precautions/fall precautions Dapsone Counseling: I discussed with the patient the risks of dapsone including but not limited to hemolytic anemia, agranulocytosis, rashes, methemoglobinemia, kidney failure, peripheral neuropathy, headaches, GI upset, and liver toxicity.  Patients who start dapsone require monitoring including baseline LFTs and weekly CBCs for the first month, then every month thereafter.  The patient verbalized understanding of the proper use and possible adverse effects of dapsone.  All of the patient's questions and concerns were addressed.

## 2022-07-05 NOTE — PROVIDER CONTACT NOTE (OTHER) - BACKGROUND
Cough, Adult  Coughing is a reflex that clears your throat and your airways (respiratory system). Coughing helps to heal and protect your lungs. It is normal to cough occasionally, but a cough that happens with other symptoms or lasts a long time may be a sign of a condition that needs treatment. An acute cough may only last 2-3 weeks, while a chronic cough may last 8 or more weeks.  Coughing is commonly caused by:  Infection of the respiratory systemby viruses or bacteria.  Breathing in substances that irritate your lungs.  Allergies.  Asthma.  Mucus that runs down the back of your throat (postnasal drip).  Smoking.  Acid backing up from the stomach into the esophagus (gastroesophageal reflux).  Certain medicines.  Chronic lung problems.  Other medical conditions such as heart failure or a blood clot in the lung (pulmonary embolism).  Follow these instructions at home:  Medicines  Take over-the-counter and prescription medicines only as told by your health care provider.  Talk with your health care provider before you take a cough suppressant medicine.  Lifestyle    Avoid cigarette smoke. Do not use any products that contain nicotine or tobacco, such as cigarettes, e-cigarettes, and chewing tobacco. If you need help quitting, ask your health care provider.  Drink enough fluid to keep your urine pale yellow.  Avoid caffeine.  Do not drink alcohol if your health care provider tells you not to drink.    General instructions    Pay close attention to changes in your cough. Tell your health care provider about them.  Always cover your mouth when you cough.  Avoid things that make you cough, such as perfume, candles, cleaning products, or campfire or tobacco smoke.  If the air is dry, use a cool mist vaporizer or humidifier in your bedroom or your home to help loosen secretions.  If your cough is worse at night, try to sleep in a semi-upright position.  Rest as needed.  Keep all follow-up visits as told by your health  care provider. This is important.    Contact a health care provider if you:  Have new symptoms.  Cough up pus.  Have a cough that does not get better after 2-3 weeks or gets worse.  Cannot control your cough with cough suppressant medicines and you are losing sleep.  Have pain that gets worse or pain that is not helped with medicine.  Have a fever.  Have unexplained weight loss.  Have night sweats.  Get help right away if:  You cough up blood.  You have difficulty breathing.  Your heartbeat is very fast.  These symptoms may represent a serious problem that is an emergency. Do not wait to see if the symptoms will go away. Get medical help right away. Call your local emergency services (911 in the [place] Do not drive yourself to the hospital.  Summary  Coughing is a reflex that clears your throat and your airways. It is normal to cough occasionally, but a cough that happens with other symptoms or lasts a long time may be a sign of a condition that needs treatment.  Take over-the-counter and prescription medicines only as told by your health care provider.  Always cover your mouth when you cough.  Contact a health care provider if you have new symptoms or a cough that does not get better after 2-3 weeks or gets worse.  This information is not intended to replace advice given to you by your health care provider. Make sure you discuss any questions you have with your health care provider.  Document Revised: 01/06/2020 Document Reviewed: 01/06/2020  [employer] Patient Education © 2021 [employer]  Upper Respiratory Infection, Adult  An upper respiratory infection (URI) is a common viral infection of the nose, throat, and upper air passages that lead to the lungs. The most common type of URI is the common cold. URIs usually get better on their own, without medical treatment.  What are the causes?  A URI is caused by a virus. You may catch a virus by:  Breathing in droplets from an infected person's cough or  sneeze.  Touching something that has been exposed to the virus (contaminated) and then touching your mouth, nose, or eyes.  What increases the risk?  You are more likely to get a URI if:  You are very young or very old.  It is annia or winter.  You have close contact with others, such as at a , school, or health care facility.  You smoke.  You have long-term (chronic) heart or lung disease.  You have a weakened disease-fighting (immune) system.  You have nasal allergies or asthma.  You are experiencing a lot of stress.  You work in an area that has poor air circulation.  You have poor nutrition.  What are the signs or symptoms?  A URI usually involves some of the following symptoms:  Runny or stuffy (congested) nose.  Sneezing.  Cough.  Sore throat.  Headache.  Fatigue.  Fever.  Loss of appetite.  Pain in your forehead, behind your eyes, and over your cheekbones (sinus pain).  Muscle aches.  Redness or irritation of the eyes.  Pressure in the ears or face.  How is this diagnosed?  This condition may be diagnosed based on your medical history and symptoms, and a physical exam. Your health care provider may use a cotton swab to take a mucus sample from your nose (nasal swab). This sample can be tested to determine what virus is causing the illness.  How is this treated?  URIs usually get better on their own within 7-10 days. You can take steps at home to relieve your symptoms. Medicines cannot cure URIs, but your health care provider may recommend certain medicines to help relieve symptoms, such as:  Over-the-counter cold medicines.  Cough suppressants. Coughing is a type of defense against infection that helps to clear the respiratory system, so take these medicines only as recommended by your health care provider.  Fever-reducing medicines.  Follow these instructions at home:  Activity  Rest as needed.  If you have a fever, stay home from work or school until your fever is gone or until your health care  provider says you are no longer contagious. Your health care provider may have you wear a face mask to prevent your infection from spreading.  Relieving symptoms  Gargle with a salt-water mixture 3-4 times a day or as needed. To make a salt-water mixture, completely dissolve ½-1 tsp of salt in 1 cup of warm water.  Use a cool-mist humidifier to add moisture to the air. This can help you breathe more easily.  Eating and drinking    Drink enough fluid to keep your urine pale yellow.  Eat soups and other clear broths.    General instructions    Take over-the-counter and prescription medicines only as told by your health care provider. These include cold medicines, fever reducers, and cough suppressants.  Do not use any products that contain nicotine or tobacco, such as cigarettes and e-cigarettes. If you need help quitting, ask your health care provider.  Stay away from secondhand smoke.  Stay up to date on all immunizations, including the yearly (annual) flu vaccine.  Keep all follow-up visits as told by your health care provider. This is important.    How to prevent the spread of infection to others    URIs can be passed from person to person (are contagious). To prevent the infection from spreading:  Wash your hands often with soap and water. If soap and water are not available, use hand .  Avoid touching your mouth, face, eyes, or nose.  Cough or sneeze into a tissue or your sleeve or elbow instead of into your hand or into the air.    Contact a health care provider if:  You are getting worse instead of better.  You have a fever or chills.  Your mucus is brown or red.  You have yellow or brown discharge coming from your nose.  You have pain in your face, especially when you bend forward.  You have swollen neck glands.  You have pain while swallowing.  You have white areas in the back of your throat.  Get help right away if:  You have shortness of breath that gets worse.  You have severe or  S/P repaired torn left shoulder rotator cuff, hx of Primary Osteoarthritis persistent:  Headache.  Ear pain.  Sinus pain.  Chest pain.  You have chronic lung disease along with any of the following:  Wheezing.  Prolonged cough.  Coughing up blood.  A change in your usual mucus.  You have a stiff neck.  You have changes in your:  Vision.  Hearing.  Thinking.  Mood.  Summary  An upper respiratory infection (URI) is a common infection of the nose, throat, and upper air passages that lead to the lungs.  A URI is caused by a virus.  URIs usually get better on their own within 7-10 days.  Medicines cannot cure URIs, but your health care provider may recommend certain medicines to help relieve symptoms.  This information is not intended to replace advice given to you by your health care provider. Make sure you discuss any questions you have with your health care provider.  Document Revised: 08/26/2021 Document Reviewed: 08/26/2021  [employer] Patient Education © 2021 [employer]  How to Quarantine at Home  Information for Patients and Families    These instructions are for people with confirmed or suspected COVID-19 who do not need to be hospitalized and those with confirmed COVID-19 who were hospitalized and discharged to care for themselves at home.    If you were tested through the Health Department  The Health Department will monitor your wellbeing.  If it is determined that you do not need to be hospitalized and can be isolated at home, you will be monitored by staff from your local or state health department.     If you were tested through a Commercial [facility]  You will need to monitor yourself and report changes in your symptoms to your doctor.  See the section below called Monitor Your Symptoms.    Follow these steps until a healthcare provider or local or state health department says you can return to your normal activities.    Stay home except to get medical care  Restrict activities outside your home, except for getting medical care.   Do not go to work, school, or public areas.   Avoid  using public transportation, ride-sharing, or taxis.    Separate yourself from other people and animals in your home  People  As much as possible, you should stay in a specific room and away from other people in your home. Also, you should use a separate bathroom, if available.    Animals  You should restrict contact with pets and other animals while you are sick with COVID-19, just like you would around other people. When possible, have another member of your household care for your animals while you are sick. If you are sick with COVID-19, avoid contact with your pet, including petting, snuggling, being kissed or licked, and sharing food. If you must care for your pet or be around animals while you are sick, wash your hands before and after you interact with pets and wear a facemask. See COVID-19 and Animals for more information.    Call ahead before visiting your doctor  If you have a medical appointment, call the healthcare provider and tell them that you have or may have COVID-19. This information will help the healthcare provider’s office take steps to keep other people from getting infected or exposed.    Wear a facemask  You should wear a facemask when you are around other people (e.g., sharing a room or vehicle) or pets and before you enter a healthcare provider’s office.     If you are not able to wear a facemask (for example, because it causes trouble breathing), then people who live with you should not stay in the same room with you, or they should wear a facemask if they enter your room.    Cover your coughs and sneezes  Cover your mouth and nose with a tissue when you cough or sneeze.   Throw used tissues in a lined trash can.   Immediately wash your hands with soap and water for at least 20 seconds or, if soap and water are not available, clean your hands with an alcohol-based hand  that contains at least 60% alcohol.    Clean your hands often  Wash your hands often with soap and water for at  least 20 seconds, especially after blowing your nose, coughing, or sneezing; going to the bathroom; and before eating or preparing food.     If soap and water are not readily available, use an alcohol-based hand  with at least 60% alcohol, covering all surfaces of your hands and rubbing them together until they feel dry.    Soap and water are the best option if hands are visibly dirty. Avoid touching your eyes, nose, and mouth with unwashed hands.    Avoid sharing personal household items  You should not share dishes, drinking glasses, cups, eating utensils, towels, or bedding with other people or pets in your home.   After using these items, they should be washed thoroughly with soap and water.    Clean all “high-touch” surfaces everyday  High touch surfaces include counters, tabletops, doorknobs, bathroom fixtures, toilets, phones, keyboards, tablets, and bedside tables.   Also, clean any surfaces that may have blood, stool, or body fluids on them.   Use a household cleaning spray or wipe, according to the label instructions. Labels contain instructions for safe and effective use of the cleaning product, including precautions you should take when applying the product, such as wearing gloves and making sure you have good ventilation during use of the product.    Monitor your symptoms  Seek prompt medical attention if your illness is worsening (e.g., difficulty breathing).   Before seeking care, call your healthcare provider and tell them that you have, or are being evaluated for, COVID-19.   Put on a facemask before you enter the facility.     These steps will help the healthcare provider’s office to keep other people in the office or waiting room from getting infected or exposed.   Persons who are placed under active monitoring or facilitated self-monitoring should follow instructions provided by their local health department or occupational health professionals, as appropriate.  If you have a medical  emergency and need to call 911, notify the dispatch personnel that you have, or are being evaluated for COVID-19. If possible, put on a facemask before emergency medical services arrive.    Discontinuing home isolation  Patients with confirmed COVID-19 should remain under home isolation precautions until the risk of secondary transmission to others is thought to be low. The decision to discontinue home isolation precautions should be made on a case-by-case basis, in consultation with healthcare providers and state and local health departments.    The below content are for household members, intimate partners, and caregivers of a patient with symptomatic laboratory-confirmed COVID-19 or a patient under investigation:    Household members, intimate partners, and caregivers may have close contact with a person with symptomatic, laboratory-confirmed COVID-19 or a person under investigation.     Close contacts should monitor their health; they should call their healthcare provider right away if they develop symptoms suggestive of COVID-19 (e.g., fever, cough, shortness of breath)     Close contacts should also follow these recommendations:  Make sure that you understand and can help the patient follow their healthcare provider’s instructions for medication(s) and care. You should help the patient with basic needs in the home and provide support for getting groceries, prescriptions, and other personal needs.  Monitor the patient’s symptoms. If the patient is getting sicker, call his or her healthcare provider and tell them that the patient has laboratory-confirmed COVID-19. This will help the healthcare provider’s office take steps to keep other people in the office or waiting room from getting infected. Ask the healthcare provider to call the local or state health department for additional guidance. If the patient has a medical emergency and you need to call 911, notify the dispatch personnel that the patient has, or is  being evaluated for COVID-19.  Household members should stay in another room or be  from the patient as much as possible. Household members should use a separate bedroom and bathroom, if available.  Prohibit visitors who do not have an essential need to be in the home.  Household members should care for any pets in the home. Do not handle pets or other animals while sick.  For more information, see COVID-19 and Animals.  Make sure that shared spaces in the home have good air flow, such as by an air conditioner or an opened window, weather permitting.  Perform hand hygiene frequently. Wash your hands often with soap and water for at least 20 seconds or use an alcohol-based hand  that contains 60 to 95% alcohol, covering all surfaces of your hands and rubbing them together until they feel dry. Soap and water should be used preferentially if hands are visibly dirty.  Avoid touching your eyes, nose, and mouth with unwashed hands.  The patient should wear a facemask when you are around other people. If the patient is not able to wear a facemask (for example, because it causes trouble breathing), you, as the caregiver, should wear a mask when you are in the same room as the patient.  Wear a disposable facemask and gloves when you touch or have contact with the patient’s blood, stool, or body fluids, such as saliva, sputum, nasal mucus, vomit, or urine.   Throw out disposable facemasks and gloves after using them. Do not reuse.  When removing personal protective equipment, first remove and dispose of gloves. Then, immediately clean your hands with soap and water or alcohol-based hand . Next, remove and dispose of facemask, and immediately clean your hands again with soap and water or alcohol-based hand .  Avoid sharing household items with the patient. You should not share dishes, drinking glasses, cups, eating utensils, towels, bedding, or other items. After the patient uses these  items, you should wash them thoroughly (see below “Wash laundry thoroughly”).  Clean all “high-touch” surfaces, such as counters, tabletops, doorknobs, bathroom fixtures, toilets, phones, keyboards, tablets, and bedside tables, every day. Also, clean any surfaces that may have blood, stool, or body fluids on them.   Use a household cleaning spray or wipe, according to the label instructions. Labels contain instructions for safe and effective use of the cleaning product including precautions you should take when applying the product, such as wearing gloves and making sure you have good ventilation during use of the product.  Wash laundry thoroughly.   Immediately remove and wash clothes or bedding that have blood, stool, or body fluids on them.  Wear disposable gloves while handling soiled items and keep soiled items away from your body. Clean your hands (with soap and water or an alcohol-based hand ) immediately after removing your gloves.  Read and follow directions on labels of laundry or clothing items and detergent. In general, using a normal laundry detergent according to washing machine instructions and dry thoroughly using the warmest temperatures recommended on the clothing label.  Place all used disposable gloves, facemasks, and other contaminated items in a lined container before disposing of them with other household waste. Clean your hands (with soap and water or an alcohol-based hand ) immediately after handling these items. Soap and water should be used preferentially if hands are visibly dirty.  Discuss any additional questions with your state or local health department or healthcare provider.    Adapted from information provided by the Centers for Disease Control and Prevention.  For more information, visit https://www.cdc.gov/coronavirus/2019-ncov/hcp/guidance-prevent-spread.html

## 2022-12-08 NOTE — ED PROVIDER NOTE - NS ED MD EM SELECTION
[address]34 Critical Care - 30 to 74 minutes Doxycycline Pregnancy And Lactation Text: This medication is Pregnancy Category D and not consider safe during pregnancy. It is also excreted in breast milk but is considered safe for shorter treatment courses.

## 2023-02-26 NOTE — ED PROVIDER NOTE - CONSTITUTIONAL DISTRESS
Pt. Brought by mom complaining of intermittent right ear pain that started 3 days ago and got worse today  Has runny nose as well   no apparent

## 2023-03-17 NOTE — OCCUPATIONAL THERAPY INITIAL EVALUATION ADULT - ADDITIONAL COMMENTS
SUBJECTIVE:  Irving Danielson is a 18 year old male who presented requesting evaluation for external hemorrhoid that started yesterday.  Patient denies history of hemorrhoids or rectal abscess.  He reports that constipation recently, having harder stools and sitting on toilet for at least half an hour to have a bowel movement.  He denies blood in stool or blood with wiping.  He denies fever, abdominal pain, vomiting.       REVIEW OF SYSTEMS:    A review of systems was performed and findings relevant to these symptoms are included in the HPI.         PAST HISTORIES:    ALLERGIES:   Allergen Reactions   • Dextromethorphan HIVES and Other (See Comments)   • Guiatuss Ac RASH       MEDICATIONS:  No outpatient medications have been marked as taking for the 3/17/23 encounter (Walk In) with Sedrick Zacarias CNP.       Past Medical History:   Diagnosis Date   • Attention deficit disorder      No past surgical history on file.  Social History     Tobacco Use   Smoking Status Never   Smokeless Tobacco Never       OBJECTIVE:  PHYSICAL EXAM:    Visit Vitals  BP (!) 135/91   Pulse 94   Temp 97.7 °F (36.5 °C) (Tympanic)   Resp 16   SpO2 99%      Vital signs reviewed.  Nursing note reviewed.    CONSTITUTIONAL: Well-hydrated, well-nourished male who appears to be in no acute distress. Awake, alert and cooperative.  ABDOMEN: Soft and non-tender  : large soft external hemorrhoid. No rectal bleeding. No palpable internal hemorrhoid or mass. No fissure.   PSYCHIATRIC:  Speech and behavior appropriate. Normal mood and affect.          ASSESSMENT/PLAN:  1. External hemorrhoid        Irving Danielson is a 18 year old male who presented to urgent care with complaint of external hemorrhoid that started yesterday. Has been constipated recently. Patient afebrile. VSS. Patient is not toxic appearing or in distress. On exam, large soft external hemorrhoid, not thrombosed.  Lidocaine/nifedipine/hydrocortison compound ointment prescribed.  If he is  not able to get this from the pharmacy, he may switch to preparation H to the external hemorrhoid.  Patient instructed to start Sitz bath and MiraLax.  We discussed constipation prevention and avoiding sitting on toilet for long periods of time.  Patient encouraged to work on fiber rich diet and adequate hydration.  Follow up with pcp if no improvement. Patient verbalizes understanding. Patient instruction printed, reviewed, and given to patient.     My attending physician was Dr. Ortega, who was available to me as needed.      No orders of the defined types were placed in this encounter.       Pt is L hand dominant. Attempted AAROM/PROM to L elbow/shoulder however pt limited at this time 2* pain.

## 2023-04-11 NOTE — PHYSICAL THERAPY INITIAL EVALUATION ADULT - ASSISTIVE DEVICE FOR TRANSFER: SIT/STAND, REHAB EVAL
No AD Please consider following up with a pain management physician if you require chronic pain control. You may find one through your primary care doctor.

## 2023-04-27 NOTE — SWALLOW BEDSIDE ASSESSMENT ADULT - ORAL PREPARATORY PHASE
[facility] requesting refill for Metoprolol 100 MG twice daily.   Last refilled 02/07/2023  LOV 06/28/2022 [facility]   NOV needs 6 month will send in 30 day supply with note to schedule f/u appointment.  
Within functional limits

## 2023-08-01 NOTE — ED ADULT NURSE NOTE - SKIN INTEGRITY
----- Message from Alia Rajan DO sent at 8/1/2023  8:46 AM EDT -----  Call patient billrd is negative  Recheck in 3 years   wound(s)

## 2023-08-23 NOTE — PHYSICAL THERAPY INITIAL EVALUATION ADULT - PATIENT PROFILE REVIEW, REHAB EVAL
yes/PT orders received: Out of bed with assistance. Consult with RN Milla MCKAY, patient may participate in PT evaluation. Detail Level: Detailed

## 2023-11-07 NOTE — ED PROCEDURE NOTE - NS ED PROC PERFORMED BY1 FT
Critical Care Orthopedics Orthopedics Orthopedics Trauma Surgery Orthopedics Orthopedics Orthopedics Orthopedics Orthopedics Orthopedics Trauma Surgery Orthopedics Internal Medicine Todd Ramos MD (resident) Internal Medicine CT Surgery CT Surgery Internal Medicine Internal Medicine Internal Medicine Internal Medicine

## 2023-12-01 NOTE — DISCHARGE NOTE NURSING/CASE MANAGEMENT/SOCIAL WORK - FLU SEASON?
ED UM Review Complete - Patient Meets Inpatient Criteria  [email or web address]      
Yes...

## 2023-12-21 NOTE — PATIENT PROFILE ADULT - NSPROEDALEARNPREF_GEN_A_NUR
Pt. has rested and slept quietly throughout the shift since admission. Patient fell asleep around 0045 and slept approx. 6.75 hrs. Patient remains HIGH Risk with 1:1 sitter observation required. Plan of care ongoing. Continue Q15 minute safety checks.    verbal instruction/written material

## 2023-12-27 ENCOUNTER — EMERGENCY (EMERGENCY)
Facility: HOSPITAL | Age: 73
LOS: 1 days | Discharge: LEFT BEFORE DISCHARGE | End: 2023-12-27
Attending: EMERGENCY MEDICINE | Admitting: EMERGENCY MEDICINE
Payer: MEDICARE

## 2023-12-27 VITALS
RESPIRATION RATE: 18 BRPM | DIASTOLIC BLOOD PRESSURE: 99 MMHG | HEART RATE: 80 BPM | OXYGEN SATURATION: 94 % | TEMPERATURE: 98 F | SYSTOLIC BLOOD PRESSURE: 146 MMHG

## 2023-12-27 VITALS
SYSTOLIC BLOOD PRESSURE: 113 MMHG | TEMPERATURE: 98 F | OXYGEN SATURATION: 98 % | RESPIRATION RATE: 18 BRPM | HEART RATE: 78 BPM | DIASTOLIC BLOOD PRESSURE: 58 MMHG

## 2023-12-27 DIAGNOSIS — Z96.619 PRESENCE OF UNSPECIFIED ARTIFICIAL SHOULDER JOINT: Chronic | ICD-10-CM

## 2023-12-27 DIAGNOSIS — Z96.659 PRESENCE OF UNSPECIFIED ARTIFICIAL KNEE JOINT: Chronic | ICD-10-CM

## 2023-12-27 DIAGNOSIS — Z96.651 PRESENCE OF RIGHT ARTIFICIAL KNEE JOINT: Chronic | ICD-10-CM

## 2023-12-27 LAB
ALBUMIN SERPL ELPH-MCNC: 3.9 G/DL — SIGNIFICANT CHANGE UP (ref 3.3–5)
ALBUMIN SERPL ELPH-MCNC: 3.9 G/DL — SIGNIFICANT CHANGE UP (ref 3.3–5)
ALP SERPL-CCNC: 83 U/L — SIGNIFICANT CHANGE UP (ref 40–120)
ALP SERPL-CCNC: 83 U/L — SIGNIFICANT CHANGE UP (ref 40–120)
ALT FLD-CCNC: 14 U/L — SIGNIFICANT CHANGE UP (ref 4–33)
ALT FLD-CCNC: 14 U/L — SIGNIFICANT CHANGE UP (ref 4–33)
ANION GAP SERPL CALC-SCNC: 13 MMOL/L — SIGNIFICANT CHANGE UP (ref 7–14)
ANION GAP SERPL CALC-SCNC: 13 MMOL/L — SIGNIFICANT CHANGE UP (ref 7–14)
AST SERPL-CCNC: 27 U/L — SIGNIFICANT CHANGE UP (ref 4–32)
AST SERPL-CCNC: 27 U/L — SIGNIFICANT CHANGE UP (ref 4–32)
BILIRUB SERPL-MCNC: 0.2 MG/DL — SIGNIFICANT CHANGE UP (ref 0.2–1.2)
BILIRUB SERPL-MCNC: 0.2 MG/DL — SIGNIFICANT CHANGE UP (ref 0.2–1.2)
BUN SERPL-MCNC: 22 MG/DL — SIGNIFICANT CHANGE UP (ref 7–23)
BUN SERPL-MCNC: 22 MG/DL — SIGNIFICANT CHANGE UP (ref 7–23)
CALCIUM SERPL-MCNC: 9.1 MG/DL — SIGNIFICANT CHANGE UP (ref 8.4–10.5)
CALCIUM SERPL-MCNC: 9.1 MG/DL — SIGNIFICANT CHANGE UP (ref 8.4–10.5)
CHLORIDE SERPL-SCNC: 102 MMOL/L — SIGNIFICANT CHANGE UP (ref 98–107)
CHLORIDE SERPL-SCNC: 102 MMOL/L — SIGNIFICANT CHANGE UP (ref 98–107)
CO2 SERPL-SCNC: 21 MMOL/L — LOW (ref 22–31)
CO2 SERPL-SCNC: 21 MMOL/L — LOW (ref 22–31)
CREAT SERPL-MCNC: 0.88 MG/DL — SIGNIFICANT CHANGE UP (ref 0.5–1.3)
CREAT SERPL-MCNC: 0.88 MG/DL — SIGNIFICANT CHANGE UP (ref 0.5–1.3)
EGFR: 69 ML/MIN/1.73M2 — SIGNIFICANT CHANGE UP
EGFR: 69 ML/MIN/1.73M2 — SIGNIFICANT CHANGE UP
GLUCOSE SERPL-MCNC: 86 MG/DL — SIGNIFICANT CHANGE UP (ref 70–99)
GLUCOSE SERPL-MCNC: 86 MG/DL — SIGNIFICANT CHANGE UP (ref 70–99)
POTASSIUM SERPL-MCNC: 5.4 MMOL/L — HIGH (ref 3.5–5.3)
POTASSIUM SERPL-MCNC: 5.4 MMOL/L — HIGH (ref 3.5–5.3)
POTASSIUM SERPL-SCNC: 5.4 MMOL/L — HIGH (ref 3.5–5.3)
POTASSIUM SERPL-SCNC: 5.4 MMOL/L — HIGH (ref 3.5–5.3)
PROT SERPL-MCNC: 7.4 G/DL — SIGNIFICANT CHANGE UP (ref 6–8.3)
PROT SERPL-MCNC: 7.4 G/DL — SIGNIFICANT CHANGE UP (ref 6–8.3)
SODIUM SERPL-SCNC: 136 MMOL/L — SIGNIFICANT CHANGE UP (ref 135–145)
SODIUM SERPL-SCNC: 136 MMOL/L — SIGNIFICANT CHANGE UP (ref 135–145)

## 2023-12-27 PROCEDURE — 99285 EMERGENCY DEPT VISIT HI MDM: CPT | Mod: FS

## 2023-12-27 PROCEDURE — 93010 ELECTROCARDIOGRAM REPORT: CPT

## 2023-12-27 PROCEDURE — 70496 CT ANGIOGRAPHY HEAD: CPT | Mod: 26,MA

## 2023-12-27 PROCEDURE — 70498 CT ANGIOGRAPHY NECK: CPT | Mod: 26,MA

## 2023-12-27 RX ORDER — SODIUM CHLORIDE 9 MG/ML
1000 INJECTION INTRAMUSCULAR; INTRAVENOUS; SUBCUTANEOUS ONCE
Refills: 0 | Status: COMPLETED | OUTPATIENT
Start: 2023-12-27 | End: 2023-12-27

## 2023-12-27 RX ORDER — MECLIZINE HCL 12.5 MG
25 TABLET ORAL ONCE
Refills: 0 | Status: COMPLETED | OUTPATIENT
Start: 2023-12-27 | End: 2023-12-27

## 2023-12-27 RX ADMIN — Medication 25 MILLIGRAM(S): at 22:32

## 2023-12-27 RX ADMIN — SODIUM CHLORIDE 1000 MILLILITER(S): 9 INJECTION INTRAMUSCULAR; INTRAVENOUS; SUBCUTANEOUS at 22:32

## 2023-12-27 NOTE — ED PROVIDER NOTE - OBJECTIVE STATEMENT
Pt is a 74 YO F with PMH Left sided shoulder replacement, History of provoked DVT who presented to ED with dizziness. Pt reports her dizziness started today, worse with changing position. Pt reports room spinning sensation. Also endorsing associated nausea without vomiting. Denies vision changes, one sided weakness, facial droop, slurred speech. Pt is a 72 YO F with PMH Left sided shoulder replacement, History of provoked DVT who presented to ED with dizziness. Pt reports her dizziness started today, worse with changing position. Pt reports room spinning sensation. Also endorsing associated nausea without vomiting. Denies vision changes, one sided weakness, facial droop, slurred speech.

## 2023-12-27 NOTE — ED ADULT NURSE NOTE - OBJECTIVE STATEMENT
Pt arrives to room 24. Pt is A and Ox4 and ambulatory. HX: macular degeneration, Pt arrives to room 24. Pt is A and Ox4 and ambulatory. HX: macular degeneration, COPD, HTN. Pt arrives to the ED complaining of dizziness. Pt states when she woke up this morning she was very dizzy. Pt endorses that the dizziness occurs upon activity. Pt endorses that she also has a headache that is a 10 out of 10. Pt denies dizziness at this time because, "I am lying down". Pt denies shortness of breath, chest pain, n/v/d, numbness and tingling. Airway is patent, respirations are even and unlabored. Pt denies GI/ complaints. Skin is clean, dry and intact. Awaiting provider orders. Plan of care ongoing, safety maintained.

## 2023-12-27 NOTE — ED PROVIDER NOTE - CLINICAL SUMMARY MEDICAL DECISION MAKING FREE TEXT BOX
Pt is a 72 YO F with PMH Left sided shoulder replacement, History of provoked DVT who presented to ED with dizziness and associated nausea. Plan for labs, meclizine, CT B, CTA H/N and reassessment Pt is a 74 YO F with PMH Left sided shoulder replacement, History of provoked DVT who presented to ED with dizziness and associated nausea. Plan for labs, meclizine, CT B, CTA H/N and reassessment Pt is a 74 YO F with PMH Left sided shoulder replacement, History of provoked DVT who presented to ED with dizziness and associated nausea. Plan for labs, meclizine, CT B, CTA H/N and reassessment    ISABEL Ladd: pt made aware of all findings. Pt was seen by neurology. Pt is refusing further work up or admission despite strongly encouraging pt to stay. pt has decision making capacity and aware leaving AMA can result in serious consequences including paralysis or death, pt verbalized understanding. Pt was sent asa and plavix to pharmacy and aware she can return at any time if she wishes to continue her work up Pt is a 72 YO F with PMH Left sided shoulder replacement, History of provoked DVT who presented to ED with dizziness and associated nausea. Plan for labs, meclizine, CT B, CTA H/N and reassessment    ISABEL Ladd: pt made aware of all findings. Pt was seen by neurology. Pt is refusing further work up or admission despite strongly encouraging pt to stay. pt has decision making capacity and aware leaving AMA can result in serious consequences including paralysis or death, pt verbalized understanding. Pt was sent asa and plavix to pharmacy and aware she can return at any time if she wishes to continue her work up

## 2023-12-27 NOTE — ED PROVIDER NOTE - PATIENT PORTAL LINK FT
You can access the FollowMyHealth Patient Portal offered by [facility] by registering at the following website: [email or web address]. By joining [email or web address]’s FollowMyHealth portal, you will also be able to view your health information using other applications (apps) compatible with our system. You can access the FollowMyHealth Patient Portal offered by [facility] by registering at the following website: [email or web address]. By joining [email or web address]’s FollowMyHealth portal, you will also be able to view your health information using other applications (apps) compatible with our system.

## 2023-12-27 NOTE — ED ADULT NURSE NOTE - NSFALLRISKINTERV_ED_ALL_ED
Assistance OOB with selected safe patient handling equipment if applicable/Assistance with ambulation/Communicate fall risk and risk factors to all staff, patient, and family/Encourage patient to sit up slowly, dangle for a short time, stand at bedside before walking/Monitor gait and stability/Orthostatic vital signs/Provide patient with walking aids/Provide visual cue: yellow wristband, yellow gown, etc/Reinforce activity limits and safety measures with patient and family/Call bell, personal items and telephone in reach/Instruct patient to call for assistance before getting out of bed/chair/stretcher/Non-slip footwear applied when patient is off stretcher/[place] to call system/Physically safe environment - no spills, clutter or unnecessary equipment/Purposeful Proactive Rounding/Room/bathroom lighting operational, light cord in reach Assistance OOB with selected safe patient handling equipment if applicable/Assistance with ambulation/Communicate fall risk and risk factors to all staff, patient, and family/Encourage patient to sit up slowly, dangle for a short time, stand at bedside before walking/Monitor gait and stability/Orthostatic vital signs/Provide patient with walking aids/Provide visual cue: yellow wristband, yellow gown, etc/Reinforce activity limits and safety measures with patient and family/Call bell, personal items and telephone in reach/Instruct patient to call for assistance before getting out of bed/chair/stretcher/Non-slip footwear applied when patient is off stretcher/[place] to call system/Physically safe environment - no spills, clutter or unnecessary equipment/Purposeful Proactive Rounding/Room/bathroom lighting operational, light cord in reach

## 2023-12-27 NOTE — ED ADULT TRIAGE NOTE - CHIEF COMPLAINT QUOTE
pt coming from home.  pt c/o dizziness since 0800 "I feel like the room is spinning".  denies chest pain. as per EMS, pt has abnormal EKG.  pt was given zofran 4mg by EMS, pt took asa 64mg this am.  Hx:  denies.  pt denies dizziness at this time

## 2023-12-27 NOTE — ED PROVIDER NOTE - ATTENDING APP SHARED VISIT CONTRIBUTION OF CARE
Well-appearing no acute distress HEENT normal no nystagmus no vertigo precipitated on head movement or change of position mostly on sitting up not lying down.    Cranial nerves II through XII intact   neck supple no carotid bruits   heart sounds normal lungs clear abdomen soft nontender   no past-pointing on the right with some past-pointing on left though patient has limited range of motion of arm so inconclusive.  Lower extremities 5/5 sensation intact

## 2023-12-27 NOTE — ED PROVIDER NOTE - NSFOLLOWUPINSTRUCTIONS_ED_ALL_ED_FT
YOU ARE LEAVING AGAINST MEDICAL ADVICE WHICH CAN RESULT IN SERIOUS CONSEQUENCES INCLUDING DEATH OR PARALYSIS  TAKE ASPIRIN AND PLAVIX DAILY   TAKE MECLIZINE ONE TABLET BY MOUTH EVERY 8 HOURS AS NEEDED FOR DIZZINESS AND DO NOT DRINK DRIVE OR OPERATE MACHINERY WHILE TAKING THIS MEDICATION AS IT CAN CAUSE DROWSINESS  FOLLOW UP WITH NEUROLOGY ASAP  RETURN TO ER AT ANY TIME IF YOU WISH TO COMPLETE YOUR WORK UP.

## 2023-12-27 NOTE — ED PROVIDER NOTE - PROGRESS NOTE DETAILS
ISABEL Ladd: pt made aware of all findings. Pt was seen by neurology. Pt is refusing further work up or admission despite strongly encouraging pt to stay. pt has decision making capacity and aware leaving AMA can result in serious consequences including paralysis or death, pt verbalized understanding.

## 2023-12-27 NOTE — ED PROVIDER NOTE - NS ED ATTENDING STATEMENT MOD
This was a shared visit with the ANATOLY. I reviewed and verified the documentation and independently performed the documented:

## 2023-12-27 NOTE — ED PROVIDER NOTE - PHYSICAL EXAMINATION
Neuro: strength and sensation full and symmetric in all extremities, no drift, + mild dysmetria with left sided finger to nose (reports due to her arm being weak from prior surgery), heel to shin intact bilaterally, no nystagmus

## 2023-12-27 NOTE — ED PROVIDER NOTE - CPE EDP ENMT NORM
[facility]    [address] [address]    Phone:  [phone]       Thank You for choosing us for your health care visit. We are glad to serve you and happy to provide you with this summary of your visit. Please help us to ensure we have accurate records. If you find anything that needs to be changed, please let our staff know as soon as possible.          Your Demographic Information     Patient Name Sex     Wang Murray Male 6/15/1934       Ethnic Group Patient Race    Not of  or  Origin White      Your Visit Details     Date & Time Provider Department    3/20/2017 10:00 AM Jeanette Fierro [facility]      Your Upcoming Appointment*(Max 10)     2017 10:00 AM CDT   Nurse Visit with OSW ANTICOAG NURSE   [facility] ([facility])    [address] [id number] [phone]            2017 10:30 AM CDT   Follow-up Visit with Sherrill Alba MD   [facility] Endocrinology ([facility])    [address] [address] [phone]            2017  8:00 AM CDT   Nurse Visit with OSW ANTICOAG NURSE   [facility] ([facility])    [address] [id number] [phone]            2017  8:30 AM CDT   ICD Check Office with Cady Malcolm MD, OSW DEVICE CHECK   [facility] Electrophysiology ([facility])    [address] [address]   [phone]            2017  9:00 AM CDT   Medicare Well Visit with Terrance Moser MD   [facility] [address] ([facility])    [address] [id number] [phone]              Your Upcoming Home Remote Device Check     2017   7:55 AM CDT   ICD Home/Remote Device Check with [facility] REMOTE DEVICE CHECK   [facility] [address] ([facility])    [address]  [address]  [facility] [address]   [phone]              We Ordered or Performed the Following     INR - POINT OF CARE       Conditions Discussed Today or Order-Related Diagnoses        Comments    Persistent atrial fibrillation         Long term (current) use of anticoagulants         Encounter for therapeutic drug monitoring         Valvular heart disease           Your Vitals Were     Smoking Status                   Former Smoker           Medications Prescribed or Re-Ordered Today     None      Your Current Medications Are        Disp Refills Start End    warfarin (COUMADIN) 1 MG tablet 360 tablet 3 11/21/2016     Sig: Take 4mg every day or as directed by anticoagulation clinic    Class: Eprescribe    Notes to Pharmacy: Sent to mail order for all future refills    Cosign for Ordering: Accepted by Terrance Moser MD on 11/30/2016  5:22 PM    metoPROLOL (TOPROL XL) 25 MG 24 hr tablet 45 tablet 3 11/1/2016     Sig - Route: Take 0.5 tablets by mouth daily. - Oral    Class: Eprescribe    pravastatin (PRAVACHOL) 20 MG tablet 90 tablet 3 11/1/2016     Sig - Route: Take 1 tablet by mouth daily. - Oral    Class: Eprescribe    nitroGLYcerin (NITROSTAT) 0.4 MG SL tablet        Sig - Route: Place 0.4 mg under the tongue every 5 minutes as needed. - Sublingual    Class: Historical Med    acetaZOLAMIDE (DIAMOX) 125 MG tablet        Sig - Route: Take 125 mg by mouth See Admin Instructions. Take 1 tablet on Mondays, Wednesdays, and Fridays - Oral    Class: Historical Med    bumetanide (BUMEX) 1 MG tablet        Sig - Route: Take 1 mg by mouth daily. 2 tablets in the AM and 1 tablet in the early afternoon daily. - Oral    Class: Historical Med    potassium chloride (KLOR-CON) 20 MEQ packet        Sig - Route: Take 20 mEq by mouth 2 times  daily. Take 1 tablet in the morning and 1.5 tablets in the evening. - Oral    Class: Historical Med    DOCUSATE SODIUM PO        Sig - Route: Take 1 capsule by mouth 2 times daily. - Oral    Class: Historical Med    aspirin 81 MG tablet        Sig - Route: Take 81 mg by mouth daily. - Oral    Class: Historical Med    Cholecalciferol (VITAMIN D-3 PO)        Sig - Route: Take 4,000 Units by mouth daily. - Oral    Class: Historical Med    Omega-3 Fatty Acids 1200 MG CAPS        Sig - Route: Take 1 capsule by mouth daily. - Oral    Class: Historical Med      Allergies     Lipitor [Atorvastatin Calcium]     Muscle weakness    Niacin RASH      Immunizations History as of 3/20/2017     Name Date    HERPES ZOSTER SHINGLES 10/1/2012    Influenza 9/23/2013, 10/3/2012, 9/30/2011, 9/29/2010, 9/21/2009, 11/13/2003    Influenza High Dose Pres Free 11/1/2016 10:22 AM, 10/28/2015, 10/23/2014    Pneumococcal Conjugate 13 Valent 10/28/2015    Pneumococcal Polysaccharide Adult 9/30/2011, 11/13/2003    Td:Adult type tetanus/diphtheria 9/30/2011, 2/8/2001    Tdap 5/8/2014 11:04 PM      Problem List as of 3/20/2017     Unspecified hypothyroidism    Essential hypertension, benign    Iron deficiency anemia, unspecified    Pure hypercholesterolemia    Insomnia, unspecified    Allergic rhinitis, cause unspecified    Coronary atherosclerosis of native coronary artery (S/P CABG)    Valvular heart disease    AAA (abdominal aortic aneurysm)    BIV ICD- [employer] 1/22/15    Edema    Unspecified disorder of kidney and ureter    CHB (complete heart block)    Cardiomyopathy    Atrial flutter    Pacemaker    Long term (current) use of anticoagulants    Encounter for therapeutic drug monitoring    Persistent atrial fibrillation      March 2017 Details    Sun Mon Tue Wed Thu Fri Sat        1               2               3               4                 5               6               7               8               9               10               11                  12               13               14               15               16               17               18                 19               20      4 mg   See details      21      4 mg         22      4 mg         23      4 mg         24      4 mg         25      4 mg           26      4 mg         27      4 mg         28      4 mg         29      4 mg         30      4 mg         31      4 mg           Date Details   03/20 This INR check               How to take your warfarin dose     To take:  4 mg Take 4 of the 1 mg tablets.           April 2017 Details    Sun Mon Tue Wed Thu Fri Sat           1      4 mg           2      4 mg         3      4 mg         4      4 mg         5      4 mg         6      4 mg         7      4 mg         8      4 mg           9      4 mg         10      4 mg         11      4 mg         12      4 mg         13      4 mg         14      4 mg         15      4 mg           16      4 mg         17      4 mg         18      4 mg         19               20               21               22                 23               24               25               26               27               28               29                 30                      Date Details   No additional details    Date of next INR:  4/18/2017         How to take your warfarin dose     To take:  4 mg Take 4 of the 1 mg tablets.           Results of Testing Done Today     INR - POINT OF CARE      Component    INR-POC    2.2                        Patient Instructions     None       normal...

## 2023-12-28 ENCOUNTER — INPATIENT (INPATIENT)
Facility: HOSPITAL | Age: 73
LOS: 6 days | Discharge: HOME | End: 2024-01-04
Attending: INTERNAL MEDICINE | Admitting: INTERNAL MEDICINE
Payer: MEDICARE

## 2023-12-28 VITALS
DIASTOLIC BLOOD PRESSURE: 69 MMHG | HEART RATE: 79 BPM | SYSTOLIC BLOOD PRESSURE: 117 MMHG | OXYGEN SATURATION: 96 % | RESPIRATION RATE: 18 BRPM | TEMPERATURE: 98 F

## 2023-12-28 DIAGNOSIS — Z96.651 PRESENCE OF RIGHT ARTIFICIAL KNEE JOINT: Chronic | ICD-10-CM

## 2023-12-28 DIAGNOSIS — I67.1 CEREBRAL ANEURYSM, NONRUPTURED: ICD-10-CM

## 2023-12-28 DIAGNOSIS — Z96.659 PRESENCE OF UNSPECIFIED ARTIFICIAL KNEE JOINT: Chronic | ICD-10-CM

## 2023-12-28 DIAGNOSIS — Z96.619 PRESENCE OF UNSPECIFIED ARTIFICIAL SHOULDER JOINT: Chronic | ICD-10-CM

## 2023-12-28 LAB
ALBUMIN SERPL ELPH-MCNC: 4.3 G/DL — SIGNIFICANT CHANGE UP (ref 3.3–5)
ALBUMIN SERPL ELPH-MCNC: 4.3 G/DL — SIGNIFICANT CHANGE UP (ref 3.3–5)
ALP SERPL-CCNC: 87 U/L — SIGNIFICANT CHANGE UP (ref 40–120)
ALP SERPL-CCNC: 87 U/L — SIGNIFICANT CHANGE UP (ref 40–120)
ALT FLD-CCNC: 13 U/L — SIGNIFICANT CHANGE UP (ref 4–33)
ALT FLD-CCNC: 13 U/L — SIGNIFICANT CHANGE UP (ref 4–33)
ANION GAP SERPL CALC-SCNC: 15 MMOL/L — HIGH (ref 7–14)
ANION GAP SERPL CALC-SCNC: 15 MMOL/L — HIGH (ref 7–14)
AST SERPL-CCNC: 18 U/L — SIGNIFICANT CHANGE UP (ref 4–32)
AST SERPL-CCNC: 18 U/L — SIGNIFICANT CHANGE UP (ref 4–32)
BASOPHILS # BLD AUTO: 0.04 K/UL — SIGNIFICANT CHANGE UP (ref 0–0.2)
BASOPHILS NFR BLD AUTO: 0.5 % — SIGNIFICANT CHANGE UP (ref 0–2)
BASOPHILS NFR BLD AUTO: 0.5 % — SIGNIFICANT CHANGE UP (ref 0–2)
BASOPHILS NFR BLD AUTO: 0.6 % — SIGNIFICANT CHANGE UP (ref 0–2)
BASOPHILS NFR BLD AUTO: 0.6 % — SIGNIFICANT CHANGE UP (ref 0–2)
BILIRUB SERPL-MCNC: 0.2 MG/DL — SIGNIFICANT CHANGE UP (ref 0.2–1.2)
BILIRUB SERPL-MCNC: 0.2 MG/DL — SIGNIFICANT CHANGE UP (ref 0.2–1.2)
BUN SERPL-MCNC: 22 MG/DL — SIGNIFICANT CHANGE UP (ref 7–23)
BUN SERPL-MCNC: 22 MG/DL — SIGNIFICANT CHANGE UP (ref 7–23)
CALCIUM SERPL-MCNC: 9.6 MG/DL — SIGNIFICANT CHANGE UP (ref 8.4–10.5)
CALCIUM SERPL-MCNC: 9.6 MG/DL — SIGNIFICANT CHANGE UP (ref 8.4–10.5)
CHLORIDE SERPL-SCNC: 102 MMOL/L — SIGNIFICANT CHANGE UP (ref 98–107)
CHLORIDE SERPL-SCNC: 102 MMOL/L — SIGNIFICANT CHANGE UP (ref 98–107)
CO2 SERPL-SCNC: 23 MMOL/L — SIGNIFICANT CHANGE UP (ref 22–31)
CO2 SERPL-SCNC: 23 MMOL/L — SIGNIFICANT CHANGE UP (ref 22–31)
CREAT SERPL-MCNC: 0.94 MG/DL — SIGNIFICANT CHANGE UP (ref 0.5–1.3)
CREAT SERPL-MCNC: 0.94 MG/DL — SIGNIFICANT CHANGE UP (ref 0.5–1.3)
EGFR: 64 ML/MIN/1.73M2 — SIGNIFICANT CHANGE UP
EGFR: 64 ML/MIN/1.73M2 — SIGNIFICANT CHANGE UP
EOSINOPHIL # BLD AUTO: 0.17 K/UL — SIGNIFICANT CHANGE UP (ref 0–0.5)
EOSINOPHIL NFR BLD AUTO: 2.2 % — SIGNIFICANT CHANGE UP (ref 0–6)
EOSINOPHIL NFR BLD AUTO: 2.2 % — SIGNIFICANT CHANGE UP (ref 0–6)
EOSINOPHIL NFR BLD AUTO: 2.3 % — SIGNIFICANT CHANGE UP (ref 0–6)
EOSINOPHIL NFR BLD AUTO: 2.3 % — SIGNIFICANT CHANGE UP (ref 0–6)
GLUCOSE SERPL-MCNC: 100 MG/DL — HIGH (ref 70–99)
GLUCOSE SERPL-MCNC: 100 MG/DL — HIGH (ref 70–99)
HCT VFR BLD CALC: 40.7 % — SIGNIFICANT CHANGE UP (ref 34.5–45)
HCT VFR BLD CALC: 40.7 % — SIGNIFICANT CHANGE UP (ref 34.5–45)
HCT VFR BLD CALC: 43.6 % — SIGNIFICANT CHANGE UP (ref 34.5–45)
HCT VFR BLD CALC: 43.6 % — SIGNIFICANT CHANGE UP (ref 34.5–45)
HGB BLD-MCNC: 12.8 G/DL — SIGNIFICANT CHANGE UP (ref 11.5–15.5)
HGB BLD-MCNC: 12.8 G/DL — SIGNIFICANT CHANGE UP (ref 11.5–15.5)
HGB BLD-MCNC: 13.6 G/DL — SIGNIFICANT CHANGE UP (ref 11.5–15.5)
HGB BLD-MCNC: 13.6 G/DL — SIGNIFICANT CHANGE UP (ref 11.5–15.5)
IANC: 3.94 K/UL — SIGNIFICANT CHANGE UP (ref 1.8–7.4)
IANC: 3.94 K/UL — SIGNIFICANT CHANGE UP (ref 1.8–7.4)
IANC: 4.4 K/UL — SIGNIFICANT CHANGE UP (ref 1.8–7.4)
IANC: 4.4 K/UL — SIGNIFICANT CHANGE UP (ref 1.8–7.4)
IMM GRANULOCYTES NFR BLD AUTO: 0.1 % — SIGNIFICANT CHANGE UP (ref 0–0.9)
IMM GRANULOCYTES NFR BLD AUTO: 0.1 % — SIGNIFICANT CHANGE UP (ref 0–0.9)
IMM GRANULOCYTES NFR BLD AUTO: 0.4 % — SIGNIFICANT CHANGE UP (ref 0–0.9)
IMM GRANULOCYTES NFR BLD AUTO: 0.4 % — SIGNIFICANT CHANGE UP (ref 0–0.9)
LYMPHOCYTES # BLD AUTO: 2.45 K/UL — SIGNIFICANT CHANGE UP (ref 1–3.3)
LYMPHOCYTES # BLD AUTO: 2.45 K/UL — SIGNIFICANT CHANGE UP (ref 1–3.3)
LYMPHOCYTES # BLD AUTO: 2.6 K/UL — SIGNIFICANT CHANGE UP (ref 1–3.3)
LYMPHOCYTES # BLD AUTO: 2.6 K/UL — SIGNIFICANT CHANGE UP (ref 1–3.3)
LYMPHOCYTES # BLD AUTO: 32 % — SIGNIFICANT CHANGE UP (ref 13–44)
LYMPHOCYTES # BLD AUTO: 32 % — SIGNIFICANT CHANGE UP (ref 13–44)
LYMPHOCYTES # BLD AUTO: 35.9 % — SIGNIFICANT CHANGE UP (ref 13–44)
LYMPHOCYTES # BLD AUTO: 35.9 % — SIGNIFICANT CHANGE UP (ref 13–44)
MCHC RBC-ENTMCNC: 27.7 PG — SIGNIFICANT CHANGE UP (ref 27–34)
MCHC RBC-ENTMCNC: 27.7 PG — SIGNIFICANT CHANGE UP (ref 27–34)
MCHC RBC-ENTMCNC: 27.8 PG — SIGNIFICANT CHANGE UP (ref 27–34)
MCHC RBC-ENTMCNC: 27.8 PG — SIGNIFICANT CHANGE UP (ref 27–34)
MCHC RBC-ENTMCNC: 31.2 GM/DL — LOW (ref 32–36)
MCHC RBC-ENTMCNC: 31.2 GM/DL — LOW (ref 32–36)
MCHC RBC-ENTMCNC: 31.4 GM/DL — LOW (ref 32–36)
MCHC RBC-ENTMCNC: 31.4 GM/DL — LOW (ref 32–36)
MCV RBC AUTO: 88.3 FL — SIGNIFICANT CHANGE UP (ref 80–100)
MCV RBC AUTO: 88.3 FL — SIGNIFICANT CHANGE UP (ref 80–100)
MCV RBC AUTO: 88.8 FL — SIGNIFICANT CHANGE UP (ref 80–100)
MCV RBC AUTO: 88.8 FL — SIGNIFICANT CHANGE UP (ref 80–100)
MONOCYTES # BLD AUTO: 0.48 K/UL — SIGNIFICANT CHANGE UP (ref 0–0.9)
MONOCYTES # BLD AUTO: 0.48 K/UL — SIGNIFICANT CHANGE UP (ref 0–0.9)
MONOCYTES # BLD AUTO: 0.57 K/UL — SIGNIFICANT CHANGE UP (ref 0–0.9)
MONOCYTES # BLD AUTO: 0.57 K/UL — SIGNIFICANT CHANGE UP (ref 0–0.9)
MONOCYTES NFR BLD AUTO: 6.6 % — SIGNIFICANT CHANGE UP (ref 2–14)
MONOCYTES NFR BLD AUTO: 6.6 % — SIGNIFICANT CHANGE UP (ref 2–14)
MONOCYTES NFR BLD AUTO: 7.4 % — SIGNIFICANT CHANGE UP (ref 2–14)
MONOCYTES NFR BLD AUTO: 7.4 % — SIGNIFICANT CHANGE UP (ref 2–14)
NEUTROPHILS # BLD AUTO: 3.94 K/UL — SIGNIFICANT CHANGE UP (ref 1.8–7.4)
NEUTROPHILS # BLD AUTO: 3.94 K/UL — SIGNIFICANT CHANGE UP (ref 1.8–7.4)
NEUTROPHILS # BLD AUTO: 4.4 K/UL — SIGNIFICANT CHANGE UP (ref 1.8–7.4)
NEUTROPHILS # BLD AUTO: 4.4 K/UL — SIGNIFICANT CHANGE UP (ref 1.8–7.4)
NEUTROPHILS NFR BLD AUTO: 54.5 % — SIGNIFICANT CHANGE UP (ref 43–77)
NEUTROPHILS NFR BLD AUTO: 54.5 % — SIGNIFICANT CHANGE UP (ref 43–77)
NEUTROPHILS NFR BLD AUTO: 57.5 % — SIGNIFICANT CHANGE UP (ref 43–77)
NEUTROPHILS NFR BLD AUTO: 57.5 % — SIGNIFICANT CHANGE UP (ref 43–77)
NRBC # BLD: 0 /100 WBCS — SIGNIFICANT CHANGE UP (ref 0–0)
NRBC # FLD: 0 K/UL — SIGNIFICANT CHANGE UP (ref 0–0)
PLATELET # BLD AUTO: 224 K/UL — SIGNIFICANT CHANGE UP (ref 150–400)
PLATELET # BLD AUTO: 224 K/UL — SIGNIFICANT CHANGE UP (ref 150–400)
PLATELET # BLD AUTO: 264 K/UL — SIGNIFICANT CHANGE UP (ref 150–400)
PLATELET # BLD AUTO: 264 K/UL — SIGNIFICANT CHANGE UP (ref 150–400)
POTASSIUM SERPL-MCNC: 4.6 MMOL/L — SIGNIFICANT CHANGE UP (ref 3.5–5.3)
POTASSIUM SERPL-MCNC: 4.6 MMOL/L — SIGNIFICANT CHANGE UP (ref 3.5–5.3)
POTASSIUM SERPL-SCNC: 4.6 MMOL/L — SIGNIFICANT CHANGE UP (ref 3.5–5.3)
POTASSIUM SERPL-SCNC: 4.6 MMOL/L — SIGNIFICANT CHANGE UP (ref 3.5–5.3)
PROT SERPL-MCNC: 8.5 G/DL — HIGH (ref 6–8.3)
PROT SERPL-MCNC: 8.5 G/DL — HIGH (ref 6–8.3)
RBC # BLD: 4.61 M/UL — SIGNIFICANT CHANGE UP (ref 3.8–5.2)
RBC # BLD: 4.61 M/UL — SIGNIFICANT CHANGE UP (ref 3.8–5.2)
RBC # BLD: 4.91 M/UL — SIGNIFICANT CHANGE UP (ref 3.8–5.2)
RBC # BLD: 4.91 M/UL — SIGNIFICANT CHANGE UP (ref 3.8–5.2)
RBC # FLD: 13.5 % — SIGNIFICANT CHANGE UP (ref 10.3–14.5)
SODIUM SERPL-SCNC: 140 MMOL/L — SIGNIFICANT CHANGE UP (ref 135–145)
SODIUM SERPL-SCNC: 140 MMOL/L — SIGNIFICANT CHANGE UP (ref 135–145)
TROPONIN T, HIGH SENSITIVITY RESULT: 19 NG/L — SIGNIFICANT CHANGE UP
TROPONIN T, HIGH SENSITIVITY RESULT: 19 NG/L — SIGNIFICANT CHANGE UP
WBC # BLD: 7.24 K/UL — SIGNIFICANT CHANGE UP (ref 3.8–10.5)
WBC # BLD: 7.24 K/UL — SIGNIFICANT CHANGE UP (ref 3.8–10.5)
WBC # BLD: 7.66 K/UL — SIGNIFICANT CHANGE UP (ref 3.8–10.5)
WBC # BLD: 7.66 K/UL — SIGNIFICANT CHANGE UP (ref 3.8–10.5)
WBC # FLD AUTO: 7.24 K/UL — SIGNIFICANT CHANGE UP (ref 3.8–10.5)
WBC # FLD AUTO: 7.24 K/UL — SIGNIFICANT CHANGE UP (ref 3.8–10.5)
WBC # FLD AUTO: 7.66 K/UL — SIGNIFICANT CHANGE UP (ref 3.8–10.5)
WBC # FLD AUTO: 7.66 K/UL — SIGNIFICANT CHANGE UP (ref 3.8–10.5)

## 2023-12-28 PROCEDURE — 99223 1ST HOSP IP/OBS HIGH 75: CPT

## 2023-12-28 RX ORDER — SERTRALINE 25 MG/1
50 TABLET, FILM COATED ORAL AT BEDTIME
Refills: 0 | Status: DISCONTINUED | OUTPATIENT
Start: 2023-12-29 | End: 2023-12-30

## 2023-12-28 RX ORDER — ASPIRIN/CALCIUM CARB/MAGNESIUM 324 MG
81 TABLET ORAL DAILY
Refills: 0 | Status: DISCONTINUED | OUTPATIENT
Start: 2023-12-28 | End: 2023-12-31

## 2023-12-28 RX ORDER — SODIUM CHLORIDE 9 MG/ML
1000 INJECTION INTRAMUSCULAR; INTRAVENOUS; SUBCUTANEOUS ONCE
Refills: 0 | Status: COMPLETED | OUTPATIENT
Start: 2023-12-28 | End: 2023-12-28

## 2023-12-28 RX ORDER — KETOROLAC TROMETHAMINE 30 MG/ML
15 SYRINGE (ML) INJECTION EVERY 6 HOURS
Refills: 0 | Status: DISCONTINUED | OUTPATIENT
Start: 2023-12-28 | End: 2023-12-30

## 2023-12-28 RX ORDER — TRAZODONE HCL 50 MG
100 TABLET ORAL ONCE
Refills: 0 | Status: COMPLETED | OUTPATIENT
Start: 2023-12-28 | End: 2023-12-28

## 2023-12-28 RX ORDER — LORATADINE 10 MG/1
10 TABLET ORAL DAILY
Refills: 0 | Status: DISCONTINUED | OUTPATIENT
Start: 2023-12-28 | End: 2023-12-30

## 2023-12-28 RX ORDER — MECLIZINE HCL 12.5 MG
12.5 TABLET ORAL ONCE
Refills: 0 | Status: COMPLETED | OUTPATIENT
Start: 2023-12-28 | End: 2023-12-28

## 2023-12-28 RX ORDER — ACETAMINOPHEN 500 MG
975 TABLET ORAL EVERY 6 HOURS
Refills: 0 | Status: DISCONTINUED | OUTPATIENT
Start: 2023-12-28 | End: 2024-01-04

## 2023-12-28 RX ORDER — CLOPIDOGREL BISULFATE 75 MG/1
75 TABLET, FILM COATED ORAL DAILY
Refills: 0 | Status: DISCONTINUED | OUTPATIENT
Start: 2023-12-28 | End: 2023-12-29

## 2023-12-28 RX ORDER — ONDANSETRON 8 MG/1
4 TABLET, FILM COATED ORAL ONCE
Refills: 0 | Status: COMPLETED | OUTPATIENT
Start: 2023-12-28 | End: 2023-12-28

## 2023-12-28 RX ORDER — ATORVASTATIN CALCIUM 80 MG/1
40 TABLET, FILM COATED ORAL AT BEDTIME
Refills: 0 | Status: DISCONTINUED | OUTPATIENT
Start: 2023-12-28 | End: 2024-01-04

## 2023-12-28 RX ORDER — CYCLOBENZAPRINE HYDROCHLORIDE 10 MG/1
10 TABLET, FILM COATED ORAL AT BEDTIME
Refills: 0 | Status: DISCONTINUED | OUTPATIENT
Start: 2023-12-28 | End: 2023-12-30

## 2023-12-28 RX ORDER — SERTRALINE 25 MG/1
50 TABLET, FILM COATED ORAL ONCE
Refills: 0 | Status: COMPLETED | OUTPATIENT
Start: 2023-12-28 | End: 2023-12-28

## 2023-12-28 RX ORDER — MECLIZINE HCL 12.5 MG
25 TABLET ORAL EVERY 8 HOURS
Refills: 0 | Status: DISCONTINUED | OUTPATIENT
Start: 2023-12-28 | End: 2023-12-30

## 2023-12-28 RX ORDER — TRAZODONE HCL 50 MG
100 TABLET ORAL AT BEDTIME
Refills: 0 | Status: DISCONTINUED | OUTPATIENT
Start: 2023-12-29 | End: 2024-01-04

## 2023-12-28 RX ORDER — ASPIRIN/CALCIUM CARB/MAGNESIUM 324 MG
1 TABLET ORAL
Qty: 21 | Refills: 0
Start: 2023-12-28 | End: 2024-01-17

## 2023-12-28 RX ORDER — SODIUM CHLORIDE 9 MG/ML
1000 INJECTION INTRAMUSCULAR; INTRAVENOUS; SUBCUTANEOUS
Refills: 0 | Status: DISCONTINUED | OUTPATIENT
Start: 2023-12-28 | End: 2023-12-29

## 2023-12-28 RX ORDER — MECLIZINE HCL 12.5 MG
1 TABLET ORAL
Qty: 10 | Refills: 0
Start: 2023-12-28

## 2023-12-28 RX ORDER — CLOPIDOGREL BISULFATE 75 MG/1
300 TABLET, FILM COATED ORAL ONCE
Refills: 0 | Status: COMPLETED | OUTPATIENT
Start: 2023-12-28 | End: 2023-12-28

## 2023-12-28 RX ORDER — CLOPIDOGREL BISULFATE 75 MG/1
1 TABLET, FILM COATED ORAL
Qty: 21 | Refills: 0
Start: 2023-12-28 | End: 2024-01-17

## 2023-12-28 RX ORDER — ASPIRIN/CALCIUM CARB/MAGNESIUM 324 MG
81 TABLET ORAL DAILY
Refills: 0 | Status: DISCONTINUED | OUTPATIENT
Start: 2023-12-28 | End: 2024-01-04

## 2023-12-28 RX ADMIN — Medication 12.5 MILLIGRAM(S): at 22:37

## 2023-12-28 RX ADMIN — CLOPIDOGREL BISULFATE 300 MILLIGRAM(S): 75 TABLET, FILM COATED ORAL at 03:48

## 2023-12-28 RX ADMIN — SERTRALINE 50 MILLIGRAM(S): 25 TABLET, FILM COATED ORAL at 22:47

## 2023-12-28 RX ADMIN — Medication 100 MILLIGRAM(S): at 23:50

## 2023-12-28 RX ADMIN — CYCLOBENZAPRINE HYDROCHLORIDE 10 MILLIGRAM(S): 10 TABLET, FILM COATED ORAL at 22:47

## 2023-12-28 RX ADMIN — Medication 81 MILLIGRAM(S): at 03:48

## 2023-12-28 RX ADMIN — ATORVASTATIN CALCIUM 40 MILLIGRAM(S): 80 TABLET, FILM COATED ORAL at 22:46

## 2023-12-28 NOTE — ED CDU PROVIDER INITIAL DAY NOTE - CLINICAL SUMMARY MEDICAL DECISION MAKING FREE TEXT BOX
72 y/o female pt pmhx HTN, HLD, anxiety was here in ED yesterday for dizziness (room spinning sensation with associated nausea w/o vomiting) and AMA b/c she wanted to go home to her dog but has been unable to ambulate and has had 3 falls since yesterday due to inability to ambulate. 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 74 y/o female pt pmhx HTN, HLD, anxiety was here in ED yesterday for dizziness (room spinning sensation with associated nausea w/o vomiting) and AMA b/c she wanted to go home to her dog but has been unable to ambulate and has had 3 falls since yesterday due to inability to ambulate. 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

## 2023-12-28 NOTE — ED CDU PROVIDER INITIAL DAY NOTE - CRANIAL NERVE AND PUPILLARY EXAM
5/5 strength and intact sensation bilaterally in upper and lower extremity. No facial droop, EOMI, no nystagmus./cranial nerves 2-12 intact 5/5 strength and intact sensation bilaterally in upper and lower extremity. No facial droop, EOMI but with associated dizz, no nystagmus, unable to ambulate due to dizz./cranial nerves 2-12 intact

## 2023-12-28 NOTE — ED PROVIDER NOTE - ATTENDING CONTRIBUTION TO CARE
Gong: I have seen and examined the patient face to face, have reviewed and addended the HPI, PE and a/p as necessary.     72 yo  F with HTN, HLD, anxiety a/w dizzines and room spining sensation yesterday and susequently signed out AMA.  Pt was recommended to have MRI to further evaluate findings.  Pt went home however, noted to have 3 falls with no head trauma, 2/2 unsteady gait.    Imaging from yesterday significant for.      NONCONTRAST HEAD CT SCAN: No CT evidence of acute intracranial pathology. 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 Reconstitution of the distal right common carotid artery, likely via retrograde flow. Moderate stenosis of the proximal right internal carotid artery.  CT ANGIOGRAPHY BRAIN: Occlusion of the right proximal P2 segment. 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    GEN - NAD; well appearing; A+O x3; non-toxic appearing  CARD -s1s2, RRR, no M,G,R;   PULM - CTA b/l, symmetric breath sounds;   ABD -  +BS, ND, NT, soft, no guarding, no rebound, no masses;   BACK - no CVA tenderness, Normal  spine;   EXT - symmetric pulses, 2+ dp, capillary refill < 2 seconds, no cyanosis, no edema;   NEURO - unstable upon standing, strength 5/5 bilaterally in upper and lower ext.      Will place in CDU for stroke work up given findings from yesterday.  Will need MRI and neuro eval upon imaging.  Case discussed with CDU ISABEL Montiel and accepted to CDU.   Neurosurgery consult pending for 5mm aneurysm found on left ICA

## 2023-12-28 NOTE — CONSULT NOTE ADULT - ASSESSMENT
LKW 1 AM on 12/27/2023  NIHSS 2 (+2 for Left Homonymous Hemianopsia)  preMRS 1  Pt not IV tenecteplase candidate because outside of time window  Pt not thrombectomy candidate because outside of time window    Impression: Acute vestibular syndrome with gait instability. Nonlateralizing neuro exam (other than baseline LHH). DDx includes peripheral vs central etiologies (given stroke risk factors and prior stroke hx, acute ischemic stroke should be ruled out).    Recommendations:  [ ] For secondary stroke prevention, Aspirin 81mg qd + Plavix 75mg qd (adapted per CHANCE/POINT). Duration TBD after further workup  [ ] Atorvastatin 40-80 mg daily titrated per LDL < 70  [ ] HgbA1C, fasting lipid panel, CBC, CMP, coag panel, troponin  [ ] MRI brain w/o con to evaluate for infarction  [ ] TTE  [ ] telemetry to check for arrhythmia, EKG, will discuss loop recorder    - Glucose control   - Stroke education and counseling  - Neuro-checks and VS q4h  - Permissive HTN up to 220/120 for 24-48h from symptom onset  - Dysphagia screen. If fails, speech/swallow eval  - aspiration, fall precautions  - STAT CT head non-contrast for change in neuro exam.   - PT/ OT / DVT ppx per primary team     Discussed with stroke fellow Dr. Deondre Murray under supervision of attending Dr. Cookie Barton.   LKW 1 AM on 12/27/2023  NIHSS 2 (+2 for Left Homonymous Hemianopsia)  preMRS 1  Pt not IV tenecteplase candidate because outside of time window  Pt not thrombectomy candidate because outside of time window    CTH: No CT evidence of acute intracranial pathology. 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 Reconstitution of the distal right common carotid artery, likely via retrograde flow. Moderate stenosis of the proximal right internal carotid artery.    CT ANGIOGRAPHY BRAIN: Occlusion of the right proximal P2 segment. 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    Impression: Acute vestibular syndrome with gait instability. Nonlateralizing neuro exam (other than baseline LHH). DDx includes peripheral vs central etiologies (given stroke risk factors and prior stroke hx, acute ischemic stroke should be ruled out).    Recommendations:  [ ] For secondary stroke prevention, Aspirin 81mg qd + Plavix 75mg qd (adapted per CHANCE/POINT). Duration TBD after further workup  [ ] Atorvastatin 40-80 mg daily titrated per LDL < 70  [ ] HgbA1C, fasting lipid panel, CBC, CMP, coag panel, troponin  [ ] MRI brain w/o con to evaluate for infarction  [ ] TTE  [ ] telemetry to check for arrhythmia, EKG, will discuss loop recorder    - Glucose control   - Stroke education and counseling  - Neuro-checks and VS q4h  - Permissive HTN up to 220/120 for 24-48h from symptom onset  - Dysphagia screen. If fails, speech/swallow eval  - aspiration, fall precautions  - STAT CT head non-contrast for change in neuro exam.   - PT/ OT / DVT ppx per primary team     Discussed with stroke fellow Dr. Deondre Murray under supervision of attending Dr. Cookie Barton.

## 2023-12-28 NOTE — ED ADULT NURSE NOTE - NSFALLRISKINTERV_ED_ALL_ED
Assistance OOB with selected safe patient handling equipment if applicable/Assistance with ambulation/Communicate fall risk and risk factors to all staff, patient, and family/Encourage patient to sit up slowly, dangle for a short time, stand at bedside before walking/Monitor gait and stability/Orthostatic vital signs/Provide visual cue: yellow wristband, yellow gown, etc/Reinforce activity limits and safety measures with patient and family/Call bell, personal items and telephone in reach/Instruct patient to call for assistance before getting out of bed/chair/stretcher/Non-slip footwear applied when patient is off stretcher/[place] to call system/Physically safe environment - no spills, clutter or unnecessary equipment/Purposeful Proactive Rounding/Room/bathroom lighting operational, light cord in reach Assistance OOB with selected safe patient handling equipment if applicable/Assistance with ambulation/Communicate fall risk and risk factors to all staff, patient, and family/Encourage patient to sit up slowly, dangle for a short time, stand at bedside before walking/Monitor gait and stability/Orthostatic vital signs/Provide visual cue: yellow wristband, yellow gown, etc/Reinforce activity limits and safety measures with patient and family/Call bell, personal items and telephone in reach/Instruct patient to call for assistance before getting out of bed/chair/stretcher/Non-slip footwear applied when patient is off stretcher/[place] to call system/Physically safe environment - no spills, clutter or unnecessary equipment/Purposeful Proactive Rounding/Room/bathroom lighting operational, light cord in reach

## 2023-12-28 NOTE — ED PROVIDER NOTE - OBJECTIVE STATEMENT
72 y/o female pt pmhx HTN, HLD, anxiety was here in ED yesterday for dizziness (room spinning sensation with associated nausea w/o vomiting) and AMA b/c she wanted to go home to her pets. Neurology highly recommended admission due to imaging results:  NONCONTRAST HEAD CT SCAN: No CT evidence of acute intracranial pathology. 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 Reconstitution of the distal right common carotid artery, likely via retrograde flow. Moderate stenosis of the proximal right internal carotid artery.  CT ANGIOGRAPHY BRAIN: Occlusion of the right proximal P2 segment. 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 72 y/o female pt pmhx HTN, HLD, anxiety was here in ED yesterday for dizziness (room spinning sensation with associated nausea w/o vomiting) and AMA b/c she wanted to go home to her pets. Neurology highly recommended admission due to imaging results:  NONCONTRAST HEAD CT SCAN: No CT evidence of acute intracranial pathology. 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 Reconstitution of the distal right common carotid artery, likely via retrograde flow. Moderate stenosis of the proximal right internal carotid artery.  CT ANGIOGRAPHY BRAIN: Occlusion of the right proximal P2 segment. 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    Pt had 3 falls between leaving hospital yesterday to coming back today; all times landing on her butt. Denies hitting her head.

## 2023-12-28 NOTE — ED PROVIDER NOTE - CLINICAL SUMMARY MEDICAL DECISION MAKING FREE TEXT BOX
72 y/o female pt pmhx HTN, HLD, anxiety was here in ED yesterday for dizziness (room spinning sensation with associated nausea w/o vomiting) and AMA b/c she wanted to go home to her pets. Neurology highly recommended admission due to imaging results:  NONCONTRAST HEAD CT SCAN: No CT evidence of acute intracranial pathology. 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 Reconstitution of the distal right common carotid artery, likely via retrograde flow. Moderate stenosis of the proximal right internal carotid artery.  CT ANGIOGRAPHY BRAIN: Occlusion of the right proximal P2 segment. 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    Pt had 3 falls between leaving hospital yesterday to coming back today; all times landing on her butt. Denies hitting her head.    CDU pt for MRI. Neurosurgery consult pending for 5mm aneurysm found on left ICA 74 y/o female pt pmhx HTN, HLD, anxiety was here in ED yesterday for dizziness (room spinning sensation with associated nausea w/o vomiting) and AMA b/c she wanted to go home to her pets. Neurology highly recommended admission due to imaging results:  NONCONTRAST HEAD CT SCAN: No CT evidence of acute intracranial pathology. 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 Reconstitution of the distal right common carotid artery, likely via retrograde flow. Moderate stenosis of the proximal right internal carotid artery.  CT ANGIOGRAPHY BRAIN: Occlusion of the right proximal P2 segment. 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    Pt had 3 falls between leaving hospital yesterday to coming back today; all times landing on her butt. Denies hitting her head.    CDU pt for MRI. Neurosurgery consult pending for 5mm aneurysm found on left ICA

## 2023-12-28 NOTE — ED ADULT TRIAGE NOTE - CHIEF COMPLAINT QUOTE
pt has been having c/o dizziness, headache since woke up yesterday morning. Pt signed out AMA 0400 today stating she wanted to go home to see her pets. Pt returned today stating that she made a mistake and should have stayed to get her MRI that was ordered. Pt states she has a blood clot in her brain that they discovered yesterday. Fingerstick 108

## 2023-12-28 NOTE — ED CDU PROVIDER INITIAL DAY NOTE - OBJECTIVE STATEMENT
74 y/o female pt pmhx HTN, HLD, anxiety was here in ED yesterday for dizziness (room spinning sensation with associated nausea w/o vomiting) and AMA b/c she wanted to go home to her pets. Neurology highly recommended admission due to imaging results:  NONCONTRAST HEAD CT SCAN: No CT evidence of acute intracranial pathology. 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 Reconstitution of the distal right common carotid artery, likely via retrograde flow. Moderate stenosis of the proximal right internal carotid artery.  CT ANGIOGRAPHY BRAIN: Occlusion of the right proximal P2 segment. 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    Pt had 3 falls between leaving hospital yesterday to coming back today; all times landing on her butt. Denies hitting her head. Plan is CDU placement for MRI, PT, OT, symptomatic treatement with meclizine and fluids and Neurosurgery consultation due to L ICA aneurysm. Pt states she normally walks without assistance and is currently unable to ambulate due to ISABEL corona assisted pt during ambulation attempt: was unable to ambulate. Pt states the dizz is worse with changes of positions and improved with meclizine but states the effect of the meclizine is short lived.

## 2023-12-28 NOTE — ED ADULT NURSE NOTE - OBJECTIVE STATEMENT
Pt received to room 29, A&O x 4, ambulatory with assistance, hx of HTN, HLD, anxiety, coming to ED with complaints of dizziness. Pt reports being treated in ED yesterday for dizziness, n/v, left AMA to feed pets, returned to hospital requesting MRI. Pt Pt received to room 29, A&O x 4, ambulatory with assistance, hx of HTN, HLD, anxiety, coming to ED with complaints of dizziness. Pt reports being treated in ED yesterday for dizziness, n/v, left AMA to feed pets, returned to hospital requesting MRI. Pt also reports having 3 falls since leaving hospital, landed on buttocks, denies pain, denies head strike, reports taking ASA daily. Pt currently endorses dizziness with movement, requires assistance upon ambulation, denies HA, double/blurry vision. Pt denies chest pain, SOB, n/v/d, numbness/tingling, urinary difficulties or abnormalities. Medicated as per EMAR, RR equal and unlabored, safety maintained, comfort provided, awaiting imaging at this time.

## 2023-12-28 NOTE — CONSULT NOTE ADULT - SUBJECTIVE AND OBJECTIVE BOX
Neurology - Consult Note    -  Spectra: [phone] ([facility]), [phone] ([facility])  -    HPI: 73F pmhx prior CVA (2020, R occipital lobe, residual LHH), presents with dizziness and nausea/vomiting      Allergies:  No Known Allergies  NSAIDs (Other)      PMHx/PSHx/Family Hx: As above, otherwise see below   No pertinent past medical history    Essential hypertension    HLD (hyperlipidemia)    Anxiety    Osteoarthritis    History of closed shoulder dislocation    Class 1 obesity with body mass index (BMI) of 34.0 to 34.9 in adult    Osteoarthritis of left shoulder    Hypertension    Hyperlipidemia    Chronic obstructive pulmonary disease (COPD)    Osteoarthritis    Chronic lower back pain    Anxiety    Seasonal allergies        Social Hx:  No current use of tobacco, alcohol, or illicit drugs  Lives with ***    Medications:  MEDICATIONS  (STANDING):  aspirin  chewable 81 milliGRAM(s) Oral daily    MEDICATIONS  (PRN):      Vitals:  T(C): 36.8 (12-27-23 @ 23:48), Max: 36.8 (12-27-23 @ 23:48)  HR: 78 (12-27-23 @ 23:48) (78 - 80)  BP: 113/58 (12-27-23 @ 23:48) (113/58 - 146/99)  RR: 18 (12-27-23 @ 23:48) (18 - 18)  SpO2: 98% (12-27-23 @ 23:48) (94% - 98%)    Physical Examination:   General - NAD  Cardiovascular - no edema  Eyes - Fundoscopy not well visualized    Neurologic Exam:  Mental status - Awake, Alert, Oriented to person, place, and time. Speech fluent, repetition and naming intact. Follows simple and complex commands. Attention/concentration, recent and remote memory (including registration and recall), and fund of knowledge intact    Cranial nerves - PEERL, LHH (baseline), EOMI, V1-V3 intact, face symmetric, tongue midline, trapezius strength symmetric, hearing intact b/l    Motor - Normal bulk and tone throughout. No pronator drift.    Strength testing            Deltoid      Biceps      Triceps     Wrist Extension    Wrist Flexion     Interossei         R            5                 5               5               5                5                        5                 5  L             5                 5               5               5                 5                        5                 5              Hip Flexion    Hip Extension    Knee Flexion    Knee Extension    Dorsiflexion    Plantar Flexion  R              5                           5               5                     5                            5                          5  L              5                           5               5                      5                            5                          5    Sensation - Light touch/temperature intact throughout    DTR's -             Biceps      Triceps     Brachioradialis      Patellar    Ankle    Toes/plantar response  R             1+            1+                  1+              1+          1+                 Down  L              1+             1+                 1+             1+           1+                 Down    Coordination - No dysmetria on HTS b/l    Gait and station - +Romberg. Gait unstable, holding onto bed/wall in order to walk    Labs:                        12.8   7.24  )-----------( 224      ( 28 Dec 2023 00:17 )             40.7     12-27    136  |  102  |  22  ----------------------------<  86  5.4<H>   |  21<L>  |  0.88    Ca    9.1      27 Dec 2023 22:24    TPro  7.4  /  Alb  3.9  /  TBili  0.2  /  DBili  x   /  AST  27  /  ALT  14  /  AlkPhos  83  12-27    CAPILLARY BLOOD GLUCOSE      POCT Blood Glucose.: 92 mg/dL (27 Dec 2023 19:39)    LIVER FUNCTIONS - ( 27 Dec 2023 22:24 )  Alb: 3.9 g/dL / Pro: 7.4 g/dL / ALK PHOS: 83 U/L / ALT: 14 U/L / AST: 27 U/L / GGT: x               CSF:                  Radiology:     Neurology - Consult Note    -  Spectra: [phone] ([facility]), [phone] ([facility])  -    HPI: 73F pmhx prior CVA (2020, R occipital lobe, residual LHH), presents with dizziness and nausea/vomiting      Allergies:  No Known Allergies  NSAIDs (Other)      PMHx/PSHx/Family Hx: As above, otherwise see below   No pertinent past medical history    Essential hypertension    HLD (hyperlipidemia)    Anxiety    Osteoarthritis    History of closed shoulder dislocation    Class 1 obesity with body mass index (BMI) of 34.0 to 34.9 in adult    Osteoarthritis of left shoulder    Hypertension    Hyperlipidemia    Chronic obstructive pulmonary disease (COPD)    Osteoarthritis    Chronic lower back pain    Anxiety    Seasonal allergies        Social Hx:  No current use of tobacco, alcohol, or illicit drugs  Lives with ***    Medications:  MEDICATIONS  (STANDING):  aspirin  chewable 81 milliGRAM(s) Oral daily    MEDICATIONS  (PRN):      Vitals:  T(C): 36.8 (12-27-23 @ 23:48), Max: 36.8 (12-27-23 @ 23:48)  HR: 78 (12-27-23 @ 23:48) (78 - 80)  BP: 113/58 (12-27-23 @ 23:48) (113/58 - 146/99)  RR: 18 (12-27-23 @ 23:48) (18 - 18)  SpO2: 98% (12-27-23 @ 23:48) (94% - 98%)    Physical Examination:   General - NAD  Cardiovascular - no edema  Eyes - Fundoscopy not well visualized    Neurologic Exam:  Mental status - Awake, Alert, Oriented to person, place, and time. Speech fluent, repetition and naming intact. Follows simple and complex commands. Attention/concentration, recent and remote memory (including registration and recall), and fund of knowledge intact    Cranial nerves - PEERL, LHH (baseline), EOMI, V1-V3 intact, face symmetric, tongue midline, trapezius strength symmetric, hearing intact b/l    Motor - Normal bulk and tone throughout. No pronator drift.    Strength testing            Deltoid      Biceps      Triceps     Wrist Extension    Wrist Flexion     Interossei         R            5                 5               5               5                5                        5                 5  L             5                 5               5               5                 5                        5                 5              Hip Flexion    Hip Extension    Knee Flexion    Knee Extension    Dorsiflexion    Plantar Flexion  R              5                           5               5                     5                            5                          5  L              5                           5               5                      5                            5                          5    Sensation - Light touch/temperature intact throughout    DTR's -             Biceps      Triceps     Brachioradialis      Patellar    Ankle    Toes/plantar response  R             1+            1+                  1+              1+          1+                 Down  L              1+             1+                 1+             1+           1+                 Down    Coordination - No dysmetria on HTS b/l    Gait and station - +Romberg. Gait unstable, holding onto bed/wall in order to walk    Labs:                        12.8   7.24  )-----------( 224      ( 28 Dec 2023 00:17 )             40.7     12-27    136  |  102  |  22  ----------------------------<  86  5.4<H>   |  21<L>  |  0.88    Ca    9.1      27 Dec 2023 22:24    TPro  7.4  /  Alb  3.9  /  TBili  0.2  /  DBili  x   /  AST  27  /  ALT  14  /  AlkPhos  83  12-27    CAPILLARY BLOOD GLUCOSE      POCT Blood Glucose.: 92 mg/dL (27 Dec 2023 19:39)    LIVER FUNCTIONS - ( 27 Dec 2023 22:24 )  Alb: 3.9 g/dL / Pro: 7.4 g/dL / ALK PHOS: 83 U/L / ALT: 14 U/L / AST: 27 U/L / GGT: x               CSF:                  Radiology:     Neurology - Consult Note    -  Spectra: [phone] ([facility]), [phone] ([facility])  -    HPI: 73F pmhx prior CVA (2020, R occipital lobe, residual LHH, on ASA 81mg qd), HTN, HLD, Anxiety, provoked DVT in past  presents with dizziness, gait instability, and nausea/vomiting. LKW 1 AM on 12/27/2023. Pt went to bed at this time and woke up at some point before noon on 12/27 (exact time unknown). Dizziness is vertiginous and worse with movement. Pt normally ambulates independently but now needs to hold onto wall/stretcher in order to stabilize herself while walking. Pt also reporting HA. No focal weakness, numbness/tingling, double vision, or language difficulties.    Allergies:  No Known Allergies  NSAIDs (Other)      PMHx/PSHx/Family Hx: As above, otherwise see below   No pertinent past medical history    Essential hypertension    HLD (hyperlipidemia)    Anxiety    Osteoarthritis    History of closed shoulder dislocation    Class 1 obesity with body mass index (BMI) of 34.0 to 34.9 in adult    Osteoarthritis of left shoulder    Hypertension    Hyperlipidemia    Chronic obstructive pulmonary disease (COPD)    Osteoarthritis    Chronic lower back pain    Anxiety    Seasonal allergies        Social Hx:  No current use of tobacco, alcohol, or illicit drugs  Lives with ***    Medications:  MEDICATIONS  (STANDING):  aspirin  chewable 81 milliGRAM(s) Oral daily    MEDICATIONS  (PRN):      Vitals:  T(C): 36.8 (12-27-23 @ 23:48), Max: 36.8 (12-27-23 @ 23:48)  HR: 78 (12-27-23 @ 23:48) (78 - 80)  BP: 113/58 (12-27-23 @ 23:48) (113/58 - 146/99)  RR: 18 (12-27-23 @ 23:48) (18 - 18)  SpO2: 98% (12-27-23 @ 23:48) (94% - 98%)    Physical Examination:   General - NAD  Cardiovascular - no edema  Eyes - Fundoscopy not well visualized    Neurologic Exam:  Mental status - Awake, Alert, Oriented to person, place, and time. Speech fluent, repetition and naming intact. Follows simple and complex commands. Attention/concentration, recent and remote memory (including registration and recall), and fund of knowledge intact    Cranial nerves - PEERL, LHH (baseline), EOMI, V1-V3 intact, face symmetric, tongue midline, trapezius strength symmetric, hearing intact b/l    Motor - Normal bulk and tone throughout. No pronator drift.    Strength testing            Deltoid      Biceps      Triceps     Wrist Extension    Wrist Flexion     Interossei         R            5                 5               5               5                5                        5                 5  L             5                 5               5               5                 5                        5                 5              Hip Flexion    Hip Extension    Knee Flexion    Knee Extension    Dorsiflexion    Plantar Flexion  R              5                           5               5                     5                            5                          5  L              5                           5               5                      5                            5                          5    Sensation - Light touch/temperature intact throughout    DTR's -             Biceps      Triceps     Brachioradialis      Patellar    Ankle    Toes/plantar response  R             1+            1+                  1+              1+          1+                 Down  L              1+             1+                 1+             1+           1+                 Down    Coordination - No dysmetria on HTS b/l    Gait and station - +Romberg. Gait unstable, holding onto bed/wall in order to walk    Labs:                        12.8   7.24  )-----------( 224      ( 28 Dec 2023 00:17 )             40.7     12-27    136  |  102  |  22  ----------------------------<  86  5.4<H>   |  21<L>  |  0.88    Ca    9.1      27 Dec 2023 22:24    TPro  7.4  /  Alb  3.9  /  TBili  0.2  /  DBili  x   /  AST  27  /  ALT  14  /  AlkPhos  83  12-27    CAPILLARY BLOOD GLUCOSE      POCT Blood Glucose.: 92 mg/dL (27 Dec 2023 19:39)    LIVER FUNCTIONS - ( 27 Dec 2023 22:24 )  Alb: 3.9 g/dL / Pro: 7.4 g/dL / ALK PHOS: 83 U/L / ALT: 14 U/L / AST: 27 U/L / GGT: x               CSF:                  Radiology:     Neurology - Consult Note    -  Spectra: [phone] ([facility]), [phone] ([facility])  -    HPI: 73F pmhx prior CVA (2020, R occipital lobe, residual LHH, on ASA 81mg qd), HTN, HLD, Anxiety, provoked DVT in past  presents with dizziness, gait instability, and nausea/vomiting. LKW 1 AM on 12/27/2023. Pt went to bed at this time and woke up at some point before noon on 12/27 (exact time unknown). Dizziness is vertiginous and worse with movement. Pt normally ambulates independently but now needs to hold onto wall/stretcher in order to stabilize herself while walking. Pt also reporting HA. No focal weakness, numbness/tingling, double vision, or language difficulties.    Allergies:  No Known Allergies  NSAIDs (Other)      PMHx/PSHx/Family Hx: As above, otherwise see below   No pertinent past medical history    Essential hypertension    HLD (hyperlipidemia)    Anxiety    Osteoarthritis    History of closed shoulder dislocation    Class 1 obesity with body mass index (BMI) of 34.0 to 34.9 in adult    Osteoarthritis of left shoulder    Hypertension    Hyperlipidemia    Chronic obstructive pulmonary disease (COPD)    Osteoarthritis    Chronic lower back pain    Anxiety    Seasonal allergies        Social Hx:  No current use of tobacco, alcohol, or illicit drugs  Lives with ***    Medications:  MEDICATIONS  (STANDING):  aspirin  chewable 81 milliGRAM(s) Oral daily    MEDICATIONS  (PRN):      Vitals:  T(C): 36.8 (12-27-23 @ 23:48), Max: 36.8 (12-27-23 @ 23:48)  HR: 78 (12-27-23 @ 23:48) (78 - 80)  BP: 113/58 (12-27-23 @ 23:48) (113/58 - 146/99)  RR: 18 (12-27-23 @ 23:48) (18 - 18)  SpO2: 98% (12-27-23 @ 23:48) (94% - 98%)    Physical Examination:   General - NAD  Cardiovascular - no edema  Eyes - Fundoscopy not well visualized    Neurologic Exam:  Mental status - Awake, Alert, Oriented to person, place, and time. Speech fluent, repetition and naming intact. Follows simple and complex commands. Attention/concentration, recent and remote memory (including registration and recall), and fund of knowledge intact    Cranial nerves - PEERL, LHH (baseline), EOMI, V1-V3 intact, face symmetric, tongue midline, trapezius strength symmetric, hearing intact b/l    Motor - Normal bulk and tone throughout. No pronator drift.    Strength testing            Deltoid      Biceps      Triceps     Wrist Extension    Wrist Flexion     Interossei         R            5                 5               5               5                5                        5                 5  L             5                 5               5               5                 5                        5                 5              Hip Flexion    Hip Extension    Knee Flexion    Knee Extension    Dorsiflexion    Plantar Flexion  R              5                           5               5                     5                            5                          5  L              5                           5               5                      5                            5                          5    Sensation - Light touch/temperature intact throughout    DTR's -             Biceps      Triceps     Brachioradialis      Patellar    Ankle    Toes/plantar response  R             1+            1+                  1+              1+          1+                 Down  L              1+             1+                 1+             1+           1+                 Down    Coordination - No dysmetria on HTS b/l    Gait and station - +Romberg. Gait unstable, holding onto bed/wall in order to walk    Labs:                        12.8   7.24  )-----------( 224      ( 28 Dec 2023 00:17 )             40.7     12-27    136  |  102  |  22  ----------------------------<  86  5.4<H>   |  21<L>  |  0.88    Ca    9.1      27 Dec 2023 22:24    TPro  7.4  /  Alb  3.9  /  TBili  0.2  /  DBili  x   /  AST  27  /  ALT  14  /  AlkPhos  83  12-27    CAPILLARY BLOOD GLUCOSE      POCT Blood Glucose.: 92 mg/dL (27 Dec 2023 19:39)    LIVER FUNCTIONS - ( 27 Dec 2023 22:24 )  Alb: 3.9 g/dL / Pro: 7.4 g/dL / ALK PHOS: 83 U/L / ALT: 14 U/L / AST: 27 U/L / GGT: x               CSF:                  Radiology:

## 2023-12-28 NOTE — CONSULT NOTE ADULT - ASSESSMENT
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

## 2023-12-28 NOTE — CONSULT NOTE ADULT - PROBLEM SELECTOR RECOMMENDATION 9
Agree with admission to CDU for further evaluation  MRI/MRA  Likely will recommend outpatient follow up after MRI/MRA

## 2023-12-28 NOTE — ED CDU PROVIDER INITIAL DAY NOTE - ATTENDING APP SHARED VISIT CONTRIBUTION OF CARE
72 yo  F with HTN, HLD, anxiety a/w dizzines and room spining sensation yesterday and susequently signed out AMA.  Pt was recommended to have MRI to further evaluate findings.  Pt went home however, noted to have 3 falls with no head trauma, 2/2 unsteady gait.    Imaging from yesterday significant for.      NONCONTRAST HEAD CT SCAN: No CT evidence of acute intracranial pathology. 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 Reconstitution of the distal right common carotid artery, likely via retrograde flow. Moderate stenosis of the proximal right internal carotid artery.  CT ANGIOGRAPHY BRAIN: Occlusion of the right proximal P2 segment. 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    GEN - NAD; well appearing; A+O x3; non-toxic appearing  CARD -s1s2, RRR, no M,G,R;   PULM - CTA b/l, symmetric breath sounds;   ABD -  +BS, ND, NT, soft, no guarding, no rebound, no masses;   BACK - no CVA tenderness, Normal  spine;   EXT - symmetric pulses, 2+ dp, capillary refill < 2 seconds, no cyanosis, no edema;   NEURO - unstable upon standing, strength 5/5 bilaterally in upper and lower ext.      Will place in CDU for stroke work up given findings from yesterday.  Will need MRI and neuro eval upon imaging.  Case discussed with CDU ISABEL Montiel and accepted to CDU.   Neurosurgery consult pending for 5mm aneurysm found on left ICA.  Will treat with meclizine for dizziness.  Follow up PT/OT and TTE.  Monitor on tele. 74 yo  F with HTN, HLD, anxiety a/w dizzines and room spining sensation yesterday and susequently signed out AMA.  Pt was recommended to have MRI to further evaluate findings.  Pt went home however, noted to have 3 falls with no head trauma, 2/2 unsteady gait.    Imaging from yesterday significant for.      NONCONTRAST HEAD CT SCAN: No CT evidence of acute intracranial pathology. 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 Reconstitution of the distal right common carotid artery, likely via retrograde flow. Moderate stenosis of the proximal right internal carotid artery.  CT ANGIOGRAPHY BRAIN: Occlusion of the right proximal P2 segment. 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    GEN - NAD; well appearing; A+O x3; non-toxic appearing  CARD -s1s2, RRR, no M,G,R;   PULM - CTA b/l, symmetric breath sounds;   ABD -  +BS, ND, NT, soft, no guarding, no rebound, no masses;   BACK - no CVA tenderness, Normal  spine;   EXT - symmetric pulses, 2+ dp, capillary refill < 2 seconds, no cyanosis, no edema;   NEURO - unstable upon standing, strength 5/5 bilaterally in upper and lower ext.      Will place in CDU for stroke work up given findings from yesterday.  Will need MRI and neuro eval upon imaging.  Case discussed with CDU ISABEL Montiel and accepted to CDU.   Neurosurgery consult pending for 5mm aneurysm found on left ICA.  Will treat with meclizine for dizziness.  Follow up PT/OT and TTE.  Monitor on tele.

## 2023-12-28 NOTE — CONSULT NOTE ADULT - SUBJECTIVE AND OBJECTIVE BOX
73F pmhx prior CVA (2020, R occipital lobe, residual LHH, on ASA 81mg qd), HTN, HLD, Anxiety, provoked DVT in past  presents with dizziness, gait instability, and nausea/vomiting. LKW 1 AM on 12/27/2023. Pt went to bed at this time and woke up at some point before noon on 12/27 (exact time unknown). Dizziness is vertiginous and worse with movement. Pt normally ambulates independently but now needs to hold onto wall/stretcher in order to stabilize herself while walking. Pt also reporting HA. No focal weakness, numbness/tingling, double vision, or language difficulties. 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 Patient states she had 3 falls since leaving the ED yesterday.    WDWN female in NAD  Vital Signs Last 24 Hrs  T(C): 36.2 (28 Dec 2023 22:56), Max: 36.7 (28 Dec 2023 16:31)  T(F): 97.2 (28 Dec 2023 22:56), Max: 98.1 (28 Dec 2023 16:31)  HR: 71 (28 Dec 2023 22:56) (71 - 79)  BP: 103/71 (28 Dec 2023 22:56) (88/44 - 117/69)  BP(mean): --  RR: 20 (28 Dec 2023 22:56) (17 - 22)  SpO2: 100% (28 Dec 2023 22:56) (96% - 100%)    Parameters below as of 28 Dec 2023 22:56  Patient On (Oxygen Delivery Method): room air    AAO X 3  PERRLA, EOMI  CN 2-12 grossly intact  TOBAR strength 5/5  No drift  SILT    < from: CT Angio Head w/ IV Cont (12.27.23 @ 23:43) >      FINDINGS:  NONCONTRAST HEAD CT SCAN:  There is no CT evidence of acute intracranial hemorrhage, mass effect or   midline shift.  Gray matter-white matter differentiation isgrossly   preserved.    Gliosis/encephalomalacia involving the right parieto-occipital lobes with   associated dystrophic calcifications. Ex vacuo dilatation of the right   lateral ventricle. Left basal ganglia chronic lacunar infarct.    Moderate generalized volume loss. Mild-moderate white matter   hypoattenuation, nonspecific however likely representing chronic   microvascular ischemic change. Atherosclerotic calcifications of the   intracranial vasculature.    Bilateral aphakia.    The paranasal sinuses and mastoids are grossly clear.    The calvarium and skull base appear within normal limits.      CT ANGIOGRAPHY NECK:  Thoracic aorta and branch vessels: Three vessel arch. Occlusion of the   brachiocephalic artery with reconstitution of the proximal right   subclavian.    Right carotid system: There is partial opacification of the distal right   common carotid artery, likely via retrograde flow.There is moderate   stenosis of the proximal internal carotid artery. External carotid artery   is patent. No carotid dissection.    Left carotid system: The common carotid artery, internal carotid artery   and external carotid artery are patent.  No hemodynamically significant   carotid stenosis using NASCET criteria.  No carotid dissection.    Vertebral arteries: The vertebral arteries are patent bilaterally.  No   flow-limiting vertebral artery stenosis.  No vertebral dissection.  The   vertebral arteries are similiar in caliber.    Soft tissues of the neck: 1.6 cm right thyroid nodule..    Cervical spine: Within normal limits.    Visualized upper chest:  Emphysematous changes.    CT ANGIOGRAPHY BRAIN:  Internal carotid arteries: No stenosis of the bilateral carotid siphons.   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    Anterior cerebral arteries: No occlusion, flow-limiting stenosis or   aneurysm.    Middle cerebral arteries: No occlusion, flow-limiting stenosis or   aneurysm.    Anterior communicating artery: Patent without aneurysm.  Right posterior communicating artery: Patent without aneurysm.  Left posterior communicating artery: Patent without aneurysm.    Posterior cerebral arteries: Occlusion of the right proximal P2 segment.   The left PCA is patent..    Vertebrobasilar: No occlusion, flow-limiting stenosis or aneurysm.  The   distal vertebral arteries are codominant.  Posterior inferior cerebellar   arteries, anterior inferior cerebellar arteries and superior cerebellar   arteries are patent bilaterally.      POSTCONTRAST HEAD CT SCAN:  [place] or abnormal enhancement.  Gray matter-white matter   differentiation is grossly preserved.  The dural venous sinuses and   cavernous sinuses are patent.  There is no evidence of an arteriovenous   malformation    IMPRESSION:  NONCONTRAST HEAD CTSCAN: No CT evidence of acute intracranial pathology.   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 Reconstitution of the proximal right subclavian artery.   Reconstitution of the distal right common carotid artery, likely via   retrograde flow. Moderate stenosis of the proximal right internal carotid   artery.    CT ANGIOGRAPHY BRAIN: Occlusion of the right proximal P2 segment. 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    POSTCONTRAST HEAD CT SCAN: No CT evidence of or intracranial mass lesion,   abnormal enhancement or an arteriovenous malformation.  Dural venous   sinuses and cavernous sinuses are patent.    1.6 cm right thyroid nodule. Consider further evaluation with nonemergent   outpatient thyroid ultrasound as clinically indicated.    < end of copied text >

## 2023-12-29 DIAGNOSIS — R42 DIZZINESS AND GIDDINESS: ICD-10-CM

## 2023-12-29 LAB
CHOLEST SERPL-MCNC: 153 MG/DL — SIGNIFICANT CHANGE UP
CHOLEST SERPL-MCNC: 153 MG/DL — SIGNIFICANT CHANGE UP
HDLC SERPL-MCNC: 43 MG/DL — LOW
HDLC SERPL-MCNC: 43 MG/DL — LOW
LIPID PNL WITH DIRECT LDL SERPL: 76 MG/DL — SIGNIFICANT CHANGE UP
LIPID PNL WITH DIRECT LDL SERPL: 76 MG/DL — SIGNIFICANT CHANGE UP
NON HDL CHOLESTEROL: 110 MG/DL — SIGNIFICANT CHANGE UP
NON HDL CHOLESTEROL: 110 MG/DL — SIGNIFICANT CHANGE UP
TRIGL SERPL-MCNC: 168 MG/DL — HIGH
TRIGL SERPL-MCNC: 168 MG/DL — HIGH

## 2023-12-29 PROCEDURE — 99233 SBSQ HOSP IP/OBS HIGH 50: CPT

## 2023-12-29 PROCEDURE — 70551 MRI BRAIN STEM W/O DYE: CPT | Mod: 26,MA

## 2023-12-29 PROCEDURE — 70544 MR ANGIOGRAPHY HEAD W/O DYE: CPT | Mod: 26,59,MA

## 2023-12-29 PROCEDURE — 99283 EMERGENCY DEPT VISIT LOW MDM: CPT | Mod: FS

## 2023-12-29 PROCEDURE — 70548 MR ANGIOGRAPHY NECK W/DYE: CPT | Mod: 26

## 2023-12-29 PROCEDURE — 99223 1ST HOSP IP/OBS HIGH 75: CPT

## 2023-12-29 RX ORDER — DIAZEPAM 5 MG
2 TABLET ORAL ONCE
Refills: 0 | Status: DISCONTINUED | OUTPATIENT
Start: 2023-12-29 | End: 2023-12-29

## 2023-12-29 RX ADMIN — CLOPIDOGREL BISULFATE 75 MILLIGRAM(S): 75 TABLET, FILM COATED ORAL at 14:18

## 2023-12-29 RX ADMIN — Medication 25 MILLIGRAM(S): at 07:49

## 2023-12-29 RX ADMIN — Medication 2 MILLIGRAM(S): at 09:20

## 2023-12-29 RX ADMIN — Medication 25 MILLIGRAM(S): at 16:06

## 2023-12-29 RX ADMIN — Medication 100 MILLIGRAM(S): at 21:40

## 2023-12-29 RX ADMIN — SERTRALINE 50 MILLIGRAM(S): 25 TABLET, FILM COATED ORAL at 21:43

## 2023-12-29 RX ADMIN — LORATADINE 10 MILLIGRAM(S): 10 TABLET ORAL at 14:19

## 2023-12-29 RX ADMIN — ATORVASTATIN CALCIUM 40 MILLIGRAM(S): 80 TABLET, FILM COATED ORAL at 21:43

## 2023-12-29 RX ADMIN — SODIUM CHLORIDE 115 MILLILITER(S): 9 INJECTION INTRAMUSCULAR; INTRAVENOUS; SUBCUTANEOUS at 07:50

## 2023-12-29 RX ADMIN — CYCLOBENZAPRINE HYDROCHLORIDE 10 MILLIGRAM(S): 10 TABLET, FILM COATED ORAL at 21:42

## 2023-12-29 RX ADMIN — Medication 81 MILLIGRAM(S): at 14:18

## 2023-12-29 RX ADMIN — SODIUM CHLORIDE 1000 MILLILITER(S): 9 INJECTION INTRAMUSCULAR; INTRAVENOUS; SUBCUTANEOUS at 02:06

## 2023-12-29 NOTE — OCCUPATIONAL THERAPY INITIAL EVALUATION ADULT - GENERAL OBSERVATIONS, REHAB EVAL
Patient received seated at edge of bed in NAD; agreeable to participate in OT evaluation. Aide at bedside. BP: 132/84 mmHg.

## 2023-12-29 NOTE — OCCUPATIONAL THERAPY INITIAL EVALUATION ADULT - PERTINENT HX OF CURRENT PROBLEM, REHAB EVAL
73 year old female with history of HTN, HLD, anxiety was here in ED yesterday for dizziness (room spinning sensation with associated nausea w/o vomiting). MRI brain negative for acute infarct. 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

## 2023-12-29 NOTE — ED ADULT NURSE REASSESSMENT NOTE - NS ED NURSE REASSESS COMMENT FT1
Pt resting in stretcher, at baseline orientation status, reports dizziness with movement, imaging completed, RR equal and unlabored, VS stable, safety maintained, comfort provided, awaiting CDU placement at this time.
Pt resting in stretcher, at baseline orientation status, reports occasionally dizziness with movement, RR equal and unlabored, safety maintained, comfort provided, awaiting imaging at this time.
Pt denies any pain, SOB. still reports dizziness, fall precautions maintained. call bell with in reach. will continue to monitor

## 2023-12-29 NOTE — PHYSICAL THERAPY INITIAL EVALUATION ADULT - PERTINENT HX OF CURRENT PROBLEM, REHAB EVAL
Patient is a 73 year old female with history of HTN, HLD, anxiety was here in ED yesterday for dizziness (room spinning sensation with associated nausea w/o vomiting). MRI brain negative for acute infarct. 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

## 2023-12-29 NOTE — ED CDU PROVIDER DISPOSITION NOTE - CLINICAL COURSE
ISABEL Gonzalez: 73-year-old female with history of hypertension, hyperlipidemia, anxiety presented to the emergency department complaining of dizziness, patient left AMA day prior and returned to the ER yesterday.  Patient placed in CDU for MRI with neurology and neurosurgery following.  Patient seen and cleared by neurosurgery advised follow-up outpatient with Dr. Bravo per ISABEL Ledbetter.  Patient seen and cleared by neurology advised follow-up outpatient advised to stop Plavix and continue aspirin per Dr. Ferrera.  Patient seen and evaluated by physical therapy, physical therapy would not clear patient advised admission.  Patient declined admission, patient states she wants to leave AGAINST MEDICAL ADVICE patient's aide is at bedside, patient advised of the multiple risk/benefits.  Discharge and results reviewed with patient.  Pt's aide to bring patient home. Pt NAD, VSS, the patient would like to leave AMA. Discussed the risks of leaving without labs/imaging/evaluation - including worsening of their condition and potentially death - the patient understands. The patient demonstrates capacity to make medical decisions. The patient stable to leave. Gave the patient strong return precautions to return to the Emergency Department for any new, worsening or concerning symptoms. A&O x4.  Ambulatory with assistance. ISABEL Gonzalez: 73-year-old female with history of hypertension, hyperlipidemia, anxiety presented to the emergency department complaining of dizziness, patient left AMA day prior and returned to the ER yesterday.  Patient placed in CDU for MRI with neurology and neurosurgery following.  Patient seen and cleared by neurosurgery advised follow-up outpatient with Dr. Bravo per ISABEL Ledbetter.  Patient seen and cleared by neurology advised follow-up outpatient advised to stop Plavix and continue aspirin per Dr. Ferrera.  Patient seen and evaluated by physical therapy, physical therapy would not clear patient advised admission.  Patient initially declined admission, after further discussion patient agreeable to admission.

## 2023-12-29 NOTE — H&P ADULT - PROBLEM SELECTOR PLAN 1
appreciate neuro recs  MR imaging as above  patient reporting continued symptoms despite meclizine  vestibular rehab  BRENDON caicedo f/u with neuro

## 2023-12-29 NOTE — PHYSICAL THERAPY INITIAL EVALUATION ADULT - LEVEL OF INDEPENDENCE: STAND/SIT, REHAB EVAL
Med Rec completed: per pt at bedside      No ORAL antibiotics in last 30 days    Preferred Pharmacy: [facility]     Pt confirmed following allergies:  Allergies   Allergen Reactions   • Codeine Rash     sorethroat   • Penicillins Hives and Itching     Hives, itching   Other reaction(s): Pruritus      Pt's home medications:   Medication Sig   • furosemide (LASIX) 40 MG Tab Take 1 Tablet by mouth every morning.   • spironolactone (ALDACTONE) 50 MG Tab Take 1 Tablet by mouth every day.   • lisinopril (PRINIVIL) 20 MG Tab Take 1 Tablet by mouth every morning.   • omeprazole (PRILOSEC) 20 MG delayed-release capsule Take 1 Capsule by mouth every morning.   • gabapentin (NEURONTIN) 800 MG tablet Take 3 Tablets by mouth every day.   • atorvastatin (LIPITOR) 40 MG Tab Take 1 Tablet by mouth every morning.   • aspirin 81 MG EC tablet Take 1 Tablet by mouth 2 times a day.   • insulin glargine (LANTUS) 100 UNIT/ML Solution Inject 30 Units under the skin every evening.   • traZODone (DESYREL) 100 MG Tab Take 1 Tablet by mouth at bedtime as needed for Sleep.   • dicyclomine (BENTYL) 10 MG Cap Take 10 mg by mouth 2 (two) times a day.   • lactulose 10 GM/15ML Solution Take 30 mL by mouth 2 (two) times a day.   • OXcarbazepine (TRILEPTAL) 300 MG Tab Take 300-600 mg by mouth 2 (two) times a day. 1 tab = 300 mg every morning  2 tabs = 600 mg every evening      minimum assist (75% patients effort)

## 2023-12-29 NOTE — ED CDU PROVIDER SUBSEQUENT DAY NOTE - PROGRESS NOTE DETAILS
Pt resting comfortably, eating breakfast.  No complaints.  Denies ha dizziness.  Feels at [facility].  Follow up neuro recs, likely dc today. ISABEL Gonzalez: 73-year-old female with history of hypertension, hyperlipidemia, anxiety presented to the emergency department complaining of dizziness, patient left AMA day prior and returned to the ER yesterday.  Patient placed in CDU for MRI with neurology and neurosurgery following.  Patient seen and cleared by neurosurgery advised follow-up outpatient with Dr. Bravo per ISABEL Ledbetter.  Patient seen and cleared by neurology advised follow-up outpatient advised to stop Plavix and continue aspirin per Dr. Ferrera.  Patient seen and evaluated by physical therapy, physical therapy would not clear patient advised admission.  Patient initially declined admission, after further discussion patient agreeable to admission.

## 2023-12-29 NOTE — PHYSICAL THERAPY INITIAL EVALUATION ADULT - GENERAL OBSERVATIONS, REHAB EVAL
Patient found sitting at edge of bed, NAD, A&Ox2, +bedside monitor, +home health aide at bedside /84, patient agreeable to participate in skilled PT/OT evaluations

## 2023-12-29 NOTE — ED CDU PROVIDER SUBSEQUENT DAY NOTE - HISTORY
72 y/o female pt pmhx HTN, HLD, anxiety was here in ED yesterday for dizziness (room spinning sensation with associated nausea w/o vomiting) and AMA b/c she wanted to go home to her pets. Neurology highly recommended admission due to imaging results:  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 Reconstitution of the distal right common carotid artery, likely via retrograde flow. Moderate stenosis of the proximal right internal carotid artery.  CT ANGIOGRAPHY BRAIN: Occlusion of the right proximal P2 segment. 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  Pt had 3 falls between leaving hospital yesterday to coming back today; all times landing on her butt. Denies hitting her head. Plan is CDU placement for MRI, PT, OT, symptomatic treatement with meclizine and fluids and Neurosurgery consultation due to L ICA aneurysm. Pt states she normally walks without assistance and is currently unable to ambulate due to ISABEL corona assisted pt during ambulation attempt: was unable to ambulate. Pt states the dizz is worse with changes of positions and improved with meclizine but states the effect of the meclizine is short lived. Pt has been stable while in CDU, MRI brain performed, awaiting read, neurosurgery following.

## 2023-12-29 NOTE — OCCUPATIONAL THERAPY INITIAL EVALUATION ADULT - LUE MMT, REHAB EVAL
Grossly at least 3+/5 throughout (except left shoulder 3/5, patient reporting history of left shoulder surgery)

## 2023-12-29 NOTE — H&P ADULT - NSHPLABSRESULTS_GEN_ALL_CORE
13.6   7.66  )-----------( 264      ( 28 Dec 2023 22:21 )             43.6     140  |  102  |  22  ----------------------------<  100<H>     12-28  4.6   |  23  |  0.94    Ca    9.6      28 Dec 2023 22:21    TPro  8.5<H>  /  Alb  4.3  /  TBili  0.2  /  DBili  x   /  AST  18  /  ALT  13  /  AlkPhos  87  12-28    < from: CT Angio Head w/ IV Cont (12.27.23 @ 23:43) >/< from: CT Angio Neck w/ IV Cont (12.27.23 @ 23:51) >  IMPRESSION:  NONCONTRAST HEAD CT SCAN: No CT evidence of acute intracranial pathology. 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 Reconstitution of the distal right common carotid artery, likely via retrograde flow. Moderate stenosis of the proximal right internal carotid artery.  CT ANGIOGRAPHY BRAIN: Occlusion of the right proximal P2 segment. 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  POSTCONTRAST HEAD CT SCAN: No CT evidence of or intracranial mass lesion, abnormal enhancement or an arteriovenous malformation.  Dural venous sinuses and cavernous sinuses are patent. 1.6 cm right thyroid nodule. Consider further evaluation with nonemergent outpatient thyroid ultrasound as clinically indicated.  < end of copied text >    < from: MR Head No Cont (12.29.23 @ 03:34) >  IMPRESSION: Old right PCA infarct. Tiny foci restricted diffusion adjacent to this old right PCA infarct which could be compatible small areas of acute infarct. 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 Aneurysm as described above.  < end of copied text >    EKG personally reviewed and interpreted - NSR 76bpm, Q in III, QTc 474ms 13.6   7.66  )-----------( 264      ( 28 Dec 2023 22:21 )             43.6     140  |  102  |  22  ----------------------------<  100<H>     12-28  4.6   |  23  |  0.94    Ca    9.6      28 Dec 2023 22:21    TPro  8.5<H>  /  Alb  4.3  /  TBili  0.2  /  DBili  x   /  AST  18  /  ALT  13  /  AlkPhos  87  12-28    Lipid Profile (12.29.23 @ 06:20)    Cholesterol: 153 mg/dL    HDL Cholesterol: 43 mg/dL    Non HDL Cholesterol: 110 mg/dL    LDL Cholesterol Calculated: 76 mg/dL    < from: CT Angio Head w/ IV Cont (12.27.23 @ 23:43) >/< from: CT Angio Neck w/ IV Cont (12.27.23 @ 23:51) >  IMPRESSION:  NONCONTRAST HEAD CT SCAN: No CT evidence of acute intracranial pathology. 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 Reconstitution of the distal right common carotid artery, likely via retrograde flow. Moderate stenosis of the proximal right internal carotid artery.  CT ANGIOGRAPHY BRAIN: Occlusion of the right proximal P2 segment. 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  POSTCONTRAST HEAD CT SCAN: No CT evidence of or intracranial mass lesion, abnormal enhancement or an arteriovenous malformation.  Dural venous sinuses and cavernous sinuses are patent. 1.6 cm right thyroid nodule. Consider further evaluation with nonemergent outpatient thyroid ultrasound as clinically indicated.  < end of copied text >    < from: MR Head No Cont (12.29.23 @ 03:34) >  IMPRESSION: Old right PCA infarct. Tiny foci restricted diffusion adjacent to this old right PCA infarct which could be compatible small areas of acute infarct. 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 Aneurysm as described above.  < end of copied text >    < from: Transthoracic Echocardiogram (12.15.20 @ 13:10) >  Conclusions:  1. Technically difficult images;  Grossly preserved left ejection fraction, however, endocardial border definition is limited, and segmental wall-motion abnormalities cannot be adequately assessed. Consider further limited evaluation  with echo contrast Definity for assessment of segmental wall motion if clinically indicated.  2. The right ventricle is not well visualized; probably preserved  right ventricular systolic function.  *** No previous Echo exam.  < end of copied text >    EKG personally reviewed and interpreted - NSR 76bpm, Q in III, QTc 474ms

## 2023-12-29 NOTE — CHART NOTE - NSCHARTNOTEFT_GEN_A_CORE
MR Head No Cont, MRA (12.29.23 @ 03:34)     IMPRESSION: Old right PCA infarct  Tiny foci restricted diffusion adjacent to this old right PCA infarct   which could be compatible small areas of acute infarct  Occlusion is seen right brachiocephalic artery as well as the right   common carotid and right internal carotid arteries.  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  Aneurysm as described above.    Impression:  Ms. Alcaraz is a 73 female with prior stroke in (2020, R occipital lobe, residual LHH, on ASA 81mg qd), HTN, HLD, Anxiety, provoked DVT in past  presents with dizziness, gait instability, and nausea/vomiting. Dizziness is vertiginous and worse with movement. Acute vestibular syndrome with gait instability. Nonlateralizing neuro exam (other than baseline LHH), likely 2/2 peripheral etiology. MRI brain negative for acute infarct to explain dizziness, doubt central etiology. 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 Patient can continue Aspirin 81mg and statin daily   [ ] Meclizine 25mg TID PRN for dizziness   [ ] Will benefit from outpatient vestibular rehab. Can follow at [facility] rehabilitation   [ ] PT/OT as tolerated  [ ] Follow up with Neurology as outpatient. Patient can follow up with Dr. Davin Sharma or Dr. Antonio Alejandra after discharge. Please instruct the patient/family to call [phone] to schedule an appointment within the next 1-2 weeks. Office is located at [address], [place] [address].  [ ] Follow outpatient with Neurosurgery for management of L ICA aneursym     Case discussed with Neurology attending Dr. Cookie Barton. MR Head No Cont, MRA (12.29.23 @ 03:34)     IMPRESSION: Old right PCA infarct  Tiny foci restricted diffusion adjacent to this old right PCA infarct   which could be compatible small areas of acute infarct  Occlusion is seen right brachiocephalic artery as well as the right   common carotid and right internal carotid arteries.  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  Aneurysm as described above.    Impression:  Ms. Alcaraz is a 73 female with prior stroke in (2020, R occipital lobe, residual LHH, on ASA 81mg qd), HTN, HLD, Anxiety, provoked DVT in past  presents with dizziness, gait instability, and nausea/vomiting. Dizziness is vertiginous and worse with movement. Acute vestibular syndrome with gait instability. Nonlateralizing neuro exam (other than baseline LHH), likely 2/2 peripheral etiology. MRI brain negative for acute infarct to explain dizziness, doubt central etiology. 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 Patient can continue Aspirin 81mg and statin daily   [ ] Meclizine 25mg TID PRN for dizziness   [ ] Will benefit from outpatient vestibular rehab. Can follow at [facility] rehabilitation   [ ] PT/OT as tolerated  [ ] Follow up with Neurology as outpatient. Patient can follow up with Dr. Davin Sharma or Dr. Antonio Alejandra after discharge. Please instruct the patient/family to call [phone] to schedule an appointment within the next 1-2 weeks. Office is located at [address] [facility], [place] [address].  [ ] Follow outpatient with Neurosurgery for management of L ICA aneursym     Case discussed with Neurology attending Dr. Cookie Barton.

## 2023-12-29 NOTE — H&P ADULT - NSHPPHYSICALEXAM_GEN_ALL_CORE
Vital Signs Last 24 Hrs  T(C): 36.7 (29 Dec 2023 21:28), Max: 37 (29 Dec 2023 14:00)  T(F): 98 (29 Dec 2023 21:28), Max: 98.6 (29 Dec 2023 14:00)  HR: 81 (29 Dec 2023 21:28) (71 - 86)  BP: 127/65 (29 Dec 2023 21:28) (102/66 - 136/78)  RR: 18 (29 Dec 2023 21:28) (16 - 19)  SpO2: 95% (29 Dec 2023 21:28) (95% - 99%)    Parameters below as of 29 Dec 2023 21:28  Patient On (Oxygen Delivery Method): room air    PHYSICAL EXAM:  GENERAL: NAD  HEAD:  Atraumatic, Normocephalic  EYES: EOMI, PERRL, conjunctiva and sclera clear  NECK: Supple, No JVD  CHEST/LUNG: Clear to auscultation bilaterally; No wheezes, rales or rhonchi; normal work of breathing, speaking in full sentences  HEART: Regular rate and rhythm; No murmurs, rubs, or gallops, (+)S1, S2  ABDOMEN: Soft, Nontender, Nondistended; Normal Bowel sounds   EXTREMITIES:  2+ Peripheral Pulses, No clubbing, cyanosis, or edema  PSYCH: normal mood and affect, A&Ox3  NEUROLOGY: no focal neuro deficits, CN II-XII intact, FTN intact b/l, strength 5/5 x4 extremities, sensation grossly intact   SKIN: No rashes or lesions on limited exam Vital Signs Last 24 Hrs  T(C): 36.7 (29 Dec 2023 21:28), Max: 37 (29 Dec 2023 14:00)  T(F): 98 (29 Dec 2023 21:28), Max: 98.6 (29 Dec 2023 14:00)  HR: 81 (29 Dec 2023 21:28) (71 - 86)  BP: 127/65 (29 Dec 2023 21:28) (102/66 - 136/78)  RR: 18 (29 Dec 2023 21:28) (16 - 19)  SpO2: 95% (29 Dec 2023 21:28) (95% - 99%)    Parameters below as of 29 Dec 2023 21:28  Patient On (Oxygen Delivery Method): room air    PHYSICAL EXAM:  GENERAL: NAD  HEAD:  Atraumatic, Normocephalic  EYES: EOMI, PERRL, conjunctiva and sclera clear  NECK: Supple, No JVD  CHEST/LUNG: Clear to auscultation bilaterally; No wheezes, rales or rhonchi; normal work of breathing, speaking in full sentences  HEART: Regular rate and rhythm; No murmurs, rubs, or gallops, (+)S1, S2  ABDOMEN: Soft, Nontender, Nondistended; Normal Bowel sounds   EXTREMITIES:  2+ Peripheral Pulses, No clubbing, cyanosis, or trace b/l LE edema  PSYCH: normal mood and affect, A&Ox3  NEUROLOGY: no focal neuro deficits, CN II-XII intact, FTN intact b/l, strength 5/5 x4 extremities, sensation grossly intact   SKIN: No rashes or lesions on limited exam

## 2023-12-29 NOTE — H&P ADULT - PROBLEM SELECTOR PLAN 6
enoxaparin for DVT ppx (hx provoked DVT after surgery in 2020)  fall precautions, c/w PT, d/w CM for [facility]ON planning

## 2023-12-29 NOTE — OCCUPATIONAL THERAPY INITIAL EVALUATION ADULT - ADDITIONAL COMMENTS
Patient reports having difficulty with ADLs and functional mobility recently, has had 3 falls. Patient's aide has been coming 7 days/week during the day to assist with ADLs and mobility.

## 2023-12-29 NOTE — PHYSICAL THERAPY INITIAL EVALUATION ADULT - GROSSLY INTACT, SENSORY
denies numbness and or tingling in all four extremities/Left UE/Right UE/Left LE/Right LE/Grossly Intact

## 2023-12-29 NOTE — ED CDU PROVIDER DISPOSITION NOTE - PATIENT PORTAL LINK FT
You can access the FollowMyHealth Patient Portal offered by [facility] by registering at the following website: [email or web address]. By joining [employer]’s FollowMyHealth portal, you will also be able to view your health information using other applications (apps) compatible with our system. You can access the FollowMyHealth Patient Portal offered by [facility] by registering at the following website: [email or web address]. By joining [employer]’s FollowMyHealth portal, you will also be able to view your health information using other applications (apps) compatible with our system.

## 2023-12-29 NOTE — ED CDU PROVIDER DISPOSITION NOTE - NSFOLLOWUPINSTRUCTIONS_ED_ALL_ED_FT
You are leaving against medical advice.  Follow up with your Primary Medical Doctor in 1-2 days.  Follow up with neurosurgery Dr Bravo in 1-2 days call to schedule an appointment.  Follow up with Neurology Dr Davin Sharma or Dr. Antonio Alejandra in 1-2 days call [phone] to schedule an appointment office is located at [address] [place] [address].  Continue to take Aspirin 81mg once a day.  STOP taking Plavix.  Take Meclizine 25mg on tablet orally every 8 hours as needed for dizziness don't drive or drink alcohol while taking this medication.   Return to the ER for any persistent/worsening or new symptoms weakness, numbness, tingling, nausea, vomiting or any concerning symptoms. You are leaving against medical advice.  Follow up with your Primary Medical Doctor in 1-2 days.  Follow up with neurosurgery Dr Barvo in 1-2 days call to schedule an appointment.  Follow up with Neurology Dr Davin Sharma or Dr. Antonio Alejandra in 1-2 days call [phone] to schedule an appointment office is located at [address] [place] [address].  Continue to take Aspirin 81mg once a day.  STOP taking Plavix.  Take Meclizine 25mg on tablet orally every 8 hours as needed for dizziness don't drive or drink alcohol while taking this medication.   Return to the ER for any persistent/worsening or new symptoms weakness, numbness, tingling, nausea, vomiting or any concerning symptoms.

## 2023-12-29 NOTE — ED CDU PROVIDER SUBSEQUENT DAY NOTE - CLINICAL SUMMARY MEDICAL DECISION MAKING FREE TEXT BOX
74 y/o female pt pmhx HTN, HLD, anxiety was here in ED yesterday for dizziness (room spinning sensation with associated nausea w/o vomiting) and AMA b/c she wanted to go home to her dog but has been unable to ambulate and has had 3 falls since yesterday due to inability to ambulate. 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

## 2023-12-29 NOTE — ED CDU PROVIDER SUBSEQUENT DAY NOTE - CRANIAL NERVE AND PUPILLARY EXAM
Pt notes Dizz with EOM but no nystagmus. 5/5 strength and intact sensation bilaterally in upper and lower extremity. No pronator drift. Pt is unable to ambulate due to associated dizz./cranial nerves 2-12 intact

## 2023-12-29 NOTE — H&P ADULT - NSHPREVIEWOFSYSTEMS_GEN_ALL_CORE
REVIEW OF SYSTEMS:    CONSTITUTIONAL: No weakness, fevers or chills  EYES/ENT: No visual changes; No dysphagia; No sore throat; No rhinorrhea; No sinus pain/pressure  NECK: No pain or stiffness  RESPIRATORY: No cough, wheezing, hemoptysis; No shortness of breath  CARDIOVASCULAR: No chest pain or palpitations; No lower extremity edema  GASTROINTESTINAL: No abdominal or epigastric pain. No nausea, vomiting, or hematemesis; No diarrhea or constipation. No melena or hematochezia.  GENITOURINARY: No dysuria, frequency or hematuria  NEUROLOGICAL: No numbness, paresthesias, or weakness; (+) HA earlier, resolved; (+) vertigo as above; No syncope  MSK: ambulates without aid at baseline; 3 falls as above  SKIN: No itching, burning, rashes, or lesions   All other review of systems is negative unless indicated above.

## 2023-12-29 NOTE — H&P ADULT - TIME BILLING
Preparing to see the patient including review of tests and other providers' notes, confirming history with patient, performing medical examination and evaluation, counseling and educating the patient, ordering medications, tests, communicating with other health care professionals, documenting clinical information in the EMR, independently interpreting results and communicating results to the patient, care coordination

## 2023-12-29 NOTE — H&P ADULT - HISTORY OF PRESENT ILLNESS
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 Symptoms started 12/28 however patient left AMA, returned 12/29 for further evaluation, reported 3 falls onto buttocks at home (denies pain from falls, no head trauma or LOC). Patient reports nausea has resolved but dizziness returns when she gets up or moves. Patient had MR imaging, was seen by NSx and neuro, who recommended outpt f/u. Patient was seen by PT/OT who recommended [facility].    VS: 98.6  72-86  102-136/65-78  102-136/65-78  16-19  95-97%RA 73 -year-old female with obesity, COPD, CVA '20 (R occipital lobe, on ASA 81mg QD), HTN, HLD, anxiety, sciatica, presenting from home with vertigo, nausea, difficulty ambulating. Symptoms started 12/28 however patient left AMA, returned 12/29 for further evaluation, reported 3 falls onto buttocks at home (denies pain from falls, no head trauma or LOC). Patient reports nausea has resolved but dizziness returns when she gets up or moves. Patient had MR imaging, was seen by NSx and neuro, who recommended outpt f/u. Patient was seen by PT/OT who recommended [facility].    VS: 98.6  72-86  102-136/65-78  102-136/65-78  16-19  95-97%RA

## 2023-12-29 NOTE — H&P ADULT - PROBLEM SELECTOR PLAN 2
MRA showing distal left internal carotid artery demonstrating a small focus of abnormal flow-related signal which appears to involve the cavernous segment and does project laterally, could be compatible with small aneurysm,   measures approximately 4.7 mm.  d/w neurosx overnight re MR findings, recs outpt f/u with neurosx

## 2023-12-29 NOTE — H&P ADULT - NSICDXPASTMEDICALHX_GEN_ALL_CORE_FT
PAST MEDICAL HISTORY:  Anxiety     Chronic obstructive pulmonary disease (COPD)     Class 1 obesity with body mass index (BMI) of 34.0 to 34.9 in adult     History of closed shoulder dislocation left shoulder 2019    Hyperlipidemia     Hypertension     Osteoarthritis R knee and L shoulder    Osteoarthritis of left shoulder     Seasonal allergies

## 2023-12-29 NOTE — PROVIDER CONTACT NOTE (OTHER) - ASSESSMENT
I was asked by Bina HALL to talk to pt, because pt is leaving AMA. SW met with pt at 4:25 PM. Pt is alert, oriented x3, was seen siting in her bed. Pt said she is upset because this hospital is sending her to a Rehab, she said stated she came to the Hospital to find out why she feels dizzy and she did not get an answer, so she is very upset and leaving the Hospital.  Pt has Private HHA 7-8 Hrs 7 days and if she needs more help the HHA stays. Her son lives in [place] and daughter lives in [place]. The HHA will drive her home. No further SW services needed.

## 2023-12-29 NOTE — PHYSICAL THERAPY INITIAL EVALUATION ADULT - ADDITIONAL COMMENTS
Patient lives in a house alone with a flight of stairs to manage, Patient reports having difficulty with ADLs and functional mobility recently, has had 3 falls. Patient's aide has been coming 7 days/week during the day to assist with ADLs and mobility. Patient denies the use of any assistive devices recently, owns two rolling walkers.     Patient left sitting at edge of bed, NAD, all lines and tubes intact, call bell within reach, RN/PA made aware

## 2023-12-29 NOTE — H&P ADULT - PROBLEM SELECTOR PLAN 3
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   c/w ASA, statin  would d/w vascular sx in AM re need for intervention

## 2023-12-29 NOTE — ED CDU PROVIDER DISPOSITION NOTE - ATTENDING CONTRIBUTION TO CARE
CDU MD YUAN:  I performed a face to face bedside interview with patient regarding history of present illness, review of symptoms and past medical history. I completed an independent physical exam.  I have discussed patient's plan of care with PA.   I agree with note as stated above, having amended the EMR as needed to reflect my findings. I have discussed the assessment and plan of care.  This includes during the time I functioned as the attending physician for this patient.

## 2023-12-29 NOTE — H&P ADULT - ASSESSMENT
73 -year-old female with obesity, COPD, CVA '20 (R occipital lobe, on ASA 81mg QD), HTN, HLD, anxiety, sciatica, presenting from home with vertigo, nausea, difficulty ambulating. Symptoms started 12/28 however patient left AMA, returned 12/29 for further evaluation, seen by NSx, Neuro, plan for eventual discharge to [facility] given persistently symptomatic w/motion, per neuro unlikely central etiology. 73 -year-old female with obesity, COPD, CVA '20 (R occipital lobe, on ASA 81mg QD), HTN, HLD, anxiety, sciatica, presenting from home with vertigo, nausea, difficulty ambulating. Symptoms started 12/28 however patient left AMA, returned 12/29 for further evaluation, seen by NSx, Neuro, plan for eventual discharge to [facility] given persistently symptomatic w/motion, per neuro unlikely central etiology.

## 2023-12-30 DIAGNOSIS — I99.8 OTHER DISORDER OF CIRCULATORY SYSTEM: ICD-10-CM

## 2023-12-30 DIAGNOSIS — R42 DIZZINESS AND GIDDINESS: ICD-10-CM

## 2023-12-30 DIAGNOSIS — E04.1 NONTOXIC SINGLE THYROID NODULE: ICD-10-CM

## 2023-12-30 DIAGNOSIS — Z29.9 ENCOUNTER FOR PROPHYLACTIC MEASURES, UNSPECIFIED: ICD-10-CM

## 2023-12-30 DIAGNOSIS — E78.5 HYPERLIPIDEMIA, UNSPECIFIED: ICD-10-CM

## 2023-12-30 LAB
ANION GAP SERPL CALC-SCNC: 10 MMOL/L — SIGNIFICANT CHANGE UP (ref 7–14)
ANION GAP SERPL CALC-SCNC: 10 MMOL/L — SIGNIFICANT CHANGE UP (ref 7–14)
BUN SERPL-MCNC: 15 MG/DL — SIGNIFICANT CHANGE UP (ref 7–23)
BUN SERPL-MCNC: 15 MG/DL — SIGNIFICANT CHANGE UP (ref 7–23)
CALCIUM SERPL-MCNC: 8.3 MG/DL — LOW (ref 8.4–10.5)
CALCIUM SERPL-MCNC: 8.3 MG/DL — LOW (ref 8.4–10.5)
CHLORIDE SERPL-SCNC: 109 MMOL/L — HIGH (ref 98–107)
CHLORIDE SERPL-SCNC: 109 MMOL/L — HIGH (ref 98–107)
CO2 SERPL-SCNC: 23 MMOL/L — SIGNIFICANT CHANGE UP (ref 22–31)
CO2 SERPL-SCNC: 23 MMOL/L — SIGNIFICANT CHANGE UP (ref 22–31)
CREAT SERPL-MCNC: 0.85 MG/DL — SIGNIFICANT CHANGE UP (ref 0.5–1.3)
CREAT SERPL-MCNC: 0.85 MG/DL — SIGNIFICANT CHANGE UP (ref 0.5–1.3)
EGFR: 72 ML/MIN/1.73M2 — SIGNIFICANT CHANGE UP
EGFR: 72 ML/MIN/1.73M2 — SIGNIFICANT CHANGE UP
GLUCOSE SERPL-MCNC: 93 MG/DL — SIGNIFICANT CHANGE UP (ref 70–99)
GLUCOSE SERPL-MCNC: 93 MG/DL — SIGNIFICANT CHANGE UP (ref 70–99)
HCT VFR BLD CALC: 37.7 % — SIGNIFICANT CHANGE UP (ref 34.5–45)
HCT VFR BLD CALC: 37.7 % — SIGNIFICANT CHANGE UP (ref 34.5–45)
HGB BLD-MCNC: 12 G/DL — SIGNIFICANT CHANGE UP (ref 11.5–15.5)
HGB BLD-MCNC: 12 G/DL — SIGNIFICANT CHANGE UP (ref 11.5–15.5)
MAGNESIUM SERPL-MCNC: 1.9 MG/DL — SIGNIFICANT CHANGE UP (ref 1.6–2.6)
MAGNESIUM SERPL-MCNC: 1.9 MG/DL — SIGNIFICANT CHANGE UP (ref 1.6–2.6)
MCHC RBC-ENTMCNC: 28.1 PG — SIGNIFICANT CHANGE UP (ref 27–34)
MCHC RBC-ENTMCNC: 28.1 PG — SIGNIFICANT CHANGE UP (ref 27–34)
MCHC RBC-ENTMCNC: 31.8 GM/DL — LOW (ref 32–36)
MCHC RBC-ENTMCNC: 31.8 GM/DL — LOW (ref 32–36)
MCV RBC AUTO: 88.3 FL — SIGNIFICANT CHANGE UP (ref 80–100)
MCV RBC AUTO: 88.3 FL — SIGNIFICANT CHANGE UP (ref 80–100)
NRBC # BLD: 0 /100 WBCS — SIGNIFICANT CHANGE UP (ref 0–0)
NRBC # BLD: 0 /100 WBCS — SIGNIFICANT CHANGE UP (ref 0–0)
NRBC # FLD: 0 K/UL — SIGNIFICANT CHANGE UP (ref 0–0)
NRBC # FLD: 0 K/UL — SIGNIFICANT CHANGE UP (ref 0–0)
PHOSPHATE SERPL-MCNC: 3.7 MG/DL — SIGNIFICANT CHANGE UP (ref 2.5–4.5)
PHOSPHATE SERPL-MCNC: 3.7 MG/DL — SIGNIFICANT CHANGE UP (ref 2.5–4.5)
PLATELET # BLD AUTO: 205 K/UL — SIGNIFICANT CHANGE UP (ref 150–400)
PLATELET # BLD AUTO: 205 K/UL — SIGNIFICANT CHANGE UP (ref 150–400)
POTASSIUM SERPL-MCNC: 4.1 MMOL/L — SIGNIFICANT CHANGE UP (ref 3.5–5.3)
POTASSIUM SERPL-MCNC: 4.1 MMOL/L — SIGNIFICANT CHANGE UP (ref 3.5–5.3)
POTASSIUM SERPL-SCNC: 4.1 MMOL/L — SIGNIFICANT CHANGE UP (ref 3.5–5.3)
POTASSIUM SERPL-SCNC: 4.1 MMOL/L — SIGNIFICANT CHANGE UP (ref 3.5–5.3)
RBC # BLD: 4.27 M/UL — SIGNIFICANT CHANGE UP (ref 3.8–5.2)
RBC # BLD: 4.27 M/UL — SIGNIFICANT CHANGE UP (ref 3.8–5.2)
RBC # FLD: 13.5 % — SIGNIFICANT CHANGE UP (ref 10.3–14.5)
RBC # FLD: 13.5 % — SIGNIFICANT CHANGE UP (ref 10.3–14.5)
SODIUM SERPL-SCNC: 142 MMOL/L — SIGNIFICANT CHANGE UP (ref 135–145)
SODIUM SERPL-SCNC: 142 MMOL/L — SIGNIFICANT CHANGE UP (ref 135–145)
TSH SERPL-MCNC: 0.65 UIU/ML — SIGNIFICANT CHANGE UP (ref 0.27–4.2)
TSH SERPL-MCNC: 0.65 UIU/ML — SIGNIFICANT CHANGE UP (ref 0.27–4.2)
WBC # BLD: 6.18 K/UL — SIGNIFICANT CHANGE UP (ref 3.8–10.5)
WBC # BLD: 6.18 K/UL — SIGNIFICANT CHANGE UP (ref 3.8–10.5)
WBC # FLD AUTO: 6.18 K/UL — SIGNIFICANT CHANGE UP (ref 3.8–10.5)
WBC # FLD AUTO: 6.18 K/UL — SIGNIFICANT CHANGE UP (ref 3.8–10.5)

## 2023-12-30 RX ORDER — CYCLOBENZAPRINE HYDROCHLORIDE 10 MG/1
5 TABLET, FILM COATED ORAL AT BEDTIME
Refills: 0 | Status: DISCONTINUED | OUTPATIENT
Start: 2023-12-30 | End: 2024-01-04

## 2023-12-30 RX ORDER — LANOLIN ALCOHOL/MO/W.PET/CERES
6 CREAM (GRAM) TOPICAL AT BEDTIME
Refills: 0 | Status: DISCONTINUED | OUTPATIENT
Start: 2023-12-30 | End: 2024-01-04

## 2023-12-30 RX ORDER — TRAZODONE HCL 50 MG
1 TABLET ORAL
Refills: 0 | DISCHARGE

## 2023-12-30 RX ORDER — ASPIRIN/CALCIUM CARB/MAGNESIUM 324 MG
1 TABLET ORAL
Refills: 0 | DISCHARGE

## 2023-12-30 RX ORDER — SERTRALINE 25 MG/1
1 TABLET, FILM COATED ORAL
Refills: 0 | DISCHARGE

## 2023-12-30 RX ORDER — ENOXAPARIN SODIUM 100 MG/ML
40 INJECTION SUBCUTANEOUS EVERY 24 HOURS
Refills: 0 | Status: DISCONTINUED | OUTPATIENT
Start: 2023-12-30 | End: 2024-01-04

## 2023-12-30 RX ORDER — OXYCODONE HYDROCHLORIDE 5 MG/1
0.5 TABLET ORAL
Qty: 0 | Refills: 0 | DISCHARGE

## 2023-12-30 RX ORDER — GABAPENTIN 400 MG/1
300 CAPSULE ORAL
Refills: 0 | Status: DISCONTINUED | OUTPATIENT
Start: 2023-12-30 | End: 2024-01-04

## 2023-12-30 RX ORDER — GABAPENTIN 400 MG/1
1 CAPSULE ORAL
Refills: 0 | DISCHARGE

## 2023-12-30 RX ORDER — SERTRALINE 25 MG/1
25 TABLET, FILM COATED ORAL AT BEDTIME
Refills: 0 | Status: DISCONTINUED | OUTPATIENT
Start: 2023-12-30 | End: 2023-12-31

## 2023-12-30 RX ORDER — LORATADINE 10 MG/1
10 TABLET ORAL DAILY
Refills: 0 | Status: DISCONTINUED | OUTPATIENT
Start: 2023-12-30 | End: 2024-01-04

## 2023-12-30 RX ORDER — CYCLOBENZAPRINE HYDROCHLORIDE 10 MG/1
1 TABLET, FILM COATED ORAL
Refills: 0 | DISCHARGE

## 2023-12-30 RX ORDER — ZOLPIDEM TARTRATE 10 MG/1
5 TABLET ORAL AT BEDTIME
Refills: 0 | Status: DISCONTINUED | OUTPATIENT
Start: 2023-12-30 | End: 2024-01-04

## 2023-12-30 RX ORDER — SENNA PLUS 8.6 MG/1
2 TABLET ORAL ONCE
Refills: 0 | Status: COMPLETED | OUTPATIENT
Start: 2023-12-30 | End: 2023-12-30

## 2023-12-30 RX ADMIN — SERTRALINE 25 MILLIGRAM(S): 25 TABLET, FILM COATED ORAL at 21:35

## 2023-12-30 RX ADMIN — SENNA PLUS 2 TABLET(S): 8.6 TABLET ORAL at 14:25

## 2023-12-30 RX ADMIN — Medication 975 MILLIGRAM(S): at 13:00

## 2023-12-30 RX ADMIN — ENOXAPARIN SODIUM 40 MILLIGRAM(S): 100 INJECTION SUBCUTANEOUS at 21:34

## 2023-12-30 RX ADMIN — Medication 6 MILLIGRAM(S): at 21:35

## 2023-12-30 RX ADMIN — ATORVASTATIN CALCIUM 40 MILLIGRAM(S): 80 TABLET, FILM COATED ORAL at 21:40

## 2023-12-30 RX ADMIN — Medication 81 MILLIGRAM(S): at 12:02

## 2023-12-30 RX ADMIN — ZOLPIDEM TARTRATE 5 MILLIGRAM(S): 10 TABLET ORAL at 00:56

## 2023-12-30 RX ADMIN — LORATADINE 10 MILLIGRAM(S): 10 TABLET ORAL at 14:18

## 2023-12-30 RX ADMIN — CYCLOBENZAPRINE HYDROCHLORIDE 5 MILLIGRAM(S): 10 TABLET, FILM COATED ORAL at 12:01

## 2023-12-30 RX ADMIN — Medication 975 MILLIGRAM(S): at 12:01

## 2023-12-30 RX ADMIN — Medication 25 MILLIGRAM(S): at 00:28

## 2023-12-30 RX ADMIN — Medication 100 MILLIGRAM(S): at 21:35

## 2023-12-30 NOTE — PATIENT PROFILE ADULT - FUNCTIONAL ASSESSMENT - BASIC MOBILITY 6.
3-calculated by average/Not able to assess (calculate score using [facility] averaging method)  3-calculated by average/Not able to assess (calculate score using [facility] averaging method)

## 2023-12-30 NOTE — PROGRESS NOTE ADULT - ASSESSMENT
73 -year-old female with obesity, COPD, CVA '20 (R occipital lobe, on ASA 81mg QD), HTN, HLD, anxiety, sciatica, presenting from home with vertigo, nausea, difficulty ambulating. Symptoms started 12/28 however patient left AMA, returned 12/29 for further evaluation, seen by NSx, Neuro, plan for eventual discharge to [facility] given persistently symptomatic w/motion, per neuro unlikely central etiology. 73 -year-old female with obesity, COPD, CVA '20 (R occipital lobe, on ASA 81mg QD), HTN, HLD, anxiety, sciatica, presenting from home with vertigo, nausea, difficulty ambulating. Symptoms started 12/28 however patient left AMA, returned 12/29 for further evaluation, seen by NSx, Neuro, plan for eventual discharge to [facility] given persistently symptomatic w/motion, per neuro unlikely central etiology.

## 2023-12-30 NOTE — PATIENT PROFILE ADULT - FALL HARM RISK - HARM RISK INTERVENTIONS
Assistance with ambulation/Assistance OOB with selected safe patient handling equipment/Communicate Risk of Fall with Harm to all staff/Discuss with provider need for PT consult/Monitor for mental status changes/Monitor gait and stability/Reinforce activity limits and safety measures with patient and family/Reorient to person, place and time as needed/Review medications for side effects contributing to fall risk/Sit up slowly, dangle for a short time, stand at bedside before walking/Tailored Fall Risk Interventions/Toileting schedule using arm’s reach rule for commode and bathroom/Use of alarms - bed, chair and/or voice tab/Visual Cue: Yellow wristband and red socks/Bed in lowest position, wheels locked, appropriate side rails in place/Call bell, personal items and telephone in reach/Instruct patient to call for assistance before getting out of bed or chair/Non-slip footwear when patient is out of bed/[place] to call system/Physically safe environment - no spills, clutter or unnecessary equipment/Purposeful Proactive Rounding/Room/bathroom lighting operational, light cord in reach Assistance with ambulation/Assistance OOB with selected safe patient handling equipment/Communicate Risk of Fall with Harm to all staff/Discuss with provider need for PT consult/Monitor for mental status changes/Monitor gait and stability/Reinforce activity limits and safety measures with patient and family/Reorient to person, place and time as needed/Review medications for side effects contributing to fall risk/Sit up slowly, dangle for a short time, stand at bedside before walking/Tailored Fall Risk Interventions/Toileting schedule using arm’s reach rule for commode and bathroom/Use of alarms - bed, chair and/or voice tab/Visual Cue: Yellow wristband and red socks/Bed in lowest position, wheels locked, appropriate side rails in place/Call bell, personal items and telephone in reach/Instruct patient to call for assistance before getting out of bed or chair/Non-slip footwear when patient is out of bed/[place] to call system/Physically safe environment - no spills, clutter or unnecessary equipment/Purposeful Proactive Rounding/Room/bathroom lighting operational, light cord in reach

## 2023-12-31 LAB
ANION GAP SERPL CALC-SCNC: 12 MMOL/L — SIGNIFICANT CHANGE UP (ref 7–14)
ANION GAP SERPL CALC-SCNC: 12 MMOL/L — SIGNIFICANT CHANGE UP (ref 7–14)
BUN SERPL-MCNC: 14 MG/DL — SIGNIFICANT CHANGE UP (ref 7–23)
BUN SERPL-MCNC: 14 MG/DL — SIGNIFICANT CHANGE UP (ref 7–23)
CALCIUM SERPL-MCNC: 8.8 MG/DL — SIGNIFICANT CHANGE UP (ref 8.4–10.5)
CALCIUM SERPL-MCNC: 8.8 MG/DL — SIGNIFICANT CHANGE UP (ref 8.4–10.5)
CHLORIDE SERPL-SCNC: 106 MMOL/L — SIGNIFICANT CHANGE UP (ref 98–107)
CHLORIDE SERPL-SCNC: 106 MMOL/L — SIGNIFICANT CHANGE UP (ref 98–107)
CO2 SERPL-SCNC: 25 MMOL/L — SIGNIFICANT CHANGE UP (ref 22–31)
CO2 SERPL-SCNC: 25 MMOL/L — SIGNIFICANT CHANGE UP (ref 22–31)
CREAT SERPL-MCNC: 0.95 MG/DL — SIGNIFICANT CHANGE UP (ref 0.5–1.3)
CREAT SERPL-MCNC: 0.95 MG/DL — SIGNIFICANT CHANGE UP (ref 0.5–1.3)
EGFR: 63 ML/MIN/1.73M2 — SIGNIFICANT CHANGE UP
EGFR: 63 ML/MIN/1.73M2 — SIGNIFICANT CHANGE UP
GLUCOSE SERPL-MCNC: 103 MG/DL — HIGH (ref 70–99)
GLUCOSE SERPL-MCNC: 103 MG/DL — HIGH (ref 70–99)
HCT VFR BLD CALC: 40.4 % — SIGNIFICANT CHANGE UP (ref 34.5–45)
HCT VFR BLD CALC: 40.4 % — SIGNIFICANT CHANGE UP (ref 34.5–45)
HGB BLD-MCNC: 13 G/DL — SIGNIFICANT CHANGE UP (ref 11.5–15.5)
HGB BLD-MCNC: 13 G/DL — SIGNIFICANT CHANGE UP (ref 11.5–15.5)
MAGNESIUM SERPL-MCNC: 2.1 MG/DL — SIGNIFICANT CHANGE UP (ref 1.6–2.6)
MAGNESIUM SERPL-MCNC: 2.1 MG/DL — SIGNIFICANT CHANGE UP (ref 1.6–2.6)
MCHC RBC-ENTMCNC: 28.2 PG — SIGNIFICANT CHANGE UP (ref 27–34)
MCHC RBC-ENTMCNC: 28.2 PG — SIGNIFICANT CHANGE UP (ref 27–34)
MCHC RBC-ENTMCNC: 32.2 GM/DL — SIGNIFICANT CHANGE UP (ref 32–36)
MCHC RBC-ENTMCNC: 32.2 GM/DL — SIGNIFICANT CHANGE UP (ref 32–36)
MCV RBC AUTO: 87.6 FL — SIGNIFICANT CHANGE UP (ref 80–100)
MCV RBC AUTO: 87.6 FL — SIGNIFICANT CHANGE UP (ref 80–100)
NRBC # BLD: 0 /100 WBCS — SIGNIFICANT CHANGE UP (ref 0–0)
NRBC # BLD: 0 /100 WBCS — SIGNIFICANT CHANGE UP (ref 0–0)
NRBC # FLD: 0 K/UL — SIGNIFICANT CHANGE UP (ref 0–0)
NRBC # FLD: 0 K/UL — SIGNIFICANT CHANGE UP (ref 0–0)
PHOSPHATE SERPL-MCNC: 4.8 MG/DL — HIGH (ref 2.5–4.5)
PHOSPHATE SERPL-MCNC: 4.8 MG/DL — HIGH (ref 2.5–4.5)
PLATELET # BLD AUTO: 220 K/UL — SIGNIFICANT CHANGE UP (ref 150–400)
PLATELET # BLD AUTO: 220 K/UL — SIGNIFICANT CHANGE UP (ref 150–400)
POTASSIUM SERPL-MCNC: 4.3 MMOL/L — SIGNIFICANT CHANGE UP (ref 3.5–5.3)
POTASSIUM SERPL-MCNC: 4.3 MMOL/L — SIGNIFICANT CHANGE UP (ref 3.5–5.3)
POTASSIUM SERPL-SCNC: 4.3 MMOL/L — SIGNIFICANT CHANGE UP (ref 3.5–5.3)
POTASSIUM SERPL-SCNC: 4.3 MMOL/L — SIGNIFICANT CHANGE UP (ref 3.5–5.3)
RBC # BLD: 4.61 M/UL — SIGNIFICANT CHANGE UP (ref 3.8–5.2)
RBC # BLD: 4.61 M/UL — SIGNIFICANT CHANGE UP (ref 3.8–5.2)
RBC # FLD: 13.4 % — SIGNIFICANT CHANGE UP (ref 10.3–14.5)
RBC # FLD: 13.4 % — SIGNIFICANT CHANGE UP (ref 10.3–14.5)
SODIUM SERPL-SCNC: 143 MMOL/L — SIGNIFICANT CHANGE UP (ref 135–145)
SODIUM SERPL-SCNC: 143 MMOL/L — SIGNIFICANT CHANGE UP (ref 135–145)
WBC # BLD: 8.04 K/UL — SIGNIFICANT CHANGE UP (ref 3.8–10.5)
WBC # BLD: 8.04 K/UL — SIGNIFICANT CHANGE UP (ref 3.8–10.5)
WBC # FLD AUTO: 8.04 K/UL — SIGNIFICANT CHANGE UP (ref 3.8–10.5)
WBC # FLD AUTO: 8.04 K/UL — SIGNIFICANT CHANGE UP (ref 3.8–10.5)

## 2023-12-31 RX ORDER — SERTRALINE 25 MG/1
25 TABLET, FILM COATED ORAL DAILY
Refills: 0 | Status: DISCONTINUED | OUTPATIENT
Start: 2023-12-31 | End: 2024-01-04

## 2023-12-31 RX ADMIN — SERTRALINE 25 MILLIGRAM(S): 25 TABLET, FILM COATED ORAL at 13:14

## 2023-12-31 RX ADMIN — ENOXAPARIN SODIUM 40 MILLIGRAM(S): 100 INJECTION SUBCUTANEOUS at 22:12

## 2023-12-31 RX ADMIN — GABAPENTIN 300 MILLIGRAM(S): 400 CAPSULE ORAL at 06:33

## 2023-12-31 RX ADMIN — Medication 100 MILLIGRAM(S): at 22:11

## 2023-12-31 RX ADMIN — ATORVASTATIN CALCIUM 40 MILLIGRAM(S): 80 TABLET, FILM COATED ORAL at 22:12

## 2023-12-31 RX ADMIN — Medication 6 MILLIGRAM(S): at 22:12

## 2023-12-31 RX ADMIN — Medication 81 MILLIGRAM(S): at 12:03

## 2023-12-31 RX ADMIN — LORATADINE 10 MILLIGRAM(S): 10 TABLET ORAL at 12:02

## 2023-12-31 NOTE — PROGRESS NOTE ADULT - ASSESSMENT
73 -year-old female with obesity, COPD, CVA '20 (R occipital lobe, on ASA 81mg QD), HTN, HLD, anxiety, sciatica, presenting from home with vertigo, nausea, difficulty ambulating. Symptoms started 12/28 however patient left AMA, returned 12/29 for further evaluation, seen by NSx, Neuro, plan for eventual discharge to [facility] given persistently symptomatic w/motion, per neuro unlikely central etiology. 73 -year-old female with obesity, COPD, CVA '20 (R occipital lobe, on ASA 81mg QD), HTN, HLD, anxiety, sciatica, presenting from home with vertigo, nausea, difficulty ambulating. Symptoms started 12/28 however patient left AMA, returned 12/29 for further evaluation, seen by NSx, Neuro, plan for eventual discharge to [facility] given persistently symptomatic w/motion, per neuro unlikely central etiology.

## 2024-01-01 LAB
ANION GAP SERPL CALC-SCNC: 11 MMOL/L — SIGNIFICANT CHANGE UP (ref 7–14)
ANION GAP SERPL CALC-SCNC: 11 MMOL/L — SIGNIFICANT CHANGE UP (ref 7–14)
BUN SERPL-MCNC: 14 MG/DL — SIGNIFICANT CHANGE UP (ref 7–23)
BUN SERPL-MCNC: 14 MG/DL — SIGNIFICANT CHANGE UP (ref 7–23)
CALCIUM SERPL-MCNC: 9.3 MG/DL — SIGNIFICANT CHANGE UP (ref 8.4–10.5)
CALCIUM SERPL-MCNC: 9.3 MG/DL — SIGNIFICANT CHANGE UP (ref 8.4–10.5)
CHLORIDE SERPL-SCNC: 102 MMOL/L — SIGNIFICANT CHANGE UP (ref 98–107)
CHLORIDE SERPL-SCNC: 102 MMOL/L — SIGNIFICANT CHANGE UP (ref 98–107)
CO2 SERPL-SCNC: 26 MMOL/L — SIGNIFICANT CHANGE UP (ref 22–31)
CO2 SERPL-SCNC: 26 MMOL/L — SIGNIFICANT CHANGE UP (ref 22–31)
CREAT SERPL-MCNC: 1 MG/DL — SIGNIFICANT CHANGE UP (ref 0.5–1.3)
CREAT SERPL-MCNC: 1 MG/DL — SIGNIFICANT CHANGE UP (ref 0.5–1.3)
EGFR: 59 ML/MIN/1.73M2 — LOW
EGFR: 59 ML/MIN/1.73M2 — LOW
GLUCOSE SERPL-MCNC: 97 MG/DL — SIGNIFICANT CHANGE UP (ref 70–99)
GLUCOSE SERPL-MCNC: 97 MG/DL — SIGNIFICANT CHANGE UP (ref 70–99)
HCT VFR BLD CALC: 40.4 % — SIGNIFICANT CHANGE UP (ref 34.5–45)
HCT VFR BLD CALC: 40.4 % — SIGNIFICANT CHANGE UP (ref 34.5–45)
HGB BLD-MCNC: 12.9 G/DL — SIGNIFICANT CHANGE UP (ref 11.5–15.5)
HGB BLD-MCNC: 12.9 G/DL — SIGNIFICANT CHANGE UP (ref 11.5–15.5)
MAGNESIUM SERPL-MCNC: 2.1 MG/DL — SIGNIFICANT CHANGE UP (ref 1.6–2.6)
MAGNESIUM SERPL-MCNC: 2.1 MG/DL — SIGNIFICANT CHANGE UP (ref 1.6–2.6)
MCHC RBC-ENTMCNC: 28 PG — SIGNIFICANT CHANGE UP (ref 27–34)
MCHC RBC-ENTMCNC: 28 PG — SIGNIFICANT CHANGE UP (ref 27–34)
MCHC RBC-ENTMCNC: 31.9 GM/DL — LOW (ref 32–36)
MCHC RBC-ENTMCNC: 31.9 GM/DL — LOW (ref 32–36)
MCV RBC AUTO: 87.8 FL — SIGNIFICANT CHANGE UP (ref 80–100)
MCV RBC AUTO: 87.8 FL — SIGNIFICANT CHANGE UP (ref 80–100)
NRBC # BLD: 0 /100 WBCS — SIGNIFICANT CHANGE UP (ref 0–0)
NRBC # BLD: 0 /100 WBCS — SIGNIFICANT CHANGE UP (ref 0–0)
NRBC # FLD: 0 K/UL — SIGNIFICANT CHANGE UP (ref 0–0)
NRBC # FLD: 0 K/UL — SIGNIFICANT CHANGE UP (ref 0–0)
PHOSPHATE SERPL-MCNC: 4.4 MG/DL — SIGNIFICANT CHANGE UP (ref 2.5–4.5)
PHOSPHATE SERPL-MCNC: 4.4 MG/DL — SIGNIFICANT CHANGE UP (ref 2.5–4.5)
PLATELET # BLD AUTO: 211 K/UL — SIGNIFICANT CHANGE UP (ref 150–400)
PLATELET # BLD AUTO: 211 K/UL — SIGNIFICANT CHANGE UP (ref 150–400)
POTASSIUM SERPL-MCNC: 3.7 MMOL/L — SIGNIFICANT CHANGE UP (ref 3.5–5.3)
POTASSIUM SERPL-MCNC: 3.7 MMOL/L — SIGNIFICANT CHANGE UP (ref 3.5–5.3)
POTASSIUM SERPL-SCNC: 3.7 MMOL/L — SIGNIFICANT CHANGE UP (ref 3.5–5.3)
POTASSIUM SERPL-SCNC: 3.7 MMOL/L — SIGNIFICANT CHANGE UP (ref 3.5–5.3)
RBC # BLD: 4.6 M/UL — SIGNIFICANT CHANGE UP (ref 3.8–5.2)
RBC # BLD: 4.6 M/UL — SIGNIFICANT CHANGE UP (ref 3.8–5.2)
RBC # FLD: 13.2 % — SIGNIFICANT CHANGE UP (ref 10.3–14.5)
RBC # FLD: 13.2 % — SIGNIFICANT CHANGE UP (ref 10.3–14.5)
SODIUM SERPL-SCNC: 139 MMOL/L — SIGNIFICANT CHANGE UP (ref 135–145)
SODIUM SERPL-SCNC: 139 MMOL/L — SIGNIFICANT CHANGE UP (ref 135–145)
WBC # BLD: 8.14 K/UL — SIGNIFICANT CHANGE UP (ref 3.8–10.5)
WBC # BLD: 8.14 K/UL — SIGNIFICANT CHANGE UP (ref 3.8–10.5)
WBC # FLD AUTO: 8.14 K/UL — SIGNIFICANT CHANGE UP (ref 3.8–10.5)
WBC # FLD AUTO: 8.14 K/UL — SIGNIFICANT CHANGE UP (ref 3.8–10.5)

## 2024-01-01 PROCEDURE — 99223 1ST HOSP IP/OBS HIGH 75: CPT

## 2024-01-01 RX ADMIN — SERTRALINE 25 MILLIGRAM(S): 25 TABLET, FILM COATED ORAL at 12:37

## 2024-01-01 RX ADMIN — Medication 100 MILLIGRAM(S): at 21:13

## 2024-01-01 RX ADMIN — Medication 6 MILLIGRAM(S): at 21:13

## 2024-01-01 RX ADMIN — Medication 81 MILLIGRAM(S): at 12:02

## 2024-01-01 RX ADMIN — ENOXAPARIN SODIUM 40 MILLIGRAM(S): 100 INJECTION SUBCUTANEOUS at 21:14

## 2024-01-01 RX ADMIN — Medication 975 MILLIGRAM(S): at 22:27

## 2024-01-01 RX ADMIN — LORATADINE 10 MILLIGRAM(S): 10 TABLET ORAL at 12:00

## 2024-01-01 RX ADMIN — ATORVASTATIN CALCIUM 40 MILLIGRAM(S): 80 TABLET, FILM COATED ORAL at 21:14

## 2024-01-01 RX ADMIN — Medication 975 MILLIGRAM(S): at 14:28

## 2024-01-01 RX ADMIN — Medication 975 MILLIGRAM(S): at 23:15

## 2024-01-01 RX ADMIN — Medication 975 MILLIGRAM(S): at 13:58

## 2024-01-01 NOTE — PROGRESS NOTE ADULT - ASSESSMENT
73 -year-old female with obesity, COPD, CVA '20 (R occipital lobe, on ASA 81mg QD), HTN, HLD, anxiety, sciatica, presenting from home with vertigo, nausea, difficulty ambulating. Symptoms started 12/28 however patient left AMA, returned 12/29 for further evaluation, seen by NSx, Neuro, plan for eventual discharge to [facility] given persistently symptomatic w/motion, per neuro unlikely central etiology. 73 -year-old female with obesity, COPD, CVA '20 (R occipital lobe, on ASA 81mg QD), HTN, HLD, anxiety, sciatica, presenting from home with vertigo, nausea, difficulty ambulating. Symptoms started 12/28 however patient left AMA, returned 12/29 for further evaluation, seen by NSx, Neuro, plan for eventual discharge to [facility] given persistently symptomatic w/motion, per neuro unlikely central etiology.

## 2024-01-01 NOTE — CONSULT NOTE ADULT - SUBJECTIVE AND OBJECTIVE BOX
VASCULAR SURGERY CONSULT NOTE  --------------------------------------------------------------------------------------------    HPI:   Ms. 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 Patient reports nausea has resolved but dizziness returns when she gets up or moves. Patient had MR imaging, was seen by NSx and neuro, who recommended outpt f/u. Patient was seen by PT/OT who recommended BRENDON. Patient is uncomfortable this AM and doesn't want to discuss too much of her history but she is a former smoker (unsure for how many years or how many packs per day but at least 1ppd for >10y). Patient underwent CTA for vertigo that demonstrated 5mm fusiform dilation of the L cavernous ICA and was admitted for further workup. CTA also demonstrated occlusion of the R brachiocephalic with reconstitution in the R subclavian and moderate stenosis of the proximal R common carotid and moderate stenosis of the R ICA.   MRA did not show a definitive reason for the vertigo but also confirmed occlusion of the R brachiocephalic and reconstitution in the R subclavian as well as decreased flow in the midportion of the R common carotid, proximal R ICA, and proximal R ECA. Also noted to have possible L CC atherosclerosis.     ROS: 10-system review is otherwise negative except HPI above.      PAST MEDICAL & SURGICAL HISTORY:  History of closed shoulder dislocation  left shoulder 2019  Class 1 obesity with body mass index (BMI) of 34.0 to 34.9 in adult  Osteoarthritis of left shoulder  Hypertension  Hyperlipidemia  Chronic obstructive pulmonary disease (COPD)  Osteoarthritis  R knee and L shoulder  Anxiety  Seasonal allergies  History of right knee joint replacement  2019  H/O shoulder replacement  L shoulder  H/O total knee replacement  R knee    FAMILY HISTORY:  FH: heart attack  mother    Family history of myocardial infarction    [] Family history not pertinent as reviewed with the patient and family        --------------------------------------------------------------------------------------------    Vitals:   T(C): 36.7 (01-01-24 @ 05:07), Max: 36.8 (12-31-23 @ 10:15)  HR: 76 (01-01-24 @ 05:07) (76 - 82)  BP: 119/77 (01-01-24 @ 05:07) (102/73 - 133/98)  RR: 18 (01-01-24 @ 05:07) (17 - 18)  SpO2: 100% (01-01-24 @ 05:07) (98% - 100%)  CAPILLARY BLOOD GLUCOSE          12-31 @ 07:01  -  01-01 @ 07:00  --------------------------------------------------------  IN:  Total IN: 0 mL    OUT:    Voided (mL): 650 mL  Total OUT: 650 mL    Total NET: -650 mL        Height (cm): 160 (12-30 @ 17:35)    Physical Examination:  GEN: NAD, resting quietly  NEURO: AAOx3, CN II-XII grossly intact notwithstanding known LHH, no focal deficits  PULM: symmetric chest rise bilaterally, no increased WOB  ABD: soft, nontender, nondistended  EXTR: no lower extremity edema, moving all extremities, no residual weakness or sensory deficits.   --------------------------------------------------------------------------------------------    LABS  CBC (01-01 @ 05:22)                              12.9                           8.14    )----------------(  211        --    % Neutrophils, --    % Lymphocytes, ANC: --                                  40.4    CBC (12-31 @ 07:50)                              13.0                           8.04    )----------------(  220        --    % Neutrophils, --    % Lymphocytes, ANC: --                                  40.4      BMP (01-01 @ 05:22)             139     |  102     |  14    		Ca++ --      Ca 9.3                ---------------------------------( 97    		Mg 2.10               3.7     |  26      |  1.00  			Ph 4.4     BMP (12-31 @ 07:50)             143     |  106     |  14    		Ca++ --      Ca 8.8                ---------------------------------( 103<H>		Mg 2.10               4.3     |  25      |  0.95  			Ph 4.8<H>              --------------------------------------------------------------------------------------------    MICROBIOLOGY  Urinalysis (01-01 @ 05:22):     Color:  / Appearance:  / SG:  / pH:  / Gluc: 97 / Ketones:  / Bili:  / Urobili:  / Protein : / Nitrites:  / Leuk.Est:  / RBC:  / WBC:  / Sq Epi:  / Non Sq Epi:  / Bacteria          --------------------------------------------------------------------------------------------    IMAGING  < from: MR Angio Head No Cont (12.29.23 @ 10:30) >  MRI the brain was performed sagittal T1 axial T1 T2 T2 FLAIR diffusion   and susceptibility weighted sequence.    MRA of the neck was performed using 2-D and 3-D time-of-flight technique    MRA of thecircle of Hendrickson with 3-D [place] of [place] technique    This exam is compared prior head CT and CTA performed on December 27, 2023.    Parenchymal volume loss and chronic microvascular ischemic changes are   identified.    Areas of encephalomalaciaand gliosis involving the right posterior   temporal occipital and parietal region is seen with associated T1   shortening. This finding also demonstrates some areas of susceptibility.   This is compatible with an old right PCA infarct with areas of laminar   necrosis. Expected dilatation of the right temporal and occipital horn is   identified.    Adjacent to this area of encephalomalacia and gliosis involving the   medial left parietal cortex are small foci of restricted diffusion which   does demonstrate decreased signal ADC mapping. These findings could be   compatible with tiny areas of acute infarcts.    Parenchymal volume loss and chronic microvascular changes identified    Prominent flow void is identified involving the distal left internal   carotid artery.    The paranasal sinuses mastoid and middle regions appear clear.    Occlusion of the brachiocephalic artery is again suspected. There is   flow-related signal seen involving the proximal right subclavian artery   region.    There is decrease flow related signal involving the proximal and   midportion of the right common carotid artery as well as decreased   flow-related signal involving both the proximal right internal and   external carotid arteries. There is flow-related signal seen involving   the right internal carotid artery just distal to the bifurcation seen.   This is likely due to retrograde flow since no flow related signal is   seen in the distal right internal carotid artery on the 3-D [place] of   [place] study. Evaluation of the left common carotid artery demonstrates   some irregularity which could be compatible atherosclerotic change. There   is normal flow seen involving the left internal and external carotid   arteries. Both vertebral arteries demonstrate normal flow-related signal.    Evaluation of the distal left internal carotid artery demonstrates a   small focus of abnormal flow-related signal. This appears to involve the   cavernous segment and does project laterally. This best seen on series 6   image 63 and could be compatible with small aneurysm. This finding   measures approximately 4.7 mm.    Both distal vertebral arteries appear normal    Both anterior and middle cerebral arteries appear normal. No flow related   signal is seen involving the right posterior cerebral artery which could   be compatible with underlying occlusion.    IMPRESSION: Old right PCA infarct    Tiny foci restricted diffusion adjacent to this old right PCA infarct   which could be compatible small areas of acute infarct    Occlusion is seen right brachiocephalic artery as well as the right   common carotid and right internal carotid arteries.    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    Aneurysm as described above.    < end of copied text >  < from: CT Angio Neck w/ IV Cont (12.27.23 @ 23:51) >  CT ANGIOGRAPHY BRAIN:  Internal carotid arteries: No stenosis of the bilateral carotid siphons.   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    Anterior cerebral arteries: No occlusion, flow-limiting stenosis or   aneurysm.    Middle cerebral arteries: No occlusion, flow-limiting stenosis or   aneurysm.    Anterior communicating artery: Patent without aneurysm.  Right posterior communicating artery: Patent without aneurysm.  Left posterior communicating artery: Patent without aneurysm.    Posterior cerebral arteries: Occlusion of the right proximal P2 segment.   The left PCA is patent..    Vertebrobasilar: No occlusion, flow-limiting stenosis or aneurysm.  The   distal vertebral arteries are codominant.  Posterior inferior cerebellar   arteries, anterior inferior cerebellar arteries and superior cerebellar   arteries are patent bilaterally.      POSTCONTRAST HEAD CT SCAN:  Nomass or abnormal enhancement.  Gray matter-white matter   differentiation is grossly preserved.  The dural venous sinuses and   cavernous sinuses are patent.  There is no evidence of an arteriovenous   malformation    IMPRESSION:  NONCONTRAST HEAD CTSCAN: No CT evidence of acute intracranial pathology.   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 Reconstitution of the proximal right subclavian artery.   Reconstitution of the distal right common carotid artery, likely via   retrograde flow. Moderate stenosis of the proximal right internal carotid   artery.    CT ANGIOGRAPHY BRAIN: Occlusion of the right proximal P2 segment. 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    POSTCONTRAST HEAD CT SCAN: No CT evidence of or intracranial mass lesion,   abnormal enhancement or an arteriovenous malformation.  Dural venous   sinuses and cavernous sinuses are patent.    1.6 cm right thyroid nodule. Consider further evaluation with nonemergent   outpatient thyroid ultrasound as clinically indicated.    Findings discussed with ISABEL Ladd  by Dr. Schmidt on 12/28/2023 12:59 AM   with read back confirmation.    --- End of Report ---    < end of copied text >    --------------------------------------------------------------------------------------------    ASSESSMENT:    73 year old woman with PMH of obesity, COPD, CVA 2020 (R occipital lobe with residual L homonymous hemianopsia, on ASA 81mg QD), HTN, HLD, anxiety, sciatica, presenting from home with vertigo, found to have occlusion of the R brachiocephalic and reconstitution in the R subclavian as well as decreased flow in the midportion of the R common carotid, proximal R ICA, and proximal R ECA. Also noted to have possible L CC atherosclerosis.       PLAN:   - patient on ASA81/statin, recommend continuing  - recommend inpatient bilateral carotid duplex to evaluate direction flow of cerebral vessels including vertebrals  - further recommendations pending duplex    Discussed with Vascular Fellow  Jose G Walsh, PGY3  C Team Surgery   [id number]     VASCULAR SURGERY CONSULT NOTE  --------------------------------------------------------------------------------------------    HPI:   Ms. 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 Patient reports nausea has resolved but dizziness returns when she gets up or moves. Patient had MR imaging, was seen by NSx and neuro, who recommended outpt f/u. Patient was seen by PT/OT who recommended BRENDON. Patient is uncomfortable this AM and doesn't want to discuss too much of her history but she is a former smoker (unsure for how many years or how many packs per day but at least 1ppd for >10y). Patient underwent CTA for vertigo that demonstrated 5mm fusiform dilation of the L cavernous ICA and was admitted for further workup. CTA also demonstrated occlusion of the R brachiocephalic with reconstitution in the R subclavian and moderate stenosis of the proximal R common carotid and moderate stenosis of the R ICA.   MRA did not show a definitive reason for the vertigo but also confirmed occlusion of the R brachiocephalic and reconstitution in the R subclavian as well as decreased flow in the midportion of the R common carotid, proximal R ICA, and proximal R ECA. Also noted to have possible L CC atherosclerosis.     ROS: 10-system review is otherwise negative except HPI above.      PAST MEDICAL & SURGICAL HISTORY:  History of closed shoulder dislocation  left shoulder 2019  Class 1 obesity with body mass index (BMI) of 34.0 to 34.9 in adult  Osteoarthritis of left shoulder  Hypertension  Hyperlipidemia  Chronic obstructive pulmonary disease (COPD)  Osteoarthritis  R knee and L shoulder  Anxiety  Seasonal allergies  History of right knee joint replacement  2019  H/O shoulder replacement  L shoulder  H/O total knee replacement  R knee    FAMILY HISTORY:  FH: heart attack  mother    Family history of myocardial infarction    [] Family history not pertinent as reviewed with the patient and family        --------------------------------------------------------------------------------------------    Vitals:   T(C): 36.7 (01-01-24 @ 05:07), Max: 36.8 (12-31-23 @ 10:15)  HR: 76 (01-01-24 @ 05:07) (76 - 82)  BP: 119/77 (01-01-24 @ 05:07) (102/73 - 133/98)  RR: 18 (01-01-24 @ 05:07) (17 - 18)  SpO2: 100% (01-01-24 @ 05:07) (98% - 100%)  CAPILLARY BLOOD GLUCOSE          12-31 @ 07:01  -  01-01 @ 07:00  --------------------------------------------------------  IN:  Total IN: 0 mL    OUT:    Voided (mL): 650 mL  Total OUT: 650 mL    Total NET: -650 mL        Height (cm): 160 (12-30 @ 17:35)    Physical Examination:  GEN: NAD, resting quietly  NEURO: AAOx3, CN II-XII grossly intact notwithstanding known LHH, no focal deficits  PULM: symmetric chest rise bilaterally, no increased WOB  ABD: soft, nontender, nondistended  EXTR: no lower extremity edema, moving all extremities, no residual weakness or sensory deficits.   --------------------------------------------------------------------------------------------    LABS  CBC (01-01 @ 05:22)                              12.9                           8.14    )----------------(  211        --    % Neutrophils, --    % Lymphocytes, ANC: --                                  40.4    CBC (12-31 @ 07:50)                              13.0                           8.04    )----------------(  220        --    % Neutrophils, --    % Lymphocytes, ANC: --                                  40.4      BMP (01-01 @ 05:22)             139     |  102     |  14    		Ca++ --      Ca 9.3                ---------------------------------( 97    		Mg 2.10               3.7     |  26      |  1.00  			Ph 4.4     BMP (12-31 @ 07:50)             143     |  106     |  14    		Ca++ --      Ca 8.8                ---------------------------------( 103<H>		Mg 2.10               4.3     |  25      |  0.95  			Ph 4.8<H>              --------------------------------------------------------------------------------------------    MICROBIOLOGY  Urinalysis (01-01 @ 05:22):     Color:  / Appearance:  / SG:  / pH:  / Gluc: 97 / Ketones:  / Bili:  / Urobili:  / Protein : / Nitrites:  / Leuk.Est:  / RBC:  / WBC:  / Sq Epi:  / Non Sq Epi:  / Bacteria          --------------------------------------------------------------------------------------------    IMAGING  < from: MR Angio Head No Cont (12.29.23 @ 10:30) >  MRI the brain was performed sagittal T1 axial T1 T2 T2 FLAIR diffusion   and susceptibility weighted sequence.    MRA of the neck was performed using 2-D and 3-D time-of-flight technique    MRA of thecircle of Hendrickson with 3-D [place] of [place] technique    This exam is compared prior head CT and CTA performed on December 27, 2023.    Parenchymal volume loss and chronic microvascular ischemic changes are   identified.    Areas of encephalomalaciaand gliosis involving the right posterior   temporal occipital and parietal region is seen with associated T1   shortening. This finding also demonstrates some areas of susceptibility.   This is compatible with an old right PCA infarct with areas of laminar   necrosis. Expected dilatation of the right temporal and occipital horn is   identified.    Adjacent to this area of encephalomalacia and gliosis involving the   medial left parietal cortex are small foci of restricted diffusion which   does demonstrate decreased signal ADC mapping. These findings could be   compatible with tiny areas of acute infarcts.    Parenchymal volume loss and chronic microvascular changes identified    Prominent flow void is identified involving the distal left internal   carotid artery.    The paranasal sinuses mastoid and middle regions appear clear.    Occlusion of the brachiocephalic artery is again suspected. There is   flow-related signal seen involving the proximal right subclavian artery   region.    There is decrease flow related signal involving the proximal and   midportion of the right common carotid artery as well as decreased   flow-related signal involving both the proximal right internal and   external carotid arteries. There is flow-related signal seen involving   the right internal carotid artery just distal to the bifurcation seen.   This is likely due to retrograde flow since no flow related signal is   seen in the distal right internal carotid artery on the 3-D [place] of   [place] study. Evaluation of the left common carotid artery demonstrates   some irregularity which could be compatible atherosclerotic change. There   is normal flow seen involving the left internal and external carotid   arteries. Both vertebral arteries demonstrate normal flow-related signal.    Evaluation of the distal left internal carotid artery demonstrates a   small focus of abnormal flow-related signal. This appears to involve the   cavernous segment and does project laterally. This best seen on series 6   image 63 and could be compatible with small aneurysm. This finding   measures approximately 4.7 mm.    Both distal vertebral arteries appear normal    Both anterior and middle cerebral arteries appear normal. No flow related   signal is seen involving the right posterior cerebral artery which could   be compatible with underlying occlusion.    IMPRESSION: Old right PCA infarct    Tiny foci restricted diffusion adjacent to this old right PCA infarct   which could be compatible small areas of acute infarct    Occlusion is seen right brachiocephalic artery as well as the right   common carotid and right internal carotid arteries.    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    Aneurysm as described above.    < end of copied text >  < from: CT Angio Neck w/ IV Cont (12.27.23 @ 23:51) >  CT ANGIOGRAPHY BRAIN:  Internal carotid arteries: No stenosis of the bilateral carotid siphons.   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    Anterior cerebral arteries: No occlusion, flow-limiting stenosis or   aneurysm.    Middle cerebral arteries: No occlusion, flow-limiting stenosis or   aneurysm.    Anterior communicating artery: Patent without aneurysm.  Right posterior communicating artery: Patent without aneurysm.  Left posterior communicating artery: Patent without aneurysm.    Posterior cerebral arteries: Occlusion of the right proximal P2 segment.   The left PCA is patent..    Vertebrobasilar: No occlusion, flow-limiting stenosis or aneurysm.  The   distal vertebral arteries are codominant.  Posterior inferior cerebellar   arteries, anterior inferior cerebellar arteries and superior cerebellar   arteries are patent bilaterally.      POSTCONTRAST HEAD CT SCAN:  Nomass or abnormal enhancement.  Gray matter-white matter   differentiation is grossly preserved.  The dural venous sinuses and   cavernous sinuses are patent.  There is no evidence of an arteriovenous   malformation    IMPRESSION:  NONCONTRAST HEAD CTSCAN: No CT evidence of acute intracranial pathology.   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 Reconstitution of the proximal right subclavian artery.   Reconstitution of the distal right common carotid artery, likely via   retrograde flow. Moderate stenosis of the proximal right internal carotid   artery.    CT ANGIOGRAPHY BRAIN: Occlusion of the right proximal P2 segment. 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    POSTCONTRAST HEAD CT SCAN: No CT evidence of or intracranial mass lesion,   abnormal enhancement or an arteriovenous malformation.  Dural venous   sinuses and cavernous sinuses are patent.    1.6 cm right thyroid nodule. Consider further evaluation with nonemergent   outpatient thyroid ultrasound as clinically indicated.    Findings discussed with ISABEL Ladd  by Dr. Schmidt on 12/28/2023 12:59 AM   with read back confirmation.    --- End of Report ---    < end of copied text >    --------------------------------------------------------------------------------------------    ASSESSMENT:    73 year old woman with PMH of obesity, COPD, CVA 2020 (R occipital lobe with residual L homonymous hemianopsia, on ASA 81mg QD), HTN, HLD, anxiety, sciatica, presenting from home with vertigo, found to have occlusion of the R brachiocephalic and reconstitution in the R subclavian as well as decreased flow in the midportion of the R common carotid, proximal R ICA, and proximal R ECA. Also noted to have possible L CC atherosclerosis.       PLAN:   - patient on ASA81/statin, recommend continuing  - recommend inpatient bilateral carotid duplex to evaluate direction flow of cerebral vessels including vertebrals  - further recommendations pending duplex    Discussed with Vascular Fellow  Jose G Walsh, PGY3  C Team Surgery   [phone]

## 2024-01-01 NOTE — CONSULT NOTE ADULT - ATTENDING COMMENTS
R innominate artery occlusion.   L carotid, does not appear to have sig stenosis on CTA  L Vert appears patent, there may be some stenosis of the L subclavian origin  duplex to further evaluate    Rec Neuro IR consult for L ICA aneurysm

## 2024-01-01 NOTE — CHART NOTE - NSCHARTNOTEFT_GEN_A_CORE
Hospitalist Medicine NP     Vascular surgery consult requested this AM. Primary team to follow up brenda Bhatti [facility]  Medicine [id number] Hospitalist Medicine NP     Vascular surgery consult requested this AM. Primary team to follow up brenda Bhatti [facility]  Medicine [phone]

## 2024-01-02 LAB
ANION GAP SERPL CALC-SCNC: 12 MMOL/L — SIGNIFICANT CHANGE UP (ref 7–14)
ANION GAP SERPL CALC-SCNC: 12 MMOL/L — SIGNIFICANT CHANGE UP (ref 7–14)
BUN SERPL-MCNC: 14 MG/DL — SIGNIFICANT CHANGE UP (ref 7–23)
BUN SERPL-MCNC: 14 MG/DL — SIGNIFICANT CHANGE UP (ref 7–23)
CALCIUM SERPL-MCNC: 9.1 MG/DL — SIGNIFICANT CHANGE UP (ref 8.4–10.5)
CALCIUM SERPL-MCNC: 9.1 MG/DL — SIGNIFICANT CHANGE UP (ref 8.4–10.5)
CHLORIDE SERPL-SCNC: 101 MMOL/L — SIGNIFICANT CHANGE UP (ref 98–107)
CHLORIDE SERPL-SCNC: 101 MMOL/L — SIGNIFICANT CHANGE UP (ref 98–107)
CO2 SERPL-SCNC: 25 MMOL/L — SIGNIFICANT CHANGE UP (ref 22–31)
CO2 SERPL-SCNC: 25 MMOL/L — SIGNIFICANT CHANGE UP (ref 22–31)
CREAT SERPL-MCNC: 1.05 MG/DL — SIGNIFICANT CHANGE UP (ref 0.5–1.3)
CREAT SERPL-MCNC: 1.05 MG/DL — SIGNIFICANT CHANGE UP (ref 0.5–1.3)
EGFR: 56 ML/MIN/1.73M2 — LOW
EGFR: 56 ML/MIN/1.73M2 — LOW
GLUCOSE SERPL-MCNC: 111 MG/DL — HIGH (ref 70–99)
GLUCOSE SERPL-MCNC: 111 MG/DL — HIGH (ref 70–99)
HCT VFR BLD CALC: 40.2 % — SIGNIFICANT CHANGE UP (ref 34.5–45)
HCT VFR BLD CALC: 40.2 % — SIGNIFICANT CHANGE UP (ref 34.5–45)
HGB BLD-MCNC: 12.5 G/DL — SIGNIFICANT CHANGE UP (ref 11.5–15.5)
HGB BLD-MCNC: 12.5 G/DL — SIGNIFICANT CHANGE UP (ref 11.5–15.5)
MAGNESIUM SERPL-MCNC: 2 MG/DL — SIGNIFICANT CHANGE UP (ref 1.6–2.6)
MAGNESIUM SERPL-MCNC: 2 MG/DL — SIGNIFICANT CHANGE UP (ref 1.6–2.6)
MCHC RBC-ENTMCNC: 27.7 PG — SIGNIFICANT CHANGE UP (ref 27–34)
MCHC RBC-ENTMCNC: 27.7 PG — SIGNIFICANT CHANGE UP (ref 27–34)
MCHC RBC-ENTMCNC: 31.1 GM/DL — LOW (ref 32–36)
MCHC RBC-ENTMCNC: 31.1 GM/DL — LOW (ref 32–36)
MCV RBC AUTO: 88.9 FL — SIGNIFICANT CHANGE UP (ref 80–100)
MCV RBC AUTO: 88.9 FL — SIGNIFICANT CHANGE UP (ref 80–100)
NRBC # BLD: 0 /100 WBCS — SIGNIFICANT CHANGE UP (ref 0–0)
NRBC # BLD: 0 /100 WBCS — SIGNIFICANT CHANGE UP (ref 0–0)
NRBC # FLD: 0 K/UL — SIGNIFICANT CHANGE UP (ref 0–0)
NRBC # FLD: 0 K/UL — SIGNIFICANT CHANGE UP (ref 0–0)
PHOSPHATE SERPL-MCNC: 4 MG/DL — SIGNIFICANT CHANGE UP (ref 2.5–4.5)
PHOSPHATE SERPL-MCNC: 4 MG/DL — SIGNIFICANT CHANGE UP (ref 2.5–4.5)
PLATELET # BLD AUTO: 200 K/UL — SIGNIFICANT CHANGE UP (ref 150–400)
PLATELET # BLD AUTO: 200 K/UL — SIGNIFICANT CHANGE UP (ref 150–400)
POTASSIUM SERPL-MCNC: 3.5 MMOL/L — SIGNIFICANT CHANGE UP (ref 3.5–5.3)
POTASSIUM SERPL-MCNC: 3.5 MMOL/L — SIGNIFICANT CHANGE UP (ref 3.5–5.3)
POTASSIUM SERPL-SCNC: 3.5 MMOL/L — SIGNIFICANT CHANGE UP (ref 3.5–5.3)
POTASSIUM SERPL-SCNC: 3.5 MMOL/L — SIGNIFICANT CHANGE UP (ref 3.5–5.3)
RBC # BLD: 4.52 M/UL — SIGNIFICANT CHANGE UP (ref 3.8–5.2)
RBC # BLD: 4.52 M/UL — SIGNIFICANT CHANGE UP (ref 3.8–5.2)
RBC # FLD: 13.1 % — SIGNIFICANT CHANGE UP (ref 10.3–14.5)
RBC # FLD: 13.1 % — SIGNIFICANT CHANGE UP (ref 10.3–14.5)
SODIUM SERPL-SCNC: 138 MMOL/L — SIGNIFICANT CHANGE UP (ref 135–145)
SODIUM SERPL-SCNC: 138 MMOL/L — SIGNIFICANT CHANGE UP (ref 135–145)
WBC # BLD: 7.27 K/UL — SIGNIFICANT CHANGE UP (ref 3.8–10.5)
WBC # BLD: 7.27 K/UL — SIGNIFICANT CHANGE UP (ref 3.8–10.5)
WBC # FLD AUTO: 7.27 K/UL — SIGNIFICANT CHANGE UP (ref 3.8–10.5)
WBC # FLD AUTO: 7.27 K/UL — SIGNIFICANT CHANGE UP (ref 3.8–10.5)

## 2024-01-02 PROCEDURE — 93880 EXTRACRANIAL BILAT STUDY: CPT | Mod: 26

## 2024-01-02 PROCEDURE — 99232 SBSQ HOSP IP/OBS MODERATE 35: CPT

## 2024-01-02 RX ADMIN — Medication 6 MILLIGRAM(S): at 21:32

## 2024-01-02 RX ADMIN — ENOXAPARIN SODIUM 40 MILLIGRAM(S): 100 INJECTION SUBCUTANEOUS at 21:31

## 2024-01-02 RX ADMIN — LORATADINE 10 MILLIGRAM(S): 10 TABLET ORAL at 11:13

## 2024-01-02 RX ADMIN — Medication 81 MILLIGRAM(S): at 11:13

## 2024-01-02 RX ADMIN — SERTRALINE 25 MILLIGRAM(S): 25 TABLET, FILM COATED ORAL at 11:13

## 2024-01-02 RX ADMIN — ATORVASTATIN CALCIUM 40 MILLIGRAM(S): 80 TABLET, FILM COATED ORAL at 21:31

## 2024-01-02 RX ADMIN — Medication 100 MILLIGRAM(S): at 21:32

## 2024-01-02 NOTE — PROGRESS NOTE ADULT - ASSESSMENT
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 Lt carotid, does not appear to have sig stenosis     Plan:  - Please obtain carotid duplex  - Neuro IR consult for L ICA aneurysm.    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 Lt carotid, does not appear to have sig stenosis     Plan:  - Please obtain carotid duplex  - Neuro IR consult for L ICA aneurysm.    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    Plan:  - Please obtain carotid duplex  - Neuro IR consult for L ICA aneurysm.    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    Plan:  - Please obtain carotid duplex  - Neuro IR consult for L ICA aneurysm.    C-Team  [id number]

## 2024-01-03 ENCOUNTER — TRANSCRIPTION ENCOUNTER (OUTPATIENT)
Age: 74
End: 2024-01-03

## 2024-01-03 PROCEDURE — 99232 SBSQ HOSP IP/OBS MODERATE 35: CPT

## 2024-01-03 RX ORDER — LORATADINE 10 MG/1
1 TABLET ORAL
Qty: 0 | Refills: 0 | DISCHARGE
Start: 2024-01-03

## 2024-01-03 RX ORDER — MELOXICAM 15 MG/1
1 TABLET ORAL
Refills: 0 | DISCHARGE

## 2024-01-03 RX ORDER — ACETAMINOPHEN 500 MG
3 TABLET ORAL
Qty: 0 | Refills: 0 | DISCHARGE
Start: 2024-01-03

## 2024-01-03 RX ADMIN — ATORVASTATIN CALCIUM 40 MILLIGRAM(S): 80 TABLET, FILM COATED ORAL at 21:13

## 2024-01-03 RX ADMIN — LORATADINE 10 MILLIGRAM(S): 10 TABLET ORAL at 11:26

## 2024-01-03 RX ADMIN — CYCLOBENZAPRINE HYDROCHLORIDE 5 MILLIGRAM(S): 10 TABLET, FILM COATED ORAL at 17:40

## 2024-01-03 RX ADMIN — Medication 975 MILLIGRAM(S): at 12:33

## 2024-01-03 RX ADMIN — SERTRALINE 25 MILLIGRAM(S): 25 TABLET, FILM COATED ORAL at 11:26

## 2024-01-03 RX ADMIN — Medication 975 MILLIGRAM(S): at 11:56

## 2024-01-03 RX ADMIN — Medication 6 MILLIGRAM(S): at 21:13

## 2024-01-03 RX ADMIN — Medication 100 MILLIGRAM(S): at 21:14

## 2024-01-03 RX ADMIN — Medication 81 MILLIGRAM(S): at 11:25

## 2024-01-03 RX ADMIN — Medication 975 MILLIGRAM(S): at 21:43

## 2024-01-03 RX ADMIN — Medication 975 MILLIGRAM(S): at 21:13

## 2024-01-03 RX ADMIN — ENOXAPARIN SODIUM 40 MILLIGRAM(S): 100 INJECTION SUBCUTANEOUS at 21:13

## 2024-01-03 NOTE — DISCHARGE NOTE PROVIDER - CARE PROVIDER_API CALL
Gil Feliciano  Internal Medicine  [address] [place] [address]  Phone: [phone]  Fax: [phone]  Established Patient  Follow Up Time:    Gil Feliciano  Internal Medicine  [address] [place] [address]  Phone: [phone]  Fax: [phone]  Established Patient  Follow Up Time:    Gil Feliciano  Internal Medicine  [address] [place] [address]  Phone: [phone]  Fax: [phone]  Established Patient  Follow Up Time:     Jose Carlos Bravo  Neurosurgery  [address] [address]  Phone: [phone]  Fax: [phone]  Follow Up Time:    Gil Feliciano  Internal Medicine  [address] [place] [address]  Phone: [phone]  Fax: [phone]  Established Patient  Follow Up Time:     Jose Carlos Bravo  Neurosurgery  [address] [address]  Phone: [phone]  Fax: [phone]  Follow Up Time:    Gil Feliciano  Internal Medicine  [address] [place] [address]  Phone: [phone]  Fax: [phone]  Established Patient  Follow Up Time:     Jose Carlos Bravo  Neurosurgery  [address] [facility] [address]  [place] [address]  Phone: [phone]  Fax: [phone]  Follow Up Time:     Remington Lang  Vascular Surgery  [address] [place] [address], [address]  [place] [address]  Phone: [phone]  Fax: [phone]  Follow Up Time:    Gil Feliciano  Internal Medicine  [address] [place] [address]  Phone: [phone]  Fax: [phone]  Established Patient  Follow Up Time:     Jose Carlos Bravo  Neurosurgery  [address] [facility] [address]  [place] [address]  Phone: [phone]  Fax: [phone]  Follow Up Time:     Remington Lang  Vascular Surgery  [address] [place] [address], [address]  [place] [address]  Phone: [phone]  Fax: [phone]  Follow Up Time:

## 2024-01-03 NOTE — DISCHARGE NOTE PROVIDER - NSDCFUADDAPPT_GEN_ALL_CORE_FT
Outpatient Vestibular Rehab- [facility]   [phone] please call for an appointment     Please follow up with Dr. Davin Sharma or Dr. Antonio Alejandra after discharge. Please  call [phone] to schedule an appointment within the next 1-2 weeks. Office is located at [address] [place] [address].    [ ] Follow outpatient with Neurosurgery for management of L ICA aneursym    Outpatient Vestibular Rehab- [facility]   [phone] please call for an appointment     Please follow up with Dr. Davin Sharma or Dr. Antonio Alejandra after discharge. Please  call [phone] to schedule an appointment within the next 1-2 weeks. Office is located at [address] [place] [address].    [ ] Follow outpatient with Neurosurgery for management of L ICA aneursym    Outpatient Vestibular Rehab- [facility]   [phone] please call for an appointment     Please follow up with Dr. Davin Sharma or Dr. Antonio Alejandra after discharge. Please  call [phone] to schedule an appointment within the next 1-2 weeks. Office is located at [address] [place] [address].    Please call Neurosurgery to make an appointment to follow-up for further management    Outpatient Vestibular Rehab- [facility]   [phone] please call for an appointment     Please follow up with Dr. Davin Sharma or Dr. Antonio Alejandra after discharge. Please  call [phone] to schedule an appointment within the next 1-2 weeks. Office is located at [address] [place] [address].    Please call Neurosurgery to make an appointment to follow-up for further management

## 2024-01-03 NOTE — DISCHARGE NOTE PROVIDER - HOSPITAL COURSE
73 -year-old female with obesity, COPD, CVA '20 (R occipital lobe, on ASA 81mg QD), HTN, HLD, anxiety, sciatica, presenting from home with vertigo, nausea, difficulty ambulating. Symptoms started 12/28 however patient left AMA, returned 12/29 for further evaluation, seen by NSx, Neuro, plan for eventual discharge to [facility] given persistently symptomatic w/motion, per neuro unlikely central etiology.    Vertigo.   - appreciate neuro recs  - vestibular rehab  - [facility]  - outpt f/u with neuro.    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   - MRA showing distal left internal carotid artery demonstrating a small focus of abnormal flow-related signal which appears to involve the cavernous segment and does project laterally, could be compatible with small aneurysm, measures approximately 4.7 mm.  - d/w neurosx overnight re MR findings, recommending neuro IR consult.    Vascular occlusion.   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   c/w ASA, statin  - no indication for emergent intervention  - carotid duplex reviewed by Vascular surgery     Hyperlipidemia.   -c/w statin  lipid panel as above.    Thyroid nodule.   - TSH = 0.65  - outpt f/u with PMD for nonemergent thyroid ultrasound.    Need for prophylactic measure.   - enoxaparin for DVT ppx (hx provoked DVT after surgery in 2020)  fall precautions, c/w PT, d/w CM for [facility] planning- patient is refusing [facility] and requesting to be dc home     Case discussed with Dr. Lamb on 1/3/24. Patient is medically stable and optimized for discharge as per attending. All medications were reviewed and prescriptions were sent to a mutually agreed upon pharmacy. Discharge plan reviewed with patient and/or family. 73 -year-old female with obesity, COPD, CVA '20 (R occipital lobe, on ASA 81mg QD), HTN, HLD, anxiety, sciatica, presenting from home with vertigo, nausea, difficulty ambulating. Symptoms started 12/28 however patient left AMA, returned 12/29 for further evaluation, seen by NSx, Neuro, plan for eventual discharge to [facility] given persistently symptomatic w/motion, per neuro unlikely central etiology.    Vertigo.   - appreciate neuro recs  - vestibular rehab  - [facility]  - outpt f/u with neuro.    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   - MRA showing distal left internal carotid artery demonstrating a small focus of abnormal flow-related signal which appears to involve the cavernous segment and does project laterally, could be compatible with small aneurysm, measures approximately 4.7 mm.  - d/w neurosx overnight re MR findings, recommending neuro IR consult.    Vascular occlusion.   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   c/w ASA, statin  - no indication for emergent intervention  - carotid duplex reviewed by Vascular surgery     Hyperlipidemia.   -c/w statin  lipid panel as above.    Thyroid nodule.   - TSH = 0.65  - outpt f/u with PMD for nonemergent thyroid ultrasound.    Need for prophylactic measure.   - enoxaparin for DVT ppx (hx provoked DVT after surgery in 2020)  fall precautions, c/w PT, d/w CM for [facility] planning- patient is refusing [facility] and requesting to be dc home     Case discussed with Dr. Lamb on 1/3/24. Patient is medically stable and optimized for discharge as per attending. All medications were reviewed and prescriptions were sent to a mutually agreed upon pharmacy. Discharge plan reviewed with patient and/or family. 73 -year-old female with obesity, COPD, CVA '20 (R occipital lobe, on ASA 81mg QD), HTN, HLD, anxiety, sciatica, presenting from home with vertigo, nausea, difficulty ambulating. Symptoms started 12/28 however patient left AMA, returned 12/29 for further evaluation, seen by NSx, Neuro, plan for eventual discharge to [facility] given persistently symptomatic w/motion, per neuro unlikely central etiology.    Vertigo.   - appreciate neuro recs  - vestibular rehab  - [facility]  - outpt f/u with neuro.    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   - MRA showing distal left internal carotid artery demonstrating a small focus of abnormal flow-related signal which appears to involve the cavernous segment and does project laterally, could be compatible with small aneurysm, measures approximately 4.7 mm.  - d/w neurosx- patient can f/u outpatient     Vascular occlusion.   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   c/w ASA, statin  - no indication for emergent intervention  - carotid duplex reviewed by Vascular surgery     Hyperlipidemia.   -c/w statin  lipid panel as above.    Thyroid nodule.   - TSH = 0.65  - outpt f/u with PMD for nonemergent thyroid ultrasound.    Need for prophylactic measure.   - enoxaparin for DVT ppx (hx provoked DVT after surgery in 2020)  fall precautions, c/w PT, d/w CM for [facility] planning- patient is refusing [facility] and requesting to be dc home     Case discussed with Dr. Lamb on 1/3/24. Patient is medically stable and optimized for discharge as per attending. All medications were reviewed and prescriptions were sent to a mutually agreed upon pharmacy. Discharge plan reviewed with patient and/or family. 73 -year-old female with obesity, COPD, CVA '20 (R occipital lobe, on ASA 81mg QD), HTN, HLD, anxiety, sciatica, presenting from home with vertigo, nausea, difficulty ambulating. Symptoms started 12/28 however patient left AMA, returned 12/29 for further evaluation, seen by NSx, Neuro, plan for eventual discharge to [facility] given persistently symptomatic w/motion, per neuro unlikely central etiology.    Vertigo.   - appreciate neuro recs  - vestibular rehab  - [facility]  - outpt f/u with neuro.    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   - MRA showing distal left internal carotid artery demonstrating a small focus of abnormal flow-related signal which appears to involve the cavernous segment and does project laterally, could be compatible with small aneurysm, measures approximately 4.7 mm.  - d/w neurosx- patient can f/u outpatient     Vascular occlusion.   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   c/w ASA, statin  - no indication for emergent intervention  - carotid duplex reviewed by Vascular surgery     Hyperlipidemia.   -c/w statin  lipid panel as above.    Thyroid nodule.   - TSH = 0.65  - outpt f/u with PMD for nonemergent thyroid ultrasound.    Need for prophylactic measure.   - enoxaparin for DVT ppx (hx provoked DVT after surgery in 2020)  fall precautions, c/w PT, d/w CM for [facility] planning- patient is refusing [facility] and requesting to be dc home     Case discussed with Dr. Lamb on 1/3/24. Patient is medically stable and optimized for discharge as per attending. All medications were reviewed and prescriptions were sent to a mutually agreed upon pharmacy. Discharge plan reviewed with patient and/or family. 73 -year-old female with obesity, COPD, CVA '20 (R occipital lobe, on ASA 81mg QD), HTN, HLD, anxiety, sciatica, presenting from home with vertigo, nausea, difficulty ambulating. Symptoms started 12/28 however patient left AMA, returned 12/29 for further evaluation, seen by NSx, Neuro, plan for eventual discharge to [facility] given persistently symptomatic w/motion, per neuro unlikely central etiology.    Vertigo.   - appreciate neuro recs  - vestibular rehab  - [facility]  - outpt f/u with neuro.    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   - MRA showing distal left internal carotid artery demonstrating a small focus of abnormal flow-related signal which appears to involve the cavernous segment and does project laterally, could be compatible with small aneurysm, measures approximately 4.7 mm.  - d/w neurosx- patient can f/u outpatient     Vascular occlusion.   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   c/w ASA, statin  - no indication for emergent intervention  - carotid duplex reviewed by Vascular surgery     Hyperlipidemia.   -c/w statin  lipid panel as above.    Thyroid nodule.   - TSH = 0.65  - outpt f/u with PMD for nonemergent thyroid ultrasound.    Need for prophylactic measure.   - enoxaparin for DVT ppx (hx provoked DVT after surgery in 2020)  fall precautions, c/w PT, d/w CM for [facility] planning- patient is refusing [facility] and requesting to be dc home     Case discussed with Dr. Lamb on 1/3/24. Patient is medically stable and optimized for discharge as per attending. All medications were reviewed. Discharge plan reviewed with patient and/or family. 73 -year-old female with obesity, COPD, CVA '20 (R occipital lobe, on ASA 81mg QD), HTN, HLD, anxiety, sciatica, presenting from home with vertigo, nausea, difficulty ambulating. Symptoms started 12/28 however patient left AMA, returned 12/29 for further evaluation, seen by NSx, Neuro, plan for eventual discharge to [facility] given persistently symptomatic w/motion, per neuro unlikely central etiology.    Vertigo.   - appreciate neuro recs  - vestibular rehab  - [facility]  - outpt f/u with neuro.    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   - MRA showing distal left internal carotid artery demonstrating a small focus of abnormal flow-related signal which appears to involve the cavernous segment and does project laterally, could be compatible with small aneurysm, measures approximately 4.7 mm.  - d/w neurosx- patient can f/u outpatient     Vascular occlusion.   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   c/w ASA, statin  - no indication for emergent intervention  - carotid duplex reviewed by Vascular surgery     Hyperlipidemia.   -c/w statin  lipid panel as above.    Thyroid nodule.   - TSH = 0.65  - outpt f/u with PMD for nonemergent thyroid ultrasound.    Need for prophylactic measure.   - enoxaparin for DVT ppx (hx provoked DVT after surgery in 2020)  fall precautions, c/w PT, d/w CM for [facility] planning- patient is refusing [facility] and requesting to be dc home     Case discussed with Dr. Lamb on 1/3/24. Patient is medically stable and optimized for discharge as per attending. All medications were reviewed. Discharge plan reviewed with patient and/or family. 73 -year-old female with obesity, COPD, CVA '20 (R occipital lobe, on ASA 81mg QD), HTN, HLD, anxiety, sciatica, presenting from home with vertigo, nausea, difficulty ambulating. Symptoms started 12/28 however patient left AMA, returned 12/29 for further evaluation, seen by NSx, Neuro, plan for eventual discharge to [facility] given persistently symptomatic w/motion, per neuro unlikely central etiology.    Vertigo.   - appreciate neuro recs  - vestibular rehab  - [facility]  - outpt f/u with neuro.    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   - MRA showing distal left internal carotid artery demonstrating a small focus of abnormal flow-related signal which appears to involve the cavernous segment and does project laterally, could be compatible with small aneurysm, measures approximately 4.7 mm.  - d/w neurosx- patient can f/u outpatient     Vascular occlusion.   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   c/w ASA, statin  - no indication for emergent intervention  - carotid duplex reviewed by Vascular surgery     Hyperlipidemia.   -c/w statin  lipid panel as above.    Thyroid nodule.   - TSH = 0.65  - outpt f/u with PMD for nonemergent thyroid ultrasound.    Need for prophylactic measure.   - enoxaparin for DVT ppx (hx provoked DVT after surgery in 2020)  fall precautions, c/w PT, d/w CM for [facility] planning- patient is refusing [facility] and requesting to be dc home     Case discussed with Dr. Lamb on 1/3/24. Patient is medically stable and optimized for discharge as per attending. All medications were reviewed. Discharge plan reviewed with patient and/or family.       Attending Addendum:  Patient seen and examined by me on the discharge day. Medications reviewed.   Feeling much better. home aide at bedside. plans discussed. No cp, no sob. no abd pain. no n/v/d. no HA, no Dizziness.  All questions answered in details. Follow up plan explained. d/w NP/PA.   outpt follow up with vascular advised for carotid stenosis. d/w vascular attending.  follow up info provided. also on dc paper for contact, to make appointment.   More than 30 mins were spent evaluating patient and coordinating care for discharge.  Discharge summary sent to pt's primary care physician at [facility]. 73 -year-old female with obesity, COPD, CVA '20 (R occipital lobe, on ASA 81mg QD), HTN, HLD, anxiety, sciatica, presenting from home with vertigo, nausea, difficulty ambulating. Symptoms started 12/28 however patient left AMA, returned 12/29 for further evaluation, seen by NSx, Neuro, plan for eventual discharge to [facility] given persistently symptomatic w/motion, per neuro unlikely central etiology.    Vertigo.   - appreciate neuro recs  - vestibular rehab  - [facility]  - outpt f/u with neuro.    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   - MRA showing distal left internal carotid artery demonstrating a small focus of abnormal flow-related signal which appears to involve the cavernous segment and does project laterally, could be compatible with small aneurysm, measures approximately 4.7 mm.  - d/w neurosx- patient can f/u outpatient     Vascular occlusion.   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   c/w ASA, statin  - no indication for emergent intervention  - carotid duplex reviewed by Vascular surgery     Hyperlipidemia.   -c/w statin  lipid panel as above.    Thyroid nodule.   - TSH = 0.65  - outpt f/u with PMD for nonemergent thyroid ultrasound.    Need for prophylactic measure.   - enoxaparin for DVT ppx (hx provoked DVT after surgery in 2020)  fall precautions, c/w PT, d/w CM for [facility] planning- patient is refusing [facility] and requesting to be dc home     Case discussed with Dr. Lamb on 1/3/24. Patient is medically stable and optimized for discharge as per attending. All medications were reviewed. Discharge plan reviewed with patient and/or family.       Attending Addendum:  Patient seen and examined by me on the discharge day. Medications reviewed.   Feeling much better. home aide at bedside. plans discussed. No cp, no sob. no abd pain. no n/v/d. no HA, no Dizziness.  All questions answered in details. Follow up plan explained. d/w NP/PA.   outpt follow up with vascular advised for carotid stenosis. d/w vascular attending.  follow up info provided. also on dc paper for contact, to make appointment.   More than 30 mins were spent evaluating patient and coordinating care for discharge.  Discharge summary sent to pt's primary care physician at [facility].

## 2024-01-03 NOTE — CHART NOTE - NSCHARTNOTEFT_GEN_A_CORE
MRA reviewed- 4.7mm left ICA aneurysm. No acute neurosurgical intervention at this time. Pt can follow up with Dr. Jose Carlos Bravo once discharged. [id number]. Case discussed with attending. MRA reviewed- 4.7mm left ICA aneurysm. No acute neurosurgical intervention at this time. Pt can follow up with Dr. Jose Carlos Bravo once discharged. [id number]. Case discussed with attending.

## 2024-01-03 NOTE — PROGRESS NOTE ADULT - PROBLEM SELECTOR PLAN 1
appreciate neuro recs  vestibular rehab  [facility] caicedo f/u with neuro
appreciate neuro recs  MR imaging as above  patient reporting continued symptoms despite meclizine  vestibular rehab  [facility] caicedo f/u with neuro
appreciate neuro recs  vestibular rehab  [facility] caicedo f/u with neuro
appreciate neuro recs  MR imaging as above  patient reporting continued symptoms despite meclizine  vestibular rehab  [facility] caicedo f/u with neuro
appreciate neuro recs  vestibular rehab  [facility], pt adamantly refusing rehab.  wants to go home with home PT.  have home aide during the day.  outpt f/u with neuro  d/w CM for safe discharge planning: VNS and home PT.   the son lives in [place].

## 2024-01-03 NOTE — PROGRESS NOTE ADULT - PROBLEM SELECTOR PLAN 5
TSH = 0.65  outpt f/u with PMD for nonemergent thyroid ultrasound
check TSH  outpt f/u with PMD for nonemergent thyroid ultrasound
TSH = 0.65  outpt f/u with PMD for nonemergent thyroid ultrasound

## 2024-01-03 NOTE — PROGRESS NOTE ADULT - ATTENDING COMMENTS
close follow up as outpt.  PT w innominate occlusion  Duplex suggests L SC stenosis, however vert flow is antegrade  rec antiplatelet, statin

## 2024-01-03 NOTE — DISCHARGE NOTE PROVIDER - NSDCCPCAREPLAN_GEN_ALL_CORE_FT
PRINCIPAL DISCHARGE DIAGNOSIS  Diagnosis: Dizziness  Assessment and Plan of Treatment: You came in with vertigo, you had an MRI/MRA done which showed an aneurysm of cavernous portion of left internal carotid artery, we consulted neurovascular surgery who recommended outpatient follow-up at this time  Physical Therapy evaluated you and recommended rehab, however you would like to be discharged home with PT services at your house.   Please follow-up with Vestibular Rehab services to help manage your dizziness      SECONDARY DISCHARGE DIAGNOSES  Diagnosis: Vascular occlusion  Assessment and Plan of Treatment: You were also found to have occlusion of your brachiocephalic artery as well as the right common carotid and right internal carotid arteries. 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 You were seen by vascular surgery who recommended no emergent surgery at this time     PRINCIPAL DISCHARGE DIAGNOSIS  Diagnosis: Dizziness  Assessment and Plan of Treatment: You came in with vertigo, you had an MRI/MRA done which showed an aneurysm of cavernous portion of left internal carotid artery, we consulted neurovascular surgery who recommended outpatient follow-up at this time, please call to make an appointment.  Physical Therapy evaluated you and recommended rehab, however you would like to be discharged home with PT services at your house.   Please follow-up with Vestibular Rehab services to help manage your dizziness      SECONDARY DISCHARGE DIAGNOSES  Diagnosis: Vascular occlusion  Assessment and Plan of Treatment: You were also found to have occlusion of your brachiocephalic artery as well as the right common carotid and right internal carotid arteries. 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 You were seen by vascular surgery who recommended no emergent surgery at this time

## 2024-01-03 NOTE — PROGRESS NOTE ADULT - NSPROGADDITIONALINFOA_GEN_ALL_CORE
f/u with NeuroSx for small carotid aneurysm. to review MRI/MRA images.  f/u vascular to review US Carotid. Does she need Sx based on findings.    Pt however, seems to be refusing all surgical interventions currently. risks discussed. fall precautions discussed, refusing rehab.   d/w ISABEL Mac.  d/w MEDINA.     - Dr. DUEÑAS Htet ([facility])  - [phone] f/u with NeuroSx for small carotid aneurysm. to review MRI/MRA images.  f/u vascular to review US Carotid. Does she need Sx based on findings.    Pt however, seems to be refusing all surgical interventions currently. risks discussed. fall precautions discussed, refusing rehab.   d/w ISABEL Mac.  d/w MEDINA.     - Dr. DUEÑAS Htet ([facility])  - [phone]

## 2024-01-03 NOTE — DISCHARGE NOTE PROVIDER - CARE PROVIDERS DIRECT ADDRESSES
,DirectAddress_Unknown ,DirectAddress_Unknown,[email or web address] ,DirectAddress_Unknown,[email or web address] ,DirectAddress_Unknown,[email or web address] ,[email or web address],[email or web address]

## 2024-01-03 NOTE — PROGRESS NOTE ADULT - PROBLEM SELECTOR PROBLEM 2
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

## 2024-01-03 NOTE — PROGRESS NOTE ADULT - PROBLEM SELECTOR PLAN 3
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   c/w ASA, statin  no indication for emergent intervention  carotid duplex pending  appreciate vascular
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   c/w ASA, statin  no indication for emergent intervention  carotid duplex done. vascular f/u to review images.   left message with vascular.
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   c/w ASA, statin  would d/w vascular sx re: need for intervention
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   c/w ASA, statin  no indication for emergent intervention
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   c/w ASA, statin  no indication for emergent intervention  carotid duplex pending  appreciate vascular

## 2024-01-03 NOTE — DISCHARGE NOTE PROVIDER - NSDCMRMEDTOKEN_GEN_ALL_CORE_FT
acetaminophen 325 mg oral tablet: 3 tab(s) orally every 6 hours As needed Mild Pain (1 - 3)  aspirin 81 mg oral tablet: 1 tab(s) orally once a day  atorvastatin 40 mg oral tablet: 1 tab(s) orally once a day (at bedtime)  cyclobenzaprine 5 mg oral tablet: 1 tab(s) orally once a day (at bedtime) as needed for  sciatica  gabapentin 300 mg oral tablet: 1 milligram(s) orally 2 times a day  loratadine 10 mg oral tablet: 1 tab(s) orally once a day  sertraline 25 mg oral tablet: 1 tab(s) orally once a day  traZODone 100 mg oral tablet: 1 tab(s) orally once a day (at bedtime)   acetaminophen 325 mg oral tablet: 3 tab(s) orally every 6 hours As needed Mild Pain (1 - 3)  aspirin 81 mg oral tablet: 1 tab(s) orally once a day  atorvastatin 40 mg oral tablet: 1 tab(s) orally once a day (at bedtime)  cyclobenzaprine 5 mg oral tablet: 1 tab(s) orally once a day (at bedtime) as needed for  sciatica  gabapentin 300 mg oral tablet: 1 milligram(s) orally 2 times a day  loratadine 10 mg oral tablet: 1 tab(s) orally once a day  PT: Outpatient PT 2-3x a week  sertraline 25 mg oral tablet: 1 tab(s) orally once a day  traZODone 100 mg oral tablet: 1 tab(s) orally once a day (at bedtime)   acetaminophen 325 mg oral tablet: 3 tab(s) orally every 6 hours As needed Mild Pain (1 - 3)  aspirin 81 mg oral tablet: 1 tab(s) orally once a day  atorvastatin 40 mg oral tablet: 1 tab(s) orally once a day (at bedtime)  cyclobenzaprine 5 mg oral tablet: 1 tab(s) orally once a day (at bedtime) as needed for  sciatica  gabapentin 300 mg oral tablet: 1 milligram(s) orally 2 times a day  loratadine 10 mg oral tablet: 1 tab(s) orally once a day  OT: Outpatient OT 1-2x a week  PT: Outpatient PT 2-3x a week  sertraline 25 mg oral tablet: 1 tab(s) orally once a day  traZODone 100 mg oral tablet: 1 tab(s) orally once a day (at bedtime)

## 2024-01-03 NOTE — PROGRESS NOTE ADULT - PROBLEM SELECTOR PLAN 2
MRA showing distal left internal carotid artery demonstrating a small focus of abnormal flow-related signal which appears to involve the cavernous segment and does project laterally, could be compatible with small aneurysm,   measures approximately 4.7 mm.  - neuroSx follow up to review MRI/MRA imanges  - Vascular recommending Neuro IR, not available in [facility].  - US carotids done, vascular to review.
MRA showing distal left internal carotid artery demonstrating a small focus of abnormal flow-related signal which appears to involve the cavernous segment and does project laterally, could be compatible with small aneurysm,   measures approximately 4.7 mm.  d/w neurosx overnight re MR findings, recs outpt f/u with neurosx
MRA showing distal left internal carotid artery demonstrating a small focus of abnormal flow-related signal which appears to involve the cavernous segment and does project laterally, could be compatible with small aneurysm,   measures approximately 4.7 mm.  d/w neurosx overnight re MR findings, recommending neuro IR consult

## 2024-01-03 NOTE — DISCHARGE NOTE PROVIDER - NPI NUMBER (FOR SYSADMIN USE ONLY) :
[[id number]] [[id number]] [[id number]],[[id number]] [[id number]] [[id number]],[[id number]],[[id number]] [[id number]],[[id number]],[[id number]]

## 2024-01-03 NOTE — CHART NOTE - NSCHARTNOTEFT_GEN_A_CORE
Reached out to Neuro sx for input on MRA results from 12/29, awaiting chart note, verbalized patient can f/u outpatient and no inpatient work-up is necessary at this time. Discussed with patient, plan for DC planning in am when patients HHA's arrive and ambulance can be set-up to facilitate safe transfer. Discussed case with Dr. Lamb, will touch base with CM/SW in am. Reached out to Neuro sx for input on MRA results from 12/29, patient can f/u outpatient and no inpatient work-up is necessary at this time. Discussed with patient, plan for DC planning in am when patients HHA's arrive and ambulance can be set-up to facilitate safe transfer. Discussed case with Dr. Lamb, will touch base with CM/SW in am.

## 2024-01-03 NOTE — PROGRESS NOTE ADULT - ASSESSMENT
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    Plan:  - Carotid duplex reviewed.  - Neuro IR consult for L ICA aneurysm.    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    Plan:  - Carotid duplex reviewed.  - Neuro IR consult for L ICA aneurysm.    C-Team  [id number]

## 2024-01-03 NOTE — PROGRESS NOTE ADULT - PROBLEM SELECTOR PLAN 4
c/w statin  lipid panel as above

## 2024-01-03 NOTE — PROGRESS NOTE ADULT - ASSESSMENT
73 -year-old female with obesity, COPD, CVA '20 (R occipital lobe, on ASA 81mg QD), HTN, HLD, anxiety, sciatica, presenting from home with vertigo, nausea, difficulty ambulating. Symptoms started 12/28 however patient left AMA, returned 12/29 for further evaluation, seen by NSx, Neuro, plan for eventual discharge to [facility] given persistently symptomatic w/motion, per neuro unlikely central etiology. 73 -year-old female with obesity, COPD, CVA '20 (R occipital lobe, on ASA 81mg QD), HTN, HLD, anxiety, sciatica, presenting from home with vertigo, nausea, difficulty ambulating. Symptoms started 12/28 however patient left AMA, returned 12/29 for further evaluation, seen by NSx, Neuro, plan for eventual discharge to [facility] given persistently symptomatic w/motion, per neuro unlikely central etiology.

## 2024-01-03 NOTE — DISCHARGE NOTE PROVIDER - PROVIDER TOKENS
PROVIDER:[TOKEN:[[id number]],ESTABLISHEDPATIENT:[T]] PROVIDER:[TOKEN:[[id number]],ESTABLISHEDPATIENT:[T]],PROVIDER:[TOKEN:[[id number]]] PROVIDER:[TOKEN:[[id number]],ESTABLISHEDPATIENT:[T]],PROVIDER:[TOKEN:[[id number]]] PROVIDER:[TOKEN:[[id number]],ESTABLISHEDPATIENT:[T]],PROVIDER:[TOKEN:[[id number]]],PROVIDER:[TOKEN:[[id number]]] PROVIDER:[TOKEN:[[id number]],ESTABLISHEDPATIENT:[T]],PROVIDER:[TOKEN:[[id number]]],PROVIDER:[TOKEN:[[id number]]]

## 2024-01-04 ENCOUNTER — TRANSCRIPTION ENCOUNTER (OUTPATIENT)
Age: 74
End: 2024-01-04

## 2024-01-04 VITALS
HEART RATE: 77 BPM | TEMPERATURE: 98 F | RESPIRATION RATE: 18 BRPM | SYSTOLIC BLOOD PRESSURE: 102 MMHG | OXYGEN SATURATION: 98 % | DIASTOLIC BLOOD PRESSURE: 60 MMHG

## 2024-01-04 PROCEDURE — 99232 SBSQ HOSP IP/OBS MODERATE 35: CPT

## 2024-01-04 RX ADMIN — Medication 81 MILLIGRAM(S): at 12:22

## 2024-01-04 RX ADMIN — SERTRALINE 25 MILLIGRAM(S): 25 TABLET, FILM COATED ORAL at 12:22

## 2024-01-04 RX ADMIN — LORATADINE 10 MILLIGRAM(S): 10 TABLET ORAL at 08:59

## 2024-01-04 NOTE — PROGRESS NOTE ADULT - SUBJECTIVE AND OBJECTIVE BOX
Late entry  Pt seen and examined 12/30/23  Still feels dizzy  No palpitations, CP or SOB    Vital Signs Last 24 Hrs  T(C): 36.8 (30 Dec 2023 10:15), Max: 36.9 (29 Dec 2023 14:44)  T(F): 98.3 (30 Dec 2023 10:15), Max: 98.5 (29 Dec 2023 14:44)  HR: 79 (30 Dec 2023 10:15) (56 - 85)  BP: 102/73 (30 Dec 2023 10:15) (102/73 - 150/99)  BP(mean): --  RR: 17 (30 Dec 2023 10:15) (16 - 18)  SpO2: 98% (30 Dec 2023 10:15) (95% - 100%)    I&O's Summary    12-30-23 @ 07:01  -  12-31-23 @ 07:00  --------------------------------------------------------  IN: 0 mL / OUT: 1500 mL / NET: -1500 mL        Gen: NAD  Head: NCAT, EOMI  Lungs: CTA b/l  Heart: RRR, nl S1/S2, no murmurs  Abd: soft, NTND, NABS  Neuro: A+O x 3    LABS:                        13.0   8.04  )-----------( 220      ( 30 Dec 2023 07:50 )             40.4     12-31    143  |  106  |  14  ----------------------------<  103<H>  4.3   |  25  |  0.95    Ca    8.8      30 Dec 2023 07:50  Phos  4.8     12-30  Mg     2.10     12-30        CAPILLARY BLOOD GLUCOSE            Urinalysis Basic - ( 31 Dec 2023 07:50 )    Color: x / Appearance: x / SG: x / pH: x  Gluc: 103 mg/dL / Ketone: x  / Bili: x / Urobili: x   Blood: x / Protein: x / Nitrite: x   Leuk Esterase: x / RBC: x / WBC x   Sq Epi: x / Non Sq Epi: x / Bacteria: x        RADIOLOGY & ADDITIONAL TESTS:    Imaging Personally Reviewed:  [x] YES  [ ] NO    Case discussed with NPP:  [X] YES  [ ] NO
Surgery Daily Progress Note  =====================================================  SUBJECTIVE: A 73y Female Patient seen and examined at bedside on AM rounds. No acute vascular events overnight.    ALLERGIES:  NSAIDs (Other)  No Known Allergies      --------------------------------------------------------------------------------------    MEDICATIONS:    Neurologic Medications  acetaminophen     Tablet .. 975 milliGRAM(s) Oral every 6 hours PRN Mild Pain (1 - 3)  cyclobenzaprine 5 milliGRAM(s) Oral at bedtime PRN Muscle Spasm  gabapentin 300 milliGRAM(s) Oral two times a day  melatonin 6 milliGRAM(s) Oral at bedtime  sertraline 25 milliGRAM(s) Oral daily  traZODone 100 milliGRAM(s) Oral at bedtime  zolpidem 5 milliGRAM(s) Oral at bedtime PRN Insomnia    Respiratory Medications  loratadine 10 milliGRAM(s) Oral daily    Cardiovascular Medications    Gastrointestinal Medications    Genitourinary Medications    Hematologic/Oncologic Medications  aspirin enteric coated 81 milliGRAM(s) Oral daily  enoxaparin Injectable 40 milliGRAM(s) SubCutaneous every 24 hours    Antimicrobial/Immunologic Medications    Endocrine/Metabolic Medications  atorvastatin 40 milliGRAM(s) Oral at bedtime    Topical/Other Medications    --------------------------------------------------------------------------------------    VITAL SIGNS:  NSAIDs (Other)  No Known Allergies      T(C): 36.9 (01-04-24 @ 10:48), Max: 36.9 (01-04-24 @ 10:48)  HR: 77 (01-04-24 @ 10:48) (70 - 86)  BP: 102/60 (01-04-24 @ 10:48) (102/60 - 129/83)  RR: 18 (01-04-24 @ 10:48) (18 - 18)  SpO2: 98% (01-04-24 @ 10:48) (95% - 98%)  --------------------------------------------------------------------------------------      EXAM    General: NAD, resting in bed comfortably.  Respiratory: Nonlabored respirations  Extremities: normal strength, FROM, no deformities  --------------------------------------------------------------------------------------    I&Os  T(C): 36.9 (01-04-24 @ 10:48), Max: 36.9 (01-04-24 @ 10:48)  HR: 77 (01-04-24 @ 10:48) (70 - 86)  BP: 102/60 (01-04-24 @ 10:48) (102/60 - 129/83)  RR: 18 (01-04-24 @ 10:48) (18 - 18)  SpO2: 98% (01-04-24 @ 10:48) (95% - 98%)  --------------------------------------------------------------------------------------    LABS                  01-03-24 @ 07:01  -  01-04-24 @ 07:00  --------------------------------------------------------  IN: 940 mL / OUT: 1200 mL / NET: -260 mL      acetaminophen     Tablet .. 975 milliGRAM(s) Oral every 6 hours PRN  aspirin enteric coated 81 milliGRAM(s) Oral daily  atorvastatin 40 milliGRAM(s) Oral at bedtime  cyclobenzaprine 5 milliGRAM(s) Oral at bedtime PRN  enoxaparin Injectable 40 milliGRAM(s) SubCutaneous every 24 hours  gabapentin 300 milliGRAM(s) Oral two times a day  loratadine 10 milliGRAM(s) Oral daily  melatonin 6 milliGRAM(s) Oral at bedtime  sertraline 25 milliGRAM(s) Oral daily  traZODone 100 milliGRAM(s) Oral at bedtime  zolpidem 5 milliGRAM(s) Oral at bedtime PRN      RADIOLOGY, EKG & ADDITIONAL TESTS: Reviewed. 
Surgery Daily Progress Note  =====================================================  SUBJECTIVE: A 73y Female Patient seen and examined at bedside on AM rounds. No acute vascular events overnight. The patient denies vertigo, pain, nausea, or vomiting.     ALLERGIES:  NSAIDs (Other)  No Known Allergies      --------------------------------------------------------------------------------------    MEDICATIONS:    Neurologic Medications  acetaminophen     Tablet .. 975 milliGRAM(s) Oral every 6 hours PRN Mild Pain (1 - 3)  cyclobenzaprine 5 milliGRAM(s) Oral at bedtime PRN Muscle Spasm  gabapentin 300 milliGRAM(s) Oral two times a day  melatonin 6 milliGRAM(s) Oral at bedtime  sertraline 25 milliGRAM(s) Oral daily  traZODone 100 milliGRAM(s) Oral at bedtime  zolpidem 5 milliGRAM(s) Oral at bedtime PRN Insomnia    Respiratory Medications  loratadine 10 milliGRAM(s) Oral daily    Cardiovascular Medications    Gastrointestinal Medications    Genitourinary Medications    Hematologic/Oncologic Medications  aspirin enteric coated 81 milliGRAM(s) Oral daily  enoxaparin Injectable 40 milliGRAM(s) SubCutaneous every 24 hours    Antimicrobial/Immunologic Medications    Endocrine/Metabolic Medications  atorvastatin 40 milliGRAM(s) Oral at bedtime    Topical/Other Medications    --------------------------------------------------------------------------------------    VITAL SIGNS:  NSAIDs (Other)  No Known Allergies      T(C): 37 (01-02-24 @ 21:01), Max: 37 (01-02-24 @ 21:01)  HR: 75 (01-02-24 @ 21:01) (65 - 81)  BP: 103/85 (01-02-24 @ 21:01) (96/78 - 108/62)  RR: 18 (01-02-24 @ 21:01) (18 - 18)  SpO2: 100% (01-02-24 @ 21:01) (95% - 100%)  --------------------------------------------------------------------------------------    EXAM    General: NAD, resting in bed comfortably.  Respiratory: Nonlabored respirations  Extremities: normal strength, FROM, no deformities    --------------------------------------------------------------------------------------    I&Os  T(C): 37 (01-02-24 @ 21:01), Max: 37 (01-02-24 @ 21:01)  HR: 75 (01-02-24 @ 21:01) (65 - 81)  BP: 103/85 (01-02-24 @ 21:01) (96/78 - 108/62)  RR: 18 (01-02-24 @ 21:01) (18 - 18)  SpO2: 100% (01-02-24 @ 21:01) (95% - 100%)  --------------------------------------------------------------------------------------    LABS                          12.5   7.27  )-----------( 200      ( 02 Jan 2024 05:55 )             40.2     01-02    138  |  101  |  14  ----------------------------<  111<H>  3.5   |  25  |  1.05    Ca    9.1      02 Jan 2024 05:55  Phos  4.0     01-02  Mg     2.00     01-02        Urinalysis Basic - ( 02 Jan 2024 05:55 )    Color: x / Appearance: x / SG: x / pH: x  Gluc: 111 mg/dL / Ketone: x  / Bili: x / Urobili: x   Blood: x / Protein: x / Nitrite: x   Leuk Esterase: x / RBC: x / WBC x   Sq Epi: x / Non Sq Epi: x / Bacteria: x        01-02-24 @ 07:01  -  01-03-24 @ 07:00  --------------------------------------------------------  IN: 720 mL / OUT: 400 mL / NET: 320 mL      acetaminophen     Tablet .. 975 milliGRAM(s) Oral every 6 hours PRN  aspirin enteric coated 81 milliGRAM(s) Oral daily  atorvastatin 40 milliGRAM(s) Oral at bedtime  cyclobenzaprine 5 milliGRAM(s) Oral at bedtime PRN  enoxaparin Injectable 40 milliGRAM(s) SubCutaneous every 24 hours  gabapentin 300 milliGRAM(s) Oral two times a day  loratadine 10 milliGRAM(s) Oral daily  melatonin 6 milliGRAM(s) Oral at bedtime  sertraline 25 milliGRAM(s) Oral daily  traZODone 100 milliGRAM(s) Oral at bedtime  zolpidem 5 milliGRAM(s) Oral at bedtime PRN      RADIOLOGY, EKG & ADDITIONAL TESTS: Reviewed. 
Surgery Daily Progress Note  =====================================================  SUBJECTIVE: A 73y Female Patient seen and examined at bedside on AM rounds. No acute events overnight.      ALLERGIES:  NSAIDs (Other)  No Known Allergies      --------------------------------------------------------------------------------------    MEDICATIONS:    Neurologic Medications  acetaminophen     Tablet .. 975 milliGRAM(s) Oral every 6 hours PRN Mild Pain (1 - 3)  cyclobenzaprine 5 milliGRAM(s) Oral at bedtime PRN Muscle Spasm  gabapentin 300 milliGRAM(s) Oral two times a day  melatonin 6 milliGRAM(s) Oral at bedtime  sertraline 25 milliGRAM(s) Oral daily  traZODone 100 milliGRAM(s) Oral at bedtime  zolpidem 5 milliGRAM(s) Oral at bedtime PRN Insomnia    Respiratory Medications  loratadine 10 milliGRAM(s) Oral daily    Cardiovascular Medications    Gastrointestinal Medications    Genitourinary Medications    Hematologic/Oncologic Medications  aspirin enteric coated 81 milliGRAM(s) Oral daily  enoxaparin Injectable 40 milliGRAM(s) SubCutaneous every 24 hours    Antimicrobial/Immunologic Medications    Endocrine/Metabolic Medications  atorvastatin 40 milliGRAM(s) Oral at bedtime    Topical/Other Medications    --------------------------------------------------------------------------------------    VITAL SIGNS:  NSAIDs (Other)  No Known Allergies      T(C): 37.1 (01-02-24 @ 06:00), Max: 37.6 (01-01-24 @ 22:00)  HR: 80 (01-02-24 @ 06:00) (77 - 91)  BP: 100/65 (01-02-24 @ 06:00) (93/82 - 102/66)  RR: 16 (01-02-24 @ 06:00) (16 - 18)  SpO2: 96% (01-02-24 @ 06:00) (96% - 100%)  --------------------------------------------------------------------------------------    Physical Examination:  GEN: NAD, resting quietly  NEURO: AAOx3  PULM: Non-labored breathing  EXTR: no lower extremity edema, moving all extremities, no residual weakness or sensory deficits.     --------------------------------------------------------------------------------------    I&Os  T(C): 37.1 (01-02-24 @ 06:00), Max: 37.6 (01-01-24 @ 22:00)  HR: 80 (01-02-24 @ 06:00) (77 - 91)  BP: 100/65 (01-02-24 @ 06:00) (93/82 - 102/66)  RR: 16 (01-02-24 @ 06:00) (16 - 18)  SpO2: 96% (01-02-24 @ 06:00) (96% - 100%)  --------------------------------------------------------------------------------------    LABS                          12.5   7.27  )-----------( 200      ( 02 Jan 2024 05:55 )             40.2     01-02    138  |  101  |  14  ----------------------------<  111<H>  3.5   |  25  |  1.05    Ca    9.1      02 Jan 2024 05:55  Phos  4.0     01-02  Mg     2.00     01-02        Urinalysis Basic - ( 02 Jan 2024 05:55 )    Color: x / Appearance: x / SG: x / pH: x  Gluc: 111 mg/dL / Ketone: x  / Bili: x / Urobili: x   Blood: x / Protein: x / Nitrite: x   Leuk Esterase: x / RBC: x / WBC x   Sq Epi: x / Non Sq Epi: x / Bacteria: x        01-01-24 @ 07:01  -  01-02-24 @ 07:00  --------------------------------------------------------  IN: 1140 mL / OUT: 900 mL / NET: 240 mL      acetaminophen     Tablet .. 975 milliGRAM(s) Oral every 6 hours PRN  aspirin enteric coated 81 milliGRAM(s) Oral daily  atorvastatin 40 milliGRAM(s) Oral at bedtime  cyclobenzaprine 5 milliGRAM(s) Oral at bedtime PRN  enoxaparin Injectable 40 milliGRAM(s) SubCutaneous every 24 hours  gabapentin 300 milliGRAM(s) Oral two times a day  loratadine 10 milliGRAM(s) Oral daily  melatonin 6 milliGRAM(s) Oral at bedtime  sertraline 25 milliGRAM(s) Oral daily  traZODone 100 milliGRAM(s) Oral at bedtime  zolpidem 5 milliGRAM(s) Oral at bedtime PRN      RADIOLOGY, EKG & ADDITIONAL TESTS: Reviewed. 
Very anxious about getting her meds on time this morning  Wants to see PT today  She denies any vestiges of vertiginous symptoms this morning    Vital Signs Last 24 Hrs  T(C): 36.8 (31 Dec 2023 10:15), Max: 36.9 (30 Dec 2023 14:44)  T(F): 98.3 (31 Dec 2023 10:15), Max: 98.5 (30 Dec 2023 14:44)  HR: 79 (31 Dec 2023 10:15) (56 - 85)  BP: 102/73 (31 Dec 2023 10:15) (102/73 - 150/99)  BP(mean): --  RR: 17 (31 Dec 2023 10:15) (16 - 18)  SpO2: 98% (31 Dec 2023 10:15) (97% - 100%)    I&O's Summary    12-30-23 @ 07:01  -  12-31-23 @ 07:00  --------------------------------------------------------  IN: 0 mL / OUT: 1500 mL / NET: -1500 mL          Gen: NAD  Head: NCAT, EOMI  Lungs: CTA b/l  Heart: RRR, nl S1/S2, no murmurs  Abd: soft, NTND, NABS  Neuro: A+O x 3    LABS:                        13.0   8.04  )-----------( 220      ( 31 Dec 2023 07:50 )             40.4     12-31    143  |  106  |  14  ----------------------------<  103<H>  4.3   |  25  |  0.95    Ca    8.8      31 Dec 2023 07:50  Phos  4.8     12-31  Mg     2.10     12-31        CAPILLARY BLOOD GLUCOSE            Urinalysis Basic - ( 31 Dec 2023 07:50 )    Color: x / Appearance: x / SG: x / pH: x  Gluc: 103 mg/dL / Ketone: x  / Bili: x / Urobili: x   Blood: x / Protein: x / Nitrite: x   Leuk Esterase: x / RBC: x / WBC x   Sq Epi: x / Non Sq Epi: x / Bacteria: x        RADIOLOGY & ADDITIONAL TESTS:    Imaging Personally Reviewed:  [x] YES  [ ] NO    Case discussed with NPP:  [X] YES  [ ] NO
No acute dizziness  No HA  No SOB/CP    Vital Signs Last 24 Hrs  T(C): 37.2 (01 Jan 2024 10:52), Max: 37.2 (01 Jan 2024 10:52)  T(F): 98.9 (01 Jan 2024 10:52), Max: 98.9 (01 Jan 2024 10:52)  HR: 77 (01 Jan 2024 10:52) (76 - 82)  BP: 93/82 (01 Jan 2024 10:52) (93/82 - 133/98)  BP(mean): --  RR: 18 (01 Jan 2024 10:52) (18 - 18)  SpO2: 100% (01 Jan 2024 10:52) (100% - 100%)    I&O's Summary    12-31-23 @ 07:01  -  01-01-24 @ 07:00  --------------------------------------------------------  IN: 0 mL / OUT: 650 mL / NET: -650 mL        Gen: NAD  Head: NCAT, EOMI  Lungs: CTA b/l  Heart: RRR, nl S1/S2, no murmurs  Abd: soft, NTND, NABS  Neuro: A+O x 3    LABS:                        12.9   8.14  )-----------( 211      ( 01 Jan 2024 05:22 )             40.4     01-01    139  |  102  |  14  ----------------------------<  97  3.7   |  26  |  1.00    Ca    9.3      01 Jan 2024 05:22  Phos  4.4     01-01  Mg     2.10     01-01        CAPILLARY BLOOD GLUCOSE            Urinalysis Basic - ( 01 Jan 2024 05:22 )    Color: x / Appearance: x / SG: x / pH: x  Gluc: 97 mg/dL / Ketone: x  / Bili: x / Urobili: x   Blood: x / Protein: x / Nitrite: x   Leuk Esterase: x / RBC: x / WBC x   Sq Epi: x / Non Sq Epi: x / Bacteria: x        RADIOLOGY & ADDITIONAL TESTS:    Imaging Personally Reviewed:  [x] YES  [ ] NO    Case discussed with NPP:  [X] YES  [ ] NO      
Did not sleep well overnight 2' ambient noise  Denies any dizziness or HA upon awakening this morning    Vital Signs Last 24 Hrs  T(C): 36.9 (02 Jan 2024 10:43), Max: 37.6 (01 Jan 2024 22:00)  T(F): 98.5 (02 Jan 2024 10:43), Max: 99.7 (01 Jan 2024 22:00)  HR: 65 (02 Jan 2024 10:43) (65 - 91)  BP: 104/87 (02 Jan 2024 10:43) (100/65 - 104/87)  BP(mean): --  RR: 18 (02 Jan 2024 10:43) (16 - 18)  SpO2: 95% (02 Jan 2024 10:43) (95% - 100%)    I&O's Summary    01-01-24 @ 07:01  -  01-02-24 @ 07:00  --------------------------------------------------------  IN: 1140 mL / OUT: 900 mL / NET: 240 mL        Gen: NAD  Head: NCAT, EOMI  Lungs: CTA b/l  Heart: RRR, nl S1/S2, no murmurs  Abd: soft, NTND, NABS  Neuro: A+O x 3, grossly nonfocal    LABS:                        12.5   7.27  )-----------( 200      ( 02 Jan 2024 05:55 )             40.2     01-02    138  |  101  |  14  ----------------------------<  111<H>  3.5   |  25  |  1.05    Ca    9.1      02 Jan 2024 05:55  Phos  4.0     01-02  Mg     2.00     01-02        CAPILLARY BLOOD GLUCOSE            Urinalysis Basic - ( 02 Jan 2024 05:55 )    Color: x / Appearance: x / SG: x / pH: x  Gluc: 111 mg/dL / Ketone: x  / Bili: x / Urobili: x   Blood: x / Protein: x / Nitrite: x   Leuk Esterase: x / RBC: x / WBC x   Sq Epi: x / Non Sq Epi: x / Bacteria: x        RADIOLOGY & ADDITIONAL TESTS:    Imaging Personally Reviewed:  [x] YES  [ ] NO    Case discussed with NPP:  [X] YES  [ ] NO      
SUBJECTIVE/ OVERNIGHT EVENTS:  she feels well  out of bed in chair  home aide at bedside.  she wants to go home.  refused rehab. fall precautions discussed.   no chest pain, no shortness of breath.   comfortable. no n/v/d. no abd pain.  no HA/dizziness.       --------------------------------------------------------------------------------------------  LABS:                        12.5   7.27  )-----------( 200      ( 02 Jan 2024 05:55 )             40.2     01-02    138  |  101  |  14  ----------------------------<  111<H>  3.5   |  25  |  1.05    Ca    9.1      02 Jan 2024 05:55  Phos  4.0     01-02  Mg     2.00     01-02        CAPILLARY BLOOD GLUCOSE            Urinalysis Basic - ( 02 Jan 2024 05:55 )    Color: x / Appearance: x / SG: x / pH: x  Gluc: 111 mg/dL / Ketone: x  / Bili: x / Urobili: x   Blood: x / Protein: x / Nitrite: x   Leuk Esterase: x / RBC: x / WBC x   Sq Epi: x / Non Sq Epi: x / Bacteria: x        RADIOLOGY & ADDITIONAL TESTS:    Imaging Personally Reviewed:  [x] YES  [ ] NO    Consultant(s) Notes Reviewed:  [x] YES  [ ] NO    MEDICATIONS  (STANDING):  aspirin enteric coated 81 milliGRAM(s) Oral daily  atorvastatin 40 milliGRAM(s) Oral at bedtime  enoxaparin Injectable 40 milliGRAM(s) SubCutaneous every 24 hours  gabapentin 300 milliGRAM(s) Oral two times a day  loratadine 10 milliGRAM(s) Oral daily  melatonin 6 milliGRAM(s) Oral at bedtime  sertraline 25 milliGRAM(s) Oral daily  traZODone 100 milliGRAM(s) Oral at bedtime    MEDICATIONS  (PRN):  acetaminophen     Tablet .. 975 milliGRAM(s) Oral every 6 hours PRN Mild Pain (1 - 3)  cyclobenzaprine 5 milliGRAM(s) Oral at bedtime PRN Muscle Spasm  zolpidem 5 milliGRAM(s) Oral at bedtime PRN Insomnia      Care Discussed with Consultants/Other Providers [x] YES  [ ] NO    Vital Signs Last 24 Hrs  T(C): 36.4 (03 Jan 2024 17:08), Max: 37 (02 Jan 2024 21:01)  T(F): 97.5 (03 Jan 2024 17:08), Max: 98.6 (02 Jan 2024 21:01)  HR: 86 (03 Jan 2024 17:08) (75 - 87)  BP: 121/75 (03 Jan 2024 10:32) (98/71 - 121/75)  BP(mean): --  RR: 18 (03 Jan 2024 17:08) (18 - 18)  SpO2: 95% (03 Jan 2024 17:08) (95% - 100%)    Parameters below as of 03 Jan 2024 06:30  Patient On (Oxygen Delivery Method): room air      I&O's Summary    02 Jan 2024 07:01  -  03 Jan 2024 07:00  --------------------------------------------------------  IN: 720 mL / OUT: 1400 mL / NET: -680 mL      PHYSICAL EXAM:  GENERAL: NAD, well-developed/obese lady, comfortable, out of bed in chair, on room air  HEAD:  Atraumatic, Normocephalic  EYES: EOMI, PERRLA, conjunctiva and sclera clear, left sided hemianopia after previous stroke.   NECK: Supple, No JVD  CHEST/LUNG: mild decrease breath sounds bilaterally; No wheeze   HEART: Regular rate and rhythm; No murmurs, rubs, or gallops  ABDOMEN: Soft, Nontender, Nondistended; Bowel sounds present  Neuro: AAOx3, no focal weakness, 5/5 b/l extremity strength  EXTREMITIES:  2+ Peripheral Pulses, No clubbing, cyanosis, trace edema  SKIN: No rashes or lesions

## 2024-01-04 NOTE — PROGRESS NOTE ADULT - ASSESSMENT
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    Plan:  - Carotid duplex reviewed.  - Neuro recs appreciated  - Follow up outpatient with Dr. 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    Plan:  - Carotid duplex reviewed.  - Neuro recs appreciated  - Follow up outpatient with Dr. Precious EPRSAUD-Team  [id number]

## 2024-01-04 NOTE — PROGRESS NOTE ADULT - ATTENDING COMMENTS
outpt fu, antegrade L vert flow, L subclav stenosis.    will monitor for progression w duplex surveillance

## 2024-01-04 NOTE — DISCHARGE NOTE NURSING/CASE MANAGEMENT/SOCIAL WORK - PATIENT PORTAL LINK FT
You can access the FollowMyHealth Patient Portal offered by [facility] by registering at the following website: [email or web address]. By joining [employer]’s FollowMyHealth portal, you will also be able to view your health information using other applications (apps) compatible with our system. You can access the FollowMyHealth Patient Portal offered by [facility] by registering at the following website: [email or web address]. By joining [email or web address]’s FollowMyHealth portal, you will also be able to view your health information using other applications (apps) compatible with our system.

## 2024-01-04 NOTE — DISCHARGE NOTE NURSING/CASE MANAGEMENT/SOCIAL WORK - NSDCVIVACCINE_GEN_ALL_CORE_FT
Tdap; 07-Nov-2017 22:12; Mat, April (RN); [employer]; [id number]; IntraMuscular; Deltoid Right.; 0.5 milliLiter(s); VIS (VIS Published: 09-May-2013, VIS Presented: 07-Nov-2017);   Tdap; 09-May-2018 18:06; June Lockhart (RN); [employer]; [id number]; IntraMuscular; Deltoid Right.; 0.5 milliLiter(s); VIS (VIS Published: 09-May-2013, VIS Presented: 09-May-2018);    Tdap; 07-Nov-2017 22:12; Mat, April (RN); [employer]; [id number]; IntraMuscular; Deltoid Right.; 0.5 milliLiter(s); VIS (VIS Published: 09-May-2013, VIS Presented: 07-Nov-2017);   Tdap; 09-May-2018 18:06; June Lockhart (RN); [employer]; [id number]; IntraMuscular; Deltoid Right.; 0.5 milliLiter(s); VIS (VIS Published: 09-May-2013, VIS Presented: 09-May-2018);

## 2024-01-04 NOTE — PROGRESS NOTE ADULT - REASON FOR ADMISSION
dizziness, difficulty ambulating

## 2024-01-04 NOTE — PROGRESS NOTE ADULT - PROVIDER SPECIALTY LIST ADULT
Internal Medicine
Vascular Surgery
Internal Medicine

## 2024-01-04 NOTE — DISCHARGE NOTE NURSING/CASE MANAGEMENT/SOCIAL WORK - NSDCFUADDAPPT_GEN_ALL_CORE_FT
Outpatient Vestibular Rehab- [facility]   [phone] please call for an appointment     Please follow up with Dr. Davin Sharma or Dr. Antonio Alejandra after discharge. Please  call [phone] to schedule an appointment within the next 1-2 weeks. Office is located at [address] [place] [address].    Please call Neurosurgery to make an appointment to follow-up for further management    Outpatient Vestibular Rehab- [facility]   [phone] please call for an appointment     Please follow up with Dr. Davin Sharma or Dr. Antonio Alejandra after discharge. Please  call [phone] to schedule an appointment within the next 1-2 weeks. Office is located at [address] [place] [address].    Please call Neurosurgery to make an appointment to follow-up for further management

## 2024-01-04 NOTE — DISCHARGE NOTE NURSING/CASE MANAGEMENT/SOCIAL WORK - NSDCPEFALRISK_GEN_ALL_CORE
For information on Fall & Injury Prevention, visit: [email or web address] OR  [email or web address] OR  https://www.cdc.gov/steadi/patient.html For information on Fall & Injury Prevention, visit: [email or web address] OR  [email or web address] OR  https://www.cdc.gov/steadi/patient.html

## 2024-01-18 ENCOUNTER — APPOINTMENT (OUTPATIENT)
Age: 74
End: 2024-01-18
Payer: MEDICARE

## 2024-01-18 VITALS
HEART RATE: 79 BPM | WEIGHT: 150 LBS | SYSTOLIC BLOOD PRESSURE: 115 MMHG | DIASTOLIC BLOOD PRESSURE: 75 MMHG | HEIGHT: 64 IN | BODY MASS INDEX: 25.61 KG/M2 | OXYGEN SATURATION: 98 %

## 2024-01-18 PROCEDURE — 99204 OFFICE O/P NEW MOD 45 MIN: CPT

## 2024-01-18 RX ORDER — TRAZODONE HYDROCHLORIDE 100 MG/1
100 TABLET ORAL
Refills: 0 | Status: ACTIVE | COMMUNITY

## 2024-01-18 RX ORDER — ASPIRIN 81 MG
81 TABLET, DELAYED RELEASE (ENTERIC COATED) ORAL
Refills: 0 | Status: ACTIVE | COMMUNITY

## 2024-01-18 RX ORDER — ATORVASTATIN CALCIUM 40 MG/1
40 TABLET, FILM COATED ORAL
Refills: 0 | Status: ACTIVE | COMMUNITY

## 2024-01-18 RX ORDER — MELOXICAM 15 MG/1
15 TABLET ORAL
Refills: 0 | Status: ACTIVE | COMMUNITY

## 2024-01-18 RX ORDER — ACETAMINOPHEN 500 MG/1
500 TABLET, COATED ORAL
Refills: 0 | Status: ACTIVE | COMMUNITY

## 2024-01-19 NOTE — RESULTS/DATA
[FreeTextEntry1] : ACC: [id number] EXAM: MR ANGIO NECK IC ORDERED BY: LESLI AGARWAL  ACC: [id number] EXAM: MR ANGIO BRAIN ORDERED BY: LESLI AGARWAL  ACC: [id number] EXAM: MR BRAIN ORDERED BY: STANLEY HERNANDEZ  PROCEDURE DATE: 12/29/2023    INTERPRETATION: Clinical indication: 5 mm aneurysm. Dizziness.  MRI the brain was performed sagittal T1 axial T1 T2 T2 FLAIR diffusion and susceptibility weighted sequence.  MRA of the neck was performed using 2-D and 3-D time-of-flight technique  MRA of the [place] with 3-D [place] technique  This exam is compared prior head CT and CTA performed on December 27, 2023.  Parenchymal volume loss and chronic microvascular ischemic changes are identified.  Areas of encephalomalacia and gliosis involving the right posterior temporal occipital and parietal region is seen with associated T1 shortening. This finding also demonstrates some areas of susceptibility. This is compatible with an old right PCA infarct with areas of laminar necrosis. Expected dilatation of the right temporal and occipital horn is identified.  Adjacent to this area of encephalomalacia and gliosis involving the medial left parietal cortex are small foci of restricted diffusion which does demonstrate decreased signal ADC mapping. These findings could be compatible with tiny areas of acute infarcts.  Parenchymal volume loss and chronic microvascular changes identified  Prominent flow void is identified involving the distal left internal carotid artery.  The paranasal sinuses mastoid and middle regions appear clear.  Occlusion of the brachiocephalic artery is again suspected. There is flow-related signal seen involving the proximal right subclavian artery region.  There is decrease flow related signal involving the proximal and midportion of the right common carotid artery as well as decreased flow-related signal involving both the proximal right internal and external carotid arteries. There is flow-related signal seen involving the right internal carotid artery just distal to the bifurcation seen. This is likely due to retrograde flow since no flow related signal is seen in the distal right internal carotid artery on the 3-D [place] study. Evaluation of the left common carotid artery demonstrates some irregularity which could be compatible atherosclerotic change. There is normal flow seen involving the left internal and external carotid arteries. Both vertebral arteries demonstrate normal flow-related signal.  Evaluation of the distal left internal carotid artery demonstrates a small focus of abnormal flow-related signal. This appears to involve the cavernous segment and does project laterally. This best seen on series 6 image 63 and could be compatible with small aneurysm. This finding measures approximately 4.7 mm.  Both distal vertebral arteries appear normal  Both anterior and middle cerebral arteries appear normal. No flow related signal is seen involving the right posterior cerebral artery which could be compatible with underlying occlusion.  IMPRESSION: Old right PCA infarct  Tiny foci restricted diffusion adjacent to this old right PCA infarct which could be compatible small areas of acute infarct  Occlusion is seen right brachiocephalic artery as well as the right common carotid and right internal carotid arteries.  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  Aneurysm as described above.  --- End of Report ---      OBINNA MOLINA MD; Attending Radiologist This document has been electronically signed. Dec 29 2023 11:24AM

## 2024-01-19 NOTE — ASSESSMENT
[FreeTextEntry1] : Impression: 73yr old female with dizziness and vertigo ct cta showed armida occlusion and possible Left ica 5mm aneurysm   Plan: Personally reviewed the imaging and does not appear an aneurysm is present  discussed the right carotid artery occlusion appears chronic  recommend remaining on aspirin no need for plavix at this time plan of care is conservative management with mra brain non con and mra neck w.wo contrast  in 1 year

## 2024-01-19 NOTE — REASON FOR VISIT
Patient ID: Edgar Goodwin is a 71 y.o. female Date of Birth 1959       Chief Complaint   Patient presents with    Follow Up Wound Care Visit     Bilateral feet wounds        Allergies:  Patient has no known allergies. Assessments:      Diagnosis ICD-10-CM Associated Orders   1. Ulcer of right foot, with fat layer exposed ([address])  L97.512 Wound cleansing and dressings      2. Ulcer of right midfoot with fat layer exposed ([address])  L97.412 Wound cleansing and dressings     Debridement      3. Ulcer of left foot, with fat layer exposed ([address])  L97.522 Wound cleansing and dressings      4. Type 2 diabetes mellitus with diabetic neuropathy, with long-term current use of insulin ([facility])  E11.40 Debridement    Z79.4                Plan:  1. Reviewed medical records. Patient was counseled and educated on the condition and the diagnosis. 2. The diagnosis, treatment options and prognosis were discussed with the patient. 3. Wounds are healing. Continue serial debridement and Santyl. 4. Stressed on patient compliance about proper off-loading, and staying off of foot. 5. Patient will return in 2 weeks for re-evaluation. Imaging: I have personally reviewed pertinent films in PACS  Labs, pathology, and Other Studies: I have personally reviewed pertinent reports. Subjective:     HPI  The patient presents for wound care in both feet. No pain. No new complaint. BS under control.         The following portions of the patient's history were reviewed and updated as appropriate:   Patient Active Problem List   Diagnosis    Uncontrolled type 2 diabetes mellitus with hyperglycemia (HCC)    Seizures ([address])    Exertional dyspnea    Enlarged pulmonary artery (HCC)    Aortic dilatation (HCC)    Anemia    Decreased pulses in feet    Hyperlipidemia    Microalbuminuria    Neuropathy of hand, right    QT prolongation    Visual impairment in both eyes    Vitamin D deficiency    Lung nodules    Orthostatic hypotension    Mild intermittent asthma without complication    Type 2 diabetes mellitus with microalbuminuria, with long-term current use of insulin (HCC)    Other inflammatory and immune myopathies, not elsewhere classified    Morbid obesity (HCC)    Polyneuropathy associated with underlying disease (HCC)    PMR (polymyalgia rheumatica) (HCC)    Thrombocytosis    Left cavernous carotid aneurysm    Type 2 diabetes mellitus with hyperglycemia, with long-term current use of insulin (HCC)    Aneurysm (HCC)    Thyroid nodule    History of absence seizures    Hypersomnia    Cerebral aneurysm without rupture    Chronic migraine without aura without status migrainosus, not intractable    Hypertension    Depressed mood    Blurry vision, bilateral    Ulnar neuropathy of right upper extremity    Polymyalgia rheumatica (HCC)    Gait instability    Stage 3a chronic kidney disease ([facility])    Obstructive sleep apnea    Diabetic neuropathy ([facility])    Unsteadiness on feet    Abnormal urinalysis    Weakness    Intertrigo    Leg numbness    Unsheltered homelessness    Bilateral lower extremity edema    Diarrhea    Dyslipidemia    Insulin long-term use (HCC)    Type 2 diabetes mellitus with hyperglycemia (HCC)    Ambulatory dysfunction    Legally blind    Suspected pressure injury of deep tissue    Calculus of gallbladder without cholecystitis without obstruction    Diabetes mellitus (HCC)    Abnormal CT scan    Friction blisters of the soles    Leukocytosis    Encounter for support and coordination of transition of care    Food insecurity    Homelessness    Ulcer of left heel ([address])    Pulmonary embolism ([address])    Diabetic foot ulcer ([address])    Cellulitis of lower extremity    Chronic anemia    Viral upper respiratory illness     Past Medical History:   Diagnosis Date    Anemia     Benign hypertension     Procedure/Onset: 01/01/2005; Description: BP elevated today. Pt reports running out of BP meds 2wks ago. Will resume BP meds.     Diabetes mellitus ([address]) Diabetes mellitus type 2 with complications, uncontrolled 9/8/2015    Dyspnea on exertion     Hyperlipidemia     Hypertension     Legally blind 2010    Miscarriage     x 3    Polymyalgia rheumatica ([address]) 11/24/2021    Seizures (HCC)     Sickle cell trait (HCC)     Stage 3a chronic kidney disease ([address]) 5/19/2022    Thyroid nodule 3/6/2020     Past Surgical History:   Procedure Laterality Date    CATARACT EXTRACTION Bilateral     august and september    EYE SURGERY      HYSTERECTOMY      43    OOPHORECTOMY Left     38    SD LIGATION/BIOPSY TEMPORAL ARTERY Bilateral 10/17/2019    Procedure: BIOPSY ARTERY TEMPORAL;  Surgeon: Juan Pablo Zaldivar, DO;  Location: [facility];  Service: Vascular    US GUIDED THYROID BIOPSY  5/13/2020     Family History   Problem Relation Age of Onset    Heart attack Mother     Heart disease Mother     Hypertension Mother     No Known Problems Father     Heart disease Sister     No Known Problems Sister     No Known Problems Sister     No Known Problems Daughter     Heart attack Maternal Grandmother     Heart disease Maternal Grandmother     No Known Problems Brother     No Known Problems Son     No Known Problems Son     No Known Problems Paternal Aunt     Diabetes Other     Heart attack Other     Cancer Neg Hx     Stroke Neg Hx       Social History     Socioeconomic History    Marital status: /Civil Union     Spouse name: None    Number of children: None    Years of education: None    Highest education level: None   Occupational History    Occupation: long term disability   Tobacco Use    Smoking status: Never    Smokeless tobacco: Never   Vaping Use    Vaping Use: Never used   Substance and Sexual Activity    Alcohol use: Never     Alcohol/week: 0.0 standard drinks of alcohol    Drug use: No    Sexual activity: Not Currently     Partners: Male     Birth control/protection: None   Other Topics Concern    None   Social History Narrative    Caffeine use    Resides with spouse and one son Social Determinants of Health     Financial Resource Strain: Low Risk  (9/15/2020)    Overall Financial Resource Strain (CARDIA)     Difficulty of Paying Living Expenses: Not very hard   Food Insecurity: Food Insecurity Present (9/26/2023)    Hunger Vital Sign     Worried About Running Out of Food in the Last Year: Often true     Ran Out of Food in the Last Year: Often true   Transportation Needs: Unmet Transportation Needs (9/26/2023)    PRAPARE - Transportation     Lack of Transportation (Medical): Yes     Lack of Transportation (Non-Medical): Yes   Physical Activity: Inactive (2/3/2020)    Exercise Vital Sign     Days of Exercise per Week: 0 days     Minutes of Exercise per Session: 0 min   Stress: No Stress Concern Present (2/3/2020)    [address]     Feeling of Stress :  Only a little   Social Connections: Unknown (2/3/2020)    Social Connection and Isolation Panel [NHANES]     Frequency of Communication with Friends and Family: Twice a week     Frequency of Social Gatherings with Friends and Family: Twice a week     Attends Yazdanism Services: Not on file     Active Member of Clubs or Organizations: Not on file     Attends Club or Organization Meetings: Not on file     Marital Status:    Intimate Partner Violence: Not on file   Housing Stability: High Risk (9/26/2023)    Housing Stability Vital Sign     Unable to Pay for Housing in the Last Year: Yes     Number of [address] in the Last Year: 3     Unstable Housing in the Last Year: Yes        Current Outpatient Medications:     albuterol (PROVENTIL HFA,VENTOLIN HFA) 90 mcg/act inhaler, Inhale 2 puffs every 6 (six) hours as needed for wheezing, Disp: , Rfl:     guaiFENesin (ROBITUSSIN) 100 MG/5ML oral liquid, Take 200 mg by mouth 4 (four) times a day as needed for cough, Disp: , Rfl:     loratadine (CLARITIN) 10 mg tablet, Take 10 mg by mouth daily, Disp: , Rfl:     acetaminophen (TYLENOL) 325 mg tablet, Take 650 mg by mouth every 6 (six) hours as needed for mild pain, Disp: , Rfl:     amLODIPine (NORVASC) 5 mg tablet, Take 1 tablet (5 mg total) by mouth daily, Disp: 30 tablet, Rfl: 3    apixaban (ELIQUIS) 5 mg, Take 1 tablet (5 mg total) by mouth 2 (two) times a day Do not start before August 17, 2023., Disp: , Rfl: 0    atorvastatin (LIPITOR) 40 mg tablet, Take 1 tablet (40 mg total) by mouth daily, Disp: 90 tablet, Rfl: 3    Blood Glucose Monitoring Suppl (OneTouch Verio Reflect) w/Device KIT, Use 1 each 4 (four) times a day (Patient not taking: Reported on 9/11/2023), Disp: 1 kit, Rfl: 0    Blood Pressure Monitor KIT, Check blood pressures BID (Patient not taking: Reported on 9/11/2023), Disp: 1 kit, Rfl: 0    cholecalciferol (VITAMIN D3) 1,000 units tablet, Take 2 tablets (2,000 Units total) by mouth daily, Disp: 60 tablet, Rfl: 2    collagenase (SANTYL) ointment, Apply 1 Application topically daily Apply to right distal plantar foot diabetic ulcer, apply to right proximal plantar foot diabetic ulcer, apply to left plantar foot diabetic ulcer, Disp: , Rfl:     Continuous Blood Gluc  (FreeStyle Denisa 2 Ball) ERIC, Use 1 each 4 (four) times a day (Patient not taking: Reported on 9/11/2023), Disp: 1 each, Rfl: 0    Continuous Blood Gluc Sensor (FreeStyle Denisa 2 Sensor) MISC, Use 1 each every 14 (fourteen) days (Patient not taking: Reported on 9/11/2023), Disp: 2 each, Rfl: 0    docusate sodium (COLACE) 100 mg capsule, Take 1 capsule (100 mg total) by mouth 2 (two) times a day, Disp: 60 capsule, Rfl: 0    Fluticasone-Salmeterol (Advair) 250-50 mcg/dose inhaler, Inhale 1 puff 2 (two) times a day Rinse mouth after use., Disp: , Rfl:     gabapentin (NEURONTIN) 100 mg capsule, Take 1 capsule (100 mg total) by mouth 2 (two) times a day, Disp: 60 capsule, Rfl: 3    hydrALAZINE (APRESOLINE) 10 mg tablet, Take 1 tablet (10 mg total) by mouth 3 (three) times a day, Disp: 90 tablet, Rfl: 3    insulin glargine (LANTUS) 100 units/mL subcutaneous injection, Inject 18 Units under the skin every 12 (twelve) hours (Patient taking differently: Inject 15 Units under the skin every 12 (twelve) hours), Disp: 10 mL, Rfl: 0    insulin lispro (HumaLOG KwikPen) 100 units/mL injection pen, Inject 5 Units under the skin 3 (three) times a day with meals, Disp: , Rfl:     Insulin Pen Needle (BD Pen Needle Tita 2nd Gen) 32G X 4 MM MISC, For use with insulin pen. Pharmacy may dispense brand covered by insurance.  (Patient not taking: Reported on 9/11/2023), Disp: 100 each, Rfl: 0    Insulin Pen Needle (BD Pen Needle Tita U/F) 32G X 4 MM MISC, Use four times daily as directed with insulin pen (Patient not taking: Reported on 9/11/2023), Disp: 200 each, Rfl: 3    Lancets (OneTouch Delica Plus [id number]) MISC, Use 1 each 4 (four) times a day (Patient not taking: Reported on 9/11/2023), Disp: 200 each, Rfl: 2    lidocaine (LIDODERM) 5 %, Apply 1 patch topically over 12 hours daily Remove & Discard patch within 12 hours or as directed by MD Do not start before October 1, 2023., Disp: , Rfl: 0    losartan (COZAAR) 100 MG tablet, Take 1 tablet (100 mg total) by mouth daily, Disp: 30 tablet, Rfl: 5    Menthol, Topical Analgesic, (Biofreeze) 4 % GEL, Apply 1 Application topically 2 (two) times a day, Disp: , Rfl:     metFORMIN (GLUCOPHAGE) 1000 MG tablet, Take 1 tablet (1,000 mg total) by mouth 2 (two) times a day with meals, Disp: 60 tablet, Rfl: 3    metoprolol tartrate (LOPRESSOR) 100 mg tablet, Take 1 tablet (100 mg total) by mouth every 12 (twelve) hours, Disp: 60 tablet, Rfl: 3    multivitamin-iron-minerals-folic acid (THERAPEUTIC-M) TABS tablet, Take 1 tablet by mouth daily, Disp: 30 tablet, Rfl: 2    nystatin (MYCOSTATIN) powder, Apply topically 2 (two) times a day, Disp: , Rfl: 0    predniSONE 1 mg tablet, Take 4 tablets (4 mg total) by mouth daily, Disp: 120 tablet, Rfl: 0    topiramate (TOPAMAX) 100 mg tablet, Take 1 tablet (100 mg total) by mouth daily at bedtime (Patient taking differently: Take 100 mg by mouth daily at bedtime), Disp: 30 tablet, Rfl: 0    Review of Systems   Constitutional:  Negative for chills and fever. Respiratory:  Negative for cough and shortness of breath. Cardiovascular:  Negative for chest pain. Gastrointestinal:  Negative for nausea and vomiting. Skin:  Positive for wound. Neurological:  Positive for numbness. Negative for weakness. Objective:  /78   Pulse 72   Temp (!) 97.4 °F (36.3 °C)   Resp 18         Physical Exam  Vitals and nursing note reviewed. Constitutional:       General: She is not in acute distress. Appearance: She is obese. She is not toxic-appearing. Cardiovascular:      Rate and Rhythm: Normal rate and regular rhythm. Pulses: Decreased pulses. Dorsalis pedis pulses are detected w/ Doppler on the right side and detected w/ Doppler on the left side. Posterior tibial pulses are detected w/ Doppler on the right side and detected w/ Doppler on the left side. Comments: Biphasic signals bilateral DP and PTA. Pulmonary:      Effort: Pulmonary effort is normal. No respiratory distress. Musculoskeletal:         General: No tenderness or signs of injury. Right lower leg: Edema present. Left lower leg: Edema present. Skin:     General: Skin is warm. Capillary Refill: Capillary refill takes less than 2 seconds. Coloration: Skin is not cyanotic or mottled. Findings: Wound present. No abscess. Nails: There is no clubbing. Comments: Full thickness ulcers in right submet 5 head, submet 5 base, and left submet 5. Increased granular tissues. Decrease in size and depth. Wounds do not probe to bone. No signs of active infection. See wound assessment and photo. Neurological:      General: No focal deficit present. Mental Status: She is alert and oriented to person, place, and time.       Cranial Nerves: No cranial nerve deficit. Sensory: Sensory deficit present. Coordination: Coordination normal.   Psychiatric:         Mood and Affect: Mood normal.         Behavior: Behavior normal.         Thought Content: Thought content normal.         Judgment: Judgment normal.         Wound 09/11/23 Diabetic Ulcer Foot Left;Plantar (Active)   Wound Image Images linked 12/04/23 0910   Wound Description Yellow;Pink;Granulation tissue;Slough 12/04/23 0918   Soraya-wound Assessment Maceration;Callus 12/04/23 0918   Wound Length (cm) 2.1 cm 12/04/23 0918   Wound Width (cm) 1.4 cm 12/04/23 0918   Wound Depth (cm) 0.3 cm 12/04/23 0918   Wound Surface Area (cm^2) 2.94 cm^2 12/04/23 0918   Wound Volume (cm^3) 0.882 cm^3 12/04/23 0918   Calculated Wound Volume (cm^3) 0.88 cm^3 12/04/23 0918   Drainage Amount Moderate 12/04/23 0918   Drainage Description Serosanguineous; Tan 12/04/23 0918   Non-staged Wound Description Full thickness 12/04/23 0918   Dressing Status Intact 12/04/23 0918       Wound 09/11/23 Diabetic Ulcer Foot Right;Plantar;Distal (Active)   Wound Image Images linked 12/04/23 0911   Wound Description Hypergranulation 12/04/23 0920   Soraya-wound Assessment Dry 12/04/23 0920   Wound Length (cm) 0.7 cm 12/04/23 0920   Wound Width (cm) 0.6 cm 12/04/23 0920   Wound Depth (cm) 0.1 cm 12/04/23 0920   Wound Surface Area (cm^2) 0.42 cm^2 12/04/23 0920   Wound Volume (cm^3) 0.042 cm^3 12/04/23 0920   Calculated Wound Volume (cm^3) 0.04 cm^3 12/04/23 0920   Change in Wound Size % 95.24 12/04/23 0920   Drainage Amount Small 12/04/23 0920   Drainage Description Serosanguineous; Yellow 12/04/23 0920   Non-staged Wound Description Full thickness 12/04/23 0920   Dressing Status Intact 12/04/23 0920       Wound 09/11/23 Diabetic Ulcer Foot Right;Plantar;Proximal (Active)   Wound Image Images linked 12/04/23 0934   Wound Description Granulation tissue;Slough; Yellow 12/04/23 0921   Soraya-wound Assessment Maceration;Callus 12/04/23 0921   Wound Length (cm) 1.1 cm 12/04/23 0921   Wound Width (cm) 1.7 cm 12/04/23 0921   Wound Depth (cm) 0.4 cm 12/04/23 0921   Wound Surface Area (cm^2) 1.87 cm^2 12/04/23 0921   Wound Volume (cm^3) 0.748 cm^3 12/04/23 0921   Calculated Wound Volume (cm^3) 0.75 cm^3 12/04/23 0921   Drainage Amount Moderate 12/04/23 0921   Drainage Description Serosanguineous; Tan 12/04/23 0921   Non-staged Wound Description Full thickness 12/04/23 0921   Dressing Status Intact 12/04/23 0921       Debridement   Wound 09/11/23 Diabetic Ulcer Foot Right;Plantar;Proximal    Universal Protocol:  Consent: Verbal consent obtained. Risks and benefits: risks, benefits and alternatives were discussed  Consent given by: patient  Time out: Immediately prior to procedure a "time out" was called to verify the correct patient, procedure, equipment, support staff and site/side marked as required.   Timeout called at: 12/4/2023 9:44 AM.  Patient understanding: patient states understanding of the procedure being performed  Patient identity confirmed: verbally with patient    Performed by: physician  Debridement type: surgical  Level of debridement: subcutaneous tissue  Pain control: lidocaine 4%  Post-debridement measurements  Length (cm): 1.2  Width (cm): 1.7  Depth (cm): 0.4  Percent debrided: 100%  Surface Area (cm^2): 2.04  Area debrided (cm^2): 2.04  Volume (cm^3): 0.82  Tissue and other material debrided: dermis, epidermis and subcutaneous tissue  Devitalized tissue debrided: fibrin and slough  Instrument(s) utilized: blade  Bleeding: small  Hemostasis obtained with: pressure  Procedural pain (0-10): 0  Post-procedural pain: 0   Response to treatment: procedure was tolerated well         Results from last 6 Months   Lab Units 08/09/23 0945   WOUND CULTURE  4+ Growth of       Lab Results   Component Value Date    HGBA1C 10.2 (H) 08/24/2023       Wound Instructions:  Orders Placed This Encounter   Procedures    Wound cleansing and [Follow-Up: _____] : a [unfilled] follow-up visit [FreeTextEntry1] : Guadalupe is here for a hospital follow up visit. She feels well.   Hospital Course:  Discharge Date 04-Jan-2024  Admission Date 29-Dec-2023 18:19  Reason for Admission dizziness, difficulty ambulating  Hospital Course   73 -year-old female with obesity, COPD, CVA '20 (R occipital lobe, on ASA 81mg  QD), HTN, HLD, anxiety, sciatica, presenting from home with vertigo, nausea,  difficulty ambulating. Symptoms started 12/28 however patient left AMA,  returned 12/29 for further evaluation, seen by NSx, Neuro, plan for eventual  discharge to [facility] given persistently symptomatic w/motion, per neuro unlikely  central etiology.    Vertigo.  - appreciate neuro recs  - vestibular rehab  - [facility]  - outpt f/u with neuro.    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  - MRA showing distal left internal carotid artery demonstrating a small focus  of abnormal flow-related signal which appears to involve the cavernous segment  and does project laterally, could be compatible with small aneurysm, measures  approximately 4.7 mm.  - d/w neurosx- patient can f/u outpatient    Vascular occlusion.  - Occlusion is seen right brachiocephalic artery as well as the right common  carotid and right internal carotid arteries. Occlusion of the right posterior  cerebral artery is seen as well.  c/w ASA, statin  - no indication for emergent intervention  - carotid duplex reviewed by Vascular surgery    Hyperlipidemia.  -c/w statin  lipid panel as above.   dressings     Bilateral foot wounds     May shower with protection only. Keep dressings clean,dry, and intact      Cleanse wounds with normal saline. Pat dry  Apply nickel thick santyl to all wounds. (one time application of silver gel used at wound care today)  DO NOT USE A FOAM DRESSING AS IT PULLS THE SANTYL AWAY FROM THE WOUND BED. Cover with ABD  Secure with tita wrap and tape  Change dressing daily         Patient is to be non-weight bearing at this time.  Patient may heel touch to Left foot for transfers and Physical Therapy      Patient to have 3-4 servings of protein daily     Standing Status:   Future     Standing Expiration Date:   12/4/2024    Debridement     This order was created via procedure documentation             Margaret Randhawa DPM

## 2024-02-20 ENCOUNTER — NON-APPOINTMENT (OUTPATIENT)
Age: 74
End: 2024-02-20

## 2024-02-21 NOTE — PROGRESS NOTE BEHAVIORAL HEALTH - REMOTE MEMORY
From: Diana Simpson  To: Doreen Edwards  Sent: 2/21/2024 1:20 PM CST  Subject: Medication     Hey I was wondering if I can get a return back to school paper without the medication on there the medication is to expensive for me to pay for    Normal

## 2024-03-01 ENCOUNTER — EMERGENCY (EMERGENCY)
Facility: HOSPITAL | Age: 74
LOS: 1 days | Discharge: HOME | End: 2024-03-01
Attending: EMERGENCY MEDICINE
Payer: MEDICARE

## 2024-03-01 VITALS
TEMPERATURE: 98 F | WEIGHT: 179.9 LBS | DIASTOLIC BLOOD PRESSURE: 66 MMHG | RESPIRATION RATE: 18 BRPM | HEART RATE: 71 BPM | SYSTOLIC BLOOD PRESSURE: 173 MMHG | HEIGHT: 64 IN | OXYGEN SATURATION: 94 %

## 2024-03-01 VITALS
DIASTOLIC BLOOD PRESSURE: 75 MMHG | OXYGEN SATURATION: 95 % | RESPIRATION RATE: 16 BRPM | SYSTOLIC BLOOD PRESSURE: 115 MMHG | TEMPERATURE: 98 F | HEART RATE: 78 BPM

## 2024-03-01 DIAGNOSIS — Z96.659 PRESENCE OF UNSPECIFIED ARTIFICIAL KNEE JOINT: Chronic | ICD-10-CM

## 2024-03-01 DIAGNOSIS — Z96.651 PRESENCE OF RIGHT ARTIFICIAL KNEE JOINT: Chronic | ICD-10-CM

## 2024-03-01 DIAGNOSIS — Z96.619 PRESENCE OF UNSPECIFIED ARTIFICIAL SHOULDER JOINT: Chronic | ICD-10-CM

## 2024-03-01 LAB
ALBUMIN SERPL ELPH-MCNC: 4 G/DL — SIGNIFICANT CHANGE UP (ref 3.3–5)
ALP SERPL-CCNC: 91 U/L — SIGNIFICANT CHANGE UP (ref 40–120)
ALT FLD-CCNC: 12 U/L — SIGNIFICANT CHANGE UP (ref 10–45)
ANION GAP SERPL CALC-SCNC: 13 MMOL/L — SIGNIFICANT CHANGE UP (ref 5–17)
APPEARANCE UR: ABNORMAL
APTT BLD: 28 SEC — SIGNIFICANT CHANGE UP (ref 24.5–35.6)
AST SERPL-CCNC: 20 U/L — SIGNIFICANT CHANGE UP (ref 10–40)
BACTERIA # UR AUTO: ABNORMAL /HPF
BASOPHILS # BLD AUTO: 0.04 K/UL — SIGNIFICANT CHANGE UP (ref 0–0.2)
BASOPHILS NFR BLD AUTO: 0.5 % — SIGNIFICANT CHANGE UP (ref 0–2)
BILIRUB SERPL-MCNC: 0.3 MG/DL — SIGNIFICANT CHANGE UP (ref 0.2–1.2)
BILIRUB UR-MCNC: NEGATIVE — SIGNIFICANT CHANGE UP
BUN SERPL-MCNC: 18 MG/DL — SIGNIFICANT CHANGE UP (ref 7–23)
CALCIUM SERPL-MCNC: 9.4 MG/DL — SIGNIFICANT CHANGE UP (ref 8.4–10.5)
CAST: 4 /LPF — SIGNIFICANT CHANGE UP (ref 0–4)
CHLORIDE SERPL-SCNC: 105 MMOL/L — SIGNIFICANT CHANGE UP (ref 96–108)
CO2 SERPL-SCNC: 23 MMOL/L — SIGNIFICANT CHANGE UP (ref 22–31)
COLOR SPEC: YELLOW — SIGNIFICANT CHANGE UP
CREAT SERPL-MCNC: 0.89 MG/DL — SIGNIFICANT CHANGE UP (ref 0.5–1.3)
DIFF PNL FLD: ABNORMAL
EGFR: 68 ML/MIN/1.73M2 — SIGNIFICANT CHANGE UP
EOSINOPHIL # BLD AUTO: 0.12 K/UL — SIGNIFICANT CHANGE UP (ref 0–0.5)
EOSINOPHIL NFR BLD AUTO: 1.5 % — SIGNIFICANT CHANGE UP (ref 0–6)
GLUCOSE SERPL-MCNC: 83 MG/DL — SIGNIFICANT CHANGE UP (ref 70–99)
GLUCOSE UR QL: NEGATIVE MG/DL — SIGNIFICANT CHANGE UP
HCT VFR BLD CALC: 38.7 % — SIGNIFICANT CHANGE UP (ref 34.5–45)
HGB BLD-MCNC: 12.1 G/DL — SIGNIFICANT CHANGE UP (ref 11.5–15.5)
IMM GRANULOCYTES NFR BLD AUTO: 0.5 % — SIGNIFICANT CHANGE UP (ref 0–0.9)
INR BLD: 1.07 RATIO — SIGNIFICANT CHANGE UP (ref 0.85–1.18)
KETONES UR-MCNC: ABNORMAL MG/DL
LEUKOCYTE ESTERASE UR-ACNC: ABNORMAL
LYMPHOCYTES # BLD AUTO: 2.14 K/UL — SIGNIFICANT CHANGE UP (ref 1–3.3)
LYMPHOCYTES # BLD AUTO: 26.9 % — SIGNIFICANT CHANGE UP (ref 13–44)
MCHC RBC-ENTMCNC: 27.9 PG — SIGNIFICANT CHANGE UP (ref 27–34)
MCHC RBC-ENTMCNC: 31.3 GM/DL — LOW (ref 32–36)
MCV RBC AUTO: 89.4 FL — SIGNIFICANT CHANGE UP (ref 80–100)
MONOCYTES # BLD AUTO: 0.53 K/UL — SIGNIFICANT CHANGE UP (ref 0–0.9)
MONOCYTES NFR BLD AUTO: 6.7 % — SIGNIFICANT CHANGE UP (ref 2–14)
NEUTROPHILS # BLD AUTO: 5.08 K/UL — SIGNIFICANT CHANGE UP (ref 1.8–7.4)
NEUTROPHILS NFR BLD AUTO: 63.9 % — SIGNIFICANT CHANGE UP (ref 43–77)
NITRITE UR-MCNC: POSITIVE
NRBC # BLD: 0 /100 WBCS — SIGNIFICANT CHANGE UP (ref 0–0)
PH UR: 5.5 — SIGNIFICANT CHANGE UP (ref 5–8)
PLATELET # BLD AUTO: 208 K/UL — SIGNIFICANT CHANGE UP (ref 150–400)
POTASSIUM SERPL-MCNC: 4.7 MMOL/L — SIGNIFICANT CHANGE UP (ref 3.5–5.3)
POTASSIUM SERPL-SCNC: 4.7 MMOL/L — SIGNIFICANT CHANGE UP (ref 3.5–5.3)
PROT SERPL-MCNC: 7.5 G/DL — SIGNIFICANT CHANGE UP (ref 6–8.3)
PROT UR-MCNC: SIGNIFICANT CHANGE UP MG/DL
PROTHROM AB SERPL-ACNC: 11.8 SEC — SIGNIFICANT CHANGE UP (ref 9.5–13)
RBC # BLD: 4.33 M/UL — SIGNIFICANT CHANGE UP (ref 3.8–5.2)
RBC # FLD: 14.6 % — HIGH (ref 10.3–14.5)
RBC CASTS # UR COMP ASSIST: 3 /HPF — SIGNIFICANT CHANGE UP (ref 0–4)
REVIEW: SIGNIFICANT CHANGE UP
SODIUM SERPL-SCNC: 141 MMOL/L — SIGNIFICANT CHANGE UP (ref 135–145)
SP GR SPEC: >1.03 — HIGH (ref 1–1.03)
SQUAMOUS # UR AUTO: >36 /HPF — HIGH (ref 0–5)
TROPONIN T, HIGH SENSITIVITY RESULT: 18 NG/L — SIGNIFICANT CHANGE UP (ref 0–51)
TROPONIN T, HIGH SENSITIVITY RESULT: 18 NG/L — SIGNIFICANT CHANGE UP (ref 0–51)
UROBILINOGEN FLD QL: 1 MG/DL — SIGNIFICANT CHANGE UP (ref 0.2–1)
WBC # BLD: 7.95 K/UL — SIGNIFICANT CHANGE UP (ref 3.8–10.5)
WBC # FLD AUTO: 7.95 K/UL — SIGNIFICANT CHANGE UP (ref 3.8–10.5)
WBC UR QL: 256 /HPF — HIGH (ref 0–5)

## 2024-03-01 PROCEDURE — 70498 CT ANGIOGRAPHY NECK: CPT | Mod: 26,MC

## 2024-03-01 PROCEDURE — 99285 EMERGENCY DEPT VISIT HI MDM: CPT

## 2024-03-01 PROCEDURE — 70496 CT ANGIOGRAPHY HEAD: CPT | Mod: 26,MC

## 2024-03-01 PROCEDURE — 70450 CT HEAD/BRAIN W/O DYE: CPT | Mod: 26,MC,XU

## 2024-03-01 RX ORDER — METOCLOPRAMIDE HCL 10 MG
10 TABLET ORAL ONCE
Refills: 0 | Status: COMPLETED | OUTPATIENT
Start: 2024-03-01 | End: 2024-03-01

## 2024-03-01 RX ORDER — CEFTRIAXONE 500 MG/1
1000 INJECTION, POWDER, FOR SOLUTION INTRAMUSCULAR; INTRAVENOUS ONCE
Refills: 0 | Status: COMPLETED | OUTPATIENT
Start: 2024-03-01 | End: 2024-03-01

## 2024-03-01 RX ORDER — ACETAMINOPHEN 500 MG
1000 TABLET ORAL ONCE
Refills: 0 | Status: COMPLETED | OUTPATIENT
Start: 2024-03-01 | End: 2024-03-01

## 2024-03-01 RX ORDER — ACETAMINOPHEN 500 MG
650 TABLET ORAL ONCE
Refills: 0 | Status: COMPLETED | OUTPATIENT
Start: 2024-03-01 | End: 2024-03-01

## 2024-03-01 RX ORDER — CEPHALEXIN 500 MG
1 CAPSULE ORAL
Qty: 21 | Refills: 0
Start: 2024-03-01 | End: 2024-03-07

## 2024-03-01 RX ADMIN — Medication 650 MILLIGRAM(S): at 19:52

## 2024-03-01 RX ADMIN — Medication 10 MILLIGRAM(S): at 15:36

## 2024-03-01 RX ADMIN — CEFTRIAXONE 1000 MILLIGRAM(S): 500 INJECTION, POWDER, FOR SOLUTION INTRAMUSCULAR; INTRAVENOUS at 21:58

## 2024-03-01 RX ADMIN — Medication 1000 MILLIGRAM(S): at 21:58

## 2024-03-01 RX ADMIN — Medication 400 MILLIGRAM(S): at 15:35

## 2024-03-01 RX ADMIN — Medication 1000 MILLIGRAM(S): at 21:57

## 2024-03-01 RX ADMIN — Medication 650 MILLIGRAM(S): at 21:57

## 2024-03-01 RX ADMIN — CEFTRIAXONE 100 MILLIGRAM(S): 500 INJECTION, POWDER, FOR SOLUTION INTRAMUSCULAR; INTRAVENOUS at 18:52

## 2024-03-01 NOTE — ED PROVIDER NOTE - NS ED ROS FT
CONSTITUTIONAL: No fevers, no chills, + lightheadedness, + dizziness  EYES: no visual changes, no eye pain  NOSE: no nasal congestion  MOUTH/THROAT: no sore throat  CV: No chest pain, no palpitations  RESP: No SOB, no cough  GI: + nausea, No v/d, no abd pain  : no dysuria, no hematuria, no flank pain  MSK: no back pain, no extremity pain  SKIN: no rashes  NEURO: + headache, no focal weakness, no decreased sensation/parasthesias   PSYCHIATRIC: no known mental health issues

## 2024-03-01 NOTE — ED ADULT NURSE NOTE - NSFALLHARMRISKINTERV_ED_ALL_ED
Assistance OOB with selected safe patient handling equipment if applicable/Assistance with ambulation/Communicate risk of Fall with Harm to all staff, patient, and family/Encourage patient to sit up slowly, dangle for a short time, stand at bedside before walking/Monitor gait and stability/Orthostatic vital signs/Provide patient with walking aids/Provide visual cue: red socks, yellow wristband, yellow gown, etc/Reinforce activity limits and safety measures with patient and family/Bed in lowest position, wheels locked, appropriate side rails in place/Call bell, personal items and telephone in reach/Instruct patient to call for assistance before getting out of bed/chair/stretcher/Non-slip footwear applied when patient is off stretcher/[place] to call system/Physically safe environment - no spills, clutter or unnecessary equipment/Purposeful Proactive Rounding/Room/bathroom lighting operational, light cord in reach

## 2024-03-01 NOTE — ED ADULT NURSE NOTE - OBJECTIVE STATEMENT
Female 73 years old with history of COPD and HLD, brought in by EMS for headache and dizziness. Pt reports symptoms started 2 days ago associated with nausea. Denies fever, chills, chest pain, vomiting or urinary symptoms. States she was so dizzy that she fainted and fell 2 days ago. With occasional cough. Reports nausea, no vomiting. States her legs are weak, for a while and she goes for physical therapy. Safety and comfort maintained. Call bell within easy reach. Will continue to monitor.

## 2024-03-01 NOTE — ED PROVIDER NOTE - PROGRESS NOTE DETAILS
Jewels Ford,  (PGY-1): UA + infection. Informed patient of results. will give a dose of ceftriaxone. Patient states she does not want to be admitted, would rather go home. CTs reveals severe atherosclerosis and chronic changes. Will give abx and assess ambulatory status prior to dispo.

## 2024-03-01 NOTE — ED PROVIDER NOTE - OBJECTIVE STATEMENT
73F with no significant PMHx, takes ASA 81mg daily, presents to the ED complaining of headache, nausea, and lightheadedness and feeling off balanced x 2 days. She reports she fell 2 days ago due to balance issues and unable to catch herself. No injuries or head trauma. No LOC. She was able to stand afterwards. She lives at home alone, with visiting aides 5 days per week for 10hrs/day. She ambulates with a walker. She states she was evaluated 2 months ago for dizziness with falls and was found to have carotid occlusion. She is now being followed by neuro, Dr. Bravo. She called the office today and was told to go to the ED. Denies chest pain, sob, fever, chills, abdominal pain, vomiting, urinary symptoms. She states her current symptoms feel similar to prior migraines.

## 2024-03-01 NOTE — ED PROVIDER NOTE - PATIENT PORTAL LINK FT
You can access the FollowMyHealth Patient Portal offered by [facility] by registering at the following website: [email or web address]. By joining [employer]’s FollowMyHealth portal, you will also be able to view your health information using other applications (apps) compatible with our system.

## 2024-03-01 NOTE — ED PROVIDER NOTE - CLINICAL SUMMARY MEDICAL DECISION MAKING FREE TEXT BOX
Bhumi: Patient is 73-year-old who presents after a fall 2 days ago.  Since that time she has had significant head pain and a general feeling of "blurriness ".  Patient also with a history of migraines.  Patient is a little nauseous.  Patient moving all 4 and able to walk.  Will scan head for trauma and treat pain. 73 year old female with no significant pmhx presents to the ED for 2 days of headache, dizziness, nausea, and decreased appetite. Fall 2 days ago with no head injury or trauma. Followed by neuro for carotid occlusion. differential includes migraine headache, carotid insufficiency, intracranial hemorrhage, arrhythmia, ACS, UTI. Will evaluate with cbc, cmp, trop, ecg, ct/cta head and neck, urinalysis. Reglan and Tylenol and reassess.   Bhumi: Patient is 73-year-old who presents after a fall 2 days ago.  Since that time she has had significant head pain and a general feeling of "blurriness ".  Patient also with a history of migraines.  Patient is a little nauseous.  Patient moving all 4 and able to walk.  Will scan head for trauma and treat pain.

## 2024-03-01 NOTE — ED PROVIDER NOTE - NSFOLLOWUPINSTRUCTIONS_ED_ALL_ED_FT
YOU WERE SEEN IN THE ED FOR: Urinary tract infection     YOU WERE PRESCRIBED: Keflex   FOLLOW THE INSTRUCTIONS ON THE LABEL/CONTAINER    FOR PAIN/FEVER, YOU MAY TAKE TYLENOL (acetaminophen) AND/OR IBUPROFEN (advil or motrin). FOLLOW THE INSTRUCTIONS ON THE LABEL/CONTAINER.    PLEASE FOLLOW UP WITH YOUR PRIVATE PHYSICIAN WITHIN THE NEXT 48 HOURS. BRING COPIES OF YOUR RESULTS.    RETURN TO THE EMERGENCY DEPARTMENT IF YOU EXPERIENCE ANY NEW/CONCERNING/WORSENING SYMPTOMS SUCH AS BUT NOT LIMITED TO: severe abdominal pain, back pain, blood in your urine, inability to urinate, fever, chills, vomiting, increasing falls or difficulty walking/balancing, headache, dizziness, or any other concerns. YOU WERE SEEN IN THE ED FOR: Urinary tract infection. Please take your antibiotics as prescribed. Your light headedness was evaluated with imaging and did not show any signs of worsening/critical disease in your carotids. Your CT scan of the head was negative.     YOU WERE PRESCRIBED: Keflex   FOLLOW THE INSTRUCTIONS ON THE LABEL/CONTAINER    FOR PAIN/FEVER, YOU MAY TAKE TYLENOL (acetaminophen) AND/OR IBUPROFEN (advil or motrin). FOLLOW THE INSTRUCTIONS ON THE LABEL/CONTAINER.    PLEASE FOLLOW UP WITH YOUR PRIVATE PHYSICIAN WITHIN THE NEXT 48 HOURS. BRING COPIES OF YOUR RESULTS.    RETURN TO THE EMERGENCY DEPARTMENT IF YOU EXPERIENCE ANY NEW/CONCERNING/WORSENING SYMPTOMS SUCH AS BUT NOT LIMITED TO: severe abdominal pain, back pain, blood in your urine, inability to urinate, fever, chills, vomiting, increasing falls or difficulty walking/balancing, headache, dizziness, or any other concerns.    If you have persistent dizziness and falls please seek urgent medical care.

## 2024-03-01 NOTE — ED PROVIDER NOTE - CARE PROVIDER_API CALL
Gil Feliciano  Internal Medicine  [address] [place] [id number]  Phone: [phone]  Fax: [phone]  Established Patient  Follow Up Time: 1-3 Days

## 2024-03-01 NOTE — ED PROVIDER NOTE - PHYSICAL EXAMINATION
GEN: NAD, awake, eyes open spontaneously  EYES: normal conjunctiva, perrl  ENT: NCAT, MMM, Trachea midline  CHEST/LUNGS: Non-tachypneic, CTAB, bilateral breath sounds  CARDIAC: Non-tachycardic, normal perfusion  ABDOMEN: Soft, NTND, No rebound/guarding  MSK: minimal bilateral LE edema, no gross deformity of extremities  SKIN: No rashes, no petechiae, no vesicles  NEURO: CN grossly intact, normal coordination, no focal motor or sensory deficits  PSYCH: Alert, appropriate, cooperative, with capacity and insight

## 2024-03-01 NOTE — ED ADULT NURSE NOTE - NS ED NURSE RECORD ANOTHER VITAL SIGN
Daily Note     Today's date: 2023  Patient name: Jb Fay  : 1953  MRN: [id number]  Referring provider: Ladan Cadena DO  Dx:   Encounter Diagnosis     ICD-10-CM    1. Rotator cuff tear arthropathy, right  M75.101     M12.811       2. Chronic right shoulder pain  M25.511     G89.29           Start Time: 1014          Subjective: Patient reports continued improvement with R shoulder strength & mobility. Objective: See treatment diary below      Assessment: Tolerated treatment well. Patient demonstrated fatigue post treatment, exhibited good technique with therapeutic exercises and would benefit from continued PT      Plan: Progress treatment as tolerated.        Precautions:   Past Medical History:   Diagnosis Date   • Abnormal inflammatory bowel disease panel     last assessed: 14   • Asthma    • Chest wall contusion     last assessed: 17   • Dermatomycosis 2011   • Hordeolum internum of left eye     last assessed: 13   • Hx of oral aphthous ulcers 2011   • Hypercalcemia 2011   • Hyperlipidemia    • Hypertension    • Sciatica 2008   • Ulceration of intestine     last assessed: 14         Date: 7/3/23 2023 07/10/2023 7/13 7/20   Visit # 6 7 8 RE 9 11   Manuals 5'       PROM R shld    ----    GH mobs LS post, inf R shld Post, inf R shld  Post, inf R shld Post, inf R shld   IASTM  Ant shld-LS STM to posterior capsule  STM to posterior capsule STM to posterior capsule           Neuro Re-Ed 15'       Scap retractions 10x5" hold 10x5" hold  --- ----   Shoulder ER seated 2x10  2x10 no resistance  2x10 with YTB 2x10 no resistance  2x10 with YTB YTB 2 x 10 reps  10x 5 sec hold YTB   Banded rows RTB 2x10 RTB 2x10 RTB 2x10 2x10 reps RTB RTB x 20 reps    Banded shld extension RTB 2x10 RTB 2x10 RTB 2x10 2x10 reps RTB RTB x 20 reps    Scaption     ---   S/L shld abd     ---    S/L R Shld ER 2x10 w/ cues t/l for scapular stabilization 2x10 w/ cues t/l for scapular stabilization 2x10 w/ cues t/l for scapular stabilization 2x10 w/ cues t/l for scapular stabilization S/L R shoulder ER 2 x 10 reps (cues for form)   Standing shld resisted IR/ER  YTB 2x10 each YTB 2x10 each 2x10 reps YTB 2x10 reps YTB        Wall walks YTB 3 x 10 reps  R/L                   Standing cane IR/ext     #2 10x   Ther Ex 15'       FIS     Pulleys x 20 reps    Thoracic extension in chair     10x 5 sec hold (arms crossed)   Wall slides flexion 10x5" hold 10x 5" hold w/ pillow case 10x5" hold 10x 5" hold w/ pillow case 10x5" hold 10x 5" hold w/ pillow case 10x5" hold 10x 5" hold w/ pillow case -----   AAROM shoulder flexion in supine W/ cane 2x10; then 5xAROM w/ cane 2x10; then 5xAROM w/ cane 2x10; then 5xAROM w/ cane 2x10; then 5xAROM ------   Scaption   With ball 20x 20x w/ ball ----   Prone shoulder extension     3# - 20x each - rows , ext   Cervical retraction 2x10 to start    ----   Shoulder abd in std 2x10    ----   Pt education Reviewed scapular positioning, avoiding excessive irritation of shoulder, and gradually increasing activity. HEP                      Ther Activity                        Gait Training                        Modalities                        Access Code: Brett Lopez  URL: [email or web address]  Date: 06/14/2023  Prepared by: Kem Anton    Exercises  - Seated Scapular Retraction  - 1 x daily - 7 x weekly - 2 sets - 10 reps  - Shoulder External Rotation and Scapular Retraction  - 1 x daily - 7 x weekly - 2 sets - 10 reps  - Sidelying Shoulder External Rotation  - 1 x daily - 7 x weekly - 2 sets - 10 reps  - Shoulder Flexion Wall Slide with Towel  - 1 x daily - 7 x weekly - 1 sets - 10 reps Yes

## 2024-03-04 LAB
-  AMOXICILLIN/CLAVULANIC ACID: SIGNIFICANT CHANGE UP
-  AMPICILLIN/SULBACTAM: SIGNIFICANT CHANGE UP
-  AMPICILLIN: SIGNIFICANT CHANGE UP
-  AZTREONAM: SIGNIFICANT CHANGE UP
-  CEFAZOLIN: SIGNIFICANT CHANGE UP
-  CEFEPIME: SIGNIFICANT CHANGE UP
-  CEFOXITIN: SIGNIFICANT CHANGE UP
-  CEFTRIAXONE: SIGNIFICANT CHANGE UP
-  CEFUROXIME: SIGNIFICANT CHANGE UP
-  CIPROFLOXACIN: SIGNIFICANT CHANGE UP
-  ERTAPENEM: SIGNIFICANT CHANGE UP
-  GENTAMICIN: SIGNIFICANT CHANGE UP
-  IMIPENEM: SIGNIFICANT CHANGE UP
-  LEVOFLOXACIN: SIGNIFICANT CHANGE UP
-  MEROPENEM: SIGNIFICANT CHANGE UP
-  NITROFURANTOIN: SIGNIFICANT CHANGE UP
-  PIPERACILLIN/TAZOBACTAM: SIGNIFICANT CHANGE UP
-  TOBRAMYCIN: SIGNIFICANT CHANGE UP
-  TRIMETHOPRIM/SULFAMETHOXAZOLE: SIGNIFICANT CHANGE UP
CULTURE RESULTS: ABNORMAL
METHOD TYPE: SIGNIFICANT CHANGE UP
ORGANISM # SPEC MICROSCOPIC CNT: ABNORMAL
ORGANISM # SPEC MICROSCOPIC CNT: ABNORMAL
SPECIMEN SOURCE: SIGNIFICANT CHANGE UP

## 2024-03-11 PROCEDURE — 96366 THER/PROPH/DIAG IV INF ADDON: CPT

## 2024-03-11 PROCEDURE — 81001 URINALYSIS AUTO W/SCOPE: CPT

## 2024-03-11 PROCEDURE — 96375 TX/PRO/DX INJ NEW DRUG ADDON: CPT | Mod: XU

## 2024-03-11 PROCEDURE — 87186 SC STD MICRODIL/AGAR DIL: CPT

## 2024-03-11 PROCEDURE — 96368 THER/DIAG CONCURRENT INF: CPT

## 2024-03-11 PROCEDURE — 96365 THER/PROPH/DIAG IV INF INIT: CPT | Mod: XU

## 2024-03-11 PROCEDURE — 70450 CT HEAD/BRAIN W/O DYE: CPT | Mod: MC

## 2024-03-11 PROCEDURE — 70498 CT ANGIOGRAPHY NECK: CPT | Mod: MC

## 2024-03-11 PROCEDURE — 84484 ASSAY OF TROPONIN QUANT: CPT

## 2024-03-11 PROCEDURE — 70496 CT ANGIOGRAPHY HEAD: CPT | Mod: MC

## 2024-03-11 PROCEDURE — 85730 THROMBOPLASTIN TIME PARTIAL: CPT

## 2024-03-11 PROCEDURE — 99284 EMERGENCY DEPT VISIT MOD MDM: CPT | Mod: 25

## 2024-03-11 PROCEDURE — 80053 COMPREHEN METABOLIC PANEL: CPT

## 2024-03-11 PROCEDURE — 85025 COMPLETE CBC W/AUTO DIFF WBC: CPT

## 2024-03-11 PROCEDURE — 87086 URINE CULTURE/COLONY COUNT: CPT

## 2024-03-11 PROCEDURE — 85610 PROTHROMBIN TIME: CPT

## 2024-03-14 ENCOUNTER — NON-APPOINTMENT (OUTPATIENT)
Age: 74
End: 2024-03-14

## 2024-03-14 ENCOUNTER — APPOINTMENT (OUTPATIENT)
Dept: NEUROSURGERY | Facility: CLINIC | Age: 74
End: 2024-03-14
Payer: MEDICARE

## 2024-03-14 VITALS
OXYGEN SATURATION: 95 % | SYSTOLIC BLOOD PRESSURE: 96 MMHG | BODY MASS INDEX: 25.61 KG/M2 | HEART RATE: 81 BPM | HEIGHT: 64 IN | DIASTOLIC BLOOD PRESSURE: 70 MMHG | WEIGHT: 150 LBS

## 2024-03-14 DIAGNOSIS — I65.29 OCCLUSION AND STENOSIS OF UNSPECIFIED CAROTID ARTERY: ICD-10-CM

## 2024-03-14 PROCEDURE — 99212 OFFICE O/P EST SF 10 MIN: CPT

## 2024-03-14 RX ORDER — CYCLOBENZAPRINE HYDROCHLORIDE 10 MG/1
10 TABLET, FILM COATED ORAL
Refills: 0 | Status: ACTIVE | COMMUNITY

## 2024-03-14 RX ORDER — SERTRALINE HYDROCHLORIDE 50 MG/1
50 TABLET, FILM COATED ORAL
Refills: 0 | Status: ACTIVE | COMMUNITY

## 2024-03-14 NOTE — REVIEW OF SYSTEMS
[As Noted in HPI] : as noted in HPI [Dizziness] : dizziness [Vertigo] : vertigo [Migraine Headache] : migraine headaches [Negative] : Heme/Lymph

## 2024-03-15 NOTE — ASSESSMENT
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

## 2024-03-15 NOTE — REASON FOR VISIT
[Follow-Up: _____] : a [unfilled] follow-up visit [Formal Caregiver] : formal caregiver [FreeTextEntry1] : Guadalupe is here for a hospital follow up visit. Chief complaint of migraine and sensitivity to light and noise.   Dr. Gil Jacobs PCP

## 2024-03-15 NOTE — PHYSICAL EXAM
[General Appearance - In No Acute Distress] : in no acute distress [General Appearance - Alert] : alert [Person] : oriented to person [Place] : oriented to place [Time] : oriented to time [Motor Strength] : muscle strength was normal in all four extremities

## 2024-05-06 RX ORDER — CLOPIDOGREL BISULFATE 75 MG/1
75 TABLET, FILM COATED ORAL
Qty: 30 | Refills: 11 | Status: ACTIVE | COMMUNITY
Start: 2024-03-14 | End: 1900-01-01

## 2024-07-04 NOTE — CONSULT NOTE ADULT - PROBLEM SELECTOR PROBLEM 1
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 No AC/AP, coags/plt wnl Now s/p R crani for SDH evacuation. Afib RVR overnight s/p Dilt IV, now in SR.      #Paroxysmal atrial fibrillation  -sp iv dilt with conversion to SR   -Continue with amiodarone per eps   -Continue po metoprolol per eps   -Echo nml lv fxn, no wma, mild MR   -remains off a/c at this time given acute SDH    #Acute subdural hematoma   -s/p R crani for SDH evacuation  -post op mgmt per neuro    #DM  -Mgmt per med    #DVT  -Acute b/l LE dvts  -Off a/c at this time given SDH  -Cont dvt ppx  -Appreciate vasc cards recs     35 minutes spent on total encounter; more than 50% of the visit was spent counseling and/or coordinating care by the attending physician.

## 2024-07-25 ENCOUNTER — OUTPATIENT (OUTPATIENT)
Dept: OUTPATIENT SERVICES | Facility: HOSPITAL | Age: 74
LOS: 1 days | Discharge: HOME | End: 2024-07-25

## 2024-07-25 DIAGNOSIS — I82.90 ACUTE EMBOLISM AND THROMBOSIS OF UNSPECIFIED VEIN: ICD-10-CM

## 2024-07-25 DIAGNOSIS — Z96.659 PRESENCE OF UNSPECIFIED ARTIFICIAL KNEE JOINT: Chronic | ICD-10-CM

## 2024-07-25 DIAGNOSIS — Z00.00 ENCOUNTER FOR GENERAL ADULT MEDICAL EXAMINATION W/OUT ABNORMAL FINDINGS: ICD-10-CM

## 2024-07-25 DIAGNOSIS — Z96.619 PRESENCE OF UNSPECIFIED ARTIFICIAL SHOULDER JOINT: Chronic | ICD-10-CM

## 2024-07-25 DIAGNOSIS — Z96.651 PRESENCE OF RIGHT ARTIFICIAL KNEE JOINT: Chronic | ICD-10-CM

## 2024-07-30 ENCOUNTER — APPOINTMENT (OUTPATIENT)
Dept: HEMATOLOGY ONCOLOGY | Facility: CLINIC | Age: 74
End: 2024-07-30
Payer: MEDICARE

## 2024-07-30 ENCOUNTER — RESULT REVIEW (OUTPATIENT)
Age: 74
End: 2024-07-30

## 2024-07-30 VITALS
SYSTOLIC BLOOD PRESSURE: 127 MMHG | HEART RATE: 78 BPM | BODY MASS INDEX: 36.85 KG/M2 | DIASTOLIC BLOOD PRESSURE: 82 MMHG | RESPIRATION RATE: 18 BRPM | HEIGHT: 61.57 IN | TEMPERATURE: 98.4 F | OXYGEN SATURATION: 99 % | WEIGHT: 197.73 LBS

## 2024-07-30 DIAGNOSIS — H34.8122 CENTRAL RETINAL VEIN OCCLUSION, LEFT EYE, STABLE: ICD-10-CM

## 2024-07-30 DIAGNOSIS — Z78.9 OTHER SPECIFIED HEALTH STATUS: ICD-10-CM

## 2024-07-30 LAB
ALBUMIN SERPL ELPH-MCNC: 4.5 G/DL
ALP BLD-CCNC: 90 U/L
ALT SERPL-CCNC: 12 U/L
ANION GAP SERPL CALC-SCNC: 13 MMOL/L
AST SERPL-CCNC: 17 U/L
BASOPHILS # BLD AUTO: 0.06 K/UL — SIGNIFICANT CHANGE UP (ref 0–0.2)
BASOPHILS NFR BLD AUTO: 0.7 % — SIGNIFICANT CHANGE UP (ref 0–2)
BILIRUB SERPL-MCNC: 0.3 MG/DL
BUN SERPL-MCNC: 28 MG/DL
CALCIUM SERPL-MCNC: 9.6 MG/DL
CHLORIDE SERPL-SCNC: 109 MMOL/L
CO2 SERPL-SCNC: 23 MMOL/L
CREAT SERPL-MCNC: 1.13 MG/DL
EGFR: 51 ML/MIN/1.73M2
EOSINOPHIL # BLD AUTO: 0.16 K/UL — SIGNIFICANT CHANGE UP (ref 0–0.5)
EOSINOPHIL NFR BLD AUTO: 1.9 % — SIGNIFICANT CHANGE UP (ref 0–6)
GLUCOSE SERPL-MCNC: 103 MG/DL
HCT VFR BLD CALC: 40.7 % — SIGNIFICANT CHANGE UP (ref 34.5–45)
HGB BLD-MCNC: 13.1 G/DL — SIGNIFICANT CHANGE UP (ref 11.5–15.5)
IMM GRANULOCYTES NFR BLD AUTO: 0.5 % — SIGNIFICANT CHANGE UP (ref 0–0.9)
LDH SERPL-CCNC: 149 U/L
LYMPHOCYTES # BLD AUTO: 2.13 K/UL — SIGNIFICANT CHANGE UP (ref 1–3.3)
LYMPHOCYTES # BLD AUTO: 25.2 % — SIGNIFICANT CHANGE UP (ref 13–44)
MCHC RBC-ENTMCNC: 29.2 PG — SIGNIFICANT CHANGE UP (ref 27–34)
MCHC RBC-ENTMCNC: 32.2 G/DL — SIGNIFICANT CHANGE UP (ref 32–36)
MCV RBC AUTO: 90.6 FL — SIGNIFICANT CHANGE UP (ref 80–100)
MONOCYTES # BLD AUTO: 0.64 K/UL — SIGNIFICANT CHANGE UP (ref 0–0.9)
MONOCYTES NFR BLD AUTO: 7.6 % — SIGNIFICANT CHANGE UP (ref 2–14)
NEUTROPHILS # BLD AUTO: 5.43 K/UL — SIGNIFICANT CHANGE UP (ref 1.8–7.4)
NEUTROPHILS NFR BLD AUTO: 64.1 % — SIGNIFICANT CHANGE UP (ref 43–77)
NRBC # BLD: 0 /100 WBCS — SIGNIFICANT CHANGE UP (ref 0–0)
PLATELET # BLD AUTO: 241 K/UL — SIGNIFICANT CHANGE UP (ref 150–400)
POTASSIUM SERPL-SCNC: 5.1 MMOL/L
PROT SERPL-MCNC: 7.4 G/DL
RBC # BLD: 4.49 M/UL — SIGNIFICANT CHANGE UP (ref 3.8–5.2)
RBC # FLD: 15.1 % — HIGH (ref 10.3–14.5)
SODIUM SERPL-SCNC: 145 MMOL/L
WBC # BLD: 8.46 K/UL — SIGNIFICANT CHANGE UP (ref 3.8–10.5)
WBC # FLD AUTO: 8.46 K/UL — SIGNIFICANT CHANGE UP (ref 3.8–10.5)

## 2024-07-30 PROCEDURE — 99205 OFFICE O/P NEW HI 60 MIN: CPT

## 2024-07-30 PROCEDURE — G2211 COMPLEX E/M VISIT ADD ON: CPT

## 2024-07-31 LAB
ALBUPE: 27.3 %
ALPHA1UPE: 24.9 %
ALPHA2UPE: 16.6 %
APTT BLD: 28.2 SEC
B2 GLYCOPROT1 IGA SERPL IA-ACNC: <2 U/ML
B2 GLYCOPROT1 IGG SER-ACNC: <1.4 U/ML
B2 GLYCOPROT1 IGM SER-ACNC: 2.8 U/ML
BETAUPE: 14.6 %
CARDIOLIPIN IGM SER-MCNC: 3.4 MPL U/ML
CARDIOLIPIN IGM SER-MCNC: <1.6 GPL U/ML
CONFIRM: 28 SEC
DEPRECATED CARDIOLIPIN IGA SER: <2 APL U/ML
DRVVT IMM 1:2 NP PPP: NORMAL
DRVVT SCREEN TO CONFIRM RATIO: 1 RATIO
GAMMAUPE: 16.6 %
IGA 24H UR QL IFE: NORMAL
INR PPP: 0.94 RATIO
KAPPA LC 24H UR QL: NORMAL
PROT PATTERN 24H UR ELPH-IMP: NORMAL
PROT UR-MCNC: 20 MG/DL
PROT UR-MCNC: 20 MG/DL
PT BLD: 10.6 SEC
SCREEN DRVVT: 33 SEC
SILICA CLOTTING TIME INTERPRETATION: NORMAL
SILICA CLOTTING TIME S/C: 1.11 RATIO

## 2024-08-01 PROBLEM — Z78.9 DRINKS WINE: Status: ACTIVE | Noted: 2024-08-01

## 2024-08-01 NOTE — REASON FOR VISIT
[Initial Consultation] : an initial consultation for [Formal Caregiver] : formal caregiver [Coagulopathy] : coagulopathy

## 2024-08-01 NOTE — ADDENDUM
[FreeTextEntry1] : , I Elie Bronson, acted solely as a scribe for Dr. Casey Coon on 7/30/2024. All medical entries made by the Scribe were at my, Dr. Casey Coon's, direction and personally dictated by me on 7/30/2024. I have reviewed the chart and agree that the record accurately reflects my personal performance of the history, physical exam, assessment and plan. I have also personally directed, reviewed, and agreed with the chart.

## 2024-08-01 NOTE — ASSESSMENT
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 These are associated with central retinal vein occlusions in the setting of hyperviscosity. A lupus type anticoagulant or anticardiolipin antibodies are also potential etiologies.   Plan:  Continue aspirin and Plavix CMP, LDH, SPEP, SPIEP, SFLC, Quant Ig, UPEP, serum viscosity, cryoglobulins Lupus anticoagulant panel  Suggested she decrease her wine intake from current 3 glasses to perhaps 1 glass per day and separate taking ASA/Plavix from drinking wine by many hours Call me in 2 weeks.   [Palliative Care Plan] : not applicable at this time

## 2024-08-01 NOTE — HISTORY OF PRESENT ILLNESS
[de-identified] : 1/2024 Left central retinal vein occlusion [de-identified] : 73 year old female referred in consultation regarding possible coagulopathy. She has history of stroke twice in the past with left homonymous hemianopsia. In January,2024 she developed a left central retinal vein occlusion. She is getting intraocular injections for this. In June,2024 evaluation showed homocysteine 10.2, serum protein electrophoresis with a possible faint band, serum protein immunoelectrophoresis with no monoclonal band, BALBINA positive at 1:40.   She reports her vision is fine now. She is free of complaints. She has chronic migraines. She notes no fever, night sweats, swollen glands, chest pain, SOB, weight loss, skeletal pain, bleeding bruising, leg pain/swelling, rash, arthritis.   She has no personal nor family history of deep venous thrombophlebitis nor pulmonary embolism.

## 2024-08-01 NOTE — CONSULT LETTER
[Dear  ___] : Dear  [unfilled], [Consult Letter:] : I had the pleasure of evaluating your patient, [unfilled]. [Consult Closing:] : Thank you very much for allowing me to participate in the care of this patient.  If you have any questions, please do not hesitate to contact me. [Sincerely,] : Sincerely, [Dear  ___] : Dear ~MONICA, [Please see my note below.] : Please see my note below. [FreeTextEntry2] : Carl Villela MD [FreeTextEntry3] : Garo Coon M.D., FACP Professor of Medicine [facility] [employer] at [facility] Associate Chief, Division of Hematology [place] [phone]

## 2024-08-01 NOTE — PHYSICAL EXAM
[Ambulatory and capable of all self care but unable to carry out any work activities] : Status 2- Ambulatory and capable of all self care but unable to carry out any work activities. Up and about more than 50% of waking hours [Normal] : affect appropriate [de-identified] :                    [place]

## 2024-08-01 NOTE — CONSULT LETTER
[Dear  ___] : Dear  [unfilled], [Consult Letter:] : I had the pleasure of evaluating your patient, [unfilled]. [Consult Closing:] : Thank you very much for allowing me to participate in the care of this patient.  If you have any questions, please do not hesitate to contact me. [Sincerely,] : Sincerely, [Dear  ___] : Dear ~MONICA, [Please see my note below.] : Please see my note below. [FreeTextEntry2] : Carl Villela MD [FreeTextEntry3] : Garo Coon M.D., FACP Professor of Medicine [facility] [employer] at \[facility]\""/[facility] Associate Chief, Division of Hematology [place] [phone]

## 2024-08-01 NOTE — HISTORY OF PRESENT ILLNESS
[de-identified] : 1/2024 Left central retinal vein occlusion [de-identified] : 73 year old female referred in consultation regarding possible coagulopathy. She has history of stroke twice in the past with left homonymous hemianopsia. In January,2024 she developed a left central retinal vein occlusion. She is getting intraocular injections for this. In June,2024 evaluation showed homocysteine 10.2, serum protein electrophoresis with a possible faint band, serum protein immunoelectrophoresis with no monoclonal band, BALBINA positive at 1:40.   She reports her vision is fine now. She is free of complaints. She has chronic migraines. She notes no fever, night sweats, swollen glands, chest pain, SOB, weight loss, skeletal pain, bleeding bruising, leg pain/swelling, rash, arthritis.   She has no personal nor family history of deep venous thrombophlebitis nor pulmonary embolism.

## 2024-08-01 NOTE — PHYSICAL EXAM
[Ambulatory and capable of all self care but unable to carry out any work activities] : Status 2- Ambulatory and capable of all self care but unable to carry out any work activities. Up and about more than 50% of waking hours [Normal] : affect appropriate [de-identified] :                    [place]

## 2024-08-01 NOTE — REVIEW OF SYSTEMS
[Diarrhea: Grade 0] : Diarrhea: Grade 0 [Negative] : Allergic/Immunologic [de-identified] : Migraines

## 2024-08-01 NOTE — REVIEW OF SYSTEMS
[Diarrhea: Grade 0] : Diarrhea: Grade 0 [Negative] : Allergic/Immunologic [de-identified] : Migraines

## 2024-08-05 LAB
ALBUMIN MFR SERPL ELPH: 55.7 %
ALBUMIN SERPL-MCNC: 4.1 G/DL
ALBUMIN/GLOB SERPL: 1.2 RATIO
ALPHA1 GLOB MFR SERPL ELPH: 4.1 %
ALPHA1 GLOB SERPL ELPH-MCNC: 0.3 G/DL
ALPHA2 GLOB MFR SERPL ELPH: 9.6 %
ALPHA2 GLOB SERPL ELPH-MCNC: 0.7 G/DL
B-GLOBULIN MFR SERPL ELPH: 13.3 %
B-GLOBULIN SERPL ELPH-MCNC: 1 G/DL
DEPRECATED KAPPA LC FREE/LAMBDA SER: 1.31 RATIO
GAMMA GLOB FLD ELPH-MCNC: 1.3 G/DL
GAMMA GLOB MFR SERPL ELPH: 17.3 %
IGA SER QL IEP: 194 MG/DL
IGG SER QL IEP: 1111 MG/DL
IGM SER QL IEP: 139 MG/DL
INTERPRETATION SERPL IEP-IMP: NORMAL
KAPPA LC CSF-MCNC: 2.44 MG/DL
KAPPA LC SERPL-MCNC: 3.19 MG/DL
M PROTEIN SPEC IFE-MCNC: NORMAL
PROT SERPL-MCNC: 7.4 G/DL
PROT SERPL-MCNC: 7.4 G/DL
VISCOSITY, SERUM: 1.8

## 2024-08-07 LAB — CRYOGLOB SERPL-MCNC: NEGATIVE

## 2024-09-11 NOTE — DISCHARGE NOTE NURSING/CASE MANAGEMENT/SOCIAL WORK - PATIENT PORTAL LINK FT
[Time Spent: ___ minutes] : I have spent [unfilled] minutes of time on the encounter which excludes teaching and separately reported services.
You can access the FollowMyHealth Patient Portal offered by [facility] by registering at the following website: [email or web address]. By joining [facility]’s FollowMyHealth portal, you will also be able to view your health information using other applications (apps) compatible with our system.

## 2024-10-22 NOTE — PHYSICAL THERAPY INITIAL EVALUATION ADULT - SOCIAL CONCERNS
[facility]    Medicine Progress Note - Hospitalist Service    Date of Admission:  10/16/2024    Assessment & Plan   Feng Wynne is a 77 year old male with significant past medical history for mechanical mitral valve replacement on chronic anticoagulation with warfarin, A-fib, HFpEF, coronary artery disease, hypertension, hyperlipidemia, Chronic sacral decubitus non-healing wound presenting for supratheraputic INR despite holding warfarin since 10/14/24.     Pt is severely debilitated. Per family he has had fairly significant decline over the past few months. He is clearly malnourished due to chronic poor po intake. He is very weak. He is completely bed bound and per PT is totally dependent for all mobility and ADLS. He also has very notable cognitive decline. OT attempted SLUM assessment but patient was unable to even participate with the test. Pt will need 24 hour care going forward. Prior to this hospitalization he had some home cares through [facility] service. Apparently they have concerns about the care being provided and will no longer take the patient on. Apparently a vulnerable adult report has been filed as well though this has not been confirmed. Ultimately given the patients significant care needs, pt will need long term care. Previously pt had been resistant to placement in LTC, however, given his significant cognitive decline, he is currently not decisional. His son Sedrick and Partner Jose are both on-board with finding long term care placement.     Social work/care coordination consultation requested for discharge planning    Family planning on discharge to LTC with hospice.  MA application pending    Patient is medically stable for discharge    Nonbleeding supratheraputic INR  Mechanical mitral valve replacement on chronic anticoagulation with warfarin (INR goal 2.5-3.5)   Pt presented to the ED with elevated INRs. His INR was found to be high today at 8.33.  His  INR has been slightly trending up over the last 2 weeks and not improving after holding warfarin since 10/14.   * Suspect supratherapeutic INR is 2/2 nutritional deficiencys. Pt appears to be severely malnourished with muscle wasting and cachexia, though does have a lot of redundant skin so does not appear objectively thin. Pt himself reports that he eats really well at home, but does follow a vegetarian diet. His partner however reports that he barely eats due to fear of gaining weight.   * Vit K - was given 1mg IV x 1 10/16/24  INR now improved to 3.23  - Resume warfarin dosing per Pharm D     - Nutrition consultation/discussion as noted below    Hypokalemia  Hypomagnesemia  Hyponatremia  Metabolic Alkalosis  Again suspect 2/2 poor po intake   - Continue Potassium and magnesium replacement protocol   - PTA torsemide and spironolactone on hold - continue to hold now due to poor oral intake    Moderate malnutrition in the context of chronic illness   Possible disordered eating, anorexia   Hypoalbuminemia  Pt reports eating very well at home and following a vegetarian diet. He does state he only eats 50% of his meals because he doesn't want to gain weight. His partner however reports that he barely eats anything at home again because he fears gaining weight.   * He does not appear thin necessarily, but clearly has very poor muscle mass and lots of redundant loose skin.   * suspect his INR issues and electrolyte issues are related to poor po intake and malnutrition.   - nutrition consulted  - Discussed nutrition at length. Discussed trying to eat the majority of his meals and not restricting. Discussed the importance of protein for muscle building and wound healing.   - Also discussed trialing an appetite stimulant like remeron to see if this could boost appetite.    - Remeron 7.5mg at bedtime ordered on 10/18    Generalized Weakness  Bedbound status   Pt is essentially bedbound at baseline. Per chart review and  discussion with his partner, he has no organic neurologic disorder that has caused him to be unable to walk. He has just become so generally weak that he cannot get out of bed on his own or walk any longer. His partner assists him with all ADLS. He does have some home cares as well.   Per PT assessment this stay pt is totally dependent for all mobility and ADLs.      Stage 4 sacral ulcer 2/2 immobility (see above).   See WOC notes for photos. Pt has seen a plastic surgeon Dr. Srinivasan recently who reported being very hesitant to proceed with any intraoperative debridement or definitive soft tissue flap coverage due to patients overall frail and debiliated state.   - No indication for abx based on lack of infection signs from the ulcer  - WOC following   - Again disucssed importance of nutrition on wound healing and strength building   - As noted above recommend LTC placement for on -going wound cares.   - Appreciate Social Work consultation     Patient's decubitus ulcer wound bed with necrotic tissue with black discoloration and eschar.  General surgery consultation requested met with patient and family.  Family is deciding on their options including give him a colostomy to let the wound heal versus limiting medical interventions and focus on comfort measures    Acute on chronic hypotension   Pt with some chronic hypotension. On Midodrine PTA.   * BP 87/51   - Will give 500cc bolus of normal saline  - Continue PTA midodrine      Chronic Afib  HFpEF  Chronic LE edema  CAD  HTN  HLD   Chronic Hypotension  - TTE showed EF 50-55 in 9/2024  - resumed midodrine tid  - Holding PTA diuretics, as above     Chronic indwelling branch catheter   Enterococcus Faecalis + urine culture  UA was abnormal on admission Concern for possible UTI  No symptoms or other signs/symptoms of infection  Urine culture positive for E. Fecalis, ? Colonization   * pt received 1 dose of IV ceftriaxone on 10/16   ID consultation requested.   Enterococcus with chronic indwelling Stubbs catheter was thought to be colonization.  Antibiotics discontinued by ID      Suspected Dementia/cognitive impairment  Pt seems quite confused on my examination. Very poor insight into chronic health issues. His partner reports that he is quite forgetful and does have some confusion at baseline, so this is not new. He has never been diagnosed with dementia in the past.   * 10/19 pt can't remember what he had for breakfast, tries to dodge the question.   * Previous SLUM score is 23-25   * OT re consulted this AM for repeat SLUMs. He was unable to even participate with the test. Suspect score is now much lower than previous.   - Pt is clearly not decisional and unable to make his own medical decisions.    - LTC placement recommended      Chronic normocytic anemia, at baseline Hb 8  - continue to monitor     BPH  - avodart resumed    Large Ventral Hernia   Noted, soft, non-painful         Diet: Combination Diet Regular Diet Adult  Room Service  Snacks/Supplements Adult: Other; L: pineapple GelateinPLUS,  D: van Magic Cup; With Meals    DVT Prophylaxis: Warfarin  Stubbs Catheter: PRESENT, indication: Wound deterioration and failed external collection device  Lines: None     Cardiac Monitoring: None  Code Status: Full Code      Clinically Significant Risk Factors               # Hypoalbuminemia: Lowest albumin = 2.4 g/dL at 10/16/2024  6:25 PM, will monitor as appropriate       # Hypertension: Noted on problem list  # Chronic heart failure with preserved ejection fraction: heart failure noted on problem list and last echo with EF >50%          # Overweight: Estimated body mass index is 26.19 kg/m  as calculated from the following:    Height as of this encounter: 1.829 m (6').    Weight as of this encounter: 87.6 kg (193 lb 2 oz).   # Moderate Malnutrition: based on nutrition assessment      # Financial/Environmental Concerns: none         Disposition Plan           Medically  Ready for Discharge:   Ready Now        Discussed with bedside RN and patient      Zita Burden MD  Hospitalist Service  [facility]  Securely message with Ravi (more info)  Text page via [employer] Paging/Directory   ______________________________________________________________________    Interval History     Chart reviewed    Patient resting comfortably in bed.  Denies any complaints.  Denies any pain.  No other acute issues      Physical Exam   Vital Signs: Temp: 98.3  F (36.8  C) Temp src: Oral BP: 94/56 Pulse: 77   Resp: 18 SpO2: 97 % O2 Device: None (Room air)    Weight: 193 lbs 1.97 oz    Constitutional: Alert awake, forgetful, resting comfortably in bed, not in acute distress  Eyes: Conjunctiva and pupils examined and normal.  HEENT: dry mucous membranes, normal dentition.  Respiratory: Clear to auscultation bilaterally, no crackles or wheezing.  Cardiovascular: Regular rate and rhythm, mechanical valve   GI: Soft, non-distended, non-tender, normal bowel sounds. Large ventral hernia. Non tender, soft.   Skin: No rashes, no cyanosis, no edema. Redundant lose skin   Musculoskeletal: Muscle wasting noted. No joint swelling, erythema or tenderness.  Neurologic: Cranial nerves 2-12 intact, normal strength and sensation.  Psychiatric: Alert, oriented to person, very confused about situation, seems as though he is not following the conversation at all.     Medical Decision Making       60 MINUTES SPENT BY ME on the date of service doing chart review, history, exam, documentation & further activities per the note.      Data     I have personally reviewed the following data over the past 24 hrs:    N/A  \   N/A   / N/A     N/A N/A N/A /  N/A   3.5 N/A N/A \     INR:  2.98 (H) PTT:  N/A   D-dimer:  N/A Fibrinogen:  N/A       Imaging results reviewed over the past 24 hrs:   No results found for this or any previous visit (from the past 24 hour(s)).   None

## 2024-10-25 NOTE — OCCUPATIONAL THERAPY INITIAL EVALUATION ADULT - BALANCE DISTURBANCE, IDENTIFIED IMPAIRMENT CONTRIBUTE, REHAB EVAL
POST IUD INSERTION INSTRUCTIONS  Due to IUD insertion you can expect some light vaginal bleeding  Take motrin/ibuprofen as needed for cramping.   Nothing in the vagina for the next 2-3 days.  Try to feel for your strings monthly using 1-2 fingers inserted into vagina  Call if you increasing pain, fever  100.5 or greater, or heavy bleeding  Return to the office in 4-6 weeks for a recheck       decreased strength

## 2024-10-29 NOTE — PROGRESS NOTE ADULT - ATTENDING COMMENTS
Post-Care Instructions: I reviewed with the patient in detail post-care instructions. Patient is to wear sunprotection, and avoid picking at any of the treated lesions. Pt may apply Vaseline to crusted or scabbing areas. Consent: The patient's consent was obtained including but not limited to risks of crusting, scabbing, blistering, scarring, darker or lighter pigmentary change, recurrence, incomplete removal and infection. Anesthesia Volume In Cc: 0.5 Detail Level: Detailed Price (Use Numbers Only, No Special Characters Or $): [facility]  [phone]

## 2025-01-03 NOTE — ED PROCEDURE NOTE - CPROC ED PHYSICIAN PRESENCE1
Patient presents to consult with Dr. Reddy for PSC. Medications and allergies reviewed via Epic. Assessment deferred to MD.  Per Dr. Reddy - needs MRI 5/2025, follow up in 6 months with labs (MELD, AFP, ). Plan reviewed with patient who verbalized understanding. Orders entered.   
Vitals completed, allergies and meds reviewed.     
I was present during the key portion of the procedure.

## 2025-01-14 NOTE — PATIENT PROFILE ADULT - INSURANCE HELP
Physical Therapy Visit    Visit Type: Daily Treatment Note  Visit: 5  Referring Provider: Campbell Moctezuma MD  Medical Diagnosis (from order): M17.12 - Osteoarthritis of left knee     SUBJECTIVE                                                                                                               1/14/25: Visited w/cardiology in regards to her A-Fib and restlessness. Medications were updated, she is feeling better today. Arrive w/SPC +  present. Considering an ablation in April. Stated her BP was low this morning. Denies dizziness, denies heart palpitations. Reports another fall while transferring off the toilet;  installed a handicap toilet to assist + reduce further risk    1/7/25: Arrived w/SPC +  present. Stated she fell in the bathroom, denies hitting her head or LOC. Denies residual discomfort/bruising. Doing okay, a little \"shook up\" after the fall. She forgot her cane which likely caused her fall. Taking \"loops\" around the driveway to increase her mobility    1/2/25: Arrived w/SPC, walking w/a reciprocal pattern. Stated she feels sick after our sessions, she requested to limit positional changes. Stated sleeping is still difficult, anxiety remains high.     12/31/24: Reports having trouble w/going to the bathroom due to urge incontinence, this has been addressed by her primary team. Currently undergoing treatment for colorectal cancer. Still having anxiety + symptoms of A-Fib    12/26/24 (Eval): Patient is 2 weeks s/p L TKA performed by Dr. Moctezuma on 12/9/24. Required a stay at the hospital follow the procedure due to her A-Fib, this is being managed by her home health nurse + cardiology team. Most recent f/u w/referring team was on 12/24/24. Presents w/expected post operative pain/stiffness + distal swelling. Denies numbness/tingling. Ambulating w/a SPC for all bouts of distance, trying intermittent steps w/UE assist in the home. Completed a bout of home health physical therapy.  Experiencing pain w/Wbing positions, active knee movement, and prolonged positioning in supine + standing + sitting. Using RICE for relief. Patient became emotional during the session in regards to her anxiety, stated she fears her A-Fib exacerbations and overall pain/discomfort. \"Twitching\" and \"spasming\" is frequently waking her up at night; she hasn't has a sound night of rest since DOS.  present    Pain / Symptoms  - Pain rating (out of 10): Current: 8       OBJECTIVE                                                                                                                    Vitals:  Blood Pressure (mmhg):      - Seated: 100/60, LUE, asymptomatic      Range of Motion (ROM)   (degrees unless noted; active unless noted; norms in ( ); negative=lacking to 0, positive=beyond 0)  Knee:   - Flexion (150):       Left:  99  Pain, symptom reaction  Passive: 111   - Extension (0-10):       Left:  -9  Pain, symptom reaction               Ambulation / Gait  - Assistive device: single point cane  - Assist Level: modified independent  - Surface: even and concrete  - Description: antalgic, decreased step length left, decreased sisi/pace, leans right, heavy reliance on BUE, flexed at knees, unsteady and step through               Treatment     Therapeutic Exercise  Manual Stretching of L knee into end range/tolerance w/prolonged holds  Elevated STSs 3 x 10 --> added to HEP*  Practiced Toilet/Transfers + Guarding w/ ~ 8 min    Manual Therapy   Patellar Mobs gr I-II  Tibfib Mobs (proximal) gr I-II  Tibial Distraction gr I-II  STM/Edema Massage to L knee    Skilled input: posture correction, verbal instruction/cues, tactile instruction/cues and demonstration    Writer verbally educated and received verbal consent for hand placement, positioning of patient, and techniques to be performed today from patient for hand placement and palpation for techniques, clothing adjustments for techniques and therapist  position for techniques as described above and how they are pertinent to the patient's plan of care.  Home Exercise Program  Access Code: [email or web address]  URL: [email or web address]  Date: 01/14/2025  Prepared by: Angelo Campbell    Exercises  - Supine Heel Slide with Strap  - 2 x daily - 7 x weekly - 3 sets - 10 reps  - Supine Quad Set  - 2 x daily - 7 x weekly - 3 sets - 10 reps - 5 hold  - Seated Hamstring Stretch  - 2 x daily - 7 x weekly - 3 sets - 30 hold  - Side to Side Weight Shift with Counter Support  - 2 x daily - 7 x weekly - 3 sets - 10 reps  - Forward Backward Weight Shift with Counter Support  - 2 x daily - 7 x weekly - 3 sets - 10 reps  - Sit to Stand  - 2 x daily - 7 x weekly - 3 sets - 5 reps      ASSESSMENT                                                                                                            Patient able to tolerate a full session of therapy today; however, again chose to discontinue further exercise after practicing sit to stands. Therapist educated on importance of consistent mobility, pushing through the Wbing barrier of pain, and participation in outpatient sessions. Her motion is slowly improving; however, strength is unchanged since initial visit. We practiced sit to stands (elevated height due to handicap toilet at home) to be performed every time she uses the restroom. Her risk of falling is significant at this time. Patient will benefit from PT services to increase functional mobility of the L knee, decrease risk of recurrent injury + deconditioning, assist w/return to recreational participation w/o limitations, and increase overall quality of life  Education:   - Results of above outlined education: Demonstrates understanding and Verbalizes understanding    PLAN                                                                                                                           Suggestions for next session as indicated: Progress per plan of  care    Goals  Long Term Goals: to be met by end of plan of care  1. Patient will restore BLE strength to >4+/5 to reduce risk of atrophy, reduce risk of falls, and increase functional mobility/strength  2. Patient's L knee AROM/PROM will be restored to WFL to assist w/proper gait mechanics and reducing risk of injuring proximal/distal joints  3. Patient will ambulate ~ 400 ft w/LRAD indep to increase functional endurance and assist w/return to ambulating community-distance  4. Patient will ascend/descend 2 flights of stairs (w/UE assist) to increase functional strength and tolerance to transfers/stair use  5. Patient will be indep w/HEP and demonstrate excellent compliance w/exercises      Therapy procedure time and total treatment time can be found documented on the Time Entry flowsheet     no

## 2025-01-23 ENCOUNTER — APPOINTMENT (OUTPATIENT)
Dept: NEUROSURGERY | Facility: CLINIC | Age: 75
End: 2025-01-23

## 2025-02-12 RX ORDER — CIPROFLOXACIN HCL 250 MG
1 TABLET ORAL
Refills: 0 | DISCHARGE
Start: 2025-02-12 | End: 2025-02-19

## 2025-02-14 ENCOUNTER — INPATIENT (INPATIENT)
Facility: HOSPITAL | Age: 75
LOS: 6 days | Discharge: HOME | DRG: 125 | End: 2025-02-21
Attending: INTERNAL MEDICINE | Admitting: STUDENT IN AN ORGANIZED HEALTH CARE EDUCATION/TRAINING PROGRAM
Payer: MEDICARE

## 2025-02-14 VITALS
DIASTOLIC BLOOD PRESSURE: 109 MMHG | SYSTOLIC BLOOD PRESSURE: 182 MMHG | OXYGEN SATURATION: 98 % | HEART RATE: 71 BPM | RESPIRATION RATE: 20 BRPM | TEMPERATURE: 98 F

## 2025-02-14 DIAGNOSIS — Z96.651 PRESENCE OF RIGHT ARTIFICIAL KNEE JOINT: Chronic | ICD-10-CM

## 2025-02-14 DIAGNOSIS — Z96.659 PRESENCE OF UNSPECIFIED ARTIFICIAL KNEE JOINT: Chronic | ICD-10-CM

## 2025-02-14 DIAGNOSIS — Z96.619 PRESENCE OF UNSPECIFIED ARTIFICIAL SHOULDER JOINT: Chronic | ICD-10-CM

## 2025-02-14 LAB
ALBUMIN SERPL ELPH-MCNC: 4.3 G/DL — SIGNIFICANT CHANGE UP (ref 3.3–5)
ALP SERPL-CCNC: 88 U/L — SIGNIFICANT CHANGE UP (ref 40–120)
ALT FLD-CCNC: 11 U/L — SIGNIFICANT CHANGE UP (ref 10–45)
ANION GAP SERPL CALC-SCNC: 15 MMOL/L — SIGNIFICANT CHANGE UP (ref 5–17)
APPEARANCE UR: CLEAR — SIGNIFICANT CHANGE UP
APTT BLD: 28.5 SEC — SIGNIFICANT CHANGE UP (ref 24.5–35.6)
AST SERPL-CCNC: 21 U/L — SIGNIFICANT CHANGE UP (ref 10–40)
BACTERIA # UR AUTO: ABNORMAL /HPF
BASOPHILS # BLD AUTO: 0.04 K/UL — SIGNIFICANT CHANGE UP (ref 0–0.2)
BASOPHILS NFR BLD AUTO: 0.5 % — SIGNIFICANT CHANGE UP (ref 0–2)
BILIRUB SERPL-MCNC: 0.3 MG/DL — SIGNIFICANT CHANGE UP (ref 0.2–1.2)
BILIRUB UR-MCNC: NEGATIVE — SIGNIFICANT CHANGE UP
BUN SERPL-MCNC: 21 MG/DL — SIGNIFICANT CHANGE UP (ref 7–23)
CALCIUM SERPL-MCNC: 9.6 MG/DL — SIGNIFICANT CHANGE UP (ref 8.4–10.5)
CAST: 0 /LPF — SIGNIFICANT CHANGE UP (ref 0–4)
CHLORIDE SERPL-SCNC: 106 MMOL/L — SIGNIFICANT CHANGE UP (ref 96–108)
CO2 SERPL-SCNC: 22 MMOL/L — SIGNIFICANT CHANGE UP (ref 22–31)
COLOR SPEC: YELLOW — SIGNIFICANT CHANGE UP
CREAT SERPL-MCNC: 1.04 MG/DL — SIGNIFICANT CHANGE UP (ref 0.5–1.3)
DIFF PNL FLD: NEGATIVE — SIGNIFICANT CHANGE UP
EGFR: 56 ML/MIN/1.73M2 — LOW
EOSINOPHIL # BLD AUTO: 0.07 K/UL — SIGNIFICANT CHANGE UP (ref 0–0.5)
EOSINOPHIL NFR BLD AUTO: 0.9 % — SIGNIFICANT CHANGE UP (ref 0–6)
FLUAV AG NPH QL: SIGNIFICANT CHANGE UP
FLUBV AG NPH QL: SIGNIFICANT CHANGE UP
GLUCOSE SERPL-MCNC: 110 MG/DL — HIGH (ref 70–99)
GLUCOSE UR QL: NEGATIVE MG/DL — SIGNIFICANT CHANGE UP
HCT VFR BLD CALC: 44.5 % — SIGNIFICANT CHANGE UP (ref 34.5–45)
HGB BLD-MCNC: 13.9 G/DL — SIGNIFICANT CHANGE UP (ref 11.5–15.5)
IMM GRANULOCYTES NFR BLD AUTO: 0.5 % — SIGNIFICANT CHANGE UP (ref 0–0.9)
INR BLD: 1.15 RATIO — SIGNIFICANT CHANGE UP (ref 0.85–1.16)
KETONES UR-MCNC: NEGATIVE MG/DL — SIGNIFICANT CHANGE UP
LEUKOCYTE ESTERASE UR-ACNC: ABNORMAL
LYMPHOCYTES # BLD AUTO: 1.75 K/UL — SIGNIFICANT CHANGE UP (ref 1–3.3)
LYMPHOCYTES # BLD AUTO: 22.4 % — SIGNIFICANT CHANGE UP (ref 13–44)
MCHC RBC-ENTMCNC: 27.9 PG — SIGNIFICANT CHANGE UP (ref 27–34)
MCHC RBC-ENTMCNC: 31.2 G/DL — LOW (ref 32–36)
MCV RBC AUTO: 89.2 FL — SIGNIFICANT CHANGE UP (ref 80–100)
MONOCYTES # BLD AUTO: 0.48 K/UL — SIGNIFICANT CHANGE UP (ref 0–0.9)
MONOCYTES NFR BLD AUTO: 6.1 % — SIGNIFICANT CHANGE UP (ref 2–14)
NEUTROPHILS # BLD AUTO: 5.43 K/UL — SIGNIFICANT CHANGE UP (ref 1.8–7.4)
NEUTROPHILS NFR BLD AUTO: 69.6 % — SIGNIFICANT CHANGE UP (ref 43–77)
NITRITE UR-MCNC: NEGATIVE — SIGNIFICANT CHANGE UP
NRBC BLD AUTO-RTO: 0 /100 WBCS — SIGNIFICANT CHANGE UP (ref 0–0)
PH UR: 5.5 — SIGNIFICANT CHANGE UP (ref 5–8)
PLATELET # BLD AUTO: 220 K/UL — SIGNIFICANT CHANGE UP (ref 150–400)
POTASSIUM SERPL-MCNC: 4.5 MMOL/L — SIGNIFICANT CHANGE UP (ref 3.5–5.3)
POTASSIUM SERPL-SCNC: 4.5 MMOL/L — SIGNIFICANT CHANGE UP (ref 3.5–5.3)
PROT SERPL-MCNC: 8.1 G/DL — SIGNIFICANT CHANGE UP (ref 6–8.3)
PROT UR-MCNC: NEGATIVE MG/DL — SIGNIFICANT CHANGE UP
PROTHROM AB SERPL-ACNC: 13.1 SEC — SIGNIFICANT CHANGE UP (ref 9.9–13.4)
RBC # BLD: 4.99 M/UL — SIGNIFICANT CHANGE UP (ref 3.8–5.2)
RBC # FLD: 15 % — HIGH (ref 10.3–14.5)
RBC CASTS # UR COMP ASSIST: 2 /HPF — SIGNIFICANT CHANGE UP (ref 0–4)
RSV RNA NPH QL NAA+NON-PROBE: SIGNIFICANT CHANGE UP
SARS-COV-2 RNA SPEC QL NAA+PROBE: SIGNIFICANT CHANGE UP
SODIUM SERPL-SCNC: 143 MMOL/L — SIGNIFICANT CHANGE UP (ref 135–145)
SP GR SPEC: >1.03 — HIGH (ref 1–1.03)
SQUAMOUS # UR AUTO: 9 /HPF — HIGH (ref 0–5)
TROPONIN T, HIGH SENSITIVITY RESULT: 22 NG/L — SIGNIFICANT CHANGE UP (ref 0–51)
UROBILINOGEN FLD QL: 0.2 MG/DL — SIGNIFICANT CHANGE UP (ref 0.2–1)
WBC # BLD: 7.81 K/UL — SIGNIFICANT CHANGE UP (ref 3.8–10.5)
WBC # FLD AUTO: 7.81 K/UL — SIGNIFICANT CHANGE UP (ref 3.8–10.5)
WBC UR QL: 36 /HPF — HIGH (ref 0–5)

## 2025-02-14 PROCEDURE — 0042T: CPT

## 2025-02-14 PROCEDURE — 70496 CT ANGIOGRAPHY HEAD: CPT | Mod: 26

## 2025-02-14 PROCEDURE — 99223 1ST HOSP IP/OBS HIGH 75: CPT | Mod: FS

## 2025-02-14 PROCEDURE — 70450 CT HEAD/BRAIN W/O DYE: CPT | Mod: 26,XU

## 2025-02-14 PROCEDURE — 70498 CT ANGIOGRAPHY NECK: CPT | Mod: 26

## 2025-02-14 PROCEDURE — 70551 MRI BRAIN STEM W/O DYE: CPT | Mod: 26

## 2025-02-14 PROCEDURE — 70540 MRI ORBIT/FACE/NECK W/O DYE: CPT | Mod: 26

## 2025-02-14 RX ORDER — ATORVASTATIN CALCIUM 80 MG/1
80 TABLET, FILM COATED ORAL AT BEDTIME
Refills: 0 | Status: DISCONTINUED | OUTPATIENT
Start: 2025-02-14 | End: 2025-02-15

## 2025-02-14 RX ORDER — SERTRALINE 100 MG/1
25 TABLET, FILM COATED ORAL DAILY
Refills: 0 | Status: DISCONTINUED | OUTPATIENT
Start: 2025-02-15 | End: 2025-02-21

## 2025-02-14 RX ORDER — METOCLOPRAMIDE HCL 10 MG
10 TABLET ORAL ONCE
Refills: 0 | Status: COMPLETED | OUTPATIENT
Start: 2025-02-14 | End: 2025-02-14

## 2025-02-14 RX ORDER — DIAZEPAM 2 MG/1
5 TABLET ORAL ONCE
Refills: 0 | Status: DISCONTINUED | OUTPATIENT
Start: 2025-02-14 | End: 2025-02-14

## 2025-02-14 RX ORDER — ACETAMINOPHEN 500 MG/5ML
1000 LIQUID (ML) ORAL ONCE
Refills: 0 | Status: COMPLETED | OUTPATIENT
Start: 2025-02-14 | End: 2025-02-14

## 2025-02-14 RX ORDER — OXYCODONE HYDROCHLORIDE 30 MG/1
2.5 TABLET ORAL ONCE
Refills: 0 | Status: DISCONTINUED | OUTPATIENT
Start: 2025-02-14 | End: 2025-02-15

## 2025-02-14 RX ORDER — CLOPIDOGREL BISULFATE 75 MG/1
75 TABLET, FILM COATED ORAL DAILY
Refills: 0 | Status: DISCONTINUED | OUTPATIENT
Start: 2025-02-15 | End: 2025-02-15

## 2025-02-14 RX ORDER — ASPIRIN 325 MG
81 TABLET ORAL DAILY
Refills: 0 | Status: DISCONTINUED | OUTPATIENT
Start: 2025-02-15 | End: 2025-02-21

## 2025-02-14 RX ADMIN — ATORVASTATIN CALCIUM 80 MILLIGRAM(S): 80 TABLET, FILM COATED ORAL at 23:48

## 2025-02-14 RX ADMIN — Medication 1000 MILLIGRAM(S): at 23:00

## 2025-02-14 RX ADMIN — Medication 10 MILLIGRAM(S): at 21:15

## 2025-02-14 RX ADMIN — DIAZEPAM 5 MILLIGRAM(S): 2 TABLET ORAL at 21:14

## 2025-02-14 RX ADMIN — Medication 400 MILLIGRAM(S): at 22:08

## 2025-02-14 NOTE — CONSULT NOTE ADULT - ASSESSMENT
ASSESSMENT   74-year-old woman, reported prior medical history of anxiety, COPD, HLD, HTN, L shoulder dislocation, OA R knee, provoked DVT after surgery 2020 (not on AC at this time), prior stroke (2020, R occipital lobe, residual LHH, on ASA 81mg qd), macular degeneration (receives injections in L eye), presenting for code stroke for multiple concerns including dizziness (room spinning) with imbalance, bilateral blurred vision, and headache.    IMPRESSION   (1) Acute vestibular syndrome with gait imbalance, possible peripheral vs central pending further workup  (2) Bilateral blurred vision, headache, also with elevated BP on presentation ?hypertensive urgency  (3) Prior LHH from R PCA stroke, however on exam also noted to have L inferior nasal visual field cut - etiology pending further workup    RECOMMENDATION   [] CDU for MRI brain + orbits w/w/o contrast  [] ophthalmology evaluation for blurred vision, recs appreciated  [] continue home ASA 81 daily and plavix 75 mg daily (per patient, she takes both; however if patient only on ASA 81 daily please start plavix 75 mg daily as well pending MR results)  [] please start atorvastatin 80mg QHS, (goal LDL<70)  [] DVT prophylaxis per primary team  [] TTE with telemetry  [] check HbA1C and lipid panel  [] Telemonitoring; Neurochecks and vital signs Q4H  [] Permissive HTN up to 220/120 mmHg for first 24 hours after the symptom onset followed by gradual normotension.   [] BGM goals 140-180  [] PT/OT evaluation  [] NPO until clears dysphagia screen, otherwise swallow evaluation  [] Stroke education provided    Discussed with on call neurovascular attending Dr. Zarate.  Patient to be seen by team and attending in AM. Note finalized upon attending attestation.  ASSESSMENT   74-year-old woman, reported prior medical history of anxiety, COPD, HLD, HTN, L shoulder dislocation, OA R knee, provoked DVT after surgery 2020 (not on AC at this time), prior stroke (2020, R occipital lobe, residual LHH, on ASA 81mg qd), macular degeneration (receives injections in L eye), presenting for code stroke for multiple concerns including dizziness (room spinning) with imbalance, bilateral blurred vision, and headache.    IMPRESSION   (1) Acute vestibular syndrome with gait imbalance, possible peripheral vs central pending further workup  (2) Bilateral blurred vision with b/l VA 20/200 with corrective lens, headache, also with elevated BP on presentation ?hypertensive urgency  (3) Prior LHH from R PCA stroke, however on exam also noted to have L inferior nasal visual field cut - etiology pending further workup    RECOMMENDATION   [] CDU for MRI brain + orbits w/w/o contrast  [] ophthalmology evaluation for blurred vision, recs appreciated  [] continue home ASA 81 daily and plavix 75 mg daily (per patient, she takes both; however if patient only on ASA 81 daily please start plavix 75 mg daily as well pending MR results)  [] please start atorvastatin 80mg QHS, (goal LDL<70)  [] DVT prophylaxis per primary team  [] TTE with telemetry  [] check HbA1C and lipid panel  [] Telemonitoring; Neurochecks and vital signs Q4H  [] Permissive HTN up to 220/120 mmHg for first 24 hours after the symptom onset followed by gradual normotension.   [] BGM goals 140-180  [] orthostatics  [] PT/OT evaluation  [] NPO until clears dysphagia screen, otherwise swallow evaluation  [] Stroke education provided    Discussed with on call neurovascular attending Dr. Zarate.  Patient to be seen by team and attending in AM. Note finalized upon attending attestation.  ASSESSMENT   74-year-old woman, reported prior medical history of anxiety, COPD, HLD, HTN, L shoulder dislocation, OA R knee, provoked DVT after surgery 2020 (not on AC at this time), prior stroke (2020, R occipital lobe, residual LHH, on ASA 81mg qd), macular degeneration (receives injections in L eye), presenting for code stroke for multiple concerns including dizziness (room spinning) with imbalance, bilateral blurred vision, and headache.    IMPRESSION   (1) Acute vestibular syndrome with gait imbalance, possible peripheral vs central pending further workup  (2) Bilateral blurred vision with b/l VA 20/200 with corrective lens, headache, also with elevated BP on presentation ?hypertensive urgency  (3) Prior LHH from R PCA stroke, however on exam also noted to have L inferior nasal visual field cut - etiology pending further workup. Also with L central retinal vein occlusion in 1/2024. Followed in outpt setting with heme for potential monoclonal gammopathy.    RECOMMENDATION   [] CDU for MRI brain + orbits w/w/o contrast  [] ophthalmology evaluation for blurred vision, recs appreciated  [] continue home ASA 81 daily and plavix 75 mg daily (per patient, she takes both; however if patient only on ASA 81 daily please start plavix 75 mg daily as well pending MR results)  [] please start atorvastatin 80mg QHS, (goal LDL<70)  [] DVT prophylaxis per primary team  [] TTE with telemetry  [] check HbA1C and lipid panel  [] Telemonitoring; Neurochecks and vital signs Q4H  [] Permissive HTN up to 220/120 mmHg for first 24 hours after the symptom onset followed by gradual normotension.   [] BGM goals 140-180  [] orthostatics  [] PT/OT evaluation  [] NPO until clears dysphagia screen, otherwise swallow evaluation  [] Stroke education provided  []Patient should follow up with Stroke NP, Maki Seymour or Kelsie Nelson, in clinic at [address] [place] [address]. Please email [email or web address] w/ basic PHI.    Discussed with on call neurovascular attending Dr. Zarate.  Patient to be seen by team and attending in AM. Note finalized upon attending attestation.  ASSESSMENT   74-year-old woman, reported prior medical history of anxiety, COPD, HLD, HTN, L shoulder dislocation, OA R knee, provoked DVT after surgery 2020 (not on AC at this time), prior stroke (2020, R occipital lobe, residual LHH), macular degeneration (receives injections in L eye), presenting for code stroke for multiple concerns including dizziness (room spinning) with imbalance, bilateral blurred vision, and headache.    IMPRESSION   (1) Acute vestibular syndrome with gait imbalance, possible peripheral vs central pending further workup  (2) Bilateral blurred vision with b/l VA 20/200 with corrective lens, headache, also with elevated BP on presentation ?hypertensive urgency  (3) Prior LHH from R PCA stroke, however on exam also noted to have L inferior nasal visual field cut - etiology pending further workup. Also with L central retinal vein occlusion in 1/2024. Followed in outpt setting with heme for potential monoclonal gammopathy.    RECOMMENDATION   [] CDU for MRI brain + orbits w/w/o contrast  [] ophthalmology evaluation for blurred vision, recs appreciated  [] continue home ASA 81 daily and plavix 75 mg daily (per patient, she takes both; however if patient only on ASA 81 daily please start plavix 75 mg daily as well pending MR results)  [] please start atorvastatin 80mg QHS, (goal LDL<70)  [] DVT prophylaxis per primary team  [] TTE with telemetry  [] check HbA1C and lipid panel  [] Telemonitoring; Neurochecks and vital signs Q4H  [] Permissive HTN up to 220/120 mmHg for first 24 hours after the symptom onset followed by gradual normotension.   [] BGM goals 140-180  [] orthostatics  [] PT/OT evaluation  [] NPO until clears dysphagia screen, otherwise swallow evaluation  [] Stroke education provided  []Patient should follow up with Stroke NP, Maki Seymour or Kelsie Nelson, in clinic at [address] [place] [address]. Please email [email or web address] w/ basic PHI.    Discussed with on call neurovascular attending Dr. Zarate.  Patient to be seen by team and attending in AM. Note finalized upon attending attestation.  ASSESSMENT   74-year-old woman, reported prior medical history of anxiety, COPD, HLD, HTN, L shoulder dislocation, OA R knee, provoked DVT after surgery 2020 (not on AC at this time), prior stroke (2020, R occipital lobe, residual LHH), macular degeneration (receives injections in L eye), presenting for code stroke for multiple concerns including dizziness (room spinning) with imbalance, bilateral blurred vision, and headache.    IMPRESSION   (1) Acute vestibular syndrome with gait imbalance likely peripheral etiology   (2) Bilateral blurred vision with b/l VA 20/200 with corrective lens, headache, also with elevated BP on presentation ?hypertensive urgency  (3) Prior LHH from R PCA stroke, however on exam also noted to have L inferior nasal visual field cut - etiology pending further workup. Also with L central retinal vein occlusion in 1/2024. Followed in outpt setting with heme for potential monoclonal gammopathy.    RECOMMENDATION   [x] MRI brain + orbits w/w/o contrast - no infarct   [] ophthalmology evaluation for blurred vision, recs appreciated  [x] continue home ASA 81 daily, STOP PLAVIX   [x] Continue home atorvastatin  [] Consider NSGY inpatient vs outpatient for evaluation of carotid stenosis    [] Check orthostatics   [x] DVT prophylaxis per primary team  [] TTE with telemetry  [] check HbA1C and lipid panel  [] Telemonitoring; Neurochecks and vital signs Q4H  [] BP parameters as per primary - no stroke   [] BGM goals 140-180  [] PT/OT evaluation  [] NPO until clears dysphagia screen, otherwise swallow evaluation  [] Stroke education provided  []Patient should follow up with Stroke NP, Maki Seymour or Kelsie Nelson, in clinic at [address] [place] [address]. Please email [email or web address] w/ basic PHI.    Discussed with on call neurovascular attending Dr. Zarate.  Patient to be seen by team and attending in AM. Note finalized upon attending attestation.  ASSESSMENT   74-year-old woman, reported prior medical history of anxiety, COPD, HLD, HTN, L shoulder dislocation, OA R knee, provoked DVT after surgery 2020 (not on AC at this time), prior stroke (2020, R occipital lobe, residual LHH), macular degeneration (receives injections in L eye), presenting for code stroke for multiple concerns including dizziness (room spinning) with imbalance, bilateral blurred vision, and headache.    IMPRESSION   (1) Acute vestibular syndrome with gait imbalance likely peripheral etiology   (2) Bilateral blurred vision with b/l VA 20/200 with corrective lens, headache, also with elevated BP on presentation ?hypertensive urgency  (3) Prior LHH from R PCA stroke, however on exam also noted to have L inferior nasal visual field cut - etiology pending further workup. Also with L central retinal vein occlusion in 1/2024. Followed in outpt setting with heme for potential monoclonal gammopathy.    RECOMMENDATION   [x] MRI brain + orbits w/w/o contrast - no infarct   [] ophthalmology evaluation for blurred vision, recs appreciated  [x] continue home ASA 81 daily, STOP PLAVIX   [x] Continue home atorvastatin  [] Consider NSGY consult for evaluation of carotid stenosis    [] Check orthostatics   [x] DVT prophylaxis per primary team  [] TTE with telemetry  [] check HbA1C and lipid panel  [] Telemonitoring; Neurochecks and vital signs Q4H  [] BP parameters as per primary - no stroke   [] BGM goals 140-180  [] PT/OT evaluation  [] NPO until clears dysphagia screen, otherwise swallow evaluation  [] Stroke education provided  []Patient should follow up with Stroke NP, Maki Seymour or Kelsie Nelson, in clinic at [address] [place] [address]. Please email [email or web address] w/ basic PHI.    Discussed with on call neurovascular attending Dr. Zarate.  Patient to be seen by team and attending in AM. Note finalized upon attending attestation.  ASSESSMENT   74-year-old woman, reported prior medical history of anxiety, COPD, HLD, HTN, L shoulder dislocation, OA R knee, provoked DVT after surgery 2020 (not on AC at this time), prior stroke (2020, R occipital lobe, residual LHH), macular degeneration (receives injections in L eye), presenting for code stroke for multiple concerns including dizziness (room spinning) with imbalance, bilateral blurred vision, and headache.    IMPRESSION   (1) Acute vestibular syndrome with gait imbalance likely peripheral etiology   (2) Bilateral blurred vision with b/l VA 20/200 with corrective lens, headache, also with elevated BP on presentation ?hypertensive urgency  (3) Prior LHH from R PCA stroke, however on exam also noted to have L inferior nasal visual field cut - etiology pending further workup. Also with L central retinal vein occlusion in 1/2024. Followed in outpt setting with heme for potential monoclonal gammopathy.    RECOMMENDATION   [x] MRI brain + orbits w/w/o contrast - no infarct   [] ophthalmology evaluation for blurred vision, recs appreciated  [x] continue home ASA 81 daily, plavix per primary team  [x] Continue home atorvastatin  [] Consider NSGY vs vascular consult for evaluation of carotid stenosis    [] Check orthostatics   [x] DVT prophylaxis per primary team  [] TTE with telemetry  [] check HbA1C and lipid panel  [] Telemonitoring; Neurochecks and vital signs Q4H  [] BP parameters as per primary team  [] BGM goals 140-180  [] PT/OT evaluation  [] NPO until clears dysphagia screen, otherwise swallow evaluation  [] Stroke education provided  []Patient should follow up with Stroke NP, Maki Seymour or Kelsie Nelson, in clinic at [address] [place] [address]. Please email [email or web address] w/ basic PHI.    Discussed with on cll neurovascular attending Dr. Zarate.  Patient to be seen by team and attending in AM. Note finalized upon attending attestation.

## 2025-02-14 NOTE — ED CDU PROVIDER INITIAL DAY NOTE - ATTENDING APP SHARED VISIT CONTRIBUTION OF CARE
ATTENDING, Cameron SEGOVIA: I have personally performed a face to face diagnostic evaluation on this patient.  I have reviewed the ACP note and agree with the history, exam, and plan of care, except as noted here. Progress notes and further evaluation to be reviewed by observation and discharging attending.

## 2025-02-14 NOTE — ED PROVIDER NOTE - ATTENDING CONTRIBUTION TO CARE
74 female with history of stroke hypertension hyperlipidemia vertigo now coming in with vision changes since she woke up at 10 AM.  It feels like everything is gray and she cannot see through it.  She has a history of macular degeneration getting eye injections.  She went to a retina specialist and will get examined on exam of the hospital.  Awake alert talking no acute distress.  No pronator drift.  No drift of the lower extremities.  Regular rate and rhythm clear lungs nontender abdomen  Optho consult patient came in as a code stroke CT CTA CBC CMP EKG reassess the patient.

## 2025-02-14 NOTE — ED CDU PROVIDER INITIAL DAY NOTE - PHYSICAL EXAMINATION
GEN: Awake, AOx3, NAD.  HEENT: NCAT  CARDIO: RRR. Normal S1/S2, no murmurs   RESP: CTAB  ABD: Soft, NTND  MSK: No obvious deformity or ROM deficit. 2+ pulses x4. No edema.  SKIN: Warm, dry. No rashes. Nail beds without cyanosis or clubbing.  NEURO: Moves all four extremities spontaneously  PSYCH: Appropriate mood & affect.

## 2025-02-14 NOTE — STROKE CODE NOTE - CT PERFORMED
Improving cellulitis in lower extremities bilaterally and lower abdomen    Continue Keflex through 11/11 (7-day course)  Moisturize dry skin in LE with vaseline products  Keep intertriginous areas dry and clean   14-Feb-2025 18:32

## 2025-02-14 NOTE — ED CDU PROVIDER INITIAL DAY NOTE - DETAILS
tele, neuro checks, MRI, echocardiogram, neurology following, PT consult  KWAME Barnes, vitals every 4 hours, frequent reevaluations tele, neuro checks, MRI, echocardiogram, neurology following, PT consult  KWAME Barnes, vitals every 4 hours, frequent reevaluations  opthalmology consulted

## 2025-02-14 NOTE — STROKE CODE NOTE - NIH STROKE SCALE: 5B. MOTOR ARM, RIGHT, QM
Care Management Discharge Note    Discharge Date: 10/18/2023     Discharge Disposition:  Home  Discharge Services:  Home Care  Discharge DME:  None     Discharge Transportation: family or friend will provide    Private pay costs discussed: Not applicable    Does the patient's insurance plan have a 3 day qualifying hospital stay waiver?  No    Education Provided on the Discharge Plan: Yes  Persons Notified of Discharge Plans: Patient, wife  Patient/Family in Agreement with the Plan: Yes    Handoff Referral Completed: Yes    Additional Information:  Updated by unit SW that patient's insurance has denied rehab stay coverage. Patient will need to discharge directly home.     Spoke with PT/OT who states they continue to feel patient requires a rehab stay for his safety. They are aware insurance has denied coverage for rehab stay. They say alternative plan would be home with home health and 24/7 support. They acknowledge 24/7 support may not be a possibility but say that check ins throughout the day at a minimum would be necessary.     Met with patient to discuss discharge planning. Patient is agreeable to home health. He does not have a preference on home care agency and agrees with referral being sent to [facility]. He asked I speak with his wife as well. Call placed to wife who also agrees with referral being sent. Referral sent at this time for RN/PT/OT/HHA. [facility] unable to accept but [facility] is able to accept for all services. Home care orders placed.    Asked both patient and wife what assist at home looks like. Patient was unsure what hours his son and daughter work. Wife states their son works overnights and daughter works 2-10 pm. She was unsure how much support they can provide at home and asked RNCC call son directly. Call placed to patient's son, John. He did not answer so a voicemail was left requesting a call back.     Call placed to patient's daughter, Penny (ph: [phone]) to discuss her  "ability to assist at home. Penny states she works two jobs and \"wont sit around on days off,\" \"I have a life.\" She said at most, her 10 year old son can help microwave meals for patient when he gets home from school.     1402 Addendum  Spoke with son, John. Explained to John we feel patient needs check ins throughout the day to maintain safety - ie observation with ambulation and making simple microwave meals, ect. John states \"Yeah, I'll be around.\" John wouldn't elaborate much more than that.     Page placed to provider to update on above, waiting return call.    1538 Addendum:  Spoke with Dr. Koroma and provided update on above information. He states discharge plan is not ideal but will proceed with discharging patient tomorrow morning. When speaking with patient this afternoon, he states he does have people he can reach out to for a ride. Informed provider RNCC would be able to assist in arranging ride home if patient is unable to secure a ride.    [facility] - accepted for home RN/PT/OT/HHA  Ph: [phone]  Fax: [phone]    Sia Horan RN, BSN  6A RN Care Coordinator  Ph: [phone]   Pager: [phone]    " (0) No drift; limb holds 90 (or 45) degrees for full 10 secs

## 2025-02-14 NOTE — ED ADULT NURSE NOTE - OBJECTIVE STATEMENT
73 y/o F A&Ox3 PMH anxiety, HTN, HLD denies pertinent PSH presents to the ED from [facility] c/o blurry vision. Pt reports dizziness, headache, and b/l blurry vision starting when she woke up this morning. Pt reports LKW was last night before bed. Upon ED arrival Code Stroke activated. Pt taken immediately to CT with ED MD, RN, EDT and neurology. Pt breathing is even and unlabored. Skin is warm, dry & in tact. PT denies CP, SOB, vomiting, diarrhea, fevers, chills. IV access obtained. Comfort & safety provided.

## 2025-02-14 NOTE — ED PROVIDER NOTE - CLINICAL SUMMARY MEDICAL DECISION MAKING FREE TEXT BOX
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    ROS negative except as noted above.    GEN: Awake, AOx3, NAD.  HEENT: NCAT  CARDIO: RRR. Normal S1/S2, no m/r/g. No JVD.  RESP: CTAB  ABD: Soft, NTND. BS+. No masses, no hepatosplenomegaly.  : No CVAT.   MSK: No obvious deformity or ROM deficit. 2+ pulses x4. No edema.  SKIN: Warm, dry. No rashes. Nail beds without cyanosis or clubbing.  NEURO: Moves all four extremities spontaneously  PSYCH: Appropriate mood & affect.     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 Patient hemodynamically appropriate for age with exception of severely elevated blood pressure.  Unclear if this is symptomatic hypertension/hypertensive emergency  - CTs, Labs, Neuro C/S

## 2025-02-14 NOTE — ED CDU PROVIDER INITIAL DAY NOTE - PROGRESS NOTE DETAILS
Patient back from MRI, states that during exam her heart was pounding and now has chest pain. Patient in NAD, appears anxious, although conversational, placed on cardiac monitor. Will obtain EKG and troponin. Per RN, patient unable to complete contrast portion of MRI. Repeat EKG and case discussed with Dr. Major, will give patient pain  medication and IVF. Patient already took Aspirin and Plavix today. - Trini Garvin PA-C Patient back from MRI, states that during exam her heart was pounding and now has chest pain. Patient in NAD, appears anxious, although conversational, placed on cardiac monitor. Will obtain EKG and troponin. Per RN, only completed MRI without contrast. Repeat EKG and case discussed with Dr. Major, will give patient pain  medication and IVF. Patient already took Aspirin and Plavix today. - Trini Garvin PA-C

## 2025-02-14 NOTE — CONSULT NOTE ADULT - ATTENDING COMMENTS
I reviewed available diagnostic studies, and reviewed images personally. I agree with resident's history, exam, orders placed, and plan of care. Total care time spent, 75 min. This excludes any time spent on separate procedures or teaching. Medical issues needing to be addressed include: Evaluation for cerebral ischemia, HTN, HLD, COPD, anxiety, hprior stroke, macular degeneration, symptoms of dizziness, blurred vision, imbalance along with HA. Transient symptoms that are similar to her usual migraines. MRI negative, event unlikely tia/ischemic event. But CTA shows R carotid stenosis, several additional areas of plaque and stenosis of vessels intra and extracranially. Will need good risk factor control, ASA daily, plavix if needed from primary team's standpoint, LDL goal <70. Primary team obtaining [facility] surgery consult. Service provided on 2/15/2025.

## 2025-02-14 NOTE — ED CDU PROVIDER INITIAL DAY NOTE - OBJECTIVE STATEMENT
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 Reports associated headaches and anxiety. Currently being treated with Cipro for a UTI. Denies current urinary symptoms.   ED course: CODE STROKE. 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 Reports associated headaches and anxiety. Currently being treated with Cipro for a UTI. Denies current urinary symptoms.

## 2025-02-14 NOTE — CONSULT NOTE ADULT - SUBJECTIVE AND OBJECTIVE BOX
Neurology - Consult Note    Spectra: [phone] ([facility]), [phone] ([facility])    HPI: 74-year-old woman, reported prior medical history of anxiety, COPD, HLD, HTN, L shoulder dislocation, OA R knee, provoked DVT after surgery  (not on AC at this time), prior stroke (, R occipital lobe, residual LHH, on ASA 81mg qd), macular degeneration (receives injections in L eye), presenting for code stroke for multiple concerns including dizziness (room spinning) with imbalance, bilateral blurred vision, and headache.    On this presentation, reports dizziness is different than prior (more severe) but unable to advocate any other differences. States headache/nausea/dizziness/'blacking out of both eyes' is usual with her migraines (whole head) and today possibly feels similar. Reports nausea.   States room spinning sensation not worsened with looking to certain direction or provoked with head turning, but only when standing/ambulating and has been persistent since 1000 on presentation day.    Had also presented to ophthalmologist (retinal specialist) on presentation day, per patient provider stated she had bilateral vision loss? but per son ophthalmologist had reported unilateral vision loss and recommended she come in for further workup, no documentation available to corroborate.    At baseline does not ambulate with assistive devices, intact with ADLs  glucose 97  /109  NIHSS 4  LKW 2200 25  MRS 0  AC/AP: ASA 81 daily and ??plavix per patient, though unclear as patient does not fully know her medications    Also has recent UTI 2 days ago with burning with urination, started on antibiotics.    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 Villela    Not candidate for tenecteplase given outside of window.  Not candidate for thrombectomy as no LVO.      Review of Systems: NEUROLOGICAL: +As stated in HPI above  All other review of systems is negative unless indicated above.    Allergies:  No Known Allergies  NSAIDs (Other)      PMHx/PSHx/Family Hx: As above, otherwise see below   No pertinent past medical history    Essential hypertension    HLD (hyperlipidemia)    Anxiety    Osteoarthritis    History of closed shoulder dislocation    Class 1 obesity with body mass index (BMI) of 34.0 to 34.9 in adult    Osteoarthritis of left shoulder    Hypertension    Hyperlipidemia    Chronic obstructive pulmonary disease (COPD)    Osteoarthritis    Chronic lower back pain    Anxiety    Seasonal allergies        Social Hx:  as above    Vitals:  T(C): 37 (25 @ 18:55), Max: 37 (25 @ 18:55)  HR: 78 (25 @ 18:55) (71 - 78)  BP: 163/76 (25 @ 18:55) (163/76 - 182/109)  RR: 18 (25 @ 18:55) (18 - 20)  SpO2: 96% (25 @ 18:55) (96% - 98%)    Physical Examination:   General - non-toxic appearing female, mildly anxious initially  Neurologic Exam:  Mental status - Awake, Alert, Oriented to person, place, and partly to time (stated January, stated  then corrected self and reported 's day), reports correct age and . Speech fluent, repetition and naming intact. Follows simple commands complex not assessed.  Cranial nerves - Pupillary exam limited given dilation earlier, LHH, also with L eye inferior nasal visual field deficit, EOMI, face sensation (V1-V3) intact b/l however reports V2 decreased in L 50% compared to R 100% otherwise intact, facial strength intact without asymmetry b/l, hearing intact b/l, palate with symmetric elevation,  sternocleidomastiod 5/5 strength b/l, tongue midline on protrusion with full lateral movement. Head impulse test equivocal, no overt nystagmus appreciated, no overt vertical skew.  Motor - Normal bulk and tone throughout. No pronator drift.  Strength testing 5/5 UE, LE  Sensation - Light touch intact throughout UE, LE, impaired extinction in UE/LE  DTR's - not assessed for focused neuro exam  Coordination - Finger to Nose intact on R, slight past pointing with LUE however of note improves when FNT in R side, HTS roughly intact b/l  Gait and station - Cautious gait, needs to hold on to family/staff and takes a few steps but feels unsteady    Labs:                        13.9   7.81  )-----------( 220      ( 2025 18:23 )             44.5     02-14    143  |  106  |  21  ----------------------------<  110[H]  4.5   |  22  |  1.04    Ca    9.6      2025 18:23    TPro  8.1  /  Alb  4.3  /  TBili  0.3  /  DBili  x   /  AST  21  /  ALT  11  /  AlkPhos  88  -    CAPILLARY BLOOD GLUCOSE      POCT Blood Glucose.: 97 mg/dL (2025 18:13)    LIVER FUNCTIONS - ( 2025 18:23 )  Alb: 4.3 g/dL / Pro: 8.1 g/dL / ALK PHOS: 88 U/L / ALT: 11 U/L / AST: 21 U/L / GGT: x             PT/INR - ( 2025 18:23 )   PT: 13.1 sec;   INR: 1.15 ratio         PTT - ( 2025 18:23 )  PTT:28.5 sec    Radiology:  < from: CT Angio Head w/ IV Cont (23 @ 23:43) >/< from: CT Angio Neck w/ IV Cont (23 @ 23:51) >  IMPRESSION:  NONCONTRAST HEAD CT SCAN: No CT evidence of acute intracranial pathology. 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 Reconstitution of the distal right common carotid artery, likely via retrograde flow. Moderate stenosis of the proximal right internal carotid artery.    CT ANGIOGRAPHY BRAIN: Occlusion of the right proximal P2 segment. 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  POSTCONTRAST HEAD CT SCAN: No CT evidence of or intracranial mass lesion, abnormal enhancement or an arteriovenous malformation.  Dural venous sinuses and cavernous sinuses are patent. 1.6 cm right thyroid nodule. Consider further evaluation with nonemergent outpatient thyroid ultrasound as clinically indicated.    < from: MR Head No Cont (23 @ 03:34) >  IMPRESSION: Old right PCA infarct. Tiny foci restricted diffusion adjacent to this old right PCA infarct which could be compatible small areas of acute infarct. 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 Aneurysm as described above.  < end of copied text >    < from: Transthoracic Echocardiogram (12.15.20 @ 13:10) >  Conclusions:  1. Technically difficult images;  Grossly preserved left ejection fraction, however, endocardial border definition is limited, and segmental wall-motion abnormalities cannot be adequately assessed. Consider further limited evaluation  with echo contrast Definity for assessment of segmental wall motion if clinically indicated.  2. The right ventricle is not well visualized; probably preserved  right ventricular systolic function.  *** No previous Echo exam.  < end of copied text >    EKG personally reviewed and interpreted - NSR 76bpm, Q in III, QTc 474ms    CT brain 25  No acute intracranial hemorrhage or mass effect.  Stable chronic right PCA infarct and overall no significant change from   3/1/2024.    CT PERFUSION:  Right PCA chronic infarct reported as both Tmax and CBF abnormality.  No additional territorial ischemic deficit.    CTA NECK:  No significant change from 3/1/24.  Coarse calcified plaque at innominate artery with flow-limiting disease   of both CCA and subclavian artery. Severe stenosis and ultimate filling   of the right common carotid artery. Severe stenosis at right carotid   bifurcation with additional flow-limiting disease into ICA.  Left common and internal carotid arteries patent without hemodynamically   significant stenosis.  Right subclavian artery fills distally and possibility of subclavian   steal from right vertebral artery is raised.  Vertebral arteries patent throughout the neck, retrograde "steal" flow on   right could be confirmed on ultrasound.    CTA HEAD:  Chronic severe stenosis of right PCA at P2 segment, unchanged and   concordant with chronic infarct zone.  No additional intracranial arterial occlusion or high grade stenosis.       Neurology - Consult Note    Spectra: [phone] ([facility]), [phone] ([facility])    HPI: 74-year-old woman, reported prior medical history of anxiety, COPD, HLD, HTN, L shoulder dislocation, OA R knee, provoked DVT after surgery  (not on AC at this time), prior stroke (, R occipital lobe, residual LHH, on ASA 81mg qd), macular degeneration (receives injections in L eye), presenting for code stroke for multiple concerns including dizziness (room spinning) with imbalance, bilateral blurred vision, and headache.    On this presentation, reports dizziness is different than prior (more severe) but unable to advocate any other differences. States headache/nausea/dizziness/'blacking out of both eyes' is usual with her migraines (whole head) and today possibly feels similar. Reports nausea.   States room spinning sensation not worsened with looking to certain direction or provoked with head turning, but only when standing/ambulating and has been persistent since 1000 on presentation day.    Had also presented to ophthalmologist (retinal specialist) on presentation day, per patient provider stated she had bilateral vision loss? but per son ophthalmologist had reported unilateral vision loss and recommended she come in for further workup, no documentation available to corroborate.    At baseline does not ambulate with assistive devices, intact with ADLs  glucose 97  /109  NIHSS 4  LKW 2200 25  MRS 0  AC/AP: ASA 81 daily and ??plavix per patient, though unclear as patient does not fully know her medications    Also has recent UTI 2 days ago with burning with urination, started on antibiotics.    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 Villela    Not candidate for tenecteplase given outside of window.  Not candidate for thrombectomy as no LVO.      Review of Systems: NEUROLOGICAL: +As stated in HPI above  All other review of systems is negative unless indicated above.    Allergies:  No Known Allergies  NSAIDs (Other)      PMHx/PSHx/Family Hx: As above, otherwise see below   No pertinent past medical history    Essential hypertension    HLD (hyperlipidemia)    Anxiety    Osteoarthritis    History of closed shoulder dislocation    Class 1 obesity with body mass index (BMI) of 34.0 to 34.9 in adult    Osteoarthritis of left shoulder    Hypertension    Hyperlipidemia    Chronic obstructive pulmonary disease (COPD)    Osteoarthritis    Chronic lower back pain    Anxiety    Seasonal allergies        Social Hx:  as above    Vitals:  T(C): 37 (25 @ 18:55), Max: 37 (25 @ 18:55)  HR: 78 (25 @ 18:55) (71 - 78)  BP: 163/76 (25 @ 18:55) (163/76 - 182/109)  RR: 18 (25 @ 18:55) (18 - 20)  SpO2: 96% (25 @ 18:55) (96% - 98%)    Physical Examination:   General - non-toxic appearing female, mildly anxious initially  Neurologic Exam:  Mental status - Awake, Alert, Oriented to person, place, and partly to time (stated January, stated  then corrected self and reported 's day), reports correct age and . Speech fluent, repetition and naming intact. Follows simple commands complex not assessed.  Cranial nerves - Pupillary exam limited given dilation earlier, LHH, also with L eye inferior nasal visual field deficit, EOMI, face sensation (V1-V3) intact b/l however reports V2 decreased in L 50% compared to R 100% otherwise intact, facial strength intact without asymmetry b/l, hearing intact b/l, palate with symmetric elevation,  sternocleidomastiod 5/5 strength b/l, tongue midline on protrusion with full lateral movement. Head impulse test equivocal, no overt nystagmus appreciated, no overt vertical skew. VA with corrective lens b/l 20/200.  Motor - Normal bulk and tone throughout. No pronator drift.  Strength testing 5/5 UE, LE  Sensation - Light touch intact throughout UE, LE, impaired extinction in UE/LE  DTR's - not assessed for focused neuro exam  Coordination - Finger to Nose intact on R, slight past pointing with LUE however of note improves when FNT in R side, HTS roughly intact b/l  Gait and station - Cautious gait, needs to hold on to family/staff and takes a few steps but feels unsteady    Labs:                        13.9   7.81  )-----------( 220      ( 2025 18:23 )             44.5     02-14    143  |  106  |  21  ----------------------------<  110[H]  4.5   |  22  |  1.04    Ca    9.6      2025 18:23    TPro  8.1  /  Alb  4.3  /  TBili  0.3  /  DBili  x   /  AST  21  /  ALT  11  /  AlkPhos  88      CAPILLARY BLOOD GLUCOSE      POCT Blood Glucose.: 97 mg/dL (2025 18:13)    LIVER FUNCTIONS - ( 2025 18:23 )  Alb: 4.3 g/dL / Pro: 8.1 g/dL / ALK PHOS: 88 U/L / ALT: 11 U/L / AST: 21 U/L / GGT: x             PT/INR - ( 2025 18:23 )   PT: 13.1 sec;   INR: 1.15 ratio         PTT - ( 2025 18:23 )  PTT:28.5 sec    Radiology:  < from: CT Angio Head w/ IV Cont (23 @ 23:43) >/< from: CT Angio Neck w/ IV Cont (23 @ 23:51) >  IMPRESSION:  NONCONTRAST HEAD CT SCAN: No CT evidence of acute intracranial pathology. 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 Reconstitution of the distal right common carotid artery, likely via retrograde flow. Moderate stenosis of the proximal right internal carotid artery.    CT ANGIOGRAPHY BRAIN: Occlusion of the right proximal P2 segment. 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  POSTCONTRAST HEAD CT SCAN: No CT evidence of or intracranial mass lesion, abnormal enhancement or an arteriovenous malformation.  Dural venous sinuses and cavernous sinuses are patent. 1.6 cm right thyroid nodule. Consider further evaluation with nonemergent outpatient thyroid ultrasound as clinically indicated.    < from: MR Head No Cont (12.29.23 @ 03:34) >  IMPRESSION: Old right PCA infarct. Tiny foci restricted diffusion adjacent to this old right PCA infarct which could be compatible small areas of acute infarct. 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 Aneurysm as described above.  < end of copied text >    < from: Transthoracic Echocardiogram (15.20 @ 13:10) >  Conclusions:  1. Technically difficult images;  Grossly preserved left ejection fraction, however, endocardial border definition is limited, and segmental wall-motion abnormalities cannot be adequately assessed. Consider further limited evaluation  with echo contrast Definity for assessment of segmental wall motion if clinically indicated.  2. The right ventricle is not well visualized; probably preserved  right ventricular systolic function.  *** No previous Echo exam.  < end of copied text >    EKG personally reviewed and interpreted - NSR 76bpm, Q in III, QTc 474ms    CT brain 25  No acute intracranial hemorrhage or mass effect.  Stable chronic right PCA infarct and overall no significant change from   3/1/2024.    CT PERFUSION:  Right PCA chronic infarct reported as both Tmax and CBF abnormality.  No additional territorial ischemic deficit.    CTA NECK:  No significant change from 3/1/24.  Coarse calcified plaque at innominate artery with flow-limiting disease   of both CCA and subclavian artery. Severe stenosis and ultimate filling   of the right common carotid artery. Severe stenosis at right carotid   bifurcation with additional flow-limiting disease into ICA.  Left common and internal carotid arteries patent without hemodynamically   significant stenosis.  Right subclavian artery fills distally and possibility of subclavian   steal from right vertebral artery is raised.  Vertebral arteries patent throughout the neck, retrograde "steal" flow on   right could be confirmed on ultrasound.    CTA HEAD:  Chronic severe stenosis of right PCA at P2 segment, unchanged and   concordant with chronic infarct zone.  No additional intracranial arterial occlusion or high grade stenosis.       Neurology - Consult Note    Spectra: [phone] ([facility]), [phone] ([facility])    HPI: 74-year-old woman, L handed, reported prior medical history of anxiety, COPD, HLD, HTN, L shoulder dislocation, OA R knee, provoked DVT after surgery  (not on AC at this time), prior stroke (, R occipital lobe, residual LHH, on ASA 81mg qd), macular degeneration (receives injections in L eye), presenting for code stroke for multiple concerns including dizziness (room spinning) with imbalance, bilateral blurred vision, and headache.    On this presentation, reports dizziness is different than prior (more severe) but unable to advocate any other differences. States headache/nausea/dizziness/'blacking out of both eyes' is usual with her migraines (whole head) and today possibly feels similar. Reports nausea.   States room spinning sensation not worsened with looking to certain direction or provoked with head turning, but only when standing/ambulating and has been persistent since 1000 on presentation day.    Had also presented to ophthalmologist (retinal specialist) on presentation day, per patient provider stated she had bilateral vision loss? but per son ophthalmologist had reported unilateral vision loss and recommended she come in for further workup, no documentation available to corroborate.    At baseline does not ambulate with assistive devices, intact with ADLs  glucose 97  /109  NIHSS 4  LKW 2200 25  MRS 0  AC/AP: ASA 81 daily and ??plavix per patient, though unclear as patient does not fully know her medications    Also has recent UTI 2 days ago with burning with urination, started on antibiotics.    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 Villela    Not candidate for tenecteplase given outside of window.  Not candidate for thrombectomy as no LVO.      Review of Systems: NEUROLOGICAL: +As stated in HPI above  All other review of systems is negative unless indicated above.    Allergies:  No Known Allergies  NSAIDs (Other)      PMHx/PSHx/Family Hx: As above, otherwise see below   No pertinent past medical history    Essential hypertension    HLD (hyperlipidemia)    Anxiety    Osteoarthritis    History of closed shoulder dislocation    Class 1 obesity with body mass index (BMI) of 34.0 to 34.9 in adult    Osteoarthritis of left shoulder    Hypertension    Hyperlipidemia    Chronic obstructive pulmonary disease (COPD)    Osteoarthritis    Chronic lower back pain    Anxiety    Seasonal allergies        Social Hx:  as above    Vitals:  T(C): 37 (25 @ 18:55), Max: 37 (25 @ 18:55)  HR: 78 (25 @ 18:55) (71 - 78)  BP: 163/76 (25 @ 18:55) (163/76 - 182/109)  RR: 18 (25 @ 18:55) (18 - 20)  SpO2: 96% (25 @ 18:55) (96% - 98%)    Physical Examination:   General - non-toxic appearing female, mildly anxious initially  Neurologic Exam:  Mental status - Awake, Alert, Oriented to person, place, and partly to time (stated January, stated  then corrected self and reported 's day), reports correct age and . Speech fluent, repetition and naming intact. Follows simple commands complex not assessed.  Cranial nerves - Pupillary exam limited given dilation earlier, LHH, also with L eye inferior nasal visual field deficit, EOMI, face sensation (V1-V3) intact b/l however reports V2 decreased in L 50% compared to R 100% otherwise intact, facial strength intact without asymmetry b/l, hearing intact b/l, palate with symmetric elevation,  sternocleidomastiod 5/5 strength b/l, tongue midline on protrusion with full lateral movement. Head impulse test equivocal, no overt nystagmus appreciated, no overt vertical skew. VA with corrective lens b/l 20/200.  Motor - Normal bulk and tone throughout. No pronator drift.  Strength testing 5/5 UE, LE  Sensation - Light touch intact throughout UE, LE, impaired extinction in UE/LE  DTR's - not assessed for focused neuro exam  Coordination - Finger to Nose intact on R, slight past pointing with LUE however of note improves when FNT in R side, HTS roughly intact b/l  Gait and station - Cautious gait, needs to hold on to family/staff and takes a few steps but feels unsteady    Labs:                        13.9   7.81  )-----------( 220      ( 2025 18:23 )             44.5     02-14    143  |  106  |  21  ----------------------------<  110[H]  4.5   |  22  |  1.04    Ca    9.6      2025 18:23    TPro  8.1  /  Alb  4.3  /  TBili  0.3  /  DBili  x   /  AST  21  /  ALT  11  /  AlkPhos  88      CAPILLARY BLOOD GLUCOSE      POCT Blood Glucose.: 97 mg/dL (2025 18:13)    LIVER FUNCTIONS - ( 2025 18:23 )  Alb: 4.3 g/dL / Pro: 8.1 g/dL / ALK PHOS: 88 U/L / ALT: 11 U/L / AST: 21 U/L / GGT: x             PT/INR - ( 2025 18:23 )   PT: 13.1 sec;   INR: 1.15 ratio         PTT - ( 2025 18:23 )  PTT:28.5 sec    Radiology:  < from: CT Angio Head w/ IV Cont (23 @ 23:43) >/< from: CT Angio Neck w/ IV Cont (23 @ 23:51) >  IMPRESSION:  NONCONTRAST HEAD CT SCAN: No CT evidence of acute intracranial pathology. 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 Reconstitution of the distal right common carotid artery, likely via retrograde flow. Moderate stenosis of the proximal right internal carotid artery.    CT ANGIOGRAPHY BRAIN: Occlusion of the right proximal P2 segment. 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  POSTCONTRAST HEAD CT SCAN: No CT evidence of or intracranial mass lesion, abnormal enhancement or an arteriovenous malformation.  Dural venous sinuses and cavernous sinuses are patent. 1.6 cm right thyroid nodule. Consider further evaluation with nonemergent outpatient thyroid ultrasound as clinically indicated.    < from: MR Head No Cont (.29.23 @ 03:34) >  IMPRESSION: Old right PCA infarct. Tiny foci restricted diffusion adjacent to this old right PCA infarct which could be compatible small areas of acute infarct. 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 Aneurysm as described above.  < end of copied text >    < from: Transthoracic Echocardiogram (15.20 @ 13:10) >  Conclusions:  1. Technically difficult images;  Grossly preserved left ejection fraction, however, endocardial border definition is limited, and segmental wall-motion abnormalities cannot be adequately assessed. Consider further limited evaluation  with echo contrast Definity for assessment of segmental wall motion if clinically indicated.  2. The right ventricle is not well visualized; probably preserved  right ventricular systolic function.  *** No previous Echo exam.  < end of copied text >    EKG personally reviewed and interpreted - NSR 76bpm, Q in III, QTc 474ms    CT brain 25  No acute intracranial hemorrhage or mass effect.  Stable chronic right PCA infarct and overall no significant change from   3/1/2024.    CT PERFUSION:  Right PCA chronic infarct reported as both Tmax and CBF abnormality.  No additional territorial ischemic deficit.    CTA NECK:  No significant change from 3/1/24.  Coarse calcified plaque at innominate artery with flow-limiting disease   of both CCA and subclavian artery. Severe stenosis and ultimate filling   of the right common carotid artery. Severe stenosis at right carotid   bifurcation with additional flow-limiting disease into ICA.  Left common and internal carotid arteries patent without hemodynamically   significant stenosis.  Right subclavian artery fills distally and possibility of subclavian   steal from right vertebral artery is raised.  Vertebral arteries patent throughout the neck, retrograde "steal" flow on   right could be confirmed on ultrasound.    CTA HEAD:  Chronic severe stenosis of right PCA at P2 segment, unchanged and   concordant with chronic infarct zone.  No additional intracranial arterial occlusion or high grade stenosis.       Neurology - Consult Note    Spectra: [phone] ([facility]), [phone] ([facility])    HPI: 74-year-old woman, L handed, reported prior medical history of anxiety, COPD, HLD, HTN, L shoulder dislocation, OA R knee, provoked DVT after surgery  (not on AC at this time), prior stroke (, R occipital lobe, residual LHH, on ASA 81mg qd), macular degeneration (receives injections in L eye), presenting for code stroke for multiple concerns including dizziness (room spinning) with imbalance, bilateral blurred vision, and headache.    On this presentation, reports dizziness is different than prior (more severe) but unable to advocate any other differences. States headache/nausea/dizziness/'blacking out of both eyes' is usual with her migraines (whole head) and today possibly feels similar. Reports nausea.   States room spinning sensation not worsened with looking to certain direction or provoked with head turning, but only when standing/ambulating and has been persistent since 1000 on presentation day.    Had also presented to ophthalmologist (retinal specialist) on presentation day, per patient provider stated she had bilateral vision loss? but per son ophthalmologist had reported unilateral vision loss and recommended she come in for further workup, no documentation available to corroborate.    At baseline does not ambulate with assistive devices, intact with ADLs  glucose 97  /109  NIHSS 4  LKW 2200 25  MRS 0  AC/AP: ASA 81 daily and ??plavix per patient, though unclear as patient does not fully know her medications    Also has recent UTI 2 days ago with burning with urination, started on antibiotics.    Of note has previously presented to the hospital multiple times for headache/lighteadedness, dizziness:   -23 - 24 hospital admission for vertigo, nausea, difficulty ambulating; found to have R CC and R ICA occlusion, R brachiocephalic artery occlusion, R PCA occlusion, no emergent intervention recommended was continued on ASA and statin  -3/1/24 - noted at this time to have +UA, was recommended for admission but patient preferred to be discharged    Outside chart review:  2024  Seen by [facility] for coagulopathy visit. 2024 had a left central retinal vein occlusion found to possibly have a monoclonal gammopathy. These are associated with central retinal vein occlusions in the setting of hyperviscosity.   A lupus type anticoagulant or anticardiolipin antibodies are also potential etiologies, pending further workup    3/2024 Seen by NSGY Dr hawkins outpatient given CTA findings.   Was recommended for DAPT for stroke prevention, follow up with stroke neurology team.    No substance use  At baseline intact with ADLs  FH MI in mother  Outpatient neuroptho Dr. Villela    Not candidate for tenecteplase given outside of window.  Not candidate for thrombectomy as no LVO.      Review of Systems: NEUROLOGICAL: +As stated in HPI above  All other review of systems is negative unless indicated above.    Allergies:  No Known Allergies  NSAIDs (Other)      PMHx/PSHx/Family Hx: As above, otherwise see below   No pertinent past medical history    Essential hypertension    HLD (hyperlipidemia)    Anxiety    Osteoarthritis    History of closed shoulder dislocation    Class 1 obesity with body mass index (BMI) of 34.0 to 34.9 in adult    Osteoarthritis of left shoulder    Hypertension    Hyperlipidemia    Chronic obstructive pulmonary disease (COPD)    Osteoarthritis    Chronic lower back pain    Anxiety    Seasonal allergies        Social Hx:  as above    Vitals:  T(C): 37 (25 @ 18:55), Max: 37 (25 @ 18:55)  HR: 78 (-25 @ 18:55) (71 - 78)  BP: 163/76 (--25 @ 18:55) (163/76 - 182/109)  RR: 18 (--25 @ 18:55) (18 - 20)  SpO2: 96% (-25 @ 18:55) (96% - 98%)    Physical Examination:   General - non-toxic appearing female, mildly anxious initially  Neurologic Exam:  Mental status - Awake, Alert, Oriented to person, place, and partly to time (stated January, stated  then corrected self and reported February solis's day), reports correct age and . Speech fluent, repetition and naming intact. Follows simple commands complex not assessed.  Cranial nerves - Pupillary exam limited given dilation earlier, LHH, also with L eye inferior nasal visual field deficit, EOMI, face sensation (V1-V3) intact b/l however reports V2 decreased in L 50% compared to R 100% otherwise intact, facial strength intact without asymmetry b/l, hearing intact b/l, palate with symmetric elevation,  sternocleidomastiod 5/5 strength b/l, tongue midline on protrusion with full lateral movement. Head impulse test equivocal, no overt nystagmus appreciated, no overt vertical skew. VA with corrective lens b/l 20/200.  Motor - Normal bulk and tone throughout. No pronator drift.  Strength testing 5/5 UE, LE  Sensation - Light touch intact throughout UE, LE, impaired extinction in UE/LE  DTR's - not assessed for focused neuro exam  Coordination - Finger to Nose intact on R, slight past pointing with LUE however of note improves when FNT in R side, HTS roughly intact b/l  Gait and station - Cautious gait, needs to hold on to family/staff and takes a few steps but feels unsteady    Labs:                        13.9   7.81  )-----------( 220      ( 2025 18:23 )             44.5     02-14    143  |  106  |  21  ----------------------------<  110[H]  4.5   |  22  |  1.04    Ca    9.6      2025 18:23    TPro  8.1  /  Alb  4.3  /  TBili  0.3  /  DBili  x   /  AST  21  /  ALT  11  /  AlkPhos  88  -14    CAPILLARY BLOOD GLUCOSE      POCT Blood Glucose.: 97 mg/dL (2025 18:13)    LIVER FUNCTIONS - ( 2025 18:23 )  Alb: 4.3 g/dL / Pro: 8.1 g/dL / ALK PHOS: 88 U/L / ALT: 11 U/L / AST: 21 U/L / GGT: x             PT/INR - ( 2025 18:23 )   PT: 13.1 sec;   INR: 1.15 ratio         PTT - ( 2025 18:23 )  PTT:28.5 sec    Radiology:  < from: CT Angio Head w/ IV Cont (23 @ 23:43) >/< from: CT Angio Neck w/ IV Cont (23 @ 23:51) >  IMPRESSION:  NONCONTRAST HEAD CT SCAN: No CT evidence of acute intracranial pathology. 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 Reconstitution of the distal right common carotid artery, likely via retrograde flow. Moderate stenosis of the proximal right internal carotid artery.    CT ANGIOGRAPHY BRAIN: Occlusion of the right proximal P2 segment. 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  POSTCONTRAST HEAD CT SCAN: No CT evidence of or intracranial mass lesion, abnormal enhancement or an arteriovenous malformation.  Dural venous sinuses and cavernous sinuses are patent. 1.6 cm right thyroid nodule. Consider further evaluation with nonemergent outpatient thyroid ultrasound as clinically indicated.    < from: MR Head No Cont (. @ 03:34) >  IMPRESSION: Old right PCA infarct. Tiny foci restricted diffusion adjacent to this old right PCA infarct which could be compatible small areas of acute infarct. 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 Aneurysm as described above.  < end of copied text >    < from: Transthoracic Echocardiogram (15.20 @ 13:10) >  Conclusions:  1. Technically difficult images;  Grossly preserved left ejection fraction, however, endocardial border definition is limited, and segmental wall-motion abnormalities cannot be adequately assessed. Consider further limited evaluation  with echo contrast Definity for assessment of segmental wall motion if clinically indicated.  2. The right ventricle is not well visualized; probably preserved  right ventricular systolic function.  *** No previous Echo exam.  < end of copied text >    EKG personally reviewed and interpreted - NSR 76bpm, Q in III, QTc 474ms    CT brain 25  No acute intracranial hemorrhage or mass effect.  Stable chronic right PCA infarct and overall no significant change from   3/1/2024.    CT PERFUSION:  Right PCA chronic infarct reported as both Tmax and CBF abnormality.  No additional territorial ischemic deficit.    CTA NECK:  No significant change from 3/1/24.  Coarse calcified plaque at innominate artery with flow-limiting disease   of both CCA and subclavian artery. Severe stenosis and ultimate filling   of the right common carotid artery. Severe stenosis at right carotid   bifurcation with additional flow-limiting disease into ICA.  Left common and internal carotid arteries patent without hemodynamically   significant stenosis.  Right subclavian artery fills distally and possibility of subclavian   steal from right vertebral artery is raised.  Vertebral arteries patent throughout the neck, retrograde "steal" flow on   right could be confirmed on ultrasound.    CTA HEAD:  Chronic severe stenosis of right PCA at P2 segment, unchanged and   concordant with chronic infarct zone.  No additional intracranial arterial occlusion or high grade stenosis.       Neurology - Consult Note    Spectra: [phone] ([facility]), [phone] ([facility])    HPI: 74-year-old woman, L handed, reported prior medical history of anxiety, COPD, HLD, HTN, L shoulder dislocation, OA R knee, provoked DVT after surgery  (not on AC at this time), prior stroke (, R occipital lobe, residual LHH), macular degeneration (receives injections in L eye), presenting for code stroke for multiple concerns including dizziness (room spinning) with imbalance, bilateral blurred vision, and headache.    On this presentation, reports dizziness is different than prior (more severe) but unable to advocate any other differences. States headache/nausea/dizziness/'blacking out of both eyes' is usual with her migraines (whole head) and today possibly feels similar. Reports nausea.   States room spinning sensation not worsened with looking to certain direction or provoked with head turning, but only when standing/ambulating and has been persistent since 1000 on presentation day.    Had also presented to ophthalmologist (retinal specialist) on presentation day, per patient provider stated she had bilateral vision loss? but per son ophthalmologist had reported unilateral vision loss and recommended she come in for further workup, no documentation available to corroborate.    At baseline does not ambulate with assistive devices, intact with ADLs  glucose 97  /109  NIHSS 4  LKW 2200 25  MRS 0  AC/AP: ASA 81 daily and ??plavix per patient, though unclear as patient does not fully know her medications    Also has recent UTI 2 days ago with burning with urination, started on antibiotics.    Of note has previously presented to the hospital multiple times for headache/lighteadedness, dizziness:   -23 - 24 hospital admission for vertigo, nausea, difficulty ambulating; found to have R CC and R ICA occlusion, R brachiocephalic artery occlusion, R PCA occlusion, no emergent intervention recommended was continued on ASA and statin  -3/1/24 - noted at this time to have +UA, was recommended for admission but patient preferred to be discharged    Outside chart review:  2024  Seen by [facility] for coagulopathy visit. 2024 had a left central retinal vein occlusion found to possibly have a monoclonal gammopathy. These are associated with central retinal vein occlusions in the setting of hyperviscosity.   A lupus type anticoagulant or anticardiolipin antibodies are also potential etiologies, pending further workup    3/2024 Seen by NSGY Dr hawkins outpatient given CTA findings.   Was recommended for DAPT for stroke prevention, follow up with stroke neurology team.    No substance use  At baseline intact with ADLs  FH MI in mother  Outpatient neuroptho Dr. Villela    Not candidate for tenecteplase given outside of window.  Not candidate for thrombectomy as no LVO.      Review of Systems: NEUROLOGICAL: +As stated in HPI above  All other review of systems is negative unless indicated above.    Allergies:  No Known Allergies  NSAIDs (Other)      PMHx/PSHx/Family Hx: As above, otherwise see below   No pertinent past medical history    Essential hypertension    HLD (hyperlipidemia)    Anxiety    Osteoarthritis    History of closed shoulder dislocation    Class 1 obesity with body mass index (BMI) of 34.0 to 34.9 in adult    Osteoarthritis of left shoulder    Hypertension    Hyperlipidemia    Chronic obstructive pulmonary disease (COPD)    Osteoarthritis    Chronic lower back pain    Anxiety    Seasonal allergies        Social Hx:  as above    Vitals:  T(C): 37 (25 @ 18:55), Max: 37 (25 @ 18:55)  HR: 78 (25 @ 18:55) (71 - 78)  BP: 163/76 (25 @ 18:55) (163/76 - 182/109)  RR: 18 (25 @ 18:55) (18 - 20)  SpO2: 96% (25 @ 18:55) (96% - 98%)    Physical Examination:   General - non-toxic appearing female, mildly anxious initially  Neurologic Exam:  Mental status - Awake, Alert, Oriented to person, place, and partly to time (stated January, stated  then corrected self and reported February solis's day), reports correct age and . Speech fluent, repetition and naming intact. Follows simple commands complex not assessed.  Cranial nerves - Pupillary exam limited given dilation earlier, LHH, also with L eye inferior nasal visual field deficit, EOMI, face sensation (V1-V3) intact b/l however reports V2 decreased in L 50% compared to R 100% otherwise intact, facial strength intact without asymmetry b/l, hearing intact b/l, palate with symmetric elevation,  sternocleidomastiod 5/5 strength b/l, tongue midline on protrusion with full lateral movement. Head impulse test equivocal, no overt nystagmus appreciated, no overt vertical skew. VA with corrective lens b/l 20/200.  Motor - Normal bulk and tone throughout. No pronator drift.  Strength testing 5/5 UE, LE  Sensation - Light touch intact throughout UE, LE, impaired extinction in UE/LE  DTR's - not assessed for focused neuro exam  Coordination - Finger to Nose intact on R, slight past pointing with LUE however of note improves when FNT in R side, HTS roughly intact b/l  Gait and station - Cautious gait, needs to hold on to family/staff and takes a few steps but feels unsteady    Labs:                        13.9   7.81  )-----------( 220      ( 2025 18:23 )             44.5     02-14    143  |  106  |  21  ----------------------------<  110[H]  4.5   |  22  |  1.04    Ca    9.6      2025 18:23    TPro  8.1  /  Alb  4.3  /  TBili  0.3  /  DBili  x   /  AST  21  /  ALT  11  /  AlkPhos  88  -14    CAPILLARY BLOOD GLUCOSE      POCT Blood Glucose.: 97 mg/dL (2025 18:13)    LIVER FUNCTIONS - ( 2025 18:23 )  Alb: 4.3 g/dL / Pro: 8.1 g/dL / ALK PHOS: 88 U/L / ALT: 11 U/L / AST: 21 U/L / GGT: x             PT/INR - ( 2025 18:23 )   PT: 13.1 sec;   INR: 1.15 ratio         PTT - ( 2025 18:23 )  PTT:28.5 sec    Radiology:  < from: CT Angio Head w/ IV Cont (23 @ 23:43) >/< from: CT Angio Neck w/ IV Cont (23 @ 23:51) >  IMPRESSION:  NONCONTRAST HEAD CT SCAN: No CT evidence of acute intracranial pathology. 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 Reconstitution of the distal right common carotid artery, likely via retrograde flow. Moderate stenosis of the proximal right internal carotid artery.    CT ANGIOGRAPHY BRAIN: Occlusion of the right proximal P2 segment. 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  POSTCONTRAST HEAD CT SCAN: No CT evidence of or intracranial mass lesion, abnormal enhancement or an arteriovenous malformation.  Dural venous sinuses and cavernous sinuses are patent. 1.6 cm right thyroid nodule. Consider further evaluation with nonemergent outpatient thyroid ultrasound as clinically indicated.    < from: MR Head No Cont (. @ 03:34) >  IMPRESSION: Old right PCA infarct. Tiny foci restricted diffusion adjacent to this old right PCA infarct which could be compatible small areas of acute infarct. 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 Aneurysm as described above.  < end of copied text >    < from: Transthoracic Echocardiogram (12.15.20 @ 13:10) >  Conclusions:  1. Technically difficult images;  Grossly preserved left ejection fraction, however, endocardial border definition is limited, and segmental wall-motion abnormalities cannot be adequately assessed. Consider further limited evaluation  with echo contrast Definity for assessment of segmental wall motion if clinically indicated.  2. The right ventricle is not well visualized; probably preserved  right ventricular systolic function.  *** No previous Echo exam.  < end of copied text >    EKG personally reviewed and interpreted - NSR 76bpm, Q in III, QTc 474ms    CT brain 25  No acute intracranial hemorrhage or mass effect.  Stable chronic right PCA infarct and overall no significant change from   3/1/2024.    CT PERFUSION:  Right PCA chronic infarct reported as both Tmax and CBF abnormality.  No additional territorial ischemic deficit.    CTA NECK:  No significant change from 3/1/24.  Coarse calcified plaque at innominate artery with flow-limiting disease   of both CCA and subclavian artery. Severe stenosis and ultimate filling   of the right common carotid artery. Severe stenosis at right carotid   bifurcation with additional flow-limiting disease into ICA.  Left common and internal carotid arteries patent without hemodynamically   significant stenosis.  Right subclavian artery fills distally and possibility of subclavian   steal from right vertebral artery is raised.  Vertebral arteries patent throughout the neck, retrograde "steal" flow on   right could be confirmed on ultrasound.    CTA HEAD:  Chronic severe stenosis of right PCA at P2 segment, unchanged and   concordant with chronic infarct zone.  No additional intracranial arterial occlusion or high grade stenosis.

## 2025-02-14 NOTE — ED ADULT NURSE NOTE - NSFALLRISKINTERV_ED_ALL_ED
Assistance OOB with selected safe patient handling equipment if applicable/Assistance with ambulation/Communicate fall risk and risk factors to all staff, patient, and family/Monitor gait and stability/Provide visual cue: yellow wristband, yellow gown, etc/Reinforce activity limits and safety measures with patient and family/Call bell, personal items and telephone in reach/Instruct patient to call for assistance before getting out of bed/chair/stretcher/Non-slip footwear applied when patient is off stretcher/[place] to call system/Physically safe environment - no spills, clutter or unnecessary equipment/Purposeful Proactive Rounding/Room/bathroom lighting operational, light cord in reach

## 2025-02-15 ENCOUNTER — RESULT REVIEW (OUTPATIENT)
Age: 75
End: 2025-02-15

## 2025-02-15 DIAGNOSIS — H53.8 OTHER VISUAL DISTURBANCES: ICD-10-CM

## 2025-02-15 LAB
ADD ON TEST-SPECIMEN IN LAB: SIGNIFICANT CHANGE UP
ADD ON TEST-SPECIMEN IN LAB: SIGNIFICANT CHANGE UP
ALBUMIN SERPL ELPH-MCNC: 3.9 G/DL — SIGNIFICANT CHANGE UP (ref 3.3–5)
ALP SERPL-CCNC: 79 U/L — SIGNIFICANT CHANGE UP (ref 40–120)
ALT FLD-CCNC: 9 U/L — LOW (ref 10–45)
ANION GAP SERPL CALC-SCNC: 12 MMOL/L — SIGNIFICANT CHANGE UP (ref 5–17)
AST SERPL-CCNC: 15 U/L — SIGNIFICANT CHANGE UP (ref 10–40)
BILIRUB SERPL-MCNC: 0.4 MG/DL — SIGNIFICANT CHANGE UP (ref 0.2–1.2)
BUN SERPL-MCNC: 21 MG/DL — SIGNIFICANT CHANGE UP (ref 7–23)
CALCIUM SERPL-MCNC: 9.1 MG/DL — SIGNIFICANT CHANGE UP (ref 8.4–10.5)
CHLORIDE SERPL-SCNC: 108 MMOL/L — SIGNIFICANT CHANGE UP (ref 96–108)
CHOLEST SERPL-MCNC: 234 MG/DL — HIGH
CO2 SERPL-SCNC: 22 MMOL/L — SIGNIFICANT CHANGE UP (ref 22–31)
CREAT SERPL-MCNC: 1.05 MG/DL — SIGNIFICANT CHANGE UP (ref 0.5–1.3)
EGFR: 55 ML/MIN/1.73M2 — LOW
ERYTHROCYTE [SEDIMENTATION RATE] IN BLOOD: 22 MM/HR — HIGH (ref 0–20)
GLUCOSE SERPL-MCNC: 115 MG/DL — HIGH (ref 70–99)
HDLC SERPL-MCNC: 53 MG/DL — SIGNIFICANT CHANGE UP
LIPID PNL WITH DIRECT LDL SERPL: 149 MG/DL — HIGH
MAGNESIUM SERPL-MCNC: 2.1 MG/DL — SIGNIFICANT CHANGE UP (ref 1.6–2.6)
NON HDL CHOLESTEROL: 182 MG/DL — HIGH
POTASSIUM SERPL-MCNC: 3.9 MMOL/L — SIGNIFICANT CHANGE UP (ref 3.5–5.3)
POTASSIUM SERPL-SCNC: 3.9 MMOL/L — SIGNIFICANT CHANGE UP (ref 3.5–5.3)
PROT SERPL-MCNC: 7.2 G/DL — SIGNIFICANT CHANGE UP (ref 6–8.3)
SODIUM SERPL-SCNC: 142 MMOL/L — SIGNIFICANT CHANGE UP (ref 135–145)
TRIGL SERPL-MCNC: 182 MG/DL — HIGH
TROPONIN T, HIGH SENSITIVITY RESULT: 23 NG/L — SIGNIFICANT CHANGE UP (ref 0–51)
TROPONIN T, HIGH SENSITIVITY RESULT: 28 NG/L — SIGNIFICANT CHANGE UP (ref 0–51)

## 2025-02-15 PROCEDURE — 93306 TTE W/DOPPLER COMPLETE: CPT | Mod: 26

## 2025-02-15 PROCEDURE — 99223 1ST HOSP IP/OBS HIGH 75: CPT

## 2025-02-15 PROCEDURE — 99233 SBSQ HOSP IP/OBS HIGH 50: CPT | Mod: FS

## 2025-02-15 RX ORDER — FLUTICASONE PROPIONATE 50 UG/1
1 SPRAY, METERED NASAL
Refills: 0 | Status: DISCONTINUED | OUTPATIENT
Start: 2025-02-15 | End: 2025-02-21

## 2025-02-15 RX ORDER — MELATONIN 5 MG
3 TABLET ORAL ONCE
Refills: 0 | Status: COMPLETED | OUTPATIENT
Start: 2025-02-15 | End: 2025-02-15

## 2025-02-15 RX ORDER — MELATONIN 5 MG
3 TABLET ORAL AT BEDTIME
Refills: 0 | Status: DISCONTINUED | OUTPATIENT
Start: 2025-02-15 | End: 2025-02-21

## 2025-02-15 RX ORDER — ALPRAZOLAM 0.5 MG
0.25 TABLET, EXTENDED RELEASE 24 HR ORAL EVERY 6 HOURS
Refills: 0 | Status: DISCONTINUED | OUTPATIENT
Start: 2025-02-15 | End: 2025-02-21

## 2025-02-15 RX ORDER — ATORVASTATIN CALCIUM 80 MG/1
40 TABLET, FILM COATED ORAL AT BEDTIME
Refills: 0 | Status: DISCONTINUED | OUTPATIENT
Start: 2025-02-15 | End: 2025-02-21

## 2025-02-15 RX ORDER — AZELASTINE HYDROCHLORIDE 137 UG/1
1 SPRAY, METERED NASAL
Refills: 0 | DISCHARGE

## 2025-02-15 RX ORDER — ACETAMINOPHEN 500 MG/5ML
650 LIQUID (ML) ORAL EVERY 6 HOURS
Refills: 0 | Status: DISCONTINUED | OUTPATIENT
Start: 2025-02-15 | End: 2025-02-21

## 2025-02-15 RX ORDER — HYPROMELLOSE 0.4 %
1 DROPS OPHTHALMIC (EYE)
Refills: 0 | Status: DISCONTINUED | OUTPATIENT
Start: 2025-02-15 | End: 2025-02-21

## 2025-02-15 RX ORDER — ALPRAZOLAM 0.5 MG
0.25 TABLET, EXTENDED RELEASE 24 HR ORAL ONCE
Refills: 0 | Status: DISCONTINUED | OUTPATIENT
Start: 2025-02-15 | End: 2025-02-15

## 2025-02-15 RX ORDER — ACETAMINOPHEN 500 MG/5ML
1000 LIQUID (ML) ORAL ONCE
Refills: 0 | Status: COMPLETED | OUTPATIENT
Start: 2025-02-15 | End: 2025-02-15

## 2025-02-15 RX ADMIN — ATORVASTATIN CALCIUM 40 MILLIGRAM(S): 80 TABLET, FILM COATED ORAL at 22:10

## 2025-02-15 RX ADMIN — Medication 400 MILLIGRAM(S): at 09:45

## 2025-02-15 RX ADMIN — Medication 250 MILLILITER(S): at 00:06

## 2025-02-15 RX ADMIN — Medication 3 MILLIGRAM(S): at 03:42

## 2025-02-15 RX ADMIN — Medication 1000 MILLIGRAM(S): at 10:14

## 2025-02-15 RX ADMIN — Medication 0.25 MILLIGRAM(S): at 10:08

## 2025-02-15 RX ADMIN — SERTRALINE 25 MILLIGRAM(S): 100 TABLET, FILM COATED ORAL at 10:07

## 2025-02-15 RX ADMIN — Medication 1 DROP(S): at 17:28

## 2025-02-15 RX ADMIN — Medication 81 MILLIGRAM(S): at 10:07

## 2025-02-15 RX ADMIN — Medication 0.25 MILLIGRAM(S): at 22:10

## 2025-02-15 RX ADMIN — FLUTICASONE PROPIONATE 1 SPRAY(S): 50 SPRAY, METERED NASAL at 17:27

## 2025-02-15 RX ADMIN — Medication 3 MILLIGRAM(S): at 22:10

## 2025-02-15 RX ADMIN — Medication 0.25 MILLIGRAM(S): at 16:46

## 2025-02-15 RX ADMIN — Medication 1 DROP(S): at 23:12

## 2025-02-15 RX ADMIN — CLOPIDOGREL BISULFATE 75 MILLIGRAM(S): 75 TABLET, FILM COATED ORAL at 10:07

## 2025-02-15 RX ADMIN — Medication 1 DROP(S): at 12:21

## 2025-02-15 RX ADMIN — Medication 10 MILLIGRAM(S): at 11:55

## 2025-02-15 RX ADMIN — OXYCODONE HYDROCHLORIDE 2.5 MILLIGRAM(S): 30 TABLET ORAL at 00:03

## 2025-02-15 NOTE — ED CDU PROVIDER DISPOSITION NOTE - ATTENDING CONTRIBUTION TO CARE
I , Dr Sheldon Yepez has seen and evaluated the patient.  The pateient with new qtc prolongation may be etiology of atypical neuro s/s - will need firther eval ans admisison

## 2025-02-15 NOTE — PHYSICAL THERAPY INITIAL EVALUATION ADULT - ADDITIONAL COMMENTS
PTA pt states she ambulated independently using no device, performed ADLs independently, has HHA to assist if needed. HHA takes her to the store, prepares meals, grocery shops, cleans. Pt owns rolling walker & shower chair

## 2025-02-15 NOTE — PHYSICAL THERAPY INITIAL EVALUATION ADULT - GENERAL OBSERVATIONS, REHAB EVAL
Pt a/w code stroke for blurry vision, gait instability. MR head (-) for acute infarct, does show chronic R PCA infarct. pt also with h/o macular degeneration. Pt received semi-supine on stretcher in NAD, A&Ox3, following all commands, agreeable to PT, very anxious. Noted pt with limited L shoulder flexion ROM due to h/o chronic pain per pt report.

## 2025-02-15 NOTE — H&P ADULT - NSICDXFAMILYHX_GEN_ALL_CORE_FT
FAMILY HISTORY:  Family history of myocardial infarction  FH: heart attack, mother    Mother  Still living? No  Family history of stroke, Age at diagnosis: Age Unknown

## 2025-02-15 NOTE — ED CDU PROVIDER SUBSEQUENT DAY NOTE - PHYSICAL EXAMINATION
· CONSTITUTIONAL: Well appearing, awake, alert, oriented to person, place, time/situation and in no apparent distress.  · ENMT: Airway patent.  · EYES: Clear bilaterally  · CARDIAC: Normal rate, regular rhythm.  Heart sounds S1, S2.  No murmurs  · RESPIRATORY: CTAB  · GASTROINTESTINAL: Abdomen soft, non-tender  · NEUROLOGICAL: Alert and oriented, strength 5/5 bilateral UE/LE, sensation grossly intact, clear speech  · PSYCHIATRIC: anxious, conversational

## 2025-02-15 NOTE — ED CDU PROVIDER DISPOSITION NOTE - CLINICAL COURSE
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 Reports associated headaches and anxiety. Currently being treated with Cipro for a UTI. Denies current urinary symptoms.  ED course: CODE STROKE. CT/CTA showed "Stable chronic right PCA infarct and overall no significant change from 3/1/2024. Severe stenosis and ultimate filling of the right common carotid artery. Severe stenosis at right carotid bifurcation with additional flow-limiting disease into ICA. Chronic severe stenosis of right PCA at P2 segment." Started on Atorvastatin by neurology. Plan for MRI and echocardiogram in CDU. 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 Reports associated headaches and anxiety. Currently being treated with Cipro for a UTI. Denies current urinary symptoms.  ED course: CODE STROKE. CT/CTA showed "Stable chronic right PCA infarct and overall no significant change from 3/1/2024. Severe stenosis and ultimate filling of the right common carotid artery. Severe stenosis at right carotid bifurcation with additional flow-limiting disease into ICA. Chronic severe stenosis of right PCA at P2 segment." Started on Atorvastatin by neurology. Plan for MRI and echocardiogram in CDU. Patient attempted MRIs however while on table had episode of chest pain and that her heart was pounding, felt anxious.  No contrast was administered and therefore MRI was performed without contrast given this episode.  Troponins were performed which were stable on repeat.  EKGs showed progressively prolonging QT interval.  MRIs were limited but had no acute infarction.  Given episode of chest pain and prolonging QT patient was admitted to medicine service under telemetry for further management. Case d/w ED attending Dr. Yepez

## 2025-02-15 NOTE — PATIENT PROFILE ADULT - FALL HARM RISK - HARM RISK INTERVENTIONS
Assistance with ambulation/Assistance OOB with selected safe patient handling equipment/Communicate Risk of Fall with Harm to all staff/Discuss with provider need for PT consult/Monitor gait and stability/Provide patient with walking aids - walker, cane, crutches/Reinforce activity limits and safety measures with patient and family/Sit up slowly, dangle for a short time, stand at bedside before walking/Tailored Fall Risk Interventions/Visual Cue: Yellow wristband and red socks/Bed in lowest position, wheels locked, appropriate side rails in place/Call bell, personal items and telephone in reach/Instruct patient to call for assistance before getting out of bed or chair/Non-slip footwear when patient is out of bed/Roy to call system/Physically safe environment - no spills, clutter or unnecessary equipment/Purposeful Proactive Rounding/Room/bathroom lighting operational, light cord in reach

## 2025-02-15 NOTE — CONSULT NOTE ADULT - ASSESSMENT
Assessment and Recommendations:    HPI:    74y female with a past medical history/ocular history of  L handed, reported prior medical history of anxiety, COPD, HLD, HTN, L shoulder dislocation, OA R knee, provoked DVT after surgery 2020 (not on AC at this time), prior stroke (2020, R occipital lobe, residual LHH, on ASA 81mg qd), macular degeneration (receives injections in L eye), presenting for code stroke for multiple concerns including dizziness (room spinning) with imbalance, bilateral blurred vision, and headache.     #Rule out GCA   - BCVA 20/50 OD, 20/100+ OS (at baseline per patient) - likely limited due to macular degeneration   - IOP 19 OD, 16 OS   - Pupils: OD 4mm in the dark to 3mm in the light, OS 4.5 mm in the dark to 3.5 mm in the light, Left RAPD+   - Confrontational VF left homonymous hemianopsia  - EOMI OU without diplopia, pain with movement  - Color plates 12/12 OD 2/12 OS  - anterior exam is remarkable for blepharitis, cobblestoning of palpebral conj,   - DFE with white chalky disc OD with PPA 0.2 cup, pale nerve OS with 0.2 cup, parafoveal soft drusen OU, possible choroidal neovascularization OS, attenuated vessels, RPE changes in periphery   - Denied jaw claudication, shoulder or hip girdle pains, fevers, lethargy, no TVL, symmetric bitemporal pulses, + scalp tenderness.  - Recommend ESR and CRP, do not discharge until ESR results   - Recommend preservative free artificial tears 4 times a day into both eyes   - MRI brain and orbits w/wo pending   - Neurology recs appreciated     #Macular degeneration OS>OD  - DFE with parafoveal soft drusen OU, possible choroidal neovascularization OS  - c/w AREDS, Amsler grid at home   - continue outpatient care with her retina specialist (Dr Saravia)      Outpatient Follow-up: Patient should follow-up with his/her ophthalmologist or with [facility] Department of Ophthalmology within 1 week of after discharge at:    [address]  [place] [address] [phone]    Natalie Taylor MD, PGY-2  Also available on [employer] Teams     Assessment and Recommendations:    HPI:    74y female with a past medical history/ocular history of  L handed, reported prior medical history of anxiety, COPD, HLD, HTN, L shoulder dislocation, OA R knee, provoked DVT after surgery 2020 (not on AC at this time), prior stroke (2020, R occipital lobe, residual LHH, on ASA 81mg qd), macular degeneration (receives injections in L eye), presenting for code stroke for multiple concerns including dizziness (room spinning) with imbalance, bilateral blurred vision, and headache.     #Rule out GCA   - BCVA 20/50 OD, 20/100+ OS (at baseline per patient) - likely also limited due to macular degeneration   - IOP 19 OD, 16 OS   - Pupils: OD 4mm in the dark to 3mm in the light, OS 4.5 mm in the dark to 3.5 mm in the light, Left RAPD+   - Confrontational VF left homonymous hemianopsia  - EOMI OU without diplopia, pain with movement  - Color plates 12/12 OD 2/12 OS  - anterior exam is remarkable for blepharitis, cobblestoning of palpebral conj,   - DFE with white chalky disc OD with PPA 0.2 cup, OS with 0.1 cup with superior ONH shunt vessels, parafoveal soft drusen OU, possible choroidal neovascularization OS, attenuated vessels, RPE changes in periphery   - Denied jaw claudication, shoulder or hip girdle pains, fevers, lethargy, no TVL, symmetric bitemporal pulses, + scalp tenderness.  - Platelets 220  - Recommend ESR and CRP, do not discharge until ESR results   - Recommend preservative free artificial tears 4 times a day into both eyes   - MRI brain with a large chronic right posterior cerebral artery territory infarct. MR orbits unremarkable   - CTA head wtihout LVO and neck - severe stenosis at right carotid bifurcation with additional flow-limiting disease into ICA. Coarse calcified plaque at innominate artery with flow-limiting disease of both CCA and subclavian artery.  - Neurology recs appreciated - agree with ischemic work up   - Will reach out to Dr Saravia during the day to obtain more information regarding the reason for referral to the ED     #Macular degeneration OS>OD  - DFE with parafoveal soft drusen OU, possible choroidal neovascularization OS  - c/w AREDS, Amsler grid at home   - continue outpatient care with her retina specialist (Dr Saravia)      Outpatient Follow-up: Patient should follow-up with his/her ophthalmologist or with [facility] Department of Ophthalmology within 1 week of after discharge at:    [address]  [place] [address]  [phone]    Natalie Taylor MD, PGY-2  Also available on [employer] Teams     Assessment and Recommendations:    HPI:    74y female with a past medical history/ocular history of  L handed, reported prior medical history of anxiety, COPD, HLD, HTN, L shoulder dislocation, OA R knee, provoked DVT after surgery 2020 (not on AC at this time), prior stroke (2020, R occipital lobe, residual LHH, on ASA 81mg qd), macular degeneration (receives injections in L eye), presenting for code stroke for multiple concerns including dizziness (room spinning) with imbalance, bilateral blurred vision, and headache.     #Rule out GCA   - BCVA 20/50 OD, 20/100+ OS (at baseline per patient) - likely also limited due to macular degeneration   - IOP 19 OD, 16 OS   - Pupils: OD 4mm in the dark to 3mm in the light, OS 4.5 mm in the dark to 3.5 mm in the light, Left RAPD+   - Confrontational VF show a left homonymous hemianopsia  - EOMI OU without diplopia, pain with movement  - Color plates 12/12 OD 2/12 OS  - anterior exam is remarkable for blepharitis, cobblestoning of palpebral conj,   - DFE with white chalky disc OD with PPA 0.2 cup, OS with 0.1 cup with superior ONH shunt vessels, parafoveal soft drusen OU, possible choroidal neovascularization OS, attenuated vessels, RPE changes in periphery   - Denied jaw claudication, shoulder or hip girdle pains, fevers, lethargy, no TVL, symmetric bitemporal pulses, + scalp tenderness.  - Platelets 220  - Recommend ESR and CRP, do not discharge until ESR results   - Recommend preservative free artificial tears 4 times a day into both eyes   - MRI brain with a large chronic right posterior cerebral artery territory infarct. MR orbits unremarkable   - CTA head wtihout LVO and neck - severe stenosis at right carotid bifurcation with additional flow-limiting disease into ICA. Coarse calcified plaque at innominate artery with flow-limiting disease of both CCA and subclavian artery.  - Neurology recs appreciated - agree with ischemic work up   - Will reach out to Dr Saravia during the day to obtain more information regarding the reason for referral to the ED     #Macular degeneration OS>OD  - DFE with parafoveal soft drusen OU, possible choroidal neovascularization OS  - c/w AREDS, Amsler grid at home   - continue outpatient care with her retina specialist (Dr Saravia)      Outpatient Follow-up: Patient should follow-up with his/her ophthalmologist or with [facility] Department of Ophthalmology within 1 week of after discharge at:    [address]  [place] [address] [phone]    Natalie Taylor MD, PGY-2  Also available on [employer] Teams

## 2025-02-15 NOTE — H&P ADULT - ASSESSMENT
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 who presents with inability to see since waking up morning of 2/14/25 at 10 AM, along with dizziness (room spinning) with imbalance, bilateral blurred vision, and headache        Acute vestibular syndrome  - no evidence for acute CVA on MRI head  - check orthostatics  - PT consult pending    Severe rt carotid artery stenosis  - cont baby ASA  - cont home-dose statin  - consider vascular consult    Visual field deficit; blurry vision  - no evidence for acute CVA  - no acute findings on MRI orbits  - orbital exam already performed by ophthalmology service without any acute discrete findings  - ESR = 54    Hx of rt occipital lobe CVA  - cont baby ASA  - no need for plavix  - cont home-dose statin    HTN  -     HLD  - cont statin    Anxiety, unspecified  - alprazolam 0,25 mg q6h prn    Need for prophylaxis  - OOB as tolerated     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 who presents with inability to see since waking up morning of 2/14/25 at 10 AM, along with dizziness (room spinning) with imbalance, bilateral blurred vision, and headache        Acute vestibular syndrome  - no evidence for acute CVA on MRI head or acute occlusion on MRA head  - telemetry  - neuro checks q4h  - check orthostatics  - TTE pending  - PT/OT consults pending  - appreciate neurology    Severe rt carotid artery stenosis  - cont baby ASA  - cont home-dose statin  - consider vascular consult    Visual field deficit; blurry vision  - no evidence for acute CVA  - no acute findings on MRI orbits  - orbital exam already performed by ophthalmology service without any acute discrete findings  - ESR = 54  - artificial tears OU 4x a day  - appreciate ophthalmology    Hx of rt occipital lobe CVA  - cont baby ASA  - no need for plavix  - cont home-dose statin    HTN  - inconsistent BP readings recorded in CDU  - will hold off anti-hypertensive medicatinos    HLD  - cont home-dose statin    Anxiety, unspecified  - alprazolam 0,25 mg q6h prn    Need for prophylaxis  - OOB as tolerated    Disposition  - pending PT/OT consults ; finalization of ischemic workup

## 2025-02-15 NOTE — H&P ADULT - HISTORY OF PRESENT ILLNESS
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  Pt reports that her constellation of symptoms bears some resemblance to previous migraine episodes.  Pt also had seen a retinal specialist on the day of presentation who advised her to come to the emergency room, no documentation of visit is available.  Code stroke was called upon arrival at [facility] ED.  NIHSS = 4.  Pt deemed outside time window for thrombolytics.  CTH showed no acute intracranial hemorrhage or mass effect and stable chronic right PCA infarct and overall no significant change from 3/1/2024.   CTA head/neck showed coarse calcified plaque at innominate artery with flow-limiting disease of both CCA and subclavian artery, severe stenosis and ultimate filling of the right common carotid artery, severe stenosis at right carotid bifurcation with additional flow-limiting disease into ICA, possibe subclavian steal from right vertebral artery, and chronic severe stenosis of right PCA at P2 segment, unchanged and concordant with chronic infarct zone.  Neurology/ophthalmology were consulted.  MRI head showed no acuities and MRI orbits was limited by motion artifact but the orbits were grossly normal.  Pt resumed on DAPT, started on atorvastatin 80 mg qhs, and admitted to CDU for further neurological workp.  Pt eventually admitted to telemetry.       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  Pt reports that her constellation of symptoms bears some resemblance to previous migraine episodes.  Pt also had seen a retinal specialist on the day of presentation who advised her to come to the emergency room, no documentation of visit is available.  Code stroke was called upon arrival at [facility] ED.  NIHSS = 4.  Pt deemed outside time window for thrombolytics.  CTH showed no acute intracranial hemorrhage or mass effect and stable chronic right PCA infarct and overall no significant change from 3/1/2024.   CTA head/neck showed coarse calcified plaque at innominate artery with flow-limiting disease of both CCA and subclavian artery, severe stenosis and ultimate filling of the right common carotid artery, severe stenosis at right carotid bifurcation with additional flow-limiting disease into ICA, possible subclavian steal from right vertebral artery, and chronic severe stenosis of right PCA at P2 segment, unchanged and concordant with chronic infarct zone.  Neurology/ophthalmology were consulted.  MRI head showed no acuities and MRI orbits was limited by motion artifact but the orbits were grossly normal.  Pt resumed on DAPT, started on atorvastatin 80 mg qhs, and admitted to CDU for further neurological workp.  Pt eventually admitted to telemetry.

## 2025-02-15 NOTE — ED CDU PROVIDER SUBSEQUENT DAY NOTE - HISTORY
Troponin 23-28, vitals stable with improving BP. Patient still endorses some chest discomfort. Patient otherwise resting comfortably in bed, conversational. Repeat EKG performed and discussed with Dr. Major, recommending cardiology consult later this morning. - Trini Garvin PA-C

## 2025-02-15 NOTE — PHYSICAL THERAPY INITIAL EVALUATION ADULT - PERTINENT HX OF CURRENT PROBLEM, REHAB EVAL
74-year-old woman, L handed, reported prior medical history of anxiety, COPD, HLD, HTN, L shoulder dislocation, OA R knee, provoked DVT after surgery 2020 (not on AC at this time), prior stroke (2020, R occipital lobe, residual LHH), macular degeneration (receives injections in L eye), presenting for code stroke for multiple concerns including dizziness (room spinning) with imbalance, bilateral blurred vision, and headache. 74-year-old woman, L handed, reported prior medical history of anxiety, COPD, HLD, HTN, L shoulder dislocation, OA R knee, provoked DVT after surgery 2020 (not on AC at this time), prior stroke (2020, R occipital lobe, residual LHH), macular degeneration (receives injections in L eye), presenting for code stroke for multiple concerns including dizziness (room spinning) with imbalance, bilateral blurred vision, and headache.  MR ORBITS: Motion limited and incomplete study as contrast was not administered. Within this limitation, unremarkable appearance of the orbits.  MR BRAIN: No acute intracranial abnormality. Large chronic right posterior cerebral artery territory infarct.No acute intracranial hemorrhage or mass effect.  CTA H&N Stable chronic right PCA infarct and overall no significant change from 3/1/2024.

## 2025-02-15 NOTE — ED ADULT NURSE REASSESSMENT NOTE - NS ED NURSE REASSESS COMMENT FT1
Pt received from RN Andrey Aleman. Pt A&O X4, oriented to CDU & plan of care discussed. Pt is observed in CDU for visual disturbance and head ache. MRI done without contrast, MRI tech called and notified, patient could not tolerate IV contrast, patient became very anxious and c/o chest pain. ISABEL Garvin notified and evaluated patient. EKG done, troponin done. Oxycodone given for pain. V/S stable, IV 20G Rt AC, patent and free of signs of infiltration. Assisted patient to BR on wheel chair. Safety & comfort measures maintained. Call bell in reach. Will continue to monitor.

## 2025-02-15 NOTE — CONSULT NOTE ADULT - SUBJECTIVE AND OBJECTIVE BOX
[facility] DEPARTMENT OF OPHTHALMOLOGY - INITIAL ADULT CONSULT  -----------------------------------------------------------------------------------------------------------------  Natalie Taylor MD  PGY 2  Contact on Teams  -----------------------------------------------------------------------------------------------------------------    HPI:  PT IS BEING SEEN     HPI: 74-year-old woman, L handed, reported prior medical history of anxiety, COPD, HLD, HTN, L shoulder dislocation, OA R knee, provoked DVT after surgery 2020 (not on AC at this time), prior stroke (2020, R occipital lobe, residual LHH, on ASA 81mg qd), macular degeneration (receives injections in L eye), presenting for code stroke for multiple concerns including dizziness (room spinning) with imbalance, bilateral blurred vision, and headache.    Pt presented to a retinal specialist earlier in the day, who reported unilateral vision loss and recommended she come in for further workup, no documentation available.      Interval History: ***    PAST MEDICAL & SURGICAL HISTORY:  History of closed shoulder dislocation  left shoulder 2019      Class 1 obesity with body mass index (BMI) of 34.0 to 34.9 in adult      Osteoarthritis of left shoulder      Hypertension      Hyperlipidemia      Chronic obstructive pulmonary disease (COPD)      Osteoarthritis  R knee and L shoulder      Anxiety      Seasonal allergies      History of right knee joint replacement  2019      H/O shoulder replacement  L shoulder      H/O total knee replacement  R knee        Past Ocular History: ***  Ophthalmic Medications: ***  FAMILY HISTORY:  FH: heart attack  mother    Family history of myocardial infarction      Social History: ***    MEDICATIONS  (STANDING):  aspirin enteric coated 81 milliGRAM(s) Oral daily  atorvastatin 80 milliGRAM(s) Oral at bedtime  clopidogrel Tablet 75 milliGRAM(s) Oral daily  sertraline 25 milliGRAM(s) Oral daily    MEDICATIONS  (PRN):    Allergies & Intolerances:   No Known Allergies    Review of Systems:  Constitutional: No fever, chills  Eyes: No blurry vision, flashes, floaters, FBS, erythema, discharge, double vision, OU  Neuro: No tremors  Cardiovascular: No chest pain, palpitations  Respiratory: No SOB, no cough  GI: No nausea, vomiting, abdominal pain  : No dysuria  Skin: no rash  Psych: no depression  Endocrine: no polyuria, polydipsia  Heme/lymph: no swelling    VITALS: T(C): 36.4 (02-14-25 @ 23:53)  T(F): 97.5 (02-14-25 @ 23:53), Max: 98.6 (02-14-25 @ 18:55)  HR: 77 (02-14-25 @ 23:53) (71 - 83)  BP: 98/72 (02-14-25 @ 23:53) (95/64 - 182/109)  RR:  (18 - 24)  SpO2:  (96% - 99%)  Wt(kg): --  General: AAO x 3, appropriate mood and affect    Ophthalmology Exam:  Visual acuity (sc): 20/20 OD 20/20 OS  Pupils: PERRL OU, no APD  Ttono: 16 OU  Extraocular movements (EOMs): Full OU, no pain, no diplopia  Confrontational Visual Field (CVF): Full OU  Color Plates: 12/12 OD 12/12 OS    Pen Light Exam (PLE)  External: Flat OU  Lids/Lashes/Lacrimal Ducts: Flat OU    Sclera/Conjunctiva: W+Q OU  Cornea: Cl OU  Anterior Chamber: D+F OU    Iris: Flat OU  Lens: Cl OU    Fundus Exam: dilated with 1% tropicamide and 2.5% phenylephrine  Approval obtained from primary team for dilation  Patient aware that pupils can remained dilated for at least 4-6 hours  Exam performed with 20D lens    Vitreous: wnl OU  Disc, cup/disc: sharp and pink, 0.4 OU  Macula: wnl OU  Vessels: wnl OU  Periphery: wnl OU    Labs/Imaging:  ***   [facility] DEPARTMENT OF OPHTHALMOLOGY - INITIAL ADULT CONSULT  -----------------------------------------------------------------------------------------------------------------  Natalie Taylor MD  PGY 2  Contact on Teams  -----------------------------------------------------------------------------------------------------------------    HPI:    HPI: 74-year-old woman, L handed, reported prior medical history of anxiety, COPD, HLD, HTN, L shoulder dislocation, OA R knee, provoked DVT after surgery 2020 (not on AC at this time), prior stroke (2020, R occipital lobe, residual LHH, on ASA 81mg qd), macular degeneration (receives injections in L eye), presenting for code stroke for multiple concerns including dizziness (room spinning) with imbalance, bilateral blurred vision, and headache.    Pt presented to a retinal specialist earlier in the day, who reported unilateral vision loss and recommended she come in for further workup, no documentation available.    Interval History: pt states that she feels that everything looked blurrier, primarily at distance, from both eyes when she woke up yesterday morning. No new floaters, no flashing lights, no trauma that she can recall.     PAST MEDICAL & SURGICAL HISTORY:  History of closed shoulder dislocation  left shoulder 2019      Class 1 obesity with body mass index (BMI) of 34.0 to 34.9 in adult      Osteoarthritis of left shoulder      Hypertension      Hyperlipidemia      Chronic obstructive pulmonary disease (COPD)      Osteoarthritis  R knee and L shoulder      Anxiety      Seasonal allergies      History of right knee joint replacement  2019      H/O shoulder replacement  L shoulder      H/O total knee replacement  R knee        Past Ocular History: CE PCIOL 5 years ago OU, left homonymous hemianopsia (residual from stroke), macular degeneration OS>OD, pt gets monthly intravitreal injections OD with Dr Saravia from [facility] (last one ~2-3 weeks ago)  Ophthalmic Medications: AREDS   FAMILY HISTORY:  FH: heart attack  mother    Family history of myocardial infarction      MEDICATIONS  (STANDING):  aspirin enteric coated 81 milliGRAM(s) Oral daily  atorvastatin 80 milliGRAM(s) Oral at bedtime  clopidogrel Tablet 75 milliGRAM(s) Oral daily  sertraline 25 milliGRAM(s) Oral daily    MEDICATIONS  (PRN):    Allergies & Intolerances:   No Known Allergies    Review of Systems:  Constitutional: No fever, chills  Eyes: + blurry vision, no flashes, floaters, FBS, erythema, discharge, double vision, OU  Neuro: + headache      VITALS: T(C): 36.4 (02-14-25 @ 23:53)  T(F): 97.5 (02-14-25 @ 23:53), Max: 98.6 (02-14-25 @ 18:55)  HR: 77 (02-14-25 @ 23:53) (71 - 83)  BP: 98/72 (02-14-25 @ 23:53) (95/64 - 182/109)  RR:  (18 - 24)  SpO2:  (96% - 99%)  Wt(kg): --  General: AAO x 3, appropriate mood and affect    Ophthalmology Exam:  Visual acuity (cc): 20/50 OD 20/100+1 OS (pt states that this is her baseline)   Pupils: OD 4mm in the dark to 3mm in the light, OS 4.5 mm in the dark to 3.5 mm in the light, Left RAPD+   Ttono: 19 OD, 16 OS   Extraocular movements (EOMs): Full OU, no pain, no diplopia  Confrontational Visual Field (CVF): left homonymous hemianopsia  Color Plates: 12/12 OD 2/12 OS    Pen Light Exam (PLE)  External: dermatochalasis OU   Lids/Lashes/Lacrimal Ducts: blepharitis OU   Sclera/Conjunctiva: cobblestoning on palpebral conj OU   Cornea: arcus OU   Anterior Chamber: D+F OU    Iris: Flat OU  Lens: PCIOL OU    Fundus Exam: dilated with 1% tropicamide and 2.5% phenylephrine  Approval obtained from primary team for dilation  Patient aware that pupils can remained dilated for at least 4-6 hours  Exam performed with 20D lens    Vitreous: wnl OU  Disc, cup/disc: white chalky disc OD with PPA 0.2 cup, pale nerve OS with 0.2 cup   Macula: parafoveal soft drusen OU, possible choroidal neovascularization OS  Vessels: attenuated OU  Periphery: RPE changes OU        Labs/Imaging:  CT PERFUSION:  Right PCA chronic infarct reported as both Tmax and CBF abnormality.    No additional territorial ischemic deficit.      CTA NECK:  No significant change from 3/1/24.    Coarse calcified plaque at innominate artery with flow-limiting disease of both CCA and subclavian artery. Severe stenosis and ultimate filling of the right common carotid artery. Severe stenosis at right carotid bifurcation with additional flow-limiting disease into ICA.    Left common and internal carotid arteries patent without hemodynamically significant stenosis.    Right subclavian artery fills distally and possibility of subclavian steal from right vertebral artery is raised.    Vertebral arteries patent throughout the neck, retrograde "steal" flow on right could be confirmed on ultrasound.      CTA HEAD:    Chronic severe stenosis of right PCA at P2 segment, unchanged and concordant with chronic infarct zone.    No additional intracranial arterial occlusion or high grade stenosis.     [facility] DEPARTMENT OF OPHTHALMOLOGY - INITIAL ADULT CONSULT  -----------------------------------------------------------------------------------------------------------------  Natalie Taylor MD  PGY 2  Contact on Teams  -----------------------------------------------------------------------------------------------------------------    HPI:    HPI: 74-year-old woman, L handed, reported prior medical history of anxiety, COPD, HLD, HTN, L shoulder dislocation, OA R knee, provoked DVT after surgery 2020 (not on AC at this time), prior stroke (2020, R occipital lobe, residual LHH, on ASA 81mg qd), macular degeneration (receives injections in L eye), presenting for code stroke for multiple concerns including dizziness (room spinning) with imbalance, bilateral blurred vision, and headache.    Pt presented to a retinal specialist earlier in the day, who reported unilateral vision loss and recommended she come in for further workup, no documentation available.    Interval History: pt states that she feels that everything looked blurrier, primarily at distance, from both eyes when she woke up yesterday morning. No new floaters, no flashing lights, no trauma that she can recall.     PAST MEDICAL & SURGICAL HISTORY:  History of closed shoulder dislocation  left shoulder 2019      Class 1 obesity with body mass index (BMI) of 34.0 to 34.9 in adult      Osteoarthritis of left shoulder      Hypertension      Hyperlipidemia      Chronic obstructive pulmonary disease (COPD)      Osteoarthritis  R knee and L shoulder      Anxiety      Seasonal allergies      History of right knee joint replacement  2019      H/O shoulder replacement  L shoulder      H/O total knee replacement  R knee        Past Ocular History: CE PCIOL 5 years ago OU, left homonymous hemianopsia (residual from stroke), macular degeneration OS>OD, pt gets monthly intravitreal injections OD with Dr Saravia from [facility] (last one ~2-3 weeks ago)  Ophthalmic Medications: AREDS   FAMILY HISTORY:  FH: heart attack  mother    Family history of myocardial infarction      MEDICATIONS  (STANDING):  aspirin enteric coated 81 milliGRAM(s) Oral daily  atorvastatin 80 milliGRAM(s) Oral at bedtime  clopidogrel Tablet 75 milliGRAM(s) Oral daily  sertraline 25 milliGRAM(s) Oral daily    MEDICATIONS  (PRN):    Allergies & Intolerances:   No Known Allergies    Review of Systems:  Constitutional: No fever, chills  Eyes: + blurry vision, no flashes, floaters, FBS, erythema, discharge, double vision, OU  Neuro: + headache      VITALS: T(C): 36.4 (02-14-25 @ 23:53)  T(F): 97.5 (02-14-25 @ 23:53), Max: 98.6 (02-14-25 @ 18:55)  HR: 77 (02-14-25 @ 23:53) (71 - 83)  BP: 98/72 (02-14-25 @ 23:53) (95/64 - 182/109)  RR:  (18 - 24)  SpO2:  (96% - 99%)  Wt(kg): --  General: AAO x 3, appropriate mood and affect    Ophthalmology Exam:  Visual acuity (cc): 20/50 OD 20/100+1 OS (pt states that this is her baseline)   Pupils: OD 4mm in the dark to 3mm in the light, OS 4.5 mm in the dark to 3.5 mm in the light, Left RAPD+   Ttono: 19 OD, 16 OS   Extraocular movements (EOMs): Full OU, no pain, no diplopia  Confrontational Visual Field (CVF): left homonymous hemianopsia  Color Plates: 12/12 OD 2/12 OS    Pen Light Exam (PLE)  External: dermatochalasis OU   Lids/Lashes/Lacrimal Ducts: blepharitis OU   Sclera/Conjunctiva: cobblestoning on palpebral conj OU   Cornea: arcus OU   Anterior Chamber: D+F OU    Iris: Flat OU  Lens: PCIOL OU    Fundus Exam: dilated with 1% tropicamide and 2.5% phenylephrine  Approval obtained from primary team for dilation  Patient aware that pupils can remained dilated for at least 4-6 hours  Exam performed with 20D lens    Vitreous: wnl OU  Disc, cup/disc: white chalky disc OD with PPA 0.2 cup, 2+ pale nerve OS with 0.1 cup with superior ONH shunt vessels    Macula: parafoveal soft drusen OU, possible choroidal neovascularization OS  Vessels: attenuated OU  Periphery: RPE changes OU        Labs/Imaging:    MR ORBITS: Motion limited and incomplete study as contrast was not administered. Within this limitation, unremarkable appearance of the orbits.    MR BRAIN: No acute intracranial abnormality. Large chronic right posterior cerebral artery territory infarct.      CT PERFUSION:  Right PCA chronic infarct reported as both Tmax and CBF abnormality.    No additional territorial ischemic deficit.      CTA NECK:  No significant change from 3/1/24.    Coarse calcified plaque at innominate artery with flow-limiting disease of both CCA and subclavian artery. Severe stenosis and ultimate filling of the right common carotid artery. Severe stenosis at right carotid bifurcation with additional flow-limiting disease into ICA.    Left common and internal carotid arteries patent without hemodynamically significant stenosis.    Right subclavian artery fills distally and possibility of subclavian steal from right vertebral artery is raised.    Vertebral arteries patent throughout the neck, retrograde "steal" flow on right could be confirmed on ultrasound.      CTA HEAD:    Chronic severe stenosis of right PCA at P2 segment, unchanged and concordant with chronic infarct zone.    No additional intracranial arterial occlusion or high grade stenosis.

## 2025-02-15 NOTE — H&P ADULT - NSHPREVIEWOFSYSTEMS_GEN_ALL_CORE
REVIEW OF SYSTEMS:    CONSTITUTIONAL: (-) dizziness (-) weakness (-) fevers (-) chills (-) weight loss (-) weight gain (-) sweats (-) poor appetite (-) falls (-) syncope  HEENT: (+) visual changes (-) HA (-) vertigo (-) rhinorrhea (-) epistaxis (-) swelling (-) hearing changes (-) sore throat (-) hoarseness  NECK: (-) stiffness (-) pain  RESPIRATORY: (-) cough (-) SOB (-) QUINTERO (-) hemoptysis   CARDIOVASCULAR: (-) chest pain (-) palpitations (-) PND (-) orthopnea  GASTROINTESTINAL: (-) abd pain (-) nausea (-) vomiting (-) diarrhea (-) constipation (-) hematemesis (-) melena (-) BRBPR (-) dysphagia (-) odynophagia (-) incontinence (-) bloatedness  GENITOURINARY: (-) dysuria  (-) frequency (-) hematuria (-) incontinence (-) abnormal discharge (-) incomplete emptying (-) flank pain  NEUROLOGICAL: (-) confusion (-) slurred speech (-) focal weakness (-) tingling/numbness (-) difficulty walking (-) tremors  MUSCULOSKELETAL: (-) back pain (-) joint pain (-) joint swelling (-) reduced ROM (-) extremity pain (-) extremity swelling  PSYCH: (-) anxious (-) depressed (-)agitation (-) aural hallucinations (-) visual hallucinations  SKIN: (-) itching (-) burning (-) rashes (-) swelling (-) bruising (-) pain  All other review of systems is negative unless indicated above. REVIEW OF SYSTEMS:    CONSTITUTIONAL: (+) dizziness (-) weakness (-) fevers (-) chills (-) weight loss (-) weight gain (-) sweats (-) poor appetite (-) falls (-) syncope  HEENT: (+) visual changes (+) HA (-) vertigo (-) rhinorrhea (-) epistaxis (-) swelling (-) hearing changes (-) sore throat (-) hoarseness  NECK: (-) stiffness (-) pain  RESPIRATORY: (-) cough (-) SOB (-) QUINTERO (-) hemoptysis   CARDIOVASCULAR: (-) chest pain (-) palpitations (-) PND (-) orthopnea  GASTROINTESTINAL: (-) abd pain (+) nausea (-) vomiting (-) diarrhea (-) constipation (-) hematemesis (-) melena (-) BRBPR (-) dysphagia (-) odynophagia (-) incontinence (-) bloatedness  GENITOURINARY: (-) dysuria  (-) frequency (-) hematuria (-) incontinence (-) abnormal discharge (-) incomplete emptying (-) flank pain  NEUROLOGICAL: (-) confusion (-) slurred speech (-) focal weakness (-) tingling/numbness (+) difficulty walking (-) tremors  MUSCULOSKELETAL: (-) back pain (-) joint pain (-) joint swelling (-) reduced ROM (-) extremity pain (-) extremity swelling  PSYCH: (-) anxious (-) depressed (-)agitation (-) aural hallucinations (-) visual hallucinations  SKIN: (-) itching (-) burning (-) rashes (-) swelling (-) bruising (-) pain  All other review of systems is negative unless indicated above.

## 2025-02-15 NOTE — ED CDU PROVIDER SUBSEQUENT DAY NOTE - ATTENDING APP SHARED VISIT CONTRIBUTION OF CARE
This was a shared visit with ANATOLY.  I have reviewed and discussed the case with the ANATOLY and agree with verified documentation unless otherwise documented.  I have independently spoken with and examined the patient and my documentation of history/pe and MDM are above.

## 2025-02-15 NOTE — ED CDU PROVIDER DISPOSITION NOTE - NSFOLLOWUPINSTRUCTIONS_ED_ALL_ED_FT
Hydrate.     Start taking Atorvastatin 80mg once a night.     We recommend you follow up with your primary care provider within the next 2-3 days, please bring all of your results with you. You can also follow up with **NEURO AND CARDS    Please return to the Emergency Department with new, worsening, or concerning symptoms, such as:  -Shortness of breath or trouble breathing  -Pressure, pain, tightness in chest  -Facial drooping, arm weakness, or speech difficulty   -Head injury or loss of consciousness   -Nonstop bleeding or an open wound     *More detailed information regarding your visit and discharge can be found by reviewing this packet

## 2025-02-15 NOTE — H&P ADULT - NSHPLABSRESULTS_GEN_ALL_CORE
LABS:                        13.9   7.81  )-----------( 220      ( 14 Feb 2025 18:23 )             44.5     02-15    142  |  108  |  21  ----------------------------<  115[H]  3.9   |  22  |  1.05    Ca    9.1      15 Feb 2025 09:08  Mg     2.1     02-15    TPro  7.2  /  Alb  3.9  /  TBili  0.4  /  DBili  x   /  AST  15  /  ALT  9[L]  /  AlkPhos  79  02-15    PT/INR - ( 14 Feb 2025 18:23 )   PT: 13.1 sec;   INR: 1.15 ratio         PTT - ( 14 Feb 2025 18:23 )  PTT:28.5 sec  CAPILLARY BLOOD GLUCOSE      POCT Blood Glucose.: 97 mg/dL (14 Feb 2025 18:13)        Urinalysis Basic - ( 15 Feb 2025 09:08 )    Color: x / Appearance: x / SG: x / pH: x  Gluc: 115 mg/dL / Ketone: x  / Bili: x / Urobili: x   Blood: x / Protein: x / Nitrite: x   Leuk Esterase: x / RBC: x / WBC x   Sq Epi: x / Non Sq Epi: x / Bacteria: x        RADIOLOGY & ADDITIONAL TESTS:    Imaging Personally Reviewed:  [x] YES  [ ] NO    Case discussed with NPP:  [X] YES  [ ] NO

## 2025-02-15 NOTE — PATIENT PROFILE ADULT - VISION (WITH CORRECTIVE LENSES IF THE PATIENT USUALLY WEARS THEM):
Alert and oriented x 3, no focal deficits, no motor or sensory deficits.
Normal vision: sees adequately in most situations; can see medication labels, newsprint

## 2025-02-15 NOTE — H&P ADULT - NSHPPHYSICALEXAM_GEN_ALL_CORE
Vital Signs Last 24 Hrs  T(C): 36.7 (15 Feb 2025 07:55), Max: 37 (14 Feb 2025 18:55)  T(F): 98.1 (15 Feb 2025 07:55), Max: 98.6 (14 Feb 2025 18:55)  HR: 69 (15 Feb 2025 07:55) (69 - 83)  BP: 96/67 (15 Feb 2025 07:55) (95/64 - 182/109)  BP(mean): 98 (15 Feb 2025 03:54) (75 - 98)  RR: 18 (15 Feb 2025 07:55) (18 - 24)  SpO2: 97% (15 Feb 2025 07:55) (94% - 99%)    I&O's Summary Vital Signs Last 24 Hrs  T(C): 36.7 (15 Feb 2025 07:55), Max: 37 (14 Feb 2025 18:55)  T(F): 98.1 (15 Feb 2025 07:55), Max: 98.6 (14 Feb 2025 18:55)  HR: 69 (15 Feb 2025 07:55) (69 - 83)  BP: 96/67 (15 Feb 2025 07:55) (95/64 - 182/109)  BP(mean): 98 (15 Feb 2025 03:54) (75 - 98)  RR: 18 (15 Feb 2025 07:55) (18 - 24)  SpO2: 97% (15 Feb 2025 07:55) (94% - 99%)    I&O's Summary      GENERAL: NAD  HEAD:  Atraumatic, Normocephalic  EYES: EOMI, PERRLA, conjunctiva and sclera clear  MOUTH/THROAT: No swelling, no erythema, no lesions, no exudates  NECK: Supple, No JVD, No LAD, No carotid bruits, No thyromegaly  CHEST/LUNG: Clear to auscultation bilaterally; Normal respiratory effort w/o intercostal retractions  HEART: Regular rate and rhythm; nl S1/S2; No murmurs, rubs, or gallops  ABDOMEN: Soft, Nontender, Nondistended; Bowel sounds present; No hepatosplenomegaly  EXTREMITIES:  2+ Peripheral Pulses; No clubbing, cyanosis, or edema b/l; Nl ROM  SKIN: No jaundice; Normal turgor, texture  NEURO: A+O x 3; nonfocal CN/motor/sensory/reflexes; speech + comprehension are intact; (+)lt eye inferior nasal field deficit; no dysmetria  PSYCH: Nl affect; no delirium or agitation; no suicidal/homicidal ideation

## 2025-02-15 NOTE — ED CDU PROVIDER SUBSEQUENT DAY NOTE - CLINICAL SUMMARY MEDICAL DECISION MAKING FREE TEXT BOX
anthony -= visual disturbamnces - tele monitoring, mri, neuro and optho reqs, freq neuro checks and reeval

## 2025-02-15 NOTE — PHYSICAL THERAPY INITIAL EVALUATION ADULT - RANGE OF MOTION EXAMINATION, REHAB EVAL
limited left shoulder flexion due to h/o chronic pain & dislocation/bilateral upper extremity ROM was WFL (within functional limits)/bilateral lower extremity ROM was WFL (within functional limits)

## 2025-02-15 NOTE — ED CDU PROVIDER SUBSEQUENT DAY NOTE - PROGRESS NOTE DETAILS
ct with ? subclav steal phenom - consider carotid duplex anthony - pt had palp and cp at mri - awaiting mr results, pt repeat ekg, nonischemic but prolonged qt - will keep on tele- send trop and magnesium level - with palp and prolong qt will need admission Patient evaluated at bedside.  Seen by ED attending earlier who recommended admission to telemetry given prolonged QTc. Patient endorsing feeling slightly anxious otherwise denies chest pain or shortness of breath at this time, requesting anxiolysis.  MRIs resulted, limited studies due to motion limitation as well as no contrast administered per patient request.  No acute intracranial abnormality noted.  Case discussed with [facility] attending Dr. Diallo who accepted patient under her service. - Eduard Cohn PA-C

## 2025-02-16 LAB
ANION GAP SERPL CALC-SCNC: 10 MMOL/L — SIGNIFICANT CHANGE UP (ref 5–17)
BASOPHILS # BLD AUTO: 0 K/UL — SIGNIFICANT CHANGE UP (ref 0–0.2)
BASOPHILS NFR BLD AUTO: 0 % — SIGNIFICANT CHANGE UP (ref 0–2)
BUN SERPL-MCNC: 17 MG/DL — SIGNIFICANT CHANGE UP (ref 7–23)
CALCIUM SERPL-MCNC: 9.1 MG/DL — SIGNIFICANT CHANGE UP (ref 8.4–10.5)
CHLORIDE SERPL-SCNC: 109 MMOL/L — HIGH (ref 96–108)
CO2 SERPL-SCNC: 23 MMOL/L — SIGNIFICANT CHANGE UP (ref 22–31)
CREAT SERPL-MCNC: 0.9 MG/DL — SIGNIFICANT CHANGE UP (ref 0.5–1.3)
EGFR: 67 ML/MIN/1.73M2 — SIGNIFICANT CHANGE UP
EOSINOPHIL # BLD AUTO: 0.34 K/UL — SIGNIFICANT CHANGE UP (ref 0–0.5)
EOSINOPHIL NFR BLD AUTO: 6 % — SIGNIFICANT CHANGE UP (ref 0–6)
GIANT PLATELETS BLD QL SMEAR: PRESENT — SIGNIFICANT CHANGE UP
GLUCOSE SERPL-MCNC: 107 MG/DL — HIGH (ref 70–99)
HCT VFR BLD CALC: 39.3 % — SIGNIFICANT CHANGE UP (ref 34.5–45)
HGB BLD-MCNC: 11.9 G/DL — SIGNIFICANT CHANGE UP (ref 11.5–15.5)
LYMPHOCYTES # BLD AUTO: 1.91 K/UL — SIGNIFICANT CHANGE UP (ref 1–3.3)
LYMPHOCYTES # BLD AUTO: 33.6 % — SIGNIFICANT CHANGE UP (ref 13–44)
MANUAL SMEAR VERIFICATION: SIGNIFICANT CHANGE UP
MCHC RBC-ENTMCNC: 26.7 PG — LOW (ref 27–34)
MCHC RBC-ENTMCNC: 30.3 G/DL — LOW (ref 32–36)
MCV RBC AUTO: 88.3 FL — SIGNIFICANT CHANGE UP (ref 80–100)
MONOCYTES # BLD AUTO: 0.44 K/UL — SIGNIFICANT CHANGE UP (ref 0–0.9)
MONOCYTES NFR BLD AUTO: 7.8 % — SIGNIFICANT CHANGE UP (ref 2–14)
NEUTROPHILS # BLD AUTO: 2.99 K/UL — SIGNIFICANT CHANGE UP (ref 1.8–7.4)
NEUTROPHILS NFR BLD AUTO: 51.7 % — SIGNIFICANT CHANGE UP (ref 43–77)
NEUTS BAND # BLD: 0.9 % — SIGNIFICANT CHANGE UP (ref 0–8)
NEUTS BAND NFR BLD: 0.9 % — SIGNIFICANT CHANGE UP (ref 0–8)
PLAT MORPH BLD: NORMAL — SIGNIFICANT CHANGE UP
PLATELET # BLD AUTO: 182 K/UL — SIGNIFICANT CHANGE UP (ref 150–400)
POTASSIUM SERPL-MCNC: 3.6 MMOL/L — SIGNIFICANT CHANGE UP (ref 3.5–5.3)
POTASSIUM SERPL-SCNC: 3.6 MMOL/L — SIGNIFICANT CHANGE UP (ref 3.5–5.3)
RBC # BLD: 4.45 M/UL — SIGNIFICANT CHANGE UP (ref 3.8–5.2)
RBC # FLD: 15.2 % — HIGH (ref 10.3–14.5)
RBC BLD AUTO: NORMAL — SIGNIFICANT CHANGE UP
SODIUM SERPL-SCNC: 142 MMOL/L — SIGNIFICANT CHANGE UP (ref 135–145)
WBC # BLD: 5.68 K/UL — SIGNIFICANT CHANGE UP (ref 3.8–10.5)
WBC # FLD AUTO: 5.68 K/UL — SIGNIFICANT CHANGE UP (ref 3.8–10.5)

## 2025-02-16 RX ORDER — CIPROFLOXACIN HCL 250 MG
500 TABLET ORAL EVERY 12 HOURS
Refills: 0 | Status: COMPLETED | OUTPATIENT
Start: 2025-02-16 | End: 2025-02-20

## 2025-02-16 RX ADMIN — Medication 1 DROP(S): at 06:28

## 2025-02-16 RX ADMIN — Medication 81 MILLIGRAM(S): at 11:30

## 2025-02-16 RX ADMIN — SERTRALINE 25 MILLIGRAM(S): 100 TABLET, FILM COATED ORAL at 11:29

## 2025-02-16 RX ADMIN — ATORVASTATIN CALCIUM 40 MILLIGRAM(S): 80 TABLET, FILM COATED ORAL at 21:07

## 2025-02-16 RX ADMIN — Medication 650 MILLIGRAM(S): at 12:29

## 2025-02-16 RX ADMIN — Medication 1 DROP(S): at 11:29

## 2025-02-16 RX ADMIN — Medication 10 MILLIGRAM(S): at 11:29

## 2025-02-16 RX ADMIN — Medication 0.25 MILLIGRAM(S): at 01:52

## 2025-02-16 RX ADMIN — Medication 3 MILLIGRAM(S): at 21:08

## 2025-02-16 RX ADMIN — Medication 500 MILLIGRAM(S): at 17:36

## 2025-02-16 RX ADMIN — Medication 650 MILLIGRAM(S): at 11:30

## 2025-02-16 RX ADMIN — FLUTICASONE PROPIONATE 1 SPRAY(S): 50 SPRAY, METERED NASAL at 06:28

## 2025-02-16 RX ADMIN — Medication 0.25 MILLIGRAM(S): at 18:12

## 2025-02-16 RX ADMIN — Medication 0.25 MILLIGRAM(S): at 12:20

## 2025-02-16 RX ADMIN — FLUTICASONE PROPIONATE 1 SPRAY(S): 50 SPRAY, METERED NASAL at 17:37

## 2025-02-16 NOTE — CONSULT NOTE ADULT - SUBJECTIVE AND OBJECTIVE BOX
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 Code stroke was called - had chronic finding but nothing new. Vascular surgery consulted for CTA findings below:    CTA Coarse calcified plaque at innominate artery with flow-limiting disease   of both CCA and subclavian artery. Severe stenosis and ultimate filling   of the right common carotid artery. Severe stenosis at right carotid   bifurcation with additional flow-limiting disease into ICA. Left common and internal carotid arteries patent without hemodynamically significant stenosis.    Patient seen and examined who says blurry vision is improving. States it was a blurry film over both eyes, not a curtain falling. States it is improving but still endorsing headache. Said she had known complete occlusion of R carotid and was told nothing to do. Currently on ASA and Statin.         PAST MEDICAL HISTORY:  PAST MEDICAL & SURGICAL HISTORY:  History of closed shoulder dislocation  left shoulder 2019    Class 1 obesity with body mass index (BMI) of 34.0 to 34.9 in adult    Osteoarthritis of left shoulder    Hypertension    Hyperlipidemia    Chronic obstructive pulmonary disease (COPD)    Osteoarthritis  R knee and L shoulder    Anxiety    Seasonal allergies    History of right knee joint replacement  2019    H/O shoulder replacement  L shoulder    H/O total knee replacement  R knee          ALLERGIES:  Allergies    No Known Allergies    Intolerances    NSAIDs (Other)      SOCIAL HISTORY: Negative for tobacco, etoh, or drug use    FAMILY HISTORY:  FAMILY HISTORY:  FH: heart attack  mother    Family history of myocardial infarction    Family history of stroke (Mother)        PHYSICAL EXAM:  General: NAD, resting comfortably  HEENT: NC/AT, EOMI  Pulmonary: normal resp effort, CTA-B  Cardiovascular: NSR, no murmurs  Abdominal: soft, ND/ND  Extremities: WWP, normal strength  Neuro: A/O x 3, CNs II-XII grossly intact, normal sensation, no focal deficits      VITAL SIGNS:  Vital Signs Last 24 Hrs  T(C): 36.3 (16 Feb 2025 11:16), Max: 36.9 (15 Feb 2025 13:10)  T(F): 97.4 (16 Feb 2025 11:16), Max: 98.4 (15 Feb 2025 13:10)  HR: 76 (16 Feb 2025 11:16) (75 - 78)  BP: 97/68 (16 Feb 2025 11:16) (92/61 - 108/78)  BP(mean): --  RR: 18 (16 Feb 2025 11:16) (18 - 18)  SpO2: 95% (16 Feb 2025 11:16) (95% - 96%)    Parameters below as of 16 Feb 2025 11:16  Patient On (Oxygen Delivery Method): room air        I&O's Summary    15 Feb 2025 07:01  -  16 Feb 2025 07:00  --------------------------------------------------------  IN: 240 mL / OUT: 0 mL / NET: 240 mL        LABS:                        11.9   5.68  )-----------( 182      ( 16 Feb 2025 06:46 )             39.3     02-16    142  |  109[H]  |  17  ----------------------------<  107[H]  3.6   |  23  |  0.90    Ca    9.1      16 Feb 2025 06:46  Mg     2.1     02-15    TPro  7.2  /  Alb  3.9  /  TBili  0.4  /  DBili  x   /  AST  15  /  ALT  9[L]  /  AlkPhos  79  02-15    PT/INR - ( 14 Feb 2025 18:23 )   PT: 13.1 sec;   INR: 1.15 ratio         PTT - ( 14 Feb 2025 18:23 )  PTT:28.5 sec  Urinalysis Basic - ( 16 Feb 2025 06:46 )    Color: x / Appearance: x / SG: x / pH: x  Gluc: 107 mg/dL / Ketone: x  / Bili: x / Urobili: x   Blood: x / Protein: x / Nitrite: x   Leuk Esterase: x / RBC: x / WBC x   Sq Epi: x / Non Sq Epi: x / Bacteria: x      CAPILLARY BLOOD GLUCOSE        LIVER FUNCTIONS - ( 15 Feb 2025 09:08 )  Alb: 3.9 g/dL / Pro: 7.2 g/dL / ALK PHOS: 79 U/L / ALT: 9 U/L / AST: 15 U/L / GGT: x             CULTURES:      RADIOLOGY & ADDITIONAL STUDIES:  < from: CT Angio Neck Stroke Protocol w/ IV Cont (02.14.25 @ 18:45) >  IMPRESSION:    CT PERFUSION:  Right PCA chronic infarct reported as both Tmax and CBF abnormality.    No additional territorial ischemic deficit.      CTA NECK:  No significant change from 3/1/24.    Coarse calcified plaque at innominate artery with flow-limiting disease   of both CCA and subclavian artery. Severe stenosis and ultimate filling   of the right common carotid artery. Severe stenosis at right carotid   bifurcation with additional flow-limiting disease into ICA.    Left common and internal carotid arteries patent without hemodynamically   significant stenosis.    Right subclavian artery fills distally and possibility of subclavian   steal from right vertebral artery is raised.    Vertebral arteries patent throughout the neck, retrograde "steal" flow on   right could be confirmed on ultrasound.      CTA HEAD:    Chronic severe stenosis of right PCA at P2 segment, unchanged and   concordant with chronic infarct zone.    No additional intracranial arterial occlusion or high grade stenosis.    < end of copied text >

## 2025-02-16 NOTE — CONSULT NOTE ADULT - ASSESSMENT
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 Code stroke was called - had chronic finding but nothing new. Vascular surgery consulted for known Right carotid stenosis.     Recommendation:  - please obtain b/l carotid duplex  - C/w aspirin/Statin  - vascular surgery to follow    Plan Discussed with Vascular Surgery Fellow on behalf of     Vascular Surgery   [id number]/ [id number]

## 2025-02-16 NOTE — OCCUPATIONAL THERAPY INITIAL EVALUATION ADULT - PERTINENT HX OF CURRENT PROBLEM, REHAB EVAL
74F L handed, reported prior medical history of anxiety, COPD, HLD, HTN, L shoulder dislocation, OA R knee, provoked DVT after surgery 2020 (not on AC at this time), prior stroke (2020, R occipital lobe, residual LHH), macular degeneration (receives injections in L eye), presenting for code stroke for multiple concerns including dizziness (room spinning) with imbalance, bilateral blurred vision, and headache. MR ORBITS: Motion limited and incomplete study as contrast was not administered. Within this limitation, unremarkable appearance of the orbits. MR BRAIN: No acute intracranial abnormality. Large chronic right posterior cerebral artery territory infarct. No acute intracranial hemorrhage or mass effect. CTA H&N Stable chronic right PCA infarct and overall no significant change from 3/1/2024.

## 2025-02-16 NOTE — PROGRESS NOTE ADULT - SUBJECTIVE AND OBJECTIVE BOX
(+)Frontal HA, received tylenol this morning, doesn't feel like her migraine HA  Visual blurriness is about the same as yesterday    Vital Signs Last 24 Hrs  T(C): 36.3 (16 Feb 2025 11:16), Max: 36.7 (15 Feb 2025 20:03)  T(F): 97.4 (16 Feb 2025 11:16), Max: 98 (15 Feb 2025 20:03)  HR: 76 (16 Feb 2025 11:16) (75 - 77)  BP: 97/68 (16 Feb 2025 11:16) (97/68 - 108/78)  BP(mean): --  RR: 18 (16 Feb 2025 11:16) (18 - 18)  SpO2: 95% (16 Feb 2025 11:16) (95% - 95%)    I&O's Summary    02-15-25 @ 07:01  -  02-16-25 @ 07:00  --------------------------------------------------------  IN: 240 mL / OUT: 0 mL / NET: 240 mL    02-16-25 @ 07:01  -  02-16-25 @ 13:55  --------------------------------------------------------  IN: 240 mL / OUT: 0 mL / NET: 240 mL        GENERAL: NAD  HEAD: NCAT, EOMI  NECK: Supple, No JVD  CHEST/LUNG: Clear to auscultation bilaterally; Normal respiratory effort w/o intercostal retractions  HEART: Regular rate and rhythm; nl S1/S2; No murmurs, rubs, or gallops  ABDOMEN: Soft, Nontender, Nondistended; Bowel sounds present; No hepatosplenomegaly  EXTREMITIES:  2+ Peripheral Pulses; No clubbing, cyanosis, or edema b/l; Nl ROM  SKIN: No jaundice; Normal turgor + texture  NEURO: A+O x 3; nonfocal CN/motor/sensory/reflexes; speech + comprehension are intact; (+)lt eye inferior nasal field deficit; no dysmetria  PSYCH: Nl affect; no delirium or agitation; no suicidal/homicidal ideation    LABS:                        11.9   5.68  )-----------( 182      ( 16 Feb 2025 06:46 )             39.3     02-16    142  |  109[H]  |  17  ----------------------------<  107[H]  3.6   |  23  |  0.90    Ca    9.1      16 Feb 2025 06:46  Mg     2.1     02-15    TPro  7.2  /  Alb  3.9  /  TBili  0.4  /  DBili  x   /  AST  15  /  ALT  9[L]  /  AlkPhos  79  02-15    PT/INR - ( 14 Feb 2025 18:23 )   PT: 13.1 sec;   INR: 1.15 ratio         PTT - ( 14 Feb 2025 18:23 )  PTT:28.5 sec  CAPILLARY BLOOD GLUCOSE            Urinalysis Basic - ( 16 Feb 2025 06:46 )    Color: x / Appearance: x / SG: x / pH: x  Gluc: 107 mg/dL / Ketone: x  / Bili: x / Urobili: x   Blood: x / Protein: x / Nitrite: x   Leuk Esterase: x / RBC: x / WBC x   Sq Epi: x / Non Sq Epi: x / Bacteria: x        RADIOLOGY & ADDITIONAL TESTS:    Imaging Personally Reviewed:  [x] YES  [ ] NO    Case discussed with NPP:  [X] YES  [ ] NO

## 2025-02-16 NOTE — OCCUPATIONAL THERAPY INITIAL EVALUATION ADULT - LIVES WITH, PROFILE
Pt lives alone in private [facility] apartment, +[address] and 1 flight indoors. Pt stays on main level. Pt ambulates without AD, however has RW on both levels in case she needs them. Pt required assistance for ADLs, has HHA 10 hours/day 7 days/week. HHA takes her to the store, prepares meals, grocery shops, cleans. Pt owns shower chair in walk-in shower.

## 2025-02-17 RX ORDER — DEXTROMETHORPHAN HBR, GUAIFENESIN 200 MG/10ML
600 LIQUID ORAL EVERY 12 HOURS
Refills: 0 | Status: DISCONTINUED | OUTPATIENT
Start: 2025-02-17 | End: 2025-02-21

## 2025-02-17 RX ORDER — CLOPIDOGREL BISULFATE 75 MG/1
75 TABLET, FILM COATED ORAL DAILY
Refills: 0 | Status: DISCONTINUED | OUTPATIENT
Start: 2025-02-17 | End: 2025-02-21

## 2025-02-17 RX ORDER — DEXTROMETHORPHAN HBR, GUAIFENESIN 200 MG/10ML
200 LIQUID ORAL EVERY 6 HOURS
Refills: 0 | Status: DISCONTINUED | OUTPATIENT
Start: 2025-02-17 | End: 2025-02-21

## 2025-02-17 RX ADMIN — Medication 0.25 MILLIGRAM(S): at 17:23

## 2025-02-17 RX ADMIN — Medication 1 DROP(S): at 23:11

## 2025-02-17 RX ADMIN — Medication 500 MILLIGRAM(S): at 05:02

## 2025-02-17 RX ADMIN — Medication 500 MILLIGRAM(S): at 17:23

## 2025-02-17 RX ADMIN — Medication 0.25 MILLIGRAM(S): at 23:11

## 2025-02-17 RX ADMIN — Medication 10 MILLIGRAM(S): at 11:21

## 2025-02-17 RX ADMIN — Medication 0.25 MILLIGRAM(S): at 11:20

## 2025-02-17 RX ADMIN — Medication 0.25 MILLIGRAM(S): at 00:03

## 2025-02-17 RX ADMIN — CLOPIDOGREL BISULFATE 75 MILLIGRAM(S): 75 TABLET, FILM COATED ORAL at 17:28

## 2025-02-17 RX ADMIN — Medication 81 MILLIGRAM(S): at 11:20

## 2025-02-17 RX ADMIN — Medication 1 DROP(S): at 17:08

## 2025-02-17 RX ADMIN — Medication 1 DROP(S): at 00:03

## 2025-02-17 RX ADMIN — FLUTICASONE PROPIONATE 1 SPRAY(S): 50 SPRAY, METERED NASAL at 17:23

## 2025-02-17 RX ADMIN — SERTRALINE 25 MILLIGRAM(S): 100 TABLET, FILM COATED ORAL at 11:21

## 2025-02-17 RX ADMIN — Medication 1 DROP(S): at 05:01

## 2025-02-17 RX ADMIN — FLUTICASONE PROPIONATE 1 SPRAY(S): 50 SPRAY, METERED NASAL at 05:01

## 2025-02-17 RX ADMIN — Medication 1 DROP(S): at 11:21

## 2025-02-17 RX ADMIN — DEXTROMETHORPHAN HBR, GUAIFENESIN 600 MILLIGRAM(S): 200 LIQUID ORAL at 17:28

## 2025-02-17 RX ADMIN — ATORVASTATIN CALCIUM 40 MILLIGRAM(S): 80 TABLET, FILM COATED ORAL at 21:03

## 2025-02-17 NOTE — PROGRESS NOTE ADULT - SUBJECTIVE AND OBJECTIVE BOX
Some phlegm in her throat  Nonvertiginous dizziness, mild    Vital Signs Last 24 Hrs  T(C): 36.6 (17 Feb 2025 11:57), Max: 37 (16 Feb 2025 20:17)  T(F): 97.8 (17 Feb 2025 11:57), Max: 98.6 (16 Feb 2025 20:17)  HR: 69 (17 Feb 2025 11:57) (63 - 87)  BP: 114/75 (17 Feb 2025 11:57) (103/71 - 126/73)  BP(mean): --  RR: 17 (17 Feb 2025 11:57) (17 - 18)  SpO2: 94% (17 Feb 2025 11:57) (93% - 94%)    I&O's Summary    02-16-25 @ 07:01  -  02-17-25 @ 07:00  --------------------------------------------------------  IN: 460 mL / OUT: 250 mL / NET: 210 mL    02-17-25 @ 07:01  -  02-17-25 @ 12:51  --------------------------------------------------------  IN: 360 mL / OUT: 300 mL / NET: 60 mL          GENERAL: NAD  HEAD: NCAT, EOMI  NECK: Supple, No JVD  CHEST/LUNG: Clear to auscultation bilaterally; Normal respiratory effort w/o intercostal retractions  HEART: Regular rate and rhythm; nl S1/S2; No murmurs, rubs, or gallops  ABDOMEN: Soft, Nontender, Nondistended; Bowel sounds present; No hepatosplenomegaly  EXTREMITIES:  2+ Peripheral Pulses; No clubbing, cyanosis, or edema b/l; Nl ROM  SKIN: No jaundice; Normal turgor + texture  NEURO: A+O x 3; nonfocal CN/motor/sensory/reflexes; speech + comprehension are intact; (+)lt eye inferior nasal field deficit; no dysmetria  PSYCH: Nl affect; no delirium or agitation; no suicidal/homicidal ideation      LABS:                        11.9   5.68  )-----------( 182      ( 16 Feb 2025 06:46 )             39.3     02-16    142  |  109[H]  |  17  ----------------------------<  107[H]  3.6   |  23  |  0.90    Ca    9.1      16 Feb 2025 06:46        CAPILLARY BLOOD GLUCOSE            Urinalysis Basic - ( 16 Feb 2025 06:46 )    Color: x / Appearance: x / SG: x / pH: x  Gluc: 107 mg/dL / Ketone: x  / Bili: x / Urobili: x   Blood: x / Protein: x / Nitrite: x   Leuk Esterase: x / RBC: x / WBC x   Sq Epi: x / Non Sq Epi: x / Bacteria: x        RADIOLOGY & ADDITIONAL TESTS:    Imaging Personally Reviewed:  [x] YES  [ ] NO    Case discussed with NPP:  [X] YES  [ ] NO       Some phlegm in her throat  Nonvertiginous dizziness, mild    Vital Signs Last 24 Hrs  T(C): 36.6 (17 Feb 2025 11:57), Max: 37 (16 Feb 2025 20:17)  T(F): 97.8 (17 Feb 2025 11:57), Max: 98.6 (16 Feb 2025 20:17)  HR: 69 (17 Feb 2025 11:57) (63 - 87)  BP: 114/75 (17 Feb 2025 11:57) (103/71 - 126/73)  BP(mean): --  RR: 17 (17 Feb 2025 11:57) (17 - 18)  SpO2: 94% (17 Feb 2025 11:57) (93% - 94%)    I&O's Summary    02-16-25 @ 07:01  -  02-17-25 @ 07:00  --------------------------------------------------------  IN: 460 mL / OUT: 250 mL / NET: 210 mL    02-17-25 @ 07:01  -  02-17-25 @ 12:51  --------------------------------------------------------  IN: 360 mL / OUT: 300 mL / NET: 60 mL          GENERAL: NAD  HEAD: NCAT, EOMI  NECK: Supple, No JVD  CHEST/LUNG: Clear to auscultation bilaterally; Normal respiratory effort w/o intercostal retractions  HEART: Regular rate and rhythm; nl S1/S2; No murmurs, rubs, or gallops  ABDOMEN: Soft, Nontender, Nondistended; Bowel sounds present; No hepatosplenomegaly  EXTREMITIES:  2+ Peripheral Pulses; No clubbing, cyanosis, or edema b/l; Nl ROM  SKIN: No jaundice; Normal turgor + texture  NEURO: A+O x 3; nonfocal CN/motor/sensory/reflexes; speech + comprehension are intact; (+)lt eye inferior nasal field deficit; no dysmetria  PSYCH: Nl affect; no delirium or agitation; no suicidal/homicidal ideation      LABS:                        11.9   5.68  )-----------( 182      ( 16 Feb 2025 06:46 )             39.3     02-16    142  |  109[H]  |  17  ----------------------------<  107[H]  3.6   |  23  |  0.90    Ca    9.1      16 Feb 2025 06:46        CAPILLARY BLOOD GLUCOSE            Urinalysis Basic - ( 16 Feb 2025 06:46 )    Color: x / Appearance: x / SG: x / pH: x  Gluc: 107 mg/dL / Ketone: x  / Bili: x / Urobili: x   Blood: x / Protein: x / Nitrite: x   Leuk Esterase: x / RBC: x / WBC x   Sq Epi: x / Non Sq Epi: x / Bacteria: x        RADIOLOGY & ADDITIONAL TESTS:    Imaging Personally Reviewed:  [x] YES  [ ] NO    Case discussed with NPP:  [X] YES  [ ] NO

## 2025-02-17 NOTE — PROGRESS NOTE ADULT - ASSESSMENT
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 who presents with inability to see since waking up morning of 2/14/25 at 10 AM, along with dizziness (room spinning) with imbalance, bilateral blurred vision, and headache        Acute vestibular syndrome  - no evidence for acute CVA on MRI head or acute occlusion on MRA head  - telemetry  - routine neuro checks  - orthostatics (-)  - TTE --> nl LV systolic fxn although endocardium poorly visualized  - PT/OT consults pending  - appreciate neurology    Severe rt carotid artery stenosis  - cont baby ASA, plavix  - cont home-dose statin  - carotid duplex pending  - appreciate vascular consult    Visual field deficit; blurry vision  - no evidence for acute CVA  - no acute findings on MRI orbits  - orbital exam already performed by ophthalmology service without any acute discrete findings  - ESR = 54 --> 22  - artificial tears OU 4x a day  - appreciate ophthalmology    Hx of rt occipital lobe CVA  - cont baby ASA  - no need for plavix  - cont home-dose statin    HTN  - BP self-controlled so far    HLD  - cont home-dose statin    Anxiety, unspecified  - alprazolam 0,25 mg q6h prn    Need for prophylaxis  - OOB as tolerated    Disposition  - PT --> TBD  - OT --> home OT

## 2025-02-18 PROCEDURE — 93880 EXTRACRANIAL BILAT STUDY: CPT | Mod: 26

## 2025-02-18 RX ADMIN — Medication 0.25 MILLIGRAM(S): at 18:12

## 2025-02-18 RX ADMIN — SERTRALINE 25 MILLIGRAM(S): 100 TABLET, FILM COATED ORAL at 12:13

## 2025-02-18 RX ADMIN — Medication 1 DROP(S): at 12:14

## 2025-02-18 RX ADMIN — Medication 1 LOZENGE: at 12:12

## 2025-02-18 RX ADMIN — Medication 81 MILLIGRAM(S): at 12:13

## 2025-02-18 RX ADMIN — Medication 10 MILLIGRAM(S): at 12:13

## 2025-02-18 RX ADMIN — Medication 0.25 MILLIGRAM(S): at 12:12

## 2025-02-18 RX ADMIN — Medication 1 DROP(S): at 18:12

## 2025-02-18 RX ADMIN — Medication 500 MILLIGRAM(S): at 18:12

## 2025-02-18 RX ADMIN — Medication 3 MILLIGRAM(S): at 00:35

## 2025-02-18 RX ADMIN — FLUTICASONE PROPIONATE 1 SPRAY(S): 50 SPRAY, METERED NASAL at 18:13

## 2025-02-18 RX ADMIN — Medication 500 MILLIGRAM(S): at 05:15

## 2025-02-18 RX ADMIN — FLUTICASONE PROPIONATE 1 SPRAY(S): 50 SPRAY, METERED NASAL at 05:15

## 2025-02-18 RX ADMIN — ATORVASTATIN CALCIUM 40 MILLIGRAM(S): 80 TABLET, FILM COATED ORAL at 21:16

## 2025-02-18 RX ADMIN — CLOPIDOGREL BISULFATE 75 MILLIGRAM(S): 75 TABLET, FILM COATED ORAL at 12:13

## 2025-02-18 RX ADMIN — Medication 1 DROP(S): at 05:15

## 2025-02-18 RX ADMIN — DEXTROMETHORPHAN HBR, GUAIFENESIN 600 MILLIGRAM(S): 200 LIQUID ORAL at 18:12

## 2025-02-18 RX ADMIN — DEXTROMETHORPHAN HBR, GUAIFENESIN 600 MILLIGRAM(S): 200 LIQUID ORAL at 05:15

## 2025-02-18 NOTE — PROGRESS NOTE ADULT - ASSESSMENT
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 who presents with inability to see since waking up morning of 2/14/25 at 10 AM, along with dizziness (room spinning) with imbalance, bilateral blurred vision, and headache        Acute vestibular syndrome  - no evidence for acute CVA on MRI head or acute occlusion on MRA head  - telemetry  - routine neuro checks  - orthostatics (-)  - TTE --> nl LV systolic fxn although endocardium poorly visualized  - PT/OT consult underway  - appreciate neurology    Severe rt carotid artery stenosis  - cont baby ASA, plavix  - cont home-dose statin  - carotid duplex done 2/18/25  - appreciate vascular consult    Visual field deficit; blurry vision  - no evidence for acute CVA  - no acute findings on MRI orbits  - orbital exam already performed by ophthalmology service without any acute discrete findings  - ESR = 54 --> 22  - artificial tears OU 4x a day  - appreciate ophthalmology    Hx of rt occipital lobe CVA  - cont baby ASA  - cont plavix  - cont home-dose statin    HTN  - BP self-controlled so far    HLD  - cont home-dose statin    Anxiety, unspecified  - alprazolam 0,25 mg q6h prn    Need for prophylaxis  - OOB as tolerated    Disposition  - PT --> TBD  - OT --> home OT  - possible d/c today pending vascular recs      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 who presents with inability to see since waking up morning of 2/14/25 at 10 AM, along with dizziness (room spinning) with imbalance, bilateral blurred vision, and headache        Acute vestibular syndrome  - no evidence for acute CVA on MRI head or acute occlusion on MRA head  - telemetry  - routine neuro checks  - orthostatics (-)  - TTE --> nl LV systolic fxn although endocardium poorly visualized  - PT/OT consult underway  - appreciate neurology    Severe rt carotid artery stenosis  - cont baby ASA, plavix  - cont home-dose statin  - carotid duplex 2/18/25 --> occluded rt ICA + CCA, 70% stenosis lt ICA  - appreciate vascular consult    Visual field deficit; blurry vision  - no evidence for acute CVA  - no acute findings on MRI orbits  - orbital exam already performed by ophthalmology service without any acute discrete findings  - ESR = 54 --> 22  - artificial tears OU 4x a day  - appreciate ophthalmology    Hx of rt occipital lobe CVA  - cont baby ASA  - cont plavix  - cont home-dose statin    HTN  - BP self-controlled so far    HLD  - cont home-dose statin    Anxiety, unspecified  - alprazolam 0,25 mg q6h prn    Need for prophylaxis  - OOB as tolerated    Disposition  - PT --> TBD  - OT --> home OT  - possible d/c today pending vascular recs

## 2025-02-18 NOTE — PROGRESS NOTE ADULT - SUBJECTIVE AND OBJECTIVE BOX
SURGERY DAILY PROGRESS NOTE    24 Hour/Overnight Events: No acute events overnight    SUBJECTIVE: Patient seen and evaluated on AM rounds.   ------------------------------------------------------------------------------------------------------------  OBJECTIVE:  Vital Signs Last 24 Hrs  T(C): 37.1 (18 Feb 2025 05:37), Max: 37.1 (18 Feb 2025 05:37)  T(F): 98.7 (18 Feb 2025 05:37), Max: 98.7 (18 Feb 2025 05:37)  HR: 72 (18 Feb 2025 05:37) (69 - 78)  BP: 112/71 (18 Feb 2025 05:37) (112/71 - 120/85)  BP(mean): --  RR: 18 (18 Feb 2025 05:37) (17 - 18)  SpO2: 96% (18 Feb 2025 05:37) (94% - 96%)    Parameters below as of 18 Feb 2025 05:37  Patient On (Oxygen Delivery Method): room air      I&O's Detail    17 Feb 2025 07:01  -  18 Feb 2025 07:00  --------------------------------------------------------  IN:    Oral Fluid: 900 mL  Total IN: 900 mL    OUT:    Voided (mL): 2200 mL  Total OUT: 2200 mL    Total NET: -1300 mL          LABS:                PHYSICAL EXAM:  General: NAD, resting comfortably  HEENT: NC/AT, EOMI  Pulmonary: normal resp effort, CTA-B  Cardiovascular: NSR, no murmurs  Abdominal: soft, ND/ND  Extremities: WWP, normal strength  Neuro: A/O x 3, CNs II-XII grossly intact, normal sensation, no focal deficits

## 2025-02-18 NOTE — PROGRESS NOTE ADULT - SUBJECTIVE AND OBJECTIVE BOX
Returned from carotid duplex  Reports that she feels some right ear stuffiness and sore throat  Cepacol lozenge ordered    Vital Signs Last 24 Hrs  T(C): 37.1 (18 Feb 2025 05:37), Max: 37.1 (18 Feb 2025 05:37)  T(F): 98.7 (18 Feb 2025 05:37), Max: 98.7 (18 Feb 2025 05:37)  HR: 72 (18 Feb 2025 05:37) (69 - 78)  BP: 112/71 (18 Feb 2025 05:37) (112/71 - 120/85)  BP(mean): --  RR: 18 (18 Feb 2025 05:37) (17 - 18)  SpO2: 96% (18 Feb 2025 05:37) (94% - 96%)    I&O's Summary    02-17-25 @ 07:01  -  02-18-25 @ 07:00  --------------------------------------------------------  IN: 900 mL / OUT: 2200 mL / NET: -1300 mL        GENERAL: NAD  HEAD: NCAT, EOMI  NECK: Supple, No JVD  CHEST/LUNG: Clear to auscultation bilaterally; Normal respiratory effort w/o intercostal retractions  HEART: Regular rate and rhythm; nl S1/S2; No murmurs, rubs, or gallops  ABDOMEN: Soft, Nontender, Nondistended; Bowel sounds present; No hepatosplenomegaly  EXTREMITIES:  2+ Peripheral Pulses; No clubbing, cyanosis, or edema b/l; Nl ROM  SKIN: No jaundice; Normal turgor + texture  NEURO: A+O x 3; nonfocal CN/motor/sensory/reflexes; speech + comprehension are intact; (+)lt eye inferior nasal field deficit; no dysmetria  PSYCH: Nl affect; no delirium or agitation; no suicidal/homicidal ideation    LABS:            CAPILLARY BLOOD GLUCOSE                RADIOLOGY & ADDITIONAL TESTS:    Imaging Personally Reviewed:  [x] YES  [ ] NO    Case discussed with NPP:  [X] YES  [ ] NO   Returned from carotid duplex  Reports that she feels some right ear stuffiness and sore throat  Cepacol lozenge ordered  Spoke w/son over phone    Vital Signs Last 24 Hrs  T(C): 37.1 (18 Feb 2025 05:37), Max: 37.1 (18 Feb 2025 05:37)  T(F): 98.7 (18 Feb 2025 05:37), Max: 98.7 (18 Feb 2025 05:37)  HR: 72 (18 Feb 2025 05:37) (69 - 78)  BP: 112/71 (18 Feb 2025 05:37) (112/71 - 120/85)  BP(mean): --  RR: 18 (18 Feb 2025 05:37) (17 - 18)  SpO2: 96% (18 Feb 2025 05:37) (94% - 96%)    I&O's Summary    02-17-25 @ 07:01  -  02-18-25 @ 07:00  --------------------------------------------------------  IN: 900 mL / OUT: 2200 mL / NET: -1300 mL        GENERAL: NAD  HEAD: NCAT, EOMI  NECK: Supple, No JVD  CHEST/LUNG: Clear to auscultation bilaterally; Normal respiratory effort w/o intercostal retractions  HEART: Regular rate and rhythm; nl S1/S2; No murmurs, rubs, or gallops  ABDOMEN: Soft, Nontender, Nondistended; Bowel sounds present; No hepatosplenomegaly  EXTREMITIES:  2+ Peripheral Pulses; No clubbing, cyanosis, or edema b/l; Nl ROM  SKIN: No jaundice; Normal turgor + texture  NEURO: A+O x 3; nonfocal CN/motor/sensory/reflexes; speech + comprehension are intact; (+)lt eye inferior nasal field deficit; no dysmetria  PSYCH: Nl affect; no delirium or agitation; no suicidal/homicidal ideation    LABS:            CAPILLARY BLOOD GLUCOSE                RADIOLOGY & ADDITIONAL TESTS:    Imaging Personally Reviewed:  [x] YES  [ ] NO    Case discussed with NPP:  [X] YES  [ ] NO

## 2025-02-18 NOTE — PROGRESS NOTE ADULT - ASSESSMENT
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 Code stroke was called - had chronic finding but nothing new. Vascular surgery consulted for known Right carotid stenosis.     Recommendation:  - please obtain b/l carotid duplex  - C/w aspirin/Statin  - vascular surgery to follow      Vascular Surgery   [id number]/ [id number]

## 2025-02-19 RX ADMIN — Medication 650 MILLIGRAM(S): at 18:40

## 2025-02-19 RX ADMIN — Medication 10 MILLIGRAM(S): at 12:05

## 2025-02-19 RX ADMIN — CLOPIDOGREL BISULFATE 75 MILLIGRAM(S): 75 TABLET, FILM COATED ORAL at 12:05

## 2025-02-19 RX ADMIN — Medication 0.25 MILLIGRAM(S): at 16:09

## 2025-02-19 RX ADMIN — FLUTICASONE PROPIONATE 1 SPRAY(S): 50 SPRAY, METERED NASAL at 18:26

## 2025-02-19 RX ADMIN — DEXTROMETHORPHAN HBR, GUAIFENESIN 600 MILLIGRAM(S): 200 LIQUID ORAL at 18:25

## 2025-02-19 RX ADMIN — Medication 1 DROP(S): at 23:05

## 2025-02-19 RX ADMIN — Medication 0.25 MILLIGRAM(S): at 23:05

## 2025-02-19 RX ADMIN — Medication 1 DROP(S): at 18:26

## 2025-02-19 RX ADMIN — Medication 1 DROP(S): at 00:41

## 2025-02-19 RX ADMIN — Medication 1 DROP(S): at 05:09

## 2025-02-19 RX ADMIN — Medication 0.25 MILLIGRAM(S): at 00:44

## 2025-02-19 RX ADMIN — DEXTROMETHORPHAN HBR, GUAIFENESIN 600 MILLIGRAM(S): 200 LIQUID ORAL at 05:09

## 2025-02-19 RX ADMIN — Medication 1 DROP(S): at 12:05

## 2025-02-19 RX ADMIN — ATORVASTATIN CALCIUM 40 MILLIGRAM(S): 80 TABLET, FILM COATED ORAL at 21:31

## 2025-02-19 RX ADMIN — SERTRALINE 25 MILLIGRAM(S): 100 TABLET, FILM COATED ORAL at 12:04

## 2025-02-19 RX ADMIN — Medication 500 MILLIGRAM(S): at 05:09

## 2025-02-19 RX ADMIN — FLUTICASONE PROPIONATE 1 SPRAY(S): 50 SPRAY, METERED NASAL at 05:10

## 2025-02-19 RX ADMIN — Medication 500 MILLIGRAM(S): at 18:25

## 2025-02-19 RX ADMIN — Medication 81 MILLIGRAM(S): at 12:05

## 2025-02-19 NOTE — PROGRESS NOTE ADULT - ASSESSMENT
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 Code stroke was called - had chronic finding but nothing new. Vascular surgery consulted for known Right carotid stenosis.     Recommendation:  - b/l carotid duplex reviewed w/ plan pending  - C/w aspirin/Statin  - vascular surgery to follow      Vascular Surgery   [id number]/ [id number]

## 2025-02-19 NOTE — PROGRESS NOTE ADULT - SUBJECTIVE AND OBJECTIVE BOX
Vascular Surgery Daily Progress Note    Subjective: Patient examined at bedside this morning.      ciprofloxacin     Tablet 500  aspirin enteric coated 81  ciprofloxacin     Tablet 500  clopidogrel Tablet 75        Vital Signs Last 24 Hrs  T(C): 36.8 (19 Feb 2025 04:21), Max: 36.9 (18 Feb 2025 11:46)  T(F): 98.2 (19 Feb 2025 04:21), Max: 98.5 (18 Feb 2025 11:46)  HR: 72 (19 Feb 2025 04:21) (72 - 74)  BP: 112/75 (19 Feb 2025 04:21) (95/67 - 117/75)  BP(mean): --  RR: 18 (19 Feb 2025 04:21) (17 - 18)  SpO2: 93% (19 Feb 2025 04:21) (93% - 94%)    Parameters below as of 19 Feb 2025 04:21  Patient On (Oxygen Delivery Method): room air      I&O's Summary    18 Feb 2025 07:01  -  19 Feb 2025 07:00  --------------------------------------------------------  IN: 900 mL / OUT: 900 mL / NET: 0 mL        Physical Exam:  General: NAD, resting comfortably  HEENT: NC/AT, EOMI  Pulmonary: normal resp effort, CTA-B  Cardiovascular: NSR, no murmurs  Abdominal: soft, ND/ND  Extremities: WWP, normal strength  Neuro: A/O x 3, CNs II-XII grossly intact, normal sensation, no focal deficits    Imaging:  PROCEDURE DATE:  02/18/2025          INTERPRETATION:  CLINICAL INFORMATION: CVA    COMPARISON: A CTA examination, dated 3/1/2024, showed an occluded right   common carotid artery and proximal right internal carotid artery    TECHNIQUE: Grayscale, color and spectral Doppler examination of both   carotid arteries was performed.    FINDINGS:    No elevated velocities or abnormal waveforms are encountered.    Peak systolic velocities are as follows:    RIGHT:  PROX CCA = 17 cm/s  DIST CCA = 0 cm/s  PROX ICA = 0 cm/s  MID ICA = 0 cm/s  DIST ICA = 0 cm/s  ECA = RETROGRADE, 105 cm/s    LEFT:  PROX CCA = 84 cm/s  DIST CCA = 115 cm/s  PROX ICA = 140 cm/s  MID ICA = 312/52 cm/s  DIST ICA = 175 cm/s  ECA = 191 cm/s    There is RETROGRADE flow through the right vertebral artery.    There is antegrade flow through the left vertebral artery.    IMPRESSION:    The right common carotid and right internal carotid arteries are occluded.  There is retrograde flow through the right external carotid artery.    There is a severe, greater than 70% stenosis of the left internal carotid   artery.    There is retrograde flow through the right vertebral artery.    Measurement of carotid stenosis is based on updated recommendations for   carotid stenosis interpretation criteria from the Intersocietal   Accreditation Commission (IAC) Vascular Testing Board, modified from the   Society of Radiologists in Ultrasound (SRU) Consensus Conference Criteria   for Internal Carotid Artery Stenosis.        --- End of Report ---

## 2025-02-19 NOTE — PROGRESS NOTE ADULT - SUBJECTIVE AND OBJECTIVE BOX
SUBJECTIVE/ OVERNIGHT EVENTS:  states she didn't sleep well last night  now feeling tired  no cp, no sob, no n/v/d. no abdominal pain.  no headache, no dizziness.   carotid doppler results reviewed. vascular follow up pending.     --------------------------------------------------------------------------------------------  LABS:            CAPILLARY BLOOD GLUCOSE                RADIOLOGY & ADDITIONAL TESTS:    Imaging Personally Reviewed:  [x] YES  [ ] NO    Consultant(s) Notes Reviewed:  [x] YES  [ ] NO    MEDICATIONS  (STANDING):  artificial tears (preservative free) Ophthalmic Solution 1 Drop(s) Both EYES four times a day  aspirin enteric coated 81 milliGRAM(s) Oral daily  atorvastatin 40 milliGRAM(s) Oral at bedtime  cetirizine 10 milliGRAM(s) Oral daily  ciprofloxacin     Tablet 500 milliGRAM(s) Oral every 12 hours  clopidogrel Tablet 75 milliGRAM(s) Oral daily  fluticasone propionate 50 MICROgram(s)/spray Nasal Spray 1 Spray(s) Both Nostrils two times a day  guaiFENesin  milliGRAM(s) Oral every 12 hours  sertraline 25 milliGRAM(s) Oral daily    MEDICATIONS  (PRN):  acetaminophen     Tablet .. 650 milliGRAM(s) Oral every 6 hours PRN Temp greater or equal to 38C (100.4F), Moderate Pain (4 - 6)  ALPRAZolam 0.25 milliGRAM(s) Oral every 6 hours PRN anxiety  guaiFENesin Oral Liquid (Sugar-Free) 200 milliGRAM(s) Oral every 6 hours PRN Cough  melatonin 3 milliGRAM(s) Oral at bedtime PRN Insomnia      Care Discussed with Consultants/Other Providers [x] YES  [ ] NO    Vital Signs Last 24 Hrs  T(C): 36.8 (19 Feb 2025 04:21), Max: 36.9 (18 Feb 2025 11:46)  T(F): 98.2 (19 Feb 2025 04:21), Max: 98.5 (18 Feb 2025 11:46)  HR: 72 (19 Feb 2025 04:21) (72 - 74)  BP: 112/75 (19 Feb 2025 04:21) (95/67 - 117/75)  BP(mean): --  RR: 18 (19 Feb 2025 04:21) (17 - 18)  SpO2: 93% (19 Feb 2025 04:21) (93% - 94%)    Parameters below as of 19 Feb 2025 04:21  Patient On (Oxygen Delivery Method): room air      I&O's Summary    18 Feb 2025 07:01  -  19 Feb 2025 07:00  --------------------------------------------------------  IN: 900 mL / OUT: 900 mL / NET: 0 mL      PHYSICAL EXAM:   GENERAL: NAD, comfortable, left eye vision deficit.  HEAD: NCAT, EOMI  NECK: Supple, No JVD  CHEST/LUNG: Clear to auscultation bilaterally; Normal respiratory effort w/o intercostal retractions  HEART: Regular rate and rhythm; nl S1/S2; No murmurs, rubs, or gallops  ABDOMEN: Soft, Nontender, Nondistended; Bowel sounds present; No hepatosplenomegaly  EXTREMITIES:  2+ Peripheral Pulses; No clubbing, cyanosis, or edema b/l; Nl ROM  SKIN: No jaundice; Normal turgor + texture  NEURO: A+O x 3; nonfocal CN/motor/sensory/reflexes; speech + comprehension are intact; (+)lt eye inferior nasal field deficit; no dysmetria  PSYCH: Nl affect; no delirium or agitation; no suicidal/homicidal ideation

## 2025-02-19 NOTE — PROGRESS NOTE ADULT - ASSESSMENT
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    Acute vestibular syndrome  - no evidence for acute CVA on MRI head or acute occlusion on MRA head  - telemetry  - routine neuro checks  - orthostatics (-)  - TTE --> nl LV systolic fxn although endocardium poorly visualized  - PT/OT consult underway  - appreciate neurology    Severe rt carotid artery stenosis  - cont baby ASA, plavix  - cont home-dose statin  - carotid duplex 2/18/25 --> occluded rt ICA + CCA, 70% stenosis lt ICA  - appreciate vascular consult, f/u pending in regards to OR inpt vs. outpt    Visual field deficit; blurry vision  - no evidence for acute CVA  - no acute findings on MRI orbits  - orbital exam already performed by ophthalmology service without any acute discrete findings  - ESR = 54 --> 22, doubt GCA  - artificial tears OU 4x a day  - appreciate ophthalmology, outpt follow up advised.     Hx of rt occipital lobe CVA  - cont baby ASA  - cont plavix  - cont home-dose statin    HTN  - BP self-controlled so far    HLD  - cont home-dose statin    Anxiety, unspecified  - alprazolam 0,25 mg q6h prn    Need for prophylaxis  - OOB as tolerated    Disposition  - PT --> Home PT  - OT --> home OT  - possible d/c planning pending vascular recs

## 2025-02-20 LAB
ADD ON TEST-SPECIMEN IN LAB: SIGNIFICANT CHANGE UP
ADD ON TEST-SPECIMEN IN LAB: SIGNIFICANT CHANGE UP
ANION GAP SERPL CALC-SCNC: 13 MMOL/L — SIGNIFICANT CHANGE UP (ref 5–17)
BUN SERPL-MCNC: 24 MG/DL — HIGH (ref 7–23)
CALCIUM SERPL-MCNC: 9.2 MG/DL — SIGNIFICANT CHANGE UP (ref 8.4–10.5)
CHLORIDE SERPL-SCNC: 103 MMOL/L — SIGNIFICANT CHANGE UP (ref 96–108)
CO2 SERPL-SCNC: 23 MMOL/L — SIGNIFICANT CHANGE UP (ref 22–31)
CREAT SERPL-MCNC: 0.97 MG/DL — SIGNIFICANT CHANGE UP (ref 0.5–1.3)
CRP SERPL-MCNC: 14 MG/L — HIGH (ref 0–4)
EGFR: 61 ML/MIN/1.73M2 — SIGNIFICANT CHANGE UP
ERYTHROCYTE [SEDIMENTATION RATE] IN BLOOD: 56 MM/HR — HIGH (ref 0–20)
GLUCOSE SERPL-MCNC: 95 MG/DL — SIGNIFICANT CHANGE UP (ref 70–99)
HCT VFR BLD CALC: 39.6 % — SIGNIFICANT CHANGE UP (ref 34.5–45)
HGB BLD-MCNC: 12.2 G/DL — SIGNIFICANT CHANGE UP (ref 11.5–15.5)
MAGNESIUM SERPL-MCNC: 1.8 MG/DL — SIGNIFICANT CHANGE UP (ref 1.6–2.6)
MCHC RBC-ENTMCNC: 26.5 PG — LOW (ref 27–34)
MCHC RBC-ENTMCNC: 30.8 G/DL — LOW (ref 32–36)
MCV RBC AUTO: 86.1 FL — SIGNIFICANT CHANGE UP (ref 80–100)
NRBC BLD AUTO-RTO: 0 /100 WBCS — SIGNIFICANT CHANGE UP (ref 0–0)
PLATELET # BLD AUTO: 191 K/UL — SIGNIFICANT CHANGE UP (ref 150–400)
POTASSIUM SERPL-MCNC: 3.8 MMOL/L — SIGNIFICANT CHANGE UP (ref 3.5–5.3)
POTASSIUM SERPL-SCNC: 3.8 MMOL/L — SIGNIFICANT CHANGE UP (ref 3.5–5.3)
RBC # BLD: 4.6 M/UL — SIGNIFICANT CHANGE UP (ref 3.8–5.2)
RBC # FLD: 14.6 % — HIGH (ref 10.3–14.5)
SODIUM SERPL-SCNC: 139 MMOL/L — SIGNIFICANT CHANGE UP (ref 135–145)
URATE SERPL-MCNC: 7.4 MG/DL — HIGH (ref 2.5–7)
WBC # BLD: 8.38 K/UL — SIGNIFICANT CHANGE UP (ref 3.8–10.5)
WBC # FLD AUTO: 8.38 K/UL — SIGNIFICANT CHANGE UP (ref 3.8–10.5)

## 2025-02-20 PROCEDURE — 73630 X-RAY EXAM OF FOOT: CPT | Mod: 26,LT

## 2025-02-20 RX ORDER — METHYLPREDNISOLONE ACETATE 80 MG/ML
30 INJECTION, SUSPENSION INTRA-ARTICULAR; INTRALESIONAL; INTRAMUSCULAR; SOFT TISSUE DAILY
Refills: 0 | Status: DISCONTINUED | OUTPATIENT
Start: 2025-02-20 | End: 2025-02-21

## 2025-02-20 RX ADMIN — Medication 650 MILLIGRAM(S): at 05:13

## 2025-02-20 RX ADMIN — FLUTICASONE PROPIONATE 1 SPRAY(S): 50 SPRAY, METERED NASAL at 17:25

## 2025-02-20 RX ADMIN — Medication 0.25 MILLIGRAM(S): at 16:25

## 2025-02-20 RX ADMIN — SERTRALINE 25 MILLIGRAM(S): 100 TABLET, FILM COATED ORAL at 12:30

## 2025-02-20 RX ADMIN — DEXTROMETHORPHAN HBR, GUAIFENESIN 600 MILLIGRAM(S): 200 LIQUID ORAL at 05:09

## 2025-02-20 RX ADMIN — DEXTROMETHORPHAN HBR, GUAIFENESIN 600 MILLIGRAM(S): 200 LIQUID ORAL at 17:24

## 2025-02-20 RX ADMIN — FLUTICASONE PROPIONATE 1 SPRAY(S): 50 SPRAY, METERED NASAL at 05:09

## 2025-02-20 RX ADMIN — Medication 1 DROP(S): at 05:13

## 2025-02-20 RX ADMIN — CLOPIDOGREL BISULFATE 75 MILLIGRAM(S): 75 TABLET, FILM COATED ORAL at 12:30

## 2025-02-20 RX ADMIN — Medication 81 MILLIGRAM(S): at 12:30

## 2025-02-20 RX ADMIN — Medication 10 MILLIGRAM(S): at 12:30

## 2025-02-20 RX ADMIN — Medication 650 MILLIGRAM(S): at 04:13

## 2025-02-20 RX ADMIN — Medication 500 MILLIGRAM(S): at 05:09

## 2025-02-20 RX ADMIN — Medication 1 DROP(S): at 17:24

## 2025-02-20 RX ADMIN — Medication 1 DROP(S): at 12:30

## 2025-02-20 RX ADMIN — METHYLPREDNISOLONE ACETATE 30 MILLIGRAM(S): 80 INJECTION, SUSPENSION INTRA-ARTICULAR; INTRALESIONAL; INTRAMUSCULAR; SOFT TISSUE at 12:29

## 2025-02-20 RX ADMIN — ATORVASTATIN CALCIUM 40 MILLIGRAM(S): 80 TABLET, FILM COATED ORAL at 21:20

## 2025-02-20 NOTE — CHART NOTE - NSCHARTNOTEFT_GEN_A_CORE
Neurology Follow-up:    Pt seen and examined on 2/15/25 with stroke attending. Please refer to consult note on 2/14/25 for final recommendations.    Jeff Weller   PGY-4  Department of Neurology  [facility] [employer] [facility]      [facility] - [address] [facility], [address]  [place] [address]  Dept Phone: [phone]  Dept Fax: [phone]        Transthoracic Echocardiography Report (TTE)     Patient Name  JARET MCNALLY Accession #        [id number]     MRN #         [id number]            Date of Study      12/12/2024     Date of Birth 1950         Referring          Gil Feliciano MD  Physician     Age           74 year(s)         Sonographer        Rhonda Sharma RDCS,  B.S.     Gender        Female             Interpreting       Gil Feliciano MD  Physician     Study         [place]  Location:     Cardiology     Height: 64 inches Weight: 194 pounds BSA: 1.93 m^2 BMI: 33.3 kg/m^2BP:  133/70 mmHg     Technical Quality: Poor visualization     Patient Status: Routine     M-Mode/2D Measurements DERIVED VARIABLES     LVID Diastole: 5.46 cm                                 FS36.08 %  LVID Systole: 3.49 cm                                  EF Ftxujm70.8 %  LV Septum: 0.99 cm                                     EF 2D66 %  LV PW: 1.08 cm  LV EDV/LV EDV Index: 145.19 ml/ 75 ml/m^2  LV ESV/LV ESV Index: 42.46 ml/ 22 ml/m^2     Aortic Root Dimension: 3.39 cm     LA Dimension: 4.2 cm     SV (2D-Teich):94.72ml     Aortic Valve Mitral Valve     AV Peak Velocity: 0.96 m/s          MV Peak E-Wave: 0.66 m/s  AV Peak Gradient: 3.67 mmHg         MV Peak A-Wave: 1.01 m/s  AV Mean Gradient: 2.7 mmHg          MV E/A Ratio: 0.65  LVOT Peak Velocity: 0.9 m/s  LVOT: 1.84 cm  AV Area (Continuity):2.16 cm^2  MV Deceleration Time: 243 msec     Pulmonic Valve     Findings     Mitral Valve  Mild mitral regurgitation is present.  Posterior mitral annular calcification is present.     Aortic Valve  Mild aortic valve calcification without any evidence of aortic  insufficiency or stenosis.     Tricuspid Valve  Mild tricuspid regurgitation.     Pulmonic Valve  Normal pulmonic valve structure and function.     Left Atrium  Mildly dilated left atrium.     Left Ventricle  Normal left ventricle structure and function.  Ejection fraction is visually estimated at 66 %.  E/A flow reversal noted. Suggestive of diastolic dysfunction.     Right Atrium  Normal right atrium.     Right Ventricle  Normal right ventricle structure and function.     Pericardial Effusion  No evidence of pericardial effusion.     Miscellaneous  Aortic root dimension within normal limits.     Summary  Mild aortic valve calcification without any evidence of aortic  insufficiency or stenosis.  Mildly dilated left atrium.  Normal left ventricle structure and function.  Ejection fraction is visually estimated at 66 %.  E/A flow reversal noted. Suggestive of diastolic dysfunction.  Aortic root dimension within normal limits.  Mild mitral regurgitation is present.  Posterior mitral annular calcification is present.  No evidence of pericardial effusion.  Normal pulmonic valve structure and function.  Normal right atrium.  Normal right ventricle structure and function.  Mild tricuspid regurgitation.  repeat one year     Signature     ----------------------------------------------------------------  Electronically signed by Gil Feliciano MD (Interpreting  physician) on 12/12/2024 at 12:31 PM  ----------------------------------------------------------------
Pt with ESR 54-> 22 overnight without steroids (and in setting of mild UTI) and CRP <3 - low suspicion for GCA given laboratory studies though scalp tenderness and vision changes are still considerations     Will reassess in AM
Patient seen at bedside as complaining of Left foot pain. Pt foot examined, dorsal pedis, posterior tibialis, popliteal and femoral pulse positive. Found with increase warmth of foot in comparing to the left foot and rest of the body, erythema streak on the dorsum of the foot over first toe, and swelling of foot. Patient able to move foot and leg, able to slightly bend at the ankle but because of pain not allowing for motor exam of foot parts. Patient does not recall if she had any trauma of injury to foot, states she might of hit her big toe. Left foot XR ordered to rule out fructure. Concern for possible cellulitis patient already on ABX coverage with Ciprofloxacin for UTI. Patient to receive Acetaminophen and leg to be elevated for swelling reduction.

## 2025-02-20 NOTE — PROVIDER CONTACT NOTE (OTHER) - ASSESSMENT
pt left leg red, warm to touch, and has difficulty wiggling toes compared to right leg. Pulses present.

## 2025-02-20 NOTE — PROGRESS NOTE ADULT - SUBJECTIVE AND OBJECTIVE BOX
SUBJECTIVE/ OVERNIGHT EVENTS:  sudden onset left foot/toe pain  no prior hx of gout  uric acid elevated.  received gentle IVF overnight.  no cp, no sob, no n/v/d. no abdominal pain.  no headache, no dizziness.       --------------------------------------------------------------------------------------------  LABS:                        12.2   8.38  )-----------( 191      ( 20 Feb 2025 05:47 )             39.6     02-20    139  |  103  |  24[H]  ----------------------------<  95  3.8   |  23  |  0.97    Ca    9.2      20 Feb 2025 05:47  Mg     1.8     02-20        CAPILLARY BLOOD GLUCOSE            Urinalysis Basic - ( 20 Feb 2025 05:47 )    Color: x / Appearance: x / SG: x / pH: x  Gluc: 95 mg/dL / Ketone: x  / Bili: x / Urobili: x   Blood: x / Protein: x / Nitrite: x   Leuk Esterase: x / RBC: x / WBC x   Sq Epi: x / Non Sq Epi: x / Bacteria: x        RADIOLOGY & ADDITIONAL TESTS:    Imaging Personally Reviewed:  [x] YES  [ ] NO    Consultant(s) Notes Reviewed:  [x] YES  [ ] NO    MEDICATIONS  (STANDING):  artificial tears (preservative free) Ophthalmic Solution 1 Drop(s) Both EYES four times a day  aspirin enteric coated 81 milliGRAM(s) Oral daily  atorvastatin 40 milliGRAM(s) Oral at bedtime  cetirizine 10 milliGRAM(s) Oral daily  clopidogrel Tablet 75 milliGRAM(s) Oral daily  fluticasone propionate 50 MICROgram(s)/spray Nasal Spray 1 Spray(s) Both Nostrils two times a day  guaiFENesin  milliGRAM(s) Oral every 12 hours  methylPREDNISolone sodium succinate Injectable 30 milliGRAM(s) IV Push daily  sertraline 25 milliGRAM(s) Oral daily  sodium chloride 0.9%. 1000 milliLiter(s) (100 mL/Hr) IV Continuous <Continuous>    MEDICATIONS  (PRN):  acetaminophen     Tablet .. 650 milliGRAM(s) Oral every 6 hours PRN Temp greater or equal to 38C (100.4F), Moderate Pain (4 - 6)  ALPRAZolam 0.25 milliGRAM(s) Oral every 6 hours PRN anxiety  guaiFENesin Oral Liquid (Sugar-Free) 200 milliGRAM(s) Oral every 6 hours PRN Cough  melatonin 3 milliGRAM(s) Oral at bedtime PRN Insomnia      Care Discussed with Consultants/Other Providers [x] YES  [ ] NO    Vital Signs Last 24 Hrs  T(C): 37.4 (20 Feb 2025 05:04), Max: 37.4 (20 Feb 2025 05:04)  T(F): 99.4 (20 Feb 2025 05:04), Max: 99.4 (20 Feb 2025 05:04)  HR: 79 (20 Feb 2025 05:04) (78 - 79)  BP: 90/65 (20 Feb 2025 05:04) (90/65 - 93/70)  BP(mean): --  RR: 18 (20 Feb 2025 05:04) (17 - 18)  SpO2: 90% (20 Feb 2025 05:04) (90% - 96%)    Parameters below as of 20 Feb 2025 05:04  Patient On (Oxygen Delivery Method): room air      I&O's Summary    19 Feb 2025 07:01  -  20 Feb 2025 07:00  --------------------------------------------------------  IN: 600 mL / OUT: 1300 mL / NET: -700 mL        PHYSICAL EXAM:   GENERAL: NAD, comfortable, left eye peripheral field vision deficit, chronic.   HEAD: NCAT, EOMI  NECK: Supple, No JVD  CHEST/LUNG: Clear to auscultation bilaterally; Normal respiratory effort w/o intercostal retractions  HEART: Regular rate and rhythm; nl S1/S2; No murmurs, rubs, or gallops  ABDOMEN: Soft, Nontender, Nondistended; Bowel sounds present; No hepatosplenomegaly  EXTREMITIES:  2+ Peripheral Pulses; No clubbing, cyanosis, or edema b/l; Nl ROM,  + left foot/big toe tenderness, erythema.   SKIN: No jaundice; Normal turgor + texture  NEURO: A+O x 3; nonfocal CN/motor/sensory/reflexes; speech + comprehension are intact; (+)lt eye inferior nasal field deficit; no dysmetria  PSYCH: Nl affect; no delirium or agitation; no suicidal/homicidal ideation

## 2025-02-20 NOTE — PROGRESS NOTE ADULT - ASSESSMENT
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 Code stroke was called - had chronic finding but nothing new. Vascular surgery consulted for known Right carotid stenosis.     Plan:  - No acute surgical intervention at this time  - b/l carotid duplex reviewed- patient to follow-up with Dr. Montiel outpatient for further evaluation  - Continue ASA/Statin  - Will sign off at this time, please call back with further questions or concerns    Vascular Surgery   [id number]

## 2025-02-20 NOTE — CONSULT NOTE ADULT - SUBJECTIVE AND OBJECTIVE BOX
[facility]  MAHOGANY Mcfarland S. Shah, Y. Patel, G. 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 Patient reports that her constellation of symptoms bears some resemblance to previous migraine episodes. Patient also had seen a retinal specialist on the day of presentation who advised her to come to the emergency room, no documentation of visit is available. Code stroke was called upon arrival at [facility] ED.  NIHSS = 4.  Patient deemed outside time window for thrombolytics. CTH showed no acute intracranial hemorrhage or mass effect and stable chronic right PCA infarct and overall no significant change from 3/1/2024. CTA head/neck showed coarse calcified plaque at innominate artery with flow-limiting disease of both CCA and subclavian artery, severe stenosis and ultimate filling of the right common carotid artery, severe stenosis at right carotid bifurcation with additional flow-limiting disease into ICA, possible subclavian steal from right vertebral artery, and chronic severe stenosis of right PCA at P2 segment, unchanged and concordant with chronic infarct zone. Neurology/ophthalmology following; MRI head showed no acuities and MRI orbits was limited by motion artifact but the orbits were grossly normal. Patient resumed on DAPT, started on atorvastatin 80 mg qhs, and admitted to CDU for further neurological workup.  Pt eventually admitted to telemetry. Vascular following for severe R carotid artery stenosis. Patient now with sudden onset L foot great toe pain and tenderness with erythema.   ROS: 14 point review of systems completed, pertinent positives and negatives as per HPI.    Allergies: No Known Allergies    PMH -- Essential hypertension  HLD (hyperlipidemia)  Anxiety  History of closed shoulder dislocation  Class 1 obesity with body mass index (BMI) of 34.0 to 34.9 in adult  Osteoarthritis of left shoulder  Chronic obstructive pulmonary disease (COPD)  Chronic lower back pain  Seasonal allergies    PSH -- History of right knee joint replacement  H/O shoulder replacement  H/O total knee replacement    FH -- FH: heart attack  Family history of myocardial infarction  Family history of stroke (Mother)    Social History -- denies tobacco, alcohol or illicit drug use    Physical Exam--  Vital Signs Last 24 Hrs  T(F): 99.1 (20 Feb 2025 12:17), Max: 99.4 (20 Feb 2025 05:04)  HR: 78 (20 Feb 2025 12:17) (78 - 79)  BP: 90/62 (20 Feb 2025 12:17) (90/62 - 93/70)  RR: 17 (20 Feb 2025 12:17) (17 - 18)  SpO2: 95% (20 Feb 2025 12:17) (90% - 96%)  General: no acute distress  HEENT: NC/AT, EOMI, anicteric  Lungs: clear to auscultation bilaterally  Heart: S1, S2 present, normal rate/rhythm  Abdomen: Soft. ND. NT. BS present.   Neuro: AAOx3, no obvious focal deficits   Extremities: No cyanosis. No edema.   Skin: Warm. Dry.    Lines: PIV     Laboratory & Imaging Data--  CBC:                       12.2   8.38  )-----------( 191      ( 20 Feb 2025 05:47 )             39.6     WBC Count: 8.38 K/uL (02-20-25 @ 05:47)  WBC Count: 5.68 K/uL (02-16-25 @ 06:46)  WBC Count: 7.81 K/uL (02-14-25 @ 18:23)    CMP: 02-20    139  |  103  |  24[H]  ----------------------------<  95  3.8   |  23  |  0.97    Ca    9.2      20 Feb 2025 05:47  Mg     1.8     02-20    Microbiology: reviewed  02-14-25 @ 20:01  SARS-CoV-2 NotDetec  Influenza A NotDetec  Influenza B NotDetec  RSV NotDetec    Radiology--reviewed    Active Medications--  acetaminophen     Tablet .. 650 milliGRAM(s) Oral every 6 hours PRN  ALPRAZolam 0.25 milliGRAM(s) Oral every 6 hours PRN  artificial tears (preservative free) Ophthalmic Solution 1 Drop(s) Both EYES four times a day  aspirin enteric coated 81 milliGRAM(s) Oral daily  atorvastatin 40 milliGRAM(s) Oral at bedtime  cetirizine 10 milliGRAM(s) Oral daily  clopidogrel Tablet 75 milliGRAM(s) Oral daily  fluticasone propionate 50 MICROgram(s)/spray Nasal Spray 1 Spray(s) Both Nostrils two times a day  guaiFENesin  milliGRAM(s) Oral every 12 hours  guaiFENesin Oral Liquid (Sugar-Free) 200 milliGRAM(s) Oral every 6 hours PRN  melatonin 3 milliGRAM(s) Oral at bedtime PRN  methylPREDNISolone sodium succinate Injectable 30 milliGRAM(s) IV Push daily  sertraline 25 milliGRAM(s) Oral daily  sodium chloride 0.9%. 1000 milliLiter(s) IV Continuous <Continuous>    Prior/Completed Antimicrobials:  ciprofloxacin     Tablet     [facility]  MAHOGANY Mcfarland S. Shah, Y. Patel, G. 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 Patient reports that her constellation of symptoms bears some resemblance to previous migraine episodes. Patient also had seen a retinal specialist on the day of presentation who advised her to come to the emergency room, no documentation of visit is available. Code stroke was called upon arrival at [facility] ED.  NIHSS = 4.  Patient deemed outside time window for thrombolytics. CTH showed no acute intracranial hemorrhage or mass effect and stable chronic right PCA infarct and overall no significant change from 3/1/2024. CTA head/neck showed coarse calcified plaque at innominate artery with flow-limiting disease of both CCA and subclavian artery, severe stenosis and ultimate filling of the right common carotid artery, severe stenosis at right carotid bifurcation with additional flow-limiting disease into ICA, possible subclavian steal from right vertebral artery, and chronic severe stenosis of right PCA at P2 segment, unchanged and concordant with chronic infarct zone. Neurology/ophthalmology following; MRI head showed no acuities and MRI orbits was limited by motion artifact but the orbits were grossly normal. Patient resumed on DAPT, started on atorvastatin 80 mg qhs, and admitted to CDU for further neurological workup.  Pt eventually admitted to telemetry. Vascular following for severe R carotid artery stenosis. Patient now with sudden onset L foot great toe pain and tenderness with erythema.   ROS: 14 point review of systems completed, pertinent positives and negatives as per HPI.    Allergies: No Known Allergies    PMH -- Essential hypertension  HLD (hyperlipidemia)  Anxiety  History of closed shoulder dislocation  Class 1 obesity with body mass index (BMI) of 34.0 to 34.9 in adult  Osteoarthritis of left shoulder  Chronic obstructive pulmonary disease (COPD)  Chronic lower back pain  Seasonal allergies    PSH -- History of right knee joint replacement  H/O shoulder replacement  H/O total knee replacement    FH -- FH: heart attack  Family history of myocardial infarction  Family history of stroke (Mother)    Social History -- denies tobacco, alcohol or illicit drug use    Physical Exam--  Vital Signs Last 24 Hrs  T(F): 99.1 (20 Feb 2025 12:17), Max: 99.4 (20 Feb 2025 05:04)  HR: 78 (20 Feb 2025 12:17) (78 - 79)  BP: 90/62 (20 Feb 2025 12:17) (90/62 - 93/70)  RR: 17 (20 Feb 2025 12:17) (17 - 18)  SpO2: 95% (20 Feb 2025 12:17) (90% - 96%)  General: no acute distress  HEENT: normocephalic, atraumatic, anicteric  Lungs: clear to auscultation bilaterally  Heart: S1, S2 present, normal rate/rhythm  Abdomen: Soft. ND. NT. BS present.   Neuro: AAOx3, no obvious focal deficits   Extremities: No cyanosis. mild L foot edema.   Skin: Warm. Dry. L foot great toe mild erythema and TTP  Lines: PIV     Laboratory & Imaging Data--  CBC:                       12.2   8.38  )-----------( 191      ( 20 Feb 2025 05:47 )             39.6     WBC Count: 8.38 K/uL (02-20-25 @ 05:47)  WBC Count: 5.68 K/uL (02-16-25 @ 06:46)  WBC Count: 7.81 K/uL (02-14-25 @ 18:23)    CMP: 02-20    139  |  103  |  24[H]  ----------------------------<  95  3.8   |  23  |  0.97    Ca    9.2      20 Feb 2025 05:47  Mg     1.8     02-20    Microbiology: reviewed  02-14-25 @ 20:01  SARS-CoV-2 NotDetec  Influenza A NotDetec  Influenza B NotDetec  RSV NotDetec    Radiology--reviewed  < from: Xray Foot AP + Lateral + Oblique, Left (02.20.25 @ 10:29) >  IMPRESSION:  No tracking soft tissue gas collections, radiopaque foreign bodies, or   gross radiographic evidence for osteomyelitis.    No fractures or dislocations.    Tarsometatarsal alignment maintained without evidence for a Lisfranc   injury.    Chronic post bunion repair changes along medial 1st metatarsal head   margin. Congenitally fused 5th DIP joint. Preserved remaining visualized   joint spaces and no joint margin erosions.    Appearance of a protuberant subungual exostosis along medial margin of   hallux distal phalanx tuft tip. No calcaneal spurring.    Generalized osteopenia otherwise no discrete lytic or blastic lesions.    --- End of Report ---    < end of copied text >    Active Medications--  acetaminophen     Tablet .. 650 milliGRAM(s) Oral every 6 hours PRN  ALPRAZolam 0.25 milliGRAM(s) Oral every 6 hours PRN  artificial tears (preservative free) Ophthalmic Solution 1 Drop(s) Both EYES four times a day  aspirin enteric coated 81 milliGRAM(s) Oral daily  atorvastatin 40 milliGRAM(s) Oral at bedtime  cetirizine 10 milliGRAM(s) Oral daily  clopidogrel Tablet 75 milliGRAM(s) Oral daily  fluticasone propionate 50 MICROgram(s)/spray Nasal Spray 1 Spray(s) Both Nostrils two times a day  guaiFENesin  milliGRAM(s) Oral every 12 hours  guaiFENesin Oral Liquid (Sugar-Free) 200 milliGRAM(s) Oral every 6 hours PRN  melatonin 3 milliGRAM(s) Oral at bedtime PRN  methylPREDNISolone sodium succinate Injectable 30 milliGRAM(s) IV Push daily  sertraline 25 milliGRAM(s) Oral daily  sodium chloride 0.9%. 1000 milliLiter(s) IV Continuous <Continuous>    Prior/Completed Antimicrobials:  ciprofloxacin     Tablet

## 2025-02-20 NOTE — PROGRESS NOTE ADULT - ASSESSMENT
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    Acute vestibular syndrome  - no evidence for acute CVA on MRI head or acute occlusion on MRA head  - telemetry  - routine neuro checks  - orthostatics (-)  - TTE --> nl LV systolic fxn although endocardium poorly visualized  - PT/OT consult underway  - appreciate neurology    Severe rt carotid artery stenosis  - cont baby ASA, plavix  - cont home-dose statin  - carotid duplex 2/18/25 --> occluded rt ICA + CCA, 70% stenosis lt ICA  - appreciate vascular consult, no surgical intervention. outpt follow up with vascular     Visual field deficit; blurry vision  - no evidence for acute CVA  - no acute findings on MRI orbits  - orbital exam already performed by ophthalmology service without any acute discrete findings  - ESR = 54 --> 22, doubt GCA  - artificial tears OU 4x a day  - appreciate ophthalmology, outpt follow up advised.     Hx of rt occipital lobe CVA  - cont baby ASA  - cont plavix  - cont home-dose statin    HTN  - BP self-controlled so far    HLD  - cont home-dose statin    Anxiety, unspecified  - alprazolam 0,25 mg q6h prn    Left foot pain/ big toe tenderness  sudden onset left foot/toe pain, no recent trauma  erythema over big toe, however, also noted on dorsum of foot  previously she was ambulating per pt.  no prior hx of gout  uric acid elevated.   no leukocytosis  start Solumedrol IV, monitor clinical improvement.  ID consulted to make sure no superimposed cellulitis.     Need for prophylaxis  - OOB as tolerated    Disposition  - PT --> Home PT  - OT --> home OT  - gout tx started, monitor clinical improvement.

## 2025-02-20 NOTE — PROGRESS NOTE ADULT - NSPROGADDITIONALINFOA_GEN_ALL_CORE
d/w ISABEL Gonzalez.     - Dr. LEANA Lamb ([facility])  - [phone] d/w ISABEL Gonzalez.     d/w pt's son Armando, all questions answered.   Pt lives with home aide. the son lives 1 hr away.  PCP f/u and vascular f/u advised. medications reviewed.     - Dr. LEANA Lamb ([facility])  - [phone]

## 2025-02-20 NOTE — CONSULT NOTE ADULT - ASSESSMENT
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   Acute vestibular syndrome, MRI with no acute CVA  Severe R carotid artery stenosis   Now with L foot/great toe pain and tenderness, some swelling, mild erythema over toe, no inc warmth with elevated uric acid with L foot xray as above, remains afebrile, no leukocytosis -- consistent with gout, low suspicion for superimposed cellulitis at this time        Recommendations:   Continue to monitor off antibiotics   Follow ESR, CRP  Continue steroids for gout  Elevate lower extremities   Monitor clinically, temps/WBC  Continue rest of care per primary team       D/w Dr. Adonis Mendoza M.D.  [facility]  Available on [employer] TEAMS - *PREFERRED*  [phone]  After 5pm on weekdays and all day on weekends - please call [phone]     Thank you for consulting us and involving us in the management of this patients case. In addition to reviewing history, imaging, documents, labs, microbiology, took into account antibiotic stewardship, local antibiogram and infection control strategies and potential transmission issues.  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   Acute vestibular syndrome, MRI with no acute CVA  Severe R carotid artery stenosis   Suspected UTI, noted no Ucx was sent, completed course earlier this am, no gu sx now  Now with L foot/great toe pain and tenderness, some swelling, mild erythema over toe, no inc warmth with elevated uric acid with L foot xray as above, on ciprofloxacin as above, remains afebrile, no leukocytosis   L foot findings likely due to gout, low suspicion for superimposed cellulitis at this time    s/p ciprofloxacin 2/16-2/20    Recommendations:   Continue to monitor off antibiotics   Follow ESR, CRP  Continue steroids for gout  Elevate lower extremities   Monitor clinically, temps/WBC  Continue rest of care per primary team       D/w Dr. Adonis Mendoza M.D.  [facility]  Available on [employer] TEAMS - *PREFERRED*  [phone]  After 5pm on weekdays and all day on weekends - please call [phone]     Thank you for consulting us and involving us in the management of this patients case. In addition to reviewing history, imaging, documents, labs, microbiology, took into account antibiotic stewardship, local antibiogram and infection control strategies and potential transmission issues.  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   Acute vestibular syndrome, MRI with no acute CVA  Severe R carotid artery stenosis   Suspected UTI, +UA with no Ucx sent, on ciprofloxacin- completed course earlier this am, no gu sx now  Now with L foot/great toe pain and tenderness, some swelling, mild erythema over toe, no inc warmth with elevated uric acid with L foot xray as above, on ciprofloxacin as above, remains afebrile, no leukocytosis   L foot findings likely due to gout, low suspicion for superimposed cellulitis at this time    s/p ciprofloxacin 2/16-2/20    Recommendations:   Monitor off antibiotics   Follow ESR, CRP  Continue steroids for gout  Elevate lower extremities   Monitor clinically, temps/WBC  -if any fevers or worsening, send Bcx and start on cefazolin  Continue rest of care per primary team       D/w Dr. Adonis Mendoza M.D.  [facility]  Available on [employer] TEAMS - *PREFERRED*  [phone]  After 5pm on weekdays and all day on weekends - please call [phone]     Thank you for consulting us and involving us in the management of this patients case. In addition to reviewing history, imaging, documents, labs, microbiology, took into account antibiotic stewardship, local antibiogram and infection control strategies and potential transmission issues.

## 2025-02-20 NOTE — PROGRESS NOTE ADULT - SUBJECTIVE AND OBJECTIVE BOX
SURGERY DAILY PROGRESS NOTE    SUBJECTIVE: No acute events overnight. Symptoms have overall improved since admission. Exacerbated by head turning.    OBJECTIVE:  Vital Signs Last 24 Hrs  T(C): 37.4 (20 Feb 2025 05:04), Max: 37.4 (20 Feb 2025 05:04)  T(F): 99.4 (20 Feb 2025 05:04), Max: 99.4 (20 Feb 2025 05:04)  HR: 79 (20 Feb 2025 05:04) (78 - 79)  BP: 90/65 (20 Feb 2025 05:04) (90/65 - 93/70)  BP(mean): --  RR: 18 (20 Feb 2025 05:04) (17 - 18)  SpO2: 90% (20 Feb 2025 05:04) (90% - 96%)    Parameters below as of 20 Feb 2025 05:04  Patient On (Oxygen Delivery Method): room air    I&O's Detail    19 Feb 2025 07:01  -  20 Feb 2025 07:00  --------------------------------------------------------  IN:    Oral Fluid: 600 mL  Total IN: 600 mL    OUT:    Voided (mL): 1300 mL  Total OUT: 1300 mL    Total NET: -700 mL    Daily   MEDICATIONS  (STANDING):  artificial tears (preservative free) Ophthalmic Solution 1 Drop(s) Both EYES four times a day  aspirin enteric coated 81 milliGRAM(s) Oral daily  atorvastatin 40 milliGRAM(s) Oral at bedtime  cetirizine 10 milliGRAM(s) Oral daily  clopidogrel Tablet 75 milliGRAM(s) Oral daily  fluticasone propionate 50 MICROgram(s)/spray Nasal Spray 1 Spray(s) Both Nostrils two times a day  guaiFENesin  milliGRAM(s) Oral every 12 hours  sertraline 25 milliGRAM(s) Oral daily  sodium chloride 0.9%. 1000 milliLiter(s) (100 mL/Hr) IV Continuous <Continuous>    MEDICATIONS  (PRN):  acetaminophen     Tablet .. 650 milliGRAM(s) Oral every 6 hours PRN Temp greater or equal to 38C (100.4F), Moderate Pain (4 - 6)  ALPRAZolam 0.25 milliGRAM(s) Oral every 6 hours PRN anxiety  guaiFENesin Oral Liquid (Sugar-Free) 200 milliGRAM(s) Oral every 6 hours PRN Cough  melatonin 3 milliGRAM(s) Oral at bedtime PRN Insomnia    LABS:                        12.2   8.38  )-----------( 191      ( 20 Feb 2025 05:47 )             39.6     02-20    139  |  103  |  24[H]  ----------------------------<  95  3.8   |  23  |  0.97    Ca    9.2      20 Feb 2025 05:47  Mg     1.8     02-20        Urinalysis Basic - ( 20 Feb 2025 05:47 )    Color: x / Appearance: x / SG: x / pH: x  Gluc: 95 mg/dL / Ketone: x  / Bili: x / Urobili: x   Blood: x / Protein: x / Nitrite: x   Leuk Esterase: x / RBC: x / WBC x   Sq Epi: x / Non Sq Epi: x / Bacteria: x    PHYSICAL EXAM:  General: NAD  HEENT: NC/AT, EOMI  Pulmonary: normal resp effort, CTA-B  Cardiovascular: NSR  Abdominal: soft, non-tender, non-distended  Extremities: WWP, normal strength  Neuro: A/O x 3, no focal deficits

## 2025-02-21 ENCOUNTER — TRANSCRIPTION ENCOUNTER (OUTPATIENT)
Age: 75
End: 2025-02-21

## 2025-02-21 VITALS — DIASTOLIC BLOOD PRESSURE: 71 MMHG | SYSTOLIC BLOOD PRESSURE: 104 MMHG | HEART RATE: 92 BPM

## 2025-02-21 PROCEDURE — 70551 MRI BRAIN STEM W/O DYE: CPT | Mod: MC

## 2025-02-21 PROCEDURE — 97161 PT EVAL LOW COMPLEX 20 MIN: CPT

## 2025-02-21 PROCEDURE — 97166 OT EVAL MOD COMPLEX 45 MIN: CPT

## 2025-02-21 PROCEDURE — 96376 TX/PRO/DX INJ SAME DRUG ADON: CPT

## 2025-02-21 PROCEDURE — 84295 ASSAY OF SERUM SODIUM: CPT

## 2025-02-21 PROCEDURE — 96365 THER/PROPH/DIAG IV INF INIT: CPT

## 2025-02-21 PROCEDURE — 97530 THERAPEUTIC ACTIVITIES: CPT

## 2025-02-21 PROCEDURE — 82947 ASSAY GLUCOSE BLOOD QUANT: CPT

## 2025-02-21 PROCEDURE — 96375 TX/PRO/DX INJ NEW DRUG ADDON: CPT

## 2025-02-21 PROCEDURE — 93306 TTE W/DOPPLER COMPLETE: CPT

## 2025-02-21 PROCEDURE — 86140 C-REACTIVE PROTEIN: CPT

## 2025-02-21 PROCEDURE — 83036 HEMOGLOBIN GLYCOSYLATED A1C: CPT

## 2025-02-21 PROCEDURE — 87637 SARSCOV2&INF A&B&RSV AMP PRB: CPT

## 2025-02-21 PROCEDURE — 85730 THROMBOPLASTIN TIME PARTIAL: CPT

## 2025-02-21 PROCEDURE — 97110 THERAPEUTIC EXERCISES: CPT

## 2025-02-21 PROCEDURE — 80061 LIPID PANEL: CPT

## 2025-02-21 PROCEDURE — 97116 GAIT TRAINING THERAPY: CPT

## 2025-02-21 PROCEDURE — 83605 ASSAY OF LACTIC ACID: CPT

## 2025-02-21 PROCEDURE — 84550 ASSAY OF BLOOD/URIC ACID: CPT

## 2025-02-21 PROCEDURE — 0042T: CPT | Mod: MC

## 2025-02-21 PROCEDURE — 85610 PROTHROMBIN TIME: CPT

## 2025-02-21 PROCEDURE — 70450 CT HEAD/BRAIN W/O DYE: CPT | Mod: MC

## 2025-02-21 PROCEDURE — 36415 COLL VENOUS BLD VENIPUNCTURE: CPT

## 2025-02-21 PROCEDURE — 93005 ELECTROCARDIOGRAM TRACING: CPT | Mod: 76

## 2025-02-21 PROCEDURE — 70498 CT ANGIOGRAPHY NECK: CPT | Mod: MC

## 2025-02-21 PROCEDURE — 93880 EXTRACRANIAL BILAT STUDY: CPT

## 2025-02-21 PROCEDURE — 97535 SELF CARE MNGMENT TRAINING: CPT

## 2025-02-21 PROCEDURE — 70540 MRI ORBIT/FACE/NECK W/O DYE: CPT | Mod: MC

## 2025-02-21 PROCEDURE — 85025 COMPLETE CBC W/AUTO DIFF WBC: CPT

## 2025-02-21 PROCEDURE — 80048 BASIC METABOLIC PNL TOTAL CA: CPT

## 2025-02-21 PROCEDURE — 85018 HEMOGLOBIN: CPT

## 2025-02-21 PROCEDURE — 85027 COMPLETE CBC AUTOMATED: CPT

## 2025-02-21 PROCEDURE — 81001 URINALYSIS AUTO W/SCOPE: CPT

## 2025-02-21 PROCEDURE — 82330 ASSAY OF CALCIUM: CPT

## 2025-02-21 PROCEDURE — 70496 CT ANGIOGRAPHY HEAD: CPT | Mod: MC

## 2025-02-21 PROCEDURE — 82435 ASSAY OF BLOOD CHLORIDE: CPT

## 2025-02-21 PROCEDURE — 84484 ASSAY OF TROPONIN QUANT: CPT

## 2025-02-21 PROCEDURE — 85014 HEMATOCRIT: CPT

## 2025-02-21 PROCEDURE — 99285 EMERGENCY DEPT VISIT HI MDM: CPT

## 2025-02-21 PROCEDURE — 0241U: CPT

## 2025-02-21 PROCEDURE — 84132 ASSAY OF SERUM POTASSIUM: CPT

## 2025-02-21 PROCEDURE — 83735 ASSAY OF MAGNESIUM: CPT

## 2025-02-21 PROCEDURE — 82962 GLUCOSE BLOOD TEST: CPT

## 2025-02-21 PROCEDURE — 73630 X-RAY EXAM OF FOOT: CPT

## 2025-02-21 PROCEDURE — 85652 RBC SED RATE AUTOMATED: CPT

## 2025-02-21 PROCEDURE — 94640 AIRWAY INHALATION TREATMENT: CPT

## 2025-02-21 PROCEDURE — 82803 BLOOD GASES ANY COMBINATION: CPT

## 2025-02-21 PROCEDURE — 80053 COMPREHEN METABOLIC PANEL: CPT

## 2025-02-21 PROCEDURE — G0378: CPT

## 2025-02-21 RX ORDER — ACETAMINOPHEN 500 MG/5ML
2 LIQUID (ML) ORAL
Qty: 0 | Refills: 0 | DISCHARGE
Start: 2025-02-21

## 2025-02-21 RX ORDER — HYPROMELLOSE 0.4 %
1 DROPS OPHTHALMIC (EYE)
Qty: 0 | Refills: 0 | DISCHARGE
Start: 2025-02-21

## 2025-02-21 RX ORDER — DEXTROMETHORPHAN HBR, GUAIFENESIN 200 MG/10ML
10 LIQUID ORAL
Qty: 1200 | Refills: 0
Start: 2025-02-21 | End: 2025-03-22

## 2025-02-21 RX ORDER — CLOPIDOGREL BISULFATE 75 MG/1
1 TABLET, FILM COATED ORAL
Qty: 30 | Refills: 0
Start: 2025-02-21 | End: 2025-03-22

## 2025-02-21 RX ORDER — PREDNISONE 20 MG/1
3 TABLET ORAL
Qty: 9 | Refills: 0
Start: 2025-02-21 | End: 2025-02-23

## 2025-02-21 RX ADMIN — CLOPIDOGREL BISULFATE 75 MILLIGRAM(S): 75 TABLET, FILM COATED ORAL at 12:58

## 2025-02-21 RX ADMIN — Medication 1 DROP(S): at 12:59

## 2025-02-21 RX ADMIN — SERTRALINE 25 MILLIGRAM(S): 100 TABLET, FILM COATED ORAL at 12:58

## 2025-02-21 RX ADMIN — Medication 1 DROP(S): at 00:32

## 2025-02-21 RX ADMIN — Medication 10 MILLIGRAM(S): at 12:58

## 2025-02-21 RX ADMIN — FLUTICASONE PROPIONATE 1 SPRAY(S): 50 SPRAY, METERED NASAL at 05:52

## 2025-02-21 RX ADMIN — Medication 1 DROP(S): at 06:32

## 2025-02-21 RX ADMIN — Medication 81 MILLIGRAM(S): at 12:57

## 2025-02-21 RX ADMIN — DEXTROMETHORPHAN HBR, GUAIFENESIN 600 MILLIGRAM(S): 200 LIQUID ORAL at 05:47

## 2025-02-21 RX ADMIN — METHYLPREDNISOLONE ACETATE 30 MILLIGRAM(S): 80 INJECTION, SUSPENSION INTRA-ARTICULAR; INTRALESIONAL; INTRAMUSCULAR; SOFT TISSUE at 05:48

## 2025-02-21 RX ADMIN — Medication 0.25 MILLIGRAM(S): at 12:58

## 2025-02-21 NOTE — DISCHARGE NOTE PROVIDER - CARE PROVIDERS DIRECT ADDRESSES
,[email or web address]

## 2025-02-21 NOTE — DISCHARGE NOTE NURSING/CASE MANAGEMENT/SOCIAL WORK - FINANCIAL ASSISTANCE
[facility] provides services at a reduced cost to those who are determined to be eligible through [facility]’s financial assistance program. Information regarding [facility]’s financial assistance program can be found by going to [email or web address] or by calling [phone].

## 2025-02-21 NOTE — DISCHARGE NOTE PROVIDER - CARE PROVIDER_API CALL
Zen Montiel  Vascular Surgery  [address], [address]  [place] [address]  Phone: [phone]  Fax: [phone]  Follow Up Time:     Abe Zarate  Vascular Neurology  [address], [address]  [place] [address]  Phone: [phone]  Fax: [phone]  Follow Up Time:     Tj Herrera)  Ophthalmology  [address] [address]  Phone: [phone]  Fax: [phone]  Follow Up Time:     Gil Feliciano  Internal Medicine  [address] [place] [address]  Phone: [phone]  Fax: [phone]  Follow Up Time:    Zen Montiel  Vascular Surgery  [address], [address]  [place] [address]  Phone: [phone]  Fax: [phone]  Follow Up Time:     Abe Zarate  Vascular Neurology  [address], [address]  [place] [address]  Phone: [phone]  Fax: [phone]  Follow Up Time:     Tj Herrera)  Ophthalmology  [address] [place] [address]  Phone: [phone]  Fax: [phone]  Follow Up Time:     Gil Feliciano  Internal Medicine  [address] [place] [address]  Phone: [phone]  Fax: [phone]  Follow Up Time:     Maki Seymour  NP in [facility]  [address] [place] [address], [address]  [place] [address]  Phone: [phone]  Fax: [phone]  Follow Up Time: 1 week

## 2025-02-21 NOTE — PHARMACOTHERAPY INTERVENTION NOTE - COMMENTS
Counseled patient and patient's son on the following discharge medications names (brand/generic), indication, and possible side effects:    Medications Discussed:  acetaminophen 325 mg oral tablet: 2 tab(s) orally every 6 hours As needed Temp greater or equal to 38C (100.4F), Moderate Pain (4 - 6)  aspirin 81 mg oral tablet: 1 tab(s) orally once a day  atorvastatin 40 mg oral tablet: 1 tab(s) orally once a day (at bedtime)  azelastine 137 mcg/inh (0.1%) nasal spray: 1 spray(s) in each nostril 2 times a day  clopidogrel 75 mg oral tablet: 1 tab(s) orally once a day  guaiFENesin 100 mg/5 mL oral liquid: 10 milliliter(s) orally every 6 hours as needed for Cough  Home physical therapy and home occupational therapy: .  loratadine 10 mg oral tablet: 1 tab(s) orally once a day  ocular lubricant preservative-free ophthalmic solution: 1 drop(s) to each affected eye 4 times a day  predniSONE 10 mg oral tablet: 3 tab(s) orally once a day for 3 days then stop  sertraline 25 mg oral tablet: 1 tab(s) orally once a day    Provided medication cards. Patient questions and concerns were answered and addressed. Patient demonstrated understanding.    Pietro FullerD, BCPS  Clinical Pharmacy Specialist  Available on [employer]  Cell: [phone]

## 2025-02-21 NOTE — DISCHARGE NOTE PROVIDER - NSDCFUADDAPPT_GEN_ALL_CORE_FT
APPTS ARE READY TO BE MADE: [X] YES    Best Family or Patient Contact (if needed):    Additional Information about above appointments (if needed):    1:   2:   3:     Other comments or requests:    APPTS ARE READY TO BE MADE: [X] YES    Best Family or Patient Contact (if needed):    Additional Information about above appointments (if needed):    1: Follow up with Stroke NP, Maki Seymour or Kelsie Nelson, in clinic at [address] [place] [address]. Please email [email or web address] w/ basic PHI.  patient to follow-up with Dr. Montiel outpatient for further evaluation    2:   3:     Other comments or requests:    APPTS ARE READY TO BE MADE: [X] YES    Best Family or Patient Contact (if needed):    Additional Information about above appointments (if needed):    1: Follow up with Stroke NP, Maki Seymour or Kelsie Nelson, in clinic at [address] [place] [address]. Please email [email or web address] w/ basic PHI.  patient to follow-up with Dr. Montiel outpatient for further evaluation    2:   3:     Other comments or requests:     Patient was outreached but did not answer. A voicemail was left for the patient to return our call.   APPTS ARE READY TO BE MADE: [X] YES    Best Family or Patient Contact (if needed):    Additional Information about above appointments (if needed):    1: Follow up with Stroke NP, Maki Seymour or Kelsie Nelson, in clinic at [address] [place] [address]. Please email [email or web address] w/ basic PHI.  patient to follow-up with Dr. Montiel outpatient for further evaluation    2:   3:     Other comments or requests:     Patient was outreached three times but could not connect, however [facility]  Patient is scheduled to see Dr. Montiel on 3/27 at [address] [place] [id number]    Patient was outreached but did not answer. A voicemail was left for the patient to return our call.   APPTS ARE READY TO BE MADE: [X] YES    Best Family or Patient Contact (if needed):    Additional Information about above appointments (if needed):    1: Follow up with Stroke NP, Maki Seymour or Kelsie Nelson, in clinic at [address] [place] [address]. Please email [email or web address] w/ basic PHI.  patient to follow-up with Dr. Montiel outpatient for further evaluation    2:   3:     Other comments or requests:     Patient was outreached three times but could not connect, however there is an appointment reflected for the patient on [facility].  Patient is scheduled to see Dr. Montiel on 3/27 at [address] [place] [id number]    Appointment was scheduled by our team on the patient's behalf through the provider's office. Patient is scheduled Roffel 3/28/25 11:00am [address] [place] [address]. Patient is scheduled for testing prior to visit 3/24/25 9:00am [address] [place] [address]

## 2025-02-21 NOTE — DISCHARGE NOTE PROVIDER - PROVIDER TOKENS
PROVIDER:[TOKEN:[[id number]]],PROVIDER:[TOKEN:[[id number]]],PROVIDER:[TOKEN:[[id number]]],PROVIDER:[TOKEN:[[id number]]] PROVIDER:[TOKEN:[[id number]]],PROVIDER:[TOKEN:[[id number]]],PROVIDER:[TOKEN:[[id number]]],PROVIDER:[TOKEN:[[id number]]],PROVIDER:[TOKEN:[[id number]],FOLLOWUP:[1 week]]

## 2025-02-21 NOTE — DISCHARGE NOTE NURSING/CASE MANAGEMENT/SOCIAL WORK - PATIENT PORTAL LINK FT
You can access the FollowMyHealth Patient Portal offered by [facility] by registering at the following website: [email or web address]. By joining [facility]’s FollowMyHealth portal, you will also be able to view your health information using other applications (apps) compatible with our system.
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
## 2025-02-21 NOTE — DISCHARGE NOTE PROVIDER - NSDCCPCAREPLAN_GEN_ALL_CORE_FT
PRINCIPAL DISCHARGE DIAGNOSIS  Diagnosis: Blurry vision  Assessment and Plan of Treatment: resolved      SECONDARY DISCHARGE DIAGNOSES  Diagnosis: Acute vestibular syndrome  Assessment and Plan of Treatment:      PRINCIPAL DISCHARGE DIAGNOSIS  Diagnosis: Blurry vision  Assessment and Plan of Treatment: resolved      SECONDARY DISCHARGE DIAGNOSES  Diagnosis: Acute vestibular syndrome  Assessment and Plan of Treatment: no evidence of acute CVA on MRI brain    Diagnosis: Carotid stenosis  Assessment and Plan of Treatment: severe, seen by vas-- no surgical intervention, op f/up , cont aspirin, plavix, statin    Diagnosis: Visual field defect  Assessment and Plan of Treatment: no acute CVA, no acute findings on MRI orbit, cont artf tear, f/up ophthal, neurol, retinal specealist    Diagnosis: History of CVA (cerebrovascular accident)  Assessment and Plan of Treatment: in occipital lobe -- stable, cont aspirin, plavix, statin, f/up with neurol    Diagnosis: HTN (hypertension)  Assessment and Plan of Treatment: controlled, cont home meds    Diagnosis: Anxiety  Assessment and Plan of Treatment: stable     PRINCIPAL DISCHARGE DIAGNOSIS  Diagnosis: Blurry vision  Assessment and Plan of Treatment: resolved      SECONDARY DISCHARGE DIAGNOSES  Diagnosis: Acute vestibular syndrome  Assessment and Plan of Treatment: no evidence of acute CVA on MRI brain    Diagnosis: Carotid stenosis  Assessment and Plan of Treatment: severe, seen by vascular-- no surgical intervention, outpatient follow up , continue aspirin, plavix, statin    Diagnosis: Visual field defect  Assessment and Plan of Treatment: no acute CVA, no acute findings on MRI orbit, cont artficial tears, follow up, ophthal, neurology, retinal specialist    Diagnosis: History of CVA (cerebrovascular accident)  Assessment and Plan of Treatment: in occipital lobe -- stable, cont aspirin, plavix, statin, f/up with neurol    Diagnosis: HTN (hypertension)  Assessment and Plan of Treatment: controlled, cont home meds    Diagnosis: Anxiety  Assessment and Plan of Treatment: stable     PRINCIPAL DISCHARGE DIAGNOSIS  Diagnosis: Blurry vision  Assessment and Plan of Treatment: resolved      SECONDARY DISCHARGE DIAGNOSES  Diagnosis: Acute vestibular syndrome  Assessment and Plan of Treatment: no evidence of acute CVA on MRI brain    Diagnosis: Carotid stenosis  Assessment and Plan of Treatment: severe, seen by vascular-- no surgical intervention, outpatient follow up , continue aspirin, plavix, statin    Diagnosis: Visual field defect  Assessment and Plan of Treatment: no acute CVA, no acute findings on MRI orbit, cont artficial tears, follow up, ophthal, neurology, retinal specialist    Diagnosis: History of CVA (cerebrovascular accident)  Assessment and Plan of Treatment: in occipital lobe -- stable, cont aspirin, plavix, statin, f/up with neurology    Diagnosis: HTN (hypertension)  Assessment and Plan of Treatment: controlled, cont home meds    Diagnosis: Anxiety  Assessment and Plan of Treatment: stable

## 2025-02-21 NOTE — PROGRESS NOTE ADULT - PROVIDER SPECIALTY LIST ADULT
Internal Medicine
Vascular Surgery
Vascular Surgery
Infectious Disease
Internal Medicine
Vascular Surgery
Internal Medicine

## 2025-02-21 NOTE — DISCHARGE NOTE NURSING/CASE MANAGEMENT/SOCIAL WORK - NSDCVIVACCINE_GEN_ALL_CORE_FT
Tdap; 07-Nov-2017 22:12; Mat, April (RN); [employer]; [id number]; IntraMuscular; Deltoid Right.; 0.5 milliLiter(s); VIS (VIS Published: 09-May-2013, VIS Presented: 07-Nov-2017);   Tdap; 09-May-2018 18:06; June Lockhart (RN); [employer]; [id number]; IntraMuscular; Deltoid Right.; 0.5 milliLiter(s); VIS (VIS Published: 09-May-2013, VIS Presented: 09-May-2018);

## 2025-02-21 NOTE — DISCHARGE NOTE NURSING/CASE MANAGEMENT/SOCIAL WORK - NSDCFUADDAPPT_GEN_ALL_CORE_FT
APPTS ARE READY TO BE MADE: [X] YES    Best Family or Patient Contact (if needed):    Additional Information about above appointments (if needed):    1: Follow up with Stroke NP, Maki Seymour or Keslie Nelson, in clinic at [address] [place] [address]. Please email [email or web address] w/ basic PHI.  patient to follow-up with Dr. Montiel outpatient for further evaluation    2:   3:     Other comments or requests:

## 2025-02-21 NOTE — DISCHARGE NOTE NURSING/CASE MANAGEMENT/SOCIAL WORK - NSDCPEFALRISK_GEN_ALL_CORE
For information on Fall & Injury Prevention, visit: [email or web address] OR  [email or web address] OR  https://www.cdc.gov/steadi/patient.html

## 2025-02-21 NOTE — DISCHARGE NOTE PROVIDER - HOSPITAL COURSE
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  Pt reports that her constellation of symptoms bears some resemblance to previous migraine episodes.  Pt also had seen a retinal specialist on the day of presentation who advised her to come to the emergency room, no documentation of visit is available.  Code stroke was called upon arrival at [facility] ED.  NIHSS = 4.  Pt deemed outside time window for thrombolytics.  CTH showed no acute intracranial hemorrhage or mass effect and stable chronic right PCA infarct and overall no significant change from 3/1/2024.   CTA head/neck showed coarse calcified plaque at innominate artery with flow-limiting disease of both CCA and subclavian artery, severe stenosis and ultimate filling of the right common carotid artery, severe stenosis at right carotid bifurcation with additional flow-limiting disease into ICA, possible subclavian steal from right vertebral artery, and chronic severe stenosis of right PCA at P2 segment, unchanged and concordant with chronic infarct zone.  Neurology/ophthalmology were consulted.  MRI head showed no acuities and MRI orbits was limited by motion artifact but the orbits were grossly normal.  Pt resumed on DAPT, started on atorvastatin 80 mg qhs, and admitted to CDU for further neurological workp.  Pt eventually admitted to telemetry.       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  Pt reports that her constellation of symptoms bears some resemblance to previous migraine episodes.  Pt also had seen a retinal specialist on the day of presentation who advised her to come to the emergency room, no documentation of visit is available.  Code stroke was called upon arrival at [facility] ED.  NIHSS = 4.  Pt deemed outside time window for thrombolytics.  CTH showed no acute intracranial hemorrhage or mass effect and stable chronic right PCA infarct and overall no significant change from 3/1/2024.   CTA head/neck showed coarse calcified plaque at innominate artery with flow-limiting disease of both CCA and subclavian artery, severe stenosis and ultimate filling of the right common carotid artery, severe stenosis at right carotid bifurcation with additional flow-limiting disease into ICA, possible subclavian steal from right vertebral artery, and chronic severe stenosis of right PCA at P2 segment, unchanged and concordant with chronic infarct zone.  Neurology/ophthalmology were consulted.  MRI head showed no acuities and MRI orbits was limited by motion artifact but the orbits were grossly normal.  Pt resumed on DAPT, started on atorvastatin 80 mg qhs, and admitted to CDU for further neurological workp.  Pt eventually admitted to telemetry.       (15 Feb 2025 10:18)    Hospital Course: came as code stroke and no acute CVA was found, treated with empiric ABs x once, resumed aspirin, plavix and statin. Started with iv solumedrol. She was consulted with vas surg, neurol, Ophthal, ID, PT/OT. She is hemodynamically stable to be discharged home with home care. Dispo and disc meds were d/w Attending.       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  Pt reports that her constellation of symptoms bears some resemblance to previous migraine episodes.  Pt also had seen a retinal specialist on the day of presentation who advised her to come to the emergency room, no documentation of visit is available.  Code stroke was called upon arrival at [facility] ED.  NIHSS = 4.  Pt deemed outside time window for thrombolytics.  CTH showed no acute intracranial hemorrhage or mass effect and stable chronic right PCA infarct and overall no significant change from 3/1/2024.   CTA head/neck showed coarse calcified plaque at innominate artery with flow-limiting disease of both CCA and subclavian artery, severe stenosis and ultimate filling of the right common carotid artery, severe stenosis at right carotid bifurcation with additional flow-limiting disease into ICA, possible subclavian steal from right vertebral artery, and chronic severe stenosis of right PCA at P2 segment, unchanged and concordant with chronic infarct zone.  Neurology/ophthalmology were consulted.  MRI head showed no acuities and MRI orbits was limited by motion artifact but the orbits were grossly normal.  Pt resumed on DAPT, started on atorvastatin 80 mg qhs, and admitted to CDU for further neurological workp.  Pt eventually admitted to telemetry.       (15 Feb 2025 10:18)    Hospital Course: came as code stroke and no acute CVA was found, treated with empiric ABs x once, resumed aspirin, plavix and statin. Started with iv solumedrol. She was consulted with vas surg, neurol, Ophthal, ID, PT/OT. She is hemodynamically stable to be discharged home with home care. Dispo and disc meds were d/w Attending.     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  Pt reports that her constellation of symptoms bears some resemblance to previous migraine episodes.  Pt also had seen a retinal specialist on the day of presentation who advised her to come to the emergency room, no documentation of visit is available.  Code stroke was called upon arrival at [facility] ED.  NIHSS = 4.  Pt deemed outside time window for thrombolytics.  CTH showed no acute intracranial hemorrhage or mass effect and stable chronic right PCA infarct and overall no significant change from 3/1/2024.   CTA head/neck showed coarse calcified plaque at innominate artery with flow-limiting disease of both CCA and subclavian artery, severe stenosis and ultimate filling of the right common carotid artery, severe stenosis at right carotid bifurcation with additional flow-limiting disease into ICA, possible subclavian steal from right vertebral artery, and chronic severe stenosis of right PCA at P2 segment, unchanged and concordant with chronic infarct zone.  Neurology/ophthalmology were consulted.  MRI head showed no acuities and MRI orbits was limited by motion artifact but the orbits were grossly normal.  Pt resumed on DAPT, started on atorvastatin 80 mg qhs, and admitted to CDU for further neurological workp.  Pt eventually admitted to telemetry.       (15 Feb 2025 10:18)    Hospital Course: came as code stroke and no acute CVA was found, treated with empiric ABs x once, resumed aspirin, plavix and statin. Started with iv solumedrol. She was consulted with vas surg, neurol, Ophthal, ID, PT/OT. She is hemodynamically stable to be discharged home with home care. Dispo and disc meds were d/w Attending.     Active or Pending Issues Requiring Follow-up: vas, neurol, ophthal, PCP    Advanced Directives:   [x ] Full code  [ ] DNR  [ ] Hospice    Discharge Diagnoses: acute vestibular syndrome, severe R-carotid A stenosis, visual field deficit, occipital lobe CVA, HTN, anxiety        Attending Addendum:  Patient seen and examined by me on the discharge day. her foot/toe pain completely resolved. erythema and edema resolved.  able to bear weight and ambulate. doing well on steroids. Medications reviewed.   plan for total 5 days course of steroids. dc home on Prednisone 30 mg x 3 more days.   Feeling much better. No cp, no sob. no abd pain. no n/v/d. no HA, no Dizziness.  All questions answered in details. Follow up plan explained. d/w NP/PA.  More than 30 mins were spent evaluating patient and coordinating care for discharge.  Discharge summary sent to pt's primary care physician at [facility].   d/w the son Armando. follow up plans discussed: PCP/card and vascular.

## 2025-02-21 NOTE — DISCHARGE NOTE PROVIDER - NSDCFUSCHEDAPPT_GEN_ALL_CORE_FT
Zen Montiel  [facility] Physician Count includes the [facility]  VASCULAR [address]  Scheduled Appointment: 03/27/2025     [facility]  NEUROLOGY [address]  Scheduled Appointment: 03/24/2025    [facility]  NEUROLOGY [address] [place] [address]  Scheduled Appointment: 03/24/2025    Zen Montiel  [facility]  VASCULAR [address]  Scheduled Appointment: 03/27/2025    Kelsie Nelson  [facility]  NEUROLOGY [address]  Scheduled Appointment: 03/28/2025

## 2025-02-21 NOTE — PROGRESS NOTE ADULT - ASSESSMENT
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   Acute vestibular syndrome, MRI with no acute CVA  Severe R carotid artery stenosis   Suspected UTI, +UA with no Ucx sent, on ciprofloxacin- completed course earlier this am, no gu sx now  Now with L foot/great toe pain and tenderness, some swelling, mild erythema over toe, no inc warmth with elevated uric acid with L foot xray as above, on ciprofloxacin as above, remains afebrile, no leukocytosis   L foot findings likely due to gout, now with dec TTP and no erythema after steroids started - not c/w superimposed cellulitis   s/p ciprofloxacin 2/16-2/20    Recommendations:   Continue off antibiotics   Continue steroids for gout  Elevate lower extremities   Continue rest of care per primary team     Stable from ID standpoint at this time.  Please call if any questions, thank you     Adriana Mendoza M.D.  [facility]  Available on [employer] - *PREFERRED*  [phone]  After 5pm on weekdays and all day on weekends - please call [phone]     Thank you for consulting us and involving us in the management of this patients case. In addition to reviewing history, imaging, documents, labs, microbiology, took into account antibiotic stewardship, local antibiogram and infection control strategies and potential transmission issues.

## 2025-02-21 NOTE — DISCHARGE NOTE PROVIDER - NSDCMRMEDTOKEN_GEN_ALL_CORE_FT
aspirin 81 mg oral tablet: 1 tab(s) orally once a day  atorvastatin 40 mg oral tablet: 1 tab(s) orally once a day (at bedtime)  azelastine 137 mcg/inh (0.1%) nasal spray: 1 spray(s) in each nostril 2 times a day  ciprofloxacin 500 mg oral tablet: 1 tab(s) orally 2 times a day  loratadine 10 mg oral tablet: 1 tab(s) orally once a day  sertraline 25 mg oral tablet: 1 tab(s) orally once a day   acetaminophen 325 mg oral tablet: 2 tab(s) orally every 6 hours As needed Temp greater or equal to 38C (100.4F), Moderate Pain (4 - 6)  aspirin 81 mg oral tablet: 1 tab(s) orally once a day  atorvastatin 40 mg oral tablet: 1 tab(s) orally once a day (at bedtime)  azelastine 137 mcg/inh (0.1%) nasal spray: 1 spray(s) in each nostril 2 times a day  loratadine 10 mg oral tablet: 1 tab(s) orally once a day  ocular lubricant preservative-free ophthalmic solution: 1 drop(s) to each affected eye 4 times a day  sertraline 25 mg oral tablet: 1 tab(s) orally once a day   acetaminophen 325 mg oral tablet: 2 tab(s) orally every 6 hours As needed Temp greater or equal to 38C (100.4F), Moderate Pain (4 - 6)  aspirin 81 mg oral tablet: 1 tab(s) orally once a day  atorvastatin 40 mg oral tablet: 1 tab(s) orally once a day (at bedtime)  azelastine 137 mcg/inh (0.1%) nasal spray: 1 spray(s) in each nostril 2 times a day  clopidogrel 75 mg oral tablet: 1 tab(s) orally once a day  guaiFENesin 100 mg/5 mL oral liquid: 10 milliliter(s) orally every 6 hours as needed for Cough  Home physical therapy and home occupational therapy: .  loratadine 10 mg oral tablet: 1 tab(s) orally once a day  ocular lubricant preservative-free ophthalmic solution: 1 drop(s) to each affected eye 4 times a day  predniSONE 10 mg oral tablet: 3 tab(s) orally once a day for 3 days then stop  sertraline 25 mg oral tablet: 1 tab(s) orally once a day

## 2025-02-21 NOTE — DISCHARGE NOTE PROVIDER - NPI NUMBER (FOR [employer] USE ONLY) :
[[id number]] [[id number]],[[id number]],[[id number]],[[id number]],[[id number]]

## 2025-03-06 ENCOUNTER — EMERGENCY (EMERGENCY)
Facility: HOSPITAL | Age: 75
LOS: 1 days | Discharge: HOME | End: 2025-03-06
Attending: EMERGENCY MEDICINE
Payer: MEDICARE

## 2025-03-06 ENCOUNTER — APPOINTMENT (OUTPATIENT)
Dept: VASCULAR SURGERY | Facility: CLINIC | Age: 75
End: 2025-03-06

## 2025-03-06 VITALS
HEIGHT: 60 IN | OXYGEN SATURATION: 97 % | DIASTOLIC BLOOD PRESSURE: 60 MMHG | HEART RATE: 80 BPM | RESPIRATION RATE: 20 BRPM | WEIGHT: 164.91 LBS | SYSTOLIC BLOOD PRESSURE: 91 MMHG

## 2025-03-06 VITALS
HEART RATE: 75 BPM | OXYGEN SATURATION: 96 % | RESPIRATION RATE: 18 BRPM | DIASTOLIC BLOOD PRESSURE: 69 MMHG | SYSTOLIC BLOOD PRESSURE: 120 MMHG

## 2025-03-06 DIAGNOSIS — Z96.651 PRESENCE OF RIGHT ARTIFICIAL KNEE JOINT: Chronic | ICD-10-CM

## 2025-03-06 DIAGNOSIS — Z96.659 PRESENCE OF UNSPECIFIED ARTIFICIAL KNEE JOINT: Chronic | ICD-10-CM

## 2025-03-06 DIAGNOSIS — Z96.619 PRESENCE OF UNSPECIFIED ARTIFICIAL SHOULDER JOINT: Chronic | ICD-10-CM

## 2025-03-06 LAB
ALBUMIN SERPL ELPH-MCNC: 4.1 G/DL — SIGNIFICANT CHANGE UP (ref 3.3–5)
ALP SERPL-CCNC: 80 U/L — SIGNIFICANT CHANGE UP (ref 40–120)
ALT FLD-CCNC: 15 U/L — SIGNIFICANT CHANGE UP (ref 10–45)
ANION GAP SERPL CALC-SCNC: 14 MMOL/L — SIGNIFICANT CHANGE UP (ref 5–17)
APTT BLD: 30.1 SEC — SIGNIFICANT CHANGE UP (ref 24.5–35.6)
AST SERPL-CCNC: 19 U/L — SIGNIFICANT CHANGE UP (ref 10–40)
BASOPHILS # BLD AUTO: 0.05 K/UL — SIGNIFICANT CHANGE UP (ref 0–0.2)
BASOPHILS NFR BLD AUTO: 0.6 % — SIGNIFICANT CHANGE UP (ref 0–2)
BILIRUB SERPL-MCNC: 0.4 MG/DL — SIGNIFICANT CHANGE UP (ref 0.2–1.2)
BUN SERPL-MCNC: 21 MG/DL — SIGNIFICANT CHANGE UP (ref 7–23)
CALCIUM SERPL-MCNC: 9.2 MG/DL — SIGNIFICANT CHANGE UP (ref 8.4–10.5)
CHLORIDE SERPL-SCNC: 106 MMOL/L — SIGNIFICANT CHANGE UP (ref 96–108)
CO2 SERPL-SCNC: 22 MMOL/L — SIGNIFICANT CHANGE UP (ref 22–31)
CREAT SERPL-MCNC: 1.13 MG/DL — SIGNIFICANT CHANGE UP (ref 0.5–1.3)
EGFR: 51 ML/MIN/1.73M2 — LOW
EGFR: 51 ML/MIN/1.73M2 — LOW
EOSINOPHIL # BLD AUTO: 0.14 K/UL — SIGNIFICANT CHANGE UP (ref 0–0.5)
EOSINOPHIL NFR BLD AUTO: 1.7 % — SIGNIFICANT CHANGE UP (ref 0–6)
GLUCOSE SERPL-MCNC: 104 MG/DL — HIGH (ref 70–99)
HCT VFR BLD CALC: 44.4 % — SIGNIFICANT CHANGE UP (ref 34.5–45)
HGB BLD-MCNC: 13.8 G/DL — SIGNIFICANT CHANGE UP (ref 11.5–15.5)
IMM GRANULOCYTES NFR BLD AUTO: 0.5 % — SIGNIFICANT CHANGE UP (ref 0–0.9)
INR BLD: 1.07 RATIO — SIGNIFICANT CHANGE UP (ref 0.85–1.16)
LYMPHOCYTES # BLD AUTO: 2.09 K/UL — SIGNIFICANT CHANGE UP (ref 1–3.3)
LYMPHOCYTES # BLD AUTO: 25 % — SIGNIFICANT CHANGE UP (ref 13–44)
MCHC RBC-ENTMCNC: 27.2 PG — SIGNIFICANT CHANGE UP (ref 27–34)
MCHC RBC-ENTMCNC: 31.1 G/DL — LOW (ref 32–36)
MCV RBC AUTO: 87.6 FL — SIGNIFICANT CHANGE UP (ref 80–100)
MONOCYTES # BLD AUTO: 0.51 K/UL — SIGNIFICANT CHANGE UP (ref 0–0.9)
MONOCYTES NFR BLD AUTO: 6.1 % — SIGNIFICANT CHANGE UP (ref 2–14)
NEUTROPHILS # BLD AUTO: 5.54 K/UL — SIGNIFICANT CHANGE UP (ref 1.8–7.4)
NEUTROPHILS NFR BLD AUTO: 66.1 % — SIGNIFICANT CHANGE UP (ref 43–77)
NRBC BLD AUTO-RTO: 0 /100 WBCS — SIGNIFICANT CHANGE UP (ref 0–0)
PLATELET # BLD AUTO: 255 K/UL — SIGNIFICANT CHANGE UP (ref 150–400)
POTASSIUM SERPL-MCNC: 4.2 MMOL/L — SIGNIFICANT CHANGE UP (ref 3.5–5.3)
POTASSIUM SERPL-SCNC: 4.2 MMOL/L — SIGNIFICANT CHANGE UP (ref 3.5–5.3)
PROT SERPL-MCNC: 7.5 G/DL — SIGNIFICANT CHANGE UP (ref 6–8.3)
PROTHROM AB SERPL-ACNC: 12.2 SEC — SIGNIFICANT CHANGE UP (ref 9.9–13.4)
RBC # BLD: 5.07 M/UL — SIGNIFICANT CHANGE UP (ref 3.8–5.2)
RBC # FLD: 14.3 % — SIGNIFICANT CHANGE UP (ref 10.3–14.5)
SODIUM SERPL-SCNC: 142 MMOL/L — SIGNIFICANT CHANGE UP (ref 135–145)
TROPONIN T, HIGH SENSITIVITY RESULT: 24 NG/L — SIGNIFICANT CHANGE UP (ref 0–51)
WBC # BLD: 8.37 K/UL — SIGNIFICANT CHANGE UP (ref 3.8–10.5)
WBC # FLD AUTO: 8.37 K/UL — SIGNIFICANT CHANGE UP (ref 3.8–10.5)

## 2025-03-06 PROCEDURE — 85610 PROTHROMBIN TIME: CPT

## 2025-03-06 PROCEDURE — 82330 ASSAY OF CALCIUM: CPT

## 2025-03-06 PROCEDURE — 82803 BLOOD GASES ANY COMBINATION: CPT

## 2025-03-06 PROCEDURE — 85018 HEMOGLOBIN: CPT

## 2025-03-06 PROCEDURE — 99285 EMERGENCY DEPT VISIT HI MDM: CPT | Mod: 25

## 2025-03-06 PROCEDURE — 84132 ASSAY OF SERUM POTASSIUM: CPT

## 2025-03-06 PROCEDURE — 70450 CT HEAD/BRAIN W/O DYE: CPT | Mod: MC

## 2025-03-06 PROCEDURE — 84484 ASSAY OF TROPONIN QUANT: CPT

## 2025-03-06 PROCEDURE — 85730 THROMBOPLASTIN TIME PARTIAL: CPT

## 2025-03-06 PROCEDURE — 84295 ASSAY OF SERUM SODIUM: CPT

## 2025-03-06 PROCEDURE — 82947 ASSAY GLUCOSE BLOOD QUANT: CPT

## 2025-03-06 PROCEDURE — 96374 THER/PROPH/DIAG INJ IV PUSH: CPT | Mod: XU

## 2025-03-06 PROCEDURE — 93010 ELECTROCARDIOGRAM REPORT: CPT

## 2025-03-06 PROCEDURE — 70496 CT ANGIOGRAPHY HEAD: CPT | Mod: 26

## 2025-03-06 PROCEDURE — 96375 TX/PRO/DX INJ NEW DRUG ADDON: CPT | Mod: XU

## 2025-03-06 PROCEDURE — 82962 GLUCOSE BLOOD TEST: CPT

## 2025-03-06 PROCEDURE — 93005 ELECTROCARDIOGRAM TRACING: CPT

## 2025-03-06 PROCEDURE — 99285 EMERGENCY DEPT VISIT HI MDM: CPT | Mod: GC

## 2025-03-06 PROCEDURE — 85025 COMPLETE CBC W/AUTO DIFF WBC: CPT

## 2025-03-06 PROCEDURE — 70450 CT HEAD/BRAIN W/O DYE: CPT | Mod: 26,XU

## 2025-03-06 PROCEDURE — 70498 CT ANGIOGRAPHY NECK: CPT

## 2025-03-06 PROCEDURE — 70496 CT ANGIOGRAPHY HEAD: CPT | Mod: MC

## 2025-03-06 PROCEDURE — 83605 ASSAY OF LACTIC ACID: CPT

## 2025-03-06 PROCEDURE — 82435 ASSAY OF BLOOD CHLORIDE: CPT

## 2025-03-06 PROCEDURE — 85014 HEMATOCRIT: CPT

## 2025-03-06 PROCEDURE — 70498 CT ANGIOGRAPHY NECK: CPT | Mod: 26

## 2025-03-06 PROCEDURE — 80053 COMPREHEN METABOLIC PANEL: CPT

## 2025-03-06 RX ORDER — METOCLOPRAMIDE HCL 10 MG
10 TABLET ORAL ONCE
Refills: 0 | Status: COMPLETED | OUTPATIENT
Start: 2025-03-06 | End: 2025-03-06

## 2025-03-06 RX ORDER — ACETAMINOPHEN 500 MG/5ML
1000 LIQUID (ML) ORAL ONCE
Refills: 0 | Status: COMPLETED | OUTPATIENT
Start: 2025-03-06 | End: 2025-03-06

## 2025-03-06 RX ADMIN — Medication 1000 MILLILITER(S): at 08:24

## 2025-03-06 RX ADMIN — Medication 400 MILLIGRAM(S): at 08:23

## 2025-03-06 RX ADMIN — Medication 1000 MILLIGRAM(S): at 09:39

## 2025-03-06 RX ADMIN — Medication 10 MILLIGRAM(S): at 08:24

## 2025-03-11 ENCOUNTER — NON-APPOINTMENT (OUTPATIENT)
Age: 75
End: 2025-03-11

## 2025-03-14 ENCOUNTER — NON-APPOINTMENT (OUTPATIENT)
Age: 75
End: 2025-03-14

## 2025-03-24 ENCOUNTER — APPOINTMENT (OUTPATIENT)
Dept: NEUROLOGY | Facility: CLINIC | Age: 75
End: 2025-03-24

## 2025-03-28 ENCOUNTER — APPOINTMENT (OUTPATIENT)
Dept: NEUROLOGY | Facility: CLINIC | Age: 75
End: 2025-03-28

## 2025-04-03 ENCOUNTER — APPOINTMENT (OUTPATIENT)
Dept: VASCULAR SURGERY | Facility: CLINIC | Age: 75
End: 2025-04-03
Payer: MEDICARE

## 2025-04-03 VITALS — HEIGHT: 64 IN

## 2025-04-03 PROCEDURE — 93880 EXTRACRANIAL BILAT STUDY: CPT

## 2025-04-03 PROCEDURE — 99215 OFFICE O/P EST HI 40 MIN: CPT

## 2025-04-04 NOTE — PHYSICAL THERAPY INITIAL EVALUATION ADULT - LEVEL OF INDEPENDENCE: SIT/SUPINE, REHAB EVAL
Medication: nebivolol passed protocol.   Last office visit date: 2/2025  Next appointment scheduled?: Yes   Number of refills given: 3    minimum assist (75% patients effort) moderate assist (50% patients effort)

## 2025-08-01 NOTE — PROGRESS NOTE ADULT - ASSESSMENT
Pt. C/o left eye irritation, MD notified.  Saline gtts instilled into left eye, pt. Reported comfort, no swelling, no redness noted to site.  Instructed pt. To return to ER if  further irritation or discomforts occur.  Pt. Verbalized understanding.   73 -year-old female with obesity, COPD, CVA '20 (R occipital lobe, on ASA 81mg QD), HTN, HLD, anxiety, sciatica, presenting from home with vertigo, nausea, difficulty ambulating. Symptoms started 12/28 however patient left AMA, returned 12/29 for further evaluation, seen by NSx, Neuro, plan for eventual discharge to [facility] given persistently symptomatic w/motion, per neuro unlikely central etiology. 73 -year-old female with obesity, COPD, CVA '20 (R occipital lobe, on ASA 81mg QD), HTN, HLD, anxiety, sciatica, presenting from home with vertigo, nausea, difficulty ambulating. Symptoms started 12/28 however patient left AMA, returned 12/29 for further evaluation, seen by NSx, Neuro, plan for eventual discharge to [facility] given persistently symptomatic w/motion, per neuro unlikely central etiology.